# Patient Record
Sex: FEMALE | Race: WHITE | NOT HISPANIC OR LATINO | Employment: FULL TIME | URBAN - METROPOLITAN AREA
[De-identification: names, ages, dates, MRNs, and addresses within clinical notes are randomized per-mention and may not be internally consistent; named-entity substitution may affect disease eponyms.]

---

## 2017-06-15 DIAGNOSIS — E66.3 OVERWEIGHT(278.02): ICD-10-CM

## 2017-06-15 DIAGNOSIS — Z12.31 ENCOUNTER FOR SCREENING MAMMOGRAM FOR MALIGNANT NEOPLASM OF BREAST: ICD-10-CM

## 2017-06-15 DIAGNOSIS — R14.0 ABDOMINAL DISTENSION (GASEOUS): ICD-10-CM

## 2017-06-15 DIAGNOSIS — R73.9 HYPERGLYCEMIA: ICD-10-CM

## 2017-06-15 DIAGNOSIS — Z12.11 ENCOUNTER FOR SCREENING FOR MALIGNANT NEOPLASM OF COLON: ICD-10-CM

## 2017-06-15 DIAGNOSIS — F41.9 ANXIETY DISORDER: ICD-10-CM

## 2017-06-15 DIAGNOSIS — E78.5 HYPERLIPIDEMIA: ICD-10-CM

## 2017-06-15 DIAGNOSIS — K21.9 GASTRO-ESOPHAGEAL REFLUX DISEASE WITHOUT ESOPHAGITIS: ICD-10-CM

## 2017-06-22 ENCOUNTER — ALLSCRIPTS OFFICE VISIT (OUTPATIENT)
Dept: OTHER | Facility: OTHER | Age: 52
End: 2017-06-22

## 2017-06-26 ENCOUNTER — APPOINTMENT (OUTPATIENT)
Dept: LAB | Facility: HOSPITAL | Age: 52
End: 2017-06-26
Payer: COMMERCIAL

## 2017-06-26 DIAGNOSIS — F41.9 ANXIETY DISORDER: ICD-10-CM

## 2017-06-26 DIAGNOSIS — Z12.11 ENCOUNTER FOR SCREENING FOR MALIGNANT NEOPLASM OF COLON: ICD-10-CM

## 2017-06-26 DIAGNOSIS — E66.3 OVERWEIGHT(278.02): ICD-10-CM

## 2017-06-26 LAB
ALBUMIN SERPL BCP-MCNC: 3.6 G/DL (ref 3.5–5)
ALP SERPL-CCNC: 81 U/L (ref 46–116)
ALT SERPL W P-5'-P-CCNC: 40 U/L (ref 12–78)
ANION GAP SERPL CALCULATED.3IONS-SCNC: 8 MMOL/L (ref 4–13)
AST SERPL W P-5'-P-CCNC: 19 U/L (ref 5–45)
BASOPHILS # BLD AUTO: 0.04 THOUSANDS/ΜL (ref 0–0.1)
BASOPHILS NFR BLD AUTO: 0 % (ref 0–1)
BILIRUB SERPL-MCNC: 0.6 MG/DL (ref 0.2–1)
BUN SERPL-MCNC: 16 MG/DL (ref 5–25)
CALCIUM SERPL-MCNC: 8.8 MG/DL (ref 8.3–10.1)
CHLORIDE SERPL-SCNC: 107 MMOL/L (ref 100–108)
CHOLEST SERPL-MCNC: 233 MG/DL (ref 50–200)
CO2 SERPL-SCNC: 27 MMOL/L (ref 21–32)
CREAT SERPL-MCNC: 0.65 MG/DL (ref 0.6–1.3)
EOSINOPHIL # BLD AUTO: 0.33 THOUSAND/ΜL (ref 0–0.61)
EOSINOPHIL NFR BLD AUTO: 3 % (ref 0–6)
ERYTHROCYTE [DISTWIDTH] IN BLOOD BY AUTOMATED COUNT: 13.2 % (ref 11.6–15.1)
GFR SERPL CREATININE-BSD FRML MDRD: >60 ML/MIN/1.73SQ M
GLUCOSE P FAST SERPL-MCNC: 111 MG/DL (ref 65–99)
HCT VFR BLD AUTO: 45.6 % (ref 34.8–46.1)
HDLC SERPL-MCNC: 38 MG/DL (ref 40–60)
HEMOCCULT STL QL IA: POSITIVE
HGB BLD-MCNC: 15.3 G/DL (ref 11.5–15.4)
LDLC SERPL CALC-MCNC: 163 MG/DL (ref 0–100)
LYMPHOCYTES # BLD AUTO: 2.91 THOUSANDS/ΜL (ref 0.6–4.47)
LYMPHOCYTES NFR BLD AUTO: 29 % (ref 14–44)
MCH RBC QN AUTO: 32.1 PG (ref 26.8–34.3)
MCHC RBC AUTO-ENTMCNC: 33.6 G/DL (ref 31.4–37.4)
MCV RBC AUTO: 96 FL (ref 82–98)
MONOCYTES # BLD AUTO: 0.8 THOUSAND/ΜL (ref 0.17–1.22)
MONOCYTES NFR BLD AUTO: 8 % (ref 4–12)
NEUTROPHILS # BLD AUTO: 5.78 THOUSANDS/ΜL (ref 1.85–7.62)
NEUTS SEG NFR BLD AUTO: 60 % (ref 43–75)
NRBC BLD AUTO-RTO: 0 /100 WBCS
PLATELET # BLD AUTO: 275 THOUSANDS/UL (ref 149–390)
PMV BLD AUTO: 11.5 FL (ref 8.9–12.7)
POTASSIUM SERPL-SCNC: 4.2 MMOL/L (ref 3.5–5.3)
PROT SERPL-MCNC: 6.8 G/DL (ref 6.4–8.2)
RBC # BLD AUTO: 4.77 MILLION/UL (ref 3.81–5.12)
SODIUM SERPL-SCNC: 142 MMOL/L (ref 136–145)
TRIGL SERPL-MCNC: 162 MG/DL
TSH SERPL DL<=0.05 MIU/L-ACNC: 0.64 UIU/ML (ref 0.36–3.74)
WBC # BLD AUTO: 9.89 THOUSAND/UL (ref 4.31–10.16)

## 2017-06-26 PROCEDURE — 85025 COMPLETE CBC W/AUTO DIFF WBC: CPT

## 2017-06-26 PROCEDURE — 80061 LIPID PANEL: CPT

## 2017-06-26 PROCEDURE — 80053 COMPREHEN METABOLIC PANEL: CPT

## 2017-06-26 PROCEDURE — G0328 FECAL BLOOD SCRN IMMUNOASSAY: HCPCS

## 2017-06-26 PROCEDURE — 36415 COLL VENOUS BLD VENIPUNCTURE: CPT

## 2017-06-26 PROCEDURE — 84443 ASSAY THYROID STIM HORMONE: CPT

## 2017-06-29 ENCOUNTER — ALLSCRIPTS OFFICE VISIT (OUTPATIENT)
Dept: OTHER | Facility: OTHER | Age: 52
End: 2017-06-29

## 2017-07-31 ENCOUNTER — HOSPITAL ENCOUNTER (OUTPATIENT)
Dept: RADIOLOGY | Age: 52
Discharge: HOME | End: 2017-07-31
Payer: COMMERCIAL

## 2017-07-31 DIAGNOSIS — Z12.31 ENCOUNTER FOR SCREENING MAMMOGRAM FOR MALIGNANT NEOPLASM OF BREAST: ICD-10-CM

## 2017-07-31 PROCEDURE — G0202 SCR MAMMO BI INCL CAD: HCPCS

## 2017-08-17 ENCOUNTER — ALLSCRIPTS OFFICE VISIT (OUTPATIENT)
Dept: OTHER | Facility: OTHER | Age: 52
End: 2017-08-17

## 2017-08-30 ENCOUNTER — ALLSCRIPTS OFFICE VISIT (OUTPATIENT)
Dept: OTHER | Facility: OTHER | Age: 52
End: 2017-08-30

## 2017-08-30 DIAGNOSIS — E78.5 HYPERLIPIDEMIA: ICD-10-CM

## 2017-08-30 DIAGNOSIS — R73.9 HYPERGLYCEMIA: ICD-10-CM

## 2017-09-20 RX ORDER — OMEPRAZOLE 20 MG/1
20 CAPSULE, DELAYED RELEASE ORAL DAILY
COMMUNITY
End: 2018-07-23 | Stop reason: SDUPTHER

## 2017-09-20 RX ORDER — ATORVASTATIN CALCIUM 10 MG/1
10 TABLET, FILM COATED ORAL DAILY
COMMUNITY
End: 2018-03-15 | Stop reason: SDUPTHER

## 2017-09-20 RX ORDER — ALPRAZOLAM 0.25 MG/1
0.25 TABLET ORAL 2 TIMES DAILY PRN
COMMUNITY
End: 2018-03-15 | Stop reason: SDUPTHER

## 2017-09-20 RX ORDER — SERTRALINE HYDROCHLORIDE 25 MG/1
50 TABLET, FILM COATED ORAL DAILY
COMMUNITY
End: 2018-04-03 | Stop reason: SDUPTHER

## 2017-09-26 ENCOUNTER — ANESTHESIA EVENT (OUTPATIENT)
Dept: GASTROENTEROLOGY | Facility: MEDICAL CENTER | Age: 52
End: 2017-09-26
Payer: COMMERCIAL

## 2017-09-26 ENCOUNTER — GENERIC CONVERSION - ENCOUNTER (OUTPATIENT)
Dept: GASTROENTEROLOGY | Facility: MEDICAL CENTER | Age: 52
End: 2017-09-26

## 2017-09-26 ENCOUNTER — HOSPITAL ENCOUNTER (OUTPATIENT)
Facility: MEDICAL CENTER | Age: 52
Setting detail: OUTPATIENT SURGERY
Discharge: HOME | End: 2017-09-26
Attending: INTERNAL MEDICINE | Admitting: INTERNAL MEDICINE
Payer: COMMERCIAL

## 2017-09-26 ENCOUNTER — ANESTHESIA (OUTPATIENT)
Dept: GASTROENTEROLOGY | Facility: MEDICAL CENTER | Age: 52
End: 2017-09-26
Payer: COMMERCIAL

## 2017-09-26 VITALS
SYSTOLIC BLOOD PRESSURE: 121 MMHG | TEMPERATURE: 96.7 F | HEART RATE: 66 BPM | OXYGEN SATURATION: 98 % | HEIGHT: 63 IN | RESPIRATION RATE: 18 BRPM | DIASTOLIC BLOOD PRESSURE: 59 MMHG | WEIGHT: 170 LBS | BODY MASS INDEX: 30.12 KG/M2

## 2017-09-26 DIAGNOSIS — K21.9 GASTRO-ESOPHAGEAL REFLUX DISEASE WITHOUT ESOPHAGITIS: ICD-10-CM

## 2017-09-26 DIAGNOSIS — R19.5 FECAL OCCULT BLOOD TEST POSITIVE: ICD-10-CM

## 2017-09-26 PROCEDURE — 88342 IMHCHEM/IMCYTCHM 1ST ANTB: CPT | Performed by: INTERNAL MEDICINE

## 2017-09-26 PROCEDURE — 88305 TISSUE EXAM BY PATHOLOGIST: CPT | Performed by: INTERNAL MEDICINE

## 2017-09-26 RX ORDER — PROPOFOL 10 MG/ML
INJECTION, EMULSION INTRAVENOUS AS NEEDED
Status: DISCONTINUED | OUTPATIENT
Start: 2017-09-26 | End: 2017-09-26 | Stop reason: SURG

## 2017-09-26 RX ORDER — SODIUM CHLORIDE 9 MG/ML
125 INJECTION, SOLUTION INTRAVENOUS CONTINUOUS
Status: DISCONTINUED | OUTPATIENT
Start: 2017-09-26 | End: 2017-09-26 | Stop reason: HOSPADM

## 2017-09-26 RX ADMIN — PROPOFOL 30 MG: 10 INJECTION, EMULSION INTRAVENOUS at 10:48

## 2017-09-26 RX ADMIN — PROPOFOL 50 MG: 10 INJECTION, EMULSION INTRAVENOUS at 10:39

## 2017-09-26 RX ADMIN — SODIUM CHLORIDE 125 ML/HR: 0.9 INJECTION, SOLUTION INTRAVENOUS at 09:43

## 2017-09-26 RX ADMIN — PROPOFOL 130 MG: 10 INJECTION, EMULSION INTRAVENOUS at 10:19

## 2017-09-26 RX ADMIN — PROPOFOL 40 MG: 10 INJECTION, EMULSION INTRAVENOUS at 10:57

## 2017-09-26 RX ADMIN — PROPOFOL 30 MG: 10 INJECTION, EMULSION INTRAVENOUS at 10:46

## 2017-09-26 RX ADMIN — PROPOFOL 40 MG: 10 INJECTION, EMULSION INTRAVENOUS at 10:35

## 2017-09-26 RX ADMIN — SODIUM CHLORIDE: 0.9 INJECTION, SOLUTION INTRAVENOUS at 10:11

## 2017-09-26 RX ADMIN — PROPOFOL 50 MG: 10 INJECTION, EMULSION INTRAVENOUS at 10:33

## 2017-09-26 RX ADMIN — PROPOFOL 20 MG: 10 INJECTION, EMULSION INTRAVENOUS at 10:21

## 2017-09-26 RX ADMIN — PROPOFOL 40 MG: 10 INJECTION, EMULSION INTRAVENOUS at 10:54

## 2017-09-26 RX ADMIN — PROPOFOL 40 MG: 10 INJECTION, EMULSION INTRAVENOUS at 10:45

## 2017-09-26 RX ADMIN — PROPOFOL 50 MG: 10 INJECTION, EMULSION INTRAVENOUS at 10:42

## 2017-09-26 RX ADMIN — PROPOFOL 30 MG: 10 INJECTION, EMULSION INTRAVENOUS at 10:49

## 2017-09-26 RX ADMIN — PROPOFOL 40 MG: 10 INJECTION, EMULSION INTRAVENOUS at 11:02

## 2017-09-26 RX ADMIN — PROPOFOL 30 MG: 10 INJECTION, EMULSION INTRAVENOUS at 10:36

## 2017-09-26 RX ADMIN — LIDOCAINE HYDROCHLORIDE 50 MG: 20 INJECTION, SOLUTION INTRAVENOUS at 10:11

## 2017-09-26 RX ADMIN — PROPOFOL 50 MG: 10 INJECTION, EMULSION INTRAVENOUS at 10:29

## 2017-09-26 RX ADMIN — PROPOFOL 50 MG: 10 INJECTION, EMULSION INTRAVENOUS at 10:25

## 2017-09-26 NOTE — DISCHARGE INSTRUCTIONS
Upper Endoscopy   WHAT YOU NEED TO KNOW:   An upper endoscopy is also called an upper gastrointestinal (GI) endoscopy, or an esophagogastroduodenoscopy (EGD)  You may feel bloated, gassy, or have some abdominal discomfort after your procedure  Your throat may be sore for 24 to 36 hours  You may burp or pass gas from air that is still inside your body  DISCHARGE INSTRUCTIONS:   Call 911 for any of the following:   · You have sudden chest pain or trouble breathing  Seek care immediately if:   · You feel dizzy or faint  · You have trouble swallowing  · Your bowel movements are very dark or black  · Your abdomen is hard and firm and you have severe pain  · You vomit blood  Contact your healthcare provider if:   · You feel full or bloated and cannot burp or pass gas  · You have not had a bowel movement for 3 days after your procedure  · You have neck pain  · You have a fever or chills  · You have nausea or are vomiting  · You have a rash or hives  · You have questions or concerns about your endoscopy  Relieve a sore throat:  Suck on throat lozenges or crushed ice  Gargle with a small amount of warm salt water  Mix 1 teaspoon of salt and 1 cup of warm water to make salt water  Relieve gas and discomfort from bloating:  Lie on your right side with a heating pad on your abdomen  Take short walks to help pass gas  Eat small meals until bloating is relieved  Rest after your procedure: You have been given medicine to relax you  Do not  drive or make important decisions until the day after your procedure  Return to your normal activity as directed  You can usually return to work the day after your procedure  Follow up with your healthcare provider as directed:  Write down your questions so you remember to ask them during your visits     © 2017 [address] is for End User's use only and may not be sold, redistributed or otherwise used for commercial purposes  All illustrations and images included in CareNotes® are the copyrighted property of [employer]  or [address]   The above information is an  only  It is not intended as medical advice for individual conditions or treatments  Talk to your doctor, nurse or pharmacist before following any medical regimen to see if it is safe and effective for you  Gastritis   WHAT YOU NEED TO KNOW:   Gastritis is inflammation or irritation of the lining of your stomach  DISCHARGE INSTRUCTIONS:   Call 911 for any of the following:   · You develop chest pain or shortness of breath  Seek care immediately if:   · You vomit blood  · You have black or bloody bowel movements  · You have severe stomach or back pain  Contact your healthcare provider if:   · You have a fever  · You have new or worsening symptoms, even after treatment  · You have questions or concerns about your condition or care  Medicines:   · Medicines  may be given to help treat a bacterial infection or decrease stomach acid  · Take your medicine as directed  Contact your healthcare provider if you think your medicine is not helping or if you have side effects  Tell him or her if you are allergic to any medicine  Keep a list of the medicines, vitamins, and herbs you take  Include the amounts, and when and why you take them  Bring the list or the pill bottles to follow-up visits  Carry your medicine list with you in case of an emergency  Manage or prevent gastritis:   · Do not smoke  Nicotine and other chemicals in cigarettes and cigars can make your symptoms worse and cause lung damage  Ask your healthcare provider for information if you currently smoke and need help to quit  E-cigarettes or smokeless tobacco still contain nicotine  Talk to your healthcare provider before you use these products  · Do not drink alcohol  Alcohol can prevent healing and make your gastritis worse   Talk to your healthcare provider if you need help to stop drinking  · Do not take NSAIDs or aspirin unless directed  These and similar medicines can cause irritation  If your healthcare provider says it is okay to take NSAIDs, take them with food  · Do not eat foods that cause irritation  Foods such as oranges and salsa can cause burning or pain  Eat a variety of healthy foods  Examples include fruits (not citrus), vegetables, low-fat dairy products, beans, whole-grain breads, and lean meats and fish  Try to eat small meals, and drink water with your meals  Do not eat for at least 3 hours before you go to bed  · Find ways to relax and decrease stress  Stress can increase stomach acid and make gastritis worse  Activities such as yoga, meditation, or listening to music can help you relax  Spend time with friends, or do things you enjoy  Follow up with your healthcare provider as directed: You may need ongoing tests or treatment, or referral to a gastroenterologist  Write down your questions so you remember to ask them during your visits  © 2017 [address] [facility] Information is for End User's use only and may not be sold, redistributed or otherwise used for commercial purposes  All illustrations and images included in CareNotes® are the copyrighted property of A D A M , Inc  or [employer]  The above information is an  only  It is not intended as medical advice for individual conditions or treatments  Talk to your doctor, nurse or pharmacist before following any medical regimen to see if it is safe and effective for you  Colonoscopy   WHAT YOU NEED TO KNOW:   A colonoscopy is a procedure to examine the inside of your colon (intestine) with a scope  Polyps or tissue growths may have been removed during your colonoscopy  It is normal to feel bloated and to have some abdominal discomfort  You should be passing gas   If you have hemorrhoids or you had polyps removed, you may have a small amount of bleeding  DISCHARGE INSTRUCTIONS:   Seek care immediately if:   · You have a large amount of bright red blood in your bowel movements  · Your abdomen is hard and firm and you have severe pain  · You have sudden trouble breathing  Contact your healthcare provider if:   · You develop a rash or hives  · You have a fever within 24 hours of your procedure       · You have not had a bowel movement for 3 days after your procedure  · You have questions or concerns about your condition or care  Activity:   · Do not lift, strain, or run  for 3 days after your procedure  · Rest after your procedure  You have been given medicine to relax you  Do not  drive or make important decisions until the day after your procedure  Return to your normal activity as directed  · Relieve gas and discomfort from bloating  by lying on your right side with a heating pad on your abdomen  You may need to take short walks to help the gas move out  Eat small meals until bloating is relieved  If you had polyps removed: For 7 days after your procedure:  · Do not  take aspirin  · Do not  go on long car rides  Follow up with your healthcare provider as directed:  Write down your questions so you remember to ask them during your visits  © 2017 [address] is for End User's use only and may not be sold, redistributed or otherwise used for commercial purposes  All illustrations and images included in CareNotes® are the copyrighted property of [employer]  or [address]  The above information is an  only  It is not intended as medical advice for individual conditions or treatments  Talk to your doctor, nurse or pharmacist before following any medical regimen to see if it is safe and effective for you  Colorectal Polyps   WHAT YOU NEED TO KNOW:   Colorectal polyps are small growths of tissue in the lining of the colon and rectum   Most polyps are hyperplastic polyps and are usually benign (noncancerous)  Certain types of polyps, called adenomatous polyps, may turn into cancer  DISCHARGE INSTRUCTIONS:   Follow up with your healthcare provider or gastroenterologist as directed: You may need to return for more tests, such as another colonoscopy  Write down your questions so you remember to ask them during your visits  Reduce your risk for colorectal polyps:   · Eat a variety of healthy foods:  Healthy foods include fruit, vegetables, whole-grain breads, low-fat dairy products, beans, lean meat, and fish  Ask if you need to be on a special diet  · Maintain a healthy weight:  Ask your healthcare provider if you need to lose weight and how much you need to lose  Ask for help with a weight loss program     · Exercise:  Begin to exercise slowly and do more as you get stronger  Talk with your healthcare provider before you start an exercise program      · Limit alcohol:  Your risk for polyps increases the more you drink  · Do not smoke: If you smoke, it is never too late to quit  Ask for information about how to stop  For support and more information:   · [facility]) [address], [place] [address]  Phone: 2- [phone]  Web Address: www digestive  niddk nih gov  Contact your healthcare provider or gastroenterologist if:   · You have a fever  · You have chills, a cough, or feel weak and achy  · You have abdominal pain that does not go away or gets worse after you take medicine  · Your abdomen is swollen  · You are losing weight without trying  · You have questions or concerns about your condition or care  Seek care immediately or call 911 if:   · You have sudden shortness of breath  · You have a fast heart rate, fast breathing, or are too dizzy to stand up  · You have severe abdominal pain  · You see blood in your bowel movement    © 2017 [facility] [address] is for End User's use only and may not be sold, redistributed or otherwise used for commercial purposes  All illustrations and images included in CareNotes® are the copyrighted property of A D A M , Inc  or [employer]  The above information is an  only  It is not intended as medical advice for individual conditions or treatments  Talk to your doctor, nurse or pharmacist before following any medical regimen to see if it is safe and effective for you

## 2017-09-26 NOTE — OP NOTE
**** GI/ENDOSCOPY REPORT ****     PATIENT NAME: Jacinto Joyner - VISIT ID:  Patient ID: [id number]   YOB: 1965     INTRODUCTION: Esophagogastroduodenoscopy - A 46 female patient presents   for an outpatient Esophagogastroduodenoscopy at [place] [address]  INDICATIONS: GERD  CONSENT: The benefits, risks, and alternatives to the procedure were   discussed and informed consent was obtained from the patient  PREPARATION:  EKG, pulse, pulse oximetry and blood pressure were monitored   throughout the procedure  MEDICATIONS: MAC anesthesia  PROCEDURE:  The endoscope was passed without difficulty through the mouth   under direct visualization and advanced to the 2nd portion of the   duodenum  The scope was withdrawn and the mucosa was carefully examined  FINDINGS:   Esophagus: The Z line was visualized at 39 cm from the entry   site  The esophagus appeared to be normal   Stomach: Gastritis was found   in the pylorus  A cold forceps biopsy was taken  Random biopsies taken   from the gastric mucosa  Duodenum: The duodenum appeared to be normal      COMPLICATIONS: There were no complications  IMPRESSIONS: Z line visualized  Normal esophagus  Gastritis found in the   pylorus  Biopsy taken  Normal duodenum  RECOMMENDATIONS: Follow-up on the results of the biopsy specimens  ESTIMATED BLOOD LOSS: Insignificant  PATHOLOGY SPECIMENS: Cold forceps biopsy taken  Associated finding:   Gastritis  PROCEDURE CODES:     ICD-9 Codes: 530 81 Esophageal reflux 535 50 Unspecified gastritides and   gastroduodenitis, without mention of hemorrhage     ICD-10 Codes: K21 Gastro-esophageal reflux disease K29 Gastritis and   duodenitis     PERFORMED BY: OUSMANE Gama  on 09/26/2017  Version 1, electronically signed by OUSMANE Person  on 09/26/2017 at   11:07

## 2017-09-26 NOTE — OP NOTE
**** GI/ENDOSCOPY REPORT ****     PATIENT NAME: Noble Garcia ------ VISIT ID:  Patient ID:   [id number] YOB: 1965     INTRODUCTION: Colonoscopy - A 46 female patient presents for an outpatient   Colonoscopy at [address] [place] [address]  PREVIOUS COLONOSCOPY:     INDICATIONS: Fecal occult blood positive  CONSENT:  The benefits, risks, and alternatives to the procedure were   discussed and informed consent was obtained from the patient  PREPARATION: EKG, pulse, pulse oximetry and blood pressure were monitored   throughout the procedure  The patient was identified by myself both   verbally and by visual inspection of ID band  Airway Assessment   Classification: Airway class 2 - Visualization of the soft palate, fauces   and uvula  ASA Classification: See anesthesia record  MEDICATIONS: Anesthesia-check records MAC anesthesia  PROCEDURE:  The endoscope was passed without difficulty through the anus   under direct visualization and advanced to the cecum, confirmed by   appendiceal orifice and ileocecal valve  The scope was withdrawn and the   mucosa was carefully examined  The quality of the preparation was good  Cecal Intubation Time: 4 Minute(s) Scope Withdrawal Time:25 Minute(s)     RECTAL EXAM: Normal rectal exam      FINDINGS:   - A single diminutive sessile polyp was found in the cecum  The polyp was completely removed by cold biopsy polypectomy  - Three   diminutive sessile polyps were found in the ascending colon  All polyps   were removed by cold biopsy polypectomy  - A single polyp, measuring   between 5 and 10 mm in size, was found in the hepatic flexure  The polyp   was completely removed by cold snare polypectomy  - Two diminutive   sessile polyps were found in the descending colon  The polyps were removed   by cold snare polypectomy  - A single pedunculated polyp, measuring   greater than 20 mm in size, was found in the descending colon  The polyp   was completely removed by snare cautery polypectomy  To control bleeding,   2 clips was applied  - there are other polyps in sigmoid colon, will plan   for removal during next session  COMPLICATIONS: There were no complications  IMPRESSIONS: A single diminutive sessile polyp found in the cecum; removed   by cold biopsy polypectomy  Three diminutive, bleeding, sessile polyps   found in the ascending colon; all polyps removed by cold biopsy   polypectomy  A single polyp found in the hepatic flexure; removed by cold   snare polypectomy  Two diminutive sessile polyps found in the descending   colon; removed by cold snare polypectomy  A single pedunculated polyp   found in the descending colon; removed by snare cautery polypectomy  Clips   were applied to control bleeding  RECOMMENDATIONS: Follow-up on the results of the biopsy specimens  Colonoscopy recommended in 1 month  ESTIMATED BLOOD LOSS: Insignificant  PATHOLOGY SPECIMENS: Completely removed by cold biopsy polypectomy  All   polyps removed by cold biopsy polypectomy  Completely removed by cold   snare polypectomy  Removed by cold snare polypectomy  Completely removed   by snare cautery polypectomy  PROCEDURE CODES:     ICD-9 Codes: 792 1 Nonspecific abnormal findings in stool contents 211 3   Benign neoplasm of colon 211 3 Benign neoplasm of colon 211 3 Benign   neoplasm of colon 211 3 Benign neoplasm of colon 211 3 Benign neoplasm of   colon     ICD-10 Codes: R19 5 Other fecal abnormalities K63 5 Polyp of colon K63 5   Polyp of colon K63 5 Polyp of colon K63 5 Polyp of colon K63 5 Polyp of   colon     PERFORMED BY: OUSMANE Le  on 09/26/2017  Version 1, electronically signed by OUSMANE Louise  on 09/26/2017 at   11:18

## 2017-10-04 ENCOUNTER — GENERIC CONVERSION - ENCOUNTER (OUTPATIENT)
Dept: OTHER | Facility: OTHER | Age: 52
End: 2017-10-04

## 2017-11-17 ENCOUNTER — APPOINTMENT (OUTPATIENT)
Dept: LAB | Facility: HOSPITAL | Age: 52
End: 2017-11-17
Attending: INTERNAL MEDICINE
Payer: COMMERCIAL

## 2017-11-17 DIAGNOSIS — R73.9 HYPERGLYCEMIA: ICD-10-CM

## 2017-11-17 DIAGNOSIS — E78.5 HYPERLIPIDEMIA: ICD-10-CM

## 2017-11-17 LAB
ANION GAP SERPL CALCULATED.3IONS-SCNC: 6 MMOL/L (ref 4–13)
BUN SERPL-MCNC: 18 MG/DL (ref 5–25)
CALCIUM SERPL-MCNC: 8.5 MG/DL (ref 8.3–10.1)
CHLORIDE SERPL-SCNC: 105 MMOL/L (ref 100–108)
CHOLEST SERPL-MCNC: 181 MG/DL (ref 50–200)
CO2 SERPL-SCNC: 29 MMOL/L (ref 21–32)
CREAT SERPL-MCNC: 0.66 MG/DL (ref 0.6–1.3)
EST. AVERAGE GLUCOSE BLD GHB EST-MCNC: 131 MG/DL
GFR SERPL CREATININE-BSD FRML MDRD: 102 ML/MIN/1.73SQ M
GLUCOSE P FAST SERPL-MCNC: 96 MG/DL (ref 65–99)
HBA1C MFR BLD: 6.2 % (ref 4.2–6.3)
HDLC SERPL-MCNC: 39 MG/DL (ref 40–60)
LDLC SERPL CALC-MCNC: 115 MG/DL (ref 0–100)
POTASSIUM SERPL-SCNC: 4.3 MMOL/L (ref 3.5–5.3)
SODIUM SERPL-SCNC: 140 MMOL/L (ref 136–145)
TRIGL SERPL-MCNC: 136 MG/DL

## 2017-11-17 PROCEDURE — 80061 LIPID PANEL: CPT

## 2017-11-17 PROCEDURE — 80048 BASIC METABOLIC PNL TOTAL CA: CPT

## 2017-11-17 PROCEDURE — 36415 COLL VENOUS BLD VENIPUNCTURE: CPT

## 2017-11-17 PROCEDURE — 83036 HEMOGLOBIN GLYCOSYLATED A1C: CPT

## 2017-12-04 ENCOUNTER — GENERIC CONVERSION - ENCOUNTER (OUTPATIENT)
Dept: OTHER | Facility: OTHER | Age: 52
End: 2017-12-04

## 2017-12-21 LAB
LEFT EYE DIABETIC RETINOPATHY: NORMAL
RIGHT EYE DIABETIC RETINOPATHY: NORMAL

## 2018-01-12 ENCOUNTER — APPOINTMENT (OUTPATIENT)
Dept: LAB | Facility: HOSPITAL | Age: 53
End: 2018-01-12
Payer: COMMERCIAL

## 2018-01-12 ENCOUNTER — ALLSCRIPTS OFFICE VISIT (OUTPATIENT)
Dept: OTHER | Facility: OTHER | Age: 53
End: 2018-01-12

## 2018-01-12 VITALS
OXYGEN SATURATION: 98 % | WEIGHT: 165.4 LBS | BODY MASS INDEX: 29.3 KG/M2 | SYSTOLIC BLOOD PRESSURE: 126 MMHG | DIASTOLIC BLOOD PRESSURE: 80 MMHG | TEMPERATURE: 97.8 F | HEART RATE: 73 BPM | HEIGHT: 63 IN

## 2018-01-12 DIAGNOSIS — E78.00 PURE HYPERCHOLESTEROLEMIA: ICD-10-CM

## 2018-01-12 DIAGNOSIS — E55.9 VITAMIN D DEFICIENCY: ICD-10-CM

## 2018-01-12 DIAGNOSIS — R35.0 FREQUENCY OF MICTURITION: ICD-10-CM

## 2018-01-12 LAB
BILIRUB UR QL STRIP: NEGATIVE
BILIRUB UR QL STRIP: NEGATIVE
CLARITY UR: NORMAL
CLARITY UR: NORMAL
COLOR UR: YELLOW
COLOR UR: YELLOW
GLUCOSE (HISTORICAL): NEGATIVE
GLUCOSE UR STRIP-MCNC: NEGATIVE MG/DL
HGB UR QL STRIP.AUTO: NEGATIVE
HGB UR QL STRIP.AUTO: NEGATIVE
KETONES UR STRIP-MCNC: NEGATIVE MG/DL
KETONES UR STRIP-MCNC: NEGATIVE MG/DL
LEUKOCYTE ESTERASE UR QL STRIP: NEGATIVE
LEUKOCYTE ESTERASE UR QL STRIP: NEGATIVE
NITRITE UR QL STRIP: NEGATIVE
NITRITE UR QL STRIP: NEGATIVE
PH UR STRIP.AUTO: 7.5 [PH]
PH UR STRIP.AUTO: 7.5 [PH] (ref 4.5–8)
PROT UR STRIP-MCNC: NEGATIVE MG/DL
PROT UR STRIP-MCNC: NEGATIVE MG/DL
SP GR UR STRIP.AUTO: 1.01 (ref 1–1.03)
SP GR UR STRIP.AUTO: 1.02
UROBILINOGEN UR QL STRIP.AUTO: 0.2
UROBILINOGEN UR QL STRIP.AUTO: 1 E.U./DL

## 2018-01-12 PROCEDURE — 81003 URINALYSIS AUTO W/O SCOPE: CPT

## 2018-01-13 VITALS
BODY MASS INDEX: 29.95 KG/M2 | HEIGHT: 63 IN | SYSTOLIC BLOOD PRESSURE: 124 MMHG | DIASTOLIC BLOOD PRESSURE: 82 MMHG | TEMPERATURE: 97.3 F | HEART RATE: 76 BPM | WEIGHT: 169 LBS | OXYGEN SATURATION: 97 %

## 2018-01-13 VITALS
RESPIRATION RATE: 18 BRPM | SYSTOLIC BLOOD PRESSURE: 118 MMHG | HEART RATE: 78 BPM | WEIGHT: 169 LBS | DIASTOLIC BLOOD PRESSURE: 71 MMHG | BODY MASS INDEX: 29.95 KG/M2 | TEMPERATURE: 97 F | HEIGHT: 63 IN

## 2018-01-13 NOTE — PROGRESS NOTES
Assessment   1  Urinary frequency (091 49) (R35 0)    Plan   Urinary frequency    · Phenazopyridine HCl - 200 MG Oral Tablet; TAKE 1 TABLET 3 TIMES DAILY AS    NEEDED FOR PAIN   · (1) URINALYSIS w URINE C/S REFLEX (will reflex a microscopy if leukocytes, occult    blood, or nitrites are not within normal limits); Status:Active; Requested NJP:[id number]; Discussion/Summary      As discussed, start the pyridium for your discomfort on urination  This medication can make your urine bright orange, which is normal   have sent the urine to the lab for culture  will call you with the results of the urine test if it is positive and we need to prescribe you an antibiotic   if not improving  The patient was counseled regarding diagnostic results,-- instructions for management,-- risk factor reductions,-- prognosis,-- patient and family education,-- impressions,-- risks and benefits of treatment options,-- importance of compliance with treatment  Possible side effects of new medications were reviewed with the patient/guardian today  The treatment plan was reviewed with the patient/guardian  The patient/guardian understands and agrees with the treatment plan      Chief Complaint   pt here c/o low back pain, burning with urination and frequency, pt used over the counter medication last night which relieved it a little      History of Present Illness   HPI: Patient presents for an acute visit  Patient presents with symptoms that started last night  She reports pressure on urinating, increased frequency and urgency, and intermittent burning on urination  She used Monistat 7 last night for her symptoms  She denies a foul odor or cloudiness to her urine  She denies fevers/chills, but does report lower abdominal pressure and pain at her bladder  Review of Systems        Constitutional: no fever-- and-- no chills  Cardiovascular: no chest pain  Respiratory: no shortness of breath        Gastrointestinal: abdominal pain, but-- as noted in HPI  Genitourinary: dysuria, but-- as noted in HPI  Integumentary: no rashes  Neurological: no headache  Active Problems   1  Abdominal bloating (787 3) (R14 0)   2  Abnormal EKG (794 31) (R94 31)   3  Abnormal weight gain (783 1) (R63 5)   4  Allergic rhinitis (477 9) (J30 9)   5  Anxiety (300 00) (F41 9)   6  Asthma (493 90) (J45 909)   7  Atherosclerosis of arteries (440 8) (I70 8)   8  Benign colon polyp (211 3) (K63 5)   9  Benign paroxysmal positional vertigo (386 11) (H81 10)   10  Colon polyps (211 3) (K63 5)   11  Dermatophytosis of nail (110 1) (B35 1)   12  Diverticulitis of colon (562 11) (K57 32)   13  Fecal occult blood test positive (792 1) (R19 5)   14  GERD (gastroesophageal reflux disease) (530 81) (K21 9)   15  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)   16  Headache, unspecified headache type (784 0) (R51)   17  Hypercholesteremia (272 0) (E78 00)   18  Hyperglycemia (790 29) (R73 9)   19  Hyperlipidemia (272 4) (E78 5)   20  Neuralgia and neuritis, unspecified (729 2) (M79 2)   21  Ovarian cyst (620 2) (N83 209)   22  Overweight (BMI 25 0-29 9) (278 02) (E66 3)   23  Renal calculus, bilateral (592 0) (N20 0)   24  Situational anxiety (300 09) (F41 8)   25  Solitary pulmonary nodule (793 11) (R91 1)    Past Medical History   Active Problems And Past Medical History Reviewed: The active problems and past medical history were reviewed and updated today  Surgical History   Surgical History Reviewed: The surgical history was reviewed and updated today  Social History    · Caffeine use (V49 89) (F15 90)   · Current every day smoker (305 1) (B71 166)   · No illicit drug use   · Rarely consumes alcohol (V49 89) (Z78 9)  The social history was reviewed and updated today  The social history was reviewed and is unchanged  Family History   Family History Reviewed: The family history was reviewed and updated today  Current Meds    1  ALPRAZolam 0 25 MG Oral Tablet; TAKE 1 TABLET Twice daily PRN; Therapy: 68JTX5138 to (Evaluate:50Trx3334); Last Rx:[id number] Ordered   2  Atorvastatin Calcium 10 MG Oral Tablet; TAKE 1 TABLET DAILY AS DIRECTED; Therapy: 97KWS0410 to (Renew:93Jbd4038)  Requested for: 24Rws7736; Last     Rx:[id number] Ordered   3  Omeprazole 20 MG Oral Capsule Delayed Release; Take 1 tablet daily; Therapy: 63CZQ2817 to (26 [id number])  Requested for: 19Nto7695; Last     Rx:[id number] Ordered   4  Sertraline HCl - 25 MG Oral Tablet; TAKE 1 TABLET DAILY; Therapy: 51GVX7668 to (Monika Santana)  Requested for: 30Aug2017; Last     Rx:30Aug2017 Ordered   5  Suprep Bowel Prep Kit 17 5-3 13-1 6 GM/180ML Oral Solution; USE AS DIRECTED; Therapy: 33OYW7921 to (Last Rx:06Oct2017)  Requested for: 73MIC5932 Ordered     The medication list was reviewed and updated today  Allergies   1  Bupropion   2  clonazepam   3  morphine   4  venlafaxine    Vitals    Recorded: 83WNK2905 01:13PM   Temperature 97 8 F, Oral   Heart Rate 84   Systolic 068, LUE, Sitting   Diastolic 82, LUE, Sitting   Height 5 ft 3 in   Weight 174 lb 2 oz   BMI Calculated 30 85   BSA Calculated 1 82   O2 Saturation 96, RA     Physical Exam        Constitutional      General appearance: No acute distress, well appearing and well nourished  Ears, Nose, Mouth, and Throat      External inspection of ears and nose: Normal        Oropharynx: Normal with no erythema, edema, exudate or lesions  Pulmonary      Respiratory effort: No increased work of breathing or signs of respiratory distress  Auscultation of lungs: Clear to auscultation  Cardiovascular      Auscultation of heart: Normal rate and rhythm, normal S1 and S2, without murmurs  Examination of extremities for edema and/or varicosities: Normal        Abdomen Tender over bladder on palpation , no CVA tenderness        Musculoskeletal      Gait and station: Normal  Psychiatric      Orientation to person, place, and time: Normal        Mood and affect: Normal           Future Appointments      Date/Time Provider Specialty Site   01/17/2018 11:30 AM Yeison Penn MD Internal Medicine [facility]     Signatures    Electronically signed by : Dangelo Tavarez; Jan 12 2018  1:49PM EST                       (Author)     Electronically signed by :  Lalo Andrade MD; Jan 12 2018  2:07PM EST                       (Author)

## 2018-01-14 VITALS
SYSTOLIC BLOOD PRESSURE: 130 MMHG | OXYGEN SATURATION: 97 % | WEIGHT: 166.38 LBS | HEIGHT: 63 IN | HEART RATE: 76 BPM | BODY MASS INDEX: 29.48 KG/M2 | TEMPERATURE: 98.4 F | DIASTOLIC BLOOD PRESSURE: 82 MMHG

## 2018-01-15 NOTE — RESULT NOTES
Discussion/Summary   EGD biopsy showed gasritis  colonsoocpy biopsy showed 6 polyps were precancerous  she will need a repeat colonsoocpy in 1-3 months  please schedule  thanks  Verified Results  (1) TISSUE EXAM [id number] 10:25AM Pravin Rojas     Test Name Result Flag Reference   LAB AP CASE REPORT (Report)     Surgical Pathology Report             Case: [id number]                   Authorizing Provider: Gold Vernon MD       Collected:      09/26/2017 1025        Ordering Location:   [facility]    Received:      09/27/2017 [facility]                            Pathologist:      Mary Rich MD                                 Specimens:  A) - Stomach, pylorus bx's                                       B) - Stomach, gastric bx's                                       C) - Large Intestine, Cecum, cold bx                                  D) - Large Intestine, Right/Ascending Colon, cold bx's                         E) - Large Intestine, Hepatic Flexure, cold snare polyp                        F) - Large Intestine, Sigmoid Colon, hot snare polyp                          G) - Large Intestine, Left/Descending Colon, cold snare polyp   LAB AP FINAL DIAGNOSIS (Report)     A  Gastric pylorus (biopsy):    - Minimal chronic inactive gastritis with features of reactive   gastropathy involving antral mucosa  - Immunostain for H  pylori (with appropriate positive control) is   negative  - No intestinal metaplasia, dysplasia or neoplasia identified  B  Stomach (biopsy):    - Minimal chronic inactive gastritis involving oxyntic and foveolar   mucosa  - Immunostain for H  pylori (with appropriate positive control) is   negative  - No definitive intestinal metaplasia, dysplasia or neoplasia   identified      C  Cecal polyp (cold biopsy):    - Portions of polypoid colonic mucosa with focal suggestion of   adenomatous epithelium     - No high-grade dysplasia or malignancy identified  D  Ascending colon polyp ??3 (cold biopsy):    - Portions of tubular adenomas  - No high-grade dysplasia or malignancy identified  E  Hepatic flexure polyp, colon (cold snare):    - Portions of tubular adenoma  - No high-grade dysplasia or malignancy identified  F  Sigmoid colon polyp (hot snare):    - Tubulovillous adenoma  - No high-grade dysplasia or malignancy identified  G  Descending colon polyp (cold snare):    - Polypoid colonic mucosa with focal suggestion of adenomatous   epithelium     - No high-grade dysplasia or malignancy identified  Electronically signed by Savi Ritchie MD on 10/2/2017 at 12:16 PM  Preliminary result electronically signed by Savi Ritchie MD on 9/29/2017 at 12:02 PM   LAB AP NOTE      Interpretation performed at [facility], [address] [place] [address], [address] [place] [address]  LAB AP SURGICAL ADDITIONAL INFORMATION (Report)     All controls performed with the immunohistochemical stains reported above   reacted appropriately  These tests were developed and their performance   characteristics determined by [facility] or   [employer]  They may not be cleared or approved by the [place]  Food and Drug Administration  The FDA has determined that such clearance   or approval is not necessary  These tests are used for clinical purposes  They should not be regarded as investigational or for research  This   laboratory has been approved by [place], designated as a high-complexity   laboratory and is qualified to perform these tests  LAB AP GROSS DESCRIPTION (Report)     A  The specimen is received in formalin, labeled with the patient's name   and hospital number, and is designated Stomach, pylorus biopsy  The   specimen consists of one tan soft tissue fragment measuring 0 3 cm in   greatest dimension  Entire submitted  One cassette  B   The specimen is received in formalin, labeled with the patient's name   and hospital number, and is designated Gastric biopsies  The specimen   consists of one tan soft tissue fragment measuring 0 5 cm in greatest   dimension  Entire submitted  One cassette  C  The specimen is received in formalin, labeled with the patient's name   and hospital number, and is designated Cecum polyp  The specimen   consists of 2 tan soft tissue fragments measuring 0 3 and 0 4 cm in   greatest dimension  Entire submitted  One cassette  D  The specimen is received in formalin, labeled with the patient's name   and hospital number, and is designated Ascending colon polyp ? ?3  The   specimen consists of multiple tan soft tissue fragments measuring in   aggregate 0 8 x 0 7 x 0 2 cm  Entire submitted  One cassette  E  The specimen is received in formalin, labeled with the patient's name   and hospital number, and is designated  Hepatic flexure polyp  The   specimen consists of 3 tan soft tissue fragments measuring 0 2, 0 2, and   0 3 cm in greatest dimension  Entire submitted  One cassette  F  The specimen is received in formalin, labeled with the patient's name   and hospital number, and is designated Sigmoid polyp  The specimen   consists of one tan-red polypoid tissue fragment measuring 2 0 cm in   greatest dimension  The apparent margin of resection is painted with blue   ink  The specimen is serially sectioned and entirely submitted in 4   cassettes  G  The specimen is received in formalin, labeled with the patient's name   and hospital number, and is designated Descending colon polyp  The   specimen consists of one tan soft tissue fragment measuring 0 3 cm in   greatest dimension  Entire submitted  One cassette  Note: The estimated total formalin fixation time based upon information   provided by the submitting clinician and the standard processing schedule   is less than 72 hours  AEK   LAB AP CLINICAL INFORMATION      Fecal occult blood test positive    Gastro-esophageal reflux disease without esophagitis     gastritis

## 2018-01-17 ENCOUNTER — GENERIC CONVERSION - ENCOUNTER (OUTPATIENT)
Dept: INTERNAL MEDICINE CLINIC | Facility: CLINIC | Age: 53
End: 2018-01-17

## 2018-01-17 ENCOUNTER — TRANSCRIBE ORDERS (OUTPATIENT)
Dept: ADMINISTRATIVE | Facility: HOSPITAL | Age: 53
End: 2018-01-17

## 2018-01-17 ENCOUNTER — ALLSCRIPTS OFFICE VISIT (OUTPATIENT)
Dept: OTHER | Facility: OTHER | Age: 53
End: 2018-01-17

## 2018-01-17 ENCOUNTER — TRANSCRIBE ORDERS (OUTPATIENT)
Dept: LAB | Age: 53
End: 2018-01-17

## 2018-01-17 ENCOUNTER — APPOINTMENT (OUTPATIENT)
Dept: LAB | Age: 53
End: 2018-01-17
Payer: COMMERCIAL

## 2018-01-17 DIAGNOSIS — E55.9 VITAMIN D DEFICIENCY DISEASE: ICD-10-CM

## 2018-01-17 DIAGNOSIS — G56.02 CARPAL TUNNEL SYNDROME OF LEFT WRIST: Primary | ICD-10-CM

## 2018-01-17 DIAGNOSIS — E55.9 VITAMIN D DEFICIENCY: ICD-10-CM

## 2018-01-17 LAB — 25(OH)D3 SERPL-MCNC: 19.1 NG/ML (ref 30–100)

## 2018-01-17 PROCEDURE — 82306 VITAMIN D 25 HYDROXY: CPT

## 2018-01-17 PROCEDURE — 36415 COLL VENOUS BLD VENIPUNCTURE: CPT

## 2018-01-23 VITALS
WEIGHT: 173 LBS | TEMPERATURE: 97.7 F | HEART RATE: 90 BPM | DIASTOLIC BLOOD PRESSURE: 90 MMHG | BODY MASS INDEX: 30.65 KG/M2 | SYSTOLIC BLOOD PRESSURE: 142 MMHG | OXYGEN SATURATION: 96 % | HEIGHT: 63 IN

## 2018-01-23 VITALS
HEART RATE: 84 BPM | HEIGHT: 63 IN | BODY MASS INDEX: 30.85 KG/M2 | SYSTOLIC BLOOD PRESSURE: 132 MMHG | DIASTOLIC BLOOD PRESSURE: 82 MMHG | TEMPERATURE: 97.8 F | WEIGHT: 174.13 LBS | OXYGEN SATURATION: 96 %

## 2018-01-23 NOTE — MISCELLANEOUS
Message  GI Reminder Recall [facility]:   Date: 12/05/2017   Dear Daren Cooney:     Review of our records shows you are due for the following: Colonoscopy  Our records indicate that you are due at this time to have a follow-up examination for a colonoscopy  As you now, these tests are done to prevent colon cancer, a very common disease in the [address] [place] [address] and responsible for the thousands of patient deaths each year  We at [address] [place] [address] [facility] are concerned for your health, and would very much appreciate you getting in touch with us at your earliest convenience, Again, this examination is vital to your proper health maintenance and for the prevention of cancer  Please call the following office to schedule your appointment:     We look forward to hearing from you!      Sincerely,     [facility]      Signatures   Electronically signed by : Armando Cr, ; Dec  5 2017  1:57PM EST                       (Author)

## 2018-02-27 ENCOUNTER — HOSPITAL ENCOUNTER (OUTPATIENT)
Dept: NEUROLOGY | Facility: AMBULATORY SURGERY CENTER | Age: 53
Discharge: HOME | End: 2018-02-27
Payer: COMMERCIAL

## 2018-02-27 DIAGNOSIS — G56.02 CARPAL TUNNEL SYNDROME OF LEFT WRIST: ICD-10-CM

## 2018-02-27 DIAGNOSIS — E55.9 VITAMIN D DEFICIENCY DISEASE: ICD-10-CM

## 2018-02-27 DIAGNOSIS — G56.03 CARPAL TUNNEL SYNDROME, BILATERAL: ICD-10-CM

## 2018-02-27 PROCEDURE — 95911 NRV CNDJ TEST 9-10 STUDIES: CPT | Performed by: PSYCHIATRY & NEUROLOGY

## 2018-02-27 PROCEDURE — 95886 MUSC TEST DONE W/N TEST COMP: CPT | Performed by: PSYCHIATRY & NEUROLOGY

## 2018-03-15 ENCOUNTER — OFFICE VISIT (OUTPATIENT)
Dept: INTERNAL MEDICINE CLINIC | Facility: CLINIC | Age: 53
End: 2018-03-15
Payer: COMMERCIAL

## 2018-03-15 VITALS
TEMPERATURE: 97.9 F | HEART RATE: 72 BPM | BODY MASS INDEX: 29.06 KG/M2 | HEIGHT: 65 IN | DIASTOLIC BLOOD PRESSURE: 80 MMHG | SYSTOLIC BLOOD PRESSURE: 152 MMHG | OXYGEN SATURATION: 98 % | WEIGHT: 174.4 LBS | RESPIRATION RATE: 20 BRPM

## 2018-03-15 DIAGNOSIS — J01.10 ACUTE NON-RECURRENT FRONTAL SINUSITIS: Primary | ICD-10-CM

## 2018-03-15 PROBLEM — R94.31 ABNORMAL EKG: Status: ACTIVE | Noted: 2017-06-14

## 2018-03-15 PROBLEM — R63.5 ABNORMAL WEIGHT GAIN: Status: ACTIVE | Noted: 2017-06-14

## 2018-03-15 PROBLEM — J30.9 ALLERGIC RHINITIS: Status: ACTIVE | Noted: 2017-06-14

## 2018-03-15 PROBLEM — F41.9 ANXIETY: Status: ACTIVE | Noted: 2017-06-22

## 2018-03-15 PROBLEM — R03.0 ELEVATED BP WITHOUT DIAGNOSIS OF HYPERTENSION: Status: ACTIVE | Noted: 2018-01-17

## 2018-03-15 PROBLEM — K57.32 DIVERTICULITIS OF COLON: Status: ACTIVE | Noted: 2017-06-29

## 2018-03-15 PROBLEM — K63.5 BENIGN COLON POLYP: Status: ACTIVE | Noted: 2017-09-26

## 2018-03-15 PROBLEM — G56.00 CTS (CARPAL TUNNEL SYNDROME): Status: ACTIVE | Noted: 2018-01-17

## 2018-03-15 PROBLEM — I70.90 ATHEROSCLEROSIS OF ARTERIES: Status: ACTIVE | Noted: 2017-06-14

## 2018-03-15 PROBLEM — H81.10 BENIGN PAROXYSMAL POSITIONAL VERTIGO: Status: ACTIVE | Noted: 2017-06-14

## 2018-03-15 PROBLEM — K21.9 GERD (GASTROESOPHAGEAL REFLUX DISEASE): Status: ACTIVE | Noted: 2017-06-22

## 2018-03-15 PROBLEM — J45.909 ASTHMA: Status: ACTIVE | Noted: 2017-06-14

## 2018-03-15 PROCEDURE — 99213 OFFICE O/P EST LOW 20 MIN: CPT | Performed by: NURSE PRACTITIONER

## 2018-03-15 RX ORDER — OMEPRAZOLE 20 MG/1
1 CAPSULE, DELAYED RELEASE ORAL DAILY
COMMUNITY
Start: 2017-06-22 | End: 2018-03-15 | Stop reason: SDUPTHER

## 2018-03-15 RX ORDER — NICOTINE 21 MG/24HR
1 PATCH, TRANSDERMAL 24 HOURS TRANSDERMAL DAILY
COMMUNITY
Start: 2018-01-17 | End: 2018-04-03

## 2018-03-15 RX ORDER — ALPRAZOLAM 0.25 MG/1
1 TABLET ORAL 2 TIMES DAILY PRN
COMMUNITY
Start: 2017-06-22 | End: 2021-07-26 | Stop reason: HOSPADM

## 2018-03-15 RX ORDER — SERTRALINE HYDROCHLORIDE 25 MG/1
TABLET, FILM COATED ORAL
COMMUNITY
Start: 2016-04-13 | End: 2018-03-15 | Stop reason: SDUPTHER

## 2018-03-15 RX ORDER — ATORVASTATIN CALCIUM 10 MG/1
1 TABLET, FILM COATED ORAL DAILY
COMMUNITY
Start: 2017-06-29 | End: 2018-04-03 | Stop reason: SDUPTHER

## 2018-03-15 RX ORDER — AZITHROMYCIN 250 MG/1
250 TABLET, FILM COATED ORAL EVERY 24 HOURS
Qty: 6 TABLET | Refills: 0 | Status: SHIPPED | OUTPATIENT
Start: 2018-03-15 | End: 2018-03-20

## 2018-03-15 NOTE — PROGRESS NOTES
Assessment/Plan:    Acute frontal sinusitis  Z pack take 2 tablets today and 1 tablet for 4 days after  Use Flonase 1 spray each nostril in the morning and at night  Use Zyrtec at night before going to bed  And use Mucinex 12 hour during the day for congestion  Rest and Hydrate  Take BP daily, and write them down, will discuss at your next appointment  It is felt that her BP is elevated due to her recent illness  Come back to the office if you are not feeling better in about a week, or if you develop fevers above 101  Diagnoses and all orders for this visit:    Acute non-recurrent frontal sinusitis  -     azithromycin (ZITHROMAX) 250 mg tablet; Take 1 tablet (250 mg total) by mouth every 24 hours for 5 days Take 2 tablets today, and one tablet daily for 4 days  Other orders  -     nicotine (NICODERM CQ) 14 mg/24hr TD 24 hr patch; Place 1 patch on the skin daily  -     Discontinue: omeprazole (PriLOSEC) 20 mg delayed release capsule; Take 1 tablet by mouth daily  -     Na Sulfate-K Sulfate-Mg Sulf (SUPREP BOWEL PREP KIT) 17 5-3 13-1 6 GM/180ML SOLN; Take by mouth  -     Cholecalciferol (VITAMIN D3) 57767 units TABS; Take by mouth  -     ALPRAZolam (XANAX) 0 25 mg tablet; Take 1 tablet by mouth 2 (two) times a day as needed  -     atorvastatin (LIPITOR) 10 mg tablet; Take 1 tablet by mouth daily  -     Discontinue: sertraline (ZOLOFT) 25 mg tablet; 1 TABLET DAILY          Subjective:      Patient ID: Madina Nixon is a 46 y o  female  Pt  Presents today for cold like symptoms  She was in urgent care last week (3/6/18) for cold like symptoms and was swabbed for flu which was negative, swabbed for strep and that was negative as well  She continues to have sore throat, hoarseness, ear "tingle" and a moist productive cough with greenish yellow secretions  She also has complaints of nasal congestion  Puffiness noted under both eyes   C/O right eye being "crusty" in AM  Reports some relief from OTC Vicks 44  Pt stated that BP was elevated at urgent care  BP was 155/80  Pt  Did have the flu vaccination this year  URI    This is a recurrent problem  The current episode started 1 to 4 weeks ago  The problem has been gradually worsening  There has been no fever  Associated symptoms include congestion, coughing (productive), headaches (sinus headaches), a plugged ear sensation, rhinorrhea, sinus pain, sneezing, a sore throat and swollen glands  Pertinent negatives include no abdominal pain, chest pain, diarrhea, dysuria, ear pain, joint pain, joint swelling, nausea, neck pain, rash, vomiting or wheezing  Treatments tried: OTC vicks 44  The treatment provided no relief  The following portions of the patient's history were reviewed and updated as appropriate: allergies, current medications, past family history, past medical history, past social history, past surgical history and problem list     Review of Systems   Constitutional: Positive for chills, diaphoresis and fatigue  Negative for activity change, appetite change and fever  HENT: Positive for congestion, postnasal drip, rhinorrhea, sinus pain, sinus pressure, sneezing and sore throat  Negative for dental problem and ear pain  Eyes: Positive for discharge (crusty to the right eye)  Negative for pain and itching  Respiratory: Positive for cough (productive)  Negative for chest tightness, shortness of breath and wheezing  Cardiovascular: Negative for chest pain  Gastrointestinal: Negative for abdominal pain, constipation, diarrhea, nausea and vomiting  Genitourinary: Negative for decreased urine volume, difficulty urinating and dysuria  Musculoskeletal: Negative for arthralgias, joint pain and neck pain  Skin: Negative for rash  Neurological: Positive for headaches (sinus headaches)  Negative for dizziness and numbness           Past Medical History:   Diagnosis Date    Anxiety     Asthma     Diverticulitis     GERD (gastroesophageal reflux disease)     Glycosuria     Headache     Hyperlipidemia     Ovarian cyst     Overweight     Pulmonary nodule     Renal calculus     Vertigo          Current Outpatient Prescriptions:     ALPRAZolam (XANAX) 0 25 mg tablet, Take 1 tablet by mouth 2 (two) times a day as needed, Disp: , Rfl:     atorvastatin (LIPITOR) 10 mg tablet, Take 1 tablet by mouth daily, Disp: , Rfl:     Cholecalciferol (VITAMIN D3) 20272 units TABS, Take by mouth, Disp: , Rfl:     Na Sulfate-K Sulfate-Mg Sulf (SUPREP BOWEL PREP KIT) 17 5-3 13-1 6 GM/180ML SOLN, Take by mouth, Disp: , Rfl:     nicotine (NICODERM CQ) 14 mg/24hr TD 24 hr patch, Place 1 patch on the skin daily, Disp: , Rfl:     omeprazole (PriLOSEC) 20 mg delayed release capsule, Take 20 mg by mouth daily, Disp: , Rfl:     sertraline (ZOLOFT) 25 mg tablet, Take 25 mg by mouth daily, Disp: , Rfl:     azithromycin (ZITHROMAX) 250 mg tablet, Take 1 tablet (250 mg total) by mouth every 24 hours for 5 days Take 2 tablets today, and one tablet daily for 4 days  , Disp: 6 tablet, Rfl: 0    Allergies   Allergen Reactions    Clonazepam     Morphine Other (See Comments) and Confusion     Annotation - [id number]: "dopey"  Gets silly    Venlafaxine        Social History   Past Surgical History:   Procedure Laterality Date    APPENDECTOMY      HYSTERECTOMY      [place] COLONOSCOPY FLX DX W/COLLJ SPEC WHEN PFRMD N/A 9/26/2017    Procedure: EGD AND COLONOSCOPY;  Surgeon: Solitario Mojica MD;  Location: [facility]; Service: Gastroenterology     Family History   Problem Relation Age of Onset    Cancer Father        Objective:  /80 (BP Location: Left arm, Patient Position: Sitting, Cuff Size: Adult)   Pulse 72   Temp 97 9 °F (36 6 °C) (Oral)   Resp 20   Ht 5' 5" (1 651 m)   Wt 79 1 kg (174 lb 6 4 oz)   SpO2 98%   BMI 29 02 kg/m²     No results found for this or any previous visit (from the past 1344 hour(s))           Physical Exam   Constitutional: She is oriented to person, place, and time  She appears well-developed and well-nourished  She appears ill  No distress  HENT:   Head: Normocephalic and atraumatic  Right Ear: External ear normal  Tympanic membrane is bulging  Left Ear: External ear normal  Tympanic membrane is bulging  Nose: Mucosal edema and rhinorrhea present  Right sinus exhibits maxillary sinus tenderness and frontal sinus tenderness  Left sinus exhibits maxillary sinus tenderness and frontal sinus tenderness  Mouth/Throat: Mucous membranes are pale and dry  Posterior oropharyngeal erythema present  No oropharyngeal exudate or posterior oropharyngeal edema  Eyes: Conjunctivae and EOM are normal  Pupils are equal, round, and reactive to light  Right eye exhibits no discharge  Left eye exhibits no discharge  Puffiness noted to B/L eyes   Neck: Normal range of motion  Neck supple  No thyromegaly present  Cardiovascular: Normal rate, regular rhythm, normal heart sounds and intact distal pulses  Exam reveals no gallop and no friction rub  No murmur heard  Pulmonary/Chest: Effort normal  No stridor  No respiratory distress  She has decreased breath sounds  She has no wheezes  She has no rhonchi  She has no rales  Abdominal: Soft  Bowel sounds are normal  She exhibits no distension  There is no tenderness  Musculoskeletal: She exhibits no edema  Lymphadenopathy:     She has no cervical adenopathy  Neurological: She is alert and oriented to person, place, and time  Skin: Skin is warm and dry  No rash noted  She is not diaphoretic  No erythema  Psychiatric: She has a normal mood and affect   Her behavior is normal  Judgment and thought content normal

## 2018-03-15 NOTE — ASSESSMENT & PLAN NOTE
Z pack take 2 tablets today and 1 tablet for 4 days after  Use Flonase 1 spray each nostril in the morning and at night  Use Zyrtec at night before going to bed  And use Mucinex 12 hour during the day for congestion  Rest and Hydrate  Take BP daily, and write them down, will discuss at your next appointment  It is felt that her BP is elevated due to her recent illness  Come back to the office if you are not feeling better in about a week, or if you develop fevers above 101

## 2018-04-03 ENCOUNTER — TELEPHONE (OUTPATIENT)
Dept: INTERNAL MEDICINE CLINIC | Facility: CLINIC | Age: 53
End: 2018-04-03

## 2018-04-03 ENCOUNTER — OFFICE VISIT (OUTPATIENT)
Dept: INTERNAL MEDICINE CLINIC | Facility: CLINIC | Age: 53
End: 2018-04-03
Payer: COMMERCIAL

## 2018-04-03 VITALS
HEART RATE: 86 BPM | DIASTOLIC BLOOD PRESSURE: 80 MMHG | TEMPERATURE: 98 F | HEIGHT: 65 IN | OXYGEN SATURATION: 97 % | SYSTOLIC BLOOD PRESSURE: 124 MMHG | BODY MASS INDEX: 28.56 KG/M2 | WEIGHT: 171.4 LBS

## 2018-04-03 DIAGNOSIS — K21.9 GASTROESOPHAGEAL REFLUX DISEASE WITHOUT ESOPHAGITIS: ICD-10-CM

## 2018-04-03 DIAGNOSIS — F41.9 ANXIETY: ICD-10-CM

## 2018-04-03 DIAGNOSIS — Z87.891 HISTORY OF PRIOR CIGARETTE SMOKING: Primary | ICD-10-CM

## 2018-04-03 DIAGNOSIS — G56.03 BILATERAL CARPAL TUNNEL SYNDROME: ICD-10-CM

## 2018-04-03 DIAGNOSIS — E78.00 HYPERCHOLESTEREMIA: ICD-10-CM

## 2018-04-03 PROCEDURE — 99214 OFFICE O/P EST MOD 30 MIN: CPT | Performed by: INTERNAL MEDICINE

## 2018-04-03 RX ORDER — ATORVASTATIN CALCIUM 10 MG/1
10 TABLET, FILM COATED ORAL DAILY
Qty: 30 TABLET | Refills: 0 | Status: SHIPPED | OUTPATIENT
Start: 2018-04-03 | End: 2018-07-23 | Stop reason: SDUPTHER

## 2018-04-05 PROBLEM — Z71.6 ENCOUNTER FOR SMOKING CESSATION COUNSELING: Status: ACTIVE | Noted: 2018-04-05

## 2018-04-05 PROBLEM — R03.0 ELEVATED BP WITHOUT DIAGNOSIS OF HYPERTENSION: Status: RESOLVED | Noted: 2018-01-17 | Resolved: 2018-04-05

## 2018-04-05 PROBLEM — J01.10 ACUTE FRONTAL SINUSITIS: Status: RESOLVED | Noted: 2018-03-15 | Resolved: 2018-04-05

## 2018-04-05 PROBLEM — H81.10 BENIGN PAROXYSMAL POSITIONAL VERTIGO: Status: RESOLVED | Noted: 2017-06-14 | Resolved: 2018-04-05

## 2018-04-05 PROBLEM — R63.5 ABNORMAL WEIGHT GAIN: Status: RESOLVED | Noted: 2017-06-14 | Resolved: 2018-04-05

## 2018-04-05 PROBLEM — K63.5 BENIGN COLON POLYP: Status: RESOLVED | Noted: 2017-09-26 | Resolved: 2018-04-05

## 2018-04-05 PROBLEM — R94.31 ABNORMAL EKG: Status: RESOLVED | Noted: 2017-06-14 | Resolved: 2018-04-05

## 2018-04-05 NOTE — PATIENT INSTRUCTIONS

## 2018-04-05 NOTE — PROGRESS NOTES
Assessment/Plan:    CTS (carpal tunnel syndrome)  Reviewed EMG/NC, will need f/u for tunnel release    Esophageal reflux  Much improved since she has stopped smoking       Diagnoses and all orders for this visit:    History of prior cigarette smoking  -     nicotine (NICODERM CQ) 7 mg/24hr TD 24 hr patch; Place 1 patch on the skin every 24 hours for 28 days    Anxiety  -     sertraline (ZOLOFT) 50 mg tablet; Take 1 tablet (50 mg total) by mouth daily for 30 days Take 1/2 to 1 tab daily as directed    Hypercholesteremia  -     atorvastatin (LIPITOR) 10 mg tablet; Take 1 tablet (10 mg total) by mouth daily    Gastroesophageal reflux disease without esophagitis    Bilateral carpal tunnel syndrome  -     Ambulatory referral to Orthopedic Surgery; Future          Subjective:      Patient ID: Tianna Childs is a 46 y o  female  Nicotine Dependence   Presents for follow-up visit  Her urge triggers include company of smokers  The symptoms have been resolved  Number of cigarettes per day: none  Compliance with prior treatments has been good  Heartburn   This is a chronic problem  The current episode started more than 1 year ago  The problem occurs occasionally  The problem has been gradually improving  Abdominal bloating  Risk factors include obesity and smoking/tobacco exposure  Past procedures include an EGD  The following portions of the patient's history were reviewed and updated as appropriate: past family history, past medical history, past social history, past surgical history and problem list     Review of Systems   Constitutional: Positive for activity change  HENT: Positive for postnasal drip  Gastrointestinal: Positive for abdominal distention  Psychiatric/Behavioral:        Anxiety due to  health   All other systems reviewed and are negative          Objective:      /80 (BP Location: Left arm, Patient Position: Sitting, Cuff Size: Adult)   Pulse 86   Temp 98 °F (36 7 °C) (Oral) Ht 5' 4 5" (1 638 m)   Wt 77 7 kg (171 lb 6 4 oz)   SpO2 97%   BMI 28 97 kg/m²          Physical Exam      /80 (BP Location: Left arm, Patient Position: Sitting, Cuff Size: Adult)   Pulse 86   Temp 98 °F (36 7 °C) (Oral)   Ht 5' 4 5" (1 638 m)   Wt 77 7 kg (171 lb 6 4 oz)   SpO2 97%   BMI 28 97 kg/m²     General Appearance:    Alert, cooperative, no distress, appears stated age   Head:    Normocephalic, without obvious abnormality, atraumatic   Eyes:    PERRL, conjunctiva/corneas clear, EOM's intact, fundi     benign, both eyes   Ears:    Normal TM's and external ear canals, both ears   Nose:   Nares normal, septum midline, mucosa normal, no drainage     or sinus tenderness   Throat:   Lips, mucosa, and tongue normal; teeth and gums normal   Neck:   Supple, symmetrical, trachea midline, no adenopathy;     thyroid:  no enlargement/tenderness/nodules; no carotid    bruit or JVD   Back:     Symmetric, no curvature, ROM normal, no CVA tenderness   Lungs:     Clear to auscultation bilaterally, respirations unlabored   Chest Wall:    No tenderness or deformity    Heart:    Regular rate and rhythm, S1 and S2 normal, no murmur, rub    or gallop       Abdomen:     Soft, non-tender, bowel sounds active all four quadrants,     no masses, no organomegaly           Extremities:   Extremities normal, atraumatic, no cyanosis or edema   Pulses:   2+ and symmetric all extremities   Skin:   Skin color, texture, turgor normal, no rashes or lesions   Lymph nodes:   Cervical, supraclavicular, and axillary nodes normal

## 2018-04-06 ENCOUNTER — CLINICAL SUPPORT (OUTPATIENT)
Dept: INTERNAL MEDICINE CLINIC | Facility: CLINIC | Age: 53
End: 2018-04-06
Payer: COMMERCIAL

## 2018-04-06 DIAGNOSIS — E78.00 HYPERCHOLESTEREMIA: Primary | ICD-10-CM

## 2018-04-06 LAB
CHOLEST SERPL-MCNC: 160 MG/DL (ref 50–200)
HDLC SERPL-MCNC: 42 MG/DL (ref 40–60)
LDLC SERPL CALC-MCNC: 90 MG/DL (ref 0–100)
NONHDLC SERPL-MCNC: 118 MG/DL
TRIGL SERPL-MCNC: 141 MG/DL

## 2018-04-06 PROCEDURE — 36415 COLL VENOUS BLD VENIPUNCTURE: CPT

## 2018-04-06 PROCEDURE — 80061 LIPID PANEL: CPT | Performed by: INTERNAL MEDICINE

## 2018-04-20 ENCOUNTER — OFFICE VISIT (OUTPATIENT)
Dept: OBGYN CLINIC | Facility: HOSPITAL | Age: 53
End: 2018-04-20
Attending: INTERNAL MEDICINE
Payer: COMMERCIAL

## 2018-04-20 VITALS
DIASTOLIC BLOOD PRESSURE: 76 MMHG | BODY MASS INDEX: 29.98 KG/M2 | HEART RATE: 62 BPM | HEIGHT: 63 IN | WEIGHT: 169.2 LBS | SYSTOLIC BLOOD PRESSURE: 116 MMHG

## 2018-04-20 DIAGNOSIS — G56.03 BILATERAL CARPAL TUNNEL SYNDROME: ICD-10-CM

## 2018-04-20 PROCEDURE — 99204 OFFICE O/P NEW MOD 45 MIN: CPT | Performed by: ORTHOPAEDIC SURGERY

## 2018-04-20 RX ORDER — NICOTINE 14MG/24HR
PATCH, TRANSDERMAL 24 HOURS TRANSDERMAL
Refills: 0 | COMMUNITY
Start: 2018-04-14 | End: 2018-06-07 | Stop reason: SDUPTHER

## 2018-04-20 NOTE — H&P
Assessment:  1  Bilateral carpal tunnel syndrome  Ambulatory referral to Orthopedic Surgery          Patient Active Problem List   Diagnosis    Allergic rhinitis    Anxiety    Asthma    Atherosclerosis of arteries    CTS (carpal tunnel syndrome)    Diverticulitis of colon    Esophageal reflux    Follicular cyst of ovary    GERD (gastroesophageal reflux disease)    Encounter for smoking cessation counseling               Plan       carpal tunnel release right hand               Subjective:      Patient ID:     Chief Complaint:Sakshi West 46 y o  female        HPI     Patient comes in today with regards to Bilateral wrist hand pain and numbness  The patient reports that the pain is in the  Volar aspect all 5 fingers go numb and has been going on for  Approximately a year  The pain is rated at0 at its best    Pain is not as much of the issue as it is numbness and dropping things and positive sleep disturbances  It is worsened with  uncertain, and is made better with  uncertain    The patient has taken  nothing for treatment         The following portions of the patient's history were reviewed and updated as appropriate: allergies, current medications, past family history, past social history, past surgical history and problem list            Social History   Social History            Social History    Marital status: /Civil Union       Spouse name: N/A    Number of children: N/A    Years of education: N/A          Occupational History    Not on file              Social History Main Topics    Smoking status: Former Smoker       Packs/day: 0 25       Years: 20 00       Quit date: 3/16/2018    Smokeless tobacco: Never Used         Comment: approx less than  half a pack    Alcohol use Yes         Comment: socail    Drug use: No    Sexual activity: Not on file           Other Topics Concern    Not on file          Social History Narrative     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                Medical History Past Medical History:   Diagnosis Date    Anxiety      Asthma      Depression      Diverticulitis      Diverticulitis of colon      Follicular cyst of ovary 12/23/2014    GERD (gastroesophageal reflux disease)      Glycosuria      Headache      Hyperlipidemia      Obesity      Ovarian cyst      Overweight      Pulmonary nodule      Renal calculus      Vertigo           Surgical History         Past Surgical History:   Procedure Laterality Date    APPENDECTOMY        HYSTERECTOMY        [place] COLONOSCOPY FLX DX W/COLLJ SPEC WHEN PFRMD N/A 9/26/2017     Procedure: EGD AND COLONOSCOPY;  Surgeon: Valeri Merida MD;  Location: [facility]; Service: Gastroenterology               Allergies   Allergen Reactions    Clonazepam      Morphine Other (See Comments) and Confusion       Annotation - [id number]: "dopey"  Gets silly    Venlafaxine               Current Outpatient Prescriptions on File Prior to Visit   Medication Sig Dispense Refill    ALPRAZolam (XANAX) 0 25 mg tablet Take 1 tablet by mouth 2 (two) times a day as needed        atorvastatin (LIPITOR) 10 mg tablet Take 1 tablet (10 mg total) by mouth daily 30 tablet 0    Cholecalciferol (VITAMIN D3) 09468 units TABS Take 1 tablet by mouth once a week          Na Sulfate-K Sulfate-Mg Sulf (SUPREP BOWEL PREP KIT) 17 5-3 13-1 6 GM/180ML SOLN Take by mouth        nicotine (NICODERM CQ) 7 mg/24hr TD 24 hr patch Place 1 patch on the skin every 24 hours for 28 days 28 patch 3    omeprazole (PriLOSEC) 20 mg delayed release capsule Take 20 mg by mouth daily        sertraline (ZOLOFT) 50 mg tablet Take 1 tablet (50 mg total) by mouth daily for 30 days Take 1/2 to 1 tab daily as directed 30 tablet 5      No current facility-administered medications on file prior to visit                  Objective:     Review of Systems   Constitutional: Negative  HENT: Negative  Eyes: Negative  Respiratory: Negative  Cardiovascular: Negative  Gastrointestinal: Negative  Endocrine: Negative  Genitourinary: Negative  Musculoskeletal:        See HPI   Skin: Negative  Allergic/Immunologic: Negative  Neurological: Negative  Hematological: Negative  Psychiatric/Behavioral: Negative           Right Hand Exam      Tenderness   The patient is experiencing tenderness in the zendejas area      Range of Motion   The patient has normal right wrist ROM     Tests   Phalens Sign: positive  Tinels Sign (Medial Nerve): negative     Other   Erythema: absent  Sensation: normal  Pulse: present     Comments:   Decreased sensation both hands all digits compared to forearm  There is minimal atrophy of the thenar eminence         Left Hand Exam      Tenderness   The patient is experiencing tenderness in the zendejas area       Range of Motion   The patient has normal left wrist ROM     Tests   Phalens Sign: positive  Tinels Sign (Medial Nerve): negative     Other   Erythema: absent  Sensation: normal  Pulse: present                Physical Exam   Constitutional: She is oriented to person, place, and time  She appears well-developed  HENT:   Head: Normocephalic  Eyes: Pupils are equal, round, and reactive to light  Neck: Normal range of motion  Cardiovascular: Normal rate  Pulmonary/Chest: Effort normal    Abdominal: She exhibits no distension  Neurological: She is alert and oriented to person, place, and time  Skin: Skin is warm  Psychiatric: She has a normal mood and affect  Nursing note and vitals reviewed         Procedures  No Procedures performed today     I have personally reviewed pertinent films in PACS and my interpretation is Severe carpal tunnel bilaterally motor showing significant deficits with the right being worse than the left and sensory being non recordable           Portions of the record may have been created with voice recognition software   Occasional wrong word or "sound a like" substitutions may have occurred due to the inherent limitations of voice recognition software   Read the chart carefully and recognize, using context, where substitutions have occurred

## 2018-04-20 NOTE — PROGRESS NOTES
Assessment:  1  Bilateral carpal tunnel syndrome  Ambulatory referral to Orthopedic Surgery     Patient Active Problem List   Diagnosis    Allergic rhinitis    Anxiety    Asthma    Atherosclerosis of arteries    CTS (carpal tunnel syndrome)    Diverticulitis of colon    Esophageal reflux    Follicular cyst of ovary    GERD (gastroesophageal reflux disease)    Encounter for smoking cessation counseling           Plan       carpal tunnel release right hand            Subjective:     Patient ID:    Chief Complaint:Sakshi Aguirre 46 y o  female      HPI    Patient comes in today with regards to Bilateral wrist hand pain and numbness  The patient reports that the pain is in the  Volar aspect all 5 fingers go numb and has been going on for  Approximately a year  The pain is rated at0 at its best    Pain is not as much of the issue as it is numbness and dropping things and positive sleep disturbances  It is worsened with  uncertain, and is made better with  uncertain  The patient has taken  nothing for treatment  The following portions of the patient's history were reviewed and updated as appropriate: allergies, current medications, past family history, past social history, past surgical history and problem list         Social History     Social History    Marital status: /Civil Union     Spouse name: N/A    Number of children: N/A    Years of education: N/A     Occupational History    Not on file       Social History Main Topics    Smoking status: Former Smoker     Packs/day: 0 25     Years: 20 00     Quit date: 3/16/2018    Smokeless tobacco: Never Used      Comment: approx less than  half a pack    Alcohol use Yes      Comment: socail    Drug use: No    Sexual activity: Not on file     Other Topics Concern    Not on file     Social History Narrative    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          Past Medical History:   Diagnosis Date    Anxiety     Asthma     Depression     Diverticulitis     Diverticulitis of colon     Follicular cyst of ovary 12/23/2014    GERD (gastroesophageal reflux disease)     Glycosuria     Headache     Hyperlipidemia     Obesity     Ovarian cyst     Overweight     Pulmonary nodule     Renal calculus     Vertigo      Past Surgical History:   Procedure Laterality Date    APPENDECTOMY      HYSTERECTOMY      [place] COLONOSCOPY FLX DX W/COLLJ SPEC WHEN PFRMD N/A 9/26/2017    Procedure: EGD AND COLONOSCOPY;  Surgeon: Cade Allan MD;  Location: [facility]; Service: Gastroenterology     Allergies   Allergen Reactions    Clonazepam     Morphine Other (See Comments) and Confusion     Annotation - [id number]: "dopey"  Gets silly    Venlafaxine      Current Outpatient Prescriptions on File Prior to Visit   Medication Sig Dispense Refill    ALPRAZolam (XANAX) 0 25 mg tablet Take 1 tablet by mouth 2 (two) times a day as needed      atorvastatin (LIPITOR) 10 mg tablet Take 1 tablet (10 mg total) by mouth daily 30 tablet 0    Cholecalciferol (VITAMIN D3) 66366 units TABS Take 1 tablet by mouth once a week        Na Sulfate-K Sulfate-Mg Sulf (SUPREP BOWEL PREP KIT) 17 5-3 13-1 6 GM/180ML SOLN Take by mouth      nicotine (NICODERM CQ) 7 mg/24hr TD 24 hr patch Place 1 patch on the skin every 24 hours for 28 days 28 patch 3    omeprazole (PriLOSEC) 20 mg delayed release capsule Take 20 mg by mouth daily      sertraline (ZOLOFT) 50 mg tablet Take 1 tablet (50 mg total) by mouth daily for 30 days Take 1/2 to 1 tab daily as directed 30 tablet 5     No current facility-administered medications on file prior to visit  Objective:    Review of Systems   Constitutional: Negative  HENT: Negative  Eyes: Negative  Respiratory: Negative  Cardiovascular: Negative  Gastrointestinal: Negative  Endocrine: Negative  Genitourinary: Negative  Musculoskeletal:        See HPI   Skin: Negative  Allergic/Immunologic: Negative  Neurological: Negative  Hematological: Negative  Psychiatric/Behavioral: Negative  Right Hand Exam     Tenderness   The patient is experiencing tenderness in the zendejas area  Range of Motion   The patient has normal right wrist ROM  Tests   Phalens Sign: positive  Tinels Sign (Medial Nerve): negative    Other   Erythema: absent  Sensation: normal  Pulse: present    Comments:   Decreased sensation both hands all digits compared to forearm  There is minimal atrophy of the thenar eminence  Left Hand Exam     Tenderness   The patient is experiencing tenderness in the zendejas area  Range of Motion   The patient has normal left wrist ROM  Tests   Phalens Sign: positive  Tinels Sign (Medial Nerve): negative    Other   Erythema: absent  Sensation: normal  Pulse: present            Physical Exam   Constitutional: She is oriented to person, place, and time  She appears well-developed  HENT:   Head: Normocephalic  Eyes: Pupils are equal, round, and reactive to light  Neck: Normal range of motion  Cardiovascular: Normal rate  Pulmonary/Chest: Effort normal    Abdominal: She exhibits no distension  Neurological: She is alert and oriented to person, place, and time  Skin: Skin is warm  Psychiatric: She has a normal mood and affect  Nursing note and vitals reviewed  Procedures  No Procedures performed today    I have personally reviewed pertinent films in PACS and my interpretation is Severe carpal tunnel bilaterally motor showing significant deficits with the right being worse than the left and sensory being non recordable         Portions of the record may have been created with voice recognition software   Occasional wrong word or "sound a like" substitutions may have occurred due to the inherent limitations of voice recognition software   Read the chart carefully and recognize, using context, where substitutions have occurred

## 2018-04-30 ENCOUNTER — CLINICAL SUPPORT (OUTPATIENT)
Dept: INTERNAL MEDICINE CLINIC | Facility: CLINIC | Age: 53
End: 2018-04-30
Payer: COMMERCIAL

## 2018-04-30 DIAGNOSIS — G56.03 BILATERAL CARPAL TUNNEL SYNDROME: Primary | ICD-10-CM

## 2018-04-30 LAB
ANION GAP SERPL CALCULATED.3IONS-SCNC: 7 MMOL/L (ref 4–13)
BASOPHILS # BLD AUTO: 0.04 THOUSANDS/ΜL (ref 0–0.1)
BASOPHILS NFR BLD AUTO: 0 % (ref 0–1)
BUN SERPL-MCNC: 17 MG/DL (ref 5–25)
CALCIUM SERPL-MCNC: 9.4 MG/DL (ref 8.3–10.1)
CHLORIDE SERPL-SCNC: 105 MMOL/L (ref 100–108)
CO2 SERPL-SCNC: 29 MMOL/L (ref 21–32)
CREAT SERPL-MCNC: 0.74 MG/DL (ref 0.6–1.3)
EOSINOPHIL # BLD AUTO: 0.4 THOUSAND/ΜL (ref 0–0.61)
EOSINOPHIL NFR BLD AUTO: 4 % (ref 0–6)
ERYTHROCYTE [DISTWIDTH] IN BLOOD BY AUTOMATED COUNT: 12.7 % (ref 11.6–15.1)
GFR SERPL CREATININE-BSD FRML MDRD: 93 ML/MIN/1.73SQ M
GLUCOSE SERPL-MCNC: 104 MG/DL (ref 65–140)
HCT VFR BLD AUTO: 44 % (ref 34.8–46.1)
HGB BLD-MCNC: 14.9 G/DL (ref 11.5–15.4)
LYMPHOCYTES # BLD AUTO: 3.43 THOUSANDS/ΜL (ref 0.6–4.47)
LYMPHOCYTES NFR BLD AUTO: 36 % (ref 14–44)
MCH RBC QN AUTO: 31.9 PG (ref 26.8–34.3)
MCHC RBC AUTO-ENTMCNC: 33.9 G/DL (ref 31.4–37.4)
MCV RBC AUTO: 94 FL (ref 82–98)
MONOCYTES # BLD AUTO: 0.73 THOUSAND/ΜL (ref 0.17–1.22)
MONOCYTES NFR BLD AUTO: 8 % (ref 4–12)
NEUTROPHILS # BLD AUTO: 5.04 THOUSANDS/ΜL (ref 1.85–7.62)
NEUTS SEG NFR BLD AUTO: 52 % (ref 43–75)
NRBC BLD AUTO-RTO: 0 /100 WBCS
PLATELET # BLD AUTO: 283 THOUSANDS/UL (ref 149–390)
PMV BLD AUTO: 11.5 FL (ref 8.9–12.7)
POTASSIUM SERPL-SCNC: 4.1 MMOL/L (ref 3.5–5.3)
RBC # BLD AUTO: 4.67 MILLION/UL (ref 3.81–5.12)
SODIUM SERPL-SCNC: 141 MMOL/L (ref 136–145)
WBC # BLD AUTO: 9.66 THOUSAND/UL (ref 4.31–10.16)

## 2018-04-30 PROCEDURE — 36415 COLL VENOUS BLD VENIPUNCTURE: CPT

## 2018-04-30 PROCEDURE — 80048 BASIC METABOLIC PNL TOTAL CA: CPT | Performed by: ORTHOPAEDIC SURGERY

## 2018-04-30 PROCEDURE — 85025 COMPLETE CBC W/AUTO DIFF WBC: CPT | Performed by: ORTHOPAEDIC SURGERY

## 2018-05-01 PROBLEM — G56.03 BILATERAL CARPAL TUNNEL SYNDROME: Status: ACTIVE | Noted: 2018-05-01

## 2018-05-10 ENCOUNTER — CONSULT (OUTPATIENT)
Dept: INTERNAL MEDICINE CLINIC | Facility: CLINIC | Age: 53
End: 2018-05-10
Payer: COMMERCIAL

## 2018-05-10 VITALS
WEIGHT: 168.8 LBS | SYSTOLIC BLOOD PRESSURE: 120 MMHG | BODY MASS INDEX: 28.12 KG/M2 | DIASTOLIC BLOOD PRESSURE: 76 MMHG | TEMPERATURE: 98.6 F | OXYGEN SATURATION: 98 % | HEIGHT: 65 IN | RESPIRATION RATE: 20 BRPM | HEART RATE: 97 BPM

## 2018-05-10 DIAGNOSIS — Z01.818 PRE-OP EXAMINATION: ICD-10-CM

## 2018-05-10 DIAGNOSIS — G56.03 BILATERAL CARPAL TUNNEL SYNDROME: Primary | ICD-10-CM

## 2018-05-10 PROBLEM — K57.32 DIVERTICULITIS OF COLON: Status: RESOLVED | Noted: 2017-06-29 | Resolved: 2018-05-10

## 2018-05-10 PROCEDURE — 99242 OFF/OP CONSLTJ NEW/EST SF 20: CPT | Performed by: NURSE PRACTITIONER

## 2018-05-10 NOTE — PATIENT INSTRUCTIONS
Cleared for surgery on 5/29 of right hand  Please contact the office of ANY new symptoms or signs of illness prior to the surgery     Note will be faxed to surgeon

## 2018-05-10 NOTE — PROGRESS NOTES
Subjective:    Connor Salgado is a 46y o  year old male or female who presents to the office today for a preoperative consultation at the request of surgeon Dr Malou Vance who plans on performing carpal tunnel release of the right hand on May 29, 2018  Planned anesthesia: IV sedation and with anesthesia   The patient has the following known anesthesia issues: none  Patients bleeding risk: none  Patient is able to walk 4 blocks without symptoms  Patient is able to walk up 2 flights of stairs without symptoms  Significant past medical history includes wears dentures - lower partial     Tobacco use: Quit 3 weeks ago  < 1 ppd, not every day per week, 20-30 years   Alcohol use: rare  Illicit drug use: none    Symptoms:   Easy bleeding: no  Easy bruising: no  Frequent nose bleeds: no  Chest pain: no  Cough: no  Dyspnea on exertion:no  Edema: no  Palpitations: no  Wheezing: no    Living situation: Patient lives with her huband in a private home  Her mother will be caring for her after surgery  shedoes not have post-op concerns  The following portions of the patient's history were reviewed and updated as appropriate: allergies, current medications, past family history, past medical history, past social history, past surgical history and problem list     Review of Systems  Review of Systems   Constitutional: Negative for activity change, appetite change, chills, diaphoresis, fatigue and fever  HENT: Positive for postnasal drip  Negative for congestion, ear pain, rhinorrhea, sinus pain, sinus pressure and sore throat  Respiratory: Negative for cough, chest tightness, shortness of breath and wheezing  Cardiovascular: Negative for chest pain, palpitations and leg swelling  Gastrointestinal: Negative for diarrhea, nausea and vomiting  Genitourinary: Negative for dysuria  Neurological: Negative for dizziness, seizures, syncope, light-headedness and headaches  Hematological: Negative for adenopathy  Does not bruise/bleed easily  Past Medical History:   Diagnosis Date    Anxiety     Asthma     Depression     Diverticulitis     Diverticulitis of colon     Follicular cyst of ovary 12/23/2014    GERD (gastroesophageal reflux disease)     Glycosuria     Headache     Hyperlipidemia     Obesity     Ovarian cyst     Overweight     Pulmonary nodule     Renal calculus     Vertigo      Past Surgical History:   Procedure Laterality Date    APPENDECTOMY      HYSTERECTOMY      [place] COLONOSCOPY FLX DX W/COLLJ SPEC WHEN PFRMD N/A 9/26/2017    Procedure: EGD AND COLONOSCOPY;  Surgeon: Margo Dunlap MD;  Location: [facility];   Service: Gastroenterology     Social History   Substance Use Topics    Smoking status: Former Smoker     Packs/day: 0 25     Years: 20 00     Quit date: 3/16/2018    Smokeless tobacco: Never Used      Comment: approx less than  half a pack    Alcohol use Yes      Comment: socail     Family History   Problem Relation Age of Onset    No Known Problems Mother     Cancer Father     Stroke Father     Pancreatic cancer Father      Clonazepam; Morphine; and Venlafaxine  Current Outpatient Prescriptions   Medication Sig Dispense Refill    ALPRAZolam (XANAX) 0 25 mg tablet Take 1 tablet by mouth 2 (two) times a day as needed      atorvastatin (LIPITOR) 10 mg tablet Take 1 tablet (10 mg total) by mouth daily 30 tablet 0    Cholecalciferol (VITAMIN D3) 06090 units TABS Take 1 tablet by mouth once a week        Na Sulfate-K Sulfate-Mg Sulf (SUPREP BOWEL PREP KIT) 17 5-3 13-1 6 GM/180ML SOLN Take by mouth      NICODERM CQ 14 MG/24HR TD 24 hr patch   0    omeprazole (PriLOSEC) 20 mg delayed release capsule Take 20 mg by mouth daily      sertraline (ZOLOFT) 50 mg tablet sertraline 50 mg tablet      nicotine (NICODERM CQ) 7 mg/24hr TD 24 hr patch Place 1 patch on the skin every 24 hours for 28 days 28 patch 3    sertraline (ZOLOFT) 50 mg tablet Take 1 tablet (50 mg total) by mouth daily for 30 days Take 1/2 to 1 tab daily as directed 30 tablet 5     No current facility-administered medications for this visit  Objective:       /76 (BP Location: Left arm, Patient Position: Sitting, Cuff Size: Large)   Pulse 97   Temp 98 6 °F (37 °C) (Oral)   Resp 20   Ht 5' 5" (1 651 m)   Wt 76 6 kg (168 lb 12 8 oz)   SpO2 98%   BMI 28 09 kg/m²   Physical Exam   Constitutional: She is oriented to person, place, and time  She appears well-developed and well-nourished  No distress  HENT:   Head: Normocephalic and atraumatic  Right Ear: Tympanic membrane and external ear normal    Left Ear: Tympanic membrane and external ear normal    Nose: Nose normal    Mouth/Throat: Oropharynx is clear and moist  No oropharyngeal exudate, posterior oropharyngeal edema or posterior oropharyngeal erythema  Eyes: Conjunctivae and EOM are normal  Pupils are equal, round, and reactive to light  Right eye exhibits no discharge  Left eye exhibits no discharge  Neck: Normal range of motion  Neck supple  Carotid bruit is not present  No thyromegaly present  Cardiovascular: Normal rate, regular rhythm and normal heart sounds  No murmur heard  Pulmonary/Chest: Effort normal and breath sounds normal  No respiratory distress  She has no decreased breath sounds  She has no wheezes  She has no rhonchi  Musculoskeletal: She exhibits no edema  Lymphadenopathy:     She has no cervical adenopathy  Neurological: She is alert and oriented to person, place, and time  Skin: Skin is warm and dry  No rash noted  She is not diaphoretic  Psychiatric: She has a normal mood and affect  Her behavior is normal    Vitals reviewed  Cardiographics  ECG: not indicated    Lab Review   CBC and BMP reviewed from 4/30/2018 within normal limits    Assessment:      46 y o  male or female with planned surgery as above      Known risk factors for perioperative complications: None      Cardiac Risk Estimation: Low risk    Juani Castelan is cleared from a medical standpoint to proceed with surgery  she is at a low risk from a cariovascular standpoint at this time without any additional cardiac testing  Reevaluation needed if he should present with symptoms prior to surgery  Plan:  Change in medication regimen before surgery: hold all medication day of surgery  Okay to resume after surgery

## 2018-05-24 NOTE — PRE-PROCEDURE INSTRUCTIONS
Pre-Surgery Instructions:   Medication Instructions    ALPRAZolam (XANAX) 0 25 mg tablet Instructed patient per Anesthesia Guidelines   atorvastatin (LIPITOR) 10 mg tablet Instructed patient per Anesthesia Guidelines   Cholecalciferol (VITAMIN D3) 39290 units TABS Instructed patient per Anesthesia Guidelines   NICODERM CQ 14 MG/24HR TD 24 hr patch Instructed patient per Anesthesia Guidelines   omeprazole (PriLOSEC) 20 mg delayed release capsule Instructed patient per Anesthesia Guidelines   sertraline (ZOLOFT) 50 mg tablet Instructed patient per Anesthesia Guidelines  Pre op and bathing instructions reviewed   Pt will get hibiclens

## 2018-05-29 ENCOUNTER — HOSPITAL ENCOUNTER (OUTPATIENT)
Facility: HOSPITAL | Age: 53
Setting detail: OUTPATIENT SURGERY
Discharge: HOME | End: 2018-05-29
Attending: ORTHOPAEDIC SURGERY | Admitting: ORTHOPAEDIC SURGERY
Payer: COMMERCIAL

## 2018-05-29 ENCOUNTER — ANESTHESIA (OUTPATIENT)
Dept: PERIOP | Facility: HOSPITAL | Age: 53
End: 2018-05-29
Payer: COMMERCIAL

## 2018-05-29 ENCOUNTER — ANESTHESIA EVENT (OUTPATIENT)
Dept: PERIOP | Facility: HOSPITAL | Age: 53
End: 2018-05-29
Payer: COMMERCIAL

## 2018-05-29 VITALS
WEIGHT: 168 LBS | HEART RATE: 63 BPM | SYSTOLIC BLOOD PRESSURE: 125 MMHG | RESPIRATION RATE: 16 BRPM | HEIGHT: 63 IN | DIASTOLIC BLOOD PRESSURE: 59 MMHG | BODY MASS INDEX: 29.77 KG/M2 | TEMPERATURE: 96.8 F | OXYGEN SATURATION: 98 %

## 2018-05-29 DIAGNOSIS — G56.03 BILATERAL CARPAL TUNNEL SYNDROME: Primary | ICD-10-CM

## 2018-05-29 PROCEDURE — 64721 CARPAL TUNNEL SURGERY: CPT | Performed by: ORTHOPAEDIC SURGERY

## 2018-05-29 RX ORDER — SODIUM CHLORIDE 9 MG/ML
INJECTION, SOLUTION INTRAVENOUS CONTINUOUS PRN
Status: DISCONTINUED | OUTPATIENT
Start: 2018-05-29 | End: 2018-05-29 | Stop reason: SURG

## 2018-05-29 RX ORDER — MAGNESIUM HYDROXIDE 1200 MG/15ML
LIQUID ORAL AS NEEDED
Status: DISCONTINUED | OUTPATIENT
Start: 2018-05-29 | End: 2018-05-29 | Stop reason: HOSPADM

## 2018-05-29 RX ORDER — FENTANYL CITRATE/PF 50 MCG/ML
25 SYRINGE (ML) INJECTION
Status: DISCONTINUED | OUTPATIENT
Start: 2018-05-29 | End: 2018-05-29 | Stop reason: HOSPADM

## 2018-05-29 RX ORDER — ONDANSETRON 2 MG/ML
4 INJECTION INTRAMUSCULAR; INTRAVENOUS EVERY 6 HOURS PRN
Status: DISCONTINUED | OUTPATIENT
Start: 2018-05-29 | End: 2018-05-29 | Stop reason: SDUPTHER

## 2018-05-29 RX ORDER — ACETAMINOPHEN 325 MG/1
650 TABLET ORAL EVERY 6 HOURS PRN
Status: DISCONTINUED | OUTPATIENT
Start: 2018-05-29 | End: 2018-05-29 | Stop reason: HOSPADM

## 2018-05-29 RX ORDER — PROPOFOL 10 MG/ML
INJECTION, EMULSION INTRAVENOUS AS NEEDED
Status: DISCONTINUED | OUTPATIENT
Start: 2018-05-29 | End: 2018-05-29 | Stop reason: SURG

## 2018-05-29 RX ORDER — BUPIVACAINE HYDROCHLORIDE 2.5 MG/ML
INJECTION, SOLUTION INFILTRATION; PERINEURAL AS NEEDED
Status: DISCONTINUED | OUTPATIENT
Start: 2018-05-29 | End: 2018-05-29 | Stop reason: HOSPADM

## 2018-05-29 RX ORDER — ONDANSETRON 2 MG/ML
INJECTION INTRAMUSCULAR; INTRAVENOUS AS NEEDED
Status: DISCONTINUED | OUTPATIENT
Start: 2018-05-29 | End: 2018-05-29 | Stop reason: SURG

## 2018-05-29 RX ORDER — OXYCODONE HYDROCHLORIDE 5 MG/1
TABLET ORAL
Qty: 6 TABLET | Refills: 0 | Status: SHIPPED | OUTPATIENT
Start: 2018-05-29 | End: 2018-10-16 | Stop reason: ALTCHOICE

## 2018-05-29 RX ORDER — PROPOFOL 10 MG/ML
INJECTION, EMULSION INTRAVENOUS CONTINUOUS PRN
Status: DISCONTINUED | OUTPATIENT
Start: 2018-05-29 | End: 2018-05-29 | Stop reason: SURG

## 2018-05-29 RX ORDER — OXYCODONE HYDROCHLORIDE 5 MG/1
5 TABLET ORAL
Status: DISCONTINUED | OUTPATIENT
Start: 2018-05-29 | End: 2018-05-29 | Stop reason: HOSPADM

## 2018-05-29 RX ORDER — FENTANYL CITRATE 50 UG/ML
INJECTION, SOLUTION INTRAMUSCULAR; INTRAVENOUS AS NEEDED
Status: DISCONTINUED | OUTPATIENT
Start: 2018-05-29 | End: 2018-05-29 | Stop reason: SURG

## 2018-05-29 RX ORDER — ONDANSETRON 2 MG/ML
4 INJECTION INTRAMUSCULAR; INTRAVENOUS ONCE AS NEEDED
Status: DISCONTINUED | OUTPATIENT
Start: 2018-05-29 | End: 2018-05-29 | Stop reason: HOSPADM

## 2018-05-29 RX ORDER — ONDANSETRON 2 MG/ML
4 INJECTION INTRAMUSCULAR; INTRAVENOUS EVERY 6 HOURS PRN
Status: DISCONTINUED | OUTPATIENT
Start: 2018-05-29 | End: 2018-05-29 | Stop reason: HOSPADM

## 2018-05-29 RX ORDER — MIDAZOLAM HYDROCHLORIDE 1 MG/ML
INJECTION INTRAMUSCULAR; INTRAVENOUS AS NEEDED
Status: DISCONTINUED | OUTPATIENT
Start: 2018-05-29 | End: 2018-05-29 | Stop reason: SURG

## 2018-05-29 RX ORDER — SODIUM CHLORIDE 9 MG/ML
100 INJECTION, SOLUTION INTRAVENOUS CONTINUOUS
Status: DISCONTINUED | OUTPATIENT
Start: 2018-05-29 | End: 2018-05-29 | Stop reason: HOSPADM

## 2018-05-29 RX ORDER — ACETAMINOPHEN 325 MG/1
650 TABLET ORAL EVERY 6 HOURS PRN
Status: DISCONTINUED | OUTPATIENT
Start: 2018-05-29 | End: 2018-05-29 | Stop reason: SDUPTHER

## 2018-05-29 RX ADMIN — SODIUM CHLORIDE: 0.9 INJECTION, SOLUTION INTRAVENOUS at 07:34

## 2018-05-29 RX ADMIN — FENTANYL CITRATE 25 MCG: 50 INJECTION INTRAMUSCULAR; INTRAVENOUS at 08:25

## 2018-05-29 RX ADMIN — PROPOFOL 80 MCG/KG/MIN: 10 INJECTION, EMULSION INTRAVENOUS at 08:25

## 2018-05-29 RX ADMIN — Medication 1000 MG: at 08:25

## 2018-05-29 RX ADMIN — ONDANSETRON 4 MG: 2 INJECTION INTRAMUSCULAR; INTRAVENOUS at 08:47

## 2018-05-29 RX ADMIN — FENTANYL CITRATE 25 MCG: 50 INJECTION INTRAMUSCULAR; INTRAVENOUS at 08:27

## 2018-05-29 RX ADMIN — MIDAZOLAM HYDROCHLORIDE 2 MG: 1 INJECTION, SOLUTION INTRAMUSCULAR; INTRAVENOUS at 08:16

## 2018-05-29 RX ADMIN — FENTANYL CITRATE 25 MCG: 50 INJECTION INTRAMUSCULAR; INTRAVENOUS at 08:44

## 2018-05-29 RX ADMIN — FENTANYL CITRATE 25 MCG: 50 INJECTION INTRAMUSCULAR; INTRAVENOUS at 08:36

## 2018-05-29 RX ADMIN — PROPOFOL 20 MG: 10 INJECTION, EMULSION INTRAVENOUS at 08:25

## 2018-05-29 NOTE — DISCHARGE INSTRUCTIONS
Discharge Instructions - Orthopedics  Donald Oviedo 46 y o  female MRN: [id number]  Unit/Bed#: APU 5    Weight Bearing Status:                                           WBAT  Encourage finger ROM    DVT prophylaxis:  None    Pain:  Aleve OTC one tablet BID with food  Oxycodone 5 mg one tab every four hours prn    Dressing Instructions:   Keep dressing clean, dry and intact until follow up appointment  Do not shower until post-op day three and ONLY IF WOUND IS DRY  No baths or pools    PT/OT:  none    Appt Instructions: If you do not have your appointment, please call the clinic at [phone] to f/u with Dr Lisa Sim in 10-14 days or as previously scheduled      Contact the office sooner if you experience any increased numbness/tingling in the extremities        Miscellaneous:  Ok to remove dressing on post-op day #3

## 2018-05-29 NOTE — OP NOTE
OPERATIVE REPORT  PATIENT NAME: Georgiana Buck    :  1965  MRN: [id number]  Pt Location: AN OR ROOM 01    SURGERY DATE: 2018    Surgeon(s) and Role:     * Dinah Lanes, DO - Primary  Haritha Montoya PAC utilized during the case for assistance with prepping draping positioning retraction closure and dressing application  Preop Diagnosis:  Bilateral carpal tunnel syndrome [G56 03]    Post-Op Diagnosis Codes:     * Bilateral carpal tunnel syndrome [G56 03]    Procedure(s) (LRB):  CARPAL TUNNEL RELEASE (Right)    Specimen(s):  * No specimens in log *    Estimated Blood Loss:   Minimal    Drains:       Anesthesia Type:   IV Sedation with Anesthesia    Operative Indications:  Bilateral carpal tunnel syndrome [G56 03]  Right carpal tunnel    Operative Findings: Thickened transverse carpal ligament    Complications:   None    Procedure and Technique:  Patient was seen and examined in the preoperative area  The right upper extremity was marked, the consent an H&P had been reviewed  The patient was brought back to the operative suite  The patient was intubated sedated  Tourniquet was applied to right upper extremity  The right upper extremity was prepped and draped in a sterile fashion  After proper timeout commenced and identified the right upper extremity as the operative site  Esmarch was utilized to exsanguinate the extremity, the tourniquet was turned up to 250 mmHg  Tissue was made in the Salomon's cardinal lines  Bovie was used to electrocauterize any bleeding  We used Metzenbaum scissors to dissect down to the carpal tunnel ligament  We used a 10 blade to incise into the carpal tunnel ligament and used a Woodland elevator to protect the median nerve while dissecting proximally and distally the carpal tunnel ligament, while releasing the carpal tunnel ligament   The transverse carpal ligament was significantly thick which required change to a free blade due to the protection blade getting caught on skin and retractors  We confirmed proper release  We irrigated wound the tourniquet was taken down after 14 minutes  Bleeding was cauterized incision was closed with 3 O nylon  Xeroform was applied to the skin a cut 4 x 4 and a Tegaderm were applied to the hand the patient was taken back to PACU after anesthesia was reversed     I was present for the entire procedure     I was present for the entire procedure    Patient Disposition:  PACU     SIGNATURE: Ryan Langston DO  DATE: May 29, 2018  TIME: 8:17 AM

## 2018-05-29 NOTE — ANESTHESIA PREPROCEDURE EVALUATION
Review of Systems/Medical History      No history of anesthetic complications     Cardiovascular  Hyperlipidemia,    Pulmonary  Asthma , well controlled/ stable ,   Comment: Seasonal allergies     GI/Hepatic    GERD well controlled,        Kidney stones,        Endo/Other  Negative endo/other ROS      GYN    Hysterectomy,        Hematology  Negative hematology ROS      Musculoskeletal    Comment: ct      Neurology    Headaches,    Psychology   Anxiety, Depression ,              Physical Exam    Airway    Mallampati score: II  TM Distance: >3 FB  Neck ROM: full     Dental   lower dentures,     Cardiovascular      Pulmonary      Other Findings        Anesthesia Plan  ASA Score- 2     Anesthesia Type- IV sedation with anesthesia with ASA Monitors  Additional Monitors:   Airway Plan:     Comment: IV sedation, GA back up; standard ASA monitors  Risks and benefits discussed with patient; patient consented and agrees to proceed  I saw and evaluated the patient  If seen with CRNA, we have discussed the anesthetic plan and I am in agreement that the plan is appropriate for the patient        Plan Factors-    Induction- intravenous  Postoperative Plan- Plan for postoperative opioid use  Informed Consent- Anesthetic plan and risks discussed with patient  I personally reviewed this patient with the CRNA  Discussed and agreed on the Anesthesia Plan with the CRNA  Sonny Farrell

## 2018-05-29 NOTE — ANESTHESIA POSTPROCEDURE EVALUATION
Post-Op Assessment Note      CV Status:  Stable    Mental Status:  Alert and awake    Hydration Status:  Euvolemic    PONV Controlled:  Controlled    Airway Patency:  Patent    Post Op Vitals Reviewed: Yes          Staff: CRNA           /56 (05/29/18 0856)    Temp     Pulse 77 (05/29/18 0856)   Resp 15 (05/29/18 0856)    SpO2 99 % (05/29/18 0856)

## 2018-05-29 NOTE — H&P (VIEW-ONLY)
Subjective:    Jacki Ortez is a 46y o  year old male or female who presents to the office today for a preoperative consultation at the request of surgeon Dr Vikas Yao who plans on performing carpal tunnel release of the right hand on May 29, 2018  Planned anesthesia: IV sedation and with anesthesia   The patient has the following known anesthesia issues: none  Patients bleeding risk: none  Patient is able to walk 4 blocks without symptoms  Patient is able to walk up 2 flights of stairs without symptoms  Significant past medical history includes wears dentures - lower partial     Tobacco use: Quit 3 weeks ago  < 1 ppd, not every day per week, 20-30 years   Alcohol use: rare  Illicit drug use: none    Symptoms:   Easy bleeding: no  Easy bruising: no  Frequent nose bleeds: no  Chest pain: no  Cough: no  Dyspnea on exertion:no  Edema: no  Palpitations: no  Wheezing: no    Living situation: Patient lives with her huband in a private home  Her mother will be caring for her after surgery  shedoes not have post-op concerns  The following portions of the patient's history were reviewed and updated as appropriate: allergies, current medications, past family history, past medical history, past social history, past surgical history and problem list     Review of Systems  Review of Systems   Constitutional: Negative for activity change, appetite change, chills, diaphoresis, fatigue and fever  HENT: Positive for postnasal drip  Negative for congestion, ear pain, rhinorrhea, sinus pain, sinus pressure and sore throat  Respiratory: Negative for cough, chest tightness, shortness of breath and wheezing  Cardiovascular: Negative for chest pain, palpitations and leg swelling  Gastrointestinal: Negative for diarrhea, nausea and vomiting  Genitourinary: Negative for dysuria  Neurological: Negative for dizziness, seizures, syncope, light-headedness and headaches  Hematological: Negative for adenopathy  Does not bruise/bleed easily  Past Medical History:   Diagnosis Date    Anxiety     Asthma     Depression     Diverticulitis     Diverticulitis of colon     Follicular cyst of ovary 12/23/2014    GERD (gastroesophageal reflux disease)     Glycosuria     Headache     Hyperlipidemia     Obesity     Ovarian cyst     Overweight     Pulmonary nodule     Renal calculus     Vertigo      Past Surgical History:   Procedure Laterality Date    APPENDECTOMY      HYSTERECTOMY      UT COLONOSCOPY FLX DX W/COLLJ SPEC WHEN PFRMD N/A 9/26/2017    Procedure: EGD AND COLONOSCOPY;  Surgeon: Morenita Lowe MD;  Location: [facility];   Service: Gastroenterology     Social History   Substance Use Topics    Smoking status: Former Smoker     Packs/day: 0 25     Years: 20 00     Quit date: 3/16/2018    Smokeless tobacco: Never Used      Comment: approx less than  half a pack    Alcohol use Yes      Comment: socail     Family History   Problem Relation Age of Onset    No Known Problems Mother     Cancer Father     Stroke Father     Pancreatic cancer Father      Clonazepam; Morphine; and Venlafaxine  Current Outpatient Prescriptions   Medication Sig Dispense Refill    ALPRAZolam (XANAX) 0 25 mg tablet Take 1 tablet by mouth 2 (two) times a day as needed      atorvastatin (LIPITOR) 10 mg tablet Take 1 tablet (10 mg total) by mouth daily 30 tablet 0    Cholecalciferol (VITAMIN D3) 24215 units TABS Take 1 tablet by mouth once a week        Na Sulfate-K Sulfate-Mg Sulf (SUPREP BOWEL PREP KIT) 17 5-3 13-1 6 GM/180ML SOLN Take by mouth      NICODERM CQ 14 MG/24HR TD 24 hr patch   0    omeprazole (PriLOSEC) 20 mg delayed release capsule Take 20 mg by mouth daily      sertraline (ZOLOFT) 50 mg tablet sertraline 50 mg tablet      nicotine (NICODERM CQ) 7 mg/24hr TD 24 hr patch Place 1 patch on the skin every 24 hours for 28 days 28 patch 3    sertraline (ZOLOFT) 50 mg tablet Take 1 tablet (50 mg total) by mouth daily for 30 days Take 1/2 to 1 tab daily as directed 30 tablet 5     No current facility-administered medications for this visit  Objective:       /76 (BP Location: Left arm, Patient Position: Sitting, Cuff Size: Large)   Pulse 97   Temp 98 6 °F (37 °C) (Oral)   Resp 20   Ht 5' 5" (1 651 m)   Wt 76 6 kg (168 lb 12 8 oz)   SpO2 98%   BMI 28 09 kg/m²   Physical Exam   Constitutional: She is oriented to person, place, and time  She appears well-developed and well-nourished  No distress  HENT:   Head: Normocephalic and atraumatic  Right Ear: Tympanic membrane and external ear normal    Left Ear: Tympanic membrane and external ear normal    Nose: Nose normal    Mouth/Throat: Oropharynx is clear and moist  No oropharyngeal exudate, posterior oropharyngeal edema or posterior oropharyngeal erythema  Eyes: Conjunctivae and EOM are normal  Pupils are equal, round, and reactive to light  Right eye exhibits no discharge  Left eye exhibits no discharge  Neck: Normal range of motion  Neck supple  Carotid bruit is not present  No thyromegaly present  Cardiovascular: Normal rate, regular rhythm and normal heart sounds  No murmur heard  Pulmonary/Chest: Effort normal and breath sounds normal  No respiratory distress  She has no decreased breath sounds  She has no wheezes  She has no rhonchi  Musculoskeletal: She exhibits no edema  Lymphadenopathy:     She has no cervical adenopathy  Neurological: She is alert and oriented to person, place, and time  Skin: Skin is warm and dry  No rash noted  She is not diaphoretic  Psychiatric: She has a normal mood and affect  Her behavior is normal    Vitals reviewed  Cardiographics  ECG: not indicated    Lab Review   CBC and BMP reviewed from 4/30/2018 within normal limits    Assessment:      46 y o  male or female with planned surgery as above      Known risk factors for perioperative complications: None      Cardiac Risk Estimation: Low risk    Gisele Dance is cleared from a medical standpoint to proceed with surgery  she is at a low risk from a cariovascular standpoint at this time without any additional cardiac testing  Reevaluation needed if he should present with symptoms prior to surgery  Plan:  Change in medication regimen before surgery: hold all medication day of surgery  Okay to resume after surgery

## 2018-06-07 DIAGNOSIS — Z72.0 CURRENTLY ATTEMPTING TO QUIT SMOKING: Primary | ICD-10-CM

## 2018-06-07 RX ORDER — NICOTINE 14MG/24HR
1 PATCH, TRANSDERMAL 24 HOURS TRANSDERMAL EVERY 24 HOURS
Qty: 28 PATCH | Refills: 1 | Status: SHIPPED | OUTPATIENT
Start: 2018-06-07 | End: 2019-05-21 | Stop reason: ALTCHOICE

## 2018-06-07 RX ORDER — NICOTINE 14MG/24HR
PATCH, TRANSDERMAL 24 HOURS TRANSDERMAL
Qty: 14 PATCH | Refills: 2 | Status: CANCELLED | OUTPATIENT
Start: 2018-06-07

## 2018-06-08 ENCOUNTER — OFFICE VISIT (OUTPATIENT)
Dept: OBGYN CLINIC | Facility: HOSPITAL | Age: 53
End: 2018-06-08

## 2018-06-08 VITALS
WEIGHT: 167 LBS | BODY MASS INDEX: 29.59 KG/M2 | HEART RATE: 75 BPM | DIASTOLIC BLOOD PRESSURE: 78 MMHG | HEIGHT: 63 IN | SYSTOLIC BLOOD PRESSURE: 116 MMHG

## 2018-06-08 DIAGNOSIS — Z48.89 AFTERCARE FOLLOWING SURGERY: Primary | ICD-10-CM

## 2018-06-08 PROCEDURE — 99024 POSTOP FOLLOW-UP VISIT: CPT | Performed by: PHYSICIAN ASSISTANT

## 2018-06-08 RX ORDER — METHOCARBAMOL 750 MG/1
TABLET ORAL
Refills: 0 | COMMUNITY
Start: 2018-06-07 | End: 2018-10-16 | Stop reason: SDUPTHER

## 2018-06-22 ENCOUNTER — OFFICE VISIT (OUTPATIENT)
Dept: OBGYN CLINIC | Facility: HOSPITAL | Age: 53
End: 2018-06-22

## 2018-06-22 VITALS
HEART RATE: 75 BPM | HEIGHT: 62 IN | BODY MASS INDEX: 30.73 KG/M2 | DIASTOLIC BLOOD PRESSURE: 72 MMHG | WEIGHT: 167 LBS | SYSTOLIC BLOOD PRESSURE: 118 MMHG

## 2018-06-22 DIAGNOSIS — G56.03 BILATERAL CARPAL TUNNEL SYNDROME: Primary | ICD-10-CM

## 2018-06-22 PROCEDURE — 99024 POSTOP FOLLOW-UP VISIT: CPT | Performed by: ORTHOPAEDIC SURGERY

## 2018-06-22 NOTE — PROGRESS NOTES
_____________________________________________________  CHIEF COMPLAINT:  Chief Complaint   Patient presents with    Right Hand - Follow-up, Post-op         SUBJECTIVE:  Hanna Ornelas is a 46y o  year old female who presents 4 weeks status post right carpal tunnel release  Patient states that she is doing very well  She denies any numbness or tingling  She is doing some exercises on her own  She only notes a decrease in strength at this point in time  She notes some pain over the incision  She denies any constitutional signs or symptoms     PAST MEDICAL HISTORY:  Past Medical History:   Diagnosis Date    Anxiety     Asthma     Depression     Diverticulitis     Diverticulitis of colon     Follicular cyst of ovary 12/23/2014    GERD (gastroesophageal reflux disease)     Glycosuria     Headache     Hyperlipidemia     Obesity     Ovarian cyst     Overweight     Pulmonary nodule     Renal calculus     Vertigo        PAST SURGICAL HISTORY:  Past Surgical History:   Procedure Laterality Date    APPENDECTOMY      COLONOSCOPY      HYSTERECTOMY      ND COLONOSCOPY FLX DX W/COLLJ SPEC WHEN PFRMD N/A 9/26/2017    Procedure: EGD AND COLONOSCOPY;  Surgeon: Lidya Claros MD;  Location: [facility];   Service: Gastroenterology    ND REVISE MEDIAN N/CARPAL TUNNEL SURG Right 5/29/2018    Procedure: CARPAL TUNNEL RELEASE;  Surgeon: Zion Garcia DO;  [facility];  Service: Orthopedics    TONSILLECTOMY      WISDOM TOOTH EXTRACTION         FAMILY HISTORY:  Family History   Problem Relation Age of Onset    No Known Problems Mother     Cancer Father     Stroke Father     Pancreatic cancer Father        SOCIAL HISTORY:  Social History   Substance Use Topics    Smoking status: Former Smoker     Packs/day: 0 25     Years: 20 00     Quit date: 3/16/2018    Smokeless tobacco: Never Used      Comment: approx less than  half a pack    Alcohol use Yes      Comment: socail       MEDICATIONS:    Current Outpatient Prescriptions:     ALPRAZolam (XANAX) 0 25 mg tablet, Take 1 tablet by mouth 2 (two) times a day as needed, Disp: , Rfl:     atorvastatin (LIPITOR) 10 mg tablet, Take 1 tablet (10 mg total) by mouth daily, Disp: 30 tablet, Rfl: 0    Cholecalciferol (VITAMIN D3) 54842 units TABS, Take 1 tablet by mouth once a week  , Disp: , Rfl:     D3-50 27865 units capsule, , Disp: , Rfl: 0    Na Sulfate-K Sulfate-Mg Sulf (SUPREP BOWEL PREP KIT) 17 5-3 13-1 6 GM/180ML SOLN, Take by mouth, Disp: , Rfl:     NICODERM CQ 14 MG/24HR TD 24 hr patch, Place 1 patch on the skin every 24 hours, Disp: 28 patch, Rfl: 1    omeprazole (PriLOSEC) 20 mg delayed release capsule, Take 20 mg by mouth daily, Disp: , Rfl:     oxyCODONE (ROXICODONE) 5 mg immediate release tablet, Take 1-2 tab Q4H PRN pain, Disp: 6 tablet, Rfl: 0    sertraline (ZOLOFT) 50 mg tablet, sertraline 50 mg tablet, Disp: , Rfl:     ALLERGIES:  Allergies   Allergen Reactions    Clonazepam     Morphine Other (See Comments) and Confusion     [id number]: "dopey"  Gets silly    Venlafaxine        REVIEW OF SYSTEMS:  General: no fever, no chills  HEENT:  No loss of hearing or eyesight problems  Eyes:  No red eyes  Respiratory:  No coughing, shortness of breath or wheezing  Cardiovascular:  No chest pain, no palpitations  GI:  Abdomen soft nontender, denies nausea  Endocrine:  No muscle weakness, no frequent urination, no excessive thirst  Urinary:  No dysuria, no incontinence  Musculoskeletal: see HPI and PE  SKIN:  No skin rash, no dry skin  Neurological:  No headaches, no confusion  Psychiatric:  No suicide thoughts, no anxiety, no depression  Review of all other systems is negative    LABS:  HgA1c:   Lab Results   Component Value Date    HGBA1C 6 2 11/17/2017     BMP:   Lab Results   Component Value Date    GLUCOSE 104 04/30/2018    CALCIUM 9 4 04/30/2018     04/30/2018    K 4 1 04/30/2018    CO2 29 04/30/2018     04/30/2018    BUN 17 04/30/2018    CREATININE 0 74 04/30/2018       _____________________________________________________  PHYSICAL EXAMINATION:  General: well developed and well nourished, alert, oriented times 3 and appears comfortable  Psychiatric: Normal  HEENT: Trachea Midline, No torticollis  Cardiovascular: No discernable arrhythmia  Pulmonary: No wheezing or stridor  Skin: No masses, erthema, lacerations, fluctation, ulcerations  Neurovascular: Sensation Intact to the Median, Ulnar, Radial Nerve, Motor Intact to the Median, Ulnar, Radial Nerve and Pulses Intact    MUSCULOSKELETAL EXAMINATION:  Right hand:  No erythema or ecchymosis noted  Mild edema noted over the carpal tunnel  Flaking of the skin from the incision  She is tender to palpation with pillar pain  Range of motion is within normal limits  Patient is neurovascularly intact     _____________________________________________________  STUDIES REVIEWED:  None      ASSESSMENT/PLAN:    Diagnoses and all orders for this visit:    Bilateral carpal tunnel syndrome        Assessment:   Four weeks status post carpal tunnel release    Plan:   Patient was instructed to continue her home exercise program   She can use cocoa butter and doing cross friction massage over the incision to help break up the scar tissue  We will have her follow up in 2 weeks to check her progress and release her back to work  Follow Up:   Two weeks    PROCEDURES PERFORMED:  None

## 2018-06-27 PROBLEM — G56.00 CTS (CARPAL TUNNEL SYNDROME): Status: RESOLVED | Noted: 2018-01-17 | Resolved: 2018-06-27

## 2018-06-27 NOTE — PROGRESS NOTES
46 y o female presenting to the office for 2 week follow up s/p right open carpal tunnel release (5/29/18)  Patient doing well overall  She denies any significant pain in the wrist  She denies any numbness/tingling at this time  She denies any s/s of an infection, or any other acute/associated complaints  Review of Systems  Review of systems negative unless otherwise specified in HPI    Past Medical History  Past Medical History:   Diagnosis Date    Anxiety     Asthma     Depression     Diverticulitis     Diverticulitis of colon     Follicular cyst of ovary 12/23/2014    GERD (gastroesophageal reflux disease)     Glycosuria     Headache     Hyperlipidemia     Obesity     Ovarian cyst     Overweight     Pulmonary nodule     Renal calculus     Vertigo        Past Surgical History  Past Surgical History:   Procedure Laterality Date    APPENDECTOMY      COLONOSCOPY      HYSTERECTOMY      CO COLONOSCOPY FLX DX W/COLLJ SPEC WHEN PFRMD N/A 9/26/2017    Procedure: EGD AND COLONOSCOPY;  Surgeon: Carri Degroot MD;  Location: [facility];   Service: Gastroenterology    CO REVISE MEDIAN N/CARPAL TUNNEL SURG Right 5/29/2018    Procedure: CARPAL TUNNEL RELEASE;  Surgeon: Christina Turner DO;  [facility];  Service: Orthopedics    TONSILLECTOMY      WISDOM TOOTH EXTRACTION         Current Medications  Current Outpatient Prescriptions on File Prior to Visit   Medication Sig Dispense Refill    ALPRAZolam (XANAX) 0 25 mg tablet Take 1 tablet by mouth 2 (two) times a day as needed      atorvastatin (LIPITOR) 10 mg tablet Take 1 tablet (10 mg total) by mouth daily 30 tablet 0    Cholecalciferol (VITAMIN D3) 74463 units TABS Take 1 tablet by mouth once a week        Na Sulfate-K Sulfate-Mg Sulf (SUPREP BOWEL PREP KIT) 17 5-3 13-1 6 GM/180ML SOLN Take by mouth      NICODERM CQ 14 MG/24HR TD 24 hr patch Place 1 patch on the skin every 24 hours 28 patch 1    omeprazole (PriLOSEC) 20 mg delayed release capsule Take 20 mg by mouth daily      oxyCODONE (ROXICODONE) 5 mg immediate release tablet Take 1-2 tab Q4H PRN pain 6 tablet 0    sertraline (ZOLOFT) 50 mg tablet sertraline 50 mg tablet       No current facility-administered medications on file prior to visit  Recent Labs [facility])    0  Lab Value Date/Time   HCT 44 0 04/30/2018 1614   HGB 14 9 04/30/2018 1614   WBC 9 66 04/30/2018 1614   GLUCOSE 104 04/30/2018 1614   HGBA1C 6 2 11/17/2017 1620         Physical exam  · General: Awake, Alert, Oriented  · Eyes: Pupils equal, round and reactive to light  · Heart: regular rate and rhythm  · Lungs: No audible wheezing  · Abdomen: soft  right wrist and hand  · Skin/incision/alignment/Palpation: minimal tenderness to palpation over the surgical site  · Range of motion: good ROM at the wrist with some stiffness  · Sensation: intact  · Motor: intact  · Tinels: Negative  · Good capillary refill    Imaging  None needed    Procedure  Removal of sutures    Assessment/Plan:   46 y  o female 2 weeks s/p right open carpal tunnel release   - can increase some weight bearing activities, but keep less than 10 lbs  - work on ROM, scar care, and strengthening  - follow up in 2 weeks for re-evaluation

## 2018-07-05 ENCOUNTER — OFFICE VISIT (OUTPATIENT)
Dept: OBGYN CLINIC | Facility: HOSPITAL | Age: 53
End: 2018-07-05

## 2018-07-05 ENCOUNTER — TELEPHONE (OUTPATIENT)
Dept: INTERNAL MEDICINE CLINIC | Facility: CLINIC | Age: 53
End: 2018-07-05

## 2018-07-05 VITALS
WEIGHT: 168.8 LBS | HEIGHT: 63 IN | DIASTOLIC BLOOD PRESSURE: 80 MMHG | BODY MASS INDEX: 29.91 KG/M2 | HEART RATE: 80 BPM | SYSTOLIC BLOOD PRESSURE: 131 MMHG

## 2018-07-05 DIAGNOSIS — Z48.89 AFTERCARE FOLLOWING SURGERY: Primary | ICD-10-CM

## 2018-07-05 PROCEDURE — 99024 POSTOP FOLLOW-UP VISIT: CPT | Performed by: PHYSICIAN ASSISTANT

## 2018-07-05 NOTE — PROGRESS NOTES
46 y o female presenting to the office for about 6 week follow-up status post right open carpal tunnel release  Patient denies any pain in the wrist  She denies any numbness/tingling in fingers  Patient is having some hypersensitivity over the incision area and some pain to touch  She denies any drainage, wound dehiscence, fever/chills, or any other acute/associated complaints  Review of Systems  Review of systems negative unless otherwise specified in HPI    Past Medical History  Past Medical History:   Diagnosis Date    Anxiety     Asthma     Depression     Diverticulitis     Diverticulitis of colon     Follicular cyst of ovary 12/23/2014    GERD (gastroesophageal reflux disease)     Glycosuria     Headache     Hyperlipidemia     Obesity     Ovarian cyst     Overweight     Pulmonary nodule     Renal calculus     Vertigo        Past Surgical History  Past Surgical History:   Procedure Laterality Date    APPENDECTOMY      COLONOSCOPY      HYSTERECTOMY      CT COLONOSCOPY FLX DX W/COLLJ SPEC WHEN PFRMD N/A 9/26/2017    Procedure: EGD AND COLONOSCOPY;  Surgeon: Lorri Jackson MD;  Location: [facility];   Service: Gastroenterology    CT REVISE MEDIAN N/CARPAL TUNNEL SURG Right 5/29/2018    Procedure: CARPAL TUNNEL RELEASE;  Surgeon: Omega Locke DO;  [facility];  Service: Orthopedics    TONSILLECTOMY      WISDOM TOOTH EXTRACTION         Current Medications  Current Outpatient Prescriptions on File Prior to Visit   Medication Sig Dispense Refill    ALPRAZolam (XANAX) 0 25 mg tablet Take 1 tablet by mouth 2 (two) times a day as needed      atorvastatin (LIPITOR) 10 mg tablet Take 1 tablet (10 mg total) by mouth daily 30 tablet 0    Cholecalciferol (VITAMIN D3) 54700 units TABS Take 1 tablet by mouth once a week        D3-50 84766 units capsule   0    Na Sulfate-K Sulfate-Mg Sulf (SUPREP BOWEL PREP KIT) 17 5-3 13-1 6 GM/180ML SOLN Take by mouth      NICODERM CQ 14 MG/24HR TD 24 hr patch Place 1 patch on the skin every 24 hours 28 patch 1    omeprazole (PriLOSEC) 20 mg delayed release capsule Take 20 mg by mouth daily      sertraline (ZOLOFT) 50 mg tablet sertraline 50 mg tablet      oxyCODONE (ROXICODONE) 5 mg immediate release tablet Take 1-2 tab Q4H PRN pain 6 tablet 0     No current facility-administered medications on file prior to visit  Recent Labs [facility])    0  Lab Value Date/Time   HCT 44 0 04/30/2018 1614   HGB 14 9 04/30/2018 1614   WBC 9 66 04/30/2018 1614   GLUCOSE 104 04/30/2018 1614   HGBA1C 6 2 11/17/2017 1620         Physical exam  · General: Awake, Alert, Oriented  · Eyes: Pupils equal, round and reactive to light  · Heart: regular rate and rhythm  · Lungs: No audible wheezing  · Abdomen: soft  right wrist   · Skin/incision/alignment/Palpation:  Patient has hypersensitivity to light informed touch over the incision site  Well-healed surgical incision without wound dehiscence  · Range of motion:  Patient has full range of motion in the level of the hand, however, she does have a little bit of stiffness with making a fist due to some minimal swelling  · Sensation:  Intact along median, ulnar, and radial nerve distributions  · Motor:  Intact   · Tinels: negative  · Good capillary refill    Imaging  none    Procedure  none    Assessment/Plan:   46 y  o female almost 6 weeks status post right open carpal tunnel release  -progress to full activities as tolerated  Patient can return to work on 7/16/18  Paperwork filled out for that   -discussed with patient scar tissue management as well as desensitization therapy  ---referred patient hand therapy to assist with this    -patient follow-up in 6 weeks for repeat evaluation

## 2018-07-05 NOTE — TELEPHONE ENCOUNTER
LMOM for pt to call me at [facility] office  Pt needs to obtain last two FMLA papers completed by [facility] neurology

## 2018-07-11 ENCOUNTER — EVALUATION (OUTPATIENT)
Dept: PHYSICAL THERAPY | Age: 53
End: 2018-07-11
Payer: COMMERCIAL

## 2018-07-11 DIAGNOSIS — M25.531 RIGHT WRIST PAIN: Primary | ICD-10-CM

## 2018-07-11 DIAGNOSIS — Z48.89 AFTERCARE FOLLOWING SURGERY: ICD-10-CM

## 2018-07-11 PROCEDURE — 97110 THERAPEUTIC EXERCISES: CPT | Performed by: PHYSICAL THERAPIST

## 2018-07-11 PROCEDURE — G8990 OTHER PT/OT CURRENT STATUS: HCPCS | Performed by: PHYSICAL THERAPIST

## 2018-07-11 PROCEDURE — G8991 OTHER PT/OT GOAL STATUS: HCPCS | Performed by: PHYSICAL THERAPIST

## 2018-07-11 PROCEDURE — 97161 PT EVAL LOW COMPLEX 20 MIN: CPT | Performed by: PHYSICAL THERAPIST

## 2018-07-11 NOTE — PROGRESS NOTES
PT Evaluation     Today's date: 2018  Patient name: Dave Waggoner  : 1965  MRN: [id number]  Referring provider: ART Cortez*  Dx:   Encounter Diagnosis     ICD-10-CM    1  Right wrist pain M25 531    2  Aftercare following surgery [id number] Ambulatory referral to PT/OT hand therapy                  Assessment  Impairments: abnormal muscle tone and pain with function    Assessment details: Patient s/p R CTR with scar hypersensitivity  Understanding of Dx/Px/POC: excellent  Goals  Short Term goals - 4 weeks  1  Patient will be independent HEP  2   Patient will report a 50% decrease in pain complaints  3   Increase strength 1/2 grade  4   Increase ROM 5-10 degrees  Long Term goals - 8 weeks  1  Patient will report elimination of pain complaints  2   Patient will return to all work related activities without restriction  3   Patient will return to all recreational activities without restriction  4   ROM WFL  5   Strength 5/5  Plan  Planned therapy interventions: stretching and strengthening  Frequency: 1x week  Duration in weeks: 2        Subjective Evaluation    History of Present Illness  Mechanism of injury: Patient s/p R CTR release on 18  Primary complaints are with hypersensitivity  Pain  Current pain rating: 3  At best pain ratin  At worst pain ratin  Quality: knife-like  Progression: improved    Patient Goals  Patient goals for therapy: decreased pain          Objective     Tenderness     Additional Tenderness Details  Over CTR incision      Passive Range of Motion     Right Wrist   Wrist flexion: WFL  Wrist extension: WFL  Radial deviation: WFL  Ulnar deviation: WFL     Strength/Myotome Testing     Left Wrist/Hand      (2nd hand position)     Trial 1: 24    Thumb Strength  Key/Lateral Pinch     [place] 1: 20  Palmar/Three-Point Pinch     Trial 1: 16    Right Wrist/Hand      (2nd hand position)     Trial 1: 22    Thumb Strength   Key/Lateral Pinch Trial 1: 19  Palmar/Three-Point Pinch     Trial 1: 16      Flowsheet Rows      Most Recent Value   PT/OT G-Codes   Assessment Type  Evaluation   G code set  Other PT/OT Primary   Other PT Primary Current Status ()  CJ   Other PT Primary Goal Status ()  CH          Precautions: None    Daily Treatment Diary     Manual                                                                                   Exercise Diary              Review HEP                                                                                                                                                                                                                                                                        Modalities

## 2018-07-23 ENCOUNTER — OFFICE VISIT (OUTPATIENT)
Dept: INTERNAL MEDICINE CLINIC | Facility: CLINIC | Age: 53
End: 2018-07-23
Payer: COMMERCIAL

## 2018-07-23 VITALS
DIASTOLIC BLOOD PRESSURE: 84 MMHG | HEIGHT: 64 IN | TEMPERATURE: 98.5 F | SYSTOLIC BLOOD PRESSURE: 132 MMHG | OXYGEN SATURATION: 97 % | HEART RATE: 86 BPM | RESPIRATION RATE: 20 BRPM | BODY MASS INDEX: 28.58 KG/M2 | WEIGHT: 167.4 LBS

## 2018-07-23 DIAGNOSIS — Z11.59 ENCOUNTER FOR HEPATITIS C SCREENING TEST FOR LOW RISK PATIENT: ICD-10-CM

## 2018-07-23 DIAGNOSIS — E55.9 VITAMIN D DEFICIENCY: ICD-10-CM

## 2018-07-23 DIAGNOSIS — F41.9 ANXIETY: ICD-10-CM

## 2018-07-23 DIAGNOSIS — K21.9 GASTROESOPHAGEAL REFLUX DISEASE, ESOPHAGITIS PRESENCE NOT SPECIFIED: Primary | ICD-10-CM

## 2018-07-23 DIAGNOSIS — E78.00 HYPERCHOLESTEREMIA: ICD-10-CM

## 2018-07-23 DIAGNOSIS — G56.03 BILATERAL CARPAL TUNNEL SYNDROME: ICD-10-CM

## 2018-07-23 PROCEDURE — 99214 OFFICE O/P EST MOD 30 MIN: CPT | Performed by: NURSE PRACTITIONER

## 2018-07-23 RX ORDER — ATORVASTATIN CALCIUM 10 MG/1
10 TABLET, FILM COATED ORAL DAILY
Qty: 30 TABLET | Refills: 5 | Status: SHIPPED | OUTPATIENT
Start: 2018-07-23 | End: 2018-09-17 | Stop reason: SDUPTHER

## 2018-07-23 RX ORDER — OMEPRAZOLE 20 MG/1
20 CAPSULE, DELAYED RELEASE ORAL DAILY
Qty: 30 CAPSULE | Refills: 5 | Status: SHIPPED | OUTPATIENT
Start: 2018-07-23 | End: 2018-10-16 | Stop reason: SDUPTHER

## 2018-07-23 NOTE — ASSESSMENT & PLAN NOTE
Will get vitamin-D level  Patient is to continues take 14245 units of vitamin-D weekly, will make changes to this medication if necessary after blood work is obtained

## 2018-07-23 NOTE — PROGRESS NOTES
Assessment/Plan:    GERD (gastroesophageal reflux disease)   Patient's GERD currently stable on Prilosec 20 milligrams daily  Will continue with current medication  Anxiety    Patient currently stable on Zoloft 50 milligrams daily, and Xanax 0 25 milligram tablets as needed  Will make no changes to her medications at this time  Encounter for hepatitis C screening test for low risk patient    Will screen patient for hepatitis- C  Will also get fasting blood work prior to her follow-up appointment in 3 months  Vitamin D deficiency    Will get vitamin-D level  Patient is to continues take 65106 units of vitamin-D weekly, will make changes to this medication if necessary after blood work is obtained  Diagnoses and all orders for this visit:    Gastroesophageal reflux disease, esophagitis presence not specified  -     omeprazole (PriLOSEC) 20 mg delayed release capsule; Take 1 capsule (20 mg total) by mouth daily    Bilateral carpal tunnel syndrome  -     Ambulatory referral to Family Practice    Hypercholesteremia  -     atorvastatin (LIPITOR) 10 mg tablet; Take 1 tablet (10 mg total) by mouth daily  -     CBC and differential  -     Comprehensive metabolic panel; Future  -     Lipid panel    Anxiety  -     sertraline (ZOLOFT) 50 mg tablet; Take 1 tablet (50 mg total) by mouth daily  -     TSH, 3rd generation with Free T4 reflex    Vitamin D deficiency  -     Vitamin D 25 hydroxy    Encounter for hepatitis C screening test for low risk patient  -     Hepatitis C antibody          Subjective:      Patient ID: Herlinda Vickers is a 46 y o  female  Patient presents today for her 3 month follow-up on GERD and anxiety  She reports reports that she is feeling pretty good with no new complaints  Patient recently had carpal tunnel surgery done to her right hand, she states that her pain in her right hand has significantly improved    She also states she quit smoking in on March 17, 2018, she  Continues to use the nicotine patches  Screenings:  Colonoscopy- 2016, she states that she had a lot polyps, she states she is due again for one  Gyn- has history of partial hytrectomy, she has not gone to her GYN in a while, she denies abnormal bleeding   Mammogram- she is going August 6th  Hep C-  Has never been screened  Eye Doctor-sees eye doctor yearly  Dentist- she is due        [facility] 65 for follow-up visit  Patient reports no chest pain, compulsions, confusion, decreased concentration, dizziness, excessive worry, insomnia, irritability, nausea, nervous/anxious behavior, palpitations, panic or shortness of breath  Symptoms occur rarely  The quality of sleep is good  Nighttime awakenings: occasional      Compliance with medications is 0-25%  Heartburn   She reports no abdominal pain, no chest pain, no coughing, no early satiety, no heartburn, no nausea, no sore throat, no stridor or no wheezing  This is a chronic problem  The current episode started more than 1 year ago  The problem occurs rarely  The problem has been waxing and waning  The symptoms are aggravated by certain foods  She has tried a PPI for the symptoms  The treatment provided moderate relief  The following portions of the patient's history were reviewed and updated as appropriate: allergies, current medications, past family history, past medical history, past social history, past surgical history and problem list     Review of Systems   Constitutional: Negative for activity change, appetite change, chills, diaphoresis, fever and irritability  HENT: Negative for congestion, ear discharge, ear pain, postnasal drip, rhinorrhea, sinus pain, sinus pressure and sore throat  Eyes: Negative for pain, discharge, itching and visual disturbance  Respiratory: Negative for cough, chest tightness, shortness of breath and wheezing  Cardiovascular: Negative for chest pain, palpitations and leg swelling     Gastrointestinal: Negative for abdominal pain, constipation, diarrhea, heartburn, nausea and vomiting  Endocrine: Negative for polydipsia, polyphagia and polyuria  Genitourinary: Negative for difficulty urinating, dysuria, urgency and vaginal bleeding  Musculoskeletal: Negative for arthralgias, back pain and neck pain  Skin: Negative for rash and wound  Neurological: Negative for dizziness, weakness and headaches  Psychiatric/Behavioral: Negative for confusion and decreased concentration  The patient is not nervous/anxious and does not have insomnia            Past Medical History:   Diagnosis Date    Anxiety     Asthma     Depression     Diverticulitis     Diverticulitis of colon     Follicular cyst of ovary 12/23/2014    GERD (gastroesophageal reflux disease)     Glycosuria     Headache     Hyperlipidemia     Obesity     Ovarian cyst     Overweight     Pulmonary nodule     Renal calculus     Vertigo          Current Outpatient Prescriptions:     ALPRAZolam (XANAX) 0 25 mg tablet, Take 1 tablet by mouth 2 (two) times a day as needed, Disp: , Rfl:     atorvastatin (LIPITOR) 10 mg tablet, Take 1 tablet (10 mg total) by mouth daily, Disp: 30 tablet, Rfl: 5    Cholecalciferol (VITAMIN D3) 02350 units TABS, Take 1 tablet by mouth once a week  , Disp: , Rfl:     Na Sulfate-K Sulfate-Mg Sulf (SUPREP BOWEL PREP KIT) 17 5-3 13-1 6 GM/180ML SOLN, Take by mouth, Disp: , Rfl:     NICODERM CQ 14 MG/24HR TD 24 hr patch, Place 1 patch on the skin every 24 hours, Disp: 28 patch, Rfl: 1    omeprazole (PriLOSEC) 20 mg delayed release capsule, Take 1 capsule (20 mg total) by mouth daily, Disp: 30 capsule, Rfl: 5    sertraline (ZOLOFT) 50 mg tablet, Take 1 tablet (50 mg total) by mouth daily, Disp: 30 tablet, Rfl: 5    D3-50 63484 units capsule, , Disp: , Rfl: 0    oxyCODONE (ROXICODONE) 5 mg immediate release tablet, Take 1-2 tab Q4H PRN pain, Disp: 6 tablet, Rfl: 0    Allergies   Allergen Reactions    Clonazepam     Morphine Other (See Comments) and Confusion     Annotation - [id number]: "doplaci"  Gets silly    Venlafaxine        Social History   Past Surgical History:   Procedure Laterality Date    APPENDECTOMY      COLONOSCOPY      HYSTERECTOMY      RI COLONOSCOPY FLX DX W/COLLJ SPEC WHEN PFRMD N/A 9/26/2017    Procedure: EGD AND COLONOSCOPY;  Surgeon: Morenita Lowe MD;  Location: [facility]; Service: Gastroenterology    RI REVISE [facility] N/CARPAL TUNNEL SURG Right 5/29/2018    Procedure: CARPAL TUNNEL RELEASE;  Surgeon: Vikas Yao DO;  [facility];  Service: Orthopedics    TONSILLECTOMY      WISDOM TOOTH EXTRACTION       Family History   Problem Relation Age of Onset    No Known Problems Mother     Cancer Father     Stroke Father     Pancreatic cancer Father        Objective:  /84 (BP Location: Left arm, Patient Position: Sitting, Cuff Size: Large)   Pulse 86   Temp 98 5 °F (36 9 °C) (Oral)   Resp 20   Ht 5' 4" (1 626 m)   Wt 75 9 kg (167 lb 6 4 oz)   SpO2 97%   BMI 28 73 kg/m²     No results found for this or any previous visit (from the past 1344 hour(s))  Physical Exam   Constitutional: She is oriented to person, place, and time  She appears well-developed and well-nourished  No distress  HENT:   Head: Normocephalic and atraumatic  Right Ear: External ear normal    Left Ear: External ear normal    Nose: Nose normal    Mouth/Throat: Oropharynx is clear and moist  No oropharyngeal exudate  Eyes: Conjunctivae and EOM are normal  Pupils are equal, round, and reactive to light  Right eye exhibits no discharge  Left eye exhibits no discharge  Neck: Normal range of motion  Neck supple  No thyromegaly present  Cardiovascular: Normal rate, regular rhythm, normal heart sounds and intact distal pulses  Exam reveals no gallop and no friction rub  No murmur heard  Pulmonary/Chest: Effort normal and breath sounds normal  No stridor  No respiratory distress  She has no wheezes   She has no rales    Abdominal: Soft  Bowel sounds are normal  She exhibits no distension  There is no tenderness  Lymphadenopathy:     She has no cervical adenopathy  Neurological: She is alert and oriented to person, place, and time  Skin: Skin is warm and dry  No rash noted  She is not diaphoretic  No erythema  Psychiatric: She has a normal mood and affect   Her behavior is normal  Judgment and thought content normal

## 2018-07-23 NOTE — ASSESSMENT & PLAN NOTE
Patient's GERD currently stable on Prilosec 20 milligrams daily  Will continue with current medication

## 2018-07-23 NOTE — ASSESSMENT & PLAN NOTE
Patient currently stable on Zoloft 50 milligrams daily, and Xanax 0 25 milligram tablets as needed  Will make no changes to her medications at this time

## 2018-07-23 NOTE — ASSESSMENT & PLAN NOTE
Will screen patient for hepatitis- C  Will also get fasting blood work prior to her follow-up appointment in 3 months

## 2018-07-27 ENCOUNTER — APPOINTMENT (OUTPATIENT)
Dept: PHYSICAL THERAPY | Age: 53
End: 2018-07-27
Payer: COMMERCIAL

## 2018-09-05 ENCOUNTER — APPOINTMENT (OUTPATIENT)
Dept: URGENT CARE | Age: 53
End: 2018-09-05
Payer: OTHER MISCELLANEOUS

## 2018-09-05 PROCEDURE — 99283 EMERGENCY DEPT VISIT LOW MDM: CPT | Performed by: PREVENTIVE MEDICINE

## 2018-09-05 PROCEDURE — 29125 APPL SHORT ARM SPLINT STATIC: CPT | Performed by: PREVENTIVE MEDICINE

## 2018-09-05 PROCEDURE — G0382 LEV 3 HOSP TYPE B ED VISIT: HCPCS | Performed by: PREVENTIVE MEDICINE

## 2018-09-10 ENCOUNTER — APPOINTMENT (OUTPATIENT)
Dept: URGENT CARE | Age: 53
End: 2018-09-10
Payer: OTHER MISCELLANEOUS

## 2018-09-10 PROCEDURE — 99213 OFFICE O/P EST LOW 20 MIN: CPT | Performed by: PREVENTIVE MEDICINE

## 2018-09-14 ENCOUNTER — TRANSCRIBE ORDERS (OUTPATIENT)
Dept: LAB | Facility: CLINIC | Age: 53
End: 2018-09-14

## 2018-09-17 ENCOUNTER — HOSPITAL ENCOUNTER (OUTPATIENT)
Dept: RADIOLOGY | Age: 53
Discharge: HOME | End: 2018-09-17
Payer: COMMERCIAL

## 2018-09-17 DIAGNOSIS — E78.00 HYPERCHOLESTEREMIA: ICD-10-CM

## 2018-09-17 PROCEDURE — 77067 SCR MAMMO BI INCL CAD: CPT

## 2018-09-17 RX ORDER — ATORVASTATIN CALCIUM 10 MG/1
TABLET, FILM COATED ORAL
Qty: 30 TABLET | Refills: 5 | Status: SHIPPED | OUTPATIENT
Start: 2018-09-17 | End: 2018-10-16 | Stop reason: SDUPTHER

## 2018-10-11 ENCOUNTER — CLINICAL SUPPORT (OUTPATIENT)
Dept: INTERNAL MEDICINE CLINIC | Facility: CLINIC | Age: 53
End: 2018-10-11
Payer: COMMERCIAL

## 2018-10-11 DIAGNOSIS — Z11.59 ENCOUNTER FOR HEPATITIS C SCREENING TEST FOR LOW RISK PATIENT: ICD-10-CM

## 2018-10-11 DIAGNOSIS — F41.9 ANXIETY: ICD-10-CM

## 2018-10-11 DIAGNOSIS — E78.00 HYPERCHOLESTEREMIA: ICD-10-CM

## 2018-10-11 DIAGNOSIS — E78.00 HYPERCHOLESTEROLEMIA: Primary | ICD-10-CM

## 2018-10-11 DIAGNOSIS — E55.9 VITAMIN D DEFICIENCY: ICD-10-CM

## 2018-10-11 LAB
25(OH)D3 SERPL-MCNC: 64.2 NG/ML (ref 30–100)
ALBUMIN SERPL BCP-MCNC: 3.9 G/DL (ref 3.5–5)
ALP SERPL-CCNC: 105 U/L (ref 46–116)
ALT SERPL W P-5'-P-CCNC: 46 U/L (ref 12–78)
ANION GAP SERPL CALCULATED.3IONS-SCNC: 7 MMOL/L (ref 4–13)
AST SERPL W P-5'-P-CCNC: 30 U/L (ref 5–45)
BASOPHILS # BLD AUTO: 0.06 THOUSANDS/ΜL (ref 0–0.1)
BASOPHILS NFR BLD AUTO: 1 % (ref 0–1)
BILIRUB SERPL-MCNC: 0.59 MG/DL (ref 0.2–1)
BUN SERPL-MCNC: 21 MG/DL (ref 5–25)
CALCIUM SERPL-MCNC: 9.2 MG/DL (ref 8.3–10.1)
CHLORIDE SERPL-SCNC: 103 MMOL/L (ref 100–108)
CHOLEST SERPL-MCNC: 194 MG/DL (ref 50–200)
CO2 SERPL-SCNC: 30 MMOL/L (ref 21–32)
CREAT SERPL-MCNC: 0.81 MG/DL (ref 0.6–1.3)
EOSINOPHIL # BLD AUTO: 0.35 THOUSAND/ΜL (ref 0–0.61)
EOSINOPHIL NFR BLD AUTO: 4 % (ref 0–6)
ERYTHROCYTE [DISTWIDTH] IN BLOOD BY AUTOMATED COUNT: 13.4 % (ref 11.6–15.1)
GFR SERPL CREATININE-BSD FRML MDRD: 84 ML/MIN/1.73SQ M
GLUCOSE P FAST SERPL-MCNC: 115 MG/DL (ref 65–99)
HCT VFR BLD AUTO: 47.5 % (ref 34.8–46.1)
HDLC SERPL-MCNC: 40 MG/DL (ref 40–60)
HGB BLD-MCNC: 15.5 G/DL (ref 11.5–15.4)
IMM GRANULOCYTES # BLD AUTO: 0.04 THOUSAND/UL (ref 0–0.2)
IMM GRANULOCYTES NFR BLD AUTO: 1 % (ref 0–2)
LDLC SERPL CALC-MCNC: 125 MG/DL (ref 0–100)
LYMPHOCYTES # BLD AUTO: 2.76 THOUSANDS/ΜL (ref 0.6–4.47)
LYMPHOCYTES NFR BLD AUTO: 31 % (ref 14–44)
MCH RBC QN AUTO: 31.3 PG (ref 26.8–34.3)
MCHC RBC AUTO-ENTMCNC: 32.6 G/DL (ref 31.4–37.4)
MCV RBC AUTO: 96 FL (ref 82–98)
MONOCYTES # BLD AUTO: 0.72 THOUSAND/ΜL (ref 0.17–1.22)
MONOCYTES NFR BLD AUTO: 8 % (ref 4–12)
NEUTROPHILS # BLD AUTO: 4.91 THOUSANDS/ΜL (ref 1.85–7.62)
NEUTS SEG NFR BLD AUTO: 55 % (ref 43–75)
NONHDLC SERPL-MCNC: 154 MG/DL
NRBC BLD AUTO-RTO: 0 /100 WBCS
PLATELET # BLD AUTO: 260 THOUSANDS/UL (ref 149–390)
PMV BLD AUTO: 11.9 FL (ref 8.9–12.7)
POTASSIUM SERPL-SCNC: 4.4 MMOL/L (ref 3.5–5.3)
PROT SERPL-MCNC: 7.1 G/DL (ref 6.4–8.2)
RBC # BLD AUTO: 4.95 MILLION/UL (ref 3.81–5.12)
SODIUM SERPL-SCNC: 140 MMOL/L (ref 136–145)
TRIGL SERPL-MCNC: 147 MG/DL
TSH SERPL DL<=0.05 MIU/L-ACNC: 0.79 UIU/ML (ref 0.36–3.74)
WBC # BLD AUTO: 8.84 THOUSAND/UL (ref 4.31–10.16)

## 2018-10-11 PROCEDURE — 80061 LIPID PANEL: CPT | Performed by: NURSE PRACTITIONER

## 2018-10-11 PROCEDURE — 84443 ASSAY THYROID STIM HORMONE: CPT | Performed by: NURSE PRACTITIONER

## 2018-10-11 PROCEDURE — 83036 HEMOGLOBIN GLYCOSYLATED A1C: CPT | Performed by: NURSE PRACTITIONER

## 2018-10-11 PROCEDURE — 36415 COLL VENOUS BLD VENIPUNCTURE: CPT

## 2018-10-11 PROCEDURE — 36415 COLL VENOUS BLD VENIPUNCTURE: CPT | Performed by: NURSE PRACTITIONER

## 2018-10-11 PROCEDURE — 85025 COMPLETE CBC W/AUTO DIFF WBC: CPT | Performed by: NURSE PRACTITIONER

## 2018-10-11 PROCEDURE — 82306 VITAMIN D 25 HYDROXY: CPT | Performed by: NURSE PRACTITIONER

## 2018-10-11 PROCEDURE — 80053 COMPREHEN METABOLIC PANEL: CPT | Performed by: NURSE PRACTITIONER

## 2018-10-11 PROCEDURE — 86803 HEPATITIS C AB TEST: CPT | Performed by: NURSE PRACTITIONER

## 2018-10-12 DIAGNOSIS — R73.09 ELEVATED GLUCOSE: Primary | ICD-10-CM

## 2018-10-12 LAB
EST. AVERAGE GLUCOSE BLD GHB EST-MCNC: 146 MG/DL
HBA1C MFR BLD: 6.7 % (ref 4.2–6.3)
HCV AB SER QL: NORMAL

## 2018-10-16 ENCOUNTER — OFFICE VISIT (OUTPATIENT)
Dept: INTERNAL MEDICINE CLINIC | Facility: CLINIC | Age: 53
End: 2018-10-16
Payer: COMMERCIAL

## 2018-10-16 VITALS
OXYGEN SATURATION: 96 % | SYSTOLIC BLOOD PRESSURE: 210 MMHG | HEART RATE: 80 BPM | HEIGHT: 63 IN | BODY MASS INDEX: 31.22 KG/M2 | WEIGHT: 176.2 LBS | TEMPERATURE: 97.6 F | DIASTOLIC BLOOD PRESSURE: 110 MMHG

## 2018-10-16 DIAGNOSIS — E55.9 VITAMIN D DEFICIENCY: ICD-10-CM

## 2018-10-16 DIAGNOSIS — R06.83 SNORING: ICD-10-CM

## 2018-10-16 DIAGNOSIS — F41.9 ANXIETY: ICD-10-CM

## 2018-10-16 DIAGNOSIS — E78.00 HYPERCHOLESTEREMIA: ICD-10-CM

## 2018-10-16 DIAGNOSIS — E11.8 TYPE 2 DIABETES MELLITUS WITH COMPLICATION, UNSPECIFIED WHETHER LONG TERM INSULIN USE: ICD-10-CM

## 2018-10-16 DIAGNOSIS — Z11.59 ENCOUNTER FOR HEPATITIS C SCREENING TEST FOR LOW RISK PATIENT: ICD-10-CM

## 2018-10-16 DIAGNOSIS — I15.9 SECONDARY HYPERTENSION: ICD-10-CM

## 2018-10-16 DIAGNOSIS — K21.9 GASTROESOPHAGEAL REFLUX DISEASE, ESOPHAGITIS PRESENCE NOT SPECIFIED: ICD-10-CM

## 2018-10-16 DIAGNOSIS — K21.9 GASTROESOPHAGEAL REFLUX DISEASE WITHOUT ESOPHAGITIS: Primary | ICD-10-CM

## 2018-10-16 PROBLEM — R73.09 ELEVATED GLUCOSE: Status: RESOLVED | Noted: 2018-10-12 | Resolved: 2018-10-16

## 2018-10-16 PROBLEM — I10 HYPERTENSION: Status: ACTIVE | Noted: 2018-10-16

## 2018-10-16 PROBLEM — E11.9 DIABETES (HCC): Status: ACTIVE | Noted: 2018-10-16

## 2018-10-16 PROBLEM — Z71.6 ENCOUNTER FOR SMOKING CESSATION COUNSELING: Status: RESOLVED | Noted: 2018-04-05 | Resolved: 2018-10-16

## 2018-10-16 PROCEDURE — 4010F ACE/ARB THERAPY RXD/TAKEN: CPT | Performed by: NURSE PRACTITIONER

## 2018-10-16 PROCEDURE — 99215 OFFICE O/P EST HI 40 MIN: CPT | Performed by: NURSE PRACTITIONER

## 2018-10-16 PROCEDURE — 93000 ELECTROCARDIOGRAM COMPLETE: CPT | Performed by: NURSE PRACTITIONER

## 2018-10-16 RX ORDER — ATORVASTATIN CALCIUM 10 MG/1
10 TABLET, FILM COATED ORAL DAILY
Qty: 90 TABLET | Refills: 1 | Status: SHIPPED | OUTPATIENT
Start: 2018-10-16 | End: 2019-02-14 | Stop reason: SDUPTHER

## 2018-10-16 RX ORDER — LISINOPRIL 10 MG/1
10 TABLET ORAL DAILY
Qty: 30 TABLET | Refills: 0 | Status: SHIPPED | OUTPATIENT
Start: 2018-10-16 | End: 2018-11-02 | Stop reason: SDUPTHER

## 2018-10-16 RX ORDER — OMEPRAZOLE 20 MG/1
20 CAPSULE, DELAYED RELEASE ORAL DAILY
Qty: 90 CAPSULE | Refills: 1 | Status: SHIPPED | OUTPATIENT
Start: 2018-10-16 | End: 2019-02-14 | Stop reason: SDUPTHER

## 2018-10-16 RX ORDER — OMEPRAZOLE 20 MG/1
20 CAPSULE, DELAYED RELEASE ORAL DAILY
Qty: 90 CAPSULE | Refills: 1 | Status: SHIPPED | OUTPATIENT
Start: 2018-10-16 | End: 2018-10-16 | Stop reason: SDUPTHER

## 2018-10-16 NOTE — PROGRESS NOTES
Assessment/Plan:    GERD (gastroesophageal reflux disease)   Patient's GERD currently stable on Prilosec 20 milligrams daily  Will continue with current medication  Patient did have an episode of gastric reflux prior to coming into the office today, gastric reflux resolved with Tums  Did get EKG while in office today NSS  Will continue to monitor  Will advise patient to get colonoscopy and possibly EGD  Diabetes [facility])  Lab Results   Component Value Date    HGBA1C 6 7 (H) 10/11/2018       Patient newly diagnosed with diabetes today, patient was given a handout on diabetes while in the office today with education  Patient will start taking metformin 500 milligrams twice a day with a plan to check a hemoglobin A1c in 3 months  Patient is to continue to work on diet and exercise  Limit sugars and carbohydrate intake  Avoid soda, juice, sweets, cookies, desserts, pasta, bread    Eat more whole grains, exercised 30 min of cardio at least 3 times a week  Also recommended daily foot exams to check for sores, and recommended yearly eye exams  Patient was offered a diabetic consultation and weight management however patient refused at this time  Hypertension    Patient newly diagnosed with hypertension today, patient's BP elevated at the beginning of office visit due to gastric reflux EKG done normal sinus rhythm  Patient's recheck /70  Will start patient on lisinopril 10 milligram tablets take once daily  Advised patient to change positions slowly  Continue to monitor blood pressure at home  Goal BP is < 130/80  Contact our office for consistent elevations  Recommend low sodium diet  Exercise 30 minutes three times a week as tolerated  Recommend yearly eye exam     Will have patient follow up in 2 weeks to review blood pressure as well as diabetes  Anxiety    Patient currently stable on Zoloft 50 milligrams daily, and Xanax 0 25 milligram tablets as needed    Will make no changes to her medications at this time  Encounter for hepatitis C screening test for low risk patient    Patient's hepatitis-C antibody negative  Encounter for smoking cessation counseling    Will resolve as patient has stop smoking  Hypercholesteremia    Patient currently well controlled on Lipitor  Recommend healthy lifestyle choices for your cholesterol  Low fat/low cholesterol diet  Limit/avoid red meat  Eat more lean meat - chicken breast, ground turkey, fish  Exercise 30 mins at least 3 times a week as tolerated  Snoring    Will get sleep study as patient has complaints of snoring as well as elevated blood pressure  Vitamin D deficiency    Patient's vitamin-D level now within normal limits patient is encouraged to take 2000 International Units of vitamin-D daily  Diagnoses and all orders for this visit:    Gastroesophageal reflux disease without esophagitis    Anxiety  -     Discontinue: sertraline (ZOLOFT) 50 mg tablet; Take 1 tablet (50 mg total) by mouth daily  -     sertraline (ZOLOFT) 50 mg tablet; Take 1 tablet (50 mg total) by mouth daily    Encounter for hepatitis C screening test for low risk patient    Vitamin D deficiency    Gastroesophageal reflux disease, esophagitis presence not specified  -     Discontinue: omeprazole (PriLOSEC) 20 mg delayed release capsule; Take 1 capsule (20 mg total) by mouth daily  -     omeprazole (PriLOSEC) 20 mg delayed release capsule; Take 1 capsule (20 mg total) by mouth daily    Hypercholesteremia  -     atorvastatin (LIPITOR) 10 mg tablet; Take 1 tablet (10 mg total) by mouth daily    Type 2 diabetes mellitus with complication, unspecified whether long term insulin use (HCC)  -     metFORMIN (GLUCOPHAGE) 500 mg tablet; Take 1 tablet (500 mg total) by mouth 2 (two) times a day with meals    Snoring  -     Diagnostic Sleep Study; Future    Secondary hypertension  -     lisinopril (ZESTRIL) 10 mg tablet;  Take 1 tablet (10 mg total) by mouth daily    Other orders  -     Cancel: Cholecalciferol (VITAMIN D3) 20371 units TABS; Take 1 tablet (50,000 Units total) by mouth once a week          Subjective:      Patient ID: Dacia Gonsalves is a 46 y o  female  Patient presents today for her 3 month follow-up on GERD,anxiety, and hyperlipidemia  She reports reports that she is feeling pretty good with no new complaints  She also states she quit smoking in on March 17, 2018, she  Continues to use the nicotine patches  The patient states that this morning she had an episode of GERD, she does report that she did the chili last night, patient reports that her GERD is much better now that she has taken Tums  Patient also complains that she has been snoring a lot and has gained some weight  Screenings:  Colonoscopy- 2016, she states that she had a lot polyps, she states she is due again for one  Gyn- has history of partial hytrectomy, she has not gone to her GYN in a while, she denies abnormal bleeding   Mammogram-   Had done  Hep C-   Had done 2018  Eye Doctor-sees eye doctor yearly  Dentist- she is due          Anxiety   Presents for follow-up visit  Patient reports no chest pain, compulsions, confusion, decreased concentration, dizziness, excessive worry, insomnia, irritability, nausea, nervous/anxious behavior, palpitations, panic or shortness of breath  Symptoms occur rarely  The quality of sleep is good  Nighttime awakenings: occasional      Compliance with medications is 0-25%  Heartburn   She reports no abdominal pain, no chest pain, no coughing, no early satiety, no heartburn, no nausea, no sore throat, no stridor or no wheezing  This is a chronic problem  The current episode started more than 1 year ago (worse today, she ate chili for dinner)  The problem occurs rarely  The problem has been waxing and waning  The symptoms are aggravated by certain foods  Risk factors include obesity and lack of exercise   She has tried a PPI (resolved) for the symptoms  The treatment provided moderate relief  Hyperlipidemia   This is a chronic problem  The current episode started more than 1 year ago  The problem is controlled  Recent lipid tests were reviewed and are normal  Exacerbating diseases include diabetes and obesity  Pertinent negatives include no chest pain, focal sensory loss, focal weakness, leg pain, myalgias or shortness of breath  Treatments tried:  patient currently taking Lipitor  The current treatment provides significant improvement of lipids  Compliance problems include adherence to diet and adherence to exercise  The following portions of the patient's history were reviewed and updated as appropriate: allergies, current medications, past family history, past medical history, past social history, past surgical history and problem list     Review of Systems   Constitutional: Negative for activity change, appetite change, chills, diaphoresis, fever and irritability  HENT: Negative for congestion, ear discharge, ear pain, postnasal drip, rhinorrhea, sinus pain, sinus pressure and sore throat  Eyes: Negative for pain, discharge, itching and visual disturbance  Respiratory: Negative for cough, chest tightness, shortness of breath and wheezing  Cardiovascular: Negative for chest pain, palpitations and leg swelling  Gastrointestinal: Negative for abdominal pain, blood in stool, constipation, diarrhea, heartburn, nausea and vomiting  Gastric reflux   Endocrine: Negative for polydipsia, polyphagia and polyuria  Genitourinary: Negative for difficulty urinating, dysuria, hematuria and urgency  Musculoskeletal: Negative for arthralgias, back pain, myalgias and neck pain  Skin: Negative for rash and wound  Neurological: Negative for dizziness, focal weakness, weakness, numbness and headaches  Psychiatric/Behavioral: Negative for confusion and decreased concentration   The patient is not nervous/anxious and does not have insomnia  Past Medical History:   Diagnosis Date    Anxiety     Asthma     Depression     Diverticulitis     Diverticulitis of colon     Follicular cyst of ovary 12/23/2014    GERD (gastroesophageal reflux disease)     Glycosuria     Headache     Hyperlipidemia     Obesity     Ovarian cyst     Overweight     Pulmonary nodule     Renal calculus     Vertigo          Current Outpatient Prescriptions:     ALPRAZolam (XANAX) 0 25 mg tablet, Take 1 tablet by mouth 2 (two) times a day as needed, Disp: , Rfl:     atorvastatin (LIPITOR) 10 mg tablet, Take 1 tablet (10 mg total) by mouth daily, Disp: 90 tablet, Rfl: 1    Cholecalciferol (VITAMIN D3) 37690 units TABS, Take 1 tablet by mouth once a week  , Disp: , Rfl:     Na Sulfate-K Sulfate-Mg Sulf (SUPREP BOWEL PREP KIT) 17 5-3 13-1 6 GM/180ML SOLN, Take by mouth, Disp: , Rfl:     NICODERM CQ 14 MG/24HR TD 24 hr patch, Place 1 patch on the skin every 24 hours, Disp: 28 patch, Rfl: 1    omeprazole (PriLOSEC) 20 mg delayed release capsule, Take 1 capsule (20 mg total) by mouth daily, Disp: 90 capsule, Rfl: 1    sertraline (ZOLOFT) 50 mg tablet, Take 1 tablet (50 mg total) by mouth daily, Disp: 90 tablet, Rfl: 1    lisinopril (ZESTRIL) 10 mg tablet, Take 1 tablet (10 mg total) by mouth daily, Disp: 30 tablet, Rfl: 0    metFORMIN (GLUCOPHAGE) 500 mg tablet, Take 1 tablet (500 mg total) by mouth 2 (two) times a day with meals, Disp: 60 tablet, Rfl: 0    Allergies   Allergen Reactions    Clonazepam     Morphine Other (See Comments) and Confusion     Annotation - [id number]: "dopey"  Gets silly    Venlafaxine        Social History   Past Surgical History:   Procedure Laterality Date    APPENDECTOMY      COLONOSCOPY      HYSTERECTOMY      DE COLONOSCOPY FLX DX W/COLLJ SPEC WHEN PFRMD N/A 9/26/2017    Procedure: EGD AND COLONOSCOPY;  Surgeon: Meg Price MD;  Location: [facility];   Service: Gastroenterology    DE REVISE [facility] N/CARPAL TUNNEL SURG Right 5/29/2018    Procedure: CARPAL TUNNEL RELEASE;  Surgeon: Joel Aguilar DO;  [facility];  Service: Orthopedics    TONSILLECTOMY      WISDOM TOOTH EXTRACTION       Family History   Problem Relation Age of Onset    No Known Problems Mother     Cancer Father     Stroke Father     Pancreatic cancer Father        Objective:  BP (!) 210/110 (BP Location: Left arm, Patient Position: Sitting, Cuff Size: Adult)   Pulse 80   Temp 97 6 °F (36 4 °C) (Oral)   Ht 5' 3" (1 6 m)   Wt 79 9 kg (176 lb 3 2 oz)   SpO2 96% Comment: room air  BMI 31 21 kg/m²     Recent Results (from the past 1344 hour(s))   CBC and differential    Collection Time: 10/11/18  8:59 AM   Result Value Ref Range    WBC 8 84 4 31 - 10 16 Thousand/uL    RBC 4 95 3 81 - 5 12 Million/uL    Hemoglobin 15 5 (H) 11 5 - 15 4 g/dL    Hematocrit 47 5 (H) 34 8 - 46 1 %    MCV 96 82 - 98 fL    MCH 31 3 26 8 - 34 3 pg    MCHC 32 6 31 4 - 37 4 g/dL    RDW 13 4 11 6 - 15 1 %    MPV 11 9 8 9 - 12 7 fL    Platelets 610 101 - 350 Thousands/uL    nRBC 0 /100 WBCs    Neutrophils Relative 55 43 - 75 %    Immat GRANS % 1 0 - 2 %    Lymphocytes Relative 31 14 - 44 %    Monocytes Relative 8 4 - 12 %    Eosinophils Relative 4 0 - 6 %    Basophils Relative 1 0 - 1 %    Neutrophils Absolute 4 91 1 85 - 7 62 Thousands/µL    Immature Grans Absolute 0 04 0 00 - 0 20 Thousand/uL    Lymphocytes Absolute 2 76 0 60 - 4 47 Thousands/µL    Monocytes Absolute 0 72 0 17 - 1 22 Thousand/µL    Eosinophils Absolute 0 35 0 00 - 0 61 Thousand/µL    Basophils Absolute 0 06 0 00 - 0 10 Thousands/µL   Hepatitis C antibody    Collection Time: 10/11/18  8:59 AM   Result Value Ref Range    Hepatitis C Ab Non-reactive Non-reactive   Lipid panel    Collection Time: 10/11/18  8:59 AM   Result Value Ref Range    Cholesterol 194 50 - 200 mg/dL    Triglycerides 147 <=150 mg/dL    HDL, Direct 40 40 - 60 mg/dL    LDL Calculated 125 (H) 0 - 100 mg/dL    Non-HDL-Chol (CHOL-HDL) 154 mg/dl   TSH, 3rd generation with Free T4 reflex    Collection Time: 10/11/18  8:59 AM   Result Value Ref Range    TSH 3RD GENERATON 0 789 0 358 - 3 740 uIU/mL   Vitamin D 25 hydroxy    Collection Time: 10/11/18  8:59 AM   Result Value Ref Range    Vit D, 25-Hydroxy 64 2 30 0 - 100 0 ng/mL   Comprehensive metabolic panel    Collection Time: 10/11/18  8:59 AM   Result Value Ref Range    Sodium 140 136 - 145 mmol/L    Potassium 4 4 3 5 - 5 3 mmol/L    Chloride 103 100 - 108 mmol/L    CO2 30 21 - 32 mmol/L    ANION GAP 7 4 - 13 mmol/L    BUN 21 5 - 25 mg/dL    Creatinine 0 81 0 60 - 1 30 mg/dL    Glucose, Fasting 115 (H) 65 - 99 mg/dL    Calcium 9 2 8 3 - 10 1 mg/dL    AST 30 5 - 45 U/L    ALT 46 12 - 78 U/L    Alkaline Phosphatase 105 46 - 116 U/L    Total Protein 7 1 6 4 - 8 2 g/dL    Albumin 3 9 3 5 - 5 0 g/dL    Total Bilirubin 0 59 0 20 - 1 00 mg/dL    eGFR 84 ml/min/1 73sq m   Hemoglobin A1C    Collection Time: 10/11/18 10:38 AM   Result Value Ref Range    Hemoglobin A1C 6 7 (H) 4 2 - 6 3 %     mg/dl            Physical Exam   Constitutional: She is oriented to person, place, and time  She appears well-developed and well-nourished  No distress  HENT:   Head: Normocephalic and atraumatic  Right Ear: External ear normal    Left Ear: External ear normal    Nose: Nose normal    Mouth/Throat: Oropharynx is clear and moist  No oropharyngeal exudate  Eyes: Pupils are equal, round, and reactive to light  Conjunctivae and EOM are normal  Right eye exhibits no discharge  Left eye exhibits no discharge  Neck: Normal range of motion  Neck supple  No thyromegaly present  Cardiovascular: Normal rate, regular rhythm, normal heart sounds and intact distal pulses  Exam reveals no gallop and no friction rub  No murmur heard  Pulmonary/Chest: Effort normal and breath sounds normal  No stridor  No respiratory distress  She has no wheezes  She has no rales  Abdominal: Soft   Bowel sounds are normal  She exhibits no distension  There is no tenderness  Lymphadenopathy:     She has no cervical adenopathy  Neurological: She is alert and oriented to person, place, and time  Skin: Skin is warm and dry  No rash noted  She is not diaphoretic  No erythema  Psychiatric: She has a normal mood and affect   Her behavior is normal  Judgment and thought content normal

## 2018-10-16 NOTE — ASSESSMENT & PLAN NOTE
Patient newly diagnosed with hypertension today, patient's BP elevated at the beginning of office visit due to gastric reflux EKG done normal sinus rhythm  Patient's recheck /70  Will start patient on lisinopril 10 milligram tablets take once daily  Advised patient to change positions slowly  Continue to monitor blood pressure at home  Goal BP is < 130/80  Contact our office for consistent elevations  Recommend low sodium diet  Exercise 30 minutes three times a week as tolerated  Recommend yearly eye exam     Will have patient follow up in 2 weeks to review blood pressure as well as diabetes

## 2018-10-16 NOTE — ASSESSMENT & PLAN NOTE
Patient currently well controlled on Lipitor  Recommend healthy lifestyle choices for your cholesterol  Low fat/low cholesterol diet  Limit/avoid red meat  Eat more lean meat - chicken breast, ground turkey, fish  Exercise 30 mins at least 3 times a week as tolerated

## 2018-10-16 NOTE — ASSESSMENT & PLAN NOTE
Patient's vitamin-D level now within normal limits patient is encouraged to take 2000 International Units of vitamin-D daily

## 2018-10-16 NOTE — ASSESSMENT & PLAN NOTE
Lab Results   Component Value Date    HGBA1C 6 7 (H) 10/11/2018       Patient newly diagnosed with diabetes today, patient was given a handout on diabetes while in the office today with education  Patient will start taking metformin 500 milligrams twice a day with a plan to check a hemoglobin A1c in 3 months  Patient is to continue to work on diet and exercise  Limit sugars and carbohydrate intake  Avoid soda, juice, sweets, cookies, desserts, pasta, bread    Eat more whole grains, exercised 30 min of cardio at least 3 times a week  Also recommended daily foot exams to check for sores, and recommended yearly eye exams  Patient was offered a diabetic consultation and weight management however patient refused at this time

## 2018-10-16 NOTE — ASSESSMENT & PLAN NOTE
Patient's GERD currently stable on Prilosec 20 milligrams daily  Will continue with current medication  Patient did have an episode of gastric reflux prior to coming into the office today, gastric reflux resolved with Tums  Did get EKG while in office today NSS  Will continue to monitor  Will advise patient to get colonoscopy and possibly EGD

## 2018-10-17 DIAGNOSIS — I15.8 OTHER SECONDARY HYPERTENSION: Primary | ICD-10-CM

## 2018-11-02 ENCOUNTER — OFFICE VISIT (OUTPATIENT)
Dept: INTERNAL MEDICINE CLINIC | Facility: CLINIC | Age: 53
End: 2018-11-02
Payer: COMMERCIAL

## 2018-11-02 VITALS
HEIGHT: 63 IN | WEIGHT: 172.2 LBS | TEMPERATURE: 98 F | BODY MASS INDEX: 30.51 KG/M2 | SYSTOLIC BLOOD PRESSURE: 142 MMHG | HEART RATE: 88 BPM | OXYGEN SATURATION: 97 % | DIASTOLIC BLOOD PRESSURE: 78 MMHG

## 2018-11-02 DIAGNOSIS — E11.8 TYPE 2 DIABETES MELLITUS WITH COMPLICATION, UNSPECIFIED WHETHER LONG TERM INSULIN USE: Primary | ICD-10-CM

## 2018-11-02 DIAGNOSIS — I15.9 SECONDARY HYPERTENSION: ICD-10-CM

## 2018-11-02 PROCEDURE — 3008F BODY MASS INDEX DOCD: CPT | Performed by: NURSE PRACTITIONER

## 2018-11-02 PROCEDURE — 1036F TOBACCO NON-USER: CPT | Performed by: NURSE PRACTITIONER

## 2018-11-02 PROCEDURE — 99214 OFFICE O/P EST MOD 30 MIN: CPT | Performed by: NURSE PRACTITIONER

## 2018-11-02 PROCEDURE — 4010F ACE/ARB THERAPY RXD/TAKEN: CPT | Performed by: NURSE PRACTITIONER

## 2018-11-02 RX ORDER — LISINOPRIL 10 MG/1
10 TABLET ORAL DAILY
Qty: 90 TABLET | Refills: 1 | Status: SHIPPED | OUTPATIENT
Start: 2018-11-02 | End: 2019-02-14 | Stop reason: SDUPTHER

## 2018-11-02 NOTE — ASSESSMENT & PLAN NOTE
Patient doing well on lisinopril 10 mg tablet daily, side effects  We will continue to monitor blood pressures   Continue current regimen -   Continue to monitor blood pressure at home  Goal BP is < 130/80  Contact our office for consistent elevations  Recommend low sodium diet  Exercise 30 minutes three times a week as tolerated    Recommend yearly eye exam

## 2018-11-02 NOTE — PROGRESS NOTES
Diabetic Foot Exam    Patient's shoes and socks removed  Right Foot/Ankle   Right Foot Inspection  Skin Exam: skin normal and skin intact no dry skin, no warmth, no callus, no erythema, no maceration, no abnormal color, no pre-ulcer, no ulcer and no callus                          Toe Exam: ROM and strength within normal limits  Sensory       Monofilament testing: intact  Vascular  Capillary refills: < 3 seconds  The right DP pulse is 2+  The right PT pulse is 2+  Right Toe  - Comprehensive Exam  Ecchymosis: none  Arch: normal  Hammertoes: absent  Claw Toes: absent  Swelling: none   Tenderness: none         Left Foot/Ankle  Left Foot Inspection  Skin Exam: skin normal and skin intactno dry skin, no warmth, no erythema, no maceration, normal color, no pre-ulcer, no ulcer and no callus                         Toe Exam: ROM and strength within normal limits                   Sensory       Monofilament: intact  Vascular  Capillary refills: < 3 seconds  The left DP pulse is 2+  The left PT pulse is 2+     Left Toe  - Comprehensive Exam  Ecchymosis: none  Arch: normal  Hammertoes: absent  Claw toes: absent  Swelling: none   Tenderness: none       Assign Risk Category:  No deformity present; ;

## 2018-11-02 NOTE — ASSESSMENT & PLAN NOTE
Lab Results   Component Value Date    HGBA1C 6 7 (H) 10/11/2018       No results for input(s): POCGLU in the last 72 hours  Patient will continue with metformin 500 mg b i d , patient denies any side effects  Will get follow-up blood work in 4 months  Patient is to continue to work on diet and exercise  Limit sugars and carbohydrate intake  Avoid soda, juice, sweets, cookies, desserts, pasta, bread    Eat more whole grains, exercised 30 min of cardio at least 3 times a week  Also recommended daily foot exams to check for sores, and recommended yearly eye exams

## 2018-11-02 NOTE — PROGRESS NOTES
Assessment/Plan:    Diabetes ([facility] [address] )  Lab Results   Component Value Date    HGBA1C 6 7 (H) 10/11/2018       No results for input(s): POCGLU in the last 72 hours  Patient will continue with metformin 500 mg b i d , patient denies any side effects  Will get follow-up blood work in 4 months  Patient is to continue to work on diet and exercise  Limit sugars and carbohydrate intake  Avoid soda, juice, sweets, cookies, desserts, pasta, bread    Eat more whole grains, exercised 30 min of cardio at least 3 times a week  Also recommended daily foot exams to check for sores, and recommended yearly eye exams  Hypertension    Patient doing well on lisinopril 10 mg tablet daily, side effects  We will continue to monitor blood pressures   Continue current regimen -   Continue to monitor blood pressure at home  Goal BP is < 130/80  Contact our office for consistent elevations  Recommend low sodium diet  Exercise 30 minutes three times a week as tolerated  Recommend yearly eye exam          Diagnoses and all orders for this visit:    Type 2 diabetes mellitus with complication, unspecified whether long term insulin use (HCC)  -     Hemoglobin A1C  -     metFORMIN (GLUCOPHAGE) 500 mg tablet; Take 1 tablet (500 mg total) by mouth 2 (two) times a day with meals    Secondary hypertension  -     Lipid panel  -     Comprehensive metabolic panel; Future  -     CBC and differential  -     lisinopril (ZESTRIL) 10 mg tablet; Take 1 tablet (10 mg total) by mouth daily          Subjective:      Patient ID: Pema Butler is a 48 y o  female  Patient presents today for 2 week follow-up on hypertension and diabetes  Diabetic foot exam done while in office today  Patient states she is feeling much better on these medications, she has been experiencing no side effects  She states she has been sleeping better    Patient works as a medical assistant, and has been checking her blood pressures at work systolic BP is in the 130s to 493R, and diastolic BP is 34T to 38M  Hypertension   This is a new problem  The current episode started 1 to 4 weeks ago  The problem has been gradually improving since onset  The problem is controlled  Pertinent negatives include no anxiety, blurred vision, chest pain, headaches, neck pain, palpitations, peripheral edema or shortness of breath  Risk factors for coronary artery disease include diabetes mellitus, dyslipidemia, sedentary lifestyle and stress  Treatments tried:   Patient currently taking lisinopril  The current treatment provides moderate improvement  Compliance problems include diet  Diabetes   She presents for her follow-up diabetic visit  She has type 2 diabetes mellitus  The initial diagnosis of diabetes was made 2 weeks ago  Her disease course has been stable  There are no hypoglycemic associated symptoms  Pertinent negatives for hypoglycemia include no dizziness or headaches  There are no diabetic associated symptoms  Pertinent negatives for diabetes include no blurred vision, no chest pain, no polydipsia, no polyphagia, no polyuria and no weakness  There are no hypoglycemic complications  There are no diabetic complications  Risk factors for coronary artery disease include sedentary lifestyle, stress, obesity and dyslipidemia  Current diabetic treatment includes oral agent (monotherapy)  She is compliant with treatment all of the time  She has not had a previous visit with a dietitian  She rarely participates in exercise  An ACE inhibitor/angiotensin II receptor blocker is being taken  She does not see a podiatrist Eye exam is not current         The following portions of the patient's history were reviewed and updated as appropriate: allergies, current medications, past family history, past medical history, past social history, past surgical history and problem list     Review of Systems   Constitutional: Negative for activity change, appetite change, chills, diaphoresis and fever    HENT: Negative for congestion, ear discharge, ear pain, postnasal drip, rhinorrhea, sinus pain, sinus pressure and sore throat  Eyes: Negative for blurred vision, pain, discharge, itching and visual disturbance  Respiratory: Negative for cough, chest tightness, shortness of breath and wheezing  Cardiovascular: Negative for chest pain, palpitations and leg swelling  Gastrointestinal: Negative for abdominal pain, blood in stool, constipation, diarrhea, nausea and vomiting  Endocrine: Negative for polydipsia, polyphagia and polyuria  Genitourinary: Negative for difficulty urinating, dysuria, hematuria and urgency  Musculoskeletal: Negative for arthralgias, back pain and neck pain  Skin: Negative for rash and wound  Neurological: Negative for dizziness, weakness, numbness and headaches           Past Medical History:   Diagnosis Date    Anxiety     Asthma     Depression     Diverticulitis     Diverticulitis of colon     Follicular cyst of ovary 12/23/2014    GERD (gastroesophageal reflux disease)     Glycosuria     Headache     Hyperlipidemia     Obesity     Ovarian cyst     Overweight     Pulmonary nodule     Renal calculus     Vertigo          Current Outpatient Prescriptions:     ALPRAZolam (XANAX) 0 25 mg tablet, Take 1 tablet by mouth 2 (two) times a day as needed, Disp: , Rfl:     atorvastatin (LIPITOR) 10 mg tablet, Take 1 tablet (10 mg total) by mouth daily, Disp: 90 tablet, Rfl: 1    Cholecalciferol (VITAMIN D3) 92513 units TABS, Take 1 tablet by mouth once a week  , Disp: , Rfl:     lisinopril (ZESTRIL) 10 mg tablet, Take 1 tablet (10 mg total) by mouth daily, Disp: 90 tablet, Rfl: 1    metFORMIN (GLUCOPHAGE) 500 mg tablet, Take 1 tablet (500 mg total) by mouth 2 (two) times a day with meals, Disp: 180 tablet, Rfl: 1    Na Sulfate-K Sulfate-Mg Sulf (SUPREP BOWEL PREP KIT) 17 5-3 13-1 6 GM/180ML SOLN, Take by mouth, Disp: , Rfl:     NICODERM CQ 14 MG/24HR TD 24 hr patch, Place 1 patch on the skin every 24 hours, Disp: 28 patch, Rfl: 1    omeprazole (PriLOSEC) 20 mg delayed release capsule, Take 1 capsule (20 mg total) by mouth daily, Disp: 90 capsule, Rfl: 1    sertraline (ZOLOFT) 50 mg tablet, Take 1 tablet (50 mg total) by mouth daily, Disp: 90 tablet, Rfl: 1    Allergies   Allergen Reactions    Clonazepam     Morphine Other (See Comments) and Confusion     [facility] [id number]: "dopey"  Gets silly    Venlafaxine        Social History   Past Surgical History:   Procedure Laterality Date    APPENDECTOMY      COLONOSCOPY      HYSTERECTOMY      AR COLONOSCOPY FLX DX W/COLLJ SPEC WHEN PFRMD N/A 9/26/2017    Procedure: EGD AND COLONOSCOPY;  Surgeon: Yvonne Gibson MD;  Location: [facility];   Service: Gastroenterology    AR REVISE MEDIAN N/CARPAL TUNNEL SURG Right 5/29/2018    Procedure: CARPAL TUNNEL RELEASE;  Surgeon: Marva Tristan DO;  [facility];  Service: Orthopedics    TONSILLECTOMY      WISDOM TOOTH EXTRACTION       Family History   Problem Relation Age of Onset    No Known Problems Mother     Cancer Father     Stroke Father     Pancreatic cancer Father        Objective:  /78 (BP Location: Right arm, Patient Position: Sitting, Cuff Size: Adult)   Pulse 88   Temp 98 °F (36 7 °C) (Oral)   Ht 5' 3" (1 6 m)   Wt 78 1 kg (172 lb 3 2 oz)   SpO2 97% Comment: room air  BMI 30 50 kg/m²     Recent Results (from the past 1344 hour(s))   CBC and differential    Collection Time: 10/11/18  8:59 AM   Result Value Ref Range    WBC 8 84 4 31 - 10 16 Thousand/uL    RBC 4 95 3 81 - 5 12 Million/uL    Hemoglobin 15 5 (H) 11 5 - 15 4 g/dL    Hematocrit 47 5 (H) 34 8 - 46 1 %    MCV 96 82 - 98 fL    MCH 31 3 26 8 - 34 3 pg    MCHC 32 6 31 4 - 37 4 g/dL    RDW 13 4 11 6 - 15 1 %    MPV 11 9 8 9 - 12 7 fL    Platelets 135 127 - 250 Thousands/uL    nRBC 0 /100 WBCs    Neutrophils Relative 55 43 - 75 %    Immat GRANS % 1 0 - 2 %    Lymphocytes Relative 31 14 - 44 %    Monocytes Relative 8 4 - 12 %    Eosinophils Relative 4 0 - 6 %    Basophils Relative 1 0 - 1 %    Neutrophils Absolute 4 91 1 85 - 7 62 Thousands/µL    Immature Grans Absolute 0 04 0 00 - 0 20 Thousand/uL    Lymphocytes Absolute 2 76 0 60 - 4 47 Thousands/µL    Monocytes Absolute 0 72 0 17 - 1 22 Thousand/µL    Eosinophils Absolute 0 35 0 00 - 0 61 Thousand/µL    Basophils Absolute 0 06 0 00 - 0 10 Thousands/µL   Hepatitis C antibody    Collection Time: 10/11/18  8:59 AM   Result Value Ref Range    Hepatitis C Ab Non-reactive Non-reactive   Lipid panel    Collection Time: 10/11/18  8:59 AM   Result Value Ref Range    Cholesterol 194 50 - 200 mg/dL    Triglycerides 147 <=150 mg/dL    HDL, Direct 40 40 - 60 mg/dL    LDL Calculated 125 (H) 0 - 100 mg/dL    Non-HDL-Chol (CHOL-HDL) 154 mg/dl   TSH, 3rd generation with Free T4 reflex    Collection Time: 10/11/18  8:59 AM   Result Value Ref Range    TSH 3RD GENERATON 0 789 0 358 - 3 740 uIU/mL   Vitamin D 25 hydroxy    Collection Time: 10/11/18  8:59 AM   Result Value Ref Range    Vit D, 25-Hydroxy 64 2 30 0 - 100 0 ng/mL   Comprehensive metabolic panel    Collection Time: 10/11/18  8:59 AM   Result Value Ref Range    Sodium 140 136 - 145 mmol/L    Potassium 4 4 3 5 - 5 3 mmol/L    Chloride 103 100 - 108 mmol/L    CO2 30 21 - 32 mmol/L    ANION GAP 7 4 - 13 mmol/L    BUN 21 5 - 25 mg/dL    Creatinine 0 81 0 60 - 1 30 mg/dL    Glucose, Fasting 115 (H) 65 - 99 mg/dL    Calcium 9 2 8 3 - 10 1 mg/dL    AST 30 5 - 45 U/L    ALT 46 12 - 78 U/L    Alkaline Phosphatase 105 46 - 116 U/L    Total Protein 7 1 6 4 - 8 2 g/dL    Albumin 3 9 3 5 - 5 0 g/dL    Total Bilirubin 0 59 0 20 - 1 00 mg/dL    eGFR 84 ml/min/1 73sq m   Hemoglobin A1C    Collection Time: 10/11/18 10:38 AM   Result Value Ref Range    Hemoglobin A1C 6 7 (H) 4 2 - 6 3 %     mg/dl            Physical Exam   Constitutional: She is oriented to person, place, and time   She appears well-developed and well-nourished  No distress  HENT:   Head: Normocephalic and atraumatic  Right Ear: External ear normal    Left Ear: External ear normal    Nose: Nose normal    Mouth/Throat: Oropharynx is clear and moist  No oropharyngeal exudate  Eyes: Pupils are equal, round, and reactive to light  Conjunctivae and EOM are normal  Right eye exhibits no discharge  Left eye exhibits no discharge  Neck: Normal range of motion  Neck supple  No thyromegaly present  Cardiovascular: Normal rate, regular rhythm, normal heart sounds and intact distal pulses  Exam reveals no gallop and no friction rub  No murmur heard  Pulmonary/Chest: Effort normal and breath sounds normal  No stridor  No respiratory distress  She has no wheezes  She has no rales  Abdominal: Soft  Bowel sounds are normal  She exhibits no distension  There is no tenderness  Lymphadenopathy:     She has no cervical adenopathy  Neurological: She is alert and oriented to person, place, and time  Skin: Skin is warm and dry  No rash noted  She is not diaphoretic  No erythema  Psychiatric: She has a normal mood and affect   Her behavior is normal  Judgment and thought content normal

## 2019-01-10 NOTE — PROGRESS NOTES
Assessment/Plan:    Other microscopic hematuria  Will get CT of abdomen and pelvis due to continued abdominal pressure  UA positive for moderate amount of blood and moderate leukocytes  Will send urine for culture, will hold off on antibiotics at this point  Advised patient to drink plenty of water, patient may take tylenol for discomfort  Kidney calculi  Addendum 01/11/2019 at 12:12 p m: CT results  Moderate right hydroureteronephrosis secondary to a 6 mm distal ureteric calculus at or just above the level of the UVJ  There is a second 3 mm distal ureteric calculus just above the larger more caudal calculus      Numerous bilateral nonobstructing renal calculi      Small amount of fluid in the right pelvis versus ovarian cyst  This could be further evaluated with nonemergent ultrasound      Enlarged fatty liver      Descending and sigmoid colon diverticulosis without evidence of diverticulitis  Discussed with Dr Dayanara Ibrahim,  Will schedule referral to Urology Dr Jasbir Hernández for Monday,  Advised patient to stay well hydrated this weekend and to get a strainer to strain kidney stones, will give patient Flexeril 5 mg tablet twice a day, and Flomax 0 4 mg once a day for the next 10 days  Discussed red flag warning signs with patient when she should report to the emergency room  Diagnoses and all orders for this visit:    Urinary frequency  -     POCT urine dip auto non-scope  -     Ambulatory referral to Gynecology; Future    Dysuria  -     Ambulatory referral to Gynecology; Future  -     CT renal stone study abdomen pelvis wo contrast; Future  -     Urine culture; Future    Right flank pain  -     CT renal stone study abdomen pelvis wo contrast; Future  -     Urine culture; Future    Other microscopic hematuria  -     CT renal stone study abdomen pelvis wo contrast; Future  -     Urine culture; Future    Kidney calculi  -     Ambulatory referral to Urology;  Future  -     cyclobenzaprine (FLEXERIL) 5 mg tablet; Take 1 tablet (5 mg total) by mouth 2 (two) times a day for 10 days  -     tamsulosin (FLOMAX) 0 4 mg; Take 1 capsule (0 4 mg total) by mouth daily with dinner    Other orders  -     cefuroxime (CEFTIN) 250 mg tablet;           Subjective:      Patient ID: Carolyn Field is a 48 y o  female  Patient presents today to follow up on UTI  Patient went to urgent care on 12/30/2018 with complaints of suprapubic tenderness and blood in her urine  Patient had urinalysis done while in Urgent Care positive for moderate amount of blood, and white blood cells, negative for ketone and nitrites  Urine culture was done and patient was started on Ceftin, culture was negative and patient was advised to continue with Ceftin  The patient was advised to continue to follow up with PCP in 14 days, and get a repeat urinalysis  Patient has history of kidney stones in the past      Patient continues to have pressure suprapubic area, and right flank  A couple of years ago she had mesh placed  Denies burning with urination  She feels pressure after she pees  The following portions of the patient's history were reviewed and updated as appropriate: allergies, current medications, past family history, past medical history, past social history, past surgical history and problem list     Review of Systems   Constitutional: Negative for activity change, appetite change, chills, diaphoresis and fever  HENT: Negative for congestion, ear discharge, ear pain, postnasal drip, rhinorrhea, sinus pain, sinus pressure and sore throat  Eyes: Negative for pain, discharge, itching and visual disturbance  Respiratory: Negative for cough, chest tightness, shortness of breath and wheezing  Cardiovascular: Negative for chest pain, palpitations and leg swelling  Gastrointestinal: Negative for abdominal pain, constipation, diarrhea, nausea and vomiting  Endocrine: Negative for polydipsia, polyphagia and polyuria  Genitourinary: Positive for flank pain (right flank) and frequency  Negative for difficulty urinating, dysuria, hematuria and urgency  Motrin relieves the discomfort   Musculoskeletal: Negative for arthralgias, back pain and neck pain  Skin: Negative for rash and wound  Neurological: Negative for dizziness, weakness, numbness and headaches           Past Medical History:   Diagnosis Date    Anxiety     Asthma     Depression     Diverticulitis     Diverticulitis of colon     Follicular cyst of ovary 12/23/2014    GERD (gastroesophageal reflux disease)     Glycosuria     Headache     Hyperlipidemia     Obesity     Ovarian cyst     Overweight     Pulmonary nodule     Renal calculus     Vertigo          Current Outpatient Prescriptions:     ALPRAZolam (XANAX) 0 25 mg tablet, Take 1 tablet by mouth 2 (two) times a day as needed, Disp: , Rfl:     atorvastatin (LIPITOR) 10 mg tablet, Take 1 tablet (10 mg total) by mouth daily, Disp: 90 tablet, Rfl: 1    Cholecalciferol (VITAMIN D3) 00736 units TABS, Take 1 tablet by mouth once a week  , Disp: , Rfl:     lisinopril (ZESTRIL) 10 mg tablet, Take 1 tablet (10 mg total) by mouth daily, Disp: 90 tablet, Rfl: 1    metFORMIN (GLUCOPHAGE) 500 mg tablet, Take 1 tablet (500 mg total) by mouth 2 (two) times a day with meals, Disp: 180 tablet, Rfl: 1    Na Sulfate-K Sulfate-Mg Sulf (SUPREP BOWEL PREP KIT) 17 5-3 13-1 6 GM/180ML SOLN, Take by mouth, Disp: , Rfl:     omeprazole (PriLOSEC) 20 mg delayed release capsule, Take 1 capsule (20 mg total) by mouth daily, Disp: 90 capsule, Rfl: 1    sertraline (ZOLOFT) 50 mg tablet, Take 1 tablet (50 mg total) by mouth daily, Disp: 90 tablet, Rfl: 1    cefuroxime (CEFTIN) 250 mg tablet, , Disp: , Rfl:     cyclobenzaprine (FLEXERIL) 5 mg tablet, Take 1 tablet (5 mg total) by mouth 2 (two) times a day for 10 days, Disp: 20 tablet, Rfl: 0    NICODERM CQ 14 MG/24HR TD 24 hr patch, Place 1 patch on the skin every 24 hours (Patient not taking: Reported on 1/11/2019 ), Disp: 28 patch, Rfl: 1    tamsulosin (FLOMAX) 0 4 mg, Take 1 capsule (0 4 mg total) by mouth daily with dinner, Disp: 10 capsule, Rfl: 0    Allergies   Allergen Reactions    Clonazepam     Morphine Other (See Comments) and Confusion     Annotation - [id number]: "dopey"  Gets silly    Venlafaxine        Social History   Past Surgical History:   Procedure Laterality Date    APPENDECTOMY      COLONOSCOPY      HYSTERECTOMY      [place] COLONOSCOPY FLX DX W/COLLJ SPEC WHEN PFRMD N/A 9/26/2017    Procedure: EGD AND COLONOSCOPY;  Surgeon: Tyree Walter MD;  Location: [facility]; Service: Gastroenterology    [place] REVISE MEDIAN N/CARPAL TUNNEL SURG Right 5/29/2018    Procedure: CARPAL TUNNEL RELEASE;  Surgeon: Lb Green DO;  [facility];  Service: Orthopedics    TONSILLECTOMY      WISDOM TOOTH EXTRACTION       Family History   Problem Relation Age of Onset    No Known Problems Mother     Stroke Father     Pancreatic cancer Father        Objective:  /80 (BP Location: Left arm, Patient Position: Sitting, Cuff Size: Adult)   Pulse 86   Temp (!) 97 1 °F (36 2 °C) (Tympanic)   Ht 5' 3 19" (1 605 m)   Wt 79 9 kg (176 lb 3 2 oz)   SpO2 97% Comment: RA  BMI 31 03 kg/m²     Recent Results (from the past 1344 hour(s))   POCT urine dip auto non-scope    Collection Time: 01/11/19 10:32 AM   Result Value Ref Range     COLOR,UA yellow     CLARITY,UA clear     SPECIFIC GRAVITY,UA 1 005      PH,UA 8 0     LEUKOCYTE ESTERASE,UA moderate     NITRITE,UA negative     GLUCOSE, UA negative     KETONES,UA negative     BILIRUBIN,UA negative     BLOOD,UA moderate     POCT URINE PROTEIN trace     SL AMB POCT UROBILINOGEN 0 2             Physical Exam   Constitutional: She is oriented to person, place, and time  She appears well-developed and well-nourished  No distress  HENT:   Head: Normocephalic and atraumatic     Right Ear: External ear normal    Left Ear: External ear normal    Nose: Nose normal    Mouth/Throat: Oropharynx is clear and moist  No oropharyngeal exudate  Eyes: Pupils are equal, round, and reactive to light  Conjunctivae and EOM are normal  Right eye exhibits no discharge  Left eye exhibits no discharge  Neck: Normal range of motion  Neck supple  No thyromegaly present  Cardiovascular: Normal rate, regular rhythm, normal heart sounds and intact distal pulses  Exam reveals no gallop and no friction rub  No murmur heard  Pulmonary/Chest: Effort normal and breath sounds normal  No stridor  No respiratory distress  She has no wheezes  She has no rales  Abdominal: Soft  Bowel sounds are normal  She exhibits no distension  There is tenderness in the suprapubic area  There is CVA tenderness  Lymphadenopathy:     She has no cervical adenopathy  Neurological: She is alert and oriented to person, place, and time  Skin: Skin is warm and dry  No rash noted  She is not diaphoretic  No erythema  Psychiatric: She has a normal mood and affect   Her behavior is normal  Judgment and thought content normal

## 2019-01-11 ENCOUNTER — HOSPITAL ENCOUNTER (OUTPATIENT)
Dept: RADIOLOGY | Facility: IMAGING CENTER | Age: 54
Discharge: HOME | End: 2019-01-11
Payer: COMMERCIAL

## 2019-01-11 ENCOUNTER — OFFICE VISIT (OUTPATIENT)
Dept: INTERNAL MEDICINE CLINIC | Age: 54
End: 2019-01-11
Payer: COMMERCIAL

## 2019-01-11 ENCOUNTER — TELEPHONE (OUTPATIENT)
Dept: UROLOGY | Facility: MEDICAL CENTER | Age: 54
End: 2019-01-11

## 2019-01-11 VITALS
WEIGHT: 176.2 LBS | TEMPERATURE: 97.1 F | OXYGEN SATURATION: 97 % | BODY MASS INDEX: 31.22 KG/M2 | HEART RATE: 86 BPM | HEIGHT: 63 IN | SYSTOLIC BLOOD PRESSURE: 130 MMHG | DIASTOLIC BLOOD PRESSURE: 80 MMHG

## 2019-01-11 DIAGNOSIS — R30.0 DYSURIA: ICD-10-CM

## 2019-01-11 DIAGNOSIS — R10.9 RIGHT FLANK PAIN: ICD-10-CM

## 2019-01-11 DIAGNOSIS — N20.0 KIDNEY CALCULI: ICD-10-CM

## 2019-01-11 DIAGNOSIS — R31.29 OTHER MICROSCOPIC HEMATURIA: ICD-10-CM

## 2019-01-11 DIAGNOSIS — R35.0 URINARY FREQUENCY: Primary | ICD-10-CM

## 2019-01-11 LAB
SL AMB  POCT GLUCOSE, UA: NEGATIVE
SL AMB LEUKOCYTE ESTERASE,UA: ABNORMAL
SL AMB POCT BILIRUBIN,UA: NEGATIVE
SL AMB POCT BLOOD,UA: ABNORMAL
SL AMB POCT CLARITY,UA: CLEAR
SL AMB POCT COLOR,UA: YELLOW
SL AMB POCT KETONES,UA: NEGATIVE
SL AMB POCT NITRITE,UA: NEGATIVE
SL AMB POCT PH,UA: 8
SL AMB POCT SPECIFIC GRAVITY,UA: 1
SL AMB POCT URINE PROTEIN: ABNORMAL
SL AMB POCT UROBILINOGEN: 0.2

## 2019-01-11 PROCEDURE — 87086 URINE CULTURE/COLONY COUNT: CPT | Performed by: NURSE PRACTITIONER

## 2019-01-11 PROCEDURE — 99215 OFFICE O/P EST HI 40 MIN: CPT | Performed by: NURSE PRACTITIONER

## 2019-01-11 PROCEDURE — 74176 CT ABD & PELVIS W/O CONTRAST: CPT

## 2019-01-11 PROCEDURE — 81003 URINALYSIS AUTO W/O SCOPE: CPT | Performed by: NURSE PRACTITIONER

## 2019-01-11 PROCEDURE — 87077 CULTURE AEROBIC IDENTIFY: CPT | Performed by: NURSE PRACTITIONER

## 2019-01-11 PROCEDURE — 87186 SC STD MICRODIL/AGAR DIL: CPT | Performed by: NURSE PRACTITIONER

## 2019-01-11 RX ORDER — CEFUROXIME AXETIL 250 MG/1
TABLET ORAL
COMMUNITY
Start: 2018-12-30 | End: 2019-11-22 | Stop reason: ALTCHOICE

## 2019-01-11 RX ORDER — TAMSULOSIN HYDROCHLORIDE 0.4 MG/1
0.4 CAPSULE ORAL
Qty: 10 CAPSULE | Refills: 0 | Status: SHIPPED | OUTPATIENT
Start: 2019-01-11 | End: 2019-01-14 | Stop reason: SDUPTHER

## 2019-01-11 RX ORDER — CYCLOBENZAPRINE HCL 5 MG
5 TABLET ORAL 2 TIMES DAILY
Qty: 20 TABLET | Refills: 0 | Status: SHIPPED | OUTPATIENT
Start: 2019-01-11 | End: 2019-05-21 | Stop reason: ALTCHOICE

## 2019-01-11 NOTE — ASSESSMENT & PLAN NOTE
Will get CT of abdomen and pelvis due to continued abdominal pressure  UA positive for moderate amount of blood and moderate leukocytes  Will send urine for culture, will hold off on antibiotics at this point  Advised patient to drink plenty of water, patient may take tylenol for discomfort

## 2019-01-11 NOTE — LETTER
January 11, 2019     Patient: Joy Campos   YOB: 1965   Date of Visit: 1/11/2019       To Whom it May Concern:    Joy Campos is under my professional care  She was seen in my office on 1/11/2019  She may return to work on 1/14/19  If you have any questions or concerns, please don't hesitate to call           Sincerely,          LEONARD Edwards        CC: No Recipients

## 2019-01-11 NOTE — LETTER
January 11, 2019     Patient: Maggi Heller   YOB: 1965   Date of Visit: 1/11/2019       To Whom it May Concern:    Maggi Heller is under my professional care  She was seen in my office on 1/11/2019  She may return to work on 1/15/19  If you have any questions or concerns, please don't hesitate to call           Sincerely,          LEONARD Kwok        CC: No Recipients

## 2019-01-11 NOTE — TELEPHONE ENCOUNTER
Cris Sofia from [address] [place], [place] [address] called to make appointment for patient at [facility]  Patient was scheduled for 01/14 at 1:45 pm  Advised for patient to come in 15 minutes early to fill out new patient paperwork

## 2019-01-11 NOTE — TELEPHONE ENCOUNTER
New Patient Intake form:    Complaint/Diagnosis:  Kidney Stones    Insurance:[employer]    History of Cancer: No    Previous urologist: No    Outside Testing/where:     If yes, what kind:[email or web address]    Records Requested/Where:[facility]    Preferred location:[place]

## 2019-01-11 NOTE — ASSESSMENT & PLAN NOTE
Addendum 01/11/2019 at 12:12 p m: CT results  Moderate right hydroureteronephrosis secondary to a 6 mm distal ureteric calculus at or just above the level of the UVJ  There is a second 3 mm distal ureteric calculus just above the larger more caudal calculus      Numerous bilateral nonobstructing renal calculi      Small amount of fluid in the right pelvis versus ovarian cyst  This could be further evaluated with nonemergent ultrasound      Enlarged fatty liver      Descending and sigmoid colon diverticulosis without evidence of diverticulitis  Discussed with Dr Shankar Dick,  Will schedule referral to Urology Dr Naila Cantor for Monday,  Advised patient to stay well hydrated this weekend and to get a strainer to strain kidney stones, will give patient Flexeril 5 mg tablet twice a day, and Flomax 0 4 mg once a day for the next 10 days  Discussed red flag warning signs with patient when she should report to the emergency room

## 2019-01-13 LAB
BACTERIA UR CULT: ABNORMAL
BACTERIA UR CULT: ABNORMAL

## 2019-01-14 ENCOUNTER — OFFICE VISIT (OUTPATIENT)
Dept: UROLOGY | Facility: CLINIC | Age: 54
End: 2019-01-14
Payer: COMMERCIAL

## 2019-01-14 VITALS
SYSTOLIC BLOOD PRESSURE: 112 MMHG | HEIGHT: 63 IN | WEIGHT: 176 LBS | DIASTOLIC BLOOD PRESSURE: 64 MMHG | BODY MASS INDEX: 31.18 KG/M2

## 2019-01-14 DIAGNOSIS — R31.29 OTHER MICROSCOPIC HEMATURIA: ICD-10-CM

## 2019-01-14 DIAGNOSIS — N20.0 KIDNEY CALCULI: Primary | ICD-10-CM

## 2019-01-14 LAB
SL AMB  POCT GLUCOSE, UA: ABNORMAL
SL AMB LEUKOCYTE ESTERASE,UA: ABNORMAL
SL AMB POCT BILIRUBIN,UA: ABNORMAL
SL AMB POCT BLOOD,UA: ABNORMAL
SL AMB POCT CLARITY,UA: CLEAR
SL AMB POCT COLOR,UA: YELLOW
SL AMB POCT KETONES,UA: ABNORMAL
SL AMB POCT NITRITE,UA: ABNORMAL
SL AMB POCT PH,UA: ABNORMAL
SL AMB POCT SPECIFIC GRAVITY,UA: 1.01
SL AMB POCT URINE PROTEIN: ABNORMAL
SL AMB POCT UROBILINOGEN: ABNORMAL

## 2019-01-14 PROCEDURE — 99243 OFF/OP CNSLTJ NEW/EST LOW 30: CPT | Performed by: PHYSICIAN ASSISTANT

## 2019-01-14 PROCEDURE — 81002 URINALYSIS NONAUTO W/O SCOPE: CPT | Performed by: PHYSICIAN ASSISTANT

## 2019-01-14 RX ORDER — TAMSULOSIN HYDROCHLORIDE 0.4 MG/1
0.4 CAPSULE ORAL
Qty: 10 CAPSULE | Refills: 1 | Status: SHIPPED | OUTPATIENT
Start: 2019-01-14 | End: 2019-05-21 | Stop reason: ALTCHOICE

## 2019-01-15 NOTE — PROGRESS NOTES
UROLOGY  NEW PATIENT  NOTE     Patient Identifiers: Cyndie Cannon (MRN: [id number])    Service Providing Consultation:  Urology, Jorge Haley PA-C  Consults  Date of Service: 1/15/2019      History of Present Illness:     Cyndie Cannon is a 48 y o  Female with remote history of kidney stones  She developed urinary frequency and hematuria and thought she had a urinary tract infection went to urgent care  She was treated for UTI  Her symptoms persisted so she went to her family doctor who ordered a CT scan  This showed a 6 mm and 3 mm stone at the UVJ in the right distal ureter with hydronephrosis  She had multiple small stones bilaterally in the kidneys  She still has the urinary pressure but has minimal pain at this time  Her urine does show 3+ microscopic blood  Review of her CT scan images showed a 6 mm stone followed by a 3 mm stone in the distal right ureter very close to the bladder  There was hydronephrosis and there were stones in both kidneys measuring up to 3-4 mm  Of note she had significant atherosclerotic disease in the aorta and femoral arteries  She does admit to leg pain after walking distances and was a former smoker  Past Medical, Past Surgical History:     Past Medical History:   Diagnosis Date    Anxiety     Asthma     Depression     Diverticulitis     Diverticulitis of colon     Follicular cyst of ovary 12/23/2014    GERD (gastroesophageal reflux disease)     Glycosuria     Headache     Hyperlipidemia     Obesity     Ovarian cyst     Overweight     Pulmonary nodule     Renal calculus     Vertigo    :    Past Surgical History:   Procedure Laterality Date    APPENDECTOMY      COLONOSCOPY      HYSTERECTOMY      NM COLONOSCOPY FLX DX W/COLLJ SPEC WHEN PFRMD N/A 9/26/2017    Procedure: EGD AND COLONOSCOPY;  Surgeon: Raymundo Mustafa MD;  Location: [facility] GI [facility];   Service: Gastroenterology    NM REVISE MEDIAN N/CARPAL TUNNEL SURG Right 5/29/2018    Procedure: CARPAL TUNNEL RELEASE;  Surgeon: Carisa Laguerre DO;  Location: [facility];  Service: Orthopedics    TONSILLECTOMY      WISDOM TOOTH EXTRACTION     :    Medications, Allergies:     Current Outpatient Prescriptions:     ALPRAZolam (XANAX) 0 25 mg tablet, Take 1 tablet by mouth 2 (two) times a day as needed, Disp: , Rfl:     atorvastatin (LIPITOR) 10 mg tablet, Take 1 tablet (10 mg total) by mouth daily, Disp: 90 tablet, Rfl: 1    cefuroxime (CEFTIN) 250 mg tablet, , Disp: , Rfl:     Cholecalciferol (VITAMIN D3) 30813 units TABS, Take 1 tablet by mouth once a week  , Disp: , Rfl:     cyclobenzaprine (FLEXERIL) 5 mg tablet, Take 1 tablet (5 mg total) by mouth 2 (two) times a day for 10 days, Disp: 20 tablet, Rfl: 0    lisinopril (ZESTRIL) 10 mg tablet, Take 1 tablet (10 mg total) by mouth daily, Disp: 90 tablet, Rfl: 1    metFORMIN (GLUCOPHAGE) 500 mg tablet, Take 1 tablet (500 mg total) by mouth 2 (two) times a day with meals, Disp: 180 tablet, Rfl: 1    Na Sulfate-K Sulfate-Mg Sulf (SUPREP BOWEL PREP KIT) 17 5-3 13-1 6 GM/180ML SOLN, Take by mouth, Disp: , Rfl:     NICODERM CQ 14 MG/24HR TD 24 hr patch, Place 1 patch on the skin every 24 hours, Disp: 28 patch, Rfl: 1    omeprazole (PriLOSEC) 20 mg delayed release capsule, Take 1 capsule (20 mg total) by mouth daily, Disp: 90 capsule, Rfl: 1    sertraline (ZOLOFT) 50 mg tablet, Take 1 tablet (50 mg total) by mouth daily, Disp: 90 tablet, Rfl: 1    tamsulosin (FLOMAX) 0 4 mg, Take 1 capsule (0 4 mg total) by mouth daily with dinner, Disp: 10 capsule, Rfl: 1    Allergies:   Allergies   Allergen Reactions    Clonazepam     Morphine Other (See Comments) and Confusion     [facility] - [id number]: "dopey"  Gets silly    Venlafaxine    :    Social and Family History:   Social History:   Social History   Substance Use Topics    Smoking status: Former Smoker     Packs/day: 0 25     Years: 20 00     Quit date: 3/16/2018    Smokeless tobacco: Never Used Comment: approx less than  half a pack    Alcohol use Yes      Comment: socially     History   Smoking Status    Former Smoker    Packs/day: 0 25    Years: 20 00    Quit date: 3/16/2018   Smokeless Tobacco    Never Used     Comment: approx less than  half a pack       Family History:  Family History   Problem Relation Age of Onset    No Known Problems Mother     Stroke Father     Pancreatic cancer Father    :     Review of Systems:     General: Fever, chills, or night sweats: negative  Cardiac: Negative for chest pain  Pulmonary: Negative for shortness of breath  Gastrointestinal: Abdominal pain negative  Nausea, vomiting, or diarrhea negative,  Genitourinary: See HPI above  Patient does have hematuria  All other systems queried were negative  Physical Exam:   General: Patient is pleasant and in NAD  Awake and alert  /64 (BP Location: Left arm, Patient Position: Sitting, Cuff Size: Adult)   Ht 5' 3" (1 6 m)   Wt 79 8 kg (176 lb)   BMI 31 18 kg/m²   Cardiac: Peripheral edema: negative  Pulmonary: Non-labored breathing  Abdomen: Soft, non-tender, non-distended  No surgical scars  No masses, tenderness, hernias noted  Genitourinary: Negative CVA tenderness, negative suprapubic tenderness  Labs:     Lab Results   Component Value Date    HGB 15 5 (H) 10/11/2018    HCT 47 5 (H) 10/11/2018    WBC 8 84 10/11/2018     10/11/2018   ]    Lab Results   Component Value Date    K 4 4 10/11/2018     10/11/2018    CO2 30 10/11/2018    BUN 21 10/11/2018    CREATININE 0 81 10/11/2018    CALCIUM 9 2 10/11/2018   ]    Imaging:   I personally reviewed the images and report of the following studies, and reviewed them with the patient:  CT ABDOMEN AND PELVIS WITHOUT IV CONTRAST - LOW DOSE RENAL STONE   IMPRESSION:     Moderate right hydroureteronephrosis secondary to a 6 mm distal ureteric calculus at or just above the level of the UVJ   There is a second 3 mm distal ureteric calculus just above the larger more caudal calculus  Numerous bilateral nonobstructing renal calculi  Small amount of fluid in the right pelvis versus ovarian cyst  This could be further evaluated with nonemergent ultrasound  Enlarged fatty liver  Descending and sigmoid colon diverticulosis without evidence of diverticulitis  ASSESSMENT:      1  Right ureteral calculus with hydronephrosis   2  Bilateral nephrolithiasis   3  Atherosclerotic vascular disease    PLAN:    she is on Flomax and her pain is well controlled she would like to attempt passing the stones if possible   she will follow up in 10 days with an ultrasound and KUB prior to visit   she knows to call if she has any questions or concerns or increased pain   if she is unable to pass the stone she is then  agreeable to scheduling  a ureteroscopy   I recommended she follow-up with her family doctor and possibly vascular surgery for her vascular CT scan findings      Thank you for allowing me to participate in this patients care  Please do not hesitate to call with any additional questions    Danica Dubon PA-C

## 2019-01-17 ENCOUNTER — HOSPITAL ENCOUNTER (OUTPATIENT)
Dept: RADIOLOGY | Facility: IMAGING CENTER | Age: 54
Discharge: HOME | End: 2019-01-17
Payer: COMMERCIAL

## 2019-01-17 DIAGNOSIS — N20.0 KIDNEY CALCULI: ICD-10-CM

## 2019-01-17 PROCEDURE — 76770 US EXAM ABDO BACK WALL COMP: CPT

## 2019-01-17 PROCEDURE — 74018 RADEX ABDOMEN 1 VIEW: CPT

## 2019-01-25 ENCOUNTER — OFFICE VISIT (OUTPATIENT)
Dept: UROLOGY | Facility: CLINIC | Age: 54
End: 2019-01-25
Payer: COMMERCIAL

## 2019-01-25 VITALS
HEART RATE: 92 BPM | WEIGHT: 179 LBS | DIASTOLIC BLOOD PRESSURE: 74 MMHG | HEIGHT: 63 IN | SYSTOLIC BLOOD PRESSURE: 126 MMHG | BODY MASS INDEX: 31.71 KG/M2

## 2019-01-25 DIAGNOSIS — N20.0 KIDNEY CALCULI: Primary | ICD-10-CM

## 2019-01-25 DIAGNOSIS — R31.29 OTHER MICROSCOPIC HEMATURIA: ICD-10-CM

## 2019-01-25 LAB
SL AMB  POCT GLUCOSE, UA: NORMAL
SL AMB LEUKOCYTE ESTERASE,UA: NORMAL
SL AMB POCT BILIRUBIN,UA: NORMAL
SL AMB POCT BLOOD,UA: NORMAL
SL AMB POCT CLARITY,UA: CLEAR
SL AMB POCT COLOR,UA: YELLOW
SL AMB POCT KETONES,UA: NORMAL
SL AMB POCT NITRITE,UA: NORMAL
SL AMB POCT PH,UA: 7
SL AMB POCT SPECIFIC GRAVITY,UA: 1.01
SL AMB POCT URINE PROTEIN: NORMAL
SL AMB POCT UROBILINOGEN: NORMAL

## 2019-01-25 PROCEDURE — 99213 OFFICE O/P EST LOW 20 MIN: CPT | Performed by: PHYSICIAN ASSISTANT

## 2019-01-25 PROCEDURE — 81002 URINALYSIS NONAUTO W/O SCOPE: CPT | Performed by: PHYSICIAN ASSISTANT

## 2019-01-25 NOTE — PROGRESS NOTES
UROLOGY PROGRESS NOTE   Patient Identifiers: Karel Arzate (MRN [id number])  Date of Service: 1/25/2019    Subjective:     51-year-old female history kidney stones and a family history kidney stones was seen in the office on January 14th for urinary frequency and hematuria  CT scan at that time showed a 6 mm and a 3 mm stone at the right UVJ with hydronephrosis  She had multiple  small stones in each kidney  She did not have a significant amount of pain and wished to remain conservative  She had an ultrasound and a KUB last week which showed the stone and hydronephrosis however she has since passed the stone she believes  She is pain free her urine is clear  Patient has  no complaints        Objective:     VITALS:    Vitals:    01/25/19 1023   BP: 126/74   Pulse: 92           LABS:  Lab Results   Component Value Date    HGB 15 5 (H) 10/11/2018    HCT 47 5 (H) 10/11/2018    WBC 8 84 10/11/2018     10/11/2018   ]    Lab Results   Component Value Date    K 4 4 10/11/2018     10/11/2018    CO2 30 10/11/2018    BUN 21 10/11/2018    CREATININE 0 81 10/11/2018    CALCIUM 9 2 10/11/2018   ]        INPATIENT MEDS:    Current Outpatient Prescriptions:     ALPRAZolam (XANAX) 0 25 mg tablet, Take 1 tablet by mouth 2 (two) times a day as needed, Disp: , Rfl:     atorvastatin (LIPITOR) 10 mg tablet, Take 1 tablet (10 mg total) by mouth daily, Disp: 90 tablet, Rfl: 1    Cholecalciferol (VITAMIN D3) 03838 units TABS, Take 1 tablet by mouth once a week  , Disp: , Rfl:     lisinopril (ZESTRIL) 10 mg tablet, Take 1 tablet (10 mg total) by mouth daily, Disp: 90 tablet, Rfl: 1    metFORMIN (GLUCOPHAGE) 500 mg tablet, Take 1 tablet (500 mg total) by mouth 2 (two) times a day with meals, Disp: 180 tablet, Rfl: 1    omeprazole (PriLOSEC) 20 mg delayed release capsule, Take 1 capsule (20 mg total) by mouth daily, Disp: 90 capsule, Rfl: 1    sertraline (ZOLOFT) 50 mg tablet, Take 1 tablet (50 mg total) by mouth daily, Disp: 90 tablet, Rfl: 1    tamsulosin (FLOMAX) 0 4 mg, Take 1 capsule (0 4 mg total) by mouth daily with dinner, Disp: 10 capsule, Rfl: 1    cefuroxime (CEFTIN) 250 mg tablet, , Disp: , Rfl:     cyclobenzaprine (FLEXERIL) 5 mg tablet, Take 1 tablet (5 mg total) by mouth 2 (two) times a day for 10 days (Patient not taking: Reported on 1/25/2019 ), Disp: 20 tablet, Rfl: 0    Na Sulfate-K Sulfate-Mg Sulf (SUPREP BOWEL PREP KIT) 17 5-3 13-1 6 GM/180ML SOLN, Take by mouth, Disp: , Rfl:     NICODERM CQ 14 MG/24HR TD 24 hr patch, Place 1 patch on the skin every 24 hours (Patient not taking: Reported on 1/25/2019 ), Disp: 28 patch, Rfl: 1      Physical Exam:   /74 (BP Location: Right arm, Patient Position: Sitting, Cuff Size: Adult)   Pulse 92   Ht 5' 3" (1 6 m)   Wt 81 2 kg (179 lb)   BMI 31 71 kg/m²   GEN: no acute distress    RESP: breathing comfortably with no accessory muscle use    ABD: soft, non-tender, non-distended   INCISION:    EXT: no significant peripheral edema       RADIOLOGY:   RENAL ULTRASOUND  IMPRESSION:  1  Shadowing calculus at the right ureterovesical junction with mild right-sided hydroureteronephrosis  A right ureteral jet is visualized within the bladder suggesting incomplete obstruction  2   Bilateral renal calculi  3   Bilateral renal cysts  ABDOMEN  IMPRESSION:     Stable bilateral nephrolithiasis and distal right ureteral calculi  Assessment:     1    Nephrolithiasis     Plan:   - she does believe she passed the right UVJ stone  - since she has no pain I will recheck an ultrasound and KUB in 6 weeks  - consider shockwave lithotripsy to the remaining stones  -

## 2019-02-05 ENCOUNTER — CLINICAL SUPPORT (OUTPATIENT)
Dept: INTERNAL MEDICINE CLINIC | Facility: CLINIC | Age: 54
End: 2019-02-05
Payer: COMMERCIAL

## 2019-02-05 DIAGNOSIS — I15.9 SECONDARY HYPERTENSION: ICD-10-CM

## 2019-02-05 DIAGNOSIS — E11.8 TYPE 2 DIABETES MELLITUS WITH COMPLICATION, UNSPECIFIED WHETHER LONG TERM INSULIN USE: Primary | ICD-10-CM

## 2019-02-05 LAB
ALBUMIN SERPL BCP-MCNC: 4 G/DL (ref 3.5–5)
ALP SERPL-CCNC: 95 U/L (ref 46–116)
ALT SERPL W P-5'-P-CCNC: 58 U/L (ref 12–78)
ANION GAP SERPL CALCULATED.3IONS-SCNC: 9 MMOL/L (ref 4–13)
AST SERPL W P-5'-P-CCNC: 23 U/L (ref 5–45)
BASOPHILS # BLD AUTO: 0.08 THOUSANDS/ΜL (ref 0–0.1)
BASOPHILS NFR BLD AUTO: 1 % (ref 0–1)
BILIRUB SERPL-MCNC: 0.35 MG/DL (ref 0.2–1)
BUN SERPL-MCNC: 20 MG/DL (ref 5–25)
CALCIUM SERPL-MCNC: 9.1 MG/DL (ref 8.3–10.1)
CHLORIDE SERPL-SCNC: 106 MMOL/L (ref 100–108)
CHOLEST SERPL-MCNC: 204 MG/DL (ref 50–200)
CO2 SERPL-SCNC: 25 MMOL/L (ref 21–32)
CREAT SERPL-MCNC: 0.77 MG/DL (ref 0.6–1.3)
EOSINOPHIL # BLD AUTO: 0.41 THOUSAND/ΜL (ref 0–0.61)
EOSINOPHIL NFR BLD AUTO: 5 % (ref 0–6)
ERYTHROCYTE [DISTWIDTH] IN BLOOD BY AUTOMATED COUNT: 13.2 % (ref 11.6–15.1)
EST. AVERAGE GLUCOSE BLD GHB EST-MCNC: 146 MG/DL
GFR SERPL CREATININE-BSD FRML MDRD: 88 ML/MIN/1.73SQ M
GLUCOSE P FAST SERPL-MCNC: 122 MG/DL (ref 65–99)
HBA1C MFR BLD: 6.7 % (ref 4.2–6.3)
HCT VFR BLD AUTO: 44.6 % (ref 34.8–46.1)
HDLC SERPL-MCNC: 40 MG/DL (ref 40–60)
HGB BLD-MCNC: 14.6 G/DL (ref 11.5–15.4)
IMM GRANULOCYTES # BLD AUTO: 0.05 THOUSAND/UL (ref 0–0.2)
IMM GRANULOCYTES NFR BLD AUTO: 1 % (ref 0–2)
LDLC SERPL CALC-MCNC: 139 MG/DL (ref 0–100)
LYMPHOCYTES # BLD AUTO: 2.83 THOUSANDS/ΜL (ref 0.6–4.47)
LYMPHOCYTES NFR BLD AUTO: 31 % (ref 14–44)
MCH RBC QN AUTO: 31.6 PG (ref 26.8–34.3)
MCHC RBC AUTO-ENTMCNC: 32.7 G/DL (ref 31.4–37.4)
MCV RBC AUTO: 97 FL (ref 82–98)
MONOCYTES # BLD AUTO: 0.78 THOUSAND/ΜL (ref 0.17–1.22)
MONOCYTES NFR BLD AUTO: 9 % (ref 4–12)
NEUTROPHILS # BLD AUTO: 4.97 THOUSANDS/ΜL (ref 1.85–7.62)
NEUTS SEG NFR BLD AUTO: 53 % (ref 43–75)
NONHDLC SERPL-MCNC: 164 MG/DL
NRBC BLD AUTO-RTO: 0 /100 WBCS
PLATELET # BLD AUTO: 288 THOUSANDS/UL (ref 149–390)
PMV BLD AUTO: 11.8 FL (ref 8.9–12.7)
POTASSIUM SERPL-SCNC: 4.4 MMOL/L (ref 3.5–5.3)
PROT SERPL-MCNC: 6.8 G/DL (ref 6.4–8.2)
RBC # BLD AUTO: 4.62 MILLION/UL (ref 3.81–5.12)
SODIUM SERPL-SCNC: 140 MMOL/L (ref 136–145)
TRIGL SERPL-MCNC: 123 MG/DL
WBC # BLD AUTO: 9.12 THOUSAND/UL (ref 4.31–10.16)

## 2019-02-05 PROCEDURE — 83036 HEMOGLOBIN GLYCOSYLATED A1C: CPT | Performed by: NURSE PRACTITIONER

## 2019-02-05 PROCEDURE — 85025 COMPLETE CBC W/AUTO DIFF WBC: CPT | Performed by: NURSE PRACTITIONER

## 2019-02-05 PROCEDURE — 36415 COLL VENOUS BLD VENIPUNCTURE: CPT

## 2019-02-05 PROCEDURE — 80061 LIPID PANEL: CPT | Performed by: NURSE PRACTITIONER

## 2019-02-05 PROCEDURE — 80053 COMPREHEN METABOLIC PANEL: CPT | Performed by: NURSE PRACTITIONER

## 2019-02-08 ENCOUNTER — HOSPITAL ENCOUNTER (OUTPATIENT)
Dept: RADIOLOGY | Facility: IMAGING CENTER | Age: 54
Discharge: HOME | End: 2019-02-08
Payer: COMMERCIAL

## 2019-02-08 ENCOUNTER — TRANSCRIBE ORDERS (OUTPATIENT)
Dept: ADMINISTRATIVE | Facility: HOSPITAL | Age: 54
End: 2019-02-08

## 2019-02-08 DIAGNOSIS — N20.0 KIDNEY CALCULI: ICD-10-CM

## 2019-02-08 PROCEDURE — 76770 US EXAM ABDO BACK WALL COMP: CPT

## 2019-02-08 PROCEDURE — 74018 RADEX ABDOMEN 1 VIEW: CPT

## 2019-02-14 ENCOUNTER — OFFICE VISIT (OUTPATIENT)
Dept: INTERNAL MEDICINE CLINIC | Facility: CLINIC | Age: 54
End: 2019-02-14
Payer: COMMERCIAL

## 2019-02-14 VITALS
HEIGHT: 64 IN | TEMPERATURE: 97.9 F | DIASTOLIC BLOOD PRESSURE: 70 MMHG | HEART RATE: 80 BPM | BODY MASS INDEX: 30.22 KG/M2 | WEIGHT: 177 LBS | OXYGEN SATURATION: 98 % | SYSTOLIC BLOOD PRESSURE: 110 MMHG

## 2019-02-14 DIAGNOSIS — E11.8 TYPE 2 DIABETES MELLITUS WITH COMPLICATION, UNSPECIFIED WHETHER LONG TERM INSULIN USE: ICD-10-CM

## 2019-02-14 DIAGNOSIS — N20.0 KIDNEY CALCULI: ICD-10-CM

## 2019-02-14 DIAGNOSIS — F41.9 ANXIETY: ICD-10-CM

## 2019-02-14 DIAGNOSIS — E78.00 HYPERCHOLESTEREMIA: ICD-10-CM

## 2019-02-14 DIAGNOSIS — E55.9 VITAMIN D DEFICIENCY: ICD-10-CM

## 2019-02-14 DIAGNOSIS — I70.90 ATHEROSCLEROSIS OF ARTERIES: ICD-10-CM

## 2019-02-14 DIAGNOSIS — K21.9 GASTROESOPHAGEAL REFLUX DISEASE, ESOPHAGITIS PRESENCE NOT SPECIFIED: ICD-10-CM

## 2019-02-14 DIAGNOSIS — I15.9 SECONDARY HYPERTENSION: ICD-10-CM

## 2019-02-14 DIAGNOSIS — K76.0 FATTY LIVER: ICD-10-CM

## 2019-02-14 PROCEDURE — 99214 OFFICE O/P EST MOD 30 MIN: CPT | Performed by: NURSE PRACTITIONER

## 2019-02-14 PROCEDURE — 3008F BODY MASS INDEX DOCD: CPT | Performed by: NURSE PRACTITIONER

## 2019-02-14 PROCEDURE — 4010F ACE/ARB THERAPY RXD/TAKEN: CPT | Performed by: NURSE PRACTITIONER

## 2019-02-14 RX ORDER — ATORVASTATIN CALCIUM 10 MG/1
10 TABLET, FILM COATED ORAL DAILY
Qty: 90 TABLET | Refills: 1 | Status: SHIPPED | OUTPATIENT
Start: 2019-02-14 | End: 2019-02-14

## 2019-02-14 RX ORDER — OMEPRAZOLE 20 MG/1
20 CAPSULE, DELAYED RELEASE ORAL DAILY
Qty: 90 CAPSULE | Refills: 1 | Status: SHIPPED | OUTPATIENT
Start: 2019-02-14 | End: 2019-10-15 | Stop reason: SDUPTHER

## 2019-02-14 RX ORDER — ATORVASTATIN CALCIUM 20 MG/1
20 TABLET, FILM COATED ORAL DAILY
Qty: 90 TABLET | Refills: 1 | Status: SHIPPED | OUTPATIENT
Start: 2019-02-14 | End: 2019-07-20 | Stop reason: SDUPTHER

## 2019-02-14 RX ORDER — LISINOPRIL 10 MG/1
10 TABLET ORAL DAILY
Qty: 90 TABLET | Refills: 1 | Status: SHIPPED | OUTPATIENT
Start: 2019-02-14 | End: 2019-11-04 | Stop reason: SDUPTHER

## 2019-02-14 NOTE — PROGRESS NOTES
Patient's shoes and socks removed  Right Foot/Ankle   Right Foot Inspection  Skin Exam: skin not intact, no dry skin, no warmth, no callus, no erythema, no maceration, no abnormal color, no pre-ulcer, no ulcer and no callus                          Toe Exam: no swelling, no tenderness, erythema and  no right toe deformity  Sensory       Monofilament testing: intact  Vascular  Capillary refills: < 3 seconds  The right DP pulse is 2+  The right PT pulse is 2+  Left Foot/Ankle  Left Foot Inspection  Skin Exam: skin normalskin not intact, no dry skin, no warmth, no erythema, no maceration, normal color, no pre-ulcer, no ulcer and no callus                         Toe Exam: no swelling, no tenderness, no erythema and no left toe deformity                   Sensory       Monofilament: intact  Vascular  Capillary refills: < 3 seconds  The left DP pulse is 2+  The left PT pulse is 2+  Assign Risk Category:  No deformity present; No loss of protective sensation;  No weak pulses       Risk: 0

## 2019-02-14 NOTE — PROGRESS NOTES
Assessment/Plan:    GERD (gastroesophageal reflux disease)   Patient's GERD currently stable on Prilosec 20 milligrams daily  Will continue with current medication  Patient did have an episode of gastric reflux prior to coming into the office today, gastric reflux resolved with Tums  Did get EKG while in office today NSS  Will continue to monitor  Will advise patient to get colonoscopy and possibly EGD  Diabetes ([facility] [address] )  Lab Results   Component Value Date    HGBA1C 6 7 (H) 02/05/2019     No changes in hemoglobin A1c since starting metformin  Will increase patient's metformin to a 1000 milligrams in the morning and 500 in the evening  Will recheck hemoglobin A1c in 4 months  Patient is to continue to work on diet and exercise  Limit sugars and carbohydrate intake  Avoid soda, juice, sweets, cookies, desserts, pasta, bread    Eat more whole grains, exercised 30 min of cardio at least 3 times a week  Also recommended daily foot exams to check for sores, and recommended yearly eye exams  Hypertension    Patient doing well on lisinopril 10 mg tablet daily, side effects  We will continue to monitor blood pressures   Continue current regimen -   Continue to monitor blood pressure at home  Goal BP is < 130/80  Contact our office for consistent elevations  Recommend low sodium diet  Exercise 30 minutes three times a week as tolerated  Recommend yearly eye exam       Kidney calculi    Patient currently following urology Dr Sae Natarajan  Anxiety    Patient currently stable on Zoloft 50 milligrams daily, and Xanax 0 25 milligram tablets as needed  Will make no changes to her medications at this time  BMI 30 0-30 9,adult    Patient is to increase exercise and make some dietary adjustments  Patient would like to be referred to weight management  Referral placed  Hypercholesteremia    Will plan on increasing patient's Lipitor to 20 milligram tablet daily   Recommend healthy lifestyle choices for your cholesterol  Low fat/low cholesterol diet  Limit/avoid red meat  Eat more lean meat - chicken breast, ground turkey, fish  Exercise 30 mins at least 3 times a week as tolerated  Vitamin D deficiency    Patient's vitamin-D level now within normal limits patient is encouraged to take 2000 International Units of vitamin-D daily  BMI Counseling: Body mass index is 30 38 kg/m²  Discussed the patient's BMI with her  The BMI is above average  BMI counseling and education was provided to the patient  Nutrition recommendations include reducing portion sizes, decreasing overall calorie intake, 3-5 servings of fruits/vegetables daily, reducing fast food intake, consuming healthier snacks, decreasing soda and/or juice intake, moderation in carbohydrate intake, increasing intake of lean protein, reducing intake of saturated fat and trans fat and reducing intake of cholesterol  Exercise recommendations include moderate aerobic physical activity for 150 minutes/week and exercising 3-5 times per week  Diagnoses and all orders for this visit:    BMI 30 0-30 9,adult  -     Ambulatory referral to Weight Management; Future    Anxiety  -     sertraline (ZOLOFT) 50 mg tablet; Take 1 tablet (50 mg total) by mouth daily    Gastroesophageal reflux disease, esophagitis presence not specified  -     omeprazole (PriLOSEC) 20 mg delayed release capsule; Take 1 capsule (20 mg total) by mouth daily    Hypercholesteremia  -     Discontinue: atorvastatin (LIPITOR) 10 mg tablet; Take 1 tablet (10 mg total) by mouth daily  -     atorvastatin (LIPITOR) 20 mg tablet; Take 1 tablet (20 mg total) by mouth daily  -     Lipid panel    Secondary hypertension  -     lisinopril (ZESTRIL) 10 mg tablet;  Take 1 tablet (10 mg total) by mouth daily    Type 2 diabetes mellitus with complication, unspecified whether long term insulin use (HCC)  -     Microalbumin / creatinine urine ratio  -     Discontinue: metFORMIN (GLUCOPHAGE) 500 mg tablet; Take 1 tablet (500 mg total) by mouth 2 (two) times a day with meals  -     metFORMIN (GLUCOPHAGE) 500 mg tablet; Take 1000mg in the morning (2 tablets) and take 500mg in the evening (1 tablet)  -     Hemoglobin A1C    Kidney calculi    Fatty liver    Atherosclerosis of arteries    Vitamin D deficiency          Subjective:      Patient ID: Trixie Lira is a 48 y o  female  Patient presents today for 3 month follow-up on hypertension, hyperlipidemia and diabetes  Diabetic foot exam done while in office today  Patient states she recently passed a kidney stone, she denies blood in her urine and flank pain  Patient currently follows Dr Barbara Llamas  Screenings:  Colonoscopy- 2016, she states that she had a lot polyps, she states she is due again for one  Gyn- has history of partial hytrectomy, she has not gone to her GYN in a while, she denies abnormal bleeding   Mammogram-   Had done  Hep C-   Had done 2018  Eye Doctor-sees eye doctor yearly  Dentist- she is due    Hypertension   This is a new problem  The current episode started 1 to 4 weeks ago  The problem is unchanged  The problem is controlled  Pertinent negatives include no anxiety, blurred vision, chest pain, headaches, neck pain, palpitations, peripheral edema or shortness of breath  Risk factors for coronary artery disease include diabetes mellitus, dyslipidemia, sedentary lifestyle and stress  Treatments tried:   Patient currently taking lisinopril  The current treatment provides moderate improvement  Compliance problems include diet  Diabetes   She presents for her follow-up diabetic visit  She has type 2 diabetes mellitus  The initial diagnosis of diabetes was made 2 weeks ago  Her disease course has been stable  There are no hypoglycemic associated symptoms  Pertinent negatives for hypoglycemia include no dizziness or headaches  There are no diabetic associated symptoms   Pertinent negatives for diabetes include no blurred vision, no chest pain, no polydipsia, no polyphagia, no polyuria and no weakness  There are no hypoglycemic complications  There are no diabetic complications  Risk factors for coronary artery disease include sedentary lifestyle, stress, obesity and dyslipidemia  Current diabetic treatment includes oral agent (monotherapy)  She is compliant with treatment all of the time  She has not had a previous visit with a dietitian  She rarely participates in exercise  An ACE inhibitor/angiotensin II receptor blocker is being taken  She does not see a podiatrist Eye exam is not current  The following portions of the patient's history were reviewed and updated as appropriate: allergies, current medications, past family history, past medical history, past social history, past surgical history and problem list     Review of Systems   Constitutional: Negative for activity change, appetite change, chills, diaphoresis and fever  HENT: Negative for congestion, ear discharge, ear pain, postnasal drip, rhinorrhea, sinus pressure, sinus pain and sore throat  Eyes: Negative for blurred vision, pain, discharge, itching and visual disturbance  Respiratory: Negative for cough, chest tightness, shortness of breath and wheezing  Cardiovascular: Negative for chest pain, palpitations and leg swelling  Gastrointestinal: Negative for abdominal pain, blood in stool, constipation, diarrhea, nausea and vomiting  Endocrine: Negative for polydipsia, polyphagia and polyuria  Genitourinary: Negative for difficulty urinating, dysuria, hematuria, urgency and vaginal bleeding  Musculoskeletal: Negative for arthralgias, back pain and neck pain  Skin: Negative for rash and wound  Neurological: Negative for dizziness, weakness, numbness and headaches           Past Medical History:   Diagnosis Date    Anxiety     Asthma     Depression     Diverticulitis     Diverticulitis of colon     Follicular cyst of ovary 12/23/2014    GERD (gastroesophageal reflux disease)     Glycosuria     Headache     Hyperlipidemia     Obesity     Ovarian cyst     Overweight     Pulmonary nodule     Renal calculus     Vertigo          Current Outpatient Medications:     ALPRAZolam (XANAX) 0 25 mg tablet, Take 1 tablet by mouth 2 (two) times a day as needed, Disp: , Rfl:     atorvastatin (LIPITOR) 20 mg tablet, Take 1 tablet (20 mg total) by mouth daily, Disp: 90 tablet, Rfl: 1    Cholecalciferol (VITAMIN D3) 90292 units TABS, Take 1 tablet by mouth once a week  , Disp: , Rfl:     lisinopril (ZESTRIL) 10 mg tablet, Take 1 tablet (10 mg total) by mouth daily, Disp: 90 tablet, Rfl: 1    metFORMIN (GLUCOPHAGE) 500 mg tablet, Take 1000mg in the morning (2 tablets) and take 500mg in the evening (1 tablet), Disp: 270 tablet, Rfl: 1    omeprazole (PriLOSEC) 20 mg delayed release capsule, Take 1 capsule (20 mg total) by mouth daily, Disp: 90 capsule, Rfl: 1    sertraline (ZOLOFT) 50 mg tablet, Take 1 tablet (50 mg total) by mouth daily, Disp: 90 tablet, Rfl: 1    cefuroxime (CEFTIN) 250 mg tablet, , Disp: , Rfl:     cyclobenzaprine (FLEXERIL) 5 mg tablet, Take 1 tablet (5 mg total) by mouth 2 (two) times a day for 10 days (Patient not taking: Reported on 1/25/2019 ), Disp: 20 tablet, Rfl: 0    Na Sulfate-K Sulfate-Mg Sulf (SUPREP BOWEL PREP KIT) 17 5-3 13-1 6 GM/180ML SOLN, Take by mouth, Disp: , Rfl:     NICODERM CQ 14 MG/24HR TD 24 hr patch, Place 1 patch on the skin every 24 hours (Patient not taking: Reported on 1/25/2019 ), Disp: 28 patch, Rfl: 1    tamsulosin (FLOMAX) 0 4 mg, Take 1 capsule (0 4 mg total) by mouth daily with dinner (Patient not taking: Reported on 2/14/2019), Disp: 10 capsule, Rfl: 1    Allergies   Allergen Reactions    Clonazepam     Morphine Other (See Comments) and Confusion     Annotation - [id number]: "dopey"  Gets silly    Venlafaxine        Social History   Past Surgical History:   Procedure Laterality Date    APPENDECTOMY      COLONOSCOPY      HYSTERECTOMY      KY COLONOSCOPY FLX DX W/COLLJ SPEC WHEN PFRMD N/A 9/26/2017    Procedure: EGD AND COLONOSCOPY;  Surgeon: Constance Land MD;  Location: [facility];   Service: Gastroenterology    KY REVISE MEDIAN N/CARPAL TUNNEL SURG Right 5/29/2018    Procedure: CARPAL TUNNEL RELEASE;  Surgeon: Anaya Wilson DO;  Location[facility];  Service: Orthopedics    TONSILLECTOMY      WISDOM TOOTH EXTRACTION       Family History   Problem Relation Age of Onset    No Known Problems Mother     Stroke Father     Pancreatic cancer Father        Objective:  /70 (BP Location: Left arm, Patient Position: Sitting, Cuff Size: Large)   Pulse 80   Temp 97 9 °F (36 6 °C) (Oral)   Ht 5' 4" (1 626 m) Comment: shoes on  Wt 80 3 kg (177 lb) Comment: shoes on  SpO2 98%   BMI 30 38 kg/m²     Recent Results (from the past 1344 hour(s))   POCT urine dip auto non-scope    Collection Time: 01/11/19 10:32 AM   Result Value Ref Range     COLOR,UA yellow     CLARITY,UA clear     SPECIFIC GRAVITY,UA 1 005      PH,UA 8 0     LEUKOCYTE ESTERASE,UA moderate     NITRITE,UA negative     GLUCOSE, UA negative     KETONES,UA negative     BILIRUBIN,UA negative     BLOOD,UA moderate     POCT URINE PROTEIN trace     SL AMB POCT UROBILINOGEN 0 2    Urine culture    Collection Time: 01/11/19  1:05 PM   Result Value Ref Range    Urine Culture 30,000-39,000 cfu/ml Escherichia coli ESBL (A)     Urine Culture [id number] cfu/ml         Susceptibility    Escherichia coli ESBL - RICHARD     ZID Performed Yes       Amoxicillin + Clavulanate 8/4 Susceptible ug/ml     Ampicillin ($$) >16 00 Resistant ug/ml     Ampicillin + Sulbactam ($) >16/8 Resistant ug/ml     Aztreonam ($$$)  >16 Resistant ug/ml     Cefazolin ($) >16 00 Resistant ug/ml     Cefepime ($) >16 00 Resistant ug/ml     Cefotaxime ($) >32 00 Resistant ug/ml     Ceftazidime ($$) >16 Resistant ug/ml     Ceftriaxone ($$) >32 00 Resistant ug/ml     Cefuroxime ($$) >16 Resistant ug/ml     Ciprofloxacin ($)  >2 00 Resistant ug/ml     Ertapenem ($$$) <0 5 Susceptible ug/ml     Gentamicin ($$) <1 Susceptible ug/ml     Levofloxacin ($) >4 00 Resistant ug/ml     Nitrofurantoin <32 Susceptible ug/ml     Piperacillin + Tazobactam ($$$) <4 Susceptible ug/ml     Tetracycline <4 Susceptible ug/ml     Tobramycin ($) 2 Susceptible ug/ml     Trimethoprim + Sulfamethoxazole ($$$) <2/38 Susceptible ug/ml   POCT urine dip    Collection Time: 01/14/19  1:59 PM   Result Value Ref Range    LEUKOCYTE ESTERASE,UA NEG     NITRITE,UA NEG     SL AMB POCT UROBILINOGEN NEG     POCT URINE PROTEIN NEG      PH,UA NEG     BLOOD,UA +     SPECIFIC GRAVITY,UA 1 015     KETONES,UA NEG     BILIRUBIN,UA NEG     GLUCOSE, UA NEG      COLOR,UA Yellow     CLARITY,UA Clear    POCT urine dip    Collection Time: 01/25/19 10:25 AM   Result Value Ref Range    LEUKOCYTE ESTERASE,UA neg     NITRITE,UA neg     SL AMB POCT UROBILINOGEN neg     POCT URINE PROTEIN neg      PH,UA 7     BLOOD,UA neg     SPECIFIC GRAVITY,UA 1 010     KETONES,UA neg     BILIRUBIN,UA neg     GLUCOSE, UA neg      COLOR,UA yellow     CLARITY,UA clear    Hemoglobin A1C    Collection Time: 02/05/19  7:47 AM   Result Value Ref Range    Hemoglobin A1C 6 7 (H) 4 2 - 6 3 %     mg/dl   Lipid panel    Collection Time: 02/05/19  7:47 AM   Result Value Ref Range    Cholesterol 204 (H) 50 - 200 mg/dL    Triglycerides 123 <=150 mg/dL    HDL, Direct 40 40 - 60 mg/dL    LDL Calculated 139 (H) 0 - 100 mg/dL    Non-HDL-Chol (CHOL-HDL) 164 mg/dl   CBC and differential    Collection Time: 02/05/19  7:47 AM   Result Value Ref Range    WBC 9 12 4 31 - 10 16 Thousand/uL    RBC 4 62 3 81 - 5 12 Million/uL    Hemoglobin 14 6 11 5 - 15 4 g/dL    Hematocrit 44 6 34 8 - 46 1 %    MCV 97 82 - 98 fL    MCH 31 6 26 8 - 34 3 pg    MCHC 32 7 31 4 - 37 4 g/dL    RDW 13 2 11 6 - 15 1 %    MPV 11 8 8 9 - 12 7 fL    Platelets 979 998 - 057 Thousands/uL    nRBC 0 /100 WBCs Neutrophils Relative 53 43 - 75 %    Immat GRANS % 1 0 - 2 %    Lymphocytes Relative 31 14 - 44 %    Monocytes Relative 9 4 - 12 %    Eosinophils Relative 5 0 - 6 %    Basophils Relative 1 0 - 1 %    Neutrophils Absolute 4 97 1 85 - 7 62 Thousands/µL    Immature Grans Absolute 0 05 0 00 - 0 20 Thousand/uL    Lymphocytes Absolute 2 83 0 60 - 4 47 Thousands/µL    Monocytes Absolute 0 78 0 17 - 1 22 Thousand/µL    Eosinophils Absolute 0 41 0 00 - 0 61 Thousand/µL    Basophils Absolute 0 08 0 00 - 0 10 Thousands/µL   Comprehensive metabolic panel    Collection Time: 02/05/19  7:47 AM   Result Value Ref Range    Sodium 140 136 - 145 mmol/L    Potassium 4 4 3 5 - 5 3 mmol/L    Chloride 106 100 - 108 mmol/L    CO2 25 21 - 32 mmol/L    ANION GAP 9 4 - 13 mmol/L    BUN 20 5 - 25 mg/dL    Creatinine 0 77 0 60 - 1 30 mg/dL    Glucose, Fasting 122 (H) 65 - 99 mg/dL    Calcium 9 1 8 3 - 10 1 mg/dL    AST 23 5 - 45 U/L    ALT 58 12 - 78 U/L    Alkaline Phosphatase 95 46 - 116 U/L    Total Protein 6 8 6 4 - 8 2 g/dL    Albumin 4 0 3 5 - 5 0 g/dL    Total Bilirubin 0 35 0 20 - 1 00 mg/dL    eGFR 88 ml/min/1 73sq m            Physical Exam   Constitutional: She is oriented to person, place, and time  She appears well-developed and well-nourished  No distress  HENT:   Head: Normocephalic and atraumatic  Right Ear: External ear normal    Left Ear: External ear normal    Nose: Nose normal    Mouth/Throat: Oropharynx is clear and moist  No oropharyngeal exudate  Eyes: Pupils are equal, round, and reactive to light  Conjunctivae and EOM are normal  Right eye exhibits no discharge  Left eye exhibits no discharge  Neck: Normal range of motion  Neck supple  No thyromegaly present  Cardiovascular: Normal rate, regular rhythm, normal heart sounds and intact distal pulses  Exam reveals no gallop and no friction rub  No murmur heard  Pulmonary/Chest: Effort normal and breath sounds normal  No stridor  No respiratory distress   She has no wheezes  She has no rales  Abdominal: Soft  Bowel sounds are normal  She exhibits no distension  There is no tenderness  Lymphadenopathy:     She has no cervical adenopathy  Neurological: She is alert and oriented to person, place, and time  Skin: Skin is warm and dry  No rash noted  She is not diaphoretic  No erythema  Psychiatric: She has a normal mood and affect   Her behavior is normal  Judgment and thought content normal

## 2019-02-15 PROBLEM — R30.0 DYSURIA: Status: RESOLVED | Noted: 2019-01-11 | Resolved: 2019-02-15

## 2019-02-15 PROBLEM — R10.9 RIGHT FLANK PAIN: Status: RESOLVED | Noted: 2019-01-11 | Resolved: 2019-02-15

## 2019-02-15 PROBLEM — K76.0 FATTY LIVER: Status: ACTIVE | Noted: 2019-02-15

## 2019-02-15 PROBLEM — Z01.818 PRE-OP EXAMINATION: Status: RESOLVED | Noted: 2018-05-10 | Resolved: 2019-02-15

## 2019-02-15 NOTE — ASSESSMENT & PLAN NOTE
Lab Results   Component Value Date    HGBA1C 6 7 (H) 02/05/2019     No changes in hemoglobin A1c since starting metformin  Will increase patient's metformin to a 1000 milligrams in the morning and 500 in the evening  Will recheck hemoglobin A1c in 4 months  Patient is to continue to work on diet and exercise  Limit sugars and carbohydrate intake  Avoid soda, juice, sweets, cookies, desserts, pasta, bread    Eat more whole grains, exercised 30 min of cardio at least 3 times a week  Also recommended daily foot exams to check for sores, and recommended yearly eye exams

## 2019-02-15 NOTE — ASSESSMENT & PLAN NOTE
Patient is to increase exercise and make some dietary adjustments  Patient would like to be referred to weight management  Referral placed

## 2019-02-15 NOTE — ASSESSMENT & PLAN NOTE
Will plan on increasing patient's Lipitor to 20 milligram tablet daily  Recommend healthy lifestyle choices for your cholesterol  Low fat/low cholesterol diet  Limit/avoid red meat  Eat more lean meat - chicken breast, ground turkey, fish  Exercise 30 mins at least 3 times a week as tolerated

## 2019-03-08 ENCOUNTER — OFFICE VISIT (OUTPATIENT)
Dept: UROLOGY | Facility: CLINIC | Age: 54
End: 2019-03-08
Payer: COMMERCIAL

## 2019-03-08 VITALS
DIASTOLIC BLOOD PRESSURE: 70 MMHG | HEIGHT: 64 IN | BODY MASS INDEX: 29.88 KG/M2 | SYSTOLIC BLOOD PRESSURE: 110 MMHG | HEART RATE: 90 BPM | WEIGHT: 175 LBS

## 2019-03-08 DIAGNOSIS — N20.0 KIDNEY CALCULI: Primary | ICD-10-CM

## 2019-03-08 PROCEDURE — 99213 OFFICE O/P EST LOW 20 MIN: CPT | Performed by: PHYSICIAN ASSISTANT

## 2019-03-08 NOTE — PROGRESS NOTES
UROLOGY PROGRESS NOTE   Patient Identifiers: Radha Toscano (MRN [id number])  Date of Service: 3/8/2019    Subjective:    51-year-old female with a history kidney stones as well as the family history kidney stones  She had a 3 mm UVJ stone on the right side  Her follow-up studies showed resolution of hydronephrosis and no stone was seen  She does have bilateral stones nonobstructing  She wishes to remain conservative  Otherwise she is asymptomatic with no complaints  Patient has  no complaints        Objective:     VITALS:    Vitals:    03/08/19 0934   BP: 110/70   Pulse: 90           LABS:  Lab Results   Component Value Date    HGB 14 6 02/05/2019    HCT 44 6 02/05/2019    WBC 9 12 02/05/2019     02/05/2019   ]    Lab Results   Component Value Date    K 4 4 02/05/2019     02/05/2019    CO2 25 02/05/2019    BUN 20 02/05/2019    CREATININE 0 77 02/05/2019    CALCIUM 9 1 02/05/2019   ]        INPATIENT MEDS:    Current Outpatient Medications:     ALPRAZolam (XANAX) 0 25 mg tablet, Take 1 tablet by mouth 2 (two) times a day as needed, Disp: , Rfl:     atorvastatin (LIPITOR) 20 mg tablet, Take 1 tablet (20 mg total) by mouth daily, Disp: 90 tablet, Rfl: 1    Cholecalciferol (VITAMIN D3) 78243 units TABS, Take 1 tablet by mouth once a week  , Disp: , Rfl:     lisinopril (ZESTRIL) 10 mg tablet, Take 1 tablet (10 mg total) by mouth daily, Disp: 90 tablet, Rfl: 1    metFORMIN (GLUCOPHAGE) 500 mg tablet, Take 1000mg in the morning (2 tablets) and take 500mg in the evening (1 tablet), Disp: 270 tablet, Rfl: 1    omeprazole (PriLOSEC) 20 mg delayed release capsule, Take 1 capsule (20 mg total) by mouth daily, Disp: 90 capsule, Rfl: 1    sertraline (ZOLOFT) 50 mg tablet, Take 1 tablet (50 mg total) by mouth daily, Disp: 90 tablet, Rfl: 1    cefuroxime (CEFTIN) 250 mg tablet, , Disp: , Rfl:     cyclobenzaprine (FLEXERIL) 5 mg tablet, Take 1 tablet (5 mg total) by mouth 2 (two) times a day for 10 days (Patient not taking: Reported on 1/25/2019 ), Disp: 20 tablet, Rfl: 0    Na Sulfate-K Sulfate-Mg Sulf (SUPREP BOWEL PREP KIT) 17 5-3 13-1 6 GM/180ML SOLN, Take by mouth, Disp: , Rfl:     NICODERM CQ 14 MG/24HR TD 24 hr patch, Place 1 patch on the skin every 24 hours (Patient not taking: Reported on 1/25/2019 ), Disp: 28 patch, Rfl: 1    tamsulosin (FLOMAX) 0 4 mg, Take 1 capsule (0 4 mg total) by mouth daily with dinner (Patient not taking: Reported on 2/14/2019), Disp: 10 capsule, Rfl: 1      Physical Exam:   /70 (BP Location: Left arm, Patient Position: Sitting, Cuff Size: Standard)   Pulse 90   Ht 5' 4" (1 626 m)   Wt 79 4 kg (175 lb)   BMI 30 04 kg/m²   GEN: no acute distress    RESP: breathing comfortably with no accessory muscle use    ABD: soft, non-tender, non-distended     RADIOLOGY:   RENAL ULTRASOUND      IMPRESSION:     No hydronephrosis  Bilateral nonobstructing calculi and cysts  Previously seen right UVJ calculus is no longer present      Assessment:     1   Nephrolithiasis    Plan:   - I offered her lithotripsy  - she would like to remain conservative this time  - follow-up in 6 months with a KUB and 24 hour urine prior to visit  - she will call if any questions or concerns arise

## 2019-05-17 ENCOUNTER — CLINICAL SUPPORT (OUTPATIENT)
Dept: INTERNAL MEDICINE CLINIC | Facility: CLINIC | Age: 54
End: 2019-05-17
Payer: COMMERCIAL

## 2019-05-17 DIAGNOSIS — I15.9 SECONDARY HYPERTENSION: Primary | ICD-10-CM

## 2019-05-17 DIAGNOSIS — E11.8 TYPE 2 DIABETES MELLITUS WITH COMPLICATION, UNSPECIFIED WHETHER LONG TERM INSULIN USE: ICD-10-CM

## 2019-05-17 DIAGNOSIS — E78.00 HYPERCHOLESTEREMIA: ICD-10-CM

## 2019-05-17 LAB
BASOPHILS # BLD AUTO: 0.05 THOUSANDS/ΜL (ref 0–0.1)
BASOPHILS NFR BLD AUTO: 1 % (ref 0–1)
CHOLEST SERPL-MCNC: 189 MG/DL (ref 50–200)
CREAT UR-MCNC: 100 MG/DL
EOSINOPHIL # BLD AUTO: 0.35 THOUSAND/ΜL (ref 0–0.61)
EOSINOPHIL NFR BLD AUTO: 4 % (ref 0–6)
ERYTHROCYTE [DISTWIDTH] IN BLOOD BY AUTOMATED COUNT: 13 % (ref 11.6–15.1)
EST. AVERAGE GLUCOSE BLD GHB EST-MCNC: 140 MG/DL
HBA1C MFR BLD: 6.5 % (ref 4.2–6.3)
HCT VFR BLD AUTO: 48.5 % (ref 34.8–46.1)
HDLC SERPL-MCNC: 37 MG/DL (ref 40–60)
HGB BLD-MCNC: 15.9 G/DL (ref 11.5–15.4)
IMM GRANULOCYTES # BLD AUTO: 0.04 THOUSAND/UL (ref 0–0.2)
IMM GRANULOCYTES NFR BLD AUTO: 0 % (ref 0–2)
LDLC SERPL CALC-MCNC: 109 MG/DL (ref 0–100)
LYMPHOCYTES # BLD AUTO: 2.48 THOUSANDS/ΜL (ref 0.6–4.47)
LYMPHOCYTES NFR BLD AUTO: 27 % (ref 14–44)
MCH RBC QN AUTO: 31.9 PG (ref 26.8–34.3)
MCHC RBC AUTO-ENTMCNC: 32.8 G/DL (ref 31.4–37.4)
MCV RBC AUTO: 97 FL (ref 82–98)
MICROALBUMIN UR-MCNC: 19.4 MG/L (ref 0–20)
MICROALBUMIN/CREAT 24H UR: 19 MG/G CREATININE (ref 0–30)
MONOCYTES # BLD AUTO: 0.69 THOUSAND/ΜL (ref 0.17–1.22)
MONOCYTES NFR BLD AUTO: 8 % (ref 4–12)
NEUTROPHILS # BLD AUTO: 5.44 THOUSANDS/ΜL (ref 1.85–7.62)
NEUTS SEG NFR BLD AUTO: 60 % (ref 43–75)
NONHDLC SERPL-MCNC: 152 MG/DL
NRBC BLD AUTO-RTO: 0 /100 WBCS
PLATELET # BLD AUTO: 265 THOUSANDS/UL (ref 149–390)
PMV BLD AUTO: 11.8 FL (ref 8.9–12.7)
RBC # BLD AUTO: 4.99 MILLION/UL (ref 3.81–5.12)
TRIGL SERPL-MCNC: 214 MG/DL
WBC # BLD AUTO: 9.05 THOUSAND/UL (ref 4.31–10.16)

## 2019-05-17 PROCEDURE — 85025 COMPLETE CBC W/AUTO DIFF WBC: CPT | Performed by: NURSE PRACTITIONER

## 2019-05-17 PROCEDURE — 80061 LIPID PANEL: CPT | Performed by: NURSE PRACTITIONER

## 2019-05-17 PROCEDURE — 83036 HEMOGLOBIN GLYCOSYLATED A1C: CPT | Performed by: NURSE PRACTITIONER

## 2019-05-17 PROCEDURE — 36415 COLL VENOUS BLD VENIPUNCTURE: CPT

## 2019-05-17 PROCEDURE — 82043 UR ALBUMIN QUANTITATIVE: CPT | Performed by: NURSE PRACTITIONER

## 2019-05-17 PROCEDURE — 3061F NEG MICROALBUMINURIA REV: CPT | Performed by: NURSE PRACTITIONER

## 2019-05-17 PROCEDURE — 82570 ASSAY OF URINE CREATININE: CPT | Performed by: NURSE PRACTITIONER

## 2019-05-21 ENCOUNTER — OFFICE VISIT (OUTPATIENT)
Dept: INTERNAL MEDICINE CLINIC | Age: 54
End: 2019-05-21
Payer: COMMERCIAL

## 2019-05-21 VITALS
OXYGEN SATURATION: 97 % | SYSTOLIC BLOOD PRESSURE: 124 MMHG | DIASTOLIC BLOOD PRESSURE: 72 MMHG | WEIGHT: 172.4 LBS | HEART RATE: 82 BPM | BODY MASS INDEX: 29.59 KG/M2 | TEMPERATURE: 96.4 F

## 2019-05-21 DIAGNOSIS — E11.8 TYPE 2 DIABETES MELLITUS WITH COMPLICATION, UNSPECIFIED WHETHER LONG TERM INSULIN USE: Primary | ICD-10-CM

## 2019-05-21 DIAGNOSIS — K21.9 GASTROESOPHAGEAL REFLUX DISEASE WITHOUT ESOPHAGITIS: ICD-10-CM

## 2019-05-21 DIAGNOSIS — K76.0 FATTY LIVER: ICD-10-CM

## 2019-05-21 DIAGNOSIS — J30.2 SEASONAL ALLERGIES: ICD-10-CM

## 2019-05-21 DIAGNOSIS — F41.9 ANXIETY: ICD-10-CM

## 2019-05-21 DIAGNOSIS — E78.00 HYPERCHOLESTEREMIA: ICD-10-CM

## 2019-05-21 DIAGNOSIS — N20.0 KIDNEY CALCULI: ICD-10-CM

## 2019-05-21 DIAGNOSIS — E55.9 VITAMIN D DEFICIENCY: ICD-10-CM

## 2019-05-21 DIAGNOSIS — I15.9 SECONDARY HYPERTENSION: ICD-10-CM

## 2019-05-21 DIAGNOSIS — I70.90 ATHEROSCLEROSIS OF ARTERIES: ICD-10-CM

## 2019-05-21 PROCEDURE — 1036F TOBACCO NON-USER: CPT | Performed by: NURSE PRACTITIONER

## 2019-05-21 PROCEDURE — 99214 OFFICE O/P EST MOD 30 MIN: CPT | Performed by: NURSE PRACTITIONER

## 2019-05-21 RX ORDER — FLUTICASONE PROPIONATE 50 MCG
1 SPRAY, SUSPENSION (ML) NASAL DAILY
Qty: 1 BOTTLE | Refills: 5 | Status: SHIPPED | OUTPATIENT
Start: 2019-05-21 | End: 2020-06-08 | Stop reason: ALTCHOICE

## 2019-05-21 RX ORDER — OMEPRAZOLE 20 MG/1
CAPSULE, DELAYED RELEASE ORAL
COMMUNITY
End: 2019-05-21 | Stop reason: SDUPTHER

## 2019-07-20 DIAGNOSIS — E78.00 HYPERCHOLESTEREMIA: ICD-10-CM

## 2019-07-22 RX ORDER — ATORVASTATIN CALCIUM 20 MG/1
20 TABLET, FILM COATED ORAL DAILY
Qty: 90 TABLET | Refills: 0 | Status: SHIPPED | OUTPATIENT
Start: 2019-07-22 | End: 2019-10-15 | Stop reason: SDUPTHER

## 2019-10-07 LAB
LEFT EYE DIABETIC RETINOPATHY: NORMAL
RIGHT EYE DIABETIC RETINOPATHY: NORMAL

## 2019-10-15 DIAGNOSIS — E78.00 HYPERCHOLESTEREMIA: ICD-10-CM

## 2019-10-15 DIAGNOSIS — F41.9 ANXIETY: ICD-10-CM

## 2019-10-15 DIAGNOSIS — K21.9 GASTROESOPHAGEAL REFLUX DISEASE, ESOPHAGITIS PRESENCE NOT SPECIFIED: ICD-10-CM

## 2019-10-15 RX ORDER — ATORVASTATIN CALCIUM 20 MG/1
20 TABLET, FILM COATED ORAL DAILY
Qty: 90 TABLET | Refills: 1 | Status: SHIPPED | OUTPATIENT
Start: 2019-10-15 | End: 2019-11-22

## 2019-10-15 RX ORDER — OMEPRAZOLE 20 MG/1
20 CAPSULE, DELAYED RELEASE ORAL DAILY
Qty: 90 CAPSULE | Refills: 1 | Status: SHIPPED | OUTPATIENT
Start: 2019-10-15 | End: 2020-03-18 | Stop reason: SDUPTHER

## 2019-10-15 NOTE — TELEPHONE ENCOUNTER
Patient needs refills of omeprazole, atorvastatin, sertraline sent to [facility] [place]

## 2019-10-28 ENCOUNTER — TRANSCRIBE ORDERS (OUTPATIENT)
Dept: ADMINISTRATIVE | Facility: HOSPITAL | Age: 54
End: 2019-10-28

## 2019-10-28 DIAGNOSIS — Z12.31 SCREENING MAMMOGRAM FOR HIGH-RISK PATIENT: Primary | ICD-10-CM

## 2019-11-04 DIAGNOSIS — I15.9 SECONDARY HYPERTENSION: ICD-10-CM

## 2019-11-04 RX ORDER — LISINOPRIL 10 MG/1
10 TABLET ORAL DAILY
Qty: 90 TABLET | Refills: 1 | Status: SHIPPED | OUTPATIENT
Start: 2019-11-04 | End: 2020-03-18 | Stop reason: SDUPTHER

## 2019-11-07 ENCOUNTER — HOSPITAL ENCOUNTER (OUTPATIENT)
Dept: RADIOLOGY | Facility: IMAGING CENTER | Age: 54
Discharge: HOME | End: 2019-11-07
Payer: COMMERCIAL

## 2019-11-07 VITALS — BODY MASS INDEX: 30.12 KG/M2 | WEIGHT: 170 LBS | HEIGHT: 63 IN

## 2019-11-07 DIAGNOSIS — Z12.31 SCREENING MAMMOGRAM FOR HIGH-RISK PATIENT: ICD-10-CM

## 2019-11-07 PROCEDURE — 77067 SCR MAMMO BI INCL CAD: CPT

## 2019-11-15 ENCOUNTER — CLINICAL SUPPORT (OUTPATIENT)
Dept: INTERNAL MEDICINE CLINIC | Facility: CLINIC | Age: 54
End: 2019-11-15
Payer: COMMERCIAL

## 2019-11-15 DIAGNOSIS — E78.00 HYPERCHOLESTEREMIA: ICD-10-CM

## 2019-11-15 DIAGNOSIS — I15.9 SECONDARY HYPERTENSION: Primary | ICD-10-CM

## 2019-11-15 DIAGNOSIS — E11.9 TYPE 2 DIABETES MELLITUS WITHOUT COMPLICATION, UNSPECIFIED WHETHER LONG TERM INSULIN USE (HCC): ICD-10-CM

## 2019-11-15 LAB
ALBUMIN SERPL BCP-MCNC: 4.3 G/DL (ref 3.5–5)
ALP SERPL-CCNC: 106 U/L (ref 46–116)
ALT SERPL W P-5'-P-CCNC: 47 U/L (ref 12–78)
ANION GAP SERPL CALCULATED.3IONS-SCNC: 7 MMOL/L (ref 4–13)
AST SERPL W P-5'-P-CCNC: 22 U/L (ref 5–45)
BASOPHILS # BLD AUTO: 0.08 THOUSANDS/ΜL (ref 0–0.1)
BASOPHILS NFR BLD AUTO: 1 % (ref 0–1)
BILIRUB SERPL-MCNC: 0.48 MG/DL (ref 0.2–1)
BUN SERPL-MCNC: 19 MG/DL (ref 5–25)
CALCIUM SERPL-MCNC: 9.6 MG/DL (ref 8.3–10.1)
CHLORIDE SERPL-SCNC: 106 MMOL/L (ref 100–108)
CHOLEST SERPL-MCNC: 223 MG/DL (ref 50–200)
CO2 SERPL-SCNC: 29 MMOL/L (ref 21–32)
CREAT SERPL-MCNC: 0.79 MG/DL (ref 0.6–1.3)
EOSINOPHIL # BLD AUTO: 0.49 THOUSAND/ΜL (ref 0–0.61)
EOSINOPHIL NFR BLD AUTO: 5 % (ref 0–6)
ERYTHROCYTE [DISTWIDTH] IN BLOOD BY AUTOMATED COUNT: 12.9 % (ref 11.6–15.1)
EST. AVERAGE GLUCOSE BLD GHB EST-MCNC: 146 MG/DL
GFR SERPL CREATININE-BSD FRML MDRD: 85 ML/MIN/1.73SQ M
GLUCOSE P FAST SERPL-MCNC: 109 MG/DL (ref 65–99)
HBA1C MFR BLD: 6.7 % (ref 4.2–6.3)
HCT VFR BLD AUTO: 49.6 % (ref 34.8–46.1)
HDLC SERPL-MCNC: 41 MG/DL
HGB BLD-MCNC: 15.8 G/DL (ref 11.5–15.4)
IMM GRANULOCYTES # BLD AUTO: 0.04 THOUSAND/UL (ref 0–0.2)
IMM GRANULOCYTES NFR BLD AUTO: 0 % (ref 0–2)
LDLC SERPL CALC-MCNC: 124 MG/DL (ref 0–100)
LYMPHOCYTES # BLD AUTO: 3.7 THOUSANDS/ΜL (ref 0.6–4.47)
LYMPHOCYTES NFR BLD AUTO: 37 % (ref 14–44)
MCH RBC QN AUTO: 31 PG (ref 26.8–34.3)
MCHC RBC AUTO-ENTMCNC: 31.9 G/DL (ref 31.4–37.4)
MCV RBC AUTO: 97 FL (ref 82–98)
MONOCYTES # BLD AUTO: 0.67 THOUSAND/ΜL (ref 0.17–1.22)
MONOCYTES NFR BLD AUTO: 7 % (ref 4–12)
NEUTROPHILS # BLD AUTO: 4.98 THOUSANDS/ΜL (ref 1.85–7.62)
NEUTS SEG NFR BLD AUTO: 50 % (ref 43–75)
NONHDLC SERPL-MCNC: 182 MG/DL
NRBC BLD AUTO-RTO: 0 /100 WBCS
PLATELET # BLD AUTO: 256 THOUSANDS/UL (ref 149–390)
PMV BLD AUTO: 11.9 FL (ref 8.9–12.7)
POTASSIUM SERPL-SCNC: 5 MMOL/L (ref 3.5–5.3)
PROT SERPL-MCNC: 7.3 G/DL (ref 6.4–8.2)
RBC # BLD AUTO: 5.09 MILLION/UL (ref 3.81–5.12)
SODIUM SERPL-SCNC: 142 MMOL/L (ref 136–145)
TRIGL SERPL-MCNC: 288 MG/DL
WBC # BLD AUTO: 9.96 THOUSAND/UL (ref 4.31–10.16)

## 2019-11-15 PROCEDURE — 80061 LIPID PANEL: CPT | Performed by: NURSE PRACTITIONER

## 2019-11-15 PROCEDURE — 80053 COMPREHEN METABOLIC PANEL: CPT | Performed by: NURSE PRACTITIONER

## 2019-11-15 PROCEDURE — 36415 COLL VENOUS BLD VENIPUNCTURE: CPT

## 2019-11-15 PROCEDURE — 83036 HEMOGLOBIN GLYCOSYLATED A1C: CPT | Performed by: NURSE PRACTITIONER

## 2019-11-15 PROCEDURE — 85025 COMPLETE CBC W/AUTO DIFF WBC: CPT | Performed by: NURSE PRACTITIONER

## 2019-11-20 NOTE — PROGRESS NOTES
Assessment/Plan:    No problem-specific Assessment & Plan notes found for this encounter  Diagnoses and all orders for this visit:    Arthritis  -     Ambulatory referral to Orthopedic Surgery; Future    Type 2 diabetes mellitus without complication, without long-term current use of insulin (HCC)  -     metFORMIN (GLUCOPHAGE) 1000 MG tablet; Take 1 tablet (1,000 mg total) by mouth 2 (two) times a day with meals  -     CBC and differential  -     Comprehensive metabolic panel; Future  -     Hemoglobin A1C  -     Microalbumin / creatinine urine ratio    Hypercholesteremia  -     atorvastatin (LIPITOR) 20 mg tablet; Take 2 tablets (40 mg total) by mouth daily  -     Lipid panel    Gastroesophageal reflux disease without esophagitis    Fatty liver    Secondary hypertension    Vitamin D deficiency    Anxiety          Subjective:      Patient ID: Jasbir Hernandez is a 47 y o  female  Patient presents today for 3 month follow-up on hypertension, hyperlipidemia and diabetes  Patient has no new complaints  Screenings:  Colonoscopy- 2016, she states that she had a lot polyps, she states she is due again for one  Gyn- has history of partial hytrectomy, she has not gone to her GYN in a while, she denies abnormal bleeding   Mammogram-   Had done  Hep C-   Had done 2018  Eye Doctor-sees eye doctor yearly  Dentist- she is due    The 10-year ASCVD risk score (Keith Dang et al , 2013) is: 6 6%    Values used to calculate the score:      Age: 47 years      Sex: Female      Is Non- : No      Diabetic: Yes      Tobacco smoker: No      Systolic Blood Pressure: 616 mmHg      Is BP treated: Yes      HDL Cholesterol: 41 mg/dL      Total Cholesterol: 223 mg/dL      Hypertension   This is a new problem  The current episode started more than 1 month ago  The problem is unchanged  The problem is controlled   Pertinent negatives include no anxiety, blurred vision, chest pain, headaches, neck pain, palpitations, peripheral edema or shortness of breath  Risk factors for coronary artery disease include diabetes mellitus, dyslipidemia, sedentary lifestyle and stress  Treatments tried:   Patient currently taking lisinopril  The current treatment provides moderate improvement  Compliance problems include diet  Diabetes   She presents for her follow-up diabetic visit  She has type 2 diabetes mellitus  Her disease course has been stable  There are no hypoglycemic associated symptoms  Pertinent negatives for hypoglycemia include no dizziness or headaches  There are no diabetic associated symptoms  Pertinent negatives for diabetes include no blurred vision, no chest pain, no polydipsia, no polyphagia, no polyuria and no weakness  There are no hypoglycemic complications  There are no diabetic complications  Risk factors for coronary artery disease include sedentary lifestyle, stress, obesity and dyslipidemia  Current diabetic treatment includes oral agent (monotherapy)  She is compliant with treatment all of the time  She has not had a previous visit with a dietitian  She rarely participates in exercise  An ACE inhibitor/angiotensin II receptor blocker is being taken  She does not see a podiatrist Eye exam is not current  Hyperlipidemia   This is a chronic problem  The current episode started more than 1 year ago  The problem is uncontrolled  Exacerbating diseases include diabetes and obesity  She has no history of hypothyroidism  Pertinent negatives include no chest pain or shortness of breath  Current antihyperlipidemic treatment includes statins, diet change and exercise  Compliance problems include adherence to diet and adherence to exercise          The following portions of the patient's history were reviewed and updated as appropriate: allergies, current medications, past family history, past medical history, past social history, past surgical history and problem list     Review of Systems   Constitutional: Negative for activity change, appetite change, chills, diaphoresis and fever  HENT: Negative for congestion, ear discharge, ear pain, postnasal drip, rhinorrhea, sinus pressure, sinus pain and sore throat  Eyes: Negative for blurred vision, pain, discharge, itching and visual disturbance  Respiratory: Negative for cough, chest tightness, shortness of breath and wheezing  Cardiovascular: Negative for chest pain, palpitations and leg swelling  Gastrointestinal: Negative for abdominal pain, constipation, diarrhea, nausea and vomiting  Endocrine: Negative for polydipsia, polyphagia and polyuria  Genitourinary: Negative for difficulty urinating, dysuria and urgency  Musculoskeletal: Positive for arthralgias (bilateral knee pain, states she had injections in the past)  Negative for back pain and neck pain  Skin: Negative for rash and wound  Neurological: Negative for dizziness, weakness, numbness and headaches           Past Medical History:   Diagnosis Date    Anxiety     Asthma     Depression     Diverticulitis     Diverticulitis of colon     Follicular cyst of ovary 12/23/2014    GERD (gastroesophageal reflux disease)     Glycosuria     Headache     Hyperlipidemia     Obesity     Ovarian cyst     Overweight     Pulmonary nodule     Renal calculus     Vertigo          Current Outpatient Medications:     ALPRAZolam (XANAX) 0 25 mg tablet, Take 1 tablet by mouth 2 (two) times a day as needed, Disp: , Rfl:     atorvastatin (LIPITOR) 20 mg tablet, Take 1 tablet (20 mg total) by mouth daily, Disp: 90 tablet, Rfl: 1    cefuroxime (CEFTIN) 250 mg tablet, , Disp: , Rfl:     Cholecalciferol (VITAMIN D3) 95204 units TABS, Take 1 tablet by mouth once a week  , Disp: , Rfl:     fluticasone (FLONASE) 50 mcg/act nasal spray, 1 spray into each nostril daily, Disp: 1 Bottle, Rfl: 5    lisinopril (ZESTRIL) 10 mg tablet, Take 1 tablet (10 mg total) by mouth daily, Disp: 90 tablet, Rfl: 1   metFORMIN (GLUCOPHAGE) 1000 MG tablet, Take 1 tablet (1,000 mg total) by mouth 2 (two) times a day with meals, Disp: 180 tablet, Rfl: 1    omeprazole (PriLOSEC) 20 mg delayed release capsule, Take 1 capsule (20 mg total) by mouth daily, Disp: 90 capsule, Rfl: 1    sertraline (ZOLOFT) 50 mg tablet, Take 1 tablet (50 mg total) by mouth daily, Disp: 90 tablet, Rfl: 1    Allergies   Allergen Reactions    Clonazepam     Morphine Other (See Comments) and Confusion     Annotation - [id number]: "dopey"  Gets silly    Venlafaxine        Social History   Past Surgical History:   Procedure Laterality Date    APPENDECTOMY      COLONOSCOPY      HYSTERECTOMY      partial age 44    MS COLONOSCOPY FLX DX W/COLLJ SPEC WHEN PFRMD N/A 9/26/2017    Procedure: EGD AND COLONOSCOPY;  Surgeon: Black Trinidad MD;  Location: [facility]; Service: Gastroenterology    MS REVISE MEDIAN N/CARPAL TUNNEL SURG Right 5/29/2018    Procedure: CARPAL TUNNEL RELEASE;  Surgeon: Apolinar Hollis DO;  [facility];  Service: Orthopedics    TONSILLECTOMY      WISDOM TOOTH EXTRACTION       Family History   Problem Relation Age of Onset    No Known Problems Mother     Stroke Father     Pancreatic cancer Father 68    No Known Problems Maternal Grandmother     No Known Problems Maternal Grandfather     No Known Problems Paternal Grandmother     No Known Problems Paternal Grandfather     No Known Problems Sister     No Known Problems Sister     No Known Problems Sister     No Known Problems Sister     No Known Problems Sister     No Known Problems Maternal Aunt     No Known Problems Paternal Aunt        Objective: There were no vitals taken for this visit      Recent Results (from the past 1344 hour(s))   CBC and differential    Collection Time: 11/15/19  9:27 AM   Result Value Ref Range    WBC 9 96 4 31 - 10 16 Thousand/uL    RBC 5 09 3 81 - 5 12 Million/uL    Hemoglobin 15 8 (H) 11 5 - 15 4 g/dL    Hematocrit 49 6 (H) 34 8 - 46 1 % MCV 97 82 - 98 fL    MCH 31 0 26 8 - 34 3 pg    MCHC 31 9 31 4 - 37 4 g/dL    RDW 12 9 11 6 - 15 1 %    MPV 11 9 8 9 - 12 7 fL    Platelets 348 695 - 512 Thousands/uL    nRBC 0 /100 WBCs    Neutrophils Relative 50 43 - 75 %    Immat GRANS % 0 0 - 2 %    Lymphocytes Relative 37 14 - 44 %    Monocytes Relative 7 4 - 12 %    Eosinophils Relative 5 0 - 6 %    Basophils Relative 1 0 - 1 %    Neutrophils Absolute 4 98 1 85 - 7 62 Thousands/µL    Immature Grans Absolute 0 04 0 00 - 0 20 Thousand/uL    Lymphocytes Absolute 3 70 0 60 - 4 47 Thousands/µL    Monocytes Absolute 0 67 0 17 - 1 22 Thousand/µL    Eosinophils Absolute 0 49 0 00 - 0 61 Thousand/µL    Basophils Absolute 0 08 0 00 - 0 10 Thousands/µL   Lipid panel    Collection Time: 11/15/19  9:27 AM   Result Value Ref Range    Cholesterol 223 (H) 50 - 200 mg/dL    Triglycerides 288 (H) <=150 mg/dL    HDL, Direct 41 >=40 mg/dL    LDL Calculated 124 (H) 0 - 100 mg/dL    Non-HDL-Chol (CHOL-HDL) 182 mg/dl   Hemoglobin A1C    Collection Time: 11/15/19  9:27 AM   Result Value Ref Range    Hemoglobin A1C 6 7 (H) 4 2 - 6 3 %     mg/dl   Comprehensive metabolic panel    Collection Time: 11/15/19  9:27 AM   Result Value Ref Range    Sodium 142 136 - 145 mmol/L    Potassium 5 0 3 5 - 5 3 mmol/L    Chloride 106 100 - 108 mmol/L    CO2 29 21 - 32 mmol/L    ANION GAP 7 4 - 13 mmol/L    BUN 19 5 - 25 mg/dL    Creatinine 0 79 0 60 - 1 30 mg/dL    Glucose, Fasting 109 (H) 65 - 99 mg/dL    Calcium 9 6 8 3 - 10 1 mg/dL    AST 22 5 - 45 U/L    ALT 47 12 - 78 U/L    Alkaline Phosphatase 106 46 - 116 U/L    Total Protein 7 3 6 4 - 8 2 g/dL    Albumin 4 3 3 5 - 5 0 g/dL    Total Bilirubin 0 48 0 20 - 1 00 mg/dL    eGFR 85 ml/min/1 73sq m            Physical Exam   Constitutional: She is oriented to person, place, and time  She appears well-developed and well-nourished  No distress  HENT:   Head: Normocephalic and atraumatic     Right Ear: External ear normal    Left Ear: External ear normal    Nose: Nose normal    Mouth/Throat: Oropharynx is clear and moist  No oropharyngeal exudate  Eyes: Pupils are equal, round, and reactive to light  Conjunctivae and EOM are normal  Right eye exhibits no discharge  Left eye exhibits no discharge  Neck: Normal range of motion  Neck supple  No thyromegaly present  Cardiovascular: Normal rate, regular rhythm, normal heart sounds and intact distal pulses  Exam reveals no gallop and no friction rub  No murmur heard  Pulmonary/Chest: Effort normal and breath sounds normal  No stridor  No respiratory distress  She has no wheezes  She has no rales  Abdominal: Soft  Bowel sounds are normal  She exhibits no distension  There is no tenderness  Lymphadenopathy:     She has no cervical adenopathy  Neurological: She is alert and oriented to person, place, and time  Skin: Skin is warm and dry  No rash noted  She is not diaphoretic  No erythema  Psychiatric: She has a normal mood and affect   Her behavior is normal  Judgment and thought content normal

## 2019-11-22 ENCOUNTER — OFFICE VISIT (OUTPATIENT)
Dept: INTERNAL MEDICINE CLINIC | Age: 54
End: 2019-11-22
Payer: COMMERCIAL

## 2019-11-22 VITALS
SYSTOLIC BLOOD PRESSURE: 126 MMHG | BODY MASS INDEX: 30.44 KG/M2 | HEART RATE: 92 BPM | OXYGEN SATURATION: 95 % | HEIGHT: 63 IN | TEMPERATURE: 97.5 F | WEIGHT: 171.8 LBS | DIASTOLIC BLOOD PRESSURE: 80 MMHG

## 2019-11-22 DIAGNOSIS — M19.90 ARTHRITIS: Primary | ICD-10-CM

## 2019-11-22 DIAGNOSIS — E78.00 HYPERCHOLESTEREMIA: ICD-10-CM

## 2019-11-22 DIAGNOSIS — K21.9 GASTROESOPHAGEAL REFLUX DISEASE WITHOUT ESOPHAGITIS: ICD-10-CM

## 2019-11-22 DIAGNOSIS — E55.9 VITAMIN D DEFICIENCY: ICD-10-CM

## 2019-11-22 DIAGNOSIS — E11.9 TYPE 2 DIABETES MELLITUS WITHOUT COMPLICATION, WITHOUT LONG-TERM CURRENT USE OF INSULIN (HCC): ICD-10-CM

## 2019-11-22 DIAGNOSIS — F41.9 ANXIETY: ICD-10-CM

## 2019-11-22 DIAGNOSIS — K76.0 FATTY LIVER: ICD-10-CM

## 2019-11-22 DIAGNOSIS — I15.9 SECONDARY HYPERTENSION: ICD-10-CM

## 2019-11-22 PROBLEM — J30.9 ALLERGIC RHINITIS: Status: RESOLVED | Noted: 2017-06-14 | Resolved: 2019-11-22

## 2019-11-22 PROBLEM — N20.0 KIDNEY CALCULI: Status: RESOLVED | Noted: 2019-01-11 | Resolved: 2019-11-22

## 2019-11-22 PROBLEM — R31.29 OTHER MICROSCOPIC HEMATURIA: Status: RESOLVED | Noted: 2019-01-11 | Resolved: 2019-11-22

## 2019-11-22 PROCEDURE — 99214 OFFICE O/P EST MOD 30 MIN: CPT | Performed by: NURSE PRACTITIONER

## 2019-11-22 RX ORDER — ATORVASTATIN CALCIUM 20 MG/1
40 TABLET, FILM COATED ORAL DAILY
Qty: 90 TABLET | Refills: 1 | Status: SHIPPED | OUTPATIENT
Start: 2019-11-22 | End: 2020-03-18 | Stop reason: SDUPTHER

## 2019-11-22 NOTE — ASSESSMENT & PLAN NOTE
Patient's GERD currently stable on Prilosec 20 milligrams daily  Will continue with current medication  You may use OTC tums as needed  Recommend small frequent meals throughout the day  Avoid aggravating foods - spicy foods, acidic foods - such as tomato and citrus  Avoid alcohol  Do not lay down for at least 30 mins after eating  Will advise patient to get colonoscopy and possibly EGD

## 2019-11-22 NOTE — ASSESSMENT & PLAN NOTE
Lab Results   Component Value Date    HGBA1C 6 7 (H) 11/15/2019        Will increase patient's metformin to a 1000 milligrams in the morning and 1000 in the evening  Will recheck hemoglobin A1c in 4 months  Patient is to continue to work on diet and exercise  Limit sugars and carbohydrate intake  Avoid soda, juice, sweets, cookies, desserts, pasta, bread    Eat more whole grains, exercised 30 min of cardio at least 3 times a week  Also recommended daily foot exams to check for sores, and recommended yearly eye exams

## 2019-12-09 ENCOUNTER — OFFICE VISIT (OUTPATIENT)
Dept: OBGYN CLINIC | Facility: OTHER | Age: 54
End: 2019-12-09
Payer: COMMERCIAL

## 2019-12-09 ENCOUNTER — APPOINTMENT (OUTPATIENT)
Dept: RADIOLOGY | Facility: OTHER | Age: 54
End: 2019-12-09
Payer: COMMERCIAL

## 2019-12-09 VITALS — WEIGHT: 171 LBS | BODY MASS INDEX: 30.29 KG/M2

## 2019-12-09 DIAGNOSIS — M19.90 ARTHRITIS: ICD-10-CM

## 2019-12-09 DIAGNOSIS — M25.562 ACUTE PAIN OF BOTH KNEES: ICD-10-CM

## 2019-12-09 DIAGNOSIS — M17.0 PRIMARY OSTEOARTHRITIS OF BOTH KNEES: Primary | ICD-10-CM

## 2019-12-09 DIAGNOSIS — M25.561 ACUTE PAIN OF BOTH KNEES: ICD-10-CM

## 2019-12-09 PROCEDURE — 99243 OFF/OP CNSLTJ NEW/EST LOW 30: CPT | Performed by: ORTHOPAEDIC SURGERY

## 2019-12-09 PROCEDURE — 73564 X-RAY EXAM KNEE 4 OR MORE: CPT

## 2019-12-09 PROCEDURE — 20610 DRAIN/INJ JOINT/BURSA W/O US: CPT | Performed by: ORTHOPAEDIC SURGERY

## 2019-12-09 RX ORDER — LIDOCAINE HYDROCHLORIDE 10 MG/ML
4 INJECTION, SOLUTION INFILTRATION; PERINEURAL
Status: COMPLETED | OUTPATIENT
Start: 2019-12-09 | End: 2019-12-09

## 2019-12-09 RX ORDER — TRIAMCINOLONE ACETONIDE 40 MG/ML
40 INJECTION, SUSPENSION INTRA-ARTICULAR; INTRAMUSCULAR
Status: COMPLETED | OUTPATIENT
Start: 2019-12-09 | End: 2019-12-09

## 2019-12-09 RX ADMIN — LIDOCAINE HYDROCHLORIDE 4 ML: 10 INJECTION, SOLUTION INFILTRATION; PERINEURAL at 10:44

## 2019-12-09 RX ADMIN — TRIAMCINOLONE ACETONIDE 40 MG: 40 INJECTION, SUSPENSION INTRA-ARTICULAR; INTRAMUSCULAR at 10:44

## 2019-12-09 NOTE — PATIENT INSTRUCTIONS
1  I explained the natural history of the  problem to the  patient - there is no surgical intervention necessary and should improve with nonoperative treatment  2  Activity modification - avoid hyperflexion like bending, kneeling, squatting,  stairs as  much as humanly possible  Change position frequently to straighten your leg if you are sitting for a long period of time  3  Pain should be the warning guide to your activities - both during and after exercises  Stop doing your sport or activity if you are getting significant pain  4  Ice in large trash bag or bag of frozen peas for 15 min 4x a day, if needed, for pain or swelling  Heat is not indicated  5  Take Advil, Aleve, or Tylenol on an as-needed basis for pain  6  Do the isometric quad and hamstring exercises that you were shown diligently at  home  7  Gliding activities (skiing machine, elliptical, swimming) are allowed, but do not do pounding activities  ( treadmill, aerobics, distance walking )     8  I injected her  left knee today with lidocaine and Kenalog to decrease pain and inflammation

## 2019-12-09 NOTE — PROGRESS NOTES
Chief Complaint   Patient presents with    Left Knee - Pain    Right Knee - Pain         Assessment:  Patellofemoral arthritis bilaterally                           Lateral compartment arthritis right knee    Plan:  1  I explained the natural history of the  problem to the  patient - there is no surgical intervention necessary and should improve with nonoperative treatment  2  Activity modification - avoid hyperflexion like bending, kneeling, squatting,  stairs as  much as humanly possible  Change position frequently to straighten your leg if you are sitting for a long period of time  3  Pain should be the warning guide to your activities - both during and after exercises  Stop doing your sport or activity if you are getting significant pain  4  Ice in large trash bag or bag of frozen peas for 15 min 4x a day, if needed, for pain or swelling  Heat is not indicated  5  Take Advil, Aleve, or Tylenol on an as-needed basis for pain  6  Do the isometric quad and hamstring exercises that you were shown diligently at  home  7  Gliding activities (skiing machine, elliptical, swimming) are allowed, but do not do pounding activities  ( treadmill, aerobics, distance walking )  8  I injected her  left knee today with lidocaine and Kenalog to decrease pain and inflammation    HPI:  This is a 47year old white female presenting for consultation from her PCP regarding her bilateral knee pain  She states that she has been having pain on and off for more than 2 years  Currently the left is worse and the right is minimally symptomatic however it swells  She was previously treated at [facility] 66  with an aspiration and cortisone injection to the right knee  She states this improved her symptoms  She describes a positive movie sign, stiffness after prolonged sitting, as well as pain with steps, squatting, kneeling and deep knee bends  She denies any giving way, true locking or other mechanical symptoms   Her left knee pain is localized anteriorly  She works at [email or web address] and cares for her handicaped   Past medical history, family history,  social history, medication history, and allergies are reviewed  These include GERD, diabetes, hypertension, anxiety, vitamin D deficiency, and hypercholesteremia  Please see HPI for pertinent review of systems  All other systems reviewed are negative  Exam: Wt 77 6 kg (171 lb)   BMI 30 29 kg/m²   On today's exam of the bilateral knees, she was not limping or using any external walking aids  There was no deformity  There was no swelling, ecchymosis, redness or signs of infection  The skin was warm, dry and well perfused  She was diffusely tender about both anterior knees  The patellae track centrally with crepitus, positive patellar compression of the left  The knees were stable to varus/valgus and AP stress  Sensation was intact to light touch with good capillary refill in all toes  Distal pulses were intact  There was no calf tenderness, popliteal adenopathy or cellulitis noted  Studies Reviewed:  I personally reviewed weight-bearing x-rays of both knees  She has lateral joint space narrowing of the right knee with preservation of the entire left knee tibial femoral and medial compartment of the right knee  There is patellofemoral narrowing and some spurring, actually worse on the right than the left  There is no collapse, loose bodies, soft tissue calcification seen       Large joint arthrocentesis: L knee  Date/Time: 12/9/2019 10:44 AM  Consent given by: patient  Site marked: site marked  Supporting Documentation  Indications: pain   Procedure Details  Location: knee - L knee  Preparation: Patient was prepped and draped in the usual sterile fashion  Needle size: 22 G  Ultrasound guidance: no  Approach: Inferior lateral   Medications administered: 4 mL lidocaine 1 %; 40 mg triamcinolone acetonide 40 mg/mL    Patient tolerance: patient tolerated the procedure well with no immediate complications  Dressing:  Sterile dressing applied            Scribe Attestation    I,:   Gilda Kunz MA am acting as a scribe while in the presence of the attending physician :        I,:   Arthur Terry MD personally performed the services described in this documentation    as scribed in my presence :

## 2019-12-09 NOTE — LETTER
December 9, 2019     [employer] [facility]  [address] [place] [address]    Patient: Rishabh Handy   YOB: 1965   Date of Visit: 12/9/2019       Dear Ms Bauman:    Thank you for referring Rishabh Handy to me for evaluation  Below are my notes for this consultation  If you have questions, please do not hesitate to call me  I look forward to following your patient along with you  Sincerely,        Jorge Swan MD        CC: No Recipients  Jorge Swan MD  12/9/2019 10:45 AM  Sign at close encounter  Chief Complaint   Patient presents with    Left Knee - Pain    Right Knee - Pain         Assessment:  Patellofemoral arthritis bilaterally                           Lateral compartment arthritis right knee    Plan:  1  I explained the natural history of the  problem to the  patient - there is no surgical intervention necessary and should improve with nonoperative treatment  2  Activity modification - avoid hyperflexion like bending, kneeling, squatting,  stairs as  much as humanly possible  Change position frequently to straighten your leg if you are sitting for a long period of time  3  Pain should be the warning guide to your activities - both during and after exercises  Stop doing your sport or activity if you are getting significant pain  4  Ice in large trash bag or bag of frozen peas for 15 min 4x a day, if needed, for pain or swelling  Heat is not indicated  5  Take Advil, Aleve, or Tylenol on an as-needed basis for pain  6  Do the isometric quad and hamstring exercises that you were shown diligently at  home  7  Gliding activities (skiing machine, elliptical, swimming) are allowed, but do not do pounding activities  ( treadmill, aerobics, distance walking )     8  I injected her  left knee today with lidocaine and Kenalog to decrease pain and inflammation    HPI:  This is a 47year old white female presenting for consultation from her PCP regarding her bilateral knee pain  She states that she has been having pain on and off for more than 2 years  Currently the left is worse and the right is minimally symptomatic however it swells  She was previously treated at [facility] 66  with an aspiration and cortisone injection to the right knee  She states this improved her symptoms  She describes a positive movie sign, stiffness after prolonged sitting, as well as pain with steps, squatting, kneeling and deep knee bends  She denies any giving way, true locking or other mechanical symptoms  Her left knee pain is localized anteriorly  She works at [employer] and cares for her handicaped   Past medical history, family history,  social history, medication history, and allergies are reviewed  These include GERD, diabetes, hypertension, anxiety, vitamin D deficiency, and hypercholesteremia  Please see HPI for pertinent review of systems  All other systems reviewed are negative  Exam: Wt 77 6 kg (171 lb)   BMI 30 29 kg/m²    On today's exam of the bilateral knees, she was not limping or using any external walking aids  There was no deformity  There was no swelling, ecchymosis, redness or signs of infection  The skin was warm, dry and well perfused  She was diffusely tender about both anterior knees  The patellae track centrally with crepitus, positive patellar compression of the left  The knees were stable to varus/valgus and AP stress  Sensation was intact to light touch with good capillary refill in all toes  Distal pulses were intact  There was no calf tenderness, popliteal adenopathy or cellulitis noted  Studies Reviewed:  I personally reviewed weight-bearing x-rays of both knees  She has lateral joint space narrowing of the right knee with preservation of the entire left knee tibial femoral and medial compartment of the right knee  There is patellofemoral narrowing and some spurring, actually worse on the right than the left    There is no collapse, loose bodies, soft tissue calcification seen       Large joint arthrocentesis: L knee  Date/Time: 12/9/2019 10:44 AM  Consent given by: patient  Site marked: site marked  Supporting Documentation  Indications: pain   Procedure Details  Location: knee - L knee  Preparation: Patient was prepped and draped in the usual sterile fashion  Needle size: 22 G  Ultrasound guidance: no  Approach: Inferior lateral   Medications administered: 4 mL lidocaine 1 %; 40 mg triamcinolone acetonide 40 mg/mL    Patient tolerance: patient tolerated the procedure well with no immediate complications  Dressing:  Sterile dressing applied            Scribe Attestation    I,:   Charly Madison MA am acting as a scribe while in the presence of the attending physician :        I,:   Maryanne Zapata MD personally performed the services described in this documentation    as scribed in my presence :

## 2019-12-29 ENCOUNTER — OFFICE VISIT (OUTPATIENT)
Dept: URGENT CARE | Age: 54
End: 2019-12-29
Payer: COMMERCIAL

## 2019-12-29 VITALS
OXYGEN SATURATION: 97 % | RESPIRATION RATE: 18 BRPM | DIASTOLIC BLOOD PRESSURE: 80 MMHG | HEART RATE: 92 BPM | HEIGHT: 63 IN | SYSTOLIC BLOOD PRESSURE: 122 MMHG | TEMPERATURE: 97.5 F | WEIGHT: 170 LBS | BODY MASS INDEX: 30.12 KG/M2

## 2019-12-29 DIAGNOSIS — J02.9 SORE THROAT: ICD-10-CM

## 2019-12-29 DIAGNOSIS — J01.00 ACUTE MAXILLARY SINUSITIS, RECURRENCE NOT SPECIFIED: Primary | ICD-10-CM

## 2019-12-29 LAB — S PYO AG THROAT QL: NEGATIVE

## 2019-12-29 PROCEDURE — 99213 OFFICE O/P EST LOW 20 MIN: CPT | Performed by: PHYSICIAN ASSISTANT

## 2019-12-29 PROCEDURE — 87880 STREP A ASSAY W/OPTIC: CPT | Performed by: PHYSICIAN ASSISTANT

## 2019-12-29 RX ORDER — CEFDINIR 300 MG/1
300 CAPSULE ORAL EVERY 12 HOURS SCHEDULED
Qty: 14 CAPSULE | Refills: 0 | Status: SHIPPED | OUTPATIENT
Start: 2019-12-29 | End: 2020-01-05

## 2019-12-29 NOTE — LETTER
December 29, 2019     Patient: Marina Padron   YOB: 1965   Date of Visit: 12/29/2019       To Whom It May Concern: It is my medical opinion that Marina Padron may return to work on 12/31/19  If you have any questions or concerns, please don't hesitate to call           Sincerely,        Williams Ruiz PA-C    CC: No Recipients

## 2019-12-29 NOTE — PROGRESS NOTES
[facility]        NAME: Wilmar Lopez is a 47 y o  female  : 1965    MRN: [id number]  DATE: 2019  TIME: 9:10 AM    Assessment and Plan   Acute maxillary sinusitis, recurrence not specified [J01 00]  1  Acute maxillary sinusitis, recurrence not specified  cefdinir (OMNICEF) 300 mg capsule   2  Sore throat  POCT rapid strepA     Rapid strep negative, will treat acute sinusitis at this time  Patient is requesting a work note    Patient Instructions     Take antibiotics as prescribed  Use nasal saline spray for symptomatic treatment  Warm water gargles  Change toothbrush in 2-3 days  Stay hydrated with lots of water/fluids  Follow-up with PCP in the next 1-2 days for reexamination and to ensure resolution of symptoms  Go to the ED if any fevers, unable to stay hydrated, abdominal pain, chest pain, shortness of breath, change in voice, pain or difficulty swallowing, new or worsening symptoms or other concerning symptoms  Chief Complaint     Chief Complaint   Patient presents with    Earache     pt reports bilateral ear pain, bodyaches and cough   states throat hurts with coughing         History of Present Illness       40-year-old female presents with nasal congestion, sinus pressure, postnasal drip times 5 days  She states the past 3 days she has started with sore throat, bilateral ear pain, intermittent cough that is sometimes dry sometimes productive of clearish phlegm and mild intermittent generalized body aches  States it feels like prior sinus infections  She has not yet tried anything for it  Does note sick contacts with similar symptoms  States she did get her flu vaccine this year  She denies any fevers, chest pain, chest tightness, shortness of breath, GI/ symptoms or other complaints  States she is eating and drinking normally, staying hydrated with a lot of fluids        Review of Systems   Review of Systems   Constitutional: Negative for activity change, appetite change, chills, fatigue and fever  HENT: Positive for congestion, ear pain, postnasal drip, rhinorrhea, sinus pressure and sore throat  Negative for facial swelling, trouble swallowing and voice change  Eyes: Negative for discharge, itching and visual disturbance  Respiratory: Positive for cough  Negative for chest tightness, shortness of breath and wheezing  Cardiovascular: Negative for chest pain  Gastrointestinal: Negative for abdominal pain, diarrhea, nausea and vomiting  Musculoskeletal: Negative for back pain and neck pain  Skin: Negative for rash  Neurological: Negative for dizziness, syncope, weakness, numbness and headaches  All other systems reviewed and are negative          Current Medications       Current Outpatient Medications:     ALPRAZolam (XANAX) 0 25 mg tablet, Take 1 tablet by mouth 2 (two) times a day as needed, Disp: , Rfl:     atorvastatin (LIPITOR) 20 mg tablet, Take 2 tablets (40 mg total) by mouth daily, Disp: 90 tablet, Rfl: 1    cefdinir (OMNICEF) 300 mg capsule, Take 1 capsule (300 mg total) by mouth every 12 (twelve) hours for 7 days, Disp: 14 capsule, Rfl: 0    Cholecalciferol (VITAMIN D3) 41384 units TABS, Take 1 tablet by mouth once a week  , Disp: , Rfl:     fluticasone (FLONASE) 50 mcg/act nasal spray, 1 spray into each nostril daily (Patient not taking: Reported on 11/22/2019), Disp: 1 Bottle, Rfl: 5    lisinopril (ZESTRIL) 10 mg tablet, Take 1 tablet (10 mg total) by mouth daily, Disp: 90 tablet, Rfl: 1    metFORMIN (GLUCOPHAGE) 1000 MG tablet, Take 1 tablet (1,000 mg total) by mouth 2 (two) times a day with meals, Disp: 180 tablet, Rfl: 1    omeprazole (PriLOSEC) 20 mg delayed release capsule, Take 1 capsule (20 mg total) by mouth daily, Disp: 90 capsule, Rfl: 1    sertraline (ZOLOFT) 50 mg tablet, Take 1 tablet (50 mg total) by mouth daily, Disp: 90 tablet, Rfl: 1    Current Allergies     Allergies as of 12/29/2019 - Reviewed 12/29/2019 Allergen Reaction Noted    Clonazepam  09/25/2017    Morphine Other (See Comments) and Confusion 06/14/2017    Venlafaxine  09/25/2017            The following portions of the patient's history were reviewed and updated as appropriate: allergies, current medications, past family history, past medical history, past social history, past surgical history and problem list      Past Medical History:   Diagnosis Date    Anxiety     Asthma     Depression     Diverticulitis     Diverticulitis of colon     Follicular cyst of ovary 12/23/2014    GERD (gastroesophageal reflux disease)     Glycosuria     Headache     Hyperlipidemia     Kidney calculi 1/11/2019    Obesity     Ovarian cyst     Overweight     Pulmonary nodule     Renal calculus     Vertigo        Past Surgical History:   Procedure Laterality Date    APPENDECTOMY      COLONOSCOPY      HYSTERECTOMY      partial age 44    AZ COLONOSCOPY FLX DX W/COLLJ SPEC WHEN PFRMD N/A 9/26/2017    Procedure: EGD AND COLONOSCOPY;  Surgeon: Mehdi Hanson MD;  Location: [facility]; Service: Gastroenterology    AZ REVISE MEDIAN N/CARPAL TUNNEL SURG Right 5/29/2018    Procedure: CARPAL TUNNEL RELEASE;  Surgeon: Samantha Delgado DO;  [facility];  Service: Orthopedics    TONSILLECTOMY      WISDOM TOOTH EXTRACTION         Family History   Problem Relation Age of Onset    No Known Problems Mother     Stroke Father     Pancreatic cancer Father 68    No Known Problems Maternal Grandmother     No Known Problems Maternal Grandfather     No Known Problems Paternal Grandmother     No Known Problems Paternal Grandfather     No Known Problems Sister     No Known Problems Sister     No Known Problems Sister     No Known Problems Sister     No Known Problems Sister     No Known Problems Maternal Aunt     No Known Problems Paternal Aunt          Medications have been verified          Objective   /80 (BP Location: Right arm, Patient Position: Sitting, Cuff Size: Standard)   Pulse 92   Temp 97 5 °F (36 4 °C) (Temporal)   Resp 18   Ht 5' 3" (1 6 m)   Wt 77 1 kg (170 lb)   SpO2 97%   BMI 30 11 kg/m²        Physical Exam     Physical Exam   Constitutional: She is oriented to person, place, and time  She appears well-developed and well-nourished  No distress  HENT:   Head: Normocephalic and atraumatic  Mouth/Throat: Uvula is midline and mucous membranes are normal  No uvula swelling  Mild, bilateral boggy nasal turbinates  TMs intact, pearly grey, mild clear fluid visualized behind TM b/l  Mild PND present with mildly erythematous posterior pharynx  Mild bilateral maxillary sinus pressure/discomfort to palpation  Airway patent, handling secretions  Eyes: Pupils are equal, round, and reactive to light  Neck: Normal range of motion  Neck supple  Cardiovascular: Normal rate, regular rhythm and normal heart sounds  Pulmonary/Chest: Effort normal and breath sounds normal  No respiratory distress  She has no wheezes  Neurological: She is alert and oriented to person, place, and time  Skin: Capillary refill takes less than 2 seconds  Psychiatric: She has a normal mood and affect  Her behavior is normal    Nursing note and vitals reviewed

## 2019-12-29 NOTE — PATIENT INSTRUCTIONS
Take antibiotics as prescribed  Use nasal saline spray for symptomatic treatment  Warm water gargles  Change toothbrush in 2-3 days  Stay hydrated with lots of water/fluids  Follow-up with PCP in the next 1-2 days for reexamination and to ensure resolution of symptoms  Go to the ED if any fevers, unable to stay hydrated, abdominal pain, chest pain, shortness of breath, change in voice, pain or difficulty swallowing, new or worsening symptoms or other concerning symptoms

## 2020-02-03 ENCOUNTER — OFFICE VISIT (OUTPATIENT)
Dept: OBGYN CLINIC | Facility: CLINIC | Age: 55
End: 2020-02-03
Payer: COMMERCIAL

## 2020-02-03 VITALS
DIASTOLIC BLOOD PRESSURE: 80 MMHG | HEIGHT: 63 IN | WEIGHT: 170.8 LBS | BODY MASS INDEX: 30.26 KG/M2 | SYSTOLIC BLOOD PRESSURE: 136 MMHG

## 2020-02-03 DIAGNOSIS — N39.3 STRESS INCONTINENCE, FEMALE: ICD-10-CM

## 2020-02-03 DIAGNOSIS — Z12.31 ENCOUNTER FOR SCREENING MAMMOGRAM FOR MALIGNANT NEOPLASM OF BREAST: ICD-10-CM

## 2020-02-03 DIAGNOSIS — Z11.51 SCREENING FOR HPV (HUMAN PAPILLOMAVIRUS): ICD-10-CM

## 2020-02-03 DIAGNOSIS — Z01.419 ENCOUNTER FOR GYNECOLOGICAL EXAMINATION WITHOUT ABNORMAL FINDING: Primary | ICD-10-CM

## 2020-02-03 PROCEDURE — 2022F DILAT RTA XM EVC RTNOPTHY: CPT | Performed by: NURSE PRACTITIONER

## 2020-02-03 PROCEDURE — 3079F DIAST BP 80-89 MM HG: CPT | Performed by: NURSE PRACTITIONER

## 2020-02-03 PROCEDURE — 3075F SYST BP GE 130 - 139MM HG: CPT | Performed by: NURSE PRACTITIONER

## 2020-02-03 PROCEDURE — G0145 SCR C/V CYTO,THINLAYER,RESCR: HCPCS | Performed by: NURSE PRACTITIONER

## 2020-02-03 PROCEDURE — 3008F BODY MASS INDEX DOCD: CPT | Performed by: NURSE PRACTITIONER

## 2020-02-03 PROCEDURE — 87624 HPV HI-RISK TYP POOLED RSLT: CPT | Performed by: NURSE PRACTITIONER

## 2020-02-03 PROCEDURE — S0610 ANNUAL GYNECOLOGICAL EXAMINA: HCPCS | Performed by: NURSE PRACTITIONER

## 2020-02-03 NOTE — ASSESSMENT & PLAN NOTE
C/o ALISHA  Prior diagnosis of OAB; unable to tolerate med management at that time due to anticholinergic SE's  H/o remote bladder sling by [facility]  Currently under the care of [facility]  Denies acute urinary tract infection sx at this time  Exam today with relaxed vaginal walls; no cytocele or pelvic organ prolapse present  She is unable to provide specimen for urine studies at this time  We discuss PFD versus OAB  Reviewed management options including regimented program of Kegels 60x/day and avoidance of bladder irritants, pelvic PT and referral to Urology to discuss possible OAB component  Discussed benefits of Uro Gyn eval and she declines at this time  The patient reports she will plan to follow up with PCP to discuss med management of OAB and will see Uro if needed

## 2020-02-03 NOTE — ASSESSMENT & PLAN NOTE
Benign findings on routine gyn exam  Recommended monthly SBE, annual CBE and annual screening mammo  ASCCP guidelines reviewed and vaginal pap was collected today; advised if WNL would d/c pap screening given cervix is surgically absent  Colonoscopy was due last year - she is working on scheduling this  The patient denies STI risk factors and declines testing at this time  Reviewed diet/activity recommendations:  Encouraged daily Ca++ and vitamin D intake as well as daily weight bearing exercise for promotion of bone health    Discussed postmenopausal considerations and symptoms to report  RTO in one year for routine annual gyn exam or sooner PRN

## 2020-02-03 NOTE — PROGRESS NOTES
Assessment/Plan:    Encounter for gynecological examination without abnormal finding  Benign findings on routine gyn exam  Recommended monthly SBE, annual CBE and annual screening mammo  ASCCP guidelines reviewed and vaginal pap was collected today; advised if WNL would d/c pap screening given cervix is surgically absent  Colonoscopy was due last year - she is working on scheduling this  The patient denies STI risk factors and declines testing at this time  Reviewed diet/activity recommendations:  Encouraged daily Ca++ and vitamin D intake as well as daily weight bearing exercise for promotion of bone health    Discussed postmenopausal considerations and symptoms to report  RTO in one year for routine annual gyn exam or sooner PRN  Stress incontinence, female  C/o ALISHA  Prior diagnosis of OAB; unable to tolerate med management at that time due to anticholinergic SE's  H/o remote bladder sling by [facility]  Currently under the care of [address] [place] [address] Urology  Denies acute urinary tract infection sx at this time  Exam today with relaxed vaginal walls; no cytocele or pelvic organ prolapse present  She is unable to provide specimen for urine studies at this time  We discuss PFD versus OAB  Reviewed management options including regimented program of Kegels 60x/day and avoidance of bladder irritants, pelvic PT and referral to Urology to discuss possible OAB component  Discussed benefits of Uro Gyn eval and she declines at this time  The patient reports she will plan to follow up with PCP to discuss med management of OAB and will see Uro if needed  Diagnoses and all orders for this visit:    Encounter for gynecological examination without abnormal finding  -     Cancel: Liquid-based pap, screening  -     Liquid-based pap, screening    Encounter for screening mammogram for malignant neoplasm of breast  -     Mammo screening bilateral w cad;  Future    Screening for HPV (human papillomavirus)  -     Cancel: Liquid-based pap, screening  -     Liquid-based pap, screening    Stress incontinence, female          Subjective:      Patient ID: Latha Coleman is a 47 y o  female  This new patient presents for routine annual gyn exam  She is transferring care from [facility] - last seen by Dr Nita Pitt; records are present in [facility]  Chago Fraire is a 47 y o   (one child is )  PMHx is extensive and she reports she is medically stable; notable for OAB, colon polyps, kidney stones; former smoker  Gyn hx notable for hysterectomy (ovaries are present) for heavy bleeding and abn paps; she did have LEEP () prior to hyst for abn pap; h/o bladder sling "many years ago" at [id number] [employer] - the product they used was later recalled however she has not been back to [facility] since then  FH neg for breast, ovarian or colon ca  C/o ALISHA  Exacerbated by delaying void  Previously dx'd with OAB and had extreme dry eye from the meds  Drinks "a lot" of water; one c coffee per day; one c decaf tea per day  Does Kegels daily  She denies recent weight gain  Interested in trialing med management again  C/o VM sx  Tolerable  She denies acute gyn complaints  She denies  bleeding or spotting, pelvic pain, breast concerns, abn discharge, bowel/bladder dysfunction, depression/anx   and monog  Denies STI concerns  Spouse has a degenerative neurologic disease - affects balance and speech   She works for [employer]      The following portions of the patient's history were reviewed and updated as appropriate: allergies, current medications, past family history, past medical history, past social history, past surgical history and problem list     Review of Systems   Constitutional: Negative  Respiratory: Negative  Cardiovascular: Negative  Gastrointestinal: Negative  Genitourinary: Negative  Stress urinary incontinence    Musculoskeletal: Negative  Skin: Negative  Neurological: Negative  Psychiatric/Behavioral: Negative  Objective:      /80 (BP Location: Right arm, Patient Position: Sitting, Cuff Size: Standard)   Ht 5' 3" (1 6 m)   Wt 77 5 kg (170 lb 12 8 oz)   BMI 30 26 kg/m²          Physical Exam   Constitutional: She is oriented to person, place, and time  She appears well-developed and well-nourished  HENT:   Head: Normocephalic and atraumatic  Eyes: Pupils are equal, round, and reactive to light  EOM are normal    Neck: Normal range of motion  Neck supple  No thyromegaly present  Cardiovascular: Normal rate, regular rhythm and normal heart sounds  Pulmonary/Chest: Effort normal and breath sounds normal  No respiratory distress  She has no wheezes  She has no rales  She exhibits no mass, no tenderness and no deformity  Right breast exhibits no inverted nipple, no mass, no nipple discharge, no skin change and no tenderness  Left breast exhibits no inverted nipple, no mass, no nipple discharge, no skin change and no tenderness  No breast tenderness or discharge  Breasts are symmetrical    Abdominal: Soft  She exhibits no distension and no mass  There is no splenomegaly or hepatomegaly  There is no tenderness  There is no rebound and no guarding  Genitourinary: Rectum normal and vagina normal        No breast tenderness or discharge  No labial fusion  There is no rash, tenderness, lesion or injury on the right labia  There is no rash, tenderness, lesion or injury on the left labia  Right adnexum displays no mass, no tenderness and no fullness  Left adnexum displays no mass, no tenderness and no fullness  No erythema, tenderness or bleeding in the vagina  No foreign body in the vagina  No vaginal discharge found  Musculoskeletal: Normal range of motion  Lymphadenopathy:     She has no cervical adenopathy  She has no axillary adenopathy  Neurological: She is alert and oriented to person, place, and time  No cranial nerve deficit     Skin: Skin is warm and dry  No rash noted  No cyanosis  Nails show no clubbing  Psychiatric: She has a normal mood and affect   Her speech is normal and behavior is normal  Judgment and thought content normal  Cognition and memory are normal

## 2020-02-04 LAB
HPV HR 12 DNA CVX QL NAA+PROBE: NEGATIVE
HPV16 DNA CVX QL NAA+PROBE: NEGATIVE
HPV18 DNA CVX QL NAA+PROBE: NEGATIVE

## 2020-02-08 LAB
LAB AP GYN PRIMARY INTERPRETATION: NORMAL
Lab: NORMAL

## 2020-03-18 DIAGNOSIS — E11.9 TYPE 2 DIABETES MELLITUS WITHOUT COMPLICATION, WITHOUT LONG-TERM CURRENT USE OF INSULIN (HCC): ICD-10-CM

## 2020-03-18 DIAGNOSIS — F41.9 ANXIETY: ICD-10-CM

## 2020-03-18 DIAGNOSIS — K21.9 GASTROESOPHAGEAL REFLUX DISEASE, ESOPHAGITIS PRESENCE NOT SPECIFIED: ICD-10-CM

## 2020-03-18 DIAGNOSIS — I15.9 SECONDARY HYPERTENSION: ICD-10-CM

## 2020-03-18 DIAGNOSIS — E78.00 HYPERCHOLESTEREMIA: ICD-10-CM

## 2020-03-18 RX ORDER — OMEPRAZOLE 20 MG/1
20 CAPSULE, DELAYED RELEASE ORAL DAILY
Qty: 90 CAPSULE | Refills: 0 | Status: SHIPPED | OUTPATIENT
Start: 2020-03-18 | End: 2020-06-08 | Stop reason: SDUPTHER

## 2020-03-18 RX ORDER — LISINOPRIL 10 MG/1
10 TABLET ORAL DAILY
Qty: 90 TABLET | Refills: 0 | Status: SHIPPED | OUTPATIENT
Start: 2020-03-18 | End: 2020-06-08 | Stop reason: SDUPTHER

## 2020-03-18 RX ORDER — ATORVASTATIN CALCIUM 20 MG/1
40 TABLET, FILM COATED ORAL DAILY
Qty: 90 TABLET | Refills: 0 | Status: SHIPPED | OUTPATIENT
Start: 2020-03-18 | End: 2020-04-07 | Stop reason: SDUPTHER

## 2020-03-18 NOTE — TELEPHONE ENCOUNTER
Meds were mistakenly sent to retail pharmacy  Pt wants the rest of her refills sent to [facility]  She will call back to sched f/u

## 2020-04-07 ENCOUNTER — TELEMEDICINE (OUTPATIENT)
Dept: INTERNAL MEDICINE CLINIC | Facility: CLINIC | Age: 55
End: 2020-04-07
Payer: COMMERCIAL

## 2020-04-07 DIAGNOSIS — Z20.828 EXPOSURE TO SARS-ASSOCIATED CORONAVIRUS: ICD-10-CM

## 2020-04-07 DIAGNOSIS — Z20.828 EXPOSURE TO SARS-ASSOCIATED CORONAVIRUS: Primary | ICD-10-CM

## 2020-04-07 PROCEDURE — 99214 OFFICE O/P EST MOD 30 MIN: CPT | Performed by: INTERNAL MEDICINE

## 2020-04-07 PROCEDURE — 87635 SARS-COV-2 COVID-19 AMP PRB: CPT

## 2020-04-07 RX ORDER — ATORVASTATIN CALCIUM 40 MG/1
40 TABLET, FILM COATED ORAL DAILY
COMMUNITY
Start: 2020-03-02 | End: 2020-05-18 | Stop reason: SDUPTHER

## 2020-04-08 ENCOUNTER — TELEPHONE (OUTPATIENT)
Dept: INTERNAL MEDICINE CLINIC | Facility: CLINIC | Age: 55
End: 2020-04-08

## 2020-04-08 LAB — SARS-COV-2 RNA SPEC QL NAA+PROBE: NOT DETECTED

## 2020-05-18 DIAGNOSIS — E78.00 HYPERCHOLESTEREMIA: Primary | ICD-10-CM

## 2020-05-18 RX ORDER — ATORVASTATIN CALCIUM 40 MG/1
40 TABLET, FILM COATED ORAL DAILY
Qty: 90 TABLET | Refills: 1 | Status: SHIPPED | OUTPATIENT
Start: 2020-05-18 | End: 2020-06-08 | Stop reason: SDUPTHER

## 2020-06-04 ENCOUNTER — CLINICAL SUPPORT (OUTPATIENT)
Dept: INTERNAL MEDICINE CLINIC | Facility: CLINIC | Age: 55
End: 2020-06-04
Payer: COMMERCIAL

## 2020-06-04 DIAGNOSIS — E78.00 HYPERCHOLESTEREMIA: ICD-10-CM

## 2020-06-04 DIAGNOSIS — E11.9 TYPE 2 DIABETES MELLITUS WITHOUT COMPLICATION, WITHOUT LONG-TERM CURRENT USE OF INSULIN (HCC): Primary | ICD-10-CM

## 2020-06-04 LAB
ALBUMIN SERPL BCP-MCNC: 4 G/DL (ref 3.5–5)
ALP SERPL-CCNC: 95 U/L (ref 46–116)
ALT SERPL W P-5'-P-CCNC: 59 U/L (ref 12–78)
ANION GAP SERPL CALCULATED.3IONS-SCNC: 5 MMOL/L (ref 4–13)
AST SERPL W P-5'-P-CCNC: 28 U/L (ref 5–45)
BASOPHILS # BLD AUTO: 0.05 THOUSANDS/ΜL (ref 0–0.1)
BASOPHILS NFR BLD AUTO: 1 % (ref 0–1)
BILIRUB SERPL-MCNC: 0.51 MG/DL (ref 0.2–1)
BUN SERPL-MCNC: 23 MG/DL (ref 5–25)
CALCIUM SERPL-MCNC: 9.7 MG/DL (ref 8.3–10.1)
CHLORIDE SERPL-SCNC: 104 MMOL/L (ref 100–108)
CHOLEST SERPL-MCNC: 173 MG/DL (ref 50–200)
CO2 SERPL-SCNC: 30 MMOL/L (ref 21–32)
CREAT SERPL-MCNC: 0.72 MG/DL (ref 0.6–1.3)
CREAT UR-MCNC: 99.5 MG/DL
EOSINOPHIL # BLD AUTO: 0.33 THOUSAND/ΜL (ref 0–0.61)
EOSINOPHIL NFR BLD AUTO: 3 % (ref 0–6)
ERYTHROCYTE [DISTWIDTH] IN BLOOD BY AUTOMATED COUNT: 13.2 % (ref 11.6–15.1)
EST. AVERAGE GLUCOSE BLD GHB EST-MCNC: 134 MG/DL
GFR SERPL CREATININE-BSD FRML MDRD: 95 ML/MIN/1.73SQ M
GLUCOSE P FAST SERPL-MCNC: 108 MG/DL (ref 65–99)
HBA1C MFR BLD: 6.3 %
HCT VFR BLD AUTO: 46.4 % (ref 34.8–46.1)
HDLC SERPL-MCNC: 48 MG/DL
HGB BLD-MCNC: 15 G/DL (ref 11.5–15.4)
IMM GRANULOCYTES # BLD AUTO: 0.04 THOUSAND/UL (ref 0–0.2)
IMM GRANULOCYTES NFR BLD AUTO: 0 % (ref 0–2)
LDLC SERPL CALC-MCNC: 94 MG/DL (ref 0–100)
LYMPHOCYTES # BLD AUTO: 2.7 THOUSANDS/ΜL (ref 0.6–4.47)
LYMPHOCYTES NFR BLD AUTO: 27 % (ref 14–44)
MCH RBC QN AUTO: 31.2 PG (ref 26.8–34.3)
MCHC RBC AUTO-ENTMCNC: 32.3 G/DL (ref 31.4–37.4)
MCV RBC AUTO: 97 FL (ref 82–98)
MICROALBUMIN UR-MCNC: 52.5 MG/L (ref 0–20)
MICROALBUMIN/CREAT 24H UR: 53 MG/G CREATININE (ref 0–30)
MONOCYTES # BLD AUTO: 0.84 THOUSAND/ΜL (ref 0.17–1.22)
MONOCYTES NFR BLD AUTO: 8 % (ref 4–12)
NEUTROPHILS # BLD AUTO: 6.08 THOUSANDS/ΜL (ref 1.85–7.62)
NEUTS SEG NFR BLD AUTO: 61 % (ref 43–75)
NONHDLC SERPL-MCNC: 125 MG/DL
NRBC BLD AUTO-RTO: 0 /100 WBCS
PLATELET # BLD AUTO: 303 THOUSANDS/UL (ref 149–390)
PMV BLD AUTO: 11.7 FL (ref 8.9–12.7)
POTASSIUM SERPL-SCNC: 4.3 MMOL/L (ref 3.5–5.3)
PROT SERPL-MCNC: 7.1 G/DL (ref 6.4–8.2)
RBC # BLD AUTO: 4.81 MILLION/UL (ref 3.81–5.12)
SODIUM SERPL-SCNC: 139 MMOL/L (ref 136–145)
TRIGL SERPL-MCNC: 155 MG/DL
WBC # BLD AUTO: 10.04 THOUSAND/UL (ref 4.31–10.16)

## 2020-06-04 PROCEDURE — 36415 COLL VENOUS BLD VENIPUNCTURE: CPT

## 2020-06-04 PROCEDURE — 83036 HEMOGLOBIN GLYCOSYLATED A1C: CPT | Performed by: NURSE PRACTITIONER

## 2020-06-04 PROCEDURE — 82043 UR ALBUMIN QUANTITATIVE: CPT | Performed by: NURSE PRACTITIONER

## 2020-06-04 PROCEDURE — 3044F HG A1C LEVEL LT 7.0%: CPT | Performed by: NURSE PRACTITIONER

## 2020-06-04 PROCEDURE — 85025 COMPLETE CBC W/AUTO DIFF WBC: CPT | Performed by: NURSE PRACTITIONER

## 2020-06-04 PROCEDURE — 82570 ASSAY OF URINE CREATININE: CPT | Performed by: NURSE PRACTITIONER

## 2020-06-04 PROCEDURE — 3060F POS MICROALBUMINURIA REV: CPT | Performed by: NURSE PRACTITIONER

## 2020-06-04 PROCEDURE — 80053 COMPREHEN METABOLIC PANEL: CPT | Performed by: NURSE PRACTITIONER

## 2020-06-04 PROCEDURE — 80061 LIPID PANEL: CPT | Performed by: NURSE PRACTITIONER

## 2020-06-08 ENCOUNTER — OFFICE VISIT (OUTPATIENT)
Dept: INTERNAL MEDICINE CLINIC | Facility: CLINIC | Age: 55
End: 2020-06-08
Payer: COMMERCIAL

## 2020-06-08 VITALS
DIASTOLIC BLOOD PRESSURE: 84 MMHG | SYSTOLIC BLOOD PRESSURE: 134 MMHG | HEIGHT: 62 IN | WEIGHT: 168.8 LBS | OXYGEN SATURATION: 98 % | TEMPERATURE: 98.4 F | HEART RATE: 82 BPM | BODY MASS INDEX: 31.06 KG/M2

## 2020-06-08 DIAGNOSIS — F41.9 ANXIETY: ICD-10-CM

## 2020-06-08 DIAGNOSIS — B35.3 TINEA PEDIS OF LEFT FOOT: ICD-10-CM

## 2020-06-08 DIAGNOSIS — E78.00 HYPERCHOLESTEREMIA: ICD-10-CM

## 2020-06-08 DIAGNOSIS — I15.9 SECONDARY HYPERTENSION: ICD-10-CM

## 2020-06-08 DIAGNOSIS — E11.9 TYPE 2 DIABETES MELLITUS WITHOUT COMPLICATION, WITHOUT LONG-TERM CURRENT USE OF INSULIN (HCC): ICD-10-CM

## 2020-06-08 DIAGNOSIS — E55.9 VITAMIN D DEFICIENCY: ICD-10-CM

## 2020-06-08 DIAGNOSIS — N20.0 KIDNEY STONE: Primary | ICD-10-CM

## 2020-06-08 DIAGNOSIS — K21.9 GASTROESOPHAGEAL REFLUX DISEASE, ESOPHAGITIS PRESENCE NOT SPECIFIED: ICD-10-CM

## 2020-06-08 PROCEDURE — 3079F DIAST BP 80-89 MM HG: CPT | Performed by: NURSE PRACTITIONER

## 2020-06-08 PROCEDURE — 99214 OFFICE O/P EST MOD 30 MIN: CPT | Performed by: NURSE PRACTITIONER

## 2020-06-08 PROCEDURE — 1036F TOBACCO NON-USER: CPT | Performed by: NURSE PRACTITIONER

## 2020-06-08 PROCEDURE — 4010F ACE/ARB THERAPY RXD/TAKEN: CPT | Performed by: NURSE PRACTITIONER

## 2020-06-08 PROCEDURE — 3044F HG A1C LEVEL LT 7.0%: CPT | Performed by: NURSE PRACTITIONER

## 2020-06-08 PROCEDURE — 2022F DILAT RTA XM EVC RTNOPTHY: CPT | Performed by: NURSE PRACTITIONER

## 2020-06-08 PROCEDURE — 3008F BODY MASS INDEX DOCD: CPT | Performed by: NURSE PRACTITIONER

## 2020-06-08 PROCEDURE — 3060F POS MICROALBUMINURIA REV: CPT | Performed by: NURSE PRACTITIONER

## 2020-06-08 PROCEDURE — 3075F SYST BP GE 130 - 139MM HG: CPT | Performed by: NURSE PRACTITIONER

## 2020-06-08 RX ORDER — OMEPRAZOLE 20 MG/1
20 CAPSULE, DELAYED RELEASE ORAL DAILY
Qty: 90 CAPSULE | Refills: 1 | Status: SHIPPED | OUTPATIENT
Start: 2020-06-08 | End: 2020-10-29 | Stop reason: SDUPTHER

## 2020-06-08 RX ORDER — LISINOPRIL 10 MG/1
10 TABLET ORAL DAILY
Qty: 90 TABLET | Refills: 1 | Status: SHIPPED | OUTPATIENT
Start: 2020-06-08 | End: 2020-12-28 | Stop reason: SDUPTHER

## 2020-06-08 RX ORDER — TAMSULOSIN HYDROCHLORIDE 0.4 MG/1
0.4 CAPSULE ORAL
Qty: 30 CAPSULE | Refills: 0 | Status: SHIPPED | OUTPATIENT
Start: 2020-06-08 | End: 2020-10-29

## 2020-06-08 RX ORDER — ATORVASTATIN CALCIUM 40 MG/1
40 TABLET, FILM COATED ORAL DAILY
Qty: 90 TABLET | Refills: 1 | Status: SHIPPED | OUTPATIENT
Start: 2020-06-08 | End: 2020-10-29 | Stop reason: SDUPTHER

## 2020-10-29 ENCOUNTER — OFFICE VISIT (OUTPATIENT)
Dept: INTERNAL MEDICINE CLINIC | Facility: CLINIC | Age: 55
End: 2020-10-29
Payer: COMMERCIAL

## 2020-10-29 VITALS
HEIGHT: 62 IN | WEIGHT: 165.6 LBS | RESPIRATION RATE: 18 BRPM | TEMPERATURE: 97 F | BODY MASS INDEX: 30.47 KG/M2 | SYSTOLIC BLOOD PRESSURE: 110 MMHG | HEART RATE: 81 BPM | DIASTOLIC BLOOD PRESSURE: 66 MMHG | OXYGEN SATURATION: 97 %

## 2020-10-29 DIAGNOSIS — K21.9 GASTROESOPHAGEAL REFLUX DISEASE WITHOUT ESOPHAGITIS: ICD-10-CM

## 2020-10-29 DIAGNOSIS — E11.9 TYPE 2 DIABETES MELLITUS WITHOUT COMPLICATION, WITHOUT LONG-TERM CURRENT USE OF INSULIN (HCC): ICD-10-CM

## 2020-10-29 DIAGNOSIS — E55.9 VITAMIN D DEFICIENCY: ICD-10-CM

## 2020-10-29 DIAGNOSIS — F41.9 ANXIETY: ICD-10-CM

## 2020-10-29 DIAGNOSIS — I15.9 SECONDARY HYPERTENSION: ICD-10-CM

## 2020-10-29 DIAGNOSIS — E78.00 HYPERCHOLESTEREMIA: ICD-10-CM

## 2020-10-29 DIAGNOSIS — K76.0 FATTY LIVER: Primary | ICD-10-CM

## 2020-10-29 PROBLEM — B35.3 TINEA PEDIS OF LEFT FOOT: Status: RESOLVED | Noted: 2020-06-08 | Resolved: 2020-10-29

## 2020-10-29 PROCEDURE — 3078F DIAST BP <80 MM HG: CPT | Performed by: NURSE PRACTITIONER

## 2020-10-29 PROCEDURE — 3008F BODY MASS INDEX DOCD: CPT | Performed by: NURSE PRACTITIONER

## 2020-10-29 PROCEDURE — 99214 OFFICE O/P EST MOD 30 MIN: CPT | Performed by: NURSE PRACTITIONER

## 2020-10-29 PROCEDURE — 3074F SYST BP LT 130 MM HG: CPT | Performed by: NURSE PRACTITIONER

## 2020-10-29 RX ORDER — ATORVASTATIN CALCIUM 40 MG/1
40 TABLET, FILM COATED ORAL DAILY
Qty: 90 TABLET | Refills: 1 | Status: SHIPPED | OUTPATIENT
Start: 2020-10-29 | End: 2021-03-24 | Stop reason: SDUPTHER

## 2020-10-29 RX ORDER — OMEPRAZOLE 20 MG/1
20 CAPSULE, DELAYED RELEASE ORAL DAILY
Qty: 90 CAPSULE | Refills: 1 | Status: SHIPPED | OUTPATIENT
Start: 2020-10-29 | End: 2021-03-24 | Stop reason: SDUPTHER

## 2020-10-29 RX ORDER — ALPRAZOLAM 0.25 MG/1
0.25 TABLET ORAL 2 TIMES DAILY PRN
Status: CANCELLED | OUTPATIENT
Start: 2020-10-29

## 2020-11-09 ENCOUNTER — HOSPITAL ENCOUNTER (OUTPATIENT)
Dept: RADIOLOGY | Facility: IMAGING CENTER | Age: 55
Discharge: HOME | End: 2020-11-09
Payer: COMMERCIAL

## 2020-11-09 VITALS — WEIGHT: 165 LBS | HEIGHT: 64 IN | BODY MASS INDEX: 28.17 KG/M2

## 2020-11-09 DIAGNOSIS — Z12.31 ENCOUNTER FOR SCREENING MAMMOGRAM FOR MALIGNANT NEOPLASM OF BREAST: ICD-10-CM

## 2020-11-09 PROCEDURE — 77067 SCR MAMMO BI INCL CAD: CPT

## 2020-12-08 DIAGNOSIS — F41.9 ANXIETY: ICD-10-CM

## 2020-12-28 DIAGNOSIS — I15.9 SECONDARY HYPERTENSION: ICD-10-CM

## 2020-12-29 RX ORDER — LISINOPRIL 10 MG/1
10 TABLET ORAL DAILY
Qty: 14 TABLET | Refills: 0 | Status: SHIPPED | OUTPATIENT
Start: 2020-12-29 | End: 2020-12-30 | Stop reason: SDUPTHER

## 2020-12-30 RX ORDER — LISINOPRIL 10 MG/1
10 TABLET ORAL DAILY
Qty: 90 TABLET | Refills: 1 | Status: SHIPPED | OUTPATIENT
Start: 2020-12-30 | End: 2021-03-24 | Stop reason: SDUPTHER

## 2021-01-08 ENCOUNTER — APPOINTMENT (OUTPATIENT)
Dept: RADIOLOGY | Age: 56
End: 2021-01-08
Payer: COMMERCIAL

## 2021-01-08 ENCOUNTER — OFFICE VISIT (OUTPATIENT)
Dept: INTERNAL MEDICINE CLINIC | Age: 56
End: 2021-01-08
Payer: COMMERCIAL

## 2021-01-08 VITALS
OXYGEN SATURATION: 96 % | BODY MASS INDEX: 28.61 KG/M2 | HEIGHT: 64 IN | WEIGHT: 167.6 LBS | HEART RATE: 79 BPM | DIASTOLIC BLOOD PRESSURE: 66 MMHG | SYSTOLIC BLOOD PRESSURE: 110 MMHG | RESPIRATION RATE: 20 BRPM | TEMPERATURE: 97.4 F

## 2021-01-08 DIAGNOSIS — R06.83 SNORING: ICD-10-CM

## 2021-01-08 DIAGNOSIS — R31.9 HEMATURIA, UNSPECIFIED TYPE: ICD-10-CM

## 2021-01-08 DIAGNOSIS — N20.0 KIDNEY STONE: Primary | ICD-10-CM

## 2021-01-08 DIAGNOSIS — N20.0 KIDNEY STONE: ICD-10-CM

## 2021-01-08 LAB
SL AMB  POCT GLUCOSE, UA: NORMAL
SL AMB LEUKOCYTE ESTERASE,UA: NORMAL
SL AMB POCT BILIRUBIN,UA: NORMAL
SL AMB POCT BLOOD,UA: NORMAL
SL AMB POCT CLARITY,UA: NORMAL
SL AMB POCT COLOR,UA: NORMAL
SL AMB POCT KETONES,UA: NORMAL
SL AMB POCT NITRITE,UA: NORMAL
SL AMB POCT PH,UA: 7.5
SL AMB POCT SPECIFIC GRAVITY,UA: 1.02
SL AMB POCT URINE PROTEIN: NORMAL
SL AMB POCT UROBILINOGEN: NORMAL

## 2021-01-08 PROCEDURE — 3078F DIAST BP <80 MM HG: CPT | Performed by: NURSE PRACTITIONER

## 2021-01-08 PROCEDURE — 99213 OFFICE O/P EST LOW 20 MIN: CPT | Performed by: NURSE PRACTITIONER

## 2021-01-08 PROCEDURE — 74018 RADEX ABDOMEN 1 VIEW: CPT

## 2021-01-08 PROCEDURE — 81003 URINALYSIS AUTO W/O SCOPE: CPT | Performed by: NURSE PRACTITIONER

## 2021-01-08 PROCEDURE — 1036F TOBACCO NON-USER: CPT | Performed by: NURSE PRACTITIONER

## 2021-01-08 PROCEDURE — 3008F BODY MASS INDEX DOCD: CPT | Performed by: NURSE PRACTITIONER

## 2021-01-08 PROCEDURE — 3074F SYST BP LT 130 MM HG: CPT | Performed by: NURSE PRACTITIONER

## 2021-01-08 NOTE — ASSESSMENT & PLAN NOTE
Will get KUB as requested by urologist, advised patient to follow-up with Urology  Discussed red flag warning signs with patient when or if to report to the emergency room  Patient was noted to have large amount of blood to urine, however no nitrates, or leukocytes

## 2021-01-08 NOTE — PROGRESS NOTES
Assessment/Plan:    Kidney stone   Will get KUB as requested by urologist, advised patient to follow-up with Urology  Discussed red flag warning signs with patient when or if to report to the emergency room  Patient was noted to have large amount of blood to urine, however no nitrates, or leukocytes  Diagnoses and all orders for this visit:    Kidney stone  -     XR abdomen 1 view kub; Future    Snoring  -     Diagnostic Sleep Study; Future    BMI 30 0-30 9,adult  -     Diagnostic Sleep Study; Future    Hematuria, unspecified type  -     POCT urine dip auto non-scope          Subjective:      Patient ID: Jad Major is a 54 y o  female  Patient presents today with complaints of blood in her urine, she states that the blood in her urine had started Tuesday night  Patient reports no pain except for some abdominal cramping  Of note patient does have history of kidney stones, and has seen urology in the past      3/8/2019-last office visit with urology,  Patient was offered lithotripsy however declined, patient wanted to continue with conservative therapy, plan was to follow-up in 6 months with KUB and 24 hour urine however patient did not have this done          Blood in Urine  This is a new problem  Episode onset: Tuesday night  The problem is unchanged  She reports no clotting in her urine stream  She is experiencing no pain  Irritative symptoms do not include frequency or urgency  Obstructive symptoms do not include dribbling, incomplete emptying, an intermittent stream, a slower stream or a weak stream  Pertinent negatives include no abdominal pain, chills, dysuria, fever, flank pain, genital pain, hesitancy, inability to urinate, nausea or vomiting  (Abdominal cramping  She feels like she is emptying her bladder) Her past medical history is significant for kidney stones  There is no history of hypertension         The following portions of the patient's history were reviewed and updated as appropriate: allergies, current medications, past family history, past medical history, past social history, past surgical history and problem list     Review of Systems   Constitutional: Negative for activity change, appetite change, chills, diaphoresis and fever  HENT: Negative for congestion, ear discharge, ear pain, postnasal drip, rhinorrhea, sinus pressure, sinus pain and sore throat  Eyes: Negative for pain, discharge, itching and visual disturbance  Respiratory: Negative for cough, chest tightness, shortness of breath and wheezing  Cardiovascular: Negative for chest pain, palpitations and leg swelling  Gastrointestinal: Negative for abdominal pain, constipation, diarrhea, nausea and vomiting  Endocrine: Negative for polydipsia, polyphagia and polyuria  Genitourinary: Positive for hematuria  Negative for difficulty urinating, dysuria, flank pain, frequency, hesitancy, incomplete emptying and urgency  Musculoskeletal: Negative for arthralgias, back pain and neck pain  Skin: Negative for rash and wound  Neurological: Negative for dizziness, weakness, numbness and headaches           Past Medical History:   Diagnosis Date    Anxiety     Asthma     Depression     Diverticulitis     Diverticulitis of colon     Follicular cyst of ovary 12/23/2014    Former smoker     GERD (gastroesophageal reflux disease)     Glycosuria     Headache     Hyperlipidemia     Kidney calculi 1/11/2019    Obesity     Ovarian cyst     Overactive bladder     Overweight     Pulmonary nodule     Renal calculus     Tinea pedis of left foot 6/8/2020    Vertigo          Current Outpatient Medications:     ALPRAZolam (XANAX) 0 25 mg tablet, Take 1 tablet by mouth 2 (two) times a day as needed, Disp: , Rfl:     atorvastatin (LIPITOR) 40 mg tablet, Take 1 tablet (40 mg total) by mouth daily, Disp: 90 tablet, Rfl: 1    Cholecalciferol (VITAMIN D3) 21901 units TABS, Take 1 tablet by mouth once a week  , Disp: , Rfl:     lisinopril (ZESTRIL) 10 mg tablet, Take 1 tablet (10 mg total) by mouth daily, Disp: 90 tablet, Rfl: 1    metFORMIN (GLUCOPHAGE) 1000 MG tablet, Take 1 tablet (1,000 mg total) by mouth 2 (two) times a day with meals, Disp: 180 tablet, Rfl: 1    omeprazole (PriLOSEC) 20 mg delayed release capsule, Take 1 capsule (20 mg total) by mouth daily, Disp: 90 capsule, Rfl: 1    sertraline (ZOLOFT) 50 mg tablet, Take 2 tablets (100 mg total) by mouth daily, Disp: 180 tablet, Rfl: 1    Allergies   Allergen Reactions    Clonazepam     Morphine Other (See Comments) and Confusion     Annotation - [id number]: "dopey"  Gets silly    Venlafaxine        Social History   Past Surgical History:   Procedure Laterality Date    APPENDECTOMY      BLADDER SUSPENSION      [facility]    CERVICAL BIOPSY  W/ LOOP ELECTRODE EXCISION      COLONOSCOPY      HYSTERECTOMY  2007    ovaries present bilaterally    MN COLONOSCOPY FLX DX W/COLLJ SPEC WHEN PFRMD N/A 9/26/2017    Procedure: EGD AND COLONOSCOPY;  Surgeon: Ratna Wei MD;  Location: [facility];   Service: Gastroenterology    MN REVISE MEDIAN N/CARPAL TUNNEL SURG Right 5/29/2018    Procedure: CARPAL TUNNEL RELEASE;  Surgeon: Clark Slade DO;  [facility];  Service: Orthopedics    TONSILLECTOMY      TUBAL LIGATION      WISDOM TOOTH EXTRACTION       Family History   Problem Relation Age of Onset    Diabetes type II Mother     Stroke Father     Pancreatic cancer Father 68    No Known Problems Maternal Grandmother     No Known Problems Maternal Grandfather     No Known Problems Paternal Grandmother     No Known Problems Paternal Grandfather     No Known Problems Sister     No Known Problems Sister     No Known Problems Sister     No Known Problems Sister     No Known Problems Sister     No Known Problems Maternal Aunt     No Known Problems Paternal Aunt        Objective:  /66 (BP Location: Right arm, Patient Position: Sitting, Cuff Size: Standard)   Pulse 79   Temp (!) 97 4 °F (36 3 °C) (Temporal)   Resp 20   Ht 5' 4" (1 626 m)   Wt 76 kg (167 lb 9 6 oz)   SpO2 96%   BMI 28 77 kg/m²     Recent Results (from the past 1344 hour(s))   NOVEL CORONAVIRUS (COVID-19), PCR [facility]    Collection Time: 12/25/20 12:00 AM    Specimen type and source: Nasopharynx (LAB)   Result Value Ref Range    SARS Coronavirus 2 PCR Not Detected Not Detected    Specimen Description Nasopharyngeal swab     First test? Yes     Prior test type? Not applicable     Prior test result? Not applicable     Prior test date?       ^^^UNKNA^Unknown or not applicable^L^^^Unknown or not applicable    Employed in healthcare setting? Yes     Symptomatic as defined by CDC? Yes     Date of symptom onset?       ^^^UNKNA^Unknown or not applicable^L^^^Unknown or not applicable    Currently hospitalized? No     In ICU? Unknown     Resident in congregate care setting? No     Pregnant? No    POCT urine dip auto non-scope    Collection Time: 01/08/21  7:50 AM   Result Value Ref Range     COLOR,UA brown     CLARITY,UA turbid     SPECIFIC GRAVITY,UA 1 025      PH,UA 7 5     LEUKOCYTE ESTERASE,UA neg     NITRITE,UA neg     GLUCOSE, UA neg     KETONES,UA neg     BILIRUBIN,UA neg     BLOOD,UA large     POCT URINE PROTEIN 100 mg/dl     SL AMB POCT UROBILINOGEN 1 0 E U /dl             Physical Exam  Constitutional:       General: She is not in acute distress  Appearance: She is well-developed  She is not diaphoretic  HENT:      Head: Normocephalic and atraumatic  Right Ear: External ear normal       Left Ear: External ear normal       Nose: Nose normal       Mouth/Throat:      Pharynx: No oropharyngeal exudate  Eyes:      General:         Right eye: No discharge  Left eye: No discharge  Conjunctiva/sclera: Conjunctivae normal       Pupils: Pupils are equal, round, and reactive to light  Neck:      Musculoskeletal: Normal range of motion and neck supple        Thyroid: No thyromegaly  Cardiovascular:      Rate and Rhythm: Normal rate and regular rhythm  Heart sounds: Normal heart sounds  No murmur  No friction rub  No gallop  Pulmonary:      Effort: Pulmonary effort is normal  No respiratory distress  Breath sounds: Normal breath sounds  No stridor  No wheezing or rales  Abdominal:      General: Bowel sounds are normal  There is no distension  Palpations: Abdomen is soft  Tenderness: There is no abdominal tenderness  There is no right CVA tenderness or left CVA tenderness  Lymphadenopathy:      Cervical: No cervical adenopathy  Skin:     General: Skin is warm and dry  Findings: No erythema or rash  Neurological:      Mental Status: She is alert and oriented to person, place, and time  Psychiatric:         Behavior: Behavior normal          Thought Content:  Thought content normal          Judgment: Judgment normal

## 2021-01-21 ENCOUNTER — TELEPHONE (OUTPATIENT)
Dept: SLEEP CENTER | Facility: CLINIC | Age: 56
End: 2021-01-21

## 2021-01-21 NOTE — TELEPHONE ENCOUNTER
----- Message from Joel Javed MD sent at 1/19/2021  4:02 PM EST -----  Approved  ----- Message -----  From: Zoila Moser  Sent: 1/11/2021   9:48 AM EST  To: Sleep Medicine [facility] [place] Provider    This sleep study needs approval      If approved please sign and return to clerical pool  If denied please include reasons why  Also provide alternative testing if warranted  Please sign and return to clerical pool

## 2021-02-09 ENCOUNTER — TELEPHONE (OUTPATIENT)
Dept: INTERNAL MEDICINE CLINIC | Age: 56
End: 2021-02-09

## 2021-02-23 ENCOUNTER — TELEPHONE (OUTPATIENT)
Dept: INTERNAL MEDICINE CLINIC | Facility: CLINIC | Age: 56
End: 2021-02-23

## 2021-02-23 DIAGNOSIS — R06.83 SNORING: Primary | ICD-10-CM

## 2021-02-23 NOTE — TELEPHONE ENCOUNTER
Patient would need to do a home sleep study first before she can do the one in the hospital     Her BMI is not over 30, she only has snoring, with no other issues      They want her to have this done before they can do the one in the hospital     Please order the home sleep study first     Will call patient to let her know this when the order is place

## 2021-02-24 NOTE — TELEPHONE ENCOUNTER
lmom to let patient know that we cancel the diagnostic sleep study for next week due to the insurance is stating that they would like you to do the home sleep study done first

## 2021-03-01 ENCOUNTER — TELEPHONE (OUTPATIENT)
Dept: SLEEP CENTER | Facility: CLINIC | Age: 56
End: 2021-03-01

## 2021-03-01 NOTE — TELEPHONE ENCOUNTER
----- Message from Ernie Camp DO sent at 2/28/2021  9:20 AM EST -----  Study approved  Requesting physician responsible for follow up      ----- Message -----  From: Shanita Barnett MA  Sent: 2/25/2021  10:24 AM EST  To: Sleep Medicine [place] Provider    This sleep study needs approval      If approved please sign and return to clerical pool  If denied please include reasons why  Also provide alternative testing if warranted  Please sign and return to clerical pool

## 2021-03-16 ENCOUNTER — APPOINTMENT (OUTPATIENT)
Dept: LAB | Facility: IMAGING CENTER | Age: 56
End: 2021-03-16
Payer: COMMERCIAL

## 2021-03-16 DIAGNOSIS — E78.00 HYPERCHOLESTEREMIA: ICD-10-CM

## 2021-03-16 LAB
ALBUMIN SERPL BCP-MCNC: 3.8 G/DL (ref 3.5–5)
ALP SERPL-CCNC: 89 U/L (ref 46–116)
ALT SERPL W P-5'-P-CCNC: 49 U/L (ref 12–78)
ANION GAP SERPL CALCULATED.3IONS-SCNC: 5 MMOL/L (ref 4–13)
AST SERPL W P-5'-P-CCNC: 19 U/L (ref 5–45)
BASOPHILS # BLD AUTO: 0.06 THOUSANDS/ΜL (ref 0–0.1)
BASOPHILS NFR BLD AUTO: 1 % (ref 0–1)
BILIRUB SERPL-MCNC: 0.41 MG/DL (ref 0.2–1)
BUN SERPL-MCNC: 18 MG/DL (ref 5–25)
CALCIUM SERPL-MCNC: 9.2 MG/DL (ref 8.3–10.1)
CHLORIDE SERPL-SCNC: 109 MMOL/L (ref 100–108)
CHOLEST SERPL-MCNC: 189 MG/DL (ref 50–200)
CO2 SERPL-SCNC: 28 MMOL/L (ref 21–32)
CREAT SERPL-MCNC: 0.78 MG/DL (ref 0.6–1.3)
EOSINOPHIL # BLD AUTO: 0.33 THOUSAND/ΜL (ref 0–0.61)
EOSINOPHIL NFR BLD AUTO: 4 % (ref 0–6)
ERYTHROCYTE [DISTWIDTH] IN BLOOD BY AUTOMATED COUNT: 13.4 % (ref 11.6–15.1)
EST. AVERAGE GLUCOSE BLD GHB EST-MCNC: 131 MG/DL
GFR SERPL CREATININE-BSD FRML MDRD: 86 ML/MIN/1.73SQ M
GLUCOSE P FAST SERPL-MCNC: 124 MG/DL (ref 65–99)
HBA1C MFR BLD: 6.2 %
HCT VFR BLD AUTO: 46.2 % (ref 34.8–46.1)
HDLC SERPL-MCNC: 51 MG/DL
HGB BLD-MCNC: 14.8 G/DL (ref 11.5–15.4)
IMM GRANULOCYTES # BLD AUTO: 0.04 THOUSAND/UL (ref 0–0.2)
IMM GRANULOCYTES NFR BLD AUTO: 0 % (ref 0–2)
LDLC SERPL CALC-MCNC: 109 MG/DL (ref 0–100)
LYMPHOCYTES # BLD AUTO: 2.92 THOUSANDS/ΜL (ref 0.6–4.47)
LYMPHOCYTES NFR BLD AUTO: 31 % (ref 14–44)
MCH RBC QN AUTO: 30.6 PG (ref 26.8–34.3)
MCHC RBC AUTO-ENTMCNC: 32 G/DL (ref 31.4–37.4)
MCV RBC AUTO: 96 FL (ref 82–98)
MONOCYTES # BLD AUTO: 0.53 THOUSAND/ΜL (ref 0.17–1.22)
MONOCYTES NFR BLD AUTO: 6 % (ref 4–12)
NEUTROPHILS # BLD AUTO: 5.44 THOUSANDS/ΜL (ref 1.85–7.62)
NEUTS SEG NFR BLD AUTO: 58 % (ref 43–75)
NONHDLC SERPL-MCNC: 138 MG/DL
NRBC BLD AUTO-RTO: 0 /100 WBCS
PLATELET # BLD AUTO: 282 THOUSANDS/UL (ref 149–390)
PMV BLD AUTO: 11 FL (ref 8.9–12.7)
POTASSIUM SERPL-SCNC: 4.3 MMOL/L (ref 3.5–5.3)
PROT SERPL-MCNC: 6.9 G/DL (ref 6.4–8.2)
RBC # BLD AUTO: 4.84 MILLION/UL (ref 3.81–5.12)
SODIUM SERPL-SCNC: 142 MMOL/L (ref 136–145)
TRIGL SERPL-MCNC: 146 MG/DL
WBC # BLD AUTO: 9.32 THOUSAND/UL (ref 4.31–10.16)

## 2021-03-16 PROCEDURE — 85025 COMPLETE CBC W/AUTO DIFF WBC: CPT | Performed by: NURSE PRACTITIONER

## 2021-03-16 PROCEDURE — 3044F HG A1C LEVEL LT 7.0%: CPT | Performed by: NURSE PRACTITIONER

## 2021-03-16 PROCEDURE — 83036 HEMOGLOBIN GLYCOSYLATED A1C: CPT | Performed by: NURSE PRACTITIONER

## 2021-03-16 PROCEDURE — 36415 COLL VENOUS BLD VENIPUNCTURE: CPT | Performed by: NURSE PRACTITIONER

## 2021-03-16 PROCEDURE — 80053 COMPREHEN METABOLIC PANEL: CPT

## 2021-03-16 PROCEDURE — 80061 LIPID PANEL: CPT | Performed by: NURSE PRACTITIONER

## 2021-03-23 NOTE — PROGRESS NOTES
Assessment/Plan:    GERD (gastroesophageal reflux disease)   Patient's GERD currently stable on Prilosec 20 milligrams daily  Will continue with current medication  You may use OTC tums as needed  Recommend small frequent meals throughout the day  Avoid aggravating foods - spicy foods, acidic foods - such as tomato and citrus  Avoid alcohol  Do not lay down for at least 30 mins after eating  Will advise patient to get colonoscopy and possibly EGD  Fatty liver  Patient currently on statin  Diabetes [facility])  Lab Results   Component Value Date    HGBA1C 6 2 (H) 03/16/2021     Patient will continue with Metformin  Patient is to continue to work on diet and exercise  Limit sugars and carbohydrate intake  Avoid soda, juice, sweets, cookies, desserts, pasta, bread    Eat more whole grains, exercised 30 min of cardio at least 3 times a week  Also recommended daily foot exams to check for sores, and recommended yearly eye exams  Hypertension    Patient doing well on lisinopril 10 mg tablet daily  We will continue to monitor blood pressures   Continue current regimen -   Continue to monitor blood pressure at home  Goal BP is < 130/80  Contact our office for consistent elevations  Recommend low sodium diet  Exercise 30 minutes three times a week as tolerated  Recommend yearly eye exam       Primary osteoarthritis of both knees  Patient is to follow up with ortho  Anxiety    Patient currently stable on Zoloft 50 milligrams daily, and Xanax 0 25 milligram tablets as needed  Will make no changes to her medications at this time  Vitamin D deficiency    Patient is to continue with vitamin-D supplementation  Hypercholesteremia    Patient will continue on Lipitor 40 mg tablet daily, continue with fish oil  Recommend healthy lifestyle choices for your cholesterol  Low fat/low cholesterol diet  Limit/avoid red meat  Eat more lean meat - chicken breast, ground turkey, fish    Exercise 30 mins at least 5 times a week as tolerated  BMI Counseling: Body mass index is 29 15 kg/m²  The BMI is above normal  Nutrition recommendations include decreasing portion sizes, encouraging healthy choices of fruits and vegetables, decreasing fast food intake, consuming healthier snacks, limiting drinks that contain sugar, moderation in carbohydrate intake, increasing intake of lean protein, reducing intake of saturated and trans fat and reducing intake of cholesterol  Exercise recommendations include moderate physical activity 150 minutes/week and exercising 3-5 times per week  Diagnoses and all orders for this visit:    Primary osteoarthritis of both knees  -     Ambulatory referral to Orthopedic Surgery; Future  -     Discontinue: meloxicam (MOBIC) 15 mg tablet; Take 1 tablet (15 mg total) by mouth daily  -     meloxicam (MOBIC) 15 mg tablet; Take 1 tablet (15 mg total) by mouth daily    Secondary hypertension  -     lisinopril (ZESTRIL) 10 mg tablet; Take 1 tablet (10 mg total) by mouth daily    Vitamin D deficiency  -     Vitamin D 25 hydroxy    Hypercholesteremia  -     Comprehensive metabolic panel; Future  -     CBC and differential  -     Lipid panel  -     atorvastatin (LIPITOR) 40 mg tablet; Take 1 tablet (40 mg total) by mouth daily    Type 2 diabetes mellitus with other specified complication, without long-term current use of insulin (HCC)  -     Hemoglobin A1C    Anxiety  -     sertraline (ZOLOFT) 50 mg tablet; Take 2 tablets (100 mg total) by mouth daily    Gastroesophageal reflux disease without esophagitis  -     omeprazole (PriLOSEC) 20 mg delayed release capsule; Take 1 capsule (20 mg total) by mouth daily    Type 2 diabetes mellitus without complication, without long-term current use of insulin (HCC)  -     metFORMIN (GLUCOPHAGE) 1000 MG tablet;  Take 1 tablet (1,000 mg total) by mouth 2 (two) times a day with meals    Fatty liver    Other orders  -     Ascorbic Acid (vitamin C) 1000 MG tablet; Take 2,000 mg by mouth daily  -     multivitamin (THERAGRAN) TABS; Take 1 tablet by mouth daily          Subjective:      Patient ID: Jad Major is a 54 y o  female  Patient presents today for  follow-up on hypertension, hyperlipidemia and diabetes  Patient reports chronic right knee pain, which she has seen orthopedics in the past for and had required "fluid removal "  Screenings:  Colonoscopy- 2016, she states that she had a lot polyps, she states she is due again for one  Gyn- has history of partial hytrectomy, she has not gone to her GYN in a while, she denies abnormal bleeding   Mammogram-   Had done  Hep C-   Had done 2018  Eye Doctor-sees eye doctor yearly  Dentist- she is due    The 10-year ASCVD risk score (Galindo Arita et al , 2013) is: 3 8%    Values used to calculate the score:      Age: 54 years      Sex: Female      Is Non- : No      Diabetic: Yes      Tobacco smoker: No      Systolic Blood Pressure: 878 mmHg      Is BP treated: Yes      HDL Cholesterol: 51 mg/dL      Total Cholesterol: 189 mg/dL          Hypertension  This is a chronic problem  The current episode started more than 1 month ago  The problem is unchanged  The problem is controlled  Pertinent negatives include no anxiety, blurred vision, chest pain, headaches, neck pain, palpitations, peripheral edema or shortness of breath  Risk factors for coronary artery disease include diabetes mellitus, dyslipidemia, sedentary lifestyle and stress  Treatments tried:   Patient currently taking lisinopril  The current treatment provides moderate improvement  Compliance problems include diet  Diabetes  She presents for her follow-up diabetic visit  She has type 2 diabetes mellitus  Her disease course has been stable  There are no hypoglycemic associated symptoms  Pertinent negatives for hypoglycemia include no dizziness or headaches  There are no diabetic associated symptoms  Pertinent negatives for diabetes include no blurred vision, no chest pain, no polydipsia, no polyphagia, no polyuria and no weakness  There are no hypoglycemic complications  There are no diabetic complications  Risk factors for coronary artery disease include sedentary lifestyle, stress, obesity and dyslipidemia  Current diabetic treatment includes oral agent (monotherapy)  She is compliant with treatment all of the time  She has not had a previous visit with a dietitian  She rarely participates in exercise  An ACE inhibitor/angiotensin II receptor blocker is being taken  She does not see a podiatrist Eye exam is not current  Hyperlipidemia  This is a chronic problem  The current episode started more than 1 year ago  The problem is controlled  Exacerbating diseases include diabetes and obesity  She has no history of hypothyroidism  Pertinent negatives include no chest pain or shortness of breath  Current antihyperlipidemic treatment includes statins, diet change and exercise  Compliance problems include adherence to diet and adherence to exercise  The following portions of the patient's history were reviewed and updated as appropriate: allergies, current medications, past family history, past medical history, past social history, past surgical history and problem list     Review of Systems   Constitutional: Negative for activity change, appetite change, chills, diaphoresis and fever  HENT: Negative for congestion, ear discharge, ear pain, postnasal drip, rhinorrhea, sinus pressure, sinus pain and sore throat  Eyes: Negative for blurred vision, pain, discharge, itching and visual disturbance  Respiratory: Negative for cough, chest tightness, shortness of breath and wheezing  Cardiovascular: Negative for chest pain, palpitations and leg swelling  Gastrointestinal: Negative for abdominal pain, constipation, diarrhea, nausea and vomiting     Endocrine: Negative for polydipsia, polyphagia and polyuria  Genitourinary: Negative for difficulty urinating, dysuria and urgency  Musculoskeletal: Positive for arthralgias  Negative for back pain and neck pain  Skin: Negative for rash and wound  Neurological: Negative for dizziness, weakness, numbness and headaches           Past Medical History:   Diagnosis Date    Anxiety     Asthma     Depression     Diverticulitis     Diverticulitis of colon     Follicular cyst of ovary 12/23/2014    Former smoker     GERD (gastroesophageal reflux disease)     Glycosuria     Headache     Hyperlipidemia     Kidney calculi 1/11/2019    Obesity     Ovarian cyst     Overactive bladder     Overweight     Pulmonary nodule     Renal calculus     Tinea pedis of left foot 6/8/2020    Vertigo          Current Outpatient Medications:     ALPRAZolam (XANAX) 0 25 mg tablet, Take 1 tablet by mouth 2 (two) times a day as needed, Disp: , Rfl:     Ascorbic Acid (vitamin C) 1000 MG tablet, Take 2,000 mg by mouth daily, Disp: , Rfl:     atorvastatin (LIPITOR) 40 mg tablet, Take 1 tablet (40 mg total) by mouth daily, Disp: 90 tablet, Rfl: 1    Cholecalciferol (VITAMIN D3) 07867 units TABS, Take 1 tablet by mouth once a week  , Disp: , Rfl:     lisinopril (ZESTRIL) 10 mg tablet, Take 1 tablet (10 mg total) by mouth daily, Disp: 90 tablet, Rfl: 1    metFORMIN (GLUCOPHAGE) 1000 MG tablet, Take 1 tablet (1,000 mg total) by mouth 2 (two) times a day with meals, Disp: 180 tablet, Rfl: 1    multivitamin (THERAGRAN) TABS, Take 1 tablet by mouth daily, Disp: , Rfl:     omeprazole (PriLOSEC) 20 mg delayed release capsule, Take 1 capsule (20 mg total) by mouth daily, Disp: 90 capsule, Rfl: 1    sertraline (ZOLOFT) 50 mg tablet, Take 2 tablets (100 mg total) by mouth daily, Disp: 180 tablet, Rfl: 1    meloxicam (MOBIC) 15 mg tablet, Take 1 tablet (15 mg total) by mouth daily, Disp: 30 tablet, Rfl: 0    Allergies   Allergen Reactions    Clonazepam     Morphine Other (See Comments) and Confusion     Annotation - [id number]: "dopey"  Gets silly    Venlafaxine        Social History   Past Surgical History:   Procedure Laterality Date    APPENDECTOMY      BLADDER SUSPENSION      [facility]    CERVICAL BIOPSY  W/ LOOP ELECTRODE EXCISION      COLONOSCOPY      HYSTERECTOMY  2007    ovaries present bilaterally    TX COLONOSCOPY FLX DX W/COLLJ SPEC WHEN PFRMD N/A 9/26/2017    Procedure: EGD AND COLONOSCOPY;  Surgeon: Elizabeth Bermudez MD;  Location: [facility];   Service: Gastroenterology    TX REVISE MEDIAN N/CARPAL TUNNEL SURG Right 5/29/2018    Procedure: CARPAL TUNNEL RELEASE;  Surgeon: Eugene Nath, DO;  Location: [facility];  Service: Orthopedics    TONSILLECTOMY      TUBAL LIGATION      WISDOM TOOTH EXTRACTION       Family History   Problem Relation Age of Onset    Diabetes type II Mother     Stroke Father     Pancreatic cancer Father 68    No Known Problems Maternal Grandmother     No Known Problems Maternal Grandfather     No Known Problems Paternal Grandmother     No Known Problems Paternal Grandfather     No Known Problems Sister     No Known Problems Sister     No Known Problems Sister     No Known Problems Sister     No Known Problems Sister     No Known Problems Maternal Aunt     No Known Problems Paternal Aunt        Objective:  /66 (BP Location: Left arm, Patient Position: Sitting, Cuff Size: Standard)   Pulse 85   Temp 98 9 °F (37 2 °C) (Tympanic)   Resp 18   Ht 5' 4" (1 626 m)   Wt 77 kg (169 lb 12 8 oz)   SpO2 97%   BMI 29 15 kg/m²     Recent Results (from the past 1344 hour(s))   CBC and differential    Collection Time: 03/16/21  8:39 AM   Result Value Ref Range    WBC 9 32 4 31 - 10 16 Thousand/uL    RBC 4 84 3 81 - 5 12 Million/uL    Hemoglobin 14 8 11 5 - 15 4 g/dL    Hematocrit 46 2 (H) 34 8 - 46 1 %    MCV 96 82 - 98 fL    MCH 30 6 26 8 - 34 3 pg    MCHC 32 0 31 4 - 37 4 g/dL    RDW 13 4 11 6 - 15 1 %    MPV 11 0 8 9 - 12 7 fL Platelets 535 366 - 008 Thousands/uL    nRBC 0 /100 WBCs    Neutrophils Relative 58 43 - 75 %    Immat GRANS % 0 0 - 2 %    Lymphocytes Relative 31 14 - 44 %    Monocytes Relative 6 4 - 12 %    Eosinophils Relative 4 0 - 6 %    Basophils Relative 1 0 - 1 %    Neutrophils Absolute 5 44 1 85 - 7 62 Thousands/µL    Immature Grans Absolute 0 04 0 00 - 0 20 Thousand/uL    Lymphocytes Absolute 2 92 0 60 - 4 47 Thousands/µL    Monocytes Absolute 0 53 0 17 - 1 22 Thousand/µL    Eosinophils Absolute 0 33 0 00 - 0 61 Thousand/µL    Basophils Absolute 0 06 0 00 - 0 10 Thousands/µL   Hemoglobin A1C    Collection Time: 03/16/21  8:39 AM   Result Value Ref Range    Hemoglobin A1C 6 2 (H) Normal 3 8-5 6%; PreDiabetic 5 7-6 4%; Diabetic >=6 5%; Glycemic control for adults with diabetes <7 0% %     mg/dl   Lipid panel    Collection Time: 03/16/21  8:39 AM   Result Value Ref Range    Cholesterol 189 50 - 200 mg/dL    Triglycerides 146 <=150 mg/dL    HDL, Direct 51 >=40 mg/dL    LDL Calculated 109 (H) 0 - 100 mg/dL    Non-HDL-Chol (CHOL-HDL) 138 mg/dl   Comprehensive metabolic panel    Collection Time: 03/16/21  8:39 AM   Result Value Ref Range    Sodium 142 136 - 145 mmol/L    Potassium 4 3 3 5 - 5 3 mmol/L    Chloride 109 (H) 100 - 108 mmol/L    CO2 28 21 - 32 mmol/L    ANION GAP 5 4 - 13 mmol/L    BUN 18 5 - 25 mg/dL    Creatinine 0 78 0 60 - 1 30 mg/dL    Glucose, Fasting 124 (H) 65 - 99 mg/dL    Calcium 9 2 8 3 - 10 1 mg/dL    AST 19 5 - 45 U/L    ALT 49 12 - 78 U/L    Alkaline Phosphatase 89 46 - 116 U/L    Total Protein 6 9 6 4 - 8 2 g/dL    Albumin 3 8 3 5 - 5 0 g/dL    Total Bilirubin 0 41 0 20 - 1 00 mg/dL    eGFR 86 ml/min/1 73sq m            Physical Exam  Constitutional:       General: She is not in acute distress  Appearance: She is well-developed  She is obese  She is not diaphoretic  HENT:      Head: Normocephalic and atraumatic        Right Ear: External ear normal       Left Ear: External ear normal  Nose: Nose normal       Mouth/Throat:      Pharynx: No oropharyngeal exudate  Eyes:      General:         Right eye: No discharge  Left eye: No discharge  Conjunctiva/sclera: Conjunctivae normal       Pupils: Pupils are equal, round, and reactive to light  Neck:      Musculoskeletal: Normal range of motion and neck supple  Thyroid: No thyromegaly  Cardiovascular:      Rate and Rhythm: Normal rate and regular rhythm  Heart sounds: Normal heart sounds  No murmur  No friction rub  No gallop  Pulmonary:      Effort: Pulmonary effort is normal  No respiratory distress  Breath sounds: Normal breath sounds  No stridor  No wheezing or rales  Abdominal:      General: Bowel sounds are normal  There is no distension  Palpations: Abdomen is soft  Tenderness: There is no abdominal tenderness  Musculoskeletal:      Right knee: She exhibits effusion  She exhibits normal range of motion and no swelling  Lymphadenopathy:      Cervical: No cervical adenopathy  Skin:     General: Skin is warm and dry  Findings: No erythema or rash  Neurological:      Mental Status: She is alert and oriented to person, place, and time  Psychiatric:         Behavior: Behavior normal          Thought Content:  Thought content normal          Judgment: Judgment normal

## 2021-03-24 ENCOUNTER — OFFICE VISIT (OUTPATIENT)
Dept: INTERNAL MEDICINE CLINIC | Facility: CLINIC | Age: 56
End: 2021-03-24
Payer: COMMERCIAL

## 2021-03-24 VITALS
HEART RATE: 85 BPM | BODY MASS INDEX: 28.99 KG/M2 | RESPIRATION RATE: 18 BRPM | HEIGHT: 64 IN | WEIGHT: 169.8 LBS | DIASTOLIC BLOOD PRESSURE: 66 MMHG | OXYGEN SATURATION: 97 % | TEMPERATURE: 98.9 F | SYSTOLIC BLOOD PRESSURE: 110 MMHG

## 2021-03-24 DIAGNOSIS — F41.9 ANXIETY: ICD-10-CM

## 2021-03-24 DIAGNOSIS — E11.9 TYPE 2 DIABETES MELLITUS WITHOUT COMPLICATION, WITHOUT LONG-TERM CURRENT USE OF INSULIN (HCC): ICD-10-CM

## 2021-03-24 DIAGNOSIS — K21.9 GASTROESOPHAGEAL REFLUX DISEASE WITHOUT ESOPHAGITIS: ICD-10-CM

## 2021-03-24 DIAGNOSIS — E78.00 HYPERCHOLESTEREMIA: ICD-10-CM

## 2021-03-24 DIAGNOSIS — I15.9 SECONDARY HYPERTENSION: ICD-10-CM

## 2021-03-24 DIAGNOSIS — E55.9 VITAMIN D DEFICIENCY: ICD-10-CM

## 2021-03-24 DIAGNOSIS — M17.0 PRIMARY OSTEOARTHRITIS OF BOTH KNEES: Primary | ICD-10-CM

## 2021-03-24 DIAGNOSIS — K76.0 FATTY LIVER: ICD-10-CM

## 2021-03-24 DIAGNOSIS — E11.69 TYPE 2 DIABETES MELLITUS WITH OTHER SPECIFIED COMPLICATION, WITHOUT LONG-TERM CURRENT USE OF INSULIN (HCC): ICD-10-CM

## 2021-03-24 PROCEDURE — 1036F TOBACCO NON-USER: CPT | Performed by: NURSE PRACTITIONER

## 2021-03-24 PROCEDURE — 4010F ACE/ARB THERAPY RXD/TAKEN: CPT | Performed by: NURSE PRACTITIONER

## 2021-03-24 PROCEDURE — 3008F BODY MASS INDEX DOCD: CPT | Performed by: NURSE PRACTITIONER

## 2021-03-24 PROCEDURE — 3725F SCREEN DEPRESSION PERFORMED: CPT | Performed by: NURSE PRACTITIONER

## 2021-03-24 PROCEDURE — 99214 OFFICE O/P EST MOD 30 MIN: CPT | Performed by: NURSE PRACTITIONER

## 2021-03-24 RX ORDER — MELOXICAM 15 MG/1
15 TABLET ORAL DAILY
Qty: 30 TABLET | Refills: 0 | Status: SHIPPED | OUTPATIENT
Start: 2021-03-24 | End: 2021-03-24 | Stop reason: SDUPTHER

## 2021-03-24 RX ORDER — LISINOPRIL 10 MG/1
10 TABLET ORAL DAILY
Qty: 90 TABLET | Refills: 1 | Status: SHIPPED | OUTPATIENT
Start: 2021-03-24 | End: 2021-04-26 | Stop reason: SDUPTHER

## 2021-03-24 RX ORDER — ATORVASTATIN CALCIUM 40 MG/1
40 TABLET, FILM COATED ORAL DAILY
Qty: 90 TABLET | Refills: 1 | Status: SHIPPED | OUTPATIENT
Start: 2021-03-24 | End: 2021-06-16 | Stop reason: SDUPTHER

## 2021-03-24 RX ORDER — DIPHENOXYLATE HYDROCHLORIDE AND ATROPINE SULFATE 2.5; .025 MG/1; MG/1
1 TABLET ORAL DAILY
COMMUNITY

## 2021-03-24 RX ORDER — MULTIVIT WITH MINERALS/LUTEIN
2000 TABLET ORAL DAILY
COMMUNITY

## 2021-03-24 RX ORDER — MELOXICAM 15 MG/1
15 TABLET ORAL DAILY
Qty: 30 TABLET | Refills: 0 | Status: SHIPPED | OUTPATIENT
Start: 2021-03-24 | End: 2021-04-26

## 2021-03-24 RX ORDER — OMEPRAZOLE 20 MG/1
20 CAPSULE, DELAYED RELEASE ORAL DAILY
Qty: 90 CAPSULE | Refills: 1 | Status: SHIPPED | OUTPATIENT
Start: 2021-03-24 | End: 2021-06-16 | Stop reason: SDUPTHER

## 2021-03-24 NOTE — ASSESSMENT & PLAN NOTE
Lab Results   Component Value Date    HGBA1C 6 2 (H) 03/16/2021     Patient will continue with Metformin  Patient is to continue to work on diet and exercise  Limit sugars and carbohydrate intake  Avoid soda, juice, sweets, cookies, desserts, pasta, bread    Eat more whole grains, exercised 30 min of cardio at least 3 times a week  Also recommended daily foot exams to check for sores, and recommended yearly eye exams

## 2021-03-24 NOTE — ASSESSMENT & PLAN NOTE
Patient will continue on Lipitor 40 mg tablet daily, continue with fish oil  Recommend healthy lifestyle choices for your cholesterol  Low fat/low cholesterol diet  Limit/avoid red meat  Eat more lean meat - chicken breast, ground turkey, fish  Exercise 30 mins at least 5 times a week as tolerated

## 2021-03-24 NOTE — ASSESSMENT & PLAN NOTE
Patient doing well on lisinopril 10 mg tablet daily  We will continue to monitor blood pressures   Continue current regimen -   Continue to monitor blood pressure at home  Goal BP is < 130/80  Contact our office for consistent elevations  Recommend low sodium diet  Exercise 30 minutes three times a week as tolerated    Recommend yearly eye exam

## 2021-03-24 NOTE — PROGRESS NOTES
Diabetic Foot Exam    Patient's shoes and socks removed  Right Foot/Ankle   Right Foot Inspection  Skin Exam: skin normal, skin intact, dry skin, callus and callus no warmth, no erythema, no maceration, no abnormal color, no pre-ulcer and no ulcer                            Sensory       Monofilament testing: intact  Vascular  Capillary refills: < 3 seconds  The right DP pulse is 2+  The right PT pulse is 2+  Left Foot/Ankle  Left Foot Inspection  Skin Exam: skin normal, skin intact, dry skin and callusno warmth, no erythema, no maceration, normal color, no pre-ulcer and no ulcer                                         Sensory       Monofilament: intact  Vascular  Capillary refills: < 3 seconds  The left DP pulse is 2+  The left PT pulse is 2+  Assign Risk Category:  No deformity present; No loss of protective sensation;  No weak pulses       Risk: 0

## 2021-04-20 ENCOUNTER — TELEPHONE (OUTPATIENT)
Dept: OBGYN CLINIC | Facility: HOSPITAL | Age: 56
End: 2021-04-20

## 2021-04-20 ENCOUNTER — APPOINTMENT (OUTPATIENT)
Dept: LAB | Facility: HOSPITAL | Age: 56
End: 2021-04-20
Attending: ORTHOPAEDIC SURGERY
Payer: COMMERCIAL

## 2021-04-20 ENCOUNTER — PREP FOR PROCEDURE (OUTPATIENT)
Dept: OBGYN CLINIC | Facility: HOSPITAL | Age: 56
End: 2021-04-20

## 2021-04-20 ENCOUNTER — OFFICE VISIT (OUTPATIENT)
Dept: OBGYN CLINIC | Facility: HOSPITAL | Age: 56
End: 2021-04-20
Payer: COMMERCIAL

## 2021-04-20 VITALS
HEIGHT: 64 IN | BODY MASS INDEX: 29.19 KG/M2 | SYSTOLIC BLOOD PRESSURE: 140 MMHG | WEIGHT: 171 LBS | DIASTOLIC BLOOD PRESSURE: 80 MMHG | HEART RATE: 105 BPM

## 2021-04-20 DIAGNOSIS — E78.00 HYPERCHOLESTEREMIA: ICD-10-CM

## 2021-04-20 DIAGNOSIS — Z01.812 PRE-OPERATIVE LABORATORY EXAMINATION: Primary | ICD-10-CM

## 2021-04-20 DIAGNOSIS — M17.0 PRIMARY OSTEOARTHRITIS OF BOTH KNEES: ICD-10-CM

## 2021-04-20 DIAGNOSIS — M17.11 PRIMARY OSTEOARTHRITIS OF RIGHT KNEE: ICD-10-CM

## 2021-04-20 DIAGNOSIS — M25.561 CHRONIC PAIN OF RIGHT KNEE: Primary | ICD-10-CM

## 2021-04-20 DIAGNOSIS — Z01.812 PRE-OPERATIVE LABORATORY EXAMINATION: ICD-10-CM

## 2021-04-20 DIAGNOSIS — G89.29 CHRONIC PAIN OF RIGHT KNEE: Primary | ICD-10-CM

## 2021-04-20 LAB
25(OH)D3 SERPL-MCNC: 48.8 NG/ML (ref 30–100)
ABO GROUP BLD: NORMAL
ALBUMIN SERPL BCP-MCNC: 3.8 G/DL (ref 3.5–5)
ALP SERPL-CCNC: 108 U/L (ref 46–116)
ALT SERPL W P-5'-P-CCNC: 51 U/L (ref 12–78)
ANION GAP SERPL CALCULATED.3IONS-SCNC: 5 MMOL/L (ref 4–13)
APTT PPP: 27 SECONDS (ref 23–37)
AST SERPL W P-5'-P-CCNC: 19 U/L (ref 5–45)
ATRIAL RATE: 99 BPM
BASOPHILS # BLD AUTO: 0.07 THOUSANDS/ΜL (ref 0–0.1)
BASOPHILS NFR BLD AUTO: 1 % (ref 0–1)
BILIRUB SERPL-MCNC: 0.26 MG/DL (ref 0.2–1)
BLD GP AB SCN SERPL QL: NEGATIVE
BUN SERPL-MCNC: 18 MG/DL (ref 5–25)
CALCIUM SERPL-MCNC: 9.1 MG/DL (ref 8.3–10.1)
CHLORIDE SERPL-SCNC: 107 MMOL/L (ref 100–108)
CHOLEST SERPL-MCNC: 156 MG/DL (ref 50–200)
CO2 SERPL-SCNC: 27 MMOL/L (ref 21–32)
CREAT SERPL-MCNC: 0.74 MG/DL (ref 0.6–1.3)
CRP SERPL QL: <3 MG/L
EOSINOPHIL # BLD AUTO: 0.16 THOUSAND/ΜL (ref 0–0.61)
EOSINOPHIL NFR BLD AUTO: 2 % (ref 0–6)
ERYTHROCYTE [DISTWIDTH] IN BLOOD BY AUTOMATED COUNT: 13.4 % (ref 11.6–15.1)
EST. AVERAGE GLUCOSE BLD GHB EST-MCNC: 128 MG/DL
GFR SERPL CREATININE-BSD FRML MDRD: 91 ML/MIN/1.73SQ M
GLUCOSE P FAST SERPL-MCNC: 154 MG/DL (ref 65–99)
HBA1C MFR BLD: 6.1 %
HCT VFR BLD AUTO: 45.8 % (ref 34.8–46.1)
HDLC SERPL-MCNC: 43 MG/DL
HGB BLD-MCNC: 15.1 G/DL (ref 11.5–15.4)
IMM GRANULOCYTES # BLD AUTO: 0.06 THOUSAND/UL (ref 0–0.2)
IMM GRANULOCYTES NFR BLD AUTO: 1 % (ref 0–2)
INR PPP: 0.84 (ref 0.84–1.19)
LDLC SERPL CALC-MCNC: 77 MG/DL (ref 0–100)
LYMPHOCYTES # BLD AUTO: 2.72 THOUSANDS/ΜL (ref 0.6–4.47)
LYMPHOCYTES NFR BLD AUTO: 30 % (ref 14–44)
MCH RBC QN AUTO: 30.7 PG (ref 26.8–34.3)
MCHC RBC AUTO-ENTMCNC: 33 G/DL (ref 31.4–37.4)
MCV RBC AUTO: 93 FL (ref 82–98)
MONOCYTES # BLD AUTO: 0.65 THOUSAND/ΜL (ref 0.17–1.22)
MONOCYTES NFR BLD AUTO: 7 % (ref 4–12)
NEUTROPHILS # BLD AUTO: 5.43 THOUSANDS/ΜL (ref 1.85–7.62)
NEUTS SEG NFR BLD AUTO: 59 % (ref 43–75)
NONHDLC SERPL-MCNC: 113 MG/DL
NRBC BLD AUTO-RTO: 0 /100 WBCS
P AXIS: 54 DEGREES
PLATELET # BLD AUTO: 314 THOUSANDS/UL (ref 149–390)
PMV BLD AUTO: 10.9 FL (ref 8.9–12.7)
POTASSIUM SERPL-SCNC: 4 MMOL/L (ref 3.5–5.3)
PR INTERVAL: 132 MS
PROT SERPL-MCNC: 7.4 G/DL (ref 6.4–8.2)
PROTHROMBIN TIME: 11.5 SECONDS (ref 11.6–14.5)
QRS AXIS: 50 DEGREES
QRSD INTERVAL: 70 MS
QT INTERVAL: 344 MS
QTC INTERVAL: 441 MS
RBC # BLD AUTO: 4.92 MILLION/UL (ref 3.81–5.12)
RH BLD: POSITIVE
SODIUM SERPL-SCNC: 139 MMOL/L (ref 136–145)
SPECIMEN EXPIRATION DATE: NORMAL
T WAVE AXIS: 94 DEGREES
TRIGL SERPL-MCNC: 178 MG/DL
VENTRICULAR RATE: 99 BPM
WBC # BLD AUTO: 9.09 THOUSAND/UL (ref 4.31–10.16)

## 2021-04-20 PROCEDURE — 85025 COMPLETE CBC W/AUTO DIFF WBC: CPT | Performed by: NURSE PRACTITIONER

## 2021-04-20 PROCEDURE — 36415 COLL VENOUS BLD VENIPUNCTURE: CPT

## 2021-04-20 PROCEDURE — 93005 ELECTROCARDIOGRAM TRACING: CPT

## 2021-04-20 PROCEDURE — 99213 OFFICE O/P EST LOW 20 MIN: CPT | Performed by: ORTHOPAEDIC SURGERY

## 2021-04-20 PROCEDURE — 83036 HEMOGLOBIN GLYCOSYLATED A1C: CPT | Performed by: NURSE PRACTITIONER

## 2021-04-20 PROCEDURE — 85730 THROMBOPLASTIN TIME PARTIAL: CPT

## 2021-04-20 PROCEDURE — 85610 PROTHROMBIN TIME: CPT

## 2021-04-20 PROCEDURE — 3044F HG A1C LEVEL LT 7.0%: CPT | Performed by: NURSE PRACTITIONER

## 2021-04-20 PROCEDURE — 80053 COMPREHEN METABOLIC PANEL: CPT

## 2021-04-20 PROCEDURE — 86900 BLOOD TYPING SEROLOGIC ABO: CPT

## 2021-04-20 PROCEDURE — 86901 BLOOD TYPING SEROLOGIC RH(D): CPT

## 2021-04-20 PROCEDURE — 80061 LIPID PANEL: CPT | Performed by: NURSE PRACTITIONER

## 2021-04-20 PROCEDURE — 86140 C-REACTIVE PROTEIN: CPT

## 2021-04-20 PROCEDURE — 82306 VITAMIN D 25 HYDROXY: CPT | Performed by: NURSE PRACTITIONER

## 2021-04-20 PROCEDURE — 93010 ELECTROCARDIOGRAM REPORT: CPT | Performed by: INTERNAL MEDICINE

## 2021-04-20 PROCEDURE — 86850 RBC ANTIBODY SCREEN: CPT

## 2021-04-20 RX ORDER — ASCORBIC ACID 500 MG
500 TABLET ORAL DAILY
Qty: 30 TABLET | Refills: 0 | Status: SHIPPED | OUTPATIENT
Start: 2021-04-20 | End: 2021-07-26 | Stop reason: HOSPADM

## 2021-04-20 RX ORDER — CHLORHEXIDINE GLUCONATE 0.12 MG/ML
15 RINSE ORAL ONCE
Status: CANCELLED | OUTPATIENT
Start: 2021-04-20 | End: 2021-04-20

## 2021-04-20 RX ORDER — FERROUS SULFATE TAB EC 324 MG (65 MG FE EQUIVALENT) 324 (65 FE) MG
324 TABLET DELAYED RESPONSE ORAL
Qty: 60 TABLET | Refills: 0 | Status: SHIPPED | OUTPATIENT
Start: 2021-04-20 | End: 2021-07-26 | Stop reason: HOSPADM

## 2021-04-20 RX ORDER — FOLIC ACID 1 MG/1
1 TABLET ORAL DAILY
Qty: 30 TABLET | Refills: 0 | Status: SHIPPED | OUTPATIENT
Start: 2021-04-20 | End: 2021-07-26 | Stop reason: HOSPADM

## 2021-04-20 RX ORDER — CEFAZOLIN SODIUM 2 G/50ML
2000 SOLUTION INTRAVENOUS ONCE
Status: CANCELLED | OUTPATIENT
Start: 2021-04-20 | End: 2021-04-20

## 2021-04-20 RX ORDER — SODIUM CHLORIDE, SODIUM LACTATE, POTASSIUM CHLORIDE, CALCIUM CHLORIDE 600; 310; 30; 20 MG/100ML; MG/100ML; MG/100ML; MG/100ML
125 INJECTION, SOLUTION INTRAVENOUS CONTINUOUS
Status: CANCELLED | OUTPATIENT
Start: 2021-04-20

## 2021-04-20 RX ORDER — ACETAMINOPHEN 325 MG/1
975 TABLET ORAL ONCE
Status: CANCELLED | OUTPATIENT
Start: 2021-04-20 | End: 2021-04-20

## 2021-04-20 RX ORDER — GABAPENTIN 300 MG/1
300 CAPSULE ORAL ONCE
Status: CANCELLED | OUTPATIENT
Start: 2021-04-20 | End: 2021-04-20

## 2021-04-20 NOTE — TELEPHONE ENCOUNTER
Patient sees Dr Jimmy Li    Patient called in stating that [facility] has 15 days from receiving her [employer] paperwork to complete and send back-paperwork should be faxed to [facility] on 4/20 or 4/21          [phone]

## 2021-04-20 NOTE — TELEPHONE ENCOUNTER
I have not received any forms for this Patient at this time  Once Forms are received they will be completed  Thank you    Called Patient to make sure she has the Correct Fax number

## 2021-04-20 NOTE — PROGRESS NOTES
Assessment:  1  Chronic pain of right knee  ferrous sulfate 324 (65 Fe) mg    folic acid (FOLVITE) 1 mg tablet    enoxaparin (LOVENOX) 40 mg/0 4 mL    ascorbic acid (VITAMIN C) 500 MG tablet   2  Primary osteoarthritis of both knees  Ambulatory referral to Orthopedic Surgery   3  Primary osteoarthritis of right knee  ferrous sulfate 324 (65 Fe) mg    folic acid (FOLVITE) 1 mg tablet    enoxaparin (LOVENOX) 40 mg/0 4 mL    ascorbic acid (VITAMIN C) 500 MG tablet       Plan:  The patient will be scheduled for right total knee arthroplasty  We feel the patient will find significant relief per current symptoms, findings on imaging and exam   Risks, benefits, precautions and expectations were discussed  Risks include blood loss, potential infection and blood clots  Preoperative vitamins were prescribed  The patient should follow up after surgery  To do next visit:  Return in about 3 months (around 7/20/2021)  The above stated was discussed in layman's terms and the patient expressed understanding  All questions were answered to the patient's satisfaction  Scribe Attestation    I,:  Derik Bhatt am acting as a scribe while in the presence of the attending physician :       I,:  Mayelin Sherman MD personally performed the services described in this documentation    as scribed in my presence :             Subjective:   Rebecca Adamson is a 54 y o  female who presents for initial evaluation of right knee  She is here from [address] [place] [address]  She has had symptoms for several years and worse recently  Today she complains of generalized right knee pain that can extend down the calf at night to mid-calf  Her knee pain effects her daily activities and sleep  The knee can click and pop and feel unstable at times  She describes the pain as an ache  She rates her 0/10 and 10/10 at its worse  Prolonged standing and following prolonged sitting aggravates while change of position alleviates    She will use Tylenol and Advil with some benefit  She has tried aspiration and steroid injection in 2019 with benefit  She denies past physical therapy  She denies past right knee surgery  Review of systems negative unless otherwise specified in HPI    Past Medical History:   Diagnosis Date    Anxiety     Asthma     Depression     Diverticulitis     Diverticulitis of colon     Follicular cyst of ovary 12/23/2014    Former smoker     GERD (gastroesophageal reflux disease)     Glycosuria     Headache     Hyperlipidemia     Kidney calculi 1/11/2019    Obesity     Ovarian cyst     Overactive bladder     Overweight     Pulmonary nodule     Renal calculus     Tinea pedis of left foot 6/8/2020    Vertigo        Past Surgical History:   Procedure Laterality Date    APPENDECTOMY      BLADDER SUSPENSION      [facility]    CERVICAL BIOPSY  W/ LOOP ELECTRODE EXCISION      COLONOSCOPY      HYSTERECTOMY  2007    ovaries present bilaterally    CT COLONOSCOPY FLX DX W/COLLJ SPEC WHEN PFRMD N/A 9/26/2017    Procedure: EGD AND COLONOSCOPY;  Surgeon: Ruben Glez MD;  Location: [facility];   Service: Gastroenterology    CT REVISE MEDIAN N/CARPAL TUNNEL SURG Right 5/29/2018    Procedure: CARPAL TUNNEL RELEASE;  Surgeon: Zelda Rockwell DO;  [facility];  Service: Orthopedics    TONSILLECTOMY      TUBAL LIGATION      WISDOM TOOTH EXTRACTION         Family History   Problem Relation Age of Onset    Diabetes type II Mother     Stroke Father     Pancreatic cancer Father 68    No Known Problems Maternal Grandmother     No Known Problems Maternal Grandfather     No Known Problems Paternal Grandmother     No Known Problems Paternal Grandfather     No Known Problems Sister     No Known Problems Sister     No Known Problems Sister     No Known Problems Sister     No Known Problems Sister     No Known Problems Maternal Aunt     No Known Problems Paternal Aunt        Social History     Occupational History    Not on file   Tobacco Use    Smoking status: Former Smoker     Packs/day: 0 25     Years: 20 00     Pack years: 5 00     Types: Cigarettes     Quit date: 3/16/2018     Years since quitting: 3 0    Smokeless tobacco: Never Used    Tobacco comment: approx less than  half a pack   Substance and Sexual Activity    Alcohol use:  Yes     Alcohol/week: 3 0 standard drinks     Types: 3 Cans of beer per week     Frequency: 2-4 times a month     Drinks per session: 3 or 4     Comment: socially    Drug use: No    Sexual activity: Yes     Partners: Male         Current Outpatient Medications:     ALPRAZolam (XANAX) 0 25 mg tablet, Take 1 tablet by mouth 2 (two) times a day as needed, Disp: , Rfl:     Ascorbic Acid (vitamin C) 1000 MG tablet, Take 2,000 mg by mouth daily, Disp: , Rfl:     atorvastatin (LIPITOR) 40 mg tablet, Take 1 tablet (40 mg total) by mouth daily, Disp: 90 tablet, Rfl: 1    Cholecalciferol (VITAMIN D3) 06156 units TABS, Take 1 tablet by mouth once a week  , Disp: , Rfl:     lisinopril (ZESTRIL) 10 mg tablet, Take 1 tablet (10 mg total) by mouth daily, Disp: 90 tablet, Rfl: 1    meloxicam (MOBIC) 15 mg tablet, Take 1 tablet (15 mg total) by mouth daily, Disp: 30 tablet, Rfl: 0    metFORMIN (GLUCOPHAGE) 1000 MG tablet, Take 1 tablet (1,000 mg total) by mouth 2 (two) times a day with meals, Disp: 180 tablet, Rfl: 1    multivitamin (THERAGRAN) TABS, Take 1 tablet by mouth daily, Disp: , Rfl:     omeprazole (PriLOSEC) 20 mg delayed release capsule, Take 1 capsule (20 mg total) by mouth daily, Disp: 90 capsule, Rfl: 1    sertraline (ZOLOFT) 50 mg tablet, Take 2 tablets (100 mg total) by mouth daily, Disp: 180 tablet, Rfl: 1    Allergies   Allergen Reactions    Clonazepam     Morphine Other (See Comments) and Confusion     Annotation - [id number]: "dopey"  Gets silly    Venlafaxine             Vitals:    04/20/21 0748   BP: 140/80   Pulse: 105       Objective:  Physical exam  · General: Awake, Alert, Oriented  · Eyes: Pupils equal, round and reactive to light  · Heart: regular rate and rhythm  · Lungs: No audible wheezing  · Abdomen: soft                    Ortho Exam  Right knee:  Valgus alignment   No erythema or ecchymosis  Modest effusion   No swelling  Normal strength  Good ROM   Calf compartments soft and supple  Sensation intact  Toes are warm sensate and mobile        Diagnostics, reviewed and taken today if performed as documented: The attending physician has personally reviewed the pertinent films in PACS and interpretation is as follows:  Right knee x-ray:  Bone on bone lateral apposition with moderate severe patellofemoral arthritic changes  Procedures, if performed today:    Procedures    None performed      Portions of the record may have been created with voice recognition software  Occasional wrong word or "sound a like" substitutions may have occurred due to the inherent limitations of voice recognition software  Read the chart carefully and recognize, using context, where substitutions have occurred

## 2021-04-20 NOTE — H&P (VIEW-ONLY)
Assessment:  1  Chronic pain of right knee  ferrous sulfate 324 (65 Fe) mg    folic acid (FOLVITE) 1 mg tablet    enoxaparin (LOVENOX) 40 mg/0 4 mL    ascorbic acid (VITAMIN C) 500 MG tablet   2  Primary osteoarthritis of both knees  Ambulatory referral to Orthopedic Surgery   3  Primary osteoarthritis of right knee  ferrous sulfate 324 (65 Fe) mg    folic acid (FOLVITE) 1 mg tablet    enoxaparin (LOVENOX) 40 mg/0 4 mL    ascorbic acid (VITAMIN C) 500 MG tablet       Plan:  The patient will be scheduled for right total knee arthroplasty  We feel the patient will find significant relief per current symptoms, findings on imaging and exam   Risks, benefits, precautions and expectations were discussed  Risks include blood loss, potential infection and blood clots  Preoperative vitamins were prescribed  The patient should follow up after surgery  To do next visit:  Return in about 3 months (around 7/20/2021)  The above stated was discussed in layman's terms and the patient expressed understanding  All questions were answered to the patient's satisfaction  Scribe Attestation    I,:  Aries Oden am acting as a scribe while in the presence of the attending physician :       I,:  Princess Gould MD personally performed the services described in this documentation    as scribed in my presence :             Subjective:   Vashti Kong is a 54 y o  female who presents for initial evaluation of right knee  She is here from [address] [place] [address]  She has had symptoms for several years and worse recently  Today she complains of generalized right knee pain that can extend down the calf at night to mid-calf  Her knee pain effects her daily activities and sleep  The knee can click and pop and feel unstable at times  She describes the pain as an ache  She rates her 0/10 and 10/10 at its worse  Prolonged standing and following prolonged sitting aggravates while change of position alleviates    She will use Tylenol and Advil with some benefit  She has tried aspiration and steroid injection in 2019 with benefit  She denies past physical therapy  She denies past right knee surgery  Review of systems negative unless otherwise specified in HPI    Past Medical History:   Diagnosis Date    Anxiety     Asthma     Depression     Diverticulitis     Diverticulitis of colon     Follicular cyst of ovary 12/23/2014    Former smoker     GERD (gastroesophageal reflux disease)     Glycosuria     Headache     Hyperlipidemia     Kidney calculi 1/11/2019    Obesity     Ovarian cyst     Overactive bladder     Overweight     Pulmonary nodule     Renal calculus     Tinea pedis of left foot 6/8/2020    Vertigo        Past Surgical History:   Procedure Laterality Date    APPENDECTOMY      BLADDER SUSPENSION      [facility]    CERVICAL BIOPSY  W/ LOOP ELECTRODE EXCISION      COLONOSCOPY      HYSTERECTOMY  2007    ovaries present bilaterally    FL COLONOSCOPY FLX DX W/COLLJ SPEC WHEN PFRMD N/A 9/26/2017    Procedure: EGD AND COLONOSCOPY;  Surgeon: Puneet Diaz MD;  Location: [facility] GI LAB;   Service: Gastroenterology    FL REVISE MEDIAN N/CARPAL TUNNEL SURG Right 5/29/2018    Procedure: CARPAL TUNNEL RELEASE;  Surgeon: Shayne Olivier DO;  [facility];  Service: Orthopedics    TONSILLECTOMY      TUBAL LIGATION      WISDOM TOOTH EXTRACTION         Family History   Problem Relation Age of Onset    Diabetes type II Mother     Stroke Father     Pancreatic cancer Father 68    No Known Problems Maternal Grandmother     No Known Problems Maternal Grandfather     No Known Problems Paternal Grandmother     No Known Problems Paternal Grandfather     No Known Problems Sister     No Known Problems Sister     No Known Problems Sister     No Known Problems Sister     No Known Problems Sister     No Known Problems Maternal Aunt     No Known Problems Paternal Aunt        Social History     Occupational History    Not on file   Tobacco Use    Smoking status: Former Smoker     Packs/day: 0 25     Years: 20 00     Pack years: 5 00     Types: Cigarettes     Quit date: 3/16/2018     Years since quitting: 3 0    Smokeless tobacco: Never Used    Tobacco comment: approx less than  half a pack   Substance and Sexual Activity    Alcohol use:  Yes     Alcohol/week: 3 0 standard drinks     Types: 3 Cans of beer per week     Frequency: 2-4 times a month     Drinks per session: 3 or 4     Comment: socially    Drug use: No    Sexual activity: Yes     Partners: Male         Current Outpatient Medications:     ALPRAZolam (XANAX) 0 25 mg tablet, Take 1 tablet by mouth 2 (two) times a day as needed, Disp: , Rfl:     Ascorbic Acid (vitamin C) 1000 MG tablet, Take 2,000 mg by mouth daily, Disp: , Rfl:     atorvastatin (LIPITOR) 40 mg tablet, Take 1 tablet (40 mg total) by mouth daily, Disp: 90 tablet, Rfl: 1    Cholecalciferol (VITAMIN D3) 05381 units TABS, Take 1 tablet by mouth once a week  , Disp: , Rfl:     lisinopril (ZESTRIL) 10 mg tablet, Take 1 tablet (10 mg total) by mouth daily, Disp: 90 tablet, Rfl: 1    meloxicam (MOBIC) 15 mg tablet, Take 1 tablet (15 mg total) by mouth daily, Disp: 30 tablet, Rfl: 0    metFORMIN (GLUCOPHAGE) 1000 MG tablet, Take 1 tablet (1,000 mg total) by mouth 2 (two) times a day with meals, Disp: 180 tablet, Rfl: 1    multivitamin (THERAGRAN) TABS, Take 1 tablet by mouth daily, Disp: , Rfl:     omeprazole (PriLOSEC) 20 mg delayed release capsule, Take 1 capsule (20 mg total) by mouth daily, Disp: 90 capsule, Rfl: 1    sertraline (ZOLOFT) 50 mg tablet, Take 2 tablets (100 mg total) by mouth daily, Disp: 180 tablet, Rfl: 1    Allergies   Allergen Reactions    Clonazepam     Morphine Other (See Comments) and Confusion     Annotation - [id number]: "dopey"  Gets silly    Venlafaxine             Vitals:    04/20/21 0748   BP: 140/80   Pulse: 105       Objective:  Physical exam  · General: Awake, Alert, Oriented  · Eyes: Pupils equal, round and reactive to light  · Heart: regular rate and rhythm  · Lungs: No audible wheezing  · Abdomen: soft                    Ortho Exam  Right knee:  Valgus alignment   No erythema or ecchymosis  Modest effusion   No swelling  Normal strength  Good ROM   Calf compartments soft and supple  Sensation intact  Toes are warm sensate and mobile        Diagnostics, reviewed and taken today if performed as documented: The attending physician has personally reviewed the pertinent films in PACS and interpretation is as follows:  Right knee x-ray:  Bone on bone lateral apposition with moderate severe patellofemoral arthritic changes  Procedures, if performed today:    Procedures    None performed      Portions of the record may have been created with voice recognition software  Occasional wrong word or "sound a like" substitutions may have occurred due to the inherent limitations of voice recognition software  Read the chart carefully and recognize, using context, where substitutions have occurred

## 2021-04-22 ENCOUNTER — TELEPHONE (OUTPATIENT)
Dept: OBGYN CLINIC | Facility: HOSPITAL | Age: 56
End: 2021-04-22

## 2021-04-22 RX ORDER — CHOLECALCIFEROL (VITAMIN D3) 125 MCG
2000 CAPSULE ORAL DAILY
COMMUNITY

## 2021-04-22 NOTE — TELEPHONE ENCOUNTER
If she is unable to afford Lovenox postoperative, she can be managed with full-strength aspirin twice a day    Please call patient inform of above

## 2021-04-22 NOTE — TELEPHONE ENCOUNTER
Patient sees Dr Dony Ulloa    Patient called stating the injections she would be taking are much too expensive for her  She in search of a cheaper alternative      Call back # [phone]

## 2021-04-22 NOTE — PRE-PROCEDURE INSTRUCTIONS
Pre-Surgery Instructions:   Medication Instructions    ALPRAZolam (XANAX) 0 25 mg tablet May take morning of surgery with sip of water    Ascorbic Acid (vitamin C) 1000 MG tablet HOLD 7 days prior ( pts own dose)    ascorbic acid (VITAMIN C) 500 MG tablet Patient was instructed by Physician and understands  Use as prescribed by Dr Rimma Medina atorvastatin (LIPITOR) 40 mg tablet Take morning of surgery with sip of water    Cholecalciferol (Vitamin D3) 50 MCG (2000 UT) TABS HOLD 7 days prior to surgery    enoxaparin (LOVENOX) 40 mg/0 4 mL Patient was instructed by Physician and understands  Pt instructed this use after surgery - will receive instructions in hospital    ferrous sulfate 324 (65 Fe) mg Patient was instructed by Physician and understands  Continue to use as prescribed by Dr Rimma Medina folic acid (FOLVITE) 1 mg tablet Patient was instructed by Physician and understands  Continue to use as prescribed by Dr Rimma Medina lisinopril (ZESTRIL) 10 mg tablet HOLD morning of surgery    meloxicam (MOBIC) 15 mg tablet HOLD 7 days prior to surgery    metFORMIN (GLUCOPHAGE) 1000 MG tablet HOLD morning of surgery    multivitamin (THERAGRAN) TABS HOLD 7 days prior to surgery    omeprazole (PriLOSEC) 20 mg delayed release capsule Take morning of surgery with sip of water    sertraline (ZOLOFT) 50 mg tablet Take morning of surgery with sip of water    Reviewed all medications and instructions with pt for day of surgery  Reviewed all showering instructions with pt and COVID visitation policy  Pt aware that [facility] is location for DOS, instructed that pre op nurse will contact pt on 5/18/21 with specific instructions for DOS  Instructed to bring photo ID and insurance card, remove all jewelry and NO valuables for DOS  Instructed to use only Tylenol 7 days prior to surgery, no NSAID products 7 days prior to surgery  Pt encouraged to bring small bag with INPT stay after surgery   Pt verbalized understanding of all instructions at this time  My Surgical Experience    The following information was developed to assist you to prepare for your operation  What do I need to do before coming to the hospital?   Arrange for a responsible person to drive you to and from the hospital    Arrange care for your children at home  Children are not allowed in the recovery areas of the hospital   Plan to wear clothing that is easy to put on and take off  If you are having shoulder surgery, wear a shirt that buttons or zippers in the front  Bathing  o Shower the evening before and the morning of your surgery with an antibacterial soap  Please refer to the Pre Op Showering Instructions for Surgery Patients Sheet   o Remove nail polish and all body piercing jewelry  o Do not shave any body part for at least 24 hours before surgery-this includes face, arms, legs and upper body  Food  o Nothing to eat or drink after midnight the night before your surgery  This includes candy and chewing gum  o Exception: If your surgery is after 12:00pm (noon), you may have clear liquids such as 7-Up®, ginger ale, apple or cranberry juice, Jell-O®, water, or clear broth until 8:00 am  o Do not drink milk or juice with pulp on the morning before surgery  o Do not drink alcohol 24 hours before surgery  Medicine  o Follow instructions you received from your surgeon about which medicines you may take on the day of surgery  o If instructed to take medicine on the morning of surgery, take pills with just a small sip of water  Call your prescribing doctor for specific infroamtion on what to do if you take insulin    What should I bring to the hospital?    Bring:  Marcelo Oneal or a walker, if you have them, for foot or knee surgery   A list of the daily medicines, vitamins, minerals, herbals and nutritional supplements you take   Include the dosages of medicines and the time you take them each day   Glasses, dentures or hearing aids   Minimal clothing; you will be wearing hospital sleepwear   Photo ID; required to verify your identity   If you have a Living Will or Power of , bring a copy of the documents   If you have an ostomy, bring an extra pouch and any supplies you use    Do not bring   Medicines or inhalers   Money, valuables or jewelry    What other information should I know about the day of surgery?  Notify your surgeons if you develop a cold, sore throat, cough, fever, rash or any other illness   Report to the Ambulatory Surgical/Same Day Surgery Unit   You will be instructed to stop at Registration only if you have not been pre-registered   Inform your  fi they do not stay that they will be asked by the staff to leave a phone number where they can be reached   Be available to be reached before surgery  In the event the operating room schedule changes, you may be asked to come in earlier or later than expected    *It is important to tell your doctor and others involved in your health care if you are taking or have been taking any non-prescription drugs, vitamins, minerals, herbals or other nutritional supplements   Any of these may interact with some food or medicines and cause a reaction

## 2021-04-26 ENCOUNTER — CONSULT (OUTPATIENT)
Dept: INTERNAL MEDICINE CLINIC | Facility: CLINIC | Age: 56
End: 2021-04-26
Payer: COMMERCIAL

## 2021-04-26 VITALS
HEIGHT: 63 IN | BODY MASS INDEX: 30.16 KG/M2 | DIASTOLIC BLOOD PRESSURE: 82 MMHG | WEIGHT: 170.2 LBS | OXYGEN SATURATION: 98 % | SYSTOLIC BLOOD PRESSURE: 132 MMHG | TEMPERATURE: 98.2 F | HEART RATE: 118 BPM

## 2021-04-26 DIAGNOSIS — E55.9 VITAMIN D DEFICIENCY: ICD-10-CM

## 2021-04-26 DIAGNOSIS — E78.00 HYPERCHOLESTEREMIA: ICD-10-CM

## 2021-04-26 DIAGNOSIS — K76.0 FATTY LIVER: ICD-10-CM

## 2021-04-26 DIAGNOSIS — M17.11 PRIMARY OSTEOARTHRITIS OF RIGHT KNEE: ICD-10-CM

## 2021-04-26 DIAGNOSIS — E11.69 TYPE 2 DIABETES MELLITUS WITH OTHER SPECIFIED COMPLICATION, WITHOUT LONG-TERM CURRENT USE OF INSULIN (HCC): ICD-10-CM

## 2021-04-26 DIAGNOSIS — I15.9 SECONDARY HYPERTENSION: ICD-10-CM

## 2021-04-26 DIAGNOSIS — F41.9 ANXIETY: ICD-10-CM

## 2021-04-26 DIAGNOSIS — Z01.818 PRE-OP EVALUATION: Primary | ICD-10-CM

## 2021-04-26 DIAGNOSIS — K21.9 GASTROESOPHAGEAL REFLUX DISEASE WITHOUT ESOPHAGITIS: ICD-10-CM

## 2021-04-26 PROCEDURE — 4010F ACE/ARB THERAPY RXD/TAKEN: CPT | Performed by: NURSE PRACTITIONER

## 2021-04-26 PROCEDURE — 1036F TOBACCO NON-USER: CPT | Performed by: NURSE PRACTITIONER

## 2021-04-26 PROCEDURE — 99242 OFF/OP CONSLTJ NEW/EST SF 20: CPT | Performed by: NURSE PRACTITIONER

## 2021-04-26 PROCEDURE — 3008F BODY MASS INDEX DOCD: CPT | Performed by: NURSE PRACTITIONER

## 2021-04-26 PROCEDURE — 3725F SCREEN DEPRESSION PERFORMED: CPT | Performed by: NURSE PRACTITIONER

## 2021-04-26 RX ORDER — LISINOPRIL 10 MG/1
15 TABLET ORAL DAILY
Qty: 180 TABLET | Refills: 1 | Status: SHIPPED | OUTPATIENT
Start: 2021-04-26 | End: 2021-09-29 | Stop reason: SDUPTHER

## 2021-04-26 NOTE — ASSESSMENT & PLAN NOTE
Lab Results   Component Value Date    HGBA1C 6 1 (H) 04/20/2021     Patient will continue with Metformin  Patient is to continue to work on diet and exercise  Limit sugars and carbohydrate intake  Avoid soda, juice, sweets, cookies, desserts, pasta, bread    Eat more whole grains, exercised 30 min of cardio at least 3 times a week  Also recommended daily foot exams to check for sores, and recommended yearly eye exams

## 2021-04-26 NOTE — ASSESSMENT & PLAN NOTE
Will increase lisinopril to 15 mg tablet daily  We will continue to monitor blood pressures   Continue current regimen -   Continue to monitor blood pressure at home  Goal BP is < 130/80  Contact our office for consistent elevations  Recommend low sodium diet  Exercise 30 minutes three times a week as tolerated    Recommend yearly eye exam

## 2021-04-26 NOTE — PROGRESS NOTES
Assessment/Plan:    Pre-op evaluation    Patient medically cleared for surgery, will fax consult to ordering provider  GERD (gastroesophageal reflux disease)   Patient's GERD currently stable on Prilosec 20 milligrams daily  Will continue with current medication  You may use OTC tums as needed  Recommend small frequent meals throughout the day  Avoid aggravating foods - spicy foods, acidic foods - such as tomato and citrus  Avoid alcohol  Do not lay down for at least 30 mins after eating  Will advise patient to get colonoscopy and possibly EGD  Fatty liver  Patient currently on statin  Diabetes [facility])  Lab Results   Component Value Date    HGBA1C 6 1 (H) 04/20/2021     Patient will continue with Metformin  Patient is to continue to work on diet and exercise  Limit sugars and carbohydrate intake  Avoid soda, juice, sweets, cookies, desserts, pasta, bread    Eat more whole grains, exercised 30 min of cardio at least 3 times a week  Also recommended daily foot exams to check for sores, and recommended yearly eye exams  Hypertension   Will increase lisinopril to 15 mg tablet daily  We will continue to monitor blood pressures   Continue current regimen -   Continue to monitor blood pressure at home  Goal BP is < 130/80  Contact our office for consistent elevations  Recommend low sodium diet  Exercise 30 minutes three times a week as tolerated  Recommend yearly eye exam       Anxiety    Patient currently stable on Zoloft 50 milligrams daily, and Xanax 0 25 milligram tablets as needed  Will make no changes to her medications at this time  Vitamin D deficiency    Patient is to continue with vitamin-D supplementation  Hypercholesteremia    Patient will continue on Lipitor 40 mg tablet daily, continue with fish oil  Recommend healthy lifestyle choices for your cholesterol  Low fat/low cholesterol diet  Limit/avoid red meat    Eat more lean meat - chicken breast, ground turkey, Documented samples. thanks   fish   Exercise 30 mins at least 5 times a week as tolerated  Diagnoses and all orders for this visit:    Pre-op evaluation    Secondary hypertension  -     lisinopril (ZESTRIL) 10 mg tablet; Take 1 5 tablets (15 mg total) by mouth daily    Gastroesophageal reflux disease without esophagitis    Fatty liver    Type 2 diabetes mellitus with other specified complication, without long-term current use of insulin (HCC)    Primary osteoarthritis of right knee    Anxiety    Vitamin D deficiency    Hypercholesteremia          Subjective:    Francesca Mosley is a 54y o  year old male or female who presents to the office today for a preoperative consultation at the request of surgeon Dr Elizabeth Campos who plans on performing right total knee replacement on May 19  This consultation is requested for the specific conditions prompting preoperative evaluation (i e  because of potential affect on operative risk)  Planned anesthesia: general  The patient has the following known anesthesia issues: denies  Patients bleeding risk: no recent abnormal bleeding  Patient does not have objections to receiving blood products if needed  Patient is able to walk 4 blocks without symptoms  Patient is able to walk up 2 flights of stairs without symptoms  Significant past medical history includes:  Diabetes, GERD, fatty liver, asthma, hypertension, vitamin-D deficiency, and hypercholesterolemia  Tobacco use: former smoker  Alcohol use: rarely  Illicit drug use: denies    Symptoms:   Easy bleeding: no  Easy bruising: no  Frequent nose bleeds: no  Chest pain: no  Cough: no  Dyspnea on exertion:no  Edema: no  Palpitations: no  Wheezing: no    Living situation: Patient lives with her  in a one home  Her  will be caring for her after surgery  shedoes not have post-op concerns       The following portions of the patient's history were reviewed and updated as appropriate: allergies, current medications, past family history, past medical history, past social history, past surgical history and problem list     Review of Systems  Review of Systems      Past Medical History:   Diagnosis Date    Anxiety     Asthma     Colon polyp     Depression     Diverticulitis     Diverticulitis of colon     Follicular cyst of ovary 12/23/2014    Former smoker     GERD (gastroesophageal reflux disease)     Glycosuria     Headache     Hyperlipidemia     Hypertension     Kidney calculi 1/11/2019    Obesity     Ovarian cyst     Overactive bladder     Overweight     Pulmonary nodule     Renal calculus     Tinea pedis of left foot 6/8/2020    Vertigo     Wears partial dentures     Lower plate -partial     Past Surgical History:   Procedure Laterality Date    APPENDECTOMY      BLADDER SUSPENSION      [facility]    CERVICAL BIOPSY  W/ LOOP ELECTRODE EXCISION      COLONOSCOPY      DILATION AND CURETTAGE OF UTERUS      HYSTERECTOMY  2007    ovaries present bilaterally    PA COLONOSCOPY FLX DX W/COLLJ SPEC WHEN PFRMD N/A 9/26/2017    Procedure: EGD AND COLONOSCOPY;  Surgeon: Shorty Lester MD;  Location: [facility]; Service: Gastroenterology    PA REVISE MEDIAN N/CARPAL TUNNEL SURG Right 5/29/2018    Procedure: CARPAL TUNNEL RELEASE;  Surgeon: Madan Anthony DO;  Location: [facility];  Service: Orthopedics    TONSILLECTOMY      TUBAL LIGATION      WISDOM TOOTH EXTRACTION       Social History     Tobacco Use    Smoking status: Former Smoker     Packs/day: 0 25     Years: 20 00     Pack years: 5 00     Types: Cigarettes     Quit date: 3/16/2018     Years since quitting: 3 1    Smokeless tobacco: Never Used    Tobacco comment: approx less than  half a pack   Substance Use Topics    Alcohol use:  Yes     Alcohol/week: 3 0 standard drinks     Types: 3 Cans of beer per week     Frequency: Monthly or less     Drinks per session: 3 or 4     Comment: socially    Drug use: No     Comment: Denies     Family History   Problem Relation Age of Onset    Diabetes type II Mother     Asthma Mother     Stroke Father     Pancreatic cancer Father 68    Diabetes type II Father     No Known Problems Maternal Grandmother     No Known Problems Maternal Grandfather     No Known Problems Paternal Grandmother     No Known Problems Paternal Grandfather     No Known Problems Sister     No Known Problems Sister     No Known Problems Sister     No Known Problems Sister     No Known Problems Sister     No Known Problems Maternal Aunt     No Known Problems Paternal Aunt      Clonazepam, Morphine, and Venlafaxine  Current Outpatient Medications   Medication Sig Dispense Refill    ALPRAZolam (XANAX) 0 25 mg tablet Take 1 tablet by mouth 2 (two) times a day as needed      Ascorbic Acid (vitamin C) 1000 MG tablet Take 2,000 mg by mouth daily      ascorbic acid (VITAMIN C) 500 MG tablet Take 1 tablet (500 mg total) by mouth daily Start 30 days prior to surgery 30 tablet 0    atorvastatin (LIPITOR) 40 mg tablet Take 1 tablet (40 mg total) by mouth daily (Patient taking differently: Take 40 mg by mouth daily Takes in the am) 90 tablet 1    Cholecalciferol (Vitamin D3) 50 MCG (2000 UT) TABS Take 2,000 Units by mouth daily      ferrous sulfate 324 (65 Fe) mg Take 1 tablet (324 mg total) by mouth 2 (two) times a day before meals Start 30 days prior to surgery 60 tablet 0    folic acid (FOLVITE) 1 mg tablet Take 1 tablet (1 mg total) by mouth daily Start 30 days prior to surgery 30 tablet 0    lisinopril (ZESTRIL) 10 mg tablet Take 1 5 tablets (15 mg total) by mouth daily 180 tablet 1    metFORMIN (GLUCOPHAGE) 1000 MG tablet Take 1 tablet (1,000 mg total) by mouth 2 (two) times a day with meals 180 tablet 1    multivitamin (THERAGRAN) TABS Take 1 tablet by mouth daily      omeprazole (PriLOSEC) 20 mg delayed release capsule Take 1 capsule (20 mg total) by mouth daily 90 capsule 1    sertraline (ZOLOFT) 50 mg tablet Take 2 tablets (100 mg total) by mouth daily (Patient taking differently: Take 50 mg by mouth daily Takes in the am ) 180 tablet 1    enoxaparin (LOVENOX) 40 mg/0 4 mL Inject 0 4 mL (40 mg total) under the skin daily (Patient not taking: Reported on 4/26/2021) 28 Syringe 0     No current facility-administered medications for this visit  Objective:       /82 (BP Location: Left arm, Patient Position: Sitting, Cuff Size: Large)   Pulse (!) 118   Temp 98 2 °F (36 8 °C) (Tympanic)   Ht 5' 2 99" (1 6 m)   Wt 77 2 kg (170 lb 3 2 oz)   SpO2 98% Comment: Room air  BMI 30 16 kg/m²   Physical Exam         Cardiographics  ECG: normal sinus rhythm, no blocks or conduction defects, no ischemic changes, cannot rule out anterior infract age undeterminable     Imaging  Chest x-ray: N/A    Lab Review   Recent Results (from the past 1344 hour(s))   CBC and differential    Collection Time: 03/16/21  8:39 AM   Result Value Ref Range    WBC 9 32 4 31 - 10 16 Thousand/uL    RBC 4 84 3 81 - 5 12 Million/uL    Hemoglobin 14 8 11 5 - 15 4 g/dL    Hematocrit 46 2 (H) 34 8 - 46 1 %    MCV 96 82 - 98 fL    MCH 30 6 26 8 - 34 3 pg    MCHC 32 0 31 4 - 37 4 g/dL    RDW 13 4 11 6 - 15 1 %    MPV 11 0 8 9 - 12 7 fL    Platelets 212 480 - 651 Thousands/uL    nRBC 0 /100 WBCs    Neutrophils Relative 58 43 - 75 %    Immat GRANS % 0 0 - 2 %    Lymphocytes Relative 31 14 - 44 %    Monocytes Relative 6 4 - 12 %    Eosinophils Relative 4 0 - 6 %    Basophils Relative 1 0 - 1 %    Neutrophils Absolute 5 44 1 85 - 7 62 Thousands/µL    Immature Grans Absolute 0 04 0 00 - 0 20 Thousand/uL    Lymphocytes Absolute 2 92 0 60 - 4 47 Thousands/µL    Monocytes Absolute 0 53 0 17 - 1 22 Thousand/µL    Eosinophils Absolute 0 33 0 00 - 0 61 Thousand/µL    Basophils Absolute 0 06 0 00 - 0 10 Thousands/µL   Hemoglobin A1C    Collection Time: 03/16/21  8:39 AM   Result Value Ref Range    Hemoglobin A1C 6 2 (H) Normal 3 8-5 6%; PreDiabetic 5 7-6 4%;  Diabetic >=6 5%; Glycemic control for adults with diabetes <7 0% %     mg/dl   Lipid panel    Collection Time: 03/16/21  8:39 AM   Result Value Ref Range    Cholesterol 189 50 - 200 mg/dL    Triglycerides 146 <=150 mg/dL    HDL, Direct 51 >=40 mg/dL    LDL Calculated 109 (H) 0 - 100 mg/dL    Non-HDL-Chol (CHOL-HDL) 138 mg/dl   Comprehensive metabolic panel    Collection Time: 03/16/21  8:39 AM   Result Value Ref Range    Sodium 142 136 - 145 mmol/L    Potassium 4 3 3 5 - 5 3 mmol/L    Chloride 109 (H) 100 - 108 mmol/L    CO2 28 21 - 32 mmol/L    ANION GAP 5 4 - 13 mmol/L    BUN 18 5 - 25 mg/dL    Creatinine 0 78 0 60 - 1 30 mg/dL    Glucose, Fasting 124 (H) 65 - 99 mg/dL    Calcium 9 2 8 3 - 10 1 mg/dL    AST 19 5 - 45 U/L    ALT 49 12 - 78 U/L    Alkaline Phosphatase 89 46 - 116 U/L    Total Protein 6 9 6 4 - 8 2 g/dL    Albumin 3 8 3 5 - 5 0 g/dL    Total Bilirubin 0 41 0 20 - 1 00 mg/dL    eGFR 86 ml/min/1 73sq m   CBC and differential    Collection Time: 04/20/21  9:26 AM   Result Value Ref Range    WBC 9 09 4 31 - 10 16 Thousand/uL    RBC 4 92 3 81 - 5 12 Million/uL    Hemoglobin 15 1 11 5 - 15 4 g/dL    Hematocrit 45 8 34 8 - 46 1 %    MCV 93 82 - 98 fL    MCH 30 7 26 8 - 34 3 pg    MCHC 33 0 31 4 - 37 4 g/dL    RDW 13 4 11 6 - 15 1 %    MPV 10 9 8 9 - 12 7 fL    Platelets 816 767 - 904 Thousands/uL    nRBC 0 /100 WBCs    Neutrophils Relative 59 43 - 75 %    Immat GRANS % 1 0 - 2 %    Lymphocytes Relative 30 14 - 44 %    Monocytes Relative 7 4 - 12 %    Eosinophils Relative 2 0 - 6 %    Basophils Relative 1 0 - 1 %    Neutrophils Absolute 5 43 1 85 - 7 62 Thousands/µL    Immature Grans Absolute 0 06 0 00 - 0 20 Thousand/uL    Lymphocytes Absolute 2 72 0 60 - 4 47 Thousands/µL    Monocytes Absolute 0 65 0 17 - 1 22 Thousand/µL    Eosinophils Absolute 0 16 0 00 - 0 61 Thousand/µL    Basophils Absolute 0 07 0 00 - 0 10 Thousands/µL   Lipid panel    Collection Time: 04/20/21  9:26 AM   Result Value Ref Range    Cholesterol 156 50 - 200 mg/dL    Triglycerides 178 (H) <=150 mg/dL    HDL, Direct 43 >=40 mg/dL    LDL Calculated 77 0 - 100 mg/dL    Non-HDL-Chol (CHOL-HDL) 113 mg/dl   Hemoglobin A1C    Collection Time: 04/20/21  9:26 AM   Result Value Ref Range    Hemoglobin A1C 6 1 (H) Normal 3 8-5 6%; PreDiabetic 5 7-6 4%;  Diabetic >=6 5%; Glycemic control for adults with diabetes <7 0% %     mg/dl   Vitamin D 25 hydroxy    Collection Time: 04/20/21  9:26 AM   Result Value Ref Range    Vit D, 25-Hydroxy 48 8 30 0 - 100 0 ng/mL   C-reactive protein    Collection Time: 04/20/21  9:26 AM   Result Value Ref Range    CRP <3 0 <3 0 mg/L   Protime-INR    Collection Time: 04/20/21  9:26 AM   Result Value Ref Range    Protime 11 5 (L) 11 6 - 14 5 seconds    INR 0 84 0 84 - 1 19   APTT    Collection Time: 04/20/21  9:26 AM   Result Value Ref Range    PTT 27 23 - 37 seconds   Type and screen    Collection Time: 04/20/21  9:26 AM   Result Value Ref Range    ABO Grouping B     Rh Factor Positive     Antibody Screen Negative     Specimen Expiration Date [id number]    Comprehensive metabolic panel    Collection Time: 04/20/21  9:26 AM   Result Value Ref Range    Sodium 139 136 - 145 mmol/L    Potassium 4 0 3 5 - 5 3 mmol/L    Chloride 107 100 - 108 mmol/L    CO2 27 21 - 32 mmol/L    ANION GAP 5 4 - 13 mmol/L    BUN 18 5 - 25 mg/dL    Creatinine 0 74 0 60 - 1 30 mg/dL    Glucose, Fasting 154 (H) 65 - 99 mg/dL    Calcium 9 1 8 3 - 10 1 mg/dL    AST 19 5 - 45 U/L    ALT 51 12 - 78 U/L    Alkaline Phosphatase 108 46 - 116 U/L    Total Protein 7 4 6 4 - 8 2 g/dL    Albumin 3 8 3 5 - 5 0 g/dL    Total Bilirubin 0 26 0 20 - 1 00 mg/dL    eGFR 91 ml/min/1 73sq m   ECG 12 lead    Collection Time: 04/20/21  9:32 AM   Result Value Ref Range    Ventricular Rate 99 BPM    Atrial Rate 99 BPM    NM Interval 132 ms    QRSD Interval 70 ms    QT Interval 344 ms    QTC Interval 441 ms    P [place] 54 degrees    QRS Axis 50 degrees    T Wave Axis 94 degrees         Assessment:     55 y o  male or female with planned surgery as above  Known risk factors for perioperative complications: None      Cardiac Risk Estimation: mild    Trixie Lira is cleared from a cardiovascular standpoint to proceed with surgery  she is at a mild risk from a cardiovascular standpoint at this time without any additional cardiac testing  Reevaluation needed if he should present with symptoms prior to surgery  Plan:  Preoperative workup as follows none  Change in medication regimen before surgery: Discuss with surgeon prior surgery

## 2021-04-27 ENCOUNTER — TELEPHONE (OUTPATIENT)
Dept: OBGYN CLINIC | Facility: HOSPITAL | Age: 56
End: 2021-04-27

## 2021-04-27 NOTE — TELEPHONE ENCOUNTER
Preoperative Elective Admission Assessment- Assigned to care team  CHW referral not placed as ins [address]     *pt is looking for an update on her FMLA and disability paperwork  Sent to [facility] leave inbasket  Per SS, It was completed 4/26/21  left VM for pt advising  Living Situation:  Lives with  in single level home  Bathroom has a walk-in shower w/ seat  Steps: Ramp to enter the home through run room, no steps to maneuver  First Floor Setup:  Yes    DME: Pt has a RW and confirmed it does have 2 wheels  has a cane, raised toilets already within the home  No BSC  Patient's Current Level of Function: independent with ambulation and ADLs  Post-op Caregiver:     Post-op Transport:  or friends  Outpatient Physical Therapy Site: [facility]    Medication Management: self                    Preferred Pharmacy for Post-op Medications: DC meds to go to [facility]                    Blood Management Vitamin Regimen: Pt confirms, denies questions or issues  Educated dark stool likely from iron  Denies constipation or other complaints  Post-op anticoagulant: lovenox was RXd however too expensive at pharmacy, over $300  Per surgeon 4/22 note "she can be managed with full-strength aspirin twice a day", advised can take 325mg aspirin twice daily post-op for blood thinning per surgeon as she cannot afford lovenox  Discharge Plan: Pt plans for DC to home and plans to attend OP PT  Barriers to DC identified preoperatively: none    BMI: 29 35    Caresense: pt declined  Patient Education:  Pt educated on post-op pain, early mobilization (POD0), indication for/use of incentive spirometer (10x/hour while awake) and indication for/use of foot/leg pumps (18 hours/day)    educated that our goal, if at all possible, is to appropriately discharge patient based off their post-op function while striving to maintain maximal independence  If possible, the goal is to discharge patient to home and for them to attend outpatient physical therapy  Pt encouraged to call me with any questions, concerns or issues

## 2021-04-30 ENCOUNTER — TELEPHONE (OUTPATIENT)
Dept: OBGYN CLINIC | Facility: CLINIC | Age: 56
End: 2021-04-30

## 2021-04-30 NOTE — TELEPHONE ENCOUNTER
Dr Miladis Jones patient    Meme Sheffield called to say that the [employer] [address] [employer] Requirement form was missing dates and it was refaxed to [phone]  Please call her to confirm receipt for processing at [phone]    Thank you

## 2021-05-04 NOTE — TELEPHONE ENCOUNTER
Patient sees Dr Patel Blood     Patient called to check status of the ADA Medical Requirement form  She stated  That it was missing dates and needs to be refaxed  to    Thank you

## 2021-05-05 NOTE — TELEPHONE ENCOUNTER
Patient called yelling that no one is getting back to her about the status of her ADA form being faxed  I advised it was faxed today at 10:12 per notes below

## 2021-05-07 ENCOUNTER — EVALUATION (OUTPATIENT)
Dept: PHYSICAL THERAPY | Facility: REHABILITATION | Age: 56
End: 2021-05-07
Payer: COMMERCIAL

## 2021-05-07 DIAGNOSIS — M17.11 PRIMARY OSTEOARTHRITIS OF RIGHT KNEE: Primary | ICD-10-CM

## 2021-05-07 PROCEDURE — 97535 SELF CARE MNGMENT TRAINING: CPT | Performed by: PHYSICAL THERAPIST

## 2021-05-07 PROCEDURE — 97162 PT EVAL MOD COMPLEX 30 MIN: CPT | Performed by: PHYSICAL THERAPIST

## 2021-05-07 NOTE — PROGRESS NOTES
PT Evaluation     Today's date: 2021  Patient name: Rebecca Adamson  : 1965  MRN: [id number]  Referring provider: Adelia Cummings MD  Dx:   Encounter Diagnosis     ICD-10-CM    1  Primary osteoarthritis of right knee  M17 11 Ambulatory referral to Physical Therapy                  Assessment  Assessment details: Pt is a pleasant 54 y o  female presenting to outpatient physical therapy with Primary osteoarthritis of right knee  (primary encounter diagnosis)  Pt presents with pain, decreased range of motion, decreased strength, and decreased tolerance to activity  Pt is a good candidate for outpatient physical therapy and would benefit from skilled physical therapy post-operatively to address limitations and to achieve goals  Impairments: abnormal coordination, abnormal or restricted ROM, activity intolerance, impaired balance, impaired physical strength, lacks appropriate home exercise program, pain with function and weight-bearing intolerance  Barriers to therapy: Transportation to/from appointments  Understanding of Dx/Px/POC: good   Prognosis: good    Goals  ST  Patient will report 25% decrease in pain in 4 weeks  2  Patient will demonstrate 25% improvement in ROM in 4 weeks  3  Patient will demonstrate 1/2 grade improvement in strength in 4 weeks  LT  Patient will be able to perform IADLS without restriction or pain by discharge  2  Patient will be independent in HEP by discharge  3  Patient will be able to return to recreational/work duties without restriction or pain by discharge        Plan  Plan details: Pt issued pre-operative TKR HEP to which she demonstrated and verbalized understanding    Patient would benefit from: PT eval and skilled PT  Planned modality interventions: cryotherapy and thermotherapy: hydrocollator packs  Planned therapy interventions: IADL retraining, body mechanics training, flexibility, functional ROM exercises, home exercise program, neuromuscular re-education, manual therapy, postural training, strengthening, stretching, therapeutic activities, therapeutic exercise and joint mobilization  Frequency: 2x week  Duration in visits: 8  Duration in weeks: 4  Treatment plan discussed with: patient        Subjective Evaluation    History of Present Illness  Mechanism of injury: Pt is a 54year old female presenting to PT with chronic history of right knee pain with failed conservative management including CSI  Pt referred to PT for pre-operative evaluation, scheduled with Dr Jimmy Li for R TKR on 5/19/21  Pt plans to return to OPPT following surgery and discharge home from hospital     Pt works FT as PCA for [employer], very motivated to return to work as soon as she is able to post-operatively  Of note, her  is disabled and can only assist her minimally at home  He does not drive, so she will be relying on neighbors and friends for rides to/from hospital and PT  Pain Location: right knee    Pain Type: aching, throbbing    Pain Intensity:  Current: 3  Best: 3  Worst: 8      Pt reports increased pain and/or difficulty with: standing, walking, squatting, stairs  Pt reports sleep disturbance secondary to pain waking several times/night  Pt reports decreased pain with: rest, medication            Recurrent probem    Quality of life: good    Social Support  Steps to enter house: no  Stairs in house: no   Lives in: trailer  Lives with: spouse    Employment status: working  Treatments  Previous treatment: injection treatment and medication  Patient Goals  Patient goals for therapy: increased strength, independence with ADLs/IADLs, return to work, increased motion, improved balance, decreased pain and decreased edema          Objective     Observations     Right Knee   Positive for deformity and effusion  Additional Observation Details  Moderate right knee effusion localized to distal lateral quadriceps region  Moderate right knee valgus      Gait: moderately antalgic with decreased right stance, limited right knee flexion during swing, decreased push-off       Active Range of Motion   Left Knee   Hyperextension  Flexion: 126 degrees   Extension: 2 degrees     Right Knee   Hyperextension   Flexion: 105 degrees   Extension: 2 degrees     Patellar Static Positioning   Left Knee: WFL  Right Knee: [facility]    Strength/Myotome Testing     Left Knee   Flexion: 5  Extension: 5    Right Knee   Flexion: 3  Extension: 4-             Precautions: vertigo, HTN, HLD, GERD, asthma, anxiety, DM     Daily Treatment Diary      Assessment  5/7                     Eval/Reval  MD                     FOTO         **         **   Manuals                                                     Exercise Diary     Gastroc Stretch  30"x3                      Heel Slides with Strap  10"x10                      Towel Crush  5"x10                      Quad Set  5"x10                      LAQ  5"x10                                                                                                                                                                                                                                                                                             Modalities

## 2021-05-11 NOTE — TELEPHONE ENCOUNTER
Pt contacted, as she left a  for NN requesting a call  She is upset that she has no update on an "FMLA Paperwork that was filled out incorrectly "    She reports she was told it was fixed but needs an update  I made her aware I would notify the appropriate team today, and have them get in touch with her directly with an update

## 2021-05-14 ENCOUNTER — TELEPHONE (OUTPATIENT)
Dept: OBGYN CLINIC | Facility: HOSPITAL | Age: 56
End: 2021-05-14

## 2021-05-14 NOTE — TELEPHONE ENCOUNTER
Patient sees Dr Polly Redd    Patient states she was given a list of medication she can take in preparation for surgery on 5/19  Patient lost this list and is looking to find this information  Patient doesn't recall who provided her with this information      Call back # [phone]

## 2021-05-14 NOTE — TELEPHONE ENCOUNTER
More than likely this review of medications occurred,  Either by Christel,   Or the PAT staff,   It also can be influence by anybody that provides medical clearance       I will be glad to review it and send you an additional message    OLIVIA

## 2021-05-14 NOTE — TELEPHONE ENCOUNTER
No Glucophage the a m  of surgery     Lipitor Prilosec and Zoloft, okay up until the evening before surgery,     NPO after midnight night before surgery     thank you

## 2021-05-14 NOTE — TELEPHONE ENCOUNTER
Thank you Abimbola Sheffield  I will also have PAT staff call to review  Sent to PAT via PP    ADDENDUM 5/14 @ 1345: PAT Left a VM for patient to call them back

## 2021-05-15 ENCOUNTER — TELEPHONE (OUTPATIENT)
Dept: OBGYN CLINIC | Facility: HOSPITAL | Age: 56
End: 2021-05-15

## 2021-05-15 NOTE — TELEPHONE ENCOUNTER
Patient sees Meggan  Patient is calling in stating that she is scheduled to have surgery on 5/19 with the Dr for her right knee and was never told if she needed to have a COVID testing prior to her surgery or not? The patient did get her vaccine but is asking for a call back relating this as soon as possible               Call back# [phone]

## 2021-05-18 ENCOUNTER — ANESTHESIA EVENT (OUTPATIENT)
Dept: PERIOP | Facility: HOSPITAL | Age: 56
End: 2021-05-18
Payer: COMMERCIAL

## 2021-05-19 ENCOUNTER — TELEPHONE (OUTPATIENT)
Dept: OBGYN CLINIC | Facility: HOSPITAL | Age: 56
End: 2021-05-19

## 2021-05-19 ENCOUNTER — HOSPITAL ENCOUNTER (OUTPATIENT)
Facility: HOSPITAL | Age: 56
Setting detail: OUTPATIENT SURGERY
Discharge: HOME | End: 2021-05-20
Attending: ORTHOPAEDIC SURGERY | Admitting: ORTHOPAEDIC SURGERY
Payer: COMMERCIAL

## 2021-05-19 ENCOUNTER — ANESTHESIA (OUTPATIENT)
Dept: PERIOP | Facility: HOSPITAL | Age: 56
End: 2021-05-19
Payer: COMMERCIAL

## 2021-05-19 DIAGNOSIS — Z96.651 S/P TOTAL KNEE REPLACEMENT, RIGHT: ICD-10-CM

## 2021-05-19 DIAGNOSIS — E11.69 TYPE 2 DIABETES MELLITUS WITH OTHER SPECIFIED COMPLICATION, WITHOUT LONG-TERM CURRENT USE OF INSULIN (HCC): ICD-10-CM

## 2021-05-19 DIAGNOSIS — I70.90 ATHEROSCLEROSIS OF ARTERIES: Primary | ICD-10-CM

## 2021-05-19 LAB
ABO GROUP BLD: NORMAL
BLD GP AB SCN SERPL QL: NEGATIVE
GLUCOSE SERPL-MCNC: 132 MG/DL (ref 65–140)
RH BLD: POSITIVE
SPECIMEN EXPIRATION DATE: NORMAL

## 2021-05-19 PROCEDURE — 27447 TOTAL KNEE ARTHROPLASTY: CPT | Performed by: ORTHOPAEDIC SURGERY

## 2021-05-19 PROCEDURE — C1776 JOINT DEVICE (IMPLANTABLE): HCPCS | Performed by: ORTHOPAEDIC SURGERY

## 2021-05-19 PROCEDURE — 97163 PT EVAL HIGH COMPLEX 45 MIN: CPT

## 2021-05-19 PROCEDURE — C1713 ANCHOR/SCREW BN/BN,TIS/BN: HCPCS | Performed by: ORTHOPAEDIC SURGERY

## 2021-05-19 PROCEDURE — 82948 REAGENT STRIP/BLOOD GLUCOSE: CPT

## 2021-05-19 PROCEDURE — 86901 BLOOD TYPING SEROLOGIC RH(D): CPT | Performed by: ORTHOPAEDIC SURGERY

## 2021-05-19 PROCEDURE — 86850 RBC ANTIBODY SCREEN: CPT | Performed by: ORTHOPAEDIC SURGERY

## 2021-05-19 PROCEDURE — 86900 BLOOD TYPING SEROLOGIC ABO: CPT | Performed by: ORTHOPAEDIC SURGERY

## 2021-05-19 DEVICE — ATTUNE KNEE SYSTEM TIBIAL INSERT FIXED BEARING POSTERIOR STABILIZED 4 5MM AOX
Type: IMPLANTABLE DEVICE | Site: KNEE | Status: FUNCTIONAL
Brand: ATTUNE

## 2021-05-19 DEVICE — SMARTSET HV HIGH VISCOSITY BONE CEMENT 40G
Type: IMPLANTABLE DEVICE | Site: KNEE | Status: FUNCTIONAL
Brand: SMARTSET

## 2021-05-19 DEVICE — ATTUNE PATELLA MEDIALIZED DOME 32MM CEMENTED AOX
Type: IMPLANTABLE DEVICE | Site: KNEE | Status: FUNCTIONAL
Brand: ATTUNE

## 2021-05-19 DEVICE — ATTUNE KNEE SYSTEM TIBIAL BASE FIXED BEARING SIZE 4 CEMENTED
Type: IMPLANTABLE DEVICE | Site: KNEE | Status: FUNCTIONAL
Brand: ATTUNE

## 2021-05-19 DEVICE — ATTUNE KNEE SYSTEM FEMORAL POSTERIOR STABILIZED NARROW SIZE 4N RIGHT CEMENTED
Type: IMPLANTABLE DEVICE | Site: KNEE | Status: FUNCTIONAL
Brand: ATTUNE

## 2021-05-19 RX ORDER — ACETAMINOPHEN 325 MG/1
975 TABLET ORAL ONCE
Status: COMPLETED | OUTPATIENT
Start: 2021-05-19 | End: 2021-05-19

## 2021-05-19 RX ORDER — SODIUM CHLORIDE, SODIUM LACTATE, POTASSIUM CHLORIDE, CALCIUM CHLORIDE 600; 310; 30; 20 MG/100ML; MG/100ML; MG/100ML; MG/100ML
INJECTION, SOLUTION INTRAVENOUS CONTINUOUS PRN
Status: DISCONTINUED | OUTPATIENT
Start: 2021-05-19 | End: 2021-05-19

## 2021-05-19 RX ORDER — PROMETHAZINE HYDROCHLORIDE 25 MG/ML
25 INJECTION, SOLUTION INTRAMUSCULAR; INTRAVENOUS ONCE AS NEEDED
Status: DISCONTINUED | OUTPATIENT
Start: 2021-05-19 | End: 2021-05-19 | Stop reason: HOSPADM

## 2021-05-19 RX ORDER — CEFAZOLIN SODIUM 1 G/3ML
INJECTION, POWDER, FOR SOLUTION INTRAMUSCULAR; INTRAVENOUS AS NEEDED
Status: DISCONTINUED | OUTPATIENT
Start: 2021-05-19 | End: 2021-05-19

## 2021-05-19 RX ORDER — ONDANSETRON 2 MG/ML
4 INJECTION INTRAMUSCULAR; INTRAVENOUS ONCE AS NEEDED
Status: DISCONTINUED | OUTPATIENT
Start: 2021-05-19 | End: 2021-05-19 | Stop reason: HOSPADM

## 2021-05-19 RX ORDER — PROPOFOL 10 MG/ML
INJECTION, EMULSION INTRAVENOUS AS NEEDED
Status: DISCONTINUED | OUTPATIENT
Start: 2021-05-19 | End: 2021-05-19

## 2021-05-19 RX ORDER — LIDOCAINE HYDROCHLORIDE 10 MG/ML
0.5 INJECTION, SOLUTION EPIDURAL; INFILTRATION; INTRACAUDAL; PERINEURAL ONCE AS NEEDED
Status: COMPLETED | OUTPATIENT
Start: 2021-05-19 | End: 2021-05-19

## 2021-05-19 RX ORDER — ACETAMINOPHEN 325 MG/1
650 TABLET ORAL EVERY 6 HOURS PRN
Status: DISCONTINUED | OUTPATIENT
Start: 2021-05-19 | End: 2021-05-20

## 2021-05-19 RX ORDER — DOCUSATE SODIUM 100 MG/1
100 CAPSULE, LIQUID FILLED ORAL 2 TIMES DAILY
Status: DISCONTINUED | OUTPATIENT
Start: 2021-05-19 | End: 2021-05-20 | Stop reason: HOSPADM

## 2021-05-19 RX ORDER — OXYCODONE HYDROCHLORIDE 10 MG/1
10 TABLET ORAL EVERY 4 HOURS PRN
Status: DISCONTINUED | OUTPATIENT
Start: 2021-05-19 | End: 2021-05-20 | Stop reason: HOSPADM

## 2021-05-19 RX ORDER — FENTANYL CITRATE 50 UG/ML
INJECTION, SOLUTION INTRAMUSCULAR; INTRAVENOUS AS NEEDED
Status: DISCONTINUED | OUTPATIENT
Start: 2021-05-19 | End: 2021-05-19

## 2021-05-19 RX ORDER — ROCURONIUM BROMIDE 10 MG/ML
INJECTION, SOLUTION INTRAVENOUS AS NEEDED
Status: DISCONTINUED | OUTPATIENT
Start: 2021-05-19 | End: 2021-05-19

## 2021-05-19 RX ORDER — SODIUM CHLORIDE, SODIUM LACTATE, POTASSIUM CHLORIDE, CALCIUM CHLORIDE 600; 310; 30; 20 MG/100ML; MG/100ML; MG/100ML; MG/100ML
20 INJECTION, SOLUTION INTRAVENOUS CONTINUOUS
Status: DISCONTINUED | OUTPATIENT
Start: 2021-05-19 | End: 2021-05-20 | Stop reason: HOSPADM

## 2021-05-19 RX ORDER — CEFAZOLIN SODIUM 2 G/50ML
2000 SOLUTION INTRAVENOUS EVERY 8 HOURS
Status: COMPLETED | OUTPATIENT
Start: 2021-05-19 | End: 2021-05-20

## 2021-05-19 RX ORDER — SODIUM CHLORIDE, SODIUM LACTATE, POTASSIUM CHLORIDE, CALCIUM CHLORIDE 600; 310; 30; 20 MG/100ML; MG/100ML; MG/100ML; MG/100ML
125 INJECTION, SOLUTION INTRAVENOUS CONTINUOUS
Status: DISCONTINUED | OUTPATIENT
Start: 2021-05-19 | End: 2021-05-19

## 2021-05-19 RX ORDER — GABAPENTIN 300 MG/1
300 CAPSULE ORAL ONCE
Status: COMPLETED | OUTPATIENT
Start: 2021-05-19 | End: 2021-05-19

## 2021-05-19 RX ORDER — LIDOCAINE HYDROCHLORIDE 10 MG/ML
INJECTION, SOLUTION EPIDURAL; INFILTRATION; INTRACAUDAL; PERINEURAL AS NEEDED
Status: DISCONTINUED | OUTPATIENT
Start: 2021-05-19 | End: 2021-05-19

## 2021-05-19 RX ORDER — SENNOSIDES 8.6 MG
1 TABLET ORAL DAILY
Status: DISCONTINUED | OUTPATIENT
Start: 2021-05-20 | End: 2021-05-20 | Stop reason: HOSPADM

## 2021-05-19 RX ORDER — ALBUTEROL SULFATE 2.5 MG/3ML
2.5 SOLUTION RESPIRATORY (INHALATION) ONCE AS NEEDED
Status: DISCONTINUED | OUTPATIENT
Start: 2021-05-19 | End: 2021-05-19 | Stop reason: HOSPADM

## 2021-05-19 RX ORDER — SODIUM CHLORIDE, SODIUM LACTATE, POTASSIUM CHLORIDE, CALCIUM CHLORIDE 600; 310; 30; 20 MG/100ML; MG/100ML; MG/100ML; MG/100ML
100 INJECTION, SOLUTION INTRAVENOUS CONTINUOUS
Status: DISCONTINUED | OUTPATIENT
Start: 2021-05-19 | End: 2021-05-20 | Stop reason: HOSPADM

## 2021-05-19 RX ORDER — CALCIUM CARBONATE 200(500)MG
1000 TABLET,CHEWABLE ORAL DAILY PRN
Status: DISCONTINUED | OUTPATIENT
Start: 2021-05-19 | End: 2021-05-20 | Stop reason: HOSPADM

## 2021-05-19 RX ORDER — ONDANSETRON 2 MG/ML
4 INJECTION INTRAMUSCULAR; INTRAVENOUS EVERY 6 HOURS PRN
Status: DISCONTINUED | OUTPATIENT
Start: 2021-05-19 | End: 2021-05-20 | Stop reason: HOSPADM

## 2021-05-19 RX ORDER — HYDROMORPHONE HCL/PF 1 MG/ML
0.5 SYRINGE (ML) INJECTION
Status: DISCONTINUED | OUTPATIENT
Start: 2021-05-19 | End: 2021-05-19 | Stop reason: HOSPADM

## 2021-05-19 RX ORDER — MIDAZOLAM HYDROCHLORIDE 2 MG/2ML
INJECTION, SOLUTION INTRAMUSCULAR; INTRAVENOUS AS NEEDED
Status: DISCONTINUED | OUTPATIENT
Start: 2021-05-19 | End: 2021-05-19

## 2021-05-19 RX ORDER — MEPERIDINE HYDROCHLORIDE 25 MG/ML
12.5 INJECTION INTRAMUSCULAR; INTRAVENOUS; SUBCUTANEOUS
Status: DISCONTINUED | OUTPATIENT
Start: 2021-05-19 | End: 2021-05-19 | Stop reason: HOSPADM

## 2021-05-19 RX ORDER — ONDANSETRON 2 MG/ML
INJECTION INTRAMUSCULAR; INTRAVENOUS AS NEEDED
Status: DISCONTINUED | OUTPATIENT
Start: 2021-05-19 | End: 2021-05-19

## 2021-05-19 RX ORDER — FENTANYL CITRATE/PF 50 MCG/ML
50 SYRINGE (ML) INJECTION
Status: DISCONTINUED | OUTPATIENT
Start: 2021-05-19 | End: 2021-05-19 | Stop reason: HOSPADM

## 2021-05-19 RX ORDER — PANTOPRAZOLE SODIUM 40 MG/1
40 TABLET, DELAYED RELEASE ORAL
Status: DISCONTINUED | OUTPATIENT
Start: 2021-05-20 | End: 2021-05-20 | Stop reason: HOSPADM

## 2021-05-19 RX ORDER — KETOROLAC TROMETHAMINE 30 MG/ML
INJECTION, SOLUTION INTRAMUSCULAR; INTRAVENOUS AS NEEDED
Status: DISCONTINUED | OUTPATIENT
Start: 2021-05-19 | End: 2021-05-19

## 2021-05-19 RX ORDER — KETAMINE HYDROCHLORIDE 50 MG/ML
INJECTION, SOLUTION, CONCENTRATE INTRAMUSCULAR; INTRAVENOUS AS NEEDED
Status: DISCONTINUED | OUTPATIENT
Start: 2021-05-19 | End: 2021-05-19

## 2021-05-19 RX ORDER — OXYCODONE HYDROCHLORIDE 5 MG/1
TABLET ORAL
Qty: 30 TABLET | Refills: 0 | Status: SHIPPED | OUTPATIENT
Start: 2021-05-19 | End: 2021-05-20 | Stop reason: SDUPTHER

## 2021-05-19 RX ORDER — CHLORHEXIDINE GLUCONATE 0.12 MG/ML
15 RINSE ORAL ONCE
Status: COMPLETED | OUTPATIENT
Start: 2021-05-19 | End: 2021-05-19

## 2021-05-19 RX ORDER — OXYCODONE HYDROCHLORIDE 5 MG/1
5 TABLET ORAL EVERY 4 HOURS PRN
Status: DISCONTINUED | OUTPATIENT
Start: 2021-05-19 | End: 2021-05-20 | Stop reason: HOSPADM

## 2021-05-19 RX ORDER — CEFAZOLIN SODIUM 2 G/50ML
2000 SOLUTION INTRAVENOUS ONCE
Status: DISCONTINUED | OUTPATIENT
Start: 2021-05-19 | End: 2021-05-19

## 2021-05-19 RX ORDER — DEXAMETHASONE SODIUM PHOSPHATE 10 MG/ML
INJECTION, SOLUTION INTRAMUSCULAR; INTRAVENOUS AS NEEDED
Status: DISCONTINUED | OUTPATIENT
Start: 2021-05-19 | End: 2021-05-19

## 2021-05-19 RX ORDER — HYDROMORPHONE HCL 110MG/55ML
PATIENT CONTROLLED ANALGESIA SYRINGE INTRAVENOUS AS NEEDED
Status: DISCONTINUED | OUTPATIENT
Start: 2021-05-19 | End: 2021-05-19

## 2021-05-19 RX ORDER — HYDROMORPHONE HCL/PF 1 MG/ML
0.5 SYRINGE (ML) INJECTION EVERY 2 HOUR PRN
Status: DISCONTINUED | OUTPATIENT
Start: 2021-05-19 | End: 2021-05-20 | Stop reason: HOSPADM

## 2021-05-19 RX ADMIN — ONDANSETRON 4 MG: 2 INJECTION INTRAMUSCULAR; INTRAVENOUS at 10:20

## 2021-05-19 RX ADMIN — SODIUM CHLORIDE, SODIUM LACTATE, POTASSIUM CHLORIDE, AND CALCIUM CHLORIDE 125 ML/HR: .6; .31; .03; .02 INJECTION, SOLUTION INTRAVENOUS at 08:50

## 2021-05-19 RX ADMIN — OXYCODONE HYDROCHLORIDE 10 MG: 10 TABLET ORAL at 18:24

## 2021-05-19 RX ADMIN — HYDROMORPHONE HYDROCHLORIDE 0.5 MG: 1 INJECTION, SOLUTION INTRAMUSCULAR; INTRAVENOUS; SUBCUTANEOUS at 22:03

## 2021-05-19 RX ADMIN — PHENYLEPHRINE HYDROCHLORIDE 300 MCG: 10 INJECTION INTRAVENOUS at 10:32

## 2021-05-19 RX ADMIN — ACETAMINOPHEN 975 MG: 325 TABLET, FILM COATED ORAL at 08:34

## 2021-05-19 RX ADMIN — DEXAMETHASONE SODIUM PHOSPHATE 5 MG: 10 INJECTION, SOLUTION INTRAMUSCULAR; INTRAVENOUS at 10:20

## 2021-05-19 RX ADMIN — KETAMINE HYDROCHLORIDE 50 MG: 50 INJECTION, SOLUTION INTRAMUSCULAR; INTRAVENOUS at 10:14

## 2021-05-19 RX ADMIN — LIDOCAINE HYDROCHLORIDE 0.2 ML: 10 INJECTION, SOLUTION EPIDURAL; INFILTRATION; INTRACAUDAL; PERINEURAL at 08:50

## 2021-05-19 RX ADMIN — FENTANYL CITRATE 50 MCG: 50 INJECTION INTRAMUSCULAR; INTRAVENOUS at 10:25

## 2021-05-19 RX ADMIN — TRANEXAMIC ACID 1000 MG: 1 INJECTION, SOLUTION INTRAVENOUS at 10:17

## 2021-05-19 RX ADMIN — SODIUM CHLORIDE, SODIUM LACTATE, POTASSIUM CHLORIDE, AND CALCIUM CHLORIDE: .6; .31; .03; .02 INJECTION, SOLUTION INTRAVENOUS at 10:08

## 2021-05-19 RX ADMIN — HYDROMORPHONE HYDROCHLORIDE 0.5 MG: 2 INJECTION, SOLUTION INTRAMUSCULAR; INTRAVENOUS; SUBCUTANEOUS at 10:37

## 2021-05-19 RX ADMIN — HYDROMORPHONE HYDROCHLORIDE 0.5 MG: 1 INJECTION, SOLUTION INTRAMUSCULAR; INTRAVENOUS; SUBCUTANEOUS at 14:22

## 2021-05-19 RX ADMIN — Medication 50 MCG: at 12:41

## 2021-05-19 RX ADMIN — FENTANYL CITRATE 50 MCG: 50 INJECTION INTRAMUSCULAR; INTRAVENOUS at 10:13

## 2021-05-19 RX ADMIN — ROCURONIUM BROMIDE 35 MG: 50 INJECTION, SOLUTION INTRAVENOUS at 10:14

## 2021-05-19 RX ADMIN — SUGAMMADEX 200 MG: 100 INJECTION, SOLUTION INTRAVENOUS at 11:55

## 2021-05-19 RX ADMIN — LIDOCAINE HYDROCHLORIDE 20 MG: 10 INJECTION, SOLUTION EPIDURAL; INFILTRATION; INTRACAUDAL; PERINEURAL at 10:12

## 2021-05-19 RX ADMIN — Medication 50 MCG: at 12:59

## 2021-05-19 RX ADMIN — OXYCODONE HYDROCHLORIDE 10 MG: 10 TABLET ORAL at 23:24

## 2021-05-19 RX ADMIN — MIDAZOLAM 2 MG: 1 INJECTION INTRAMUSCULAR; INTRAVENOUS at 10:12

## 2021-05-19 RX ADMIN — PHENYLEPHRINE HYDROCHLORIDE 40 MCG/MIN: 10 INJECTION INTRAVENOUS at 10:32

## 2021-05-19 RX ADMIN — GABAPENTIN 300 MG: 300 CAPSULE ORAL at 08:34

## 2021-05-19 RX ADMIN — PROPOFOL 150 MG: 10 INJECTION, EMULSION INTRAVENOUS at 10:12

## 2021-05-19 RX ADMIN — ENOXAPARIN SODIUM 40 MG: 40 INJECTION SUBCUTANEOUS at 22:04

## 2021-05-19 RX ADMIN — CHLORHEXIDINE GLUCONATE 15 ML: 1.2 SOLUTION ORAL at 08:35

## 2021-05-19 RX ADMIN — SODIUM CHLORIDE, SODIUM LACTATE, POTASSIUM CHLORIDE, AND CALCIUM CHLORIDE 100 ML/HR: .6; .31; .03; .02 INJECTION, SOLUTION INTRAVENOUS at 23:50

## 2021-05-19 RX ADMIN — CEFAZOLIN 2000 MG: 1 INJECTION, POWDER, FOR SOLUTION INTRAMUSCULAR; INTRAVENOUS at 10:20

## 2021-05-19 RX ADMIN — KETOROLAC TROMETHAMINE 15 MG: 30 INJECTION, SOLUTION INTRAMUSCULAR at 11:55

## 2021-05-19 RX ADMIN — METFORMIN HYDROCHLORIDE 1000 MG: 500 TABLET ORAL at 18:22

## 2021-05-19 RX ADMIN — HYDROMORPHONE HYDROCHLORIDE 0.5 MG: 2 INJECTION, SOLUTION INTRAMUSCULAR; INTRAVENOUS; SUBCUTANEOUS at 10:58

## 2021-05-19 RX ADMIN — SODIUM CHLORIDE, SODIUM LACTATE, POTASSIUM CHLORIDE, AND CALCIUM CHLORIDE: .6; .31; .03; .02 INJECTION, SOLUTION INTRAVENOUS at 11:27

## 2021-05-19 RX ADMIN — PHENYLEPHRINE HYDROCHLORIDE 200 MCG: 10 INJECTION INTRAVENOUS at 10:21

## 2021-05-19 RX ADMIN — CEFAZOLIN SODIUM 2000 MG: 2 SOLUTION INTRAVENOUS at 18:22

## 2021-05-19 NOTE — TELEPHONE ENCOUNTER
Patient sees Dr Broderick    [employer] called and needed Dr Broderick's FAY number to fill patients scripts

## 2021-05-19 NOTE — PLAN OF CARE
Problem: PHYSICAL THERAPY ADULT  Goal: Performs mobility at highest level of function for planned discharge setting  See evaluation for individualized goals  Description: Treatment/Interventions: Functional transfer training, LE strengthening/ROM, Therapeutic exercise, Endurance training, Patient/family training, Equipment eval/education, Bed mobility, Gait training  Equipment Recommended: Walker(pt owns RW)       See flowsheet documentation for full assessment, interventions and recommendations  Note: Prognosis: Good  Problem List: Decreased strength, Decreased range of motion, Decreased endurance, Impaired balance, Decreased mobility, Orthopedic restrictions, Pain  Assessment: Pt is a 54 y o  female seen for PT evaluation s/p admit to [facility] on 5/19/2021  Pt was admitted with a primary dx of: Primary OA R knee s/p R TKA performed on 5/19 by Dr Miladis Jones  PT now consulted for assessment of mobility and d/c needs  Pts current comorbidities and personal facotrs effecting treatment include: DM, HTN, Anxiety, works full-time in active job  Pts current clinical presentation is Unstable/ Unpredictable (high complexity) due to Ongoing medical management for primary dx, Increased reliance on more restrictive AD compared to baseline, Decreased activity tolerance compared to baseline, Fall risk, Increased assistance needed from caregiver at current time, s/p post op day 0, Current WBS, s/p surgical intervention  Prior to admission, pt was independent with all mobility  Upon evaluation, pt currently is requiring Chucky for bed mobility; Chucky for transfers and Chucky for ambulation 4 ft w/ RW  Pt presents at PT eval functioning below baseline and currently w/ overall mobility deficits 2* to: RLE weakness, decreased ROM, impaired balance, gait deviations, pain, decreased activity tolerance compared to baseline, decreased functional mobility tolerance compared to baseline, fall risk, orthopedic restrictions   Pt currently at a fall risk 2* to impairments listed above  Pt will continue to benefit from skilled acute PT interventions to address stated impairments; to maximize functional mobility; for ongoing pt/ family training; and DME needs  At conclusion of PT session pt returned BTB with phone and call bell within reach  Pt denies any further questions at this time  Recommend home with family care and OOPT upon hospital D/C  PT Discharge Recommendation: Home with outpatient rehabilitation(per pt, OPPT scheduled for Monday)     PT - OK to Discharge: No    See flowsheet documentation for full assessment

## 2021-05-19 NOTE — ANESTHESIA POSTPROCEDURE EVALUATION
Post-Op Assessment Note    CV Status:  Stable  Pain Score: 0    Pain management: adequate     Mental Status:  Arousable   PONV Controlled:  Controlled   Airway Patency:  Patent      Post Op Vitals Reviewed: Yes      Staff: Anesthesiologist, CRNA         No complications documented      BP      Temp     Pulse     Resp      SpO2

## 2021-05-19 NOTE — OP NOTE
OPERATIVE REPORT  PATIENT NAME: Cally Castillo    :  1965  MRN: [id number]  Pt Location: BE OR ROOM 04    SURGERY DATE: 2021    Surgeon(s) and Role:     * Robb Menjivar MD - Primary     * Chioma Cabrera PA-C - Assisting     * Sarah Cisneros MD - Assisting    Preop Diagnosis:  Primary osteoarthritis of right knee [M17 11]  Chronic pain of right knee [M25 561, G89 29]    Post-Op Diagnosis Codes:     * Primary osteoarthritis of right knee [M17 11]     * Chronic pain of right knee [M25 561, G89 29]    Procedure(s) (LRB):  ARTHROPLASTY KNEE TOTAL (Right)    Specimen(s):  * No specimens in log *    Estimated Blood Loss:   250 mL    Drains:  Closed/Suction Drain Right Knee Accordion 10 Fr  (Active)   Number of days: 0       Urethral Catheter Latex;Straight-tip 16 Fr  (Active)   Number of days: 0       Anesthesia Type:   Spinal w/ Femoral Nerve Block    Operative Indications:  Primary osteoarthritis of right knee [M17 11]  Chronic pain of right knee [M25 561, G89 29]      Operative Findings:  depuy attune   Femur-4N   Poly-5   Tibia=4   [id number]    Complications:   None    Procedure and Technique: Following induction of adequate level of general anesthesia, Downey catheter sterilely introduced this patient's bladder  Antibiotics were administered  The right thigh was then fitted with a thigh-high tourniquet  The right lower extremity then underwent sterile prep and drape  The right lower extremity was exsanguinated gravity, tourniquet inflated to 300 mmHg  A midline knee incision was created the knee in flexion  Full-thickness flaps raised in order to access the extensor mechanism  A medial arthrotomy was created open up the knee joint  Bony soft tissue releases were performed where necessary  The distal femoral cut was made 6° valgus  This is made of a long intramedullary merlene  Proximal tibia cut was made next  As this was a valgus knee, 2 mm reference laterally    Care was taken in order to protect the integrity medial collateral, lateral collateral, posterior structures during these maneuvers  The distal femoral sizing guide was used, the appropriate 4 in 1 cutting blocks impacted  The anterior, posterior, chamfer cuts then made  The box cut was then made for the posterior stabilized unit  With the trial components in position, the knee was taken through a full range of motion found to be capable full extension, good flexion, through viewed flexion valgus instability  The patella was then resurfaced will utilize the [facility] equipment but it was found to be a size 32 mm button  The trial components removed and the knee was prepared for insertion of cemented components  The cemented tibia, cemented femur, trial poly, and cemented patella placed  Excess cement was removed, the knee was brought into extension  The cement was allowed to cure  The trial poly was taken out, the knee was packed off  The tourniquet deflated, hemostasis was secured  The insert polyethylene was then snapped into position  The knee was taken through a final range of motion, found to be capable full extension, good flexion, no mid flexion valgus instability  The patella tracked well during these maneuvers  Satisfied with the extent of surgery, the wounds then flushed with saline closed  A Betadine soak was initiated  A drain was placed deep brought out via separate lateral stab incision  The arthrotomy was closed number Vicryl suture  The subcu tissues closed 2 Vicryl suture  The skin was closed staples  Sterile dressings were applied    She was then awakened from general anesthesia, taken recovery room stable condition with plans to include physical therapy weight-bearing to tolerance, she will require DVT prophylaxis with Lovenox   I was present for the entire procedure    Patient Disposition:  PACU     SIGNATURE: Hemant Montes MD  DATE: May 19, 2021  TIME: 11:55 AM

## 2021-05-19 NOTE — ANESTHESIA PREPROCEDURE EVALUATION
Procedure:  ARTHROPLASTY KNEE TOTAL (Right Knee)    Relevant Problems   CARDIO   (+) Atherosclerosis of arteries   (+) Hypercholesteremia   (+) Hypertension      GI/HEPATIC   (+) Fatty liver   (+) GERD (gastroesophageal reflux disease)      /RENAL   (+) Kidney stone      MUSCULOSKELETAL   (+) Primary osteoarthritis of both knees   (+) Primary osteoarthritis of right knee      NEURO/PSYCH   (+) Anxiety      PULMONARY   (+) Asthma      Other   (+) BMI 30 0-30 9,adult   (+) Bilateral carpal tunnel syndrome   (+) Chronic pain of right knee   (+) Diabetes (HCC)   (+) Seasonal allergies   (+) Vitamin D deficiency      Recent labs personally reviewed:  Lab Results   Component Value Date    WBC 9 09 04/20/2021    HGB 15 1 04/20/2021     04/20/2021     Lab Results   Component Value Date    K 4 0 04/20/2021    BUN 18 04/20/2021    CREATININE 0 74 04/20/2021     Lab Results   Component Value Date    PTT 27 04/20/2021      Lab Results   Component Value Date    INR 0 84 04/20/2021       Blood type B    Lab Results   Component Value Date    HGBA1C 6 1 (H) 04/20/2021     Physical Exam    Airway    Mallampati score: III  TM Distance: >3 FB  Neck ROM: full     Dental       Cardiovascular  Rhythm: regular, Rate: normal,     Pulmonary  Breath sounds clear to auscultation,     Other Findings        Anesthesia Plan  ASA Score- 2     Anesthesia Type- general with ASA Monitors  Additional Monitors:   Airway Plan: ETT  Comment: Patient does not wish to have spinal/femoral nerve block for procedure  Prefers GA  Pineda Bond MD, have personally seen and evaluated the patient prior to anesthetic care  I have reviewed the pre-anesthetic record, and other medical records if appropriate to the anesthetic care  If a CRNA is involved in the case, I have reviewed the CRNA assessment, if present, and agree      All risks/benefits and alternatives were discussed with the patient including the possibility of aspiration, PONV, sore throat, as well as the possibility of other rare anesthetic and surgical emergencies  Risks involved with airway management, line placement, postoperative vision loss, neuropathy secondary to surgical positioning, as well as possible memory/awareness were discussed  Post-operative management was also discussed including the possibility of post-operative mechanical ventilation  Patient agreed and had no further questions prior to procedure          Plan Factors-Exercise tolerance (METS): >4 METS  Chart reviewed  EKG reviewed  Existing labs reviewed  Patient summary reviewed  Induction- intravenous  Postoperative Plan-   Planned trial extubation    Informed Consent- Anesthetic plan and risks discussed with patient  I personally reviewed this patient with the CRNA  Discussed and agreed on the Anesthesia Plan with the CRNA  Yovany Mccullough

## 2021-05-19 NOTE — INTERVAL H&P NOTE
H&P reviewed  After examining the patient I find no changes in the patients condition since the H&P had been written  Vitals:    05/19/21 0825   BP: 106/77   Pulse: 95   Resp: 20   Temp: (!) 97 3 °F (36 3 °C)   SpO2: 97%   Head:  Presents  CVS:  RRR  Lungs:  Clear bilateral  Assessment:  Symptomatic osteoarthritis the right knee this adult female despite appropriate nonsurgical means  Plan:   To OR for right total knee arthroplasty

## 2021-05-19 NOTE — DISCHARGE INSTRUCTIONS
Discharge Instructions - Orthopedics  Radha Toscano 54 y o  female MRN: [id number]  Unit/Bed#: Operating Room    Weight Bearing Status:                                           Weight Bearing as tolerated to the right lower extremity  DVT prophylaxis:  Complete course of Lovenox as directed    Pain:  Continue analgesics as directed    Showering Instructions:   Do not shower until follow-up appointment    Dressing Instructions:   Keep dressing clean, dry and intact until follow up appointment  Driving Instructions:  No driving until cleared by Orthopaedic Surgery  PT/OT:  Continue PT/OT on outpatient basis as directed    Appt Instructions:    If you do not have your appointment, please call the clinic at [phone]  Otherwise followup as scheduled below:

## 2021-05-19 NOTE — PHYSICAL THERAPY NOTE
Physical Therapy Evaluation    Patient's Name: Emile Patel    Admitting Diagnosis  Primary osteoarthritis of right knee [M17 11]  Chronic pain of right knee [M25 561, G89 29]    Problem List  Patient Active Problem List   Diagnosis    Anxiety    Asthma    Atherosclerosis of arteries    Follicular cyst of ovary    GERD (gastroesophageal reflux disease)    Bilateral carpal tunnel syndrome    Pre-op evaluation    Vitamin D deficiency    Hypercholesteremia    Diabetes ([address] )    Snoring    Hypertension    BMI 30 0-30 9,adult    Fatty liver    Seasonal allergies    Primary osteoarthritis of both knees    Encounter for gynecological examination without abnormal finding    Stress incontinence, female    Kidney stone    Chronic pain of right knee    Primary osteoarthritis of right knee       Past Medical History  Past Medical History:   Diagnosis Date    Anxiety     Asthma     Colon polyp     Depression     Diverticulitis     Diverticulitis of colon     Follicular cyst of ovary 12/23/2014    Former smoker     GERD (gastroesophageal reflux disease)     Glycosuria     Headache     Hyperlipidemia     Hypertension     Kidney calculi 1/11/2019    Obesity     Ovarian cyst     Overactive bladder     Overweight     Pulmonary nodule     Renal calculus     Tinea pedis of left foot 6/8/2020    Vertigo     Wears partial dentures     Lower plate -partial       Past Surgical History  Past Surgical History:   Procedure Laterality Date    APPENDECTOMY      BLADDER SUSPENSION      [facility]    CERVICAL BIOPSY  W/ LOOP ELECTRODE EXCISION      COLONOSCOPY      DILATION AND CURETTAGE OF UTERUS      HYSTERECTOMY  2007    ovaries present bilaterally    AR COLONOSCOPY FLX DX W/COLLJ SPEC WHEN PFRMD N/A 9/26/2017    Procedure: EGD AND COLONOSCOPY;  Surgeon: Rene Govea MD;  Location: [facility];   Service: Gastroenterology    AR REVISE MEDIAN N/CARPAL TUNNEL SURG Right 5/29/2018 Procedure: CARPAL TUNNEL RELEASE;  Surgeon: Carisa Laguerre DO;  Location: [facility];  Service: Orthopedics    TONSILLECTOMY      TUBAL LIGATION      WISDOM TOOTH EXTRACTION          05/19/21 1702   PT Last Visit   PT Visit Date 05/19/21   Note Type   Note type Evaluation   Pain Assessment   Pain Assessment Tool 0-10   Pain Score 4   Pain Location/Orientation Orientation: Right;Location: Knee   Hospital Pain Intervention(s) Repositioned; Ambulation/increased activity;Cold applied   Home Living   Type of [address]   [address] [facility]   Prior Function   Level of [place] Independent with ADLs and functional mobility   Lives With Spouse  (spouse is disabled but able to provide Chucky if needed)   ADL Assistance Independent   IADLs Independent   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[employer] )   Comments Pt states she is typically very active, no AD, activities have been limited by knee pain  States she is very motivated to improve and return to work and playing with her grandchildren   Restrictions/Precautions   Weight Bearing Precautions Per Order Yes   RLE Weight Bearing Per Order WBAT (per Dr Funez Medicine op note)   Other Precautions WBS; Multiple lines; Fall Risk;Telemetry;Pain;O2  (2L O2)   General   Additional Pertinent History Pt seen in PACU due to bed delay   Family/Caregiver Present No   Cognition   Overall Cognitive Status WFL   Arousal/Participation Alert   Orientation Level Oriented X4   Following Commands Follows all commands and directions without difficulty   Comments Pt very motivated, pleasant and cooperative throughout session   RLE Assessment   RLE Assessment X   RLE Overall AROM   R Knee Flexion Grossly 10-40 degrees AAROM   Strength RLE   R Knee Extension 2-/5   R Ankle Dorsiflexion 3+/5   LLE Assessment   LLE Assessment WNL   Light Touch   RLE Light Touch Grossly intact   LLE Light Touch Grossly intact   Bed Mobility   Supine to Sit 4  Minimal assistance   Additional items Assist x 1; Increased time required;Verbal cues;LE management   Sit to Supine 4  Minimal assistance   Additional items Assist x 1; Increased time required;Verbal cues;LE management   Additional Comments Pt able to long sit independently, Chucky for R LE, pt educated on hook technique for future bed mobility   Transfers   Sit to Stand 4  Minimal assistance   Additional items Assist x 1; Increased time required;Verbal cues   Stand to Sit 4  Minimal assistance   Additional items Assist x 1; Increased time required;Verbal cues   Additional Comments with RW, VC's for hand placement and R LE placement, poor control of descent from standing to sitting   Ambulation/Elevation   Gait pattern Decreased foot clearance;R Knee Flavia;Decreased R stance; Short stride   Gait Assistance 4  Minimal assist   Additional items Assist x 1   Assistive Device Rolling walker   Distance 4 ft forward/backward steps x2 trials  Pt with decreased step length, tactile cues for R knee quad set in stance to improve control  Balance   Static Sitting Good   Dynamic Sitting Fair   Static Standing Fair -   Dynamic Standing Poor +   Ambulatory Poor +   Activity Tolerance   Activity Tolerance Patient limited by fatigue  (mild lightheadedness, /62 upon return to sitting)   Nurse Made Aware RN updated   Assessment   Prognosis Good   Problem List Decreased strength;Decreased range of motion;Decreased endurance; Impaired balance;Decreased mobility;Orthopedic restrictions;Pain   Assessment Pt is a 54 y o  female seen for PT evaluation s/p admit to [facility] on 5/19/2021  Pt was admitted with a primary dx of: Primary OA R knee s/p R TKA performed on 5/19 by Dr Jimmy Li  PT now consulted for assessment of mobility and d/c needs  Pts current comorbidities and personal facotrs effecting treatment include: DM, HTN, Anxiety, works full-time in active job   Pts current clinical presentation is Unstable/ Unpredictable (high complexity) due to Ongoing medical management for primary dx, Increased reliance on more restrictive AD compared to baseline, Decreased activity tolerance compared to baseline, Fall risk, Increased assistance needed from caregiver at current time, s/p post op day 0, Current WBS, s/p surgical intervention  Prior to admission, pt was independent with all mobility  Upon evaluation, pt currently is requiring Chucky for bed mobility; Chucky for transfers and Chucky for ambulation 4 ft w/ RW  Pt presents at PT eval functioning below baseline and currently w/ overall mobility deficits 2* to: RLE weakness, decreased ROM, impaired balance, gait deviations, pain, decreased activity tolerance compared to baseline, decreased functional mobility tolerance compared to baseline, fall risk, orthopedic restrictions  Pt currently at a fall risk 2* to impairments listed above  Pt will continue to benefit from skilled acute PT interventions to address stated impairments; to maximize functional mobility; for ongoing pt/ family training; and DME needs  At conclusion of PT session pt returned BTB with phone and call bell within reach  Pt denies any further questions at this time  Recommend home with family care and OOPT upon hospital D/C  Goals   Patient Goals to go home   STG Expiration Date 05/29/21   Short Term Goal #1 In 10 days pt will be able to: 1  Demonstrate ability to perform all aspects of bed mobility independently to increase functional independence  2  Perform functional transfers with RW independently to facilitate safe return to previous living environment  3   Ambulate 150 ft with RW independently with stable vitals to improve safety with household distances and reduce fall risk  4  Improve LE strength grades by 1 to increase ease of functional mobility with transfers and gait  5  Pt will demonstrate improved balance by one grade in order to decrease risk of falls     PT Treatment Day 0   Plan   Treatment/Interventions Functional transfer training;LE strengthening/ROM; Therapeutic exercise; Endurance training;Patient/family training;Equipment eval/education; Bed mobility;Gait training   PT Frequency Twice a day;7x/wk   Recommendation   PT Discharge Recommendation Home with outpatient rehabilitation  (per pt, OPPT scheduled for Monday)   Equipment Recommended Walker  (pt owns RW)   PT - OK to Discharge No   AM-PAC Basic Mobility Inpatient   Turning in Bed Without Bedrails 4   Lying on Back to Sitting on Edge of Flat Bed 3   Moving Bed to Chair 3   Standing Up From Chair 3   Walk in Room 3   Climb 3-5 Stairs 2   Basic Mobility Inpatient Raw Score 18   Basic Mobility Standardized Score 41 05       Rogerio Conway, PT, DPT

## 2021-05-19 NOTE — CONSULTS
Internal Medicine  Consultation Note    Patient: Liam Marie  Age/sex: 54 y o  female  Medical Record #: [id number]    Assessment/Plan    Status Post right Total KNEE ARTHROPLASTY   Continue post op pain control measures as prescribed   Follow bowel regimen to help decrease narcotic induced constipation    Follow post operative hemoglobin with serial CBC and treat accordingly   Monitor WBC and fever curve post op while encouraging use of incentive spirometer   DVT prophylaxis in place and reviewed  DM II   Hgb A1c 6 1   Home meds: metformin 1g bid   Switch to DM diet   Add sliding scale and coverage   Resume oral medications as above   Monitor trend    HTN   Hold lisinopril in the post operative setting to avoid hypotension/TOMMY   OK to resume on dc   Add hydralazine as needed for SBP >160   Monitor trend    Hyperlipidemia   Low cholesterol diet   Continue statin therapy    GERD   Cont PPI   Monitor for nausea/vomiting    Depression/anxiety  · Cont sertraline 50mg daily and xanax as needed        PRE-OP HGB LEVEL: 15 1    Subjective/ HPI: Liam Marie was seen and examined  Hx of KNEE pain failed out patient conservative measures  Elected to undergo total KNEE arthroplasty We are asked to see patient for post op management of underlying medical co-morbidities as outlined above  Pt did well intra and post operatively with good hemodynamics  Pt currently comfortable and without any reported post op nausea  ROS:   A 10 point ROS was performed; negative except as noted above       Social History:    Substance Use History:   Social History     Substance and Sexual Activity   Alcohol Use Yes    Alcohol/week: 3 0 standard drinks    Types: 3 Cans of beer per week    Frequency: Monthly or less    Drinks per session: 3 or 4    Comment: socially     Social History     Tobacco Use   Smoking Status Former Smoker    Packs/day: 0 25    Years: 20 00    Pack years: 5 00    Types: Cigarettes    Quit date: 3/16/2018    Years since quitting: 3 1   Smokeless Tobacco Never Used   Tobacco Comment    approx less than  half a pack     Social History     Substance and Sexual Activity   Drug Use No    Comment: Denies       Family History:    non-contributory      Review of Scheduled Meds:  Current Facility-Administered Medications   Medication Dose Route Frequency Provider Last Rate    cefazolin  2,000 mg Intravenous Once Kirill Ozuna MD      lactated ringers  125 mL/hr Intravenous Continuous Aleksandra Bansal  mL/hr (05/19/21 0850)    lactated ringers  125 mL/hr Intravenous Continuous Kirill Ozuna MD      betadine 20 mL in sodium chloride 0 9% 500 ml   Irrigation Once Kirill Ozuna MD       Facility-Administered Medications Ordered in Other Encounters   Medication Dose Route Frequency Provider Last Rate    ceFAZolin   Intravenous PRN Elmarie Lali, CRNA      dexamethasone (PF)    PRN Elmarie Lali, CRNA      fentanyl citrate (PF)   Intravenous PRN Elmarie Lali, CRNA      HYDROmorphone    PRN Elmarie Lali, CRNA      ketamine   Intramuscular PRN Elmarie Lali, CRNA      lactated ringers    Continuous PRN Elmarie Lali, CRNA      lidocaine (PF)    PRN Elmarie Lali, CRNA      midazolam   Intravenous PRN Elmarie Lali, CRNA      ondansetron    PRN Elmarie Lali, CRNA      phenylephrine (CLINTON-SYNEPHRINE) 50 mg (STANDARD CONCENTRATION) in sodium chloride 0 9% 250 mL    Continuous PRN Elmarie Lali, CRNA 20 mcg/min (05/19/21 1040)    phenylephrine    PRN Elmarie Lali, CRNA      propofol   Intravenous PRN Elmarie Lali, CRNA      ROCuronium    PRN Elmarie Lali, CRNA         Labs:                Invalid input(s): LABGLOM, CMP             Results from last 7 days   Lab Units 05/19/21  0833   POC GLUCOSE mg/dl 132       Lab Results   Component Value Date    URINECX 30,000-39,000 cfu/ml Escherichia coli ESBL (A) 01/11/2019    URINECX 8161-1234 cfu/ml 2019       Input and Output Summary (last 24 hours):     No intake or output data in the 24 hours ending 21 1042    Imaging:     No orders to display       *Labs /Radiology studiesLabs reviewed  *Medications reviewed and reconciled as needed  *Please refer to order section for additional ordered labs studies  *Case discussed with primary attending during morning huddle case rounds    Vitals:   Temp (24hrs), Av 3 °F (36 3 °C), Min:97 3 °F (36 3 °C), Max:97 3 °F (36 3 °C)    Temp:  [97 3 °F (36 3 °C)] 97 3 °F (36 3 °C)  HR:  [95] 95  Resp:  [20] 20  BP: (106)/(77) 106/77  SpO2:  [97 %] 97 %  Body mass index is 29 18 kg/m²  Physical Exam:   General Appearance: no distress, conversive  HEENT: PERRLA, conjuctiva normal; oropharynx clear; mucous membranes moist;   Neck:  Supple, no lymphadenopathy or thyromegaly  Lungs: CTA, normal respiratory effort, no retractions, expiratory effort normal  CV: regular rate and rhythm , PMI normal   ABD: soft non tender, no masses , no hepatic or splenomegaly  EXT: DP pulses intact, no lymphadenopathy, no edema ;  right KNEE dressing/drain in place  : mckeon draining clear yellow urine  Skin: normal turgor, normal texture, no rash  Psych: affect normal, mood normal  Neuro: AAOx3          Invasive Devices     Peripheral Intravenous Line            Peripheral IV -- days    Peripheral IV 21 Left Hand less than 1 day          Drain            Urethral Catheter Latex;Straight-tip 16 Fr  less than 1 day          Airway            ETT  Cuffed;Oral;Straight; Inflated 7 mm less than 1 day                   Code Status: No Order  Current Length of Stay: 0 day(s)    Total floor / unit time spent today 45 minutes with more than 50% spent counseling/coordinating care  Counseling includes discussion with patient re: progress  and discussion with patient of his/her current medical state/information   Coordination of patient's care was performed in conjunction with primary service  Time invested included review of patient's labs, vitals, and management of their comorbidities with continued monitoring  In addition, this patient was discussed with medical team including physician and advanced extenders  The care of the patient was extensively discussed and appropriate treatment plan was formulated unique for this patient  ** Please Note: Fluency Direct voice to text software may have been used in the creation of this document   Audio transcription errors may occur**

## 2021-05-19 NOTE — ANESTHESIA POSTPROCEDURE EVALUATION
Post-Op Assessment Note    No complications documented      BP   117/58   Temp  97 6   Pulse  92   Resp   22   SpO2   96 face mask

## 2021-05-20 VITALS
HEIGHT: 64 IN | HEART RATE: 95 BPM | RESPIRATION RATE: 18 BRPM | OXYGEN SATURATION: 91 % | SYSTOLIC BLOOD PRESSURE: 101 MMHG | BODY MASS INDEX: 29.76 KG/M2 | DIASTOLIC BLOOD PRESSURE: 48 MMHG | WEIGHT: 174.3 LBS | TEMPERATURE: 98.3 F

## 2021-05-20 DIAGNOSIS — Z96.651 AFTERCARE FOLLOWING RIGHT KNEE JOINT REPLACEMENT SURGERY: Primary | ICD-10-CM

## 2021-05-20 DIAGNOSIS — Z47.1 AFTERCARE FOLLOWING RIGHT KNEE JOINT REPLACEMENT SURGERY: Primary | ICD-10-CM

## 2021-05-20 LAB
ANION GAP SERPL CALCULATED.3IONS-SCNC: 9 MMOL/L (ref 4–13)
BUN SERPL-MCNC: 16 MG/DL (ref 5–25)
CALCIUM SERPL-MCNC: 8.8 MG/DL (ref 8.3–10.1)
CHLORIDE SERPL-SCNC: 101 MMOL/L (ref 100–108)
CO2 SERPL-SCNC: 29 MMOL/L (ref 21–32)
CREAT SERPL-MCNC: 0.76 MG/DL (ref 0.6–1.3)
ERYTHROCYTE [DISTWIDTH] IN BLOOD BY AUTOMATED COUNT: 13.6 % (ref 11.6–15.1)
GFR SERPL CREATININE-BSD FRML MDRD: 89 ML/MIN/1.73SQ M
GLUCOSE SERPL-MCNC: 128 MG/DL (ref 65–140)
HCT VFR BLD AUTO: 40.1 % (ref 34.8–46.1)
HGB BLD-MCNC: 12.8 G/DL (ref 11.5–15.4)
MCH RBC QN AUTO: 30.8 PG (ref 26.8–34.3)
MCHC RBC AUTO-ENTMCNC: 31.9 G/DL (ref 31.4–37.4)
MCV RBC AUTO: 97 FL (ref 82–98)
PLATELET # BLD AUTO: 285 THOUSANDS/UL (ref 149–390)
PMV BLD AUTO: 11.4 FL (ref 8.9–12.7)
POTASSIUM SERPL-SCNC: 3.7 MMOL/L (ref 3.5–5.3)
RBC # BLD AUTO: 4.15 MILLION/UL (ref 3.81–5.12)
SODIUM SERPL-SCNC: 139 MMOL/L (ref 136–145)
WBC # BLD AUTO: 13.63 THOUSAND/UL (ref 4.31–10.16)

## 2021-05-20 PROCEDURE — 80048 BASIC METABOLIC PNL TOTAL CA: CPT | Performed by: ORTHOPAEDIC SURGERY

## 2021-05-20 PROCEDURE — 97530 THERAPEUTIC ACTIVITIES: CPT

## 2021-05-20 PROCEDURE — 99024 POSTOP FOLLOW-UP VISIT: CPT | Performed by: PHYSICIAN ASSISTANT

## 2021-05-20 PROCEDURE — 97110 THERAPEUTIC EXERCISES: CPT

## 2021-05-20 PROCEDURE — NC001 PR NO CHARGE: Performed by: PHYSICIAN ASSISTANT

## 2021-05-20 PROCEDURE — NC001 PR NO CHARGE: Performed by: ORTHOPAEDIC SURGERY

## 2021-05-20 PROCEDURE — 97116 GAIT TRAINING THERAPY: CPT

## 2021-05-20 PROCEDURE — 85027 COMPLETE CBC AUTOMATED: CPT | Performed by: ORTHOPAEDIC SURGERY

## 2021-05-20 PROCEDURE — 97166 OT EVAL MOD COMPLEX 45 MIN: CPT

## 2021-05-20 RX ORDER — ACETAMINOPHEN 325 MG/1
975 TABLET ORAL EVERY 8 HOURS SCHEDULED
Status: DISCONTINUED | OUTPATIENT
Start: 2021-05-20 | End: 2021-05-20 | Stop reason: HOSPADM

## 2021-05-20 RX ORDER — METHOCARBAMOL 500 MG/1
500 TABLET, FILM COATED ORAL EVERY 6 HOURS PRN
Status: DISCONTINUED | OUTPATIENT
Start: 2021-05-20 | End: 2021-05-20 | Stop reason: HOSPADM

## 2021-05-20 RX ORDER — OXYCODONE HYDROCHLORIDE 5 MG/1
TABLET ORAL
Qty: 30 TABLET | Refills: 0 | Status: SHIPPED | OUTPATIENT
Start: 2021-05-20 | End: 2021-08-31

## 2021-05-20 RX ADMIN — PANTOPRAZOLE SODIUM 40 MG: 40 TABLET, DELAYED RELEASE ORAL at 05:12

## 2021-05-20 RX ADMIN — CEFAZOLIN SODIUM 2000 MG: 2 SOLUTION INTRAVENOUS at 01:32

## 2021-05-20 RX ADMIN — SERTRALINE 50 MG: 50 TABLET, FILM COATED ORAL at 08:23

## 2021-05-20 RX ADMIN — OXYCODONE HYDROCHLORIDE 10 MG: 10 TABLET ORAL at 08:47

## 2021-05-20 RX ADMIN — HYDROMORPHONE HYDROCHLORIDE 0.5 MG: 1 INJECTION, SOLUTION INTRAMUSCULAR; INTRAVENOUS; SUBCUTANEOUS at 06:21

## 2021-05-20 RX ADMIN — METHOCARBAMOL 500 MG: 500 TABLET ORAL at 08:23

## 2021-05-20 RX ADMIN — ACETAMINOPHEN 975 MG: 325 TABLET, FILM COATED ORAL at 08:23

## 2021-05-20 RX ADMIN — METFORMIN HYDROCHLORIDE 1000 MG: 500 TABLET ORAL at 08:23

## 2021-05-20 RX ADMIN — HYDROMORPHONE HYDROCHLORIDE 0.5 MG: 1 INJECTION, SOLUTION INTRAMUSCULAR; INTRAVENOUS; SUBCUTANEOUS at 01:37

## 2021-05-20 RX ADMIN — OXYCODONE HYDROCHLORIDE 10 MG: 10 TABLET ORAL at 04:37

## 2021-05-20 NOTE — CASE MANAGEMENT
Not a bundle  Not a readmission  Met with pt & explained CM role  Pt lives with  in mobile home with ramp entry in back  Reports was Xander Manjit, works & drives  DME rw  No h/o vna or rhb  Denies any MH,D&A tx  Uses [facility]  Wants to use [employer]  Emergency contact,  Waylon Owen [phone]  Will have ride home  Has OPPT already set up & RW      Pt discussed during care rounds  Has lovenox filled prior to surgery  CM reviewed d/c planning process including the following: identifying help at home, patient preference for d/c planning needs, Discharge Lounge, [facility] Meds to Bed program, availability of treatment team to discuss questions or concerns patient and/or family may have regarding understanding medications and recognizing signs and symptoms once discharged  CM also encouraged patient to follow up with all recommended appointments after discharge  Patient advised of importance for patient and family to participate in managing patients medical well being  Updated by nursing pt needs atc clarified & they are reaching out to ortho & will update pt

## 2021-05-20 NOTE — PROGRESS NOTES
Progress Note - Orthopedics   Pema Butler 54 y o  female MRN: [id number]  Unit/Bed#: -[facility] Encounter: [id number]    Assessment:  Postop day 1 right total knee replacement arthroplasty  DM 2  Hypertension  Hyperlipidemia  GERD  Depression anxiety  Observe for acute blood loss anemia    Plan: Total knee replacement protocol  Physical therapy  DVT prophylaxis  Case management discharge planning  Hemovac discontinued  Observe for acute blood loss anemia    Weight bearing:  Weight-bearing as tolerated right lower extremity    VTE Pharmacologic Prophylaxis: Enoxaparin (Lovenox)  VTE Mechanical Prophylaxis: foot pump applied    Subjective:  Adult female who is postop day 1 from right total knee replacement, she reports significant pain, she elected not to receive a block by Anesthesia    Vitals: Blood pressure 131/91, pulse 80, temperature 98 7 °F (37 1 °C), resp  rate 17, height 5' 4" (1 626 m), weight 79 1 kg (174 lb 4 8 oz), SpO2 96 %  ,Body mass index is 29 92 kg/m²        Intake/Output Summary (Last 24 hours) at 5/20/2021 8757  Last data filed at 5/20/2021 0550  Gross per 24 hour   Intake 1750 ml   Output 1900 ml   Net -150 ml       Invasive Devices     Peripheral Intravenous Line            Peripheral IV -- days    Peripheral IV 05/19/21 Left Hand less than 1 day          Drain            Closed/Suction Drain Right Knee Accordion 10 Fr  less than 1 day                Physical Exam:  Awake alert adult female, out of bed in a reclining chair with legs elevated  Ortho Exam: Knee:  She has Ace wraps and dry dressing from the midthigh to her foot right lower extremity  Hemovac output had discontinued significantly through the night and was removed at this time  She has no calf pain she has negative Homans test    Lab, Imaging and other studies:  Pending, the reader is directed to the patient's chart

## 2021-05-20 NOTE — OCCUPATIONAL THERAPY NOTE
Occupational Therapy Evaluation     Patient Name: Brandy Daniel  FCYFE'F Date: 5/20/2021  Problem List  Principal Problem:    Status post total knee replacement, right    Past Medical History  Past Medical History:   Diagnosis Date    Anxiety     Asthma     Colon polyp     Depression     Diverticulitis     Diverticulitis of colon     Follicular cyst of ovary 12/23/2014    Former smoker     GERD (gastroesophageal reflux disease)     Glycosuria     Headache     Hyperlipidemia     Hypertension     Kidney calculi 1/11/2019    Obesity     Ovarian cyst     Overactive bladder     Overweight     Pulmonary nodule     Renal calculus     Tinea pedis of left foot 6/8/2020    Vertigo     Wears partial dentures     Lower plate -partial     Past Surgical History  Past Surgical History:   Procedure Laterality Date    APPENDECTOMY      BLADDER SUSPENSION      [facility]    CERVICAL BIOPSY  W/ LOOP ELECTRODE EXCISION      COLONOSCOPY      DILATION AND CURETTAGE OF UTERUS      HYSTERECTOMY  2007    ovaries present bilaterally    OH COLONOSCOPY FLX DX W/COLLJ SPEC WHEN PFRMD N/A 9/26/2017    Procedure: EGD AND COLONOSCOPY;  Surgeon: Claudia Dorman MD;  Location: [facility]; Service: Gastroenterology    OH REVISE MEDIAN N/CARPAL TUNNEL SURG Right 5/29/2018    Procedure: CARPAL TUNNEL RELEASE;  Surgeon: David Miramontes DO;  [facility];  Service: Orthopedics    TONSILLECTOMY      TUBAL LIGATION      WISDOM TOOTH EXTRACTION             05/20/21 0835   OT Last Visit   OT Visit Date 05/20/21   Note Type   Note type Evaluation   Restrictions/Precautions   Weight Bearing Precautions Per Order Yes   RLE Weight Bearing Per Order WBAT   Pain Assessment   Pain Assessment Tool 0-10   Pain Score Worst Possible Pain   Pain Location/Orientation Orientation: Right;Location: Knee   Hospital Pain Intervention(s) Ambulation/increased activity; Emotional support   Home Living   Type of [address] Layout One level   Bathroom Shower/Tub Walk-in shower   Bathroom Toilet Standard   Bathroom Equipment Grab bars in shower;Commode;Built-in shower seat   Bathroom Accessibility Accessible   Home Equipment Walker;Reacher   Additional Comments Pt lives in a [facility] with her  who is on disability and can assist as needed  DME includes: 2 commodes, built in shower seat, reacher, walker, and grab bars  Prior Function   Level of [place] Independent with ADLs and functional mobility   Lives With Spouse   Receives Help From Family; Neighbor   ADL Assistance Independent   IADLs Independent   Falls in the last 6 months 0   Vocational Full time employment   Comments Pt was completing ADLs, IADLs, functional mobility, and 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  Pt receives assistane from her  and neighbors  Pt was working three 12 hr shifts as a CNA and babysitting grandchildren  Lifestyle   Autonomy I with ADLs, IADLs, functional mobility, and work I  Reciprocal Relationships Lives with ; has children/grandchildren; friendly neighbors    Service to Others CNA    Intrinsic Gratification Spending time with grandchildren   Psychosocial   Psychosocial (WDL) WDL   ADL   Where Assessed Chair   Eating Assistance 7  Independent   Grooming Assistance 6  [address] 5  Supervision/Setup   [address] 4  [address] 6  Modified independent   [address] [facility] 4  Minimal Assistance   Additional Comments Increased time and use of RW for steadying while pulling pants over hips in stance  Bed Mobility   Additional Comments Pt was seated in recliner chair upon arrival; pt was left in recliner chair with all needs within reach and nurse in room      Transfers   Sit to Stand 5  Supervision   Additional items Increased time required   Stand to Sit 5 Supervision   Additional items Increased time required   Additional Comments RW use   Functional Mobility   Additional Comments Unable to assess    Balance   Static Sitting Good   Dynamic Sitting Fair +   Static Standing Fair -   Dynamic Standing Fair -   Activity Tolerance   Activity Tolerance Patient limited by pain   Medical Staff Made Aware OT Wendy Thomas    Nurse Made Aware Yes    RUE Assessment   RUE Assessment WFL   LUE Assessment   LUE Assessment WFL   Hand Function   Gross Motor Coordination Functional   Fine Motor Coordination Functional   Sensation   Additional Comments No apparent deficits    Vision-Basic Assessment   Current Vision No visual deficits   Vision - Complex Assessment   Ocular Range of Motion WFL   Perception   Inattention/Neglect Appears intact   Cognition   Overall Cognitive Status WFL   Arousal/Participation Alert; Cooperative;Responsive   Attention Within functional limits   Orientation Level Oriented X4   Memory Within functional limits   Following Commands Follows all commands and directions without difficulty   Comments Pt was limitied by significant pain this session however demonstrated intact insight to deficits and limitations for returning home,    Assessment   Assessment Pt is a 54 y o female presenting to B with prior dx of osteoarthritis of R knee  Pt underwent R TKA on 05/19  Comorbidities include DM II, HTN, Hyperlipidemia, GERD, and depression/anxiety  Pt was I PTA  Currently, pt is completing UB ADLs with Mod I, LB ADLs with Min A, and functional transfers with supervision and use of RW  Functional limitations consist of increased pain, and decreased activity tolerance  From an OT standpoint, anticipate d/c home with family support and available DME pending medical stability   Pt is not recommended for OT for remainder of IP stay   Goals   Patient Goals Return home    Recommendation   OT Discharge Recommendation No rehabilitation needs   OT - OK to Discharge Yes   AM-PAC Daily Activity Inpatient   Lower Body Dressing 3   Bathing 3   Toileting 3   Upper Body Dressing 4   Grooming 4   Eating 4   Daily Activity Raw Score 21   Daily Activity Standardized Score (Calc for Raw Score >=11) 44 27   AM-PAC Applied Cognition Inpatient   Following a Speech/Presentation 4   Understanding Ordinary Conversation 4   Taking Medications 4   Remembering Where Things Are Placed or Put Away 4   Remembering List of 4-5 Errands 4   Taking Care of Complicated Tasks 4   Applied Cognition Raw Score 24   Applied Cognition Standardized Score 62 21

## 2021-05-20 NOTE — PLAN OF CARE
Problem: PHYSICAL THERAPY ADULT  Goal: Performs mobility at highest level of function for planned discharge setting  See evaluation for individualized goals  Description: Treatment/Interventions: Functional transfer training, LE strengthening/ROM, Therapeutic exercise, Endurance training, Patient/family training, Equipment eval/education, Bed mobility, Gait training  Equipment Recommended: Walker(pt owns RW)       See flowsheet documentation for full assessment, interventions and recommendations  Outcome: Progressing  Note: Prognosis: Good  Problem List: Decreased strength, Decreased range of motion, Decreased endurance, Impaired balance, Decreased mobility, Pain  Assessment: Patient out of bed in bathroom at time of PT treatment session  Patient reports having increased right knee pain, but is willing to participate with therapy  Patient was able to perform all transfers with supervision which is improved compared to previous session, although, increased time still required to complete due to patient's pain level  Slight cuing was needed for hand placement during sit to stand transfers  Patient tolerated increased ambulation distances with supervision and the use of a rolling walker, but distances remain limited by pain  One seated rest break was required during gait training 2* to pain  No gross loss of balance was noted with gait training, but patient did present with decreased stance time on right, forward flexed posture, and decreased step length bilaterally  With tactile cuing patient was able to correct forward flexed posture during ambulation  Additionally, patient was able to tolerate and perform all lower extremity TherEx seated out of bed in chair without any increase in complaints  Slight cuing was needed for proper form and pacing with TherEx  Increased time was required to complete all mobility and TherEx this session due to patient's increased pain level    PT spoke with patient who denies any mobility or safety concerns about returning home at time of discharge  Patient states her spouse and supportive neighbors are able to assist her as needed and patient is very motivated to go home today  Patient states "if I pain is under control i'll be fine at home"  At conclusion of PT treatment session patient was assisted back into chair with all needs within reach  PT will continue to follow, D/C recommendation when medically cleared is home with increased support, OPPT  Barriers to Discharge: None  Barriers to Discharge Comments: Patient denies any mobility or safety concerns about returning home at time of discharge     PT Discharge Recommendation: Home with outpatient rehabilitation(home with increased support, OPPT )     PT - OK to Discharge: Yes(when medically cleared )    See flowsheet documentation for full assessment

## 2021-05-20 NOTE — PHYSICAL THERAPY NOTE
PHYSICAL THERAPY NOTE          Patient Name: Cyndie DUARTE'S Date: 5/20/2021 05/20/21 2198   Note Type   Note Type Treatment   Pain Assessment   Pain Assessment Tool 0-10   Pain Score 9   Pain Location/Orientation Orientation: Right;Location: Knee   Pain Onset/Description Onset: Ongoing;Frequency: Constant/Continuous; Descriptor: Aching   Effect of Pain on Daily Activities Increased pain with activity   Patient's Stated Pain Goal No pain   Hospital Pain Intervention(s) Ambulation/increased activity;Repositioned   Restrictions/Precautions   Weight Bearing Precautions Per Order Yes   RLE Weight Bearing Per Order WBAT   Other Precautions Fall Risk;Pain   General   Chart Reviewed Yes   Response to Previous Treatment Patient with no complaints from previous session  Family/Caregiver Present No   Cognition   Overall Cognitive Status WFL   Arousal/Participation Alert   Attention Within functional limits   Orientation Level Oriented X4   Memory Within functional limits   Following Commands Follows all commands and directions without difficulty   Subjective   Subjective Patient willing to participate in PT treatment session   Bed Mobility   Additional Comments NA, patient out of bed in bathroom at time of PT eval   Transfers   Sit to Stand 5  Supervision   Additional items Increased time required   Stand to Sit 5  Supervision   Additional items Increased time required   Additional Comments Increased time required during transfers due to pain   Ambulation/Elevation   Gait pattern Short stride; Foward flexed; Excessively slow   Gait Assistance 5  Supervision   Assistive Device Rolling walker   Distance 40ft, 55ft   (seated rest break required )   Balance   Static Sitting Fair +   Static Standing Fair -   Ambulatory Fair -   Activity Tolerance   Activity Tolerance Patient limited by pain; Patient limited by fatigue   Nurse Made Aware Patient appropriate to be seen and mobilized per nursing   Exercises   Quad Sets Sitting;10 reps;AROM; Right   Heelslides Sitting;15 reps;AROM; Right  (x 2 sets )   Hip Abduction Sitting;15 reps;AAROM; Right  (x 2 sets )   Knee AROM Long Arc Quad Sitting;15 reps;AROM; Right  (x 2 sets )   Assessment   Prognosis Good   Problem List Decreased strength;Decreased range of motion;Decreased endurance; Impaired balance;Decreased mobility;Pain   Assessment Patient out of bed in bathroom at time of PT treatment session  Patient reports having increased right knee pain, but is willing to participate with therapy  Patient was able to perform all transfers with supervision which is improved compared to previous session, although, increased time still required to complete due to patient's pain level  Slight cuing was needed for hand placement during sit to stand transfers  Patient tolerated increased ambulation distances with supervision and the use of a rolling walker, but distances remain limited by pain  One seated rest break was required during gait training 2* to pain  No gross loss of balance was noted with gait training, but patient did present with decreased stance time on right, forward flexed posture, and decreased step length bilaterally  With tactile cuing patient was able to correct forward flexed posture during ambulation  Additionally, patient was able to tolerate and perform all lower extremity TherEx seated out of bed in chair without any increase in complaints  Slight cuing was needed for proper form and pacing with TherEx  Increased time was required to complete all mobility and TherEx this session due to patient's increased pain level  PT spoke with patient who denies any mobility or safety concerns about returning home at time of discharge  Patient states her spouse and supportive neighbors are able to assist her as needed and patient is very motivated to go home today    Patient states "if I pain is under control i'll be fine at home"  At conclusion of PT treatment session patient was assisted back into chair with all needs within reach  PT will continue to follow, D/C recommendation when medically cleared is home with increased support, OPPT  Barriers to Discharge None   Barriers to Discharge Comments Patient denies any mobility or safety concerns about returning home at time of discharge   Goals   Patient Goals " to go home today"   [facility] Expiration Date 05/29/21   PT Treatment Day 1   Plan   Treatment/Interventions Functional transfer training;LE strengthening/ROM; Therapeutic exercise; Endurance training;Patient/family training;Equipment eval/education; Bed mobility;Gait training;Spoke to nursing;OT   Progress Progressing toward goals   PT Frequency 7x/wk; Twice a day   Recommendation   PT Discharge Recommendation Home with outpatient rehabilitation  (home with increased support, OPPT )   Equipment Recommended [employer]  (pt already owns )   Lizz 74 walker   PT - OK to Discharge Yes  (when medically cleared )   AM-PAC Basic Mobility Inpatient   Turning in Bed Without Bedrails 4   Lying on Back to Sitting on Edge of Flat Bed 4   Moving Bed to Chair 4   Standing Up From Chair 4   Walk in Room 3   Climb 3-5 Stairs 3   Basic Mobility Inpatient Raw Score 22   Basic Mobility Standardized Score 47 4   Portions of the documentation may have been created using voice recognition software  Occasional wrong word or sound alike substitutions may have occurred due to the inherent limitations of the voice recognition software  Read the chart carefully and recognize, using context, where substitutions have occurred      Oliverio , PT, DPT

## 2021-05-20 NOTE — DISCHARGE SUMMARY
ORTHOPEDICS DISCHARGE SUMMARY  Richard Coombs 54 y o  female MRN: [id number]  Unit/Bed#: -01    Attending Physician: Herlinda Cartwright    Admitting diagnosis: Primary osteoarthritis of right knee [M17 11]  Chronic pain of right knee [M25 561, G89 29]    Discharge diagnosis:  Status post right total knee arthroplasty    Date of admission: 5/19/2021    Date of discharge: 05/20/21         Procedure:  Right total knee arthroplasty    HPI:  This is a 54y o  year old female that presented to the office with signs and symptoms of right knee osteoarthritis  They tried and failed conservative treatment measures and wished to proceed with surgical intervention  The risks, benefits, and complications of the procedure were discussed with the patient and informed consent was obtained  Hospital Course: The patient was admitted to the hospital on 5/19/2021 and underwent an uncomplicated right total knee arthroplasty  They were transferred to the floor after a brief stay in the post-anesthesia care unit  Their pain was well managed with IV and oral pain medications  They began therapy on post operative day #1  Lovenox was also started for DVT prophylaxis 12 hours post operatively  Hemovac drain was removed on POD1  On discharge date pt was cleared by PT and the medicine team and determined to be safe for discharge  Daily discussion was had with the patient, nursing staff, orthopaedic team, and family members if present  All questions were answered to the patients satisifaction  0   Lab Value Date/Time    HGB 12 8 05/20/2021 0507    HGB 15 1 04/20/2021 0926    HGB 14 8 03/16/2021 0839    HGB 15 0 06/04/2020 0841    HGB 15 8 (H) 11/15/2019 0927    HGB 15 9 (H) 05/17/2019 0847    HGB 14 6 02/05/2019 0747    HGB 15 5 (H) 10/11/2018 0859    HGB 14 9 04/30/2018 1614    HGB 15 3 06/26/2017 1253     Vital signs remained stable and pt was resuscitated with IVF as needed   Body mass index is 29 92 kg/m²       Discharge Instructions: The patient was discharged weight bearing as tolerated to the right lower extremity  Lovenox will be continued for 28 days  Continue PT/OT  Take pain medications as instructed  Discharge Medications: For the complete list of discharge medications, please refer to the patient's medication reconciliation

## 2021-05-20 NOTE — CONSULTS
Acute pain service consulted for perioperative right lower extremity nerve blocks for right TKA  Patient seen by anesthesia preop, refused block at that time  Patient also seen postoperatively and again declined placement of peripheral nerve block      Alee Allen PA-C

## 2021-05-20 NOTE — PROGRESS NOTES
Internal Medicine Progress Note  Patient: Lucy Simms  Age/sex: 54 y o  female  Medical Record #: [id number]      ASSESSMENT/PLAN: (Interval History)  Lucy Simms is seen and examined and management for following issues:    Status Post right Total KNEE ARTHROPLASTY   Pain not well controlled   Continue encourage incentive spirometry; monitor fever curve   DVT prophylaxis in place and reviewed  Results from last 7 days   Lab Units 05/20/21  0507   WBC Thousand/uL 13 63*   HEMOGLOBIN g/dL 12 8   HEMATOCRIT % 40 1   PLATELETS Thousands/uL 285    Add standing tylenol and as needed methocarbamol      Operative acute blood loss   · Decrease in hgb levels from preop labs results; suspect due to post operation extravasation losses along with potential dilutional effects of fluids  · No indication for surgery optimization venofer protocol/transfusion  · Transfuse PRBC if hgb less then 8 0 (per ortho protocol)  or symptomatic  · Monitor follow up CBC post intervention to trend effect    DM II  · Hgb A1c 6 1  · Home meds: metformin 1g bid  · Cont DM diet  · Cont sliding scale and coverage  · Resume oral medications as above  · Monitor trend     HTN  · Hold lisinopril in the post operative setting to avoid hypotension/TOMMY  · OK to resume on dc  · Add hydralazine as needed for SBP >160  · Monitor trend     Hyperlipidemia  · Low cholesterol diet  · Continue statin therapy     GERD  · Cont PPI  · Monitor for nausea/vomiting     Depression/anxiety  · Cont sertraline 50mg daily and xanax as needed      OK for dc from medicine standpoint     PRE-OP HGB LEVEL: 15 1  The above assessment and plan was reviewed and updated as determined by my evaluation of the patient on 5/20/2021      Labs:   Results from last 7 days   Lab Units 05/20/21  0507   WBC Thousand/uL 13 63*   HEMOGLOBIN g/dL 12 8   HEMATOCRIT % 40 1   PLATELETS Thousands/uL 285           Invalid input(s): LABGLOM, CMP          Results from last 7 days   Lab Units 05/19/21  0833   POC GLUCOSE mg/dl 132       Review of Scheduled Meds:  Current Facility-Administered Medications   Medication Dose Route Frequency Provider Last Rate    acetaminophen  650 mg Oral Q6H PRN Karlie Gomez MD      calcium carbonate  1,000 mg Oral Daily PRN Karlie Gomez MD      docusate sodium  100 mg Oral BID Karlie Gomez MD      enoxaparin  40 mg Subcutaneous Daily Karlie Gomez MD      HYDROmorphone  0 5 mg Intravenous Q2H PRN Karlie Gomez MD      lactated ringers  1,000 mL Intravenous Once PRN Karlie Gomez MD      And    lactated ringers  1,000 mL Intravenous Once PRN Karlie Gomez MD      lactated ringers  20 mL/hr Intravenous Continuous Horace Reed MD      lactated ringers  100 mL/hr Intravenous Continuous Karlie Gomez MD Stopped (05/20/21 0505)    metFORMIN  1,000 mg Oral BID With Meals Karlie Gomez MD      ondansetron  4 mg Intravenous Q6H PRN Karlie Gomez MD      oxyCODONE  10 mg Oral Q4H PRN Karlie Gomez MD      oxyCODONE  5 mg Oral Q4H PRN Karlie Gomez MD      pantoprazole  40 mg Oral Early Morning Karlie Gomez MD      senna  1 tablet Oral Daily Karlie Gomez MD      sertraline  50 mg Oral Daily Karlie Gomez MD      sodium chloride  1,000 mL Intravenous Once PRN Karlie Gomez MD      And    sodium chloride  1,000 mL Intravenous Once PRN Karlie Gomez MD         Subjective/ HPI: Patient seen and examined  Patients overnight issues or events were reviewed with nursing or staff during rounds or morning huddle session  New or overnight issues include the following:     Pt without any overnight events or reported nursing issues; does not feel that pain is very well controlled      ROS:   A 10 point ROS was performed; negative except as noted above         Imaging:     No orders to display       *Labs /Radiology studies Reviewed  *Medications  reviewed and reconciled as needed  *Please refer to order section for additional ordered labs studies  *Case discussed with primary attending during morning huddle case rounds    Physical Examination:  Vitals:   Vitals:    05/19/21 2315 05/20/21 0321 05/20/21 0550 05/20/21 0703   BP: 128/65 131/91  (!) 101/48   Pulse: 89 80  95   Resp: 17 17  18   Temp: 98 4 °F (36 9 °C) 98 7 °F (37 1 °C)  98 3 °F (36 8 °C)   TempSrc:       SpO2: 98% 96%  91%   Weight:   79 1 kg (174 lb 4 8 oz)    Height:           General Appearance: no distress, conversive  HEENT: PERRLA, conjuctiva normal; oropharynx clear; mucous membranes moist;   Neck:  Supple, no lymphadenopathy or thyromegaly  Lungs: CTA, normal respiratory effort, no retractions, expiratory effort normal  CV: regular rate and rhythm , PMI normal   ABD: soft non tender, no masses , no hepatic or splenomegaly  EXT: DP pulses intact, no lymphadenopathy, no edema; right knee drain and surgical dressing in place  Skin: normal turgor, normal texture, no rash  Psych: affect normal, mood normal  Neuro: AAOx3    : mckeon removed ; due to void    The above physical exam was reviewed and updated as determined by my evaluation of the patient on 5/20/2021  Invasive Devices     Peripheral Intravenous Line            Peripheral IV -- days    Peripheral IV 05/19/21 Left Hand less than 1 day                   VTE Pharmacologic Prophylaxis: Enoxaparin  Code Status: Level 1 - Full Code  Current Length of Stay: 0 day(s)      Total time spent:  30 minutes with more than 50% spent counseling/coordinating care  Counseling includes discussion with patient re: progress  and discussion with patient of his/her current medical state/information  Coordination of patient's care was performed in conjunction with primary service  Time invested included review of patient's labs, vitals, and management of their comorbidities with continued monitoring  In addition, this patient was discussed with medical team including physician and advanced extenders   The care of the patient was extensively discussed and appropriate treatment plan was formulated unique for this patient  ** Please Note:  voice to text software may have been used in the creation of this document   Although proof errors in transcription or interpretation are a potential of such software**

## 2021-05-21 ENCOUNTER — TELEPHONE (OUTPATIENT)
Dept: OBGYN CLINIC | Facility: HOSPITAL | Age: 56
End: 2021-05-21

## 2021-05-21 NOTE — TELEPHONE ENCOUNTER
Pt contacted to complete a postoperative follow up call assessment  Pt did not answer, so a detailed VM was left requesting a callback to discuss

## 2021-05-21 NOTE — TELEPHONE ENCOUNTER
Spoke to pt for post-op follow up assessment  Pt reports she is doing very well but does have pain  7/10 pain now  AVS, AVS med list and F/Us reviewed with pt  Pt confirms she is taking  Oxycodone 5mg every 4 hours and  Tylenol 1000mg every 6-8 hours  She denies any drainage  She Started colace today, LBM day before surgery  Passing gas, no vomiting or abdominal pain  Pt was advised on a BM plan: Take Colace 100mg BID  Increase oral fluids to 6-8 glasses of water/day  Increase ambulation frequency  Eat small, frequent, meals high in protein and fiber  Eat Prunes or Drink prune juice  Take OTC miralax if above plan does not work, following directions on the back of the bottle and consulting [facility] with a med list to verify miralax is appropriate  Confirms she is  Icing and elevating, denies swelling concerns  Taking 325mg aspirin  twice daily as planned preoperatively (see 4/27 note)  PT 5/24  Surgeon 5/26  No barriers  Amb with RW, no falls  Pt encouraged to call me with any questions, concerns or issues

## 2021-05-24 ENCOUNTER — OFFICE VISIT (OUTPATIENT)
Dept: PHYSICAL THERAPY | Facility: REHABILITATION | Age: 56
End: 2021-05-24
Payer: COMMERCIAL

## 2021-05-24 DIAGNOSIS — Z96.651 STATUS POST TOTAL KNEE REPLACEMENT, RIGHT: Primary | ICD-10-CM

## 2021-05-24 PROCEDURE — 97112 NEUROMUSCULAR REEDUCATION: CPT | Performed by: PHYSICAL THERAPIST

## 2021-05-24 PROCEDURE — 97161 PT EVAL LOW COMPLEX 20 MIN: CPT | Performed by: PHYSICAL THERAPIST

## 2021-05-24 PROCEDURE — 97110 THERAPEUTIC EXERCISES: CPT | Performed by: PHYSICAL THERAPIST

## 2021-05-24 NOTE — PROGRESS NOTES
PT Evaluation     Today's date: 2021  Patient name: Romy Ramirez  : 1965  MRN: [id number]  Referring provider: Deepika Nance MD  Dx:   Encounter Diagnosis     ICD-10-CM    1  Status post total knee replacement, right  [id number] [id number]                   Assessment  Assessment details: Pt is a pleasant 54 y o female reporting to PT s/p R TKA on 2021  She presents with increased swelling, decreased ROM in extension and flexion, and global decreased strength of surrounding musculature of R knee, and abnormal gait  She exhibits decreased contractions of the quadriceps muscles  These impairments limit her in performing her ADLs at her PLOF, as well as restrict her from returning to work  Pt supervised and treated by PAT Sherman, with direct supervision by Tanya Arevalo DPT  Impairments: abnormal gait, abnormal muscle firing, abnormal or restricted ROM, activity intolerance, impaired balance and pain with function  Understanding of Dx/Px/POC: good   Prognosis: good  Prognosis details: Pt prognosis is good due to her motivation to return to work and PLOF  Goals  ST  Pt will be able to ambulate with [facility] community distances up to a mile in 4 weeks  2  Pt will be able to dress, including donning and doffing socks and shoes with less than 25% assistance and decreased time in 4 weeks  LT  Pt will be able to ambulate independently community distances up to 1-2 miles in 7 weeks  2  Pt will be able to ascend and descend stairs ad curbs in 7 weeks  3  Pt will be able to demonstrate adequate strength and ROM to return to PLOF and return to work       Plan  Patient would benefit from: skilled physical therapy  Planned modality interventions: thermotherapy: hydrocollator packs and cryotherapy  Planned therapy interventions: ADL retraining, balance, flexibility, functional ROM exercises, gait training, home exercise program, joint mobilization, manual therapy, neuromuscular re-education, patient education, strengthening, stretching, therapeutic activities and therapeutic exercise  Frequency: 2x week  Duration in visits: 8  Duration in weeks: 4  Treatment plan discussed with: patient        Subjective Evaluation    History of Present Illness  Mechanism of injury: surgery  Mechanism of injury: 2021     Pt is a 55 y/o female presenting to therapy s/p R TKA on 2021  She states the surgery went well and has been self managing her pain with tylenol and ice throughout the day after discontinuing use of OxyContin and has been taking Asprin as a DVT prophylaxis  She has been independent with ADLs such as cooking, dressing, bathing, and ambulating around her home but takes increased time and causes fatigue  States she has been sleeping in recliner due to her bed being too high up  She is ambulating with a rolling walker and states she wishes to use a cane as soon as possible  She denies drainage and redness from the wound but expressed concern for possible infection due to increase edema at the knee and ankle  She has a follow up with the doctor on Wednesday (2021)  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  Pain  Current pain ratin  At worst pain ratin    Patient Goals  Patient goals for therapy: increased motion, increased strength, return to work and decreased edema          Objective     Observations     Right Knee   Positive for edema and incision  Additional Observation Details  No warmth, redness, or drainage from incisional site       Active Range of Motion     Right Knee   Flexion: 45 degrees   Extension: -10 degrees     Passive Range of Motion     Right Knee   Flexion: 78 degrees   Extension: -8 degrees       Flowsheet Rows      Most Recent Value   PT/OT G-Codes   Current Score  63   Projected Score  81             Precautions: n/a    Daily Treatment Diary    Date             FOTO IE            Re-Eval IE Manuals    Knee PROM flex, ext  AYAN                                                   Neuro Re-Ed     Quad sets 5" x10                                                                                          Ther Ex    gastroc stretch with strap 30" x 3            Heel slides 5"x 10            SAQ                          Standing hip abd, ext             Standing knee flex             HR/TR                          Ther Activity    bike                          Gait Training                              Modalities    CP

## 2021-05-26 ENCOUNTER — OFFICE VISIT (OUTPATIENT)
Dept: OBGYN CLINIC | Facility: HOSPITAL | Age: 56
End: 2021-05-26

## 2021-05-26 ENCOUNTER — HOSPITAL ENCOUNTER (OUTPATIENT)
Dept: RADIOLOGY | Facility: HOSPITAL | Age: 56
Discharge: HOME | End: 2021-05-26
Attending: ORTHOPAEDIC SURGERY
Payer: COMMERCIAL

## 2021-05-26 ENCOUNTER — VBI (OUTPATIENT)
Dept: ADMINISTRATIVE | Facility: OTHER | Age: 56
End: 2021-05-26

## 2021-05-26 VITALS
WEIGHT: 174 LBS | HEART RATE: 137 BPM | DIASTOLIC BLOOD PRESSURE: 73 MMHG | SYSTOLIC BLOOD PRESSURE: 148 MMHG | HEIGHT: 64 IN | BODY MASS INDEX: 29.71 KG/M2

## 2021-05-26 DIAGNOSIS — Z96.651 AFTERCARE FOLLOWING RIGHT KNEE JOINT REPLACEMENT SURGERY: ICD-10-CM

## 2021-05-26 DIAGNOSIS — Z47.1 AFTERCARE FOLLOWING RIGHT KNEE JOINT REPLACEMENT SURGERY: ICD-10-CM

## 2021-05-26 DIAGNOSIS — Z47.1 AFTERCARE FOLLOWING RIGHT KNEE JOINT REPLACEMENT SURGERY: Primary | ICD-10-CM

## 2021-05-26 DIAGNOSIS — Z96.651 AFTERCARE FOLLOWING RIGHT KNEE JOINT REPLACEMENT SURGERY: Primary | ICD-10-CM

## 2021-05-26 PROCEDURE — 3008F BODY MASS INDEX DOCD: CPT | Performed by: NURSE PRACTITIONER

## 2021-05-26 PROCEDURE — 73560 X-RAY EXAM OF KNEE 1 OR 2: CPT

## 2021-05-26 PROCEDURE — 99024 POSTOP FOLLOW-UP VISIT: CPT | Performed by: ORTHOPAEDIC SURGERY

## 2021-05-26 NOTE — PROGRESS NOTES
Assessment:  1  Aftercare following right knee joint replacement surgery         Plan:  The patient is doing well and should continue daily Lovenox, pain medications as needed and current physical therapy regimen  The patient should follow up in one week  To do next visit:  Return in about 1 week (around 6/2/2021)  The above stated was discussed in layman's terms and the patient expressed understanding  All questions were answered to the patient's satisfaction  Scribe Attestation    I,:  Dalphine Hashimoto am acting as a scribe while in the presence of the attending physician :       I,:  Kolton Britt MD personally performed the services described in this documentation    as scribed in my presence :             Subjective:   Tha Zaldivar is a 54 y o  female who presents one week s/p right TKA, 5/19/2021  The patient is doing well  Today she complains of generalized right pain  She does participate in physical therapy  She does use Aspirin 325mg BID  She does use oxycodone and Tylenol for pain control  She denies fever, chills or shortness of breath          Review of systems negative unless otherwise specified in HPI    Past Medical History:   Diagnosis Date    Anxiety     Asthma     Colon polyp     Depression     Diverticulitis     Diverticulitis of colon     Follicular cyst of ovary 12/23/2014    Former smoker     GERD (gastroesophageal reflux disease)     Glycosuria     Headache     Hyperlipidemia     Hypertension     Kidney calculi 1/11/2019    Obesity     Ovarian cyst     Overactive bladder     Overweight     Pulmonary nodule     Renal calculus     Tinea pedis of left foot 6/8/2020    Vertigo     Wears partial dentures     Lower plate -partial       Past Surgical History:   Procedure Laterality Date    APPENDECTOMY      BLADDER SUSPENSION      [facility]    CERVICAL BIOPSY  W/ LOOP ELECTRODE EXCISION      COLONOSCOPY      DILATION AND CURETTAGE OF UTERUS  HYSTERECTOMY  2007    ovaries present bilaterally    [place] COLONOSCOPY FLX DX W/COLLJ SPEC WHEN PFRMD N/A 9/26/2017    Procedure: EGD AND COLONOSCOPY;  Surgeon: Adamaris Parada MD;  Location: [facility]; Service: Gastroenterology    [place] REVISE MEDIAN N/CARPAL TUNNEL SURG Right 5/29/2018    Procedure: CARPAL TUNNEL RELEASE;  Surgeon: Leanna Matt DO;  [facility];  Service: Orthopedics    [place] TOTAL KNEE ARTHROPLASTY Right 5/19/2021    Procedure: ARTHROPLASTY KNEE TOTAL;  Surgeon: Anna King MD;  [facility];  Service: Orthopedics    TONSILLECTOMY      TUBAL LIGATION      WISDOM TOOTH EXTRACTION         Family History   Problem Relation Age of Onset    Diabetes type II Mother     Asthma Mother     Stroke Father     Pancreatic cancer Father 68    Diabetes type II Father     No Known Problems Maternal Grandmother     No Known Problems Maternal Grandfather     No Known Problems Paternal Grandmother     No Known Problems Paternal Grandfather     No Known Problems Sister     No Known Problems Sister     No Known Problems Sister     No Known Problems Sister     No Known Problems Sister     No Known Problems Maternal Aunt     No Known Problems Paternal Aunt        Social History     Occupational History    Not on file   Tobacco Use    Smoking status: Former Smoker     Packs/day: 0 25     Years: 20 00     Pack years: 5 00     Types: Cigarettes     Quit date: 3/16/2018     Years since quitting: 3 1    Smokeless tobacco: Never Used    Tobacco comment: approx less than  half a pack   Substance and Sexual Activity    Alcohol use:  Yes     Alcohol/week: 3 0 standard drinks     Types: 3 Cans of beer per week     Frequency: Monthly or less     Drinks per session: 3 or 4     Comment: socially    Drug use: No     Comment: Denies    Sexual activity: Not Currently     Partners: Male         Current Outpatient Medications:     ALPRAZolam (XANAX) 0 25 mg tablet, Take 1 tablet by mouth 2 (two) times a day as needed, Disp: , Rfl:     Ascorbic Acid (vitamin C) 1000 MG tablet, Take 2,000 mg by mouth daily, Disp: , Rfl:     ascorbic acid (VITAMIN C) 500 MG tablet, Take 1 tablet (500 mg total) by mouth daily Start 30 days prior to surgery, Disp: 30 tablet, Rfl: 0    atorvastatin (LIPITOR) 40 mg tablet, Take 1 tablet (40 mg total) by mouth daily (Patient taking differently: Take 40 mg by mouth daily Takes in the am), Disp: 90 tablet, Rfl: 1    Cholecalciferol (Vitamin D3) 50 MCG (2000 UT) TABS, Take 2,000 Units by mouth daily, Disp: , Rfl:     enoxaparin (LOVENOX) 40 mg/0 4 mL, Inject 0 4 mL (40 mg total) under the skin daily (Patient not taking: Reported on 4/26/2021), Disp: 28 Syringe, Rfl: 0    ferrous sulfate 324 (65 Fe) mg, Take 1 tablet (324 mg total) by mouth 2 (two) times a day before meals Start 30 days prior to surgery, Disp: 60 tablet, Rfl: 0    folic acid (FOLVITE) 1 mg tablet, Take 1 tablet (1 mg total) by mouth daily Start 30 days prior to surgery, Disp: 30 tablet, Rfl: 0    lisinopril (ZESTRIL) 10 mg tablet, Take 1 5 tablets (15 mg total) by mouth daily, Disp: 180 tablet, Rfl: 1    metFORMIN (GLUCOPHAGE) 1000 MG tablet, Take 1 tablet (1,000 mg total) by mouth 2 (two) times a day with meals, Disp: 180 tablet, Rfl: 1    multivitamin (THERAGRAN) TABS, Take 1 tablet by mouth daily, Disp: , Rfl:     omeprazole (PriLOSEC) 20 mg delayed release capsule, Take 1 capsule (20 mg total) by mouth daily, Disp: 90 capsule, Rfl: 1    oxyCODONE (ROXICODONE) 5 mg immediate release tablet, 1 pill po Q4 Hrs prn, Disp: 30 tablet, Rfl: 0    sertraline (ZOLOFT) 50 mg tablet, Take 2 tablets (100 mg total) by mouth daily (Patient taking differently: Take 50 mg by mouth daily Takes in the am ), Disp: 180 tablet, Rfl: 1    Allergies   Allergen Reactions    Clonazepam Other (See Comments)     Pt states "didn't feel right on it "    Morphine Other (See Comments) and Confusion     Annotation - [id number]: "dopey"  Gets silly    Venlafaxine Other (See Comments)     Unknown--pt states " MD was trying pt on different medications "            Vitals:    05/26/21 1352   BP: 148/73   Pulse: (!) 137       Objective:  Physical exam  · General: Awake, Alert, Oriented  · Eyes: Pupils equal, round and reactive to light  · Heart: regular rate and rhythm  · Lungs: No audible wheezing  · Abdomen: soft                    Ortho Exam  Right knee:  Incision clean dry and intact  Staples well approximated   No erythema and no ecchymosis  Appropriate swelling of lower limb  Appropriate warmth of knee  Extensor mechanism intact  Extension near full  Flexion near 90  Calf compartments soft and supple  Sensation intact  Toes are warm sensate and mobile      Diagnostics, reviewed and taken today if performed as documented: The attending physician has personally reviewed the pertinent films in PACS and interpretation is as follows:  Right knee x-ray:  Well aligned prosthesis with no acute changes  Procedures, if performed today:    Procedures    None performed      Portions of the record may have been created with voice recognition software  Occasional wrong word or "sound a like" substitutions may have occurred due to the inherent limitations of voice recognition software  Read the chart carefully and recognize, using context, where substitutions have occurred

## 2021-05-28 ENCOUNTER — OFFICE VISIT (OUTPATIENT)
Dept: PHYSICAL THERAPY | Facility: REHABILITATION | Age: 56
End: 2021-05-28
Payer: COMMERCIAL

## 2021-05-28 ENCOUNTER — TELEPHONE (OUTPATIENT)
Dept: OBGYN CLINIC | Facility: HOSPITAL | Age: 56
End: 2021-05-28

## 2021-05-28 DIAGNOSIS — Z96.651 STATUS POST TOTAL KNEE REPLACEMENT, RIGHT: Primary | ICD-10-CM

## 2021-05-28 DIAGNOSIS — M17.11 PRIMARY OSTEOARTHRITIS OF RIGHT KNEE: ICD-10-CM

## 2021-05-28 PROCEDURE — 97140 MANUAL THERAPY 1/> REGIONS: CPT

## 2021-05-28 PROCEDURE — 97112 NEUROMUSCULAR REEDUCATION: CPT

## 2021-05-28 PROCEDURE — 97110 THERAPEUTIC EXERCISES: CPT

## 2021-05-28 NOTE — PROGRESS NOTES
Daily Note     Today's date: 2021  Patient name: Joy Campos  : 1965  MRN: [id number]  Referring provider: Nereida Sands MD  Dx:   Encounter Diagnosis     ICD-10-CM    1  Status post total knee replacement, right  Z96 651    2  Primary osteoarthritis of right knee  M17 11                   Subjective: pt reports returning to surgeon yesterday in which he was pleased with her progress  She noted will return next  for suture removal  She currently c/o pain in R knee and soreness following IE for a short period  Compliance noted with HEP  Objective: See treatment diary below      Assessment: Tolerated treatment well  Added exercises as listed with good response  Intermittent cues to follow through with proper technique  Most challenged with quad sets and SAQ's; poor quad activation  Trial NMES NV to promote increased quad activation  Patient demonstrated fatigue post treatment and would benefit from continued PT  Plan: Continue per plan of care  Progress treatment as tolerated         Precautions: n/a    Daily Treatment Diary    Date            FOTO IE            Re-Eval IE               Manuals    Knee PROM flex, ext  AYAN TE                                                  Neuro Re-Ed     Quad sets 5" x10 5"x10  NMES nv                                                                                         Ther Ex    gastroc stretch with strap 30" x 3 30"x3           Heel slides 5"x 10            SAQ  5" 2x5 /c min A NMES nv                        Standing hip abd, ext  x10 ea b/l           Standing knee flex  x10 ea b/l           HR/TR  2x10                        Ther Activity    bike  5 min                        Gait Training                              Modalities    CP  10 min

## 2021-05-28 NOTE — TELEPHONE ENCOUNTER
Patient sees Dr Miladis Jones  Patient is calling in stating that she had surgery on 5/19 to her right knee  She is calling in stating that she just got off the phone with [employer] disability and was advised that her her disability was only approved until 6/9 but she is still not able to work  The patient is stating that her disability people stated that they need records from us but is not exactly sure what is needed, she is going to reach back out to them to get further information

## 2021-06-01 ENCOUNTER — OFFICE VISIT (OUTPATIENT)
Dept: PHYSICAL THERAPY | Facility: REHABILITATION | Age: 56
End: 2021-06-01
Payer: COMMERCIAL

## 2021-06-01 DIAGNOSIS — Z96.651 STATUS POST TOTAL KNEE REPLACEMENT, RIGHT: Primary | ICD-10-CM

## 2021-06-01 DIAGNOSIS — M17.11 PRIMARY OSTEOARTHRITIS OF RIGHT KNEE: ICD-10-CM

## 2021-06-01 PROCEDURE — 97110 THERAPEUTIC EXERCISES: CPT | Performed by: PHYSICAL THERAPIST

## 2021-06-01 PROCEDURE — 97112 NEUROMUSCULAR REEDUCATION: CPT | Performed by: PHYSICAL THERAPIST

## 2021-06-01 NOTE — PROGRESS NOTES
Daily Note     Today's date: 2021  Patient name: Rivera Felix  : 1965  MRN: [id number]  Referring provider: Kadeem Tovar MD  Dx:   Encounter Diagnosis     ICD-10-CM    1  Status post total knee replacement, right  Z96 651    2  Primary osteoarthritis of right knee  M17 11                   Subjective: Pt reports she doesn't feel very well today, most likely secondary to pain medication  She reports she has right ankle swelling today as well  She will be following up with Dr Pamella Boateng tomorrow for suture removal      Objective: See treatment diary below  Assessment: Pt with good tolerance to progression of program noting appropriate challenge and fatigue  She required moderate verbal and manual cues for correct positioning and performance, difficulty activating quadriceps with all exercises which improved with tactile cues and simultaneous contralateral activation  Will continue to progress program as able  Pt will benefit from continued skilled PT intervention in order to address her remaining limitations and to restore maximal function  Plan: Continue per plan of care  Progress treatment as tolerated         Precautions: vertigo, HTN, HLD, GERD, asthma, anxiety, DM     Daily Treatment Diary      Assessment                       Eval/Reval                       FOTO         **         **   Manuals   Knee PROM                       Patellar ROM                          Exercise Diary     Bike - AROM  5'             Gastroc Stretch - strap  30"x3                      Heel Slides with Strap  10"x10                      Hamstring Stretch - strap  30"x3             Towel Crush  5"x10                      Quad Set  5"x10                      LAQ  5"x10                      SAQ  5"x10 Modalities    CP R Knee   Extension Stretch  Post-Tx  10'

## 2021-06-01 NOTE — TELEPHONE ENCOUNTER
Patient called stating her insurance attempted to send over disability paperwork to be filled out by our office  Patient was advised we received paperwork on 5/26 and that it is bin queue

## 2021-06-02 ENCOUNTER — OFFICE VISIT (OUTPATIENT)
Dept: OBGYN CLINIC | Facility: HOSPITAL | Age: 56
End: 2021-06-02

## 2021-06-02 VITALS
DIASTOLIC BLOOD PRESSURE: 68 MMHG | HEART RATE: 128 BPM | HEIGHT: 64 IN | SYSTOLIC BLOOD PRESSURE: 113 MMHG | BODY MASS INDEX: 29.87 KG/M2

## 2021-06-02 DIAGNOSIS — Z96.651 AFTERCARE FOLLOWING RIGHT KNEE JOINT REPLACEMENT SURGERY: Primary | ICD-10-CM

## 2021-06-02 DIAGNOSIS — Z47.1 AFTERCARE FOLLOWING RIGHT KNEE JOINT REPLACEMENT SURGERY: Primary | ICD-10-CM

## 2021-06-02 PROCEDURE — 99024 POSTOP FOLLOW-UP VISIT: CPT | Performed by: ORTHOPAEDIC SURGERY

## 2021-06-02 NOTE — PROGRESS NOTES
Assessment:   Diagnosis ICD-10-CM Associated Orders   1  Aftercare following right knee joint replacement surgery  Z47 1     Z96 651        Plan:    Second postoperative visit 2 weeks status post right total knee arthroplasty  Patient's incision is healed appropriately and staples removed successfully  Continue Lovenox until its completion  Continue physical therapy to maximize recovery  Walker to cane progression when able to do so  Ice elevate as indicated  Can get incision wet, do not submerge, pat dry  On topical ointments can be applied in approximately 7-10 days  To do next visit:  Return in about 4 weeks (around 6/30/2021) for re-check  The above stated was discussed in layman's terms and the patient expressed understanding  All questions were answered to the patient's satisfaction  Scribe Attestation    I,:  Pako Davalos am acting as a scribe while in the presence of the attending physician :       I,:  Kole Ramos MD personally performed the services described in this documentation    as scribed in my presence :             Subjective:   Marisol Black is a 54 y o  female who presents second postoperative visit 2 weeks status post right total knee arthroplasty  Patient overall is doing well  Her pain is well controlled  She uses a Rollator for ambulatory assistance  She has been administering her Lovenox for DVT prophylaxis  She presents today with a compression stocking on her right lower extremity that therapy gave her  She notes muscle soreness  She denies any calf pain         Review of systems negative unless otherwise specified in HPI  Review of Systems    Past Medical History:   Diagnosis Date    Anxiety     Asthma     Colon polyp     Depression     Diverticulitis     Diverticulitis of colon     Follicular cyst of ovary 12/23/2014    Former smoker     GERD (gastroesophageal reflux disease)     Glycosuria     Headache     Hyperlipidemia     Hypertension     Kidney calculi 1/11/2019    Obesity     Ovarian cyst     Overactive bladder     Overweight     Pulmonary nodule     Renal calculus     Tinea pedis of left foot 6/8/2020    Vertigo     Wears partial dentures     Lower plate -partial       Past Surgical History:   Procedure Laterality Date    APPENDECTOMY      BLADDER SUSPENSION      [facility]    CERVICAL BIOPSY  W/ LOOP ELECTRODE EXCISION      COLONOSCOPY      DILATION AND CURETTAGE OF UTERUS      HYSTERECTOMY  2007    ovaries present bilaterally    [place] COLONOSCOPY FLX DX W/COLLJ SPEC WHEN PFRMD N/A 9/26/2017    Procedure: EGD AND COLONOSCOPY;  Surgeon: Bon Haynes MD;  Location: [facility];   Service: Gastroenterology    [place] REVISE MEDIAN N/CARPAL TUNNEL SURG Right 5/29/2018    Procedure: CARPAL TUNNEL RELEASE;  Surgeon: Barbi Baxter DO;  [facility];  Service: Orthopedics    [place] TOTAL KNEE ARTHROPLASTY Right 5/19/2021    Procedure: ARTHROPLASTY KNEE TOTAL;  Surgeon: Petty Ga MD;  [facility];  Service: Orthopedics    TONSILLECTOMY      TUBAL LIGATION      WISDOM TOOTH EXTRACTION         Family History   Problem Relation Age of Onset    Diabetes type II Mother     Asthma Mother     Stroke Father     Pancreatic cancer Father 68    Diabetes type II Father     No Known Problems Maternal Grandmother     No Known Problems Maternal Grandfather     No Known Problems Paternal Grandmother     No Known Problems Paternal Grandfather     No Known Problems Sister     No Known Problems Sister     No Known Problems Sister     No Known Problems Sister     No Known Problems Sister     No Known Problems Maternal Aunt     No Known Problems Paternal Aunt        Social History     Occupational History    Not on file   Tobacco Use    Smoking status: Former Smoker     Packs/day: 0 25     Years: 20 00     Pack years: 5 00     Types: Cigarettes     Quit date: 3/16/2018     Years since quitting: 3 2    Smokeless tobacco: Never Used    Tobacco comment: approx less than  half a pack   Substance and Sexual Activity    Alcohol use:  Yes     Alcohol/week: 3 0 standard drinks     Types: 3 Cans of beer per week     Frequency: Monthly or less     Drinks per session: 3 or 4     Comment: socially    Drug use: No     Comment: Denies    Sexual activity: Not Currently     Partners: Male         Current Outpatient Medications:     ALPRAZolam (XANAX) 0 25 mg tablet, Take 1 tablet by mouth 2 (two) times a day as needed, Disp: , Rfl:     Ascorbic Acid (vitamin C) 1000 MG tablet, Take 2,000 mg by mouth daily, Disp: , Rfl:     ascorbic acid (VITAMIN C) 500 MG tablet, Take 1 tablet (500 mg total) by mouth daily Start 30 days prior to surgery, Disp: 30 tablet, Rfl: 0    atorvastatin (LIPITOR) 40 mg tablet, Take 1 tablet (40 mg total) by mouth daily (Patient taking differently: Take 40 mg by mouth daily Takes in the am), Disp: 90 tablet, Rfl: 1    Cholecalciferol (Vitamin D3) 50 MCG (2000 UT) TABS, Take 2,000 Units by mouth daily, Disp: , Rfl:     enoxaparin (LOVENOX) 40 mg/0 4 mL, Inject 0 4 mL (40 mg total) under the skin daily (Patient not taking: Reported on 4/26/2021), Disp: 28 Syringe, Rfl: 0    ferrous sulfate 324 (65 Fe) mg, Take 1 tablet (324 mg total) by mouth 2 (two) times a day before meals Start 30 days prior to surgery, Disp: 60 tablet, Rfl: 0    folic acid (FOLVITE) 1 mg tablet, Take 1 tablet (1 mg total) by mouth daily Start 30 days prior to surgery, Disp: 30 tablet, Rfl: 0    lisinopril (ZESTRIL) 10 mg tablet, Take 1 5 tablets (15 mg total) by mouth daily, Disp: 180 tablet, Rfl: 1    metFORMIN (GLUCOPHAGE) 1000 MG tablet, Take 1 tablet (1,000 mg total) by mouth 2 (two) times a day with meals, Disp: 180 tablet, Rfl: 1    multivitamin (THERAGRAN) TABS, Take 1 tablet by mouth daily, Disp: , Rfl:     omeprazole (PriLOSEC) 20 mg delayed release capsule, Take 1 capsule (20 mg total) by mouth daily, Disp: 90 capsule, Rfl: 1   oxyCODONE (ROXICODONE) 5 mg immediate release tablet, 1 pill po Q4 Hrs prn, Disp: 30 tablet, Rfl: 0    sertraline (ZOLOFT) 50 mg tablet, Take 2 tablets (100 mg total) by mouth daily (Patient taking differently: Take 50 mg by mouth daily Takes in the am ), Disp: 180 tablet, Rfl: 1    Allergies   Allergen Reactions    Clonazepam Other (See Comments)     Pt states "didn't feel right on it "    Morphine Other (See Comments) and Confusion     Annotation - [id number]: "dopey"  Gets silly    Venlafaxine Other (See Comments)     Unknown--pt states " MD was trying pt on different medications "            Vitals:    06/02/21 1423   BP: 113/68   Pulse: (!) 128       Objective:                    Right Knee Exam     Range of Motion   Extension: 10 (To within 5° with time)   Flexion: 90     Other   Swelling: moderate    Comments:      Intact extensor mechanism  Calf and thigh are soft nontender no signs DVT  Healed anterior incision no signs of infection  Staples removed successfully  Appropriate amount of warmth  Diagnostics, reviewed and taken today if performed as documented:    None performed          Procedures, if performed today:    Procedures    None performed      Portions of the record may have been created with voice recognition software  Occasional wrong word or "sound a like" substitutions may have occurred due to the inherent limitations of voice recognition software  Read the chart carefully and recognize, using context, where substitutions have occurred

## 2021-06-04 ENCOUNTER — OFFICE VISIT (OUTPATIENT)
Dept: PHYSICAL THERAPY | Facility: REHABILITATION | Age: 56
End: 2021-06-04
Payer: COMMERCIAL

## 2021-06-04 DIAGNOSIS — Z96.651 STATUS POST TOTAL KNEE REPLACEMENT, RIGHT: Primary | ICD-10-CM

## 2021-06-04 PROCEDURE — 97112 NEUROMUSCULAR REEDUCATION: CPT | Performed by: PHYSICAL THERAPIST

## 2021-06-04 PROCEDURE — 97110 THERAPEUTIC EXERCISES: CPT | Performed by: PHYSICAL THERAPIST

## 2021-06-04 NOTE — PROGRESS NOTES
Daily Note     Today's date: 2021  Patient name: James Pope  : 1965  MRN: [id number]  Referring provider: Desirae Jorge MD  Dx:   Encounter Diagnosis     ICD-10-CM    1  Status post total knee replacement, right  Z96 651                   Subjective: Pt reports feeling sore and achy globally in the knee this morning and took pain medication  States the follow up with Doctor went well to remove staples and wants to see her back in 4 weeks  He also instructed her to not wrap her knee with ACE bandage to reduce swelling at the ankle  Objective: See treatment diary below      Assessment: Tolerated treatment well  Patient demonstrated fatigue post treatment and would benefit from continued PT  Pt continues to demonstrate poor quad activation with exercises that improves slightly with tactile and verbal cueing, decreased knee extension as well as noted increased fatigue as progressed through exercises  Pt supervised and treated by PAT Shabazz, with direct supervision by Easton Anand DPT  Plan: Continue per plan of care  Progress treatment as tolerated         Precautions: vertigo, HTN, HLD, GERD, asthma, anxiety, DM     Daily Treatment Diary      Assessment                     Eval/Reval                       FOTO         **         **   Manuals   Knee PROM    JG                   Patellar ROM  JG                        Exercise Diary     Bike - AROM  5' 5'            Gastroc Stretch - strap  30"x3  30"x3                    Heel Slides with Strap  10"x10  10"x10                    Hamstring Stretch - strap  30"x3 30"x3            Towel Crush  5"x10  5" x 10                    Quad Set  5"x10  5"x 10                    LAQ  5"x10  5"x10                    SAQ  5"x10  5"x10 Modalities    CP R Knee   Extension Stretch  Post-Tx  10'  10'

## 2021-06-08 ENCOUNTER — OFFICE VISIT (OUTPATIENT)
Dept: PHYSICAL THERAPY | Facility: REHABILITATION | Age: 56
End: 2021-06-08
Payer: COMMERCIAL

## 2021-06-08 DIAGNOSIS — M17.11 PRIMARY OSTEOARTHRITIS OF RIGHT KNEE: ICD-10-CM

## 2021-06-08 DIAGNOSIS — Z96.651 STATUS POST TOTAL KNEE REPLACEMENT, RIGHT: Primary | ICD-10-CM

## 2021-06-08 PROCEDURE — 97140 MANUAL THERAPY 1/> REGIONS: CPT | Performed by: PHYSICAL THERAPIST

## 2021-06-08 PROCEDURE — 97112 NEUROMUSCULAR REEDUCATION: CPT | Performed by: PHYSICAL THERAPIST

## 2021-06-11 ENCOUNTER — OFFICE VISIT (OUTPATIENT)
Dept: PHYSICAL THERAPY | Facility: REHABILITATION | Age: 56
End: 2021-06-11
Payer: COMMERCIAL

## 2021-06-11 DIAGNOSIS — M17.11 PRIMARY OSTEOARTHRITIS OF RIGHT KNEE: ICD-10-CM

## 2021-06-11 DIAGNOSIS — Z96.651 STATUS POST TOTAL KNEE REPLACEMENT, RIGHT: Primary | ICD-10-CM

## 2021-06-11 PROCEDURE — 97140 MANUAL THERAPY 1/> REGIONS: CPT | Performed by: PHYSICAL THERAPIST

## 2021-06-11 PROCEDURE — 97110 THERAPEUTIC EXERCISES: CPT | Performed by: PHYSICAL THERAPIST

## 2021-06-11 PROCEDURE — 97112 NEUROMUSCULAR REEDUCATION: CPT | Performed by: PHYSICAL THERAPIST

## 2021-06-11 NOTE — PROGRESS NOTES
Daily Note     Today's date: 2021  Patient name: Rivera Felix  : 1965  MRN: [id number]  Referring provider: Kadeem Tovar MD  Dx:   Encounter Diagnosis     ICD-10-CM    1  Status post total knee replacement, right  Z96 651    2  Primary osteoarthritis of right knee  M17 11                   Subjective: Pt reports moderate aching and stiffness prior to session today, may be attributed to rainy weather  She reports she transitioned to cane for shorter distances, still using RW for longer distances  Objective: See treatment diary below  Assessment: Pt with good tolerance to progression of program noting appropriate challenge and fatigue  She required moderate verbal and manual cues for correct positioning and performance, difficulty activating quadriceps with all exercises which improved with tactile cues and simultaneous contralateral activation  Will continue to progress program as able  Pt will benefit from continued skilled PT intervention in order to address her remaining limitations and to restore maximal function  Plan: Continue per plan of care  Progress treatment as tolerated         Precautions: vertigo, HTN, HLD, GERD, asthma, anxiety, DM     Daily Treatment Diary      Assessment                 Eval/Reval                       FOTO         **         **   Manuals   Knee PROM    GOPAL SANDHU MD               Patellar ROM  GOPAL SANDHU MD         HS & GS Stretch             Exercise Diary     Bike - AROM  5' 5' 6' 10'          Gastroc Stretch - strap  30"x3  30"x3 30"x3  30"x3               Heel Slides with Strap  10"x10  10"x10  10"x10 AAROM  10"x10               Hamstring Stretch - strap  30"x3 30"x3 30"x4  30"x3          Towel Crush  5"x10  5" x 10  5"x10  10"x10                Quad Set  5"x10  5"x 10  5"x10  10"x10                LAQ  5"x10  5"x10  5"x10  5"  2x10                SAQ  5"x10  5"x10  5"x15  5"  2x10                Standing Knee Flexion        5"  1x10 Step Ups:   Forward        NV                Step Ups:   Lateral                        Eccentric Step Downs:    Forward                       Mini Squats                       SLS        NV                                                                                                               Modalities    CP R Knee   Extension Stretch  Post-Tx  10'  10'  10'  10'

## 2021-06-15 ENCOUNTER — OFFICE VISIT (OUTPATIENT)
Dept: PHYSICAL THERAPY | Facility: REHABILITATION | Age: 56
End: 2021-06-15
Payer: COMMERCIAL

## 2021-06-15 DIAGNOSIS — Z96.651 STATUS POST TOTAL KNEE REPLACEMENT, RIGHT: Primary | ICD-10-CM

## 2021-06-15 DIAGNOSIS — M17.11 PRIMARY OSTEOARTHRITIS OF RIGHT KNEE: ICD-10-CM

## 2021-06-15 PROCEDURE — 97110 THERAPEUTIC EXERCISES: CPT

## 2021-06-15 PROCEDURE — 97140 MANUAL THERAPY 1/> REGIONS: CPT

## 2021-06-15 PROCEDURE — 97112 NEUROMUSCULAR REEDUCATION: CPT

## 2021-06-15 NOTE — PROGRESS NOTES
Daily Note     Today's date: 6/15/2021  Patient name: Yuvonne Bamberger  : 1965  MRN: [id number]  Referring provider: Traci Thakkar MD  Dx:   Encounter Diagnosis     ICD-10-CM    1  Status post total knee replacement, right  Z96 651    2  Primary osteoarthritis of right knee  M17 11                   Subjective: pt reports shopping for an extended period of time in which she experienced       Objective: See treatment diary below      Assessment: Tolerated treatment well  Responds well with addition of exercises today  Knee PROM progressing nicely  Patient demonstrated fatigue post treatment, exhibited good technique with therapeutic exercises and would benefit from continued PT      Plan: Continue per plan of care  Progress treatment as tolerated  Precautions: vertigo, HTN, HLD, GERD, asthma, anxiety, DM     Daily Treatment Diary      Assessment  6/1  6/4  6/8  6/11  6/15             Eval/Reval                       FOTO         63/69         **   Manuals   Knee PROM    JEKTA LERNER             Patellar ROM  JG EDSON OAKLEY TE        HS & GS Stretch             Exercise Diary     Bike - AROM  5' 5' 6' 10' 10'         Gastroc Stretch - strap  30"x3  30"x3 30"x3  30"x3 30"x3              Heel Slides with Strap  10"x10  10"x10  10"x10 AAROM  10"x10 AAROM  10"x10              Hamstring Stretch - strap  30"x3 30"x3 30"x4  30"x3 10"x3         Towel Crush  5"x10  5" x 10  5"x10  10"x10  10"x10              Quad Set  5"x10  5"x 10  5"x10  10"x10  10"x10              LAQ  5"x10  5"x10  5"x10  5"  2x10  5"  2x10              SAQ  5"x10  5"x10  5"x15  5"  2x10  5"  2x10              Standing Knee Flexion        5"  1x10  5"x10              Step Ups:   Forward        NV  NV              Step Ups:   Lateral                        Eccentric Step Downs:    Forward                       Mini Squats                       SLS        NV  10"x3 Modalities    CP R Knee   Extension Stretch  Post-Tx  10'  10'  10'  10'  10'

## 2021-06-16 DIAGNOSIS — F41.9 ANXIETY: ICD-10-CM

## 2021-06-16 DIAGNOSIS — E78.00 HYPERCHOLESTEREMIA: ICD-10-CM

## 2021-06-16 DIAGNOSIS — K21.9 GASTROESOPHAGEAL REFLUX DISEASE WITHOUT ESOPHAGITIS: ICD-10-CM

## 2021-06-16 RX ORDER — ATORVASTATIN CALCIUM 40 MG/1
40 TABLET, FILM COATED ORAL DAILY
Qty: 90 TABLET | Refills: 1 | Status: SHIPPED | OUTPATIENT
Start: 2021-06-16 | End: 2022-05-04 | Stop reason: SDUPTHER

## 2021-06-16 RX ORDER — OMEPRAZOLE 20 MG/1
20 CAPSULE, DELAYED RELEASE ORAL DAILY
Qty: 90 CAPSULE | Refills: 1 | Status: SHIPPED | OUTPATIENT
Start: 2021-06-16 | End: 2021-07-28 | Stop reason: SDUPTHER

## 2021-06-16 NOTE — TELEPHONE ENCOUNTER
Needs the following medications to be sent to [facility]:    Omeprazole 20 mg  Sertraline 50 mg  Atorvastatin 40 mg

## 2021-06-18 ENCOUNTER — OFFICE VISIT (OUTPATIENT)
Dept: PHYSICAL THERAPY | Facility: REHABILITATION | Age: 56
End: 2021-06-18
Payer: COMMERCIAL

## 2021-06-18 DIAGNOSIS — Z96.651 STATUS POST TOTAL KNEE REPLACEMENT, RIGHT: Primary | ICD-10-CM

## 2021-06-18 DIAGNOSIS — M17.11 PRIMARY OSTEOARTHRITIS OF RIGHT KNEE: ICD-10-CM

## 2021-06-18 PROCEDURE — 97140 MANUAL THERAPY 1/> REGIONS: CPT

## 2021-06-18 PROCEDURE — 97112 NEUROMUSCULAR REEDUCATION: CPT

## 2021-06-18 PROCEDURE — 97110 THERAPEUTIC EXERCISES: CPT

## 2021-06-18 NOTE — PROGRESS NOTES
Daily Note     Today's date: 2021  Patient name: Jorge Thomas  : 1965  MRN: [id number]  Referring provider: Eli Singh MD  Dx:   Encounter Diagnosis     ICD-10-CM    1  Status post total knee replacement, right  Z96 651    2  Primary osteoarthritis of right knee  M17 11                   Subjective: Pt reports with no new complaints of knee pain and states she is noticing things getting easier around the house like getting in and out of bed  Objective: See treatment diary below      Assessment: Tolerated treatment well  Patient demonstrated fatigue post treatment, exhibited good technique with therapeutic exercises and would benefit from continued PT  Pt continues to demonstrate increased ROM gains  Measurement take at 105 degrees knee flexion  Pt tolerated step ups well, requiring cueing to decrease hip hike and increase knee flexion during exercise  Plan: Continue per plan of care        Precautions: vertigo, HTN, HLD, GERD, asthma, anxiety, DM     Daily Treatment Diary      Assessment  6/1  6/4  6/8  6/11  6/15  6/18           Eval/Reval                       FOTO         63/69         **   Manuals   Knee PROM    JG  NT  MD EDUARDA HERRON           Patellar ROM  JG NT  MD LERNER JG       HS & GS Stretch             Exercise Diary     Bike - AROM  5' 5' 6' 10' 10' 8' Full ROM        Gastroc Stretch - strap  30"x3  30"x3 30"x3  30"x3 30"x3  30" x3             Heel Slides with Strap  10"x10  10"x10  10"x10 AAROM  10"x10 AAROM  10"x10  AAROM 10"x10            Hamstring Stretch - strap  30"x3 30"x3 30"x4  30"x3 10"x3 10" x3         Towel Crush  5"x10  5" x 10  5"x10  10"x10  10"x10  10"x10            Quad Set  5"x10  5"x 10  5"x10  10"x10  10"x10  10" x10            LAQ  5"x10  5"x10  5"x10  5"  2x10  5"  2x10  5" 2x10            SAQ  5"x10  5"x10  5"x15  5"  2x10  5"  2x10  5" 2x10            Standing Knee Flexion        5"  1x10  5"x10  5# x10             Step Ups:   Forward        NV  NV  x10 Step Ups:   Lateral                        Eccentric Step Downs:    Forward                       Mini Squats                       SLS        NV  10"x3  10"3 x5                                                                                                           Modalities    CP R Knee   Extension Stretch  Post-Tx  10'  10'  10'  10'  10'

## 2021-06-22 ENCOUNTER — OFFICE VISIT (OUTPATIENT)
Dept: PHYSICAL THERAPY | Facility: REHABILITATION | Age: 56
End: 2021-06-22
Payer: COMMERCIAL

## 2021-06-22 DIAGNOSIS — Z96.651 STATUS POST TOTAL KNEE REPLACEMENT, RIGHT: Primary | ICD-10-CM

## 2021-06-22 DIAGNOSIS — M17.11 PRIMARY OSTEOARTHRITIS OF RIGHT KNEE: ICD-10-CM

## 2021-06-22 PROCEDURE — 97116 GAIT TRAINING THERAPY: CPT

## 2021-06-22 PROCEDURE — 97110 THERAPEUTIC EXERCISES: CPT

## 2021-06-22 PROCEDURE — 97112 NEUROMUSCULAR REEDUCATION: CPT

## 2021-06-22 PROCEDURE — 97140 MANUAL THERAPY 1/> REGIONS: CPT

## 2021-06-22 NOTE — PROGRESS NOTES
Daily Note     Today's date: 2021  Patient name: James Pope  : 1965  MRN: [id number]  Referring provider: Desirae Jorge MD  Dx:   Encounter Diagnosis     ICD-10-CM    1  Status post total knee replacement, right  Z96 651    2  Primary osteoarthritis of right knee  M17 11                   Subjective:  Patient reports that her knee is getting better  Sakshi notes feeling restless at night when trying to sleep but overall she notes her knee is improving  She continues to note stiffness when trying to walk  Patient notes that she is walking at home at times without her cane  Objective: See treatment diary below      Assessment: Tolerated treatment well  Patient performed ex as noted without difficulty  Performed gait training without an AD with verbal cues and demonstration to improve heel/toe gait mechanics  Patient demonstrated good carry over  Patient with R knee stiffness initially which improved throughout the session and post manual therapy  Patient denied pain post treatment  Patient would benefit from continued PT to attain set goals  Plan: Continue per plan of care        Precautions: vertigo, HTN, HLD, GERD, asthma, anxiety, DM     Daily Treatment Diary      Assessment  6/1  6/4  6/8  6/11  6/15  6/18  6/22         Eval/Reval                       FOTO         63/69         **   Manuals   Knee PROM    GOPAL Cordero  CC         Patellar ROM  JEKTA SANDHU MD TE JG CC      HS & GS Stretch             Exercise Diary     Bike - AROM  5' 5' 6' 10' 10' 8' Full ROM 8' L1       Gastroc Stretch - strap  30"x3  30"x3 30"x3  30"x3 30"x3  30" x3   30"x3          Heel Slides with Strap  10"x10  10"x10  10"x10 AAROM  10"x10 AAROM  10"x10  AAROM 10"x10  AAROM 10"x10          Hamstring Stretch - strap  30"x3 30"x3 30"x4  30"x3 10"x3 10" x3  30"x3       Towel Crush  5"x10  5" x 10  5"x10  10"x10  10"x10  10"x10  10"x10          Quad Set  5"x10  5"x 10  5"x10  10"x10  10"x10  10" x10 10"x10          LAQ  5"x10  5"x10  5"x10  5"  2x10  5"  2x10  5" 2x10  5" 2x10          SAQ  5"x10  5"x10  5"x15  5"  2x10  5"  2x10  5" 2x10  5" 2x10          Standing Knee Flexion        5"  1x10  5"x10  5# x10   5"x15          Step Ups:   Forward        NV  NV  x10  4inch x15          Step Ups:   Lateral                        Eccentric Step Downs:    Forward                       Mini Squats                       SLS        NV  10"x3  10"3 x5            gait tr              perf                                                                                 Modalities    CP R Knee   Extension Stretch  Post-Tx  10'  10'  10'  10'  10'    10'

## 2021-06-25 ENCOUNTER — OFFICE VISIT (OUTPATIENT)
Dept: PHYSICAL THERAPY | Facility: REHABILITATION | Age: 56
End: 2021-06-25
Payer: COMMERCIAL

## 2021-06-25 DIAGNOSIS — Z96.651 STATUS POST TOTAL KNEE REPLACEMENT, RIGHT: Primary | ICD-10-CM

## 2021-06-25 PROCEDURE — 97116 GAIT TRAINING THERAPY: CPT | Performed by: PHYSICAL THERAPIST

## 2021-06-25 PROCEDURE — 97110 THERAPEUTIC EXERCISES: CPT | Performed by: PHYSICAL THERAPIST

## 2021-06-25 PROCEDURE — 97140 MANUAL THERAPY 1/> REGIONS: CPT | Performed by: PHYSICAL THERAPIST

## 2021-06-25 PROCEDURE — 97112 NEUROMUSCULAR REEDUCATION: CPT | Performed by: PHYSICAL THERAPIST

## 2021-06-25 NOTE — PROGRESS NOTES
Daily Note     Today's date: 2021  Patient name: Lucy Simms  : 1965  MRN: [id number]  Referring provider: Mya Toribio MD  Dx:   Encounter Diagnosis     ICD-10-CM    1  Status post total knee replacement, right  Z96 651                   Subjective: Pt states she overall feels her knee is feeling better, reporting occasion aches and pains and discomfort at night trying to sleep  States her knee still gives out slightly from time to time when walking  Objective: See treatment diary below      Assessment: Tolerated treatment well  Patient demonstrated fatigue post treatment, exhibited good technique with therapeutic exercises and would benefit from continued PT  Demonstrates improved quad contraction with slight lag remaining  Increased stride length on R compared to L during gait training as well as decreased stance time on R LE and decreased arm swing  Cued pt for heel strike o R LE and knee extension as well as beginning small step through pattern, she adjusted to cueing accordingly  Plan: Continue per plan of care  Progress treatment as tolerated         Precautions: vertigo, HTN, HLD, GERD, asthma, anxiety, DM     Daily Treatment Diary      Assessment  6/1  6/4  6/8  6/11  6/15  6/18  6/22  6/24       Eval/Reval                       FOTO         63/69         **   Manuals   Knee PROM    JG  NT  MD Lucia Sandra  CC JG       Patellar ROM  JG NT  MD TE GOPAL CC JG     HS & GS Stretch        JG     Exercise Diary     Bike - AROM  5' 5' 6' 10' 10' 8' Full ROM 8' L1 8' L1      Gastroc Stretch - strap  30"x3  30"x3 30"x3  30"x3 30"x3  30" x3   30"x3  30"x3        Heel Slides with Strap  10"x10  10"x10  10"x10 AAROM  10"x10 AAROM  10"x10  AAROM 10"x10  AAROM 10"x10  AAROM 10"x10        Hamstring Stretch - strap  30"x3 30"x3 30"x4  30"x3 10"x3 10" x3  30"x3 np      Towel Crush  5"x10  5" x 10  5"x10  10"x10  10"x10  10"x10  10"x10  10"x10        Quad Set  5"x10  5"x 10  5"x10  10"x10  10"x10  10" x10  10"x10  10"x10        LAQ  5"x10  5"x10  5"x10  5"  2x10  5"  2x10  5" 2x10  5" 2x10  5" 2x10        SAQ  5"x10  5"x10  5"x15  5"  2x10  5"  2x10  5" 2x10  5" 2x10  5" 2x10        Standing Knee Flexion        5"  1x10  5"x10  5# x10   5"x15  np        Step Ups:   Forward        NV  NV  x10  4inch x15  0R x15        Step Ups:   Lateral                        Eccentric Step Downs:    Forward                       Mini Squats                       SLS        NV  10"x3  10"3 x5    np        gait tr              perf  perf                                                                               Modalities    CP R Knee   Extension Stretch  Post-Tx  10'  10'  10'  10'  10'    10'

## 2021-06-29 ENCOUNTER — OFFICE VISIT (OUTPATIENT)
Dept: PHYSICAL THERAPY | Facility: REHABILITATION | Age: 56
End: 2021-06-29
Payer: COMMERCIAL

## 2021-06-29 DIAGNOSIS — Z96.651 STATUS POST TOTAL KNEE REPLACEMENT, RIGHT: Primary | ICD-10-CM

## 2021-06-29 DIAGNOSIS — M17.11 PRIMARY OSTEOARTHRITIS OF RIGHT KNEE: ICD-10-CM

## 2021-06-29 PROCEDURE — 97112 NEUROMUSCULAR REEDUCATION: CPT

## 2021-06-29 PROCEDURE — 97140 MANUAL THERAPY 1/> REGIONS: CPT

## 2021-06-29 PROCEDURE — 97110 THERAPEUTIC EXERCISES: CPT

## 2021-06-29 NOTE — PROGRESS NOTES
PT Re-Evaluation     Today's date: 2021  Patient name: Joy Campos  : 1965  MRN: [id number]  Referring provider: Nereida Sands MD  Dx:   Encounter Diagnosis     ICD-10-CM    1  Status post total knee replacement, right  Z96 651    2  Primary osteoarthritis of right knee  M17 11                   Assessment  Assessment details: Joy Campos has been treated in outpatient physical therapy over the past 4 weeks for diagnosis/complaints of Status post total knee replacement, right  (primary encounter diagnosis)  Primary osteoarthritis of right knee  Pt demonstrates increased range of motion, improved strength, decreased pain, and increased activity tolerance  Pt continues to present with pain, decreased range of motion, decreased strength, and decreased tolerance to activity  Pt would continue to benefit from skilled physical therapy to address limitations and to achieve goals  Thank you for this referral   Impairments: abnormal coordination, abnormal gait, abnormal muscle firing, abnormal or restricted ROM, activity intolerance, impaired balance, impaired physical strength and pain with function  Understanding of Dx/Px/POC: good   Prognosis: good  Prognosis details: Pt prognosis is good due to her motivation to return to work and PLOF  Goals  ST  Pt will be able to ambulate with [facility] community distances up to a mile in 4 weeks  - Partially met  2  Pt will be able to dress, including donning and doffing socks and shoes with less than 25% assistance and decreased time in 4 weeks  - MET    LT  Pt will be able to ambulate independently community distances up to 1-2 miles in 7 weeks  2  Pt will be able to ascend and descend stairs ad curbs in 7 weeks  3  Pt will be able to demonstrate adequate strength and ROM to return to PLOF and return to work       Plan  Patient would benefit from: skilled physical therapy  Planned modality interventions: thermotherapy: hydrocollator packs and cryotherapy  Planned therapy interventions: ADL retraining, balance, flexibility, functional ROM exercises, gait training, home exercise program, joint mobilization, manual therapy, neuromuscular re-education, patient education, strengthening, stretching, therapeutic activities and therapeutic exercise  Frequency: 2x week  Duration in visits: 8  Duration in weeks: 4  Treatment plan discussed with: patient        Subjective Evaluation    History of Present Illness  Mechanism of injury: surgery  Mechanism of injury: 2021     Pt is a 53 y/o female presenting to therapy s/p R TKA on 2021  She states the surgery went well and has been self managing her pain with tylenol and ice throughout the day after discontinuing use of OxyContin and has been taking Asprin as a DVT prophylaxis  She has been independent with ADLs such as cooking, dressing, bathing, and ambulating around her home but takes increased time and causes fatigue  States she has been sleeping in recliner due to her bed being too high up  She is ambulating with a rolling walker and states she wishes to use a cane as soon as possible  She denies drainage and redness from the wound but expressed concern for possible infection due to increase edema at the knee and ankle  She has a follow up with the doctor on Wednesday (2021)  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[employer] and is highly motivated to return to work        21  Pt states she has some stiffness and discomfort in the morning upon waking up  States she takes Tylenol PRN and utilizes Oxycodone prior to therapy sessions  Reports she has no stairs at home, except in the front of her house, which she avoids still  Utilizes RW for assistance into shower, otherwise ambulates with SPC in and outside the home    Pain  Current pain ratin  At worst pain ratin    Patient Goals  Patient goals for therapy: increased motion, increased strength, return to work and decreased edema          Objective     Observations     Right Knee   Negative for edema and incision  Additional Observation Details  No warmth, redness, or drainage from incisional site       Active Range of Motion     Right Knee   Flexion: 118 degrees   Extensor la degrees     Passive Range of Motion     Right Knee   Flexion: 122 degrees   Extension: 0 degrees

## 2021-06-29 NOTE — PROGRESS NOTES
Daily Note     Today's date: 2021  Patient name: Dacia Gonsalves  : 1965  MRN: [id number]  Referring provider: Karyle Lemon, MD  Dx:   Encounter Diagnosis     ICD-10-CM    1  Status post total knee replacement, right  Z96 651    2  Primary osteoarthritis of right knee  M17 11                   Subjective: pt reports no c/o pain prior to beginning today, however, mild stiffness and pain upon waking in the a m  She noted has been taking Tylenol intermittently and Oxycodone before each therapy session  Pt reports returning to Dr Munira Lora tomorrow for post op f/u  Objective: See treatment diary below      Assessment: Tolerated treatment well  Moderate difficulty with eccentric control with step exercises and required verbal and manual cuing to avoid compensatory movements  Patient demonstrated fatigue post treatment, exhibited good technique with therapeutic exercises and would benefit from continued PT      Plan: Continue per plan of care  Progress treatment as tolerated         Precautions: vertigo, HTN, HLD, GERD, asthma, anxiety, DM     Daily Treatment Diary      Assessment  6/1  6/4  6/8  6/11  6/15  6/18  6/22  6/24  6/29     Eval/Reval                       FOTO         63/69         **   Manuals   Knee PROM    JG  NT  MD  TE  Justin Beverage  CC JG  TE     Patellar ROM  JG NT  MD TE JG CC JG TE    HS & GS Stretch        JG     Exercise Diary     Bike - AROM  5' 5' 6' 10' 10' 8' Full ROM 8' L1 8' L1 16'L1     Gastroc Stretch - strap  30"x3  30"x3 30"x3  30"x3 30"x3  30" x3   30"x3  30"x3  30"x3      Heel Slides with Strap  10"x10  10"x10  10"x10 AAROM  10"x10 AAROM  10"x10  AAROM 10"x10  AAROM 10"x10  AAROM 10"x10  AAROM 10"x10      Hamstring Stretch - strap  30"x3 30"x3 30"x4  30"x3 10"x3 10" x3  30"x3 np      Towel Crush  5"x10  5" x 10  5"x10  10"x10  10"x10  10"x10  10"x10  10"x10        Quad Set  5"x10  5"x 10  5"x10  10"x10  10"x10  10" x10  10"x10  10"x10        LAQ  5"x10  5"x10  5"x10  5"  2x10 5"  2x10  5" 2x10  5" 2x10  5" 2x10  5"2x10      SAQ  5"x10  5"x10  5"x15  5"  2x10  5"  2x10  5" 2x10  5" 2x10  5" 2x10  DC      Standing Knee Flexion        5"  1x10  5"x10  5# x10   5"x15  np        Step Ups:   Forward        NV  NV  x10  4inch x15  0R x15  0Rx15      Step Ups:   Lateral                  0R x10      Eccentric Step Downs:    Forward                  0Rx5     Mini Squats                 5"2x10      SLS        NV  10"x3  10"3 x5    np       gait tr              perf  perf                                                                               Modalities    CP R Knee   Extension Stretch  Post-Tx  10'  10'  10'  10'  10'    10'    10'

## 2021-06-30 ENCOUNTER — OFFICE VISIT (OUTPATIENT)
Dept: OBGYN CLINIC | Facility: HOSPITAL | Age: 56
End: 2021-06-30

## 2021-06-30 VITALS — BODY MASS INDEX: 29.71 KG/M2 | WEIGHT: 174 LBS | HEIGHT: 64 IN

## 2021-06-30 DIAGNOSIS — Z96.651 AFTERCARE FOLLOWING RIGHT KNEE JOINT REPLACEMENT SURGERY: ICD-10-CM

## 2021-06-30 DIAGNOSIS — Z96.651 STATUS POST TOTAL KNEE REPLACEMENT, RIGHT: Primary | ICD-10-CM

## 2021-06-30 DIAGNOSIS — Z47.1 AFTERCARE FOLLOWING RIGHT KNEE JOINT REPLACEMENT SURGERY: ICD-10-CM

## 2021-06-30 PROBLEM — M25.561 CHRONIC PAIN OF RIGHT KNEE: Status: RESOLVED | Noted: 2021-04-20 | Resolved: 2021-06-30

## 2021-06-30 PROBLEM — M17.11 PRIMARY OSTEOARTHRITIS OF RIGHT KNEE: Status: RESOLVED | Noted: 2021-04-20 | Resolved: 2021-06-30

## 2021-06-30 PROBLEM — G89.29 CHRONIC PAIN OF RIGHT KNEE: Status: RESOLVED | Noted: 2021-04-20 | Resolved: 2021-06-30

## 2021-06-30 PROCEDURE — 99024 POSTOP FOLLOW-UP VISIT: CPT | Performed by: ORTHOPAEDIC SURGERY

## 2021-06-30 RX ORDER — CEPHALEXIN 500 MG/1
2000 CAPSULE ORAL ONCE AS NEEDED
Qty: 40 CAPSULE | Refills: 0 | Status: SHIPPED | OUTPATIENT
Start: 2021-06-30 | End: 2021-07-14

## 2021-06-30 NOTE — PROGRESS NOTES
Assessment  Diagnoses and all orders for this visit:    Status post total knee replacement, right    Aftercare following right knee joint replacement surgery      Discussion and Plan:    Weightbearing as tolerated right lower extremity   Continue physical therapy to focus on strengthening per TKA protocol  Antibiotic prescription provided for dental prophylaxis before dental procedures  Follow-up and 6 weeks for re-evaluation, with new x-rays upon arrival    Subjective:   Patient ID: Keagan Bonner is a 54 y o  female      59-year-old female presents for 6 week evaluation of right total knee replacement  She reports she is doing well  She is participating in physical therapy benefit  She does take oxycodone at times before went physical therapy, but mostly takes Tylenol when her knee feels sore  She presents ambulating with a 4 point cane  She denies any other complaints  The following portions of the patient's history were reviewed and updated as appropriate: allergies, current medications, past family history, past medical history, past social history, past surgical history and problem list     Review of Systems   Constitutional: Negative for chills, fever and unexpected weight change  HENT: Negative for sore throat  Eyes: Negative for visual disturbance  Respiratory: Negative for cough and shortness of breath  Cardiovascular: Negative for chest pain  Gastrointestinal: Negative for abdominal pain, nausea and vomiting  Endocrine: Negative for cold intolerance  Musculoskeletal: Positive for arthralgias and myalgias  Skin: Negative for rash  Allergic/Immunologic: Negative for immunocompromised state  Objective:  Ht 5' 4" (1 626 m)   Wt 78 9 kg (174 lb)   BMI 29 87 kg/m²       Right Knee Exam     Comments:   There is well healing anterior knee scar, 1 small Vicryl stitch a popping out proximally   Knee range of motion from 0-110 degrees   Stable to varus and valgus stress There is no effusion   Ankle strongly plantar flexes and dorsiflexes            Physical Exam  Vitals and nursing note reviewed  Constitutional:       Appearance: Normal appearance  HENT:      Head: Normocephalic and atraumatic  Nose: Nose normal       Mouth/Throat:      Mouth: Mucous membranes are moist    Eyes:      Extraocular Movements: Extraocular movements intact  Conjunctiva/sclera: Conjunctivae normal    Cardiovascular:      Rate and Rhythm: Normal rate  Pulses: Normal pulses  Pulmonary:      Effort: Pulmonary effort is normal       Breath sounds: Normal breath sounds  Abdominal:      Palpations: Abdomen is soft  Musculoskeletal:      Cervical back: Normal range of motion  Comments: See Ortho Exam   Skin:     General: Skin is warm  Capillary Refill: Capillary refill takes less than 2 seconds  Neurological:      General: No focal deficit present  Mental Status: She is alert  Mental status is at baseline  Psychiatric:         Mood and Affect: Mood normal          Behavior: Behavior normal          I have personally reviewed pertinent films in PACS and my interpretation is as follows    No new x-rays obtained today

## 2021-07-02 ENCOUNTER — OFFICE VISIT (OUTPATIENT)
Dept: PHYSICAL THERAPY | Facility: REHABILITATION | Age: 56
End: 2021-07-02
Payer: COMMERCIAL

## 2021-07-02 DIAGNOSIS — M17.11 PRIMARY OSTEOARTHRITIS OF RIGHT KNEE: ICD-10-CM

## 2021-07-02 DIAGNOSIS — Z96.651 STATUS POST TOTAL KNEE REPLACEMENT, RIGHT: Primary | ICD-10-CM

## 2021-07-02 PROCEDURE — 97110 THERAPEUTIC EXERCISES: CPT | Performed by: PHYSICAL THERAPIST

## 2021-07-02 PROCEDURE — 97112 NEUROMUSCULAR REEDUCATION: CPT | Performed by: PHYSICAL THERAPIST

## 2021-07-02 PROCEDURE — 97140 MANUAL THERAPY 1/> REGIONS: CPT | Performed by: PHYSICAL THERAPIST

## 2021-07-02 NOTE — PROGRESS NOTES
Daily Note     Today's date: 2021  Patient name: Mary Logan  : 1965  MRN: [id number]  Referring provider: Natalie Shell MD  Dx:   Encounter Diagnosis     ICD-10-CM    1  Status post total knee replacement, right  Z96 651    2  Primary osteoarthritis of right knee  M17 11                   Subjective: Pt reports she had her follow-up appointment with Dr Janet Castle yesterday, he was very pleased with her progress  She reports she fully transitioned to cane for all distances, no longer using RW  Objective: See treatment diary below  Assessment: Pt with good tolerance to progression of program noting appropriate challenge and fatigue  She required moderate verbal and manual cues for correct positioning and performance, difficulty activating quadriceps with all exercises which improved with tactile cues and simultaneous contralateral activation  Will continue to progress program as able  Pt will benefit from continued skilled PT intervention in order to address her remaining limitations and to restore maximal function  Plan: Continue per plan of care  Progress treatment as tolerated         Precautions: vertigo, HTN, HLD, GERD, asthma, anxiety, DM     Daily Treatment Diary      Assessment  6/1  6/4  6/8  6/11  6/15  6/18  6/22  6/24  6/29  7/2   Eval/Reval                       FOTO         63/69         **   Manuals   Knee PROM    JG  NT  MD  TE  Ale Winn  CC JG  TE     Patellar ROM  JG NT  MD EDUARDA SENIOR JG TE    HS & GS Stretch        JG     Exercise Diary     Bike - AROM  5' 5' 6' 10' 10' 8' Full ROM 8' L1 8' L1 16'L1 L1x13'    Gastroc Stretch - strap  30"x3  30"x3 30"x3  30"x3 30"x3  30" x3   30"x3  30"x3  30"x3  Wall  30"x3    Heel Slides with Strap  10"x10  10"x10  10"x10 AAROM  10"x10 AAROM  10"x10  AAROM 10"x10  AAROM 10"x10  AAROM 10"x10  AAROM 10"x10  HEP    Hamstring Stretch - strap  30"x3 30"x3 30"x4  30"x3 10"x3 10" x3  30"x3 np      Towel Crush  5"x10  5" x 10  5"x10  10"x10  10"x10 10"x10  10"x10  10"x10    HEP    Quad Set  5"x10  5"x 10  5"x10  10"x10  10"x10  10" x10  10"x10  10"x10    HEP    LAQ  5"x10  5"x10  5"x10  5"  2x10  5"  2x10  5" 2x10  5" 2x10  5" 2x10  5"2x10  1#  5"  2x10    SAQ  5"x10  5"x10  5"x15  5"  2x10  5"  2x10  5" 2x10  5" 2x10  5" 2x10  DC  1#  5"  2x10    Standing Knee Flexion        5"  1x10  5"x10  5# x10   5"x15  np        SLR Flexion          1x10    SLR Abduction          1x10    Step Ups:   Forward        NV  NV  x10  4inch x15  0R x15  0Rx15  0Rx20    Step Ups:   Lateral                  0R x10  0Rx20    Eccentric Step Downs:    Forward                  0Rx5  0Rx20  Manual cues and A   Mini Squats                 5"2x10   5"  2x10   SLS        NV  10"x3  10"3 x5    np   20"x3    gait tr              perf  perf        Wall Slides                    NV                                                   Modalities    CP R Knee   Extension Stretch  Post-Tx  10'  10'  10'  10'  10'    10'    10'  10'

## 2021-07-06 ENCOUNTER — TELEPHONE (OUTPATIENT)
Dept: INTERNAL MEDICINE CLINIC | Facility: CLINIC | Age: 56
End: 2021-07-06

## 2021-07-06 ENCOUNTER — OFFICE VISIT (OUTPATIENT)
Dept: PHYSICAL THERAPY | Facility: REHABILITATION | Age: 56
End: 2021-07-06
Payer: COMMERCIAL

## 2021-07-06 DIAGNOSIS — M17.11 PRIMARY OSTEOARTHRITIS OF RIGHT KNEE: ICD-10-CM

## 2021-07-06 DIAGNOSIS — Z96.651 STATUS POST TOTAL KNEE REPLACEMENT, RIGHT: Primary | ICD-10-CM

## 2021-07-06 PROCEDURE — 97112 NEUROMUSCULAR REEDUCATION: CPT | Performed by: PHYSICAL THERAPIST

## 2021-07-06 PROCEDURE — 97110 THERAPEUTIC EXERCISES: CPT | Performed by: PHYSICAL THERAPIST

## 2021-07-06 PROCEDURE — 97140 MANUAL THERAPY 1/> REGIONS: CPT | Performed by: PHYSICAL THERAPIST

## 2021-07-06 NOTE — PROGRESS NOTES
Daily Note     Today's date: 2021  Patient name: Dg Leroy  : 1965  MRN: [id number]  Referring provider: Alma Ferreira MD  Dx: No diagnosis found  Subjective: Pt states no new complaints of pain/discomfort since last session  Objective: See treatment diary below      Assessment: Tolerated treatment well  Patient demonstrated fatigue post treatment, exhibited good technique with therapeutic exercises and would benefit from continued PT  Pt continues to demonstrate decreased eccentric control of quads during stepping exercises  Quad contraction is improving, only required cueing at beginning of exercises to maintain it  Plan: Continue per plan of care  Progress treatment as tolerated         Precautions: vertigo, HTN, HLD, GERD, asthma, anxiety, DM     Daily Treatment Diary      Assessment  7/6  6/4  6/8  6/11  6/15  6/18  6/22  6/24  6/29  7/2   Eval/Reval                       FOTO         63/69         **   Manuals   Knee PROM    JG  NT  MD  TE  [address]  CC JG  TE     Patellar ROM  JG NT  MD TE JG CC JG TE    HS & GS Stretch        JG     Exercise Diary     Bike - AROM  L1 x 5' 6' 10' 10' 8' Full ROM 8' L1 8' L1 16'L1 L1x13'    Gastroc Stretch - strap  Wall 30"x3  30"x3 30"x3  30"x3 30"x3  30" x3   30"x3  30"x3  30"x3  Wall  30"x3    Heel Slides with Strap    10"x10  10"x10 AAROM  10"x10 AAROM  10"x10  AAROM 10"x10  AAROM 10"x10  AAROM 10"x10  AAROM 10"x10  HEP    Hamstring Stretch - strap   30"x3 30"x4  30"x3 10"x3 10" x3  30"x3 np      Towel Crush   5" x 10  5"x10  10"x10  10"x10  10"x10  10"x10  10"x10    HEP    Quad Set    5"x 10  5"x10  10"x10  10"x10  10" x10  10"x10  10"x10    HEP    LAQ  1# 5" 2x10   5"x10  5"x10  5"  2x10  5"  2x10  5" 2x10  5" 2x10  5" 2x10  5"2x10  1#  5"  2x10    SAQ  1# 5' 2x10  5"x10  5"x15  5"  2x10  5"  2x10  5" 2x10  5" 2x10  5" 2x10  DC  1#  5"  2x10    Standing Knee Flexion        5"  1x10  5"x10  5# x10   5"x15  np        SLR Flexion 1x10 1x10    SLR Abduction 1x10         1x10    Step Ups:   Forward  0R x20      NV  NV  x10  4inch x15  0R x15  0Rx15  0Rx20    Step Ups:   Lateral  0R x20                0R x10  0Rx20    Eccentric Step Downs:    Forward  0Rx20 manual cues and A                0Rx5  0Rx20  Manual cues and A   Mini Squats  5" 2x10                5"2x10   5"  2x10   SLS  30" x3      NV  10"x3  10"3 x5    np   20"x3    gait tr              perf  perf        Wall Slides  NV                  NV                                                   Modalities    CP R Knee   Extension Stretch  Post-Tx    10'  10'  10'  10'    10'    10'  10'

## 2021-07-06 NOTE — TELEPHONE ENCOUNTER
Patient called asking for a urologist she tried calling dr walsh but would have to wait to long  She states before amrando costa went on maternity leave she told her she did have kidney stones  She wants to be seen for this  Who is recommended she call?

## 2021-07-07 ENCOUNTER — TELEPHONE (OUTPATIENT)
Dept: OBGYN CLINIC | Facility: HOSPITAL | Age: 56
End: 2021-07-07

## 2021-07-07 NOTE — TELEPHONE ENCOUNTER
Patient sees Dr Broderick    Patient called to check on her STD paperwork, Dr Shen Hoes off on them today and faxed them back

## 2021-07-07 NOTE — TELEPHONE ENCOUNTER
I called the patient with the contact information for [facility]

## 2021-07-09 ENCOUNTER — OFFICE VISIT (OUTPATIENT)
Dept: PHYSICAL THERAPY | Facility: REHABILITATION | Age: 56
End: 2021-07-09
Payer: COMMERCIAL

## 2021-07-09 DIAGNOSIS — M17.11 PRIMARY OSTEOARTHRITIS OF RIGHT KNEE: ICD-10-CM

## 2021-07-09 DIAGNOSIS — Z96.651 STATUS POST TOTAL KNEE REPLACEMENT, RIGHT: Primary | ICD-10-CM

## 2021-07-09 PROCEDURE — 97140 MANUAL THERAPY 1/> REGIONS: CPT | Performed by: PHYSICAL THERAPIST

## 2021-07-09 PROCEDURE — 97110 THERAPEUTIC EXERCISES: CPT | Performed by: PHYSICAL THERAPIST

## 2021-07-09 PROCEDURE — 97112 NEUROMUSCULAR REEDUCATION: CPT | Performed by: PHYSICAL THERAPIST

## 2021-07-09 NOTE — PROGRESS NOTES
Daily Note     Today's date: 2021  Patient name: Hilda Johnston  : 1965  MRN: [id number]  Referring provider: Camilo Villalobos MD  Dx:   Encounter Diagnosis     ICD-10-CM    1  Status post total knee replacement, right  Z96 651    2  Primary osteoarthritis of right knee  M17 11                   Subjective: Pt states no new complaints of pain/discomfort since last session  Objective: See treatment diary below  Assessment: Pt with good tolerance to progression of program noting appropriate challenge and fatigue  She required moderate verbal and manual cues for correct positioning and performance, difficulty activating quadriceps with forward/lateral step ups with compensatory hip extension  Pt with good response to addition of wall slides and single leg press  Will continue to progress program as able  Pt will benefit from continued skilled PT intervention in order to address her remaining limitations and to restore maximal function  Plan: Continue per plan of care  Progress treatment as tolerated  Precautions: vertigo, HTN, HLD, GERD, asthma, anxiety, DM     Daily Treatment Diary      Assessment             Eval/Reval               FOTO               Manuals   Knee PROM   MD           Patellar ROM  MD           HS & GS Stretch             Exercise Diary     Bike - AROM  L1 x L1x10'            LAQ  1# 5" 2x10  2# 5" 2x10             SAQ  1# 5' 2x10 2# 5" 2x10             TKE with 1/2 Foam    10"x10  MC            SLR Flexion 1x10 2#  2x10            SLR Abduction 1x10 2#  2x10            Step Ups:   Forward  0R x20  0R  x20            Step Ups:   Lateral  0R x20 0R  x20            Eccentric Step Downs:    Forward  0Rx20 manual cues and A  HOLD           Mini Squats  5" 2x10   HOLD           SLS  30"x3              gait tr                Wall Slides  NV  5"x10           Single Leg Press    22#  2x10                           Modalities    CP R Knee   Extension Stretch  Post-Tx NP

## 2021-07-13 ENCOUNTER — OFFICE VISIT (OUTPATIENT)
Dept: PHYSICAL THERAPY | Facility: REHABILITATION | Age: 56
End: 2021-07-13
Payer: COMMERCIAL

## 2021-07-13 DIAGNOSIS — Z96.651 STATUS POST TOTAL KNEE REPLACEMENT, RIGHT: Primary | ICD-10-CM

## 2021-07-13 DIAGNOSIS — M17.11 PRIMARY OSTEOARTHRITIS OF RIGHT KNEE: ICD-10-CM

## 2021-07-13 PROCEDURE — 97112 NEUROMUSCULAR REEDUCATION: CPT | Performed by: PHYSICAL THERAPIST

## 2021-07-13 PROCEDURE — 97140 MANUAL THERAPY 1/> REGIONS: CPT | Performed by: PHYSICAL THERAPIST

## 2021-07-13 PROCEDURE — 97110 THERAPEUTIC EXERCISES: CPT | Performed by: PHYSICAL THERAPIST

## 2021-07-13 NOTE — PROGRESS NOTES
Daily Note     Today's date: 2021  Patient name: Tami Meraz  : 1965  MRN: [id number]  Referring provider: Jenel Mortimer, MD  Dx:   Encounter Diagnosis     ICD-10-CM    1  Status post total knee replacement, right  Z96 651    2  Primary osteoarthritis of right knee  M17 11                   Subjective: Pt reports to therapy with no new complaints of pain/discomfort in her knee or adverse effects of last therapy session  Objective: See treatment diary below      Assessment: Tolerated treatment well  Patient demonstrated fatigue post treatment, exhibited good technique with therapeutic exercises and would benefit from continued PT  Pt continues to demonstrate decreased eccentric quad control during step down and required verbal and manual cueing to decrease hip extension as compensation  Plan: Continue per plan of care  Progress treatment as tolerated  Precautions: vertigo, HTN, HLD, GERD, asthma, anxiety, DM     Daily Treatment Diary      Assessment            Eval/Reval               FOTO               Manuals   Knee PROM   MD HERRON          Patellar ROM  MD HERRON          HS & GS Stretch             Exercise Diary     Bike - AROM  L1 x L1x10' L1 x10'            LAQ  1# 5" 2x10  2# 5" 2x10  2# 5" 2x10           SAQ  1# 5' 2x10 2# 5" 2x10  2# 5" 2x10            TKE with 1/2 Foam    10"x10  MC            SLR Flexion 1x10 2#  2x10 2#   2x10           SLR Abduction 1x10 2#  2x10 2# 2x10           Step Ups:   Forward  0R x20  0R  x20 0R x20           Step Ups:   Lateral  0R x20 0R  x20 0R x20           Eccentric Step Downs:    Forward  0Rx20 manual cues and A  HOLD           Mini Squats  5" 2x10   HOLD           SLS  30"x3              gait tr                Wall Slides  NV  5"x10 5" x10           Single Leg Press    22#  2x10 22# 2x10                          Modalities    CP R Knee   Extension Stretch  Post-Tx   NP

## 2021-07-16 ENCOUNTER — OFFICE VISIT (OUTPATIENT)
Dept: PHYSICAL THERAPY | Facility: REHABILITATION | Age: 56
End: 2021-07-16
Payer: COMMERCIAL

## 2021-07-16 ENCOUNTER — OFFICE VISIT (OUTPATIENT)
Dept: UROLOGY | Facility: AMBULATORY SURGERY CENTER | Age: 56
End: 2021-07-16
Payer: COMMERCIAL

## 2021-07-16 ENCOUNTER — APPOINTMENT (OUTPATIENT)
Dept: PHYSICAL THERAPY | Facility: REHABILITATION | Age: 56
End: 2021-07-16
Payer: COMMERCIAL

## 2021-07-16 VITALS
HEART RATE: 84 BPM | SYSTOLIC BLOOD PRESSURE: 138 MMHG | HEIGHT: 64 IN | DIASTOLIC BLOOD PRESSURE: 74 MMHG | WEIGHT: 171 LBS | BODY MASS INDEX: 29.19 KG/M2

## 2021-07-16 DIAGNOSIS — M17.11 PRIMARY OSTEOARTHRITIS OF RIGHT KNEE: ICD-10-CM

## 2021-07-16 DIAGNOSIS — Z96.651 STATUS POST TOTAL KNEE REPLACEMENT, RIGHT: Primary | ICD-10-CM

## 2021-07-16 DIAGNOSIS — N20.0 NEPHROLITHIASIS: Primary | ICD-10-CM

## 2021-07-16 PROCEDURE — 97110 THERAPEUTIC EXERCISES: CPT | Performed by: PHYSICAL THERAPIST

## 2021-07-16 PROCEDURE — 97140 MANUAL THERAPY 1/> REGIONS: CPT | Performed by: PHYSICAL THERAPIST

## 2021-07-16 PROCEDURE — 1036F TOBACCO NON-USER: CPT | Performed by: NURSE PRACTITIONER

## 2021-07-16 PROCEDURE — 99213 OFFICE O/P EST LOW 20 MIN: CPT | Performed by: NURSE PRACTITIONER

## 2021-07-16 PROCEDURE — 97112 NEUROMUSCULAR REEDUCATION: CPT | Performed by: PHYSICAL THERAPIST

## 2021-07-16 NOTE — PROGRESS NOTES
Daily Note     Today's date: 2021  Patient name: Tianna Childs  : 1965  MRN: [id number]  Referring provider: Jonathan Santos MD  Dx:   Encounter Diagnosis     ICD-10-CM    1  Status post total knee replacement, right  Z96 651    2  Primary osteoarthritis of right knee  M17 11        Start Time: 0900  Stop Time: 0945  Total time in clinic (min): 45 minutes    Subjective: Pt comes to therapy denying pain or discomfort  Reports she was having a lot of pain yesterday, attributed to walking a lot while out shopping  Objective: See treatment diary below      Assessment: Tolerated treatment well  Patient demonstrated fatigue post treatment, exhibited good technique with therapeutic exercises and would benefit from continued PT      Plan: Progress treatment as tolerated  Precautions: vertigo, HTN, HLD, GERD, asthma, anxiety, DM     Daily Treatment Diary      Assessment  7/6  7/9 7/13 7/15         Eval/Reval               FOTO               Manuals   Knee PROM   MD HERRON          Patellar ROM  MD HERRON          HS & GS Stretch             Exercise Diary     Bike - AROM  L1 x L1x10' L1 x10'  L1x10'          LAQ  1# 5" 2x10  2# 5" 2x10  2# 5" 2x10           SAQ  1# 5' 2x10 2# 5" 2x10  2# 5" 2x10            TKE with 1/2 Foam    10"x10  MC            SLR Flexion 1x10 2#  2x10 2#   2x10 2#   2x10          SLR Abduction 1x10 2#  2x10 2# 2x10 2#   2x10          Step Ups:   Forward  0R x20  0R  x20 0R x20           Step Ups:   Lateral  0R x20 0R  x20 0R x20           Eccentric Step Downs:    Forward  0Rx20 manual cues and A  HOLD           Mini Squats  5" 2x10   HOLD           SLS  30"x3              gait tr                Wall Slides  NV  5"x10 5" x10           Single Leg Press    22#  2x10 22# 2x10 22# 2x10                         Modalities    CP R Knee   Extension Stretch  Post-Tx   NP

## 2021-07-16 NOTE — PROGRESS NOTES
07/16/21    Hardik Tomas   1965   [id number]     Assessment  1 Nephrolithiasis     Discussion/Plan  1 Nephrolithiasis    (1/8/2021) Abdominal Xray: Numerous bilateral renal stones, largest in the left kidney upper pole measuring 1 4 x 0 8 cm  Referral to Nephrology discussed for metabolic workup  Patient refused at this time  We discussed procedures for kidney stones including ESWL and URS  Educational dietary packet provided and we discussed this at length  Encouraged hydration with lemon water 64 oz daily  KUB xray ordered   We reviewed ER precautions    Patient will return in 4 weeks with KUB xray to review and monitor stone burden  Patient wishes to discuss surgical intervention options and take care of her stones  Subjective  HPI    Hardik Tomas is a 54year-old female with a history of kidney stones  She is asymptomatic at this time, however, she wishes to have intervention for her large stone burden  She denies stone episodes, gross hematuria, fever, chills, nausea, dysuria, flank pain  She hydrates with primarily lemon water and 1 coffee daily  Her father had an extensive history of kidney stones  She recently had her right knee replaced and is going through physical therapy     PMH: GERD, asthma, HTN, anxiety, OA, ovarian cysts     Review of Systems - History obtained from chart review and the patient  General ROS: negative  Psychological ROS: negative  Ophthalmic ROS: negative  ENT ROS: negative  Allergy and Immunology ROS: negative  Hematological and Lymphatic ROS: negative  Endocrine ROS: negative  Breast ROS: negative for breast lumps  Respiratory ROS: no cough, shortness of breath, or wheezing  Cardiovascular ROS: no chest pain or dyspnea on exertion  Gastrointestinal ROS: no abdominal pain, change in bowel habits, or black or bloody stools  Genito-Urinary ROS: positive for - urinary frequency  Musculoskeletal ROS: negative  Neurological ROS: no TIA or stroke symptoms  Dermatological ROS: negative       Objective  Physical Exam  Vitals and nursing note reviewed  Constitutional:       General: She is not in acute distress  Appearance: Normal appearance  She is obese  She is not ill-appearing, toxic-appearing or diaphoretic  HENT:      Head: Normocephalic and atraumatic  Pulmonary:      Effort: Pulmonary effort is normal  No respiratory distress  Musculoskeletal:         General: Normal range of motion  Cervical back: Normal range of motion  Skin:     General: Skin is warm and dry  Neurological:      General: No focal deficit present  Mental Status: She is alert and oriented to person, place, and time  Mental status is at baseline  Psychiatric:         Mood and Affect: Mood normal          Behavior: Behavior normal          Thought Content: Thought content normal          Judgment: Judgment normal           XR abdomen KUB 1 view 1/8/21  INDICATION:   N20 0: Calculus of kidney  Blood in urine for 3 days  Lower abdominal cramping  History of kidney stones      COMPARISON:  Renal ultrasound from 2/8/2019  Abdomen plain films from 2/8/2019  Abdomen and pelvic CT from 1/11/2019      VIEWS:  AP supine        FINDINGS:  Numerous bilateral renal stones, largest in the left kidney upper pole measuring 1 4 x 0 8 cm  Rest of the other stones are subcentimeter in size      No radiopaque ureteral calculi identified      Nonobstructive bowel gas pattern      No acute osseous abnormality is seen      IMPRESSION:     Numerous bilateral renal stones, largest in the left kidney upper pole measuring 1 4 x 0 8 cm  Rest of the other stones are subcentimeter in size      Navi Brandon [facility]

## 2021-07-20 ENCOUNTER — OFFICE VISIT (OUTPATIENT)
Dept: PHYSICAL THERAPY | Facility: REHABILITATION | Age: 56
End: 2021-07-20
Payer: COMMERCIAL

## 2021-07-20 DIAGNOSIS — Z96.651 STATUS POST TOTAL KNEE REPLACEMENT, RIGHT: Primary | ICD-10-CM

## 2021-07-20 DIAGNOSIS — M17.11 PRIMARY OSTEOARTHRITIS OF RIGHT KNEE: ICD-10-CM

## 2021-07-20 PROCEDURE — 97110 THERAPEUTIC EXERCISES: CPT

## 2021-07-20 PROCEDURE — 97112 NEUROMUSCULAR REEDUCATION: CPT

## 2021-07-20 PROCEDURE — 97140 MANUAL THERAPY 1/> REGIONS: CPT

## 2021-07-23 ENCOUNTER — OFFICE VISIT (OUTPATIENT)
Dept: PHYSICAL THERAPY | Facility: REHABILITATION | Age: 56
End: 2021-07-23
Payer: COMMERCIAL

## 2021-07-23 DIAGNOSIS — Z96.651 STATUS POST TOTAL KNEE REPLACEMENT, RIGHT: Primary | ICD-10-CM

## 2021-07-23 DIAGNOSIS — M17.11 PRIMARY OSTEOARTHRITIS OF RIGHT KNEE: ICD-10-CM

## 2021-07-23 PROCEDURE — 97112 NEUROMUSCULAR REEDUCATION: CPT

## 2021-07-23 PROCEDURE — 97110 THERAPEUTIC EXERCISES: CPT

## 2021-07-23 PROCEDURE — 97140 MANUAL THERAPY 1/> REGIONS: CPT

## 2021-07-23 NOTE — PROGRESS NOTES
Daily Note     Today's date: 2021  Patient name: Deidre Venegas  : 1965  MRN: [id number]  Referring provider: Fermín Nair MD  Dx:   Encounter Diagnosis     ICD-10-CM    1  Status post total knee replacement, right  Z96 651    2  Primary osteoarthritis of right knee  M17 11                   Subjective:  Patient reports that her knee is feeling good, just some mild stiffness  Objective: See treatment diary below      Assessment: Patient tolerated treatment well and was able to progress quad strengthening today  Cuing for proper loading and quad control with step ups  Patient exhibited good technique with therapeutic exercises and would benefit from continued PT      Plan: Continue per plan of care  Precautions: vertigo, HTN, HLD, GERD, asthma, anxiety, DM     Daily Treatment Diary      Assessment  7/6  7/9 7/13 7/15 7/20 7/23       Eval/Reval               FOTO               Manuals   Knee PROM   MD HERRON  CC SC       Patellar ROM  MD HERRON  CC SC       HS & GS Stretch             Exercise Diary     Bike - AROM  L1 x L1x10' L1 x10'  L1x10' L1 x10' L1 x10'        LAQ  1# 5" 2x10  2# 5" 2x10  2# 5" 2x10  2# 5" 2x10 2 5# 5"  2x10        SAQ  1# 5' 2x10 2# 5" 2x10  2# 5" 2x10   2# 5" 2x10 2 5# 5"  2x10        TKE with 1/2 Foam    10"x10  MC            SLR Flexion 1x10 2#  2x10 2#   2x10 2#   2x10 2# 2x10 2#  2x10        SLR Abduction 1x10 2#  2x10 2# 2x10 2#   2x10 2# 2x10 2#  2x10        Step Ups:   Forward  0R x20  0R  x20 0R x20  ! R x5  OR x15 1R  20x        Step Ups:   Lateral  0R x20 0R  x20 0R x20  OR x20 1R  x20        Eccentric Step Downs:    Forward  0Rx20 manual cues and A  HOLD           Mini Squats  5" 2x10   HOLD           SLS  30"x3              gait tr                Wall Slides  NV  5"x10 5" x10   5"x12 5"x12       Single Leg Press    22#  2x10 22# 2x10 22# 2x10 22# 2x10 22#  2x10                       Modalities    CP R Knee   Extension Stretch  Post-Tx   NP   NP

## 2021-07-25 ENCOUNTER — APPOINTMENT (EMERGENCY)
Dept: RADIOLOGY | Facility: HOSPITAL | Age: 56
DRG: 661 | End: 2021-07-25
Payer: COMMERCIAL

## 2021-07-25 ENCOUNTER — HOSPITAL ENCOUNTER (INPATIENT)
Facility: HOSPITAL | Age: 56
LOS: 1 days | Discharge: HOME | DRG: 661 | End: 2021-07-26
Attending: EMERGENCY MEDICINE | Admitting: INTERNAL MEDICINE
Payer: COMMERCIAL

## 2021-07-25 DIAGNOSIS — N20.0 KIDNEY STONE: ICD-10-CM

## 2021-07-25 DIAGNOSIS — N20.0 NEPHROLITHIASIS: Primary | ICD-10-CM

## 2021-07-25 DIAGNOSIS — R10.9 LEFT FLANK PAIN: ICD-10-CM

## 2021-07-25 LAB
ALBUMIN SERPL BCP-MCNC: 3.9 G/DL (ref 3.5–5)
ALP SERPL-CCNC: 123 U/L (ref 46–116)
ALT SERPL W P-5'-P-CCNC: 31 U/L (ref 12–78)
ANION GAP SERPL CALCULATED.3IONS-SCNC: 6 MMOL/L (ref 4–13)
AST SERPL W P-5'-P-CCNC: 15 U/L (ref 5–45)
BACTERIA UR QL AUTO: ABNORMAL /HPF
BASOPHILS # BLD AUTO: 0.06 THOUSANDS/ΜL (ref 0–0.1)
BASOPHILS NFR BLD AUTO: 0 % (ref 0–1)
BILIRUB SERPL-MCNC: 0.3 MG/DL (ref 0.2–1)
BILIRUB UR QL STRIP: NEGATIVE
BUN SERPL-MCNC: 19 MG/DL (ref 5–25)
CALCIUM SERPL-MCNC: 9.8 MG/DL (ref 8.3–10.1)
CHLORIDE SERPL-SCNC: 104 MMOL/L (ref 100–108)
CLARITY UR: ABNORMAL
CO2 SERPL-SCNC: 25 MMOL/L (ref 21–32)
COLOR UR: ABNORMAL
CREAT SERPL-MCNC: 1.04 MG/DL (ref 0.6–1.3)
EOSINOPHIL # BLD AUTO: 0.02 THOUSAND/ΜL (ref 0–0.61)
EOSINOPHIL NFR BLD AUTO: 0 % (ref 0–6)
ERYTHROCYTE [DISTWIDTH] IN BLOOD BY AUTOMATED COUNT: 14.4 % (ref 11.6–15.1)
GFR SERPL CREATININE-BSD FRML MDRD: 61 ML/MIN/1.73SQ M
GLUCOSE SERPL-MCNC: 121 MG/DL (ref 65–140)
GLUCOSE UR STRIP-MCNC: NEGATIVE MG/DL
HCT VFR BLD AUTO: 41.1 % (ref 34.8–46.1)
HGB BLD-MCNC: 13.2 G/DL (ref 11.5–15.4)
HGB UR QL STRIP.AUTO: ABNORMAL
IMM GRANULOCYTES # BLD AUTO: 0.13 THOUSAND/UL (ref 0–0.2)
IMM GRANULOCYTES NFR BLD AUTO: 1 % (ref 0–2)
KETONES UR STRIP-MCNC: NEGATIVE MG/DL
LEUKOCYTE ESTERASE UR QL STRIP: ABNORMAL
LYMPHOCYTES # BLD AUTO: 1.71 THOUSANDS/ΜL (ref 0.6–4.47)
LYMPHOCYTES NFR BLD AUTO: 10 % (ref 14–44)
MCH RBC QN AUTO: 29.5 PG (ref 26.8–34.3)
MCHC RBC AUTO-ENTMCNC: 32.1 G/DL (ref 31.4–37.4)
MCV RBC AUTO: 92 FL (ref 82–98)
MONOCYTES # BLD AUTO: 0.92 THOUSAND/ΜL (ref 0.17–1.22)
MONOCYTES NFR BLD AUTO: 6 % (ref 4–12)
NEUTROPHILS # BLD AUTO: 14.01 THOUSANDS/ΜL (ref 1.85–7.62)
NEUTS SEG NFR BLD AUTO: 83 % (ref 43–75)
NITRITE UR QL STRIP: NEGATIVE
NON-SQ EPI CELLS URNS QL MICRO: ABNORMAL /HPF
NRBC BLD AUTO-RTO: 0 /100 WBCS
PH UR STRIP.AUTO: 5.5 [PH] (ref 4.5–8)
PLATELET # BLD AUTO: 351 THOUSANDS/UL (ref 149–390)
PMV BLD AUTO: 10.9 FL (ref 8.9–12.7)
POTASSIUM SERPL-SCNC: 4.1 MMOL/L (ref 3.5–5.3)
PROT SERPL-MCNC: 7.8 G/DL (ref 6.4–8.2)
PROT UR STRIP-MCNC: ABNORMAL MG/DL
RBC # BLD AUTO: 4.47 MILLION/UL (ref 3.81–5.12)
RBC #/AREA URNS AUTO: ABNORMAL /HPF
SODIUM SERPL-SCNC: 135 MMOL/L (ref 136–145)
SP GR UR STRIP.AUTO: 1.02 (ref 1–1.03)
UROBILINOGEN UR QL STRIP.AUTO: 0.2 E.U./DL
WBC # BLD AUTO: 16.85 THOUSAND/UL (ref 4.31–10.16)
WBC #/AREA URNS AUTO: ABNORMAL /HPF

## 2021-07-25 PROCEDURE — 87086 URINE CULTURE/COLONY COUNT: CPT

## 2021-07-25 PROCEDURE — 74176 CT ABD & PELVIS W/O CONTRAST: CPT

## 2021-07-25 PROCEDURE — NC001 PR NO CHARGE: Performed by: INTERNAL MEDICINE

## 2021-07-25 PROCEDURE — 80053 COMPREHEN METABOLIC PANEL: CPT

## 2021-07-25 PROCEDURE — 96366 THER/PROPH/DIAG IV INF ADDON: CPT

## 2021-07-25 PROCEDURE — 85025 COMPLETE CBC W/AUTO DIFF WBC: CPT

## 2021-07-25 PROCEDURE — 36415 COLL VENOUS BLD VENIPUNCTURE: CPT

## 2021-07-25 PROCEDURE — 96375 TX/PRO/DX INJ NEW DRUG ADDON: CPT

## 2021-07-25 PROCEDURE — G1004 CDSM NDSC: HCPCS

## 2021-07-25 PROCEDURE — 81001 URINALYSIS AUTO W/SCOPE: CPT

## 2021-07-25 PROCEDURE — 96365 THER/PROPH/DIAG IV INF INIT: CPT

## 2021-07-25 PROCEDURE — 99285 EMERGENCY DEPT VISIT HI MDM: CPT

## 2021-07-25 PROCEDURE — 99285 EMERGENCY DEPT VISIT HI MDM: CPT | Performed by: EMERGENCY MEDICINE

## 2021-07-25 RX ORDER — KETOROLAC TROMETHAMINE 10 MG/1
10 TABLET, FILM COATED ORAL EVERY 6 HOURS PRN
Status: DISCONTINUED | OUTPATIENT
Start: 2021-07-25 | End: 2021-07-26 | Stop reason: HOSPADM

## 2021-07-25 RX ORDER — ONDANSETRON 4 MG/1
4 TABLET, ORALLY DISINTEGRATING ORAL ONCE
Status: COMPLETED | OUTPATIENT
Start: 2021-07-25 | End: 2021-07-25

## 2021-07-25 RX ORDER — CEFAZOLIN SODIUM 1 G/50ML
1000 SOLUTION INTRAVENOUS
Status: COMPLETED | OUTPATIENT
Start: 2021-07-26 | End: 2021-07-26

## 2021-07-25 RX ORDER — ATORVASTATIN CALCIUM 40 MG/1
40 TABLET, FILM COATED ORAL DAILY
Status: DISCONTINUED | OUTPATIENT
Start: 2021-07-25 | End: 2021-07-26 | Stop reason: HOSPADM

## 2021-07-25 RX ORDER — HYDROMORPHONE HCL/PF 1 MG/ML
1 SYRINGE (ML) INJECTION ONCE
Status: COMPLETED | OUTPATIENT
Start: 2021-07-25 | End: 2021-07-25

## 2021-07-25 RX ORDER — MELATONIN
2000 DAILY
Status: DISCONTINUED | OUTPATIENT
Start: 2021-07-26 | End: 2021-07-26 | Stop reason: HOSPADM

## 2021-07-25 RX ORDER — PANTOPRAZOLE SODIUM 20 MG/1
20 TABLET, DELAYED RELEASE ORAL
Status: DISCONTINUED | OUTPATIENT
Start: 2021-07-26 | End: 2021-07-26 | Stop reason: HOSPADM

## 2021-07-25 RX ORDER — ACETAMINOPHEN 325 MG/1
650 TABLET ORAL EVERY 6 HOURS PRN
Status: DISCONTINUED | OUTPATIENT
Start: 2021-07-25 | End: 2021-07-26 | Stop reason: HOSPADM

## 2021-07-25 RX ORDER — SODIUM CHLORIDE, SODIUM GLUCONATE, SODIUM ACETATE, POTASSIUM CHLORIDE, MAGNESIUM CHLORIDE, SODIUM PHOSPHATE, DIBASIC, AND POTASSIUM PHOSPHATE .53; .5; .37; .037; .03; .012; .00082 G/100ML; G/100ML; G/100ML; G/100ML; G/100ML; G/100ML; G/100ML
1000 INJECTION, SOLUTION INTRAVENOUS ONCE
Status: COMPLETED | OUTPATIENT
Start: 2021-07-25 | End: 2021-07-25

## 2021-07-25 RX ORDER — SODIUM CHLORIDE, SODIUM GLUCONATE, SODIUM ACETATE, POTASSIUM CHLORIDE, MAGNESIUM CHLORIDE, SODIUM PHOSPHATE, DIBASIC, AND POTASSIUM PHOSPHATE .53; .5; .37; .037; .03; .012; .00082 G/100ML; G/100ML; G/100ML; G/100ML; G/100ML; G/100ML; G/100ML
75 INJECTION, SOLUTION INTRAVENOUS CONTINUOUS
Status: DISPENSED | OUTPATIENT
Start: 2021-07-25 | End: 2021-07-26

## 2021-07-25 RX ORDER — KETOROLAC TROMETHAMINE 30 MG/ML
15 INJECTION, SOLUTION INTRAMUSCULAR; INTRAVENOUS ONCE
Status: COMPLETED | OUTPATIENT
Start: 2021-07-25 | End: 2021-07-25

## 2021-07-25 RX ADMIN — ONDANSETRON 4 MG: 4 TABLET, ORALLY DISINTEGRATING ORAL at 23:12

## 2021-07-25 RX ADMIN — SODIUM CHLORIDE, SODIUM GLUCONATE, SODIUM ACETATE, POTASSIUM CHLORIDE, MAGNESIUM CHLORIDE, SODIUM PHOSPHATE, DIBASIC, AND POTASSIUM PHOSPHATE 75 ML/HR: .53; .5; .37; .037; .03; .012; .00082 INJECTION, SOLUTION INTRAVENOUS at 23:29

## 2021-07-25 RX ADMIN — HYDROMORPHONE HYDROCHLORIDE 1 MG: 1 INJECTION, SOLUTION INTRAMUSCULAR; INTRAVENOUS; SUBCUTANEOUS at 23:12

## 2021-07-25 RX ADMIN — SODIUM CHLORIDE, SODIUM GLUCONATE, SODIUM ACETATE, POTASSIUM CHLORIDE, MAGNESIUM CHLORIDE, SODIUM PHOSPHATE, DIBASIC, AND POTASSIUM PHOSPHATE 1000 ML: .53; .5; .37; .037; .03; .012; .00082 INJECTION, SOLUTION INTRAVENOUS at 17:49

## 2021-07-25 RX ADMIN — ATORVASTATIN CALCIUM 40 MG: 40 TABLET, FILM COATED ORAL at 23:25

## 2021-07-25 RX ADMIN — KETOROLAC TROMETHAMINE 15 MG: 30 INJECTION, SOLUTION INTRAMUSCULAR; INTRAVENOUS at 17:49

## 2021-07-25 NOTE — ED PROVIDER NOTES
History  Chief Complaint   Patient presents with    Flank Pain     Pt with kidney sontes, pain on the right kristin, was at the specialist on last week, states no improvement     53 yo F PMHx of kidney stones and chronic knee pain s/p R  Knee replacement in May 2021 presents today with L-sided flank pain  Patient has a history of numerous bilateral renal stones, largest stone noted to be in the L upper pole, measuring 1 4 x0 8cm on on KUB from 01/2021  Patient's current pain began today at noon  She states that she has intermittent pain from her kidney stones which she manages well with Tylenol  However, today her pain is 10/10  She states her pain does not usually get this bad  She saw Urology last week who discussed her options regarding surgical procedures or lithotripsy for removal of the stones  They provided her a script for an updated outpatient KUB but she has not been able to get this done yet  She does endorse polydipsia and urinary frequency today  She notes a small amount of blood in her urine, but has not been sifting her urine for stones and has not noticed passing any stones  She denies any nausea, vomiting, SOB, cough or chest pain  Of note, patient has chronic pain in her R-knee s/p knee replacement  She has a script for oxycontin which she takes twice a week for physical therapy (on Tuesday and Friday)  She has not taken any oxycontin for this current bout of pain  Prior to Admission Medications   Prescriptions Last Dose Informant Patient Reported? Taking?    ALPRAZolam (XANAX) 0 25 mg tablet Not Taking at Unknown time Self Yes No   Sig: Take 1 tablet by mouth 2 (two) times a day as needed   Patient not taking: Reported on 7/25/2021   Ascorbic Acid (vitamin C) 1000 MG tablet 7/24/2021 at Unknown time Self Yes Yes   Sig: Take 2,000 mg by mouth daily   Cholecalciferol (Vitamin D3) 50 MCG (2000 UT) TABS 7/25/2021 at Unknown time Self Yes Yes   Sig: Take 2,000 Units by mouth daily   ascorbic acid (VITAMIN C) 500 MG tablet Not Taking at Unknown time Self No No   Sig: Take 1 tablet (500 mg total) by mouth daily Start 30 days prior to surgery   Patient not taking: Reported on 2021   atorvastatin (LIPITOR) 40 mg tablet 2021 at Unknown time Self No Yes   Sig: Take 1 tablet (40 mg total) by mouth daily   enoxaparin (LOVENOX) 40 mg/0 4 mL Not Taking at Unknown time Self No No   Sig: Inject 0 4 mL (40 mg total) under the skin daily   Patient not taking: Reported on 2021   ferrous sulfate 324 (65 Fe) mg Not Taking at Unknown time Self No No   Sig: Take 1 tablet (324 mg total) by mouth 2 (two) times a day before meals Start 30 days prior to surgery   Patient not taking: Reported on 3/27/8893   folic acid (FOLVITE) 1 mg tablet Not Taking at Unknown time Self No No   Sig: Take 1 tablet (1 mg total) by mouth daily Start 30 days prior to surgery   Patient not taking: Reported on 2021   lisinopril (ZESTRIL) 10 mg tablet 2021 at Unknown time Self No Yes   Sig: Take 1 5 tablets (15 mg total) by mouth daily   metFORMIN (GLUCOPHAGE) 1000 MG tablet 2021 at Unknown time Self No Yes   Sig: Take 1 tablet (1,000 mg total) by mouth 2 (two) times a day with meals   multivitamin (THERAGRAN) TABS 2021 at Unknown time Self Yes Yes   Sig: Take 1 tablet by mouth daily   omeprazole (PriLOSEC) 20 mg delayed release capsule  Self No No   Sig: Take 1 capsule (20 mg total) by mouth daily   oxyCODONE (ROXICODONE) 5 mg immediate release tablet 2021 at Unknown time Self No Yes   Si pill po Q4 Hrs prn   sertraline (ZOLOFT) 50 mg tablet 2021 at Unknown time Self No Yes   Sig: Take 1 tablet (50 mg total) by mouth daily Takes in the am       Facility-Administered Medications: None       Past Medical History:   Diagnosis Date    Anxiety     Asthma     Colon polyp     Depression     Diverticulitis     Diverticulitis of colon     Follicular cyst of ovary 2014    Former smoker     GERD (gastroesophageal reflux disease)     Glycosuria     Headache     Hyperlipidemia     Hypertension     Kidney calculi 1/11/2019    Obesity     Ovarian cyst     Overactive bladder     Overweight     Pulmonary nodule     Renal calculus     Tinea pedis of left foot 6/8/2020    Vertigo     Wears partial dentures     Lower plate -partial       Past Surgical History:   Procedure Laterality Date    APPENDECTOMY      BLADDER SUSPENSION      [facility]    CERVICAL BIOPSY  W/ LOOP ELECTRODE EXCISION      COLONOSCOPY      DILATION AND CURETTAGE OF UTERUS      HYSTERECTOMY  2007    ovaries present bilaterally    CO COLONOSCOPY FLX DX W/COLLJ SPEC WHEN PFRMD N/A 9/26/2017    Procedure: EGD AND COLONOSCOPY;  Surgeon: Cameron Munguia MD;  Location: [facility]; Service: Gastroenterology    CO REVISE MEDIAN N/CARPAL TUNNEL SURG Right 5/29/2018    Procedure: CARPAL TUNNEL RELEASE;  Surgeon: Leonardo Castro DO;  Location[facility];  Service: Orthopedics    CO TOTAL KNEE ARTHROPLASTY Right 5/19/2021    Procedure: ARTHROPLASTY KNEE TOTAL;  Surgeon: Miguelina Simmons MD;  [facility]: [facility];  Service: Orthopedics    TONSILLECTOMY      TUBAL LIGATION      WISDOM TOOTH EXTRACTION         Family History   Problem Relation Age of Onset    Diabetes type II Mother     Asthma Mother     Stroke Father     Pancreatic cancer Father 68    Diabetes type II Father     No Known Problems Maternal Grandmother     No Known Problems Maternal Grandfather     No Known Problems Paternal Grandmother     No Known Problems Paternal Grandfather     No Known Problems Sister     No Known Problems Sister     No Known Problems Sister     No Known Problems Sister     No Known Problems Sister     No Known Problems Maternal Aunt     No Known Problems Paternal Aunt      I have reviewed and agree with the history as documented      E-Cigarette/Vaping    E-Cigarette Use Never User     Comments Denies      E-Cigarette/Vaping Substances     Social History     Tobacco Use    Smoking status: Former Smoker     Packs/day: 0 25     Years: 20 00     Pack years: 5 00     Types: Cigarettes     Quit date: 3/16/2018     Years since quitting: 3 3    Smokeless tobacco: Never Used    Tobacco comment: approx less than  half a pack   Vaping Use    Vaping Use: Never used   Substance Use Topics    Alcohol use: Yes     Alcohol/week: 3 0 standard drinks     Types: 3 Cans of beer per week     Comment: socially    Drug use: No     Comment: Denies        Review of Systems   Constitutional: Negative for activity change, appetite change, chills, diaphoresis and fever  HENT: Negative for congestion, ear pain and sore throat  Eyes: Negative for pain and visual disturbance  Respiratory: Negative for cough and shortness of breath  Cardiovascular: Negative for chest pain and palpitations  Gastrointestinal: Positive for abdominal pain  Negative for abdominal distention, blood in stool, diarrhea, nausea and vomiting  Genitourinary: Positive for dysuria, flank pain (left-sided) and hematuria  Negative for pelvic pain  Musculoskeletal: Negative for arthralgias and back pain  Skin: Negative for color change and rash  Neurological: Negative for seizures and syncope  Psychiatric/Behavioral: Negative for agitation  All other systems reviewed and are negative  Physical Exam  ED Triage Vitals [07/25/21 1617]   Temperature Pulse Respirations Blood Pressure SpO2   97 8 °F (36 6 °C) 98 16 159/83 100 %      Temp Source Heart Rate Source Patient Position - Orthostatic VS BP Location FiO2 (%)   Oral -- -- -- --      Pain Score       Worst Possible Pain             Orthostatic Vital Signs  Vitals:    07/25/21 1617   BP: 159/83   Pulse: 98       Physical Exam  Vitals reviewed  Constitutional:       General: She is in acute distress (pateint writhing on the stretcher in pain, can't find a comfortable postion)     HENT:      Head: Normocephalic and atraumatic  Right Ear: External ear normal       Left Ear: External ear normal       Nose: Nose normal       Mouth/Throat:      Mouth: Mucous membranes are moist       Pharynx: Oropharynx is clear  Eyes:      Extraocular Movements: Extraocular movements intact  Pupils: Pupils are equal, round, and reactive to light  Cardiovascular:      Rate and Rhythm: Normal rate and regular rhythm  Pulses: Normal pulses  Pulmonary:      Effort: Pulmonary effort is normal       Breath sounds: Normal breath sounds  Abdominal:      General: Abdomen is flat  Tenderness: There is left CVA tenderness  Musculoskeletal:         General: No swelling or tenderness  Skin:     General: Skin is warm and dry  Capillary Refill: Capillary refill takes less than 2 seconds  Neurological:      Mental Status: She is alert and oriented to person, place, and time  Psychiatric:         Mood and Affect: Mood normal          Behavior: Behavior normal          ED Medications  Medications   HYDROmorphone (DILAUDID) injection 1 mg (has no administration in time range)   ceFAZolin (ANCEF) IVPB (premix in dextrose) 1,000 mg 50 mL (has no administration in time range)   multi-electrolyte (PLASMALYTE-A/ISOLYTE-S PH 7 4) IV solution (has no administration in time range)   ketorolac (TORADOL) injection 15 mg (15 mg Intravenous Given 7/25/21 1749)   multi-electrolyte (ISOLYTE-S PH 7 4) bolus 1,000 mL (1,000 mL Intravenous New Bag 7/25/21 1749)       Diagnostic Studies  Results Reviewed     Procedure Component Value Units Date/Time    Urine Microscopic [[id number]]  (Abnormal) Collected: 07/25/21 1756    Lab Status: Final result Specimen: Urine, Clean Catch Updated: 07/25/21 1827     RBC, UA Innumerable /hpf      WBC, UA 20-30 /hpf      Epithelial Cells Occasional /hpf      Bacteria, UA None Seen /hpf     Urine culture [[id number]] Collected: 07/25/21 1756    Lab Status:  In process Specimen: Urine, Clean Catch Updated: 07/25/21 1827    Comprehensive metabolic panel [[id number]]  (Abnormal) Collected: 07/25/21 1749    Lab Status: Final result Specimen: Blood from Arm, Right Updated: 07/25/21 1813     Sodium 135 mmol/L      Potassium 4 1 mmol/L      Chloride 104 mmol/L      CO2 25 mmol/L      ANION GAP 6 mmol/L      BUN 19 mg/dL      Creatinine 1 04 mg/dL      Glucose 121 mg/dL      Calcium 9 8 mg/dL      AST 15 U/L      ALT 31 U/L      Alkaline Phosphatase 123 U/L      Total Protein 7 8 g/dL      Albumin 3 9 g/dL      Total Bilirubin 0 30 mg/dL      eGFR 61 ml/min/1 73sq m     Narrative:      National Kidney Disease Foundation guidelines for Chronic Kidney Disease (CKD):     Stage 1 with normal or high GFR (GFR > 90 mL/min/1 73 square meters)    Stage 2 Mild CKD (GFR = 60-89 mL/min/1 73 square meters)    Stage 3A Moderate CKD (GFR = 45-59 mL/min/1 73 square meters)    Stage 3B Moderate CKD (GFR = 30-44 mL/min/1 73 square meters)    Stage 4 Severe CKD (GFR = 15-29 mL/min/1 73 square meters)    Stage 5 End Stage CKD (GFR <15 mL/min/1 73 square meters)  Note: GFR calculation is accurate only with a steady state creatinine    CBC and differential [[id number]]  (Abnormal) Collected: 07/25/21 1749    Lab Status: Final result Specimen: Blood from Arm, Right Updated: 07/25/21 1759     WBC 16 85 Thousand/uL      RBC 4 47 Million/uL      Hemoglobin 13 2 g/dL      Hematocrit 41 1 %      MCV 92 fL      MCH 29 5 pg      MCHC 32 1 g/dL      RDW 14 4 %      MPV 10 9 fL      Platelets 202 Thousands/uL      nRBC 0 /100 WBCs      Neutrophils Relative 83 %      Immat GRANS % 1 %      Lymphocytes Relative 10 %      Monocytes Relative 6 %      Eosinophils Relative 0 %      Basophils Relative 0 %      Neutrophils Absolute 14 01 Thousands/µL      Immature Grans Absolute 0 13 Thousand/uL      Lymphocytes Absolute 1 71 Thousands/µL      Monocytes Absolute 0 92 Thousand/µL      Eosinophils Absolute 0 02 Thousand/µL      Basophils Absolute 0 06 Thousands/µL     Urine Macroscopic, POC [[id number]]  (Abnormal) Collected: 07/25/21 1756    Lab Status: Final result Specimen: Urine Updated: 07/25/21 1757     Color, UA Rosa     Clarity, UA Slightly Cloudy     pH, UA 5 5     Leukocytes, UA Small     Nitrite, UA Negative     Protein, UA 30 (1+) mg/dl      Glucose, UA Negative mg/dl      Ketones, UA Negative mg/dl      Urobilinogen, UA 0 2 E U /dl      Bilirubin, UA Negative     Blood, UA Large     Specific [place], UA 1 020    Narrative:      [facility] RESULT                 CT renal stone study abdomen pelvis wo contrast   Final Result by Ayleen Aponte MD (07/25 1908)      Severe left hydroureteronephrosis resulting from an obstructing distal left ureteral calculus measuring 8 mm just above the UV junction  Multiple additional nonobstructing intrarenal calculi the largest measuring 1 4 cm  Significant perinephric    stranding  Moderate hydroureteronephrosis due to an obstructing 14 mm proximal right ureteral calculus  Multiple additional calculi throughout the right renal collecting system the largest in the lower pole measures 5 mm  Hepatomegaly  Benign left adrenal gland adenoma measuring 1 9 cm  Colonic diverticulosis without diverticulitis  Workstation performed: [id number]               Procedures  Procedures      ED Course  ED Course as of Jul 25 2132   Coolidge Gosselin Jul 25, 2021   6760 Re-evaluated patient after toradol IV, her pain is much relieved  CT renal stone study abdomen pelvis wo contrast   2058 Spoke with resident AYLA Camargo to admit to [facility] under Dr David Ayala                                          MDM  Number of Diagnoses or Management Options  Left flank pain  Nephrolithiasis  Diagnosis management comments: 55 yo F PMHx of kidney stones following with urology, chronic knee pain s/p knee replacement presents with severe L-sided flank pain       Previous imaging reviewed: showed significant stone burden bilaterally  Last CT from 2019  CT ordered with stone protocol to evaluate current status of her stones  Differential includes: nephrolithiasis, UTI  Basic labs ordered and was significant for WBCs-16 85  Urine showed sterile pyuria  CT showed: severe L hydroureteronephrosis resulting from obstructing stoen in the L ureter  8 mm stone just above the UV junction    -Also showed moderate R hydroureteronephrosis with a 14mm obstructing stone  Urology consulted and made aware that patient is in the ED  They recommend the patient be admitted to medicine, made NPO from midnight for a urologic procedure in the morning  Case discussed with [facility] resident for admission  Disposition  Final diagnoses:   Nephrolithiasis   Left flank pain     Time reflects when diagnosis was documented in both MDM as applicable and the Disposition within this note     Time User Action Codes Description Comment    7/25/2021  8:29 PM Laveta [place] [facility] Add [N20 0] Nephrolithiasis     7/25/2021  9:00 PM Stiles Cinnamon Modify [N20 0] Nephrolithiasis     7/25/2021  9:00 PM Stiles Cinnamon Add [R10 9] Left flank pain       ED Disposition     ED Disposition Condition Date/Time Comment    Admit Stable Sun Jul 25, 2021  9:00 PM Case was discussed with Dr Francis Abbasi and the patient's admission status was agreed to be inpatient to the service of Dr Núñez  Follow-up Information    None         Patient's Medications   Discharge Prescriptions    No medications on file     No discharge procedures on file  PDMP Review     None           ED Provider  Attending physically available and evaluated Alexa Forrest I managed the patient along with the ED Attending      Electronically Signed by         Vitaliy Yates MD  07/25/21 4632

## 2021-07-25 NOTE — ED ATTENDING ATTESTATION
Raymon Palm DO, saw and evaluated the patient  I have discussed the patient with the resident/non-physician practitioner and agree with the resident's/non-physician practitioner's findings, Plan of Care, and MDM as documented in the resident's/non-physician practitioner's note, except where noted  All available labs and Radiology studies were reviewed  At this point I agree with the current assessment done in the Emergency Department  I have conducted an independent evaluation of this patient including a focused history and a physical exam     ED Note - Herlinda Vickers 54 y o  female MRN: [id number]  Unit/Bed#: ED 15 Encounter: [id number]    History of Present Illness   HPI  Herlinda Vickers is a 54 y o  female who presents for evaluation of left flank pain which onset this morning  Patient with known left upper pole kidney stone  Patient able to describe the quality of the pain but states that it is severe and feels like it is radiating down to the left lower quadrant  No ripping or tearing type pain  No chest pain, shortness of breath, nausea or vomiting, dizziness or diaphoresis  Tylenol at home with minimal to no relief  No obvious alleviating or exacerbating factors  REVIEW OF SYSTEMS  See HPI for further details  12 systems reviewed and otherwise negative except as noted     Historical Information     PAST MEDICAL HISTORY  Past Medical History:   Diagnosis Date    Anxiety     Asthma     Colon polyp     Depression     Diverticulitis     Diverticulitis of colon     Follicular cyst of ovary 12/23/2014    Former smoker     GERD (gastroesophageal reflux disease)     Glycosuria     Headache     Hyperlipidemia     Hypertension     Kidney calculi 1/11/2019    Obesity     Ovarian cyst     Overactive bladder     Overweight     Pulmonary nodule     Renal calculus     Tinea pedis of left foot 6/8/2020    Vertigo     Wears partial dentures     Lower plate -partial       FAMILY HISTORY  Family History   Problem Relation Age of Onset    Diabetes type II Mother     Asthma Mother     Stroke Father     Pancreatic cancer Father 68    Diabetes type II Father     No Known Problems Maternal Grandmother     No Known Problems Maternal Grandfather     No Known Problems Paternal Grandmother     No Known Problems Paternal Grandfather     No Known Problems Sister     No Known Problems Sister     No Known Problems Sister     No Known Problems Sister     No Known Problems Sister     No Known Problems Maternal Aunt     No Known Problems Paternal Aunt        SOCIAL HISTORY  Social History     Socioeconomic History    Marital status: /Civil Union     Spouse name: None    Number of children: None    Years of education: None    Highest education level: None   Occupational History    None   Tobacco Use    Smoking status: Former Smoker     Packs/day: 0 25     Years: 20 00     Pack years: 5 00     Types: Cigarettes     Quit date: 3/16/2018     Years since quitting: 3 3    Smokeless tobacco: Never Used    Tobacco comment: approx less than  half a pack   Vaping Use    Vaping Use: Never used   Substance and Sexual Activity    Alcohol use: Yes     Alcohol/week: 3 0 standard drinks     Types: 3 Cans of beer per week     Comment: socially    Drug use: No     Comment: Denies    Sexual activity: Not Currently     Partners: Male   Other Topics Concern    None   Social History Narrative    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      Social Determinants of Health     Financial Resource Strain:     Difficulty of Paying Living Expenses:    Food Insecurity:     Worried About Running Out of Food in the Last Year:     Ran Out of Food in the Last Year:    Transportation Needs:     Lack of Transportation (Medical):      Lack of Transportation (Non-Medical):    Physical Activity:     Days of Exercise per Week:     Minutes of Exercise per Session:    Stress:     Feeling of Stress :    Social Connections:     Frequency of Communication with Friends and Family:     Frequency of Social Gatherings with Friends and Family:     Attends Zoroastrianism Services:     Active Member of Clubs or Organizations:     Attends Club or Organization Meetings:     Marital Status:    Intimate Partner Violence:     Fear of Current or Ex-Partner:     Emotionally Abused:     Physically Abused:     Sexually Abused:        SURGICAL HISTORY  Past Surgical History:   Procedure Laterality Date    APPENDECTOMY      BLADDER SUSPENSION      [facility]    CERVICAL BIOPSY  W/ LOOP ELECTRODE EXCISION      COLONOSCOPY      DILATION AND CURETTAGE OF UTERUS      HYSTERECTOMY  2007    ovaries present bilaterally    LA COLONOSCOPY FLX DX W/COLLJ SPEC WHEN PFRMD N/A 9/26/2017    Procedure: EGD AND COLONOSCOPY;  Surgeon: Valeri Merida MD;  Location: [facility]; Service: Gastroenterology    LA REVISE MEDIAN N/CARPAL TUNNEL SURG Right 5/29/2018    Procedure: CARPAL TUNNEL RELEASE;  Surgeon: Lyn Terrell DO;  Location[facility];  Service: Orthopedics    LA TOTAL KNEE ARTHROPLASTY Right 5/19/2021    Procedure: ARTHROPLASTY KNEE TOTAL;  Surgeon: Suraj Malave MD;  Location: [facility];  Service: Orthopedics    TONSILLECTOMY      TUBAL LIGATION      WISDOM TOOTH EXTRACTION       Meds/Allergies     CURRENT MEDICATIONS  No current facility-administered medications for this encounter      Current Outpatient Medications:     ALPRAZolam (XANAX) 0 25 mg tablet, Take 1 tablet by mouth 2 (two) times a day as needed, Disp: , Rfl:     Ascorbic Acid (vitamin C) 1000 MG tablet, Take 2,000 mg by mouth daily, Disp: , Rfl:     ascorbic acid (VITAMIN C) 500 MG tablet, Take 1 tablet (500 mg total) by mouth daily Start 30 days prior to surgery, Disp: 30 tablet, Rfl: 0    atorvastatin (LIPITOR) 40 mg tablet, Take 1 tablet (40 mg total) by mouth daily, Disp: 90 tablet, Rfl: 1    Cholecalciferol (Vitamin D3) 50 MCG (2000 UT) TABS, Take 2,000 Units by mouth daily, Disp: , Rfl:     enoxaparin (LOVENOX) 40 mg/0 4 mL, Inject 0 4 mL (40 mg total) under the skin daily, Disp: 28 Syringe, Rfl: 0    ferrous sulfate 324 (65 Fe) mg, Take 1 tablet (324 mg total) by mouth 2 (two) times a day before meals Start 30 days prior to surgery, Disp: 60 tablet, Rfl: 0    folic acid (FOLVITE) 1 mg tablet, Take 1 tablet (1 mg total) by mouth daily Start 30 days prior to surgery, Disp: 30 tablet, Rfl: 0    lisinopril (ZESTRIL) 10 mg tablet, Take 1 5 tablets (15 mg total) by mouth daily, Disp: 180 tablet, Rfl: 1    metFORMIN (GLUCOPHAGE) 1000 MG tablet, Take 1 tablet (1,000 mg total) by mouth 2 (two) times a day with meals, Disp: 180 tablet, Rfl: 1    multivitamin (THERAGRAN) TABS, Take 1 tablet by mouth daily, Disp: , Rfl:     omeprazole (PriLOSEC) 20 mg delayed release capsule, Take 1 capsule (20 mg total) by mouth daily, Disp: 90 capsule, Rfl: 1    oxyCODONE (ROXICODONE) 5 mg immediate release tablet, 1 pill po Q4 Hrs prn, Disp: 30 tablet, Rfl: 0    sertraline (ZOLOFT) 50 mg tablet, Take 1 tablet (50 mg total) by mouth daily Takes in the am , Disp: 90 tablet, Rfl: 1  (Not in a hospital admission)      ALLERGIES  Allergies   Allergen Reactions    Clonazepam Other (See Comments)     Pt states "didn't feel right on it "    Morphine Other (See Comments) and Confusion     Annotation - [id number]: "dopey"  Gets silly    Venlafaxine Other (See Comments)     Unknown--pt states " MD was trying pt on different medications "     Objective     PHYSICAL EXAM    VITAL SIGNS: Blood pressure 159/83, pulse 98, temperature 97 8 °F (36 6 °C), temperature source Oral, resp  rate 16, weight 77 1 kg (170 lb), SpO2 100 %      Constitutional:  Appears well developed and well nourished, no acute distress, non-toxic appearance   Eyes:  PERRL, EOMI, conjunctivae pink, sclerae non-icteric    HENT:  Normocephalic/Atraumatic, no rhinorrhea, mucous membranes moist   Neck: normal range of motion, no tenderness, supple   Respiratory:  No respiratory distress, normal breath sounds   Cardiovascular:  Normal rate, normal rhythm    GI:  Soft, mild left lower quadrant tenderness, non-distended  :  Mild left CVAT, no flank ecchymosis  Musculoskeletal:  Slight tenderness noted to the left lower lumbar paraspinal musculature  No tenderness noted to the left SI joint or left sciatic notch  No swelling or edema, no tenderness, no deformities  Integument:  Pink, warm, dry, Well hydrated, no rash, no erythema, no bullae   Lymphatic:  No cervical/ tonsillar/ submandibular lymphadenopathy noted   Neurologic:  Awake, Alert & oriented x 3, CN 2-12 intact, no focal neurological deficits, motor function intact  Psychiatric:  Speech and behavior appropriate       ED COURSE and MDM:    Assessment/Plan   Assessment:  Deidre Venegas is a 54 y o  female presents for evaluation of left flank pain  Known kidney stone  Plan:  Labs, IV fluids, symptom management, CT scan, disposition as appropriate  CRITICAL CARE TIME:   0      Portions of the record may have been created with voice recognition software  Occasional wrong word or "sound a like" substitutions may have occurred due to the inherent limitations of voice recognition software       ED Provider  Electronically Signed by

## 2021-07-26 ENCOUNTER — ANESTHESIA EVENT (INPATIENT)
Dept: PERIOP | Facility: HOSPITAL | Age: 56
DRG: 661 | End: 2021-07-26
Payer: COMMERCIAL

## 2021-07-26 ENCOUNTER — ANESTHESIA (INPATIENT)
Dept: PERIOP | Facility: HOSPITAL | Age: 56
DRG: 661 | End: 2021-07-26
Payer: COMMERCIAL

## 2021-07-26 ENCOUNTER — TELEPHONE (OUTPATIENT)
Dept: UROLOGY | Facility: CLINIC | Age: 56
End: 2021-07-26

## 2021-07-26 ENCOUNTER — PREP FOR PROCEDURE (OUTPATIENT)
Dept: UROLOGY | Facility: CLINIC | Age: 56
End: 2021-07-26

## 2021-07-26 ENCOUNTER — APPOINTMENT (INPATIENT)
Dept: RADIOLOGY | Facility: HOSPITAL | Age: 56
DRG: 661 | End: 2021-07-26
Payer: COMMERCIAL

## 2021-07-26 VITALS
HEART RATE: 61 BPM | OXYGEN SATURATION: 96 % | DIASTOLIC BLOOD PRESSURE: 76 MMHG | SYSTOLIC BLOOD PRESSURE: 145 MMHG | HEIGHT: 63 IN | RESPIRATION RATE: 18 BRPM | BODY MASS INDEX: 30.12 KG/M2 | TEMPERATURE: 98.4 F | WEIGHT: 170 LBS

## 2021-07-26 DIAGNOSIS — N20.0 NEPHROLITHIASIS: Primary | ICD-10-CM

## 2021-07-26 PROBLEM — R10.9 ACUTE LEFT FLANK PAIN: Status: RESOLVED | Noted: 2019-01-11 | Resolved: 2021-07-26

## 2021-07-26 LAB
ANION GAP SERPL CALCULATED.3IONS-SCNC: 4 MMOL/L (ref 4–13)
BACTERIA UR CULT: NORMAL
BASOPHILS # BLD AUTO: 0.04 THOUSANDS/ΜL (ref 0–0.1)
BASOPHILS NFR BLD AUTO: 0 % (ref 0–1)
BUN SERPL-MCNC: 20 MG/DL (ref 5–25)
CALCIUM SERPL-MCNC: 9.1 MG/DL (ref 8.3–10.1)
CHLORIDE SERPL-SCNC: 105 MMOL/L (ref 100–108)
CO2 SERPL-SCNC: 27 MMOL/L (ref 21–32)
CREAT SERPL-MCNC: 1.21 MG/DL (ref 0.6–1.3)
EOSINOPHIL # BLD AUTO: 0.33 THOUSAND/ΜL (ref 0–0.61)
EOSINOPHIL NFR BLD AUTO: 3 % (ref 0–6)
ERYTHROCYTE [DISTWIDTH] IN BLOOD BY AUTOMATED COUNT: 14.3 % (ref 11.6–15.1)
GFR SERPL CREATININE-BSD FRML MDRD: 50 ML/MIN/1.73SQ M
GLUCOSE SERPL-MCNC: 109 MG/DL (ref 65–140)
GLUCOSE SERPL-MCNC: 120 MG/DL (ref 65–140)
HCT VFR BLD AUTO: 37.9 % (ref 34.8–46.1)
HGB BLD-MCNC: 11.9 G/DL (ref 11.5–15.4)
IMM GRANULOCYTES # BLD AUTO: 0.04 THOUSAND/UL (ref 0–0.2)
IMM GRANULOCYTES NFR BLD AUTO: 0 % (ref 0–2)
LYMPHOCYTES # BLD AUTO: 2.27 THOUSANDS/ΜL (ref 0.6–4.47)
LYMPHOCYTES NFR BLD AUTO: 22 % (ref 14–44)
MCH RBC QN AUTO: 29.2 PG (ref 26.8–34.3)
MCHC RBC AUTO-ENTMCNC: 31.4 G/DL (ref 31.4–37.4)
MCV RBC AUTO: 93 FL (ref 82–98)
MONOCYTES # BLD AUTO: 0.83 THOUSAND/ΜL (ref 0.17–1.22)
MONOCYTES NFR BLD AUTO: 8 % (ref 4–12)
NEUTROPHILS # BLD AUTO: 6.81 THOUSANDS/ΜL (ref 1.85–7.62)
NEUTS SEG NFR BLD AUTO: 67 % (ref 43–75)
NRBC BLD AUTO-RTO: 0 /100 WBCS
PLATELET # BLD AUTO: 304 THOUSANDS/UL (ref 149–390)
PMV BLD AUTO: 10.9 FL (ref 8.9–12.7)
POTASSIUM SERPL-SCNC: 4.1 MMOL/L (ref 3.5–5.3)
RBC # BLD AUTO: 4.08 MILLION/UL (ref 3.81–5.12)
SODIUM SERPL-SCNC: 136 MMOL/L (ref 136–145)
WBC # BLD AUTO: 10.32 THOUSAND/UL (ref 4.31–10.16)

## 2021-07-26 PROCEDURE — 0TC68ZZ EXTIRPATION OF MATTER FROM RIGHT URETER, VIA NATURAL OR ARTIFICIAL OPENING ENDOSCOPIC: ICD-10-PCS | Performed by: UROLOGY

## 2021-07-26 PROCEDURE — C1758 CATHETER, URETERAL: HCPCS | Performed by: UROLOGY

## 2021-07-26 PROCEDURE — 99255 IP/OBS CONSLTJ NEW/EST HI 80: CPT | Performed by: UROLOGY

## 2021-07-26 PROCEDURE — 0T778DZ DILATION OF LEFT URETER WITH INTRALUMINAL DEVICE, VIA NATURAL OR ARTIFICIAL OPENING ENDOSCOPIC: ICD-10-PCS | Performed by: UROLOGY

## 2021-07-26 PROCEDURE — 52332 CYSTOSCOPY AND TREATMENT: CPT | Performed by: UROLOGY

## 2021-07-26 PROCEDURE — 74420 UROGRAPHY RTRGR +-KUB: CPT

## 2021-07-26 PROCEDURE — BT14ZZZ FLUOROSCOPY OF KIDNEYS, URETERS AND BLADDER: ICD-10-PCS | Performed by: UROLOGY

## 2021-07-26 PROCEDURE — 0T768DZ DILATION OF RIGHT URETER WITH INTRALUMINAL DEVICE, VIA NATURAL OR ARTIFICIAL OPENING ENDOSCOPIC: ICD-10-PCS | Performed by: UROLOGY

## 2021-07-26 PROCEDURE — 85025 COMPLETE CBC W/AUTO DIFF WBC: CPT | Performed by: STUDENT IN AN ORGANIZED HEALTH CARE EDUCATION/TRAINING PROGRAM

## 2021-07-26 PROCEDURE — C2617 STENT, NON-COR, TEM W/O DEL: HCPCS | Performed by: UROLOGY

## 2021-07-26 PROCEDURE — NC001 PR NO CHARGE: Performed by: INTERNAL MEDICINE

## 2021-07-26 PROCEDURE — 99221 1ST HOSP IP/OBS SF/LOW 40: CPT | Performed by: INTERNAL MEDICINE

## 2021-07-26 PROCEDURE — 82360 CALCULUS ASSAY QUANT: CPT | Performed by: UROLOGY

## 2021-07-26 PROCEDURE — 80048 BASIC METABOLIC PNL TOTAL CA: CPT | Performed by: STUDENT IN AN ORGANIZED HEALTH CARE EDUCATION/TRAINING PROGRAM

## 2021-07-26 PROCEDURE — C1769 GUIDE WIRE: HCPCS | Performed by: UROLOGY

## 2021-07-26 PROCEDURE — 52356 CYSTO/URETERO W/LITHOTRIPSY: CPT | Performed by: UROLOGY

## 2021-07-26 PROCEDURE — 82948 REAGENT STRIP/BLOOD GLUCOSE: CPT

## 2021-07-26 DEVICE — INLAY OPTIMA URETERAL STENT W/O GUIDEWIRE
Type: IMPLANTABLE DEVICE | Status: NON-FUNCTIONAL
Brand: BARD® INLAY OPTIMA® URETERAL STENT
Removed: 2021-09-16

## 2021-07-26 RX ORDER — SODIUM CHLORIDE, SODIUM LACTATE, POTASSIUM CHLORIDE, CALCIUM CHLORIDE 600; 310; 30; 20 MG/100ML; MG/100ML; MG/100ML; MG/100ML
75 INJECTION, SOLUTION INTRAVENOUS CONTINUOUS
Status: DISCONTINUED | OUTPATIENT
Start: 2021-07-26 | End: 2021-07-26 | Stop reason: HOSPADM

## 2021-07-26 RX ORDER — PHENAZOPYRIDINE HYDROCHLORIDE 100 MG/1
100 TABLET, FILM COATED ORAL 3 TIMES DAILY PRN
Qty: 10 TABLET | Refills: 0 | Status: SHIPPED | OUTPATIENT
Start: 2021-07-26 | End: 2021-07-26 | Stop reason: SDUPTHER

## 2021-07-26 RX ORDER — TAMSULOSIN HYDROCHLORIDE 0.4 MG/1
0.4 CAPSULE ORAL
Qty: 14 CAPSULE | Refills: 0 | Status: SHIPPED | OUTPATIENT
Start: 2021-07-26 | End: 2021-07-26 | Stop reason: SDUPTHER

## 2021-07-26 RX ORDER — TAMSULOSIN HYDROCHLORIDE 0.4 MG/1
0.4 CAPSULE ORAL
Qty: 14 CAPSULE | Refills: 0 | Status: SHIPPED | OUTPATIENT
Start: 2021-07-26 | End: 2021-08-31 | Stop reason: SDUPTHER

## 2021-07-26 RX ORDER — OXYBUTYNIN CHLORIDE 5 MG/1
5 TABLET ORAL 3 TIMES DAILY
Qty: 42 TABLET | Refills: 0 | Status: SHIPPED | OUTPATIENT
Start: 2021-07-26 | End: 2021-07-26 | Stop reason: SDUPTHER

## 2021-07-26 RX ORDER — OXYBUTYNIN CHLORIDE 5 MG/1
5 TABLET ORAL 3 TIMES DAILY
Qty: 42 TABLET | Refills: 0 | Status: SHIPPED | OUTPATIENT
Start: 2021-07-26 | End: 2021-08-31 | Stop reason: SDUPTHER

## 2021-07-26 RX ORDER — ONDANSETRON 2 MG/ML
INJECTION INTRAMUSCULAR; INTRAVENOUS AS NEEDED
Status: DISCONTINUED | OUTPATIENT
Start: 2021-07-26 | End: 2021-07-26

## 2021-07-26 RX ORDER — PHENAZOPYRIDINE HYDROCHLORIDE 100 MG/1
100 TABLET, FILM COATED ORAL
Status: CANCELLED | OUTPATIENT
Start: 2021-07-26 | End: 2021-07-28

## 2021-07-26 RX ORDER — MAGNESIUM HYDROXIDE 1200 MG/15ML
LIQUID ORAL AS NEEDED
Status: DISCONTINUED | OUTPATIENT
Start: 2021-07-26 | End: 2021-07-26 | Stop reason: HOSPADM

## 2021-07-26 RX ORDER — GLYCOPYRROLATE 0.2 MG/ML
INJECTION INTRAMUSCULAR; INTRAVENOUS AS NEEDED
Status: DISCONTINUED | OUTPATIENT
Start: 2021-07-26 | End: 2021-07-26

## 2021-07-26 RX ORDER — FENTANYL CITRATE/PF 50 MCG/ML
25 SYRINGE (ML) INJECTION
Status: DISCONTINUED | OUTPATIENT
Start: 2021-07-26 | End: 2021-07-26 | Stop reason: HOSPADM

## 2021-07-26 RX ORDER — ONDANSETRON 2 MG/ML
4 INJECTION INTRAMUSCULAR; INTRAVENOUS ONCE AS NEEDED
Status: DISCONTINUED | OUTPATIENT
Start: 2021-07-26 | End: 2021-07-26 | Stop reason: HOSPADM

## 2021-07-26 RX ORDER — PROPOFOL 10 MG/ML
INJECTION, EMULSION INTRAVENOUS AS NEEDED
Status: DISCONTINUED | OUTPATIENT
Start: 2021-07-26 | End: 2021-07-26

## 2021-07-26 RX ORDER — PHENAZOPYRIDINE HYDROCHLORIDE 100 MG/1
100 TABLET, FILM COATED ORAL 3 TIMES DAILY PRN
Qty: 42 TABLET | Refills: 0 | Status: SHIPPED | OUTPATIENT
Start: 2021-07-26 | End: 2021-08-09

## 2021-07-26 RX ORDER — CEFAZOLIN SODIUM 1 G/50ML
SOLUTION INTRAVENOUS AS NEEDED
Status: DISCONTINUED | OUTPATIENT
Start: 2021-07-26 | End: 2021-07-26

## 2021-07-26 RX ORDER — SODIUM CHLORIDE, SODIUM LACTATE, POTASSIUM CHLORIDE, CALCIUM CHLORIDE 600; 310; 30; 20 MG/100ML; MG/100ML; MG/100ML; MG/100ML
INJECTION, SOLUTION INTRAVENOUS CONTINUOUS PRN
Status: DISCONTINUED | OUTPATIENT
Start: 2021-07-26 | End: 2021-07-26

## 2021-07-26 RX ORDER — LIDOCAINE HYDROCHLORIDE 10 MG/ML
INJECTION, SOLUTION EPIDURAL; INFILTRATION; INTRACAUDAL; PERINEURAL AS NEEDED
Status: DISCONTINUED | OUTPATIENT
Start: 2021-07-26 | End: 2021-07-26

## 2021-07-26 RX ORDER — CEFAZOLIN SODIUM 2 G/50ML
2000 SOLUTION INTRAVENOUS ONCE
Status: CANCELLED | OUTPATIENT
Start: 2021-09-16 | End: 2021-07-26

## 2021-07-26 RX ORDER — OXYBUTYNIN CHLORIDE 5 MG/1
5 TABLET, EXTENDED RELEASE ORAL DAILY
Status: CANCELLED | OUTPATIENT
Start: 2021-07-26

## 2021-07-26 RX ORDER — LIDOCAINE HYDROCHLORIDE 20 MG/ML
JELLY TOPICAL AS NEEDED
Status: DISCONTINUED | OUTPATIENT
Start: 2021-07-26 | End: 2021-07-26 | Stop reason: HOSPADM

## 2021-07-26 RX ORDER — TAMSULOSIN HYDROCHLORIDE 0.4 MG/1
0.4 CAPSULE ORAL
Status: CANCELLED | OUTPATIENT
Start: 2021-07-26

## 2021-07-26 RX ORDER — DEXAMETHASONE SODIUM PHOSPHATE 10 MG/ML
INJECTION, SOLUTION INTRAMUSCULAR; INTRAVENOUS AS NEEDED
Status: DISCONTINUED | OUTPATIENT
Start: 2021-07-26 | End: 2021-07-26

## 2021-07-26 RX ADMIN — ATORVASTATIN CALCIUM 40 MG: 40 TABLET, FILM COATED ORAL at 07:44

## 2021-07-26 RX ADMIN — LIDOCAINE HYDROCHLORIDE 50 MG: 10 INJECTION, SOLUTION EPIDURAL; INFILTRATION; INTRACAUDAL; PERINEURAL at 13:13

## 2021-07-26 RX ADMIN — DEXAMETHASONE SODIUM PHOSPHATE 5 MG: 10 INJECTION, SOLUTION INTRAMUSCULAR; INTRAVENOUS at 13:23

## 2021-07-26 RX ADMIN — ONDANSETRON 4 MG: 2 INJECTION INTRAMUSCULAR; INTRAVENOUS at 13:23

## 2021-07-26 RX ADMIN — CEFAZOLIN SODIUM 1000 MG: 1 SOLUTION INTRAVENOUS at 13:15

## 2021-07-26 RX ADMIN — ACETAMINOPHEN 650 MG: 325 TABLET, FILM COATED ORAL at 07:44

## 2021-07-26 RX ADMIN — PANTOPRAZOLE SODIUM 20 MG: 20 TABLET, DELAYED RELEASE ORAL at 06:11

## 2021-07-26 RX ADMIN — SODIUM CHLORIDE, SODIUM LACTATE, POTASSIUM CHLORIDE, AND CALCIUM CHLORIDE: .6; .31; .03; .02 INJECTION, SOLUTION INTRAVENOUS at 13:28

## 2021-07-26 RX ADMIN — PROPOFOL 150 MG: 10 INJECTION, EMULSION INTRAVENOUS at 13:13

## 2021-07-26 RX ADMIN — Medication 2000 UNITS: at 08:39

## 2021-07-26 RX ADMIN — CEFAZOLIN SODIUM 1000 MG: 1 SOLUTION INTRAVENOUS at 06:12

## 2021-07-26 RX ADMIN — CEFTRIAXONE SODIUM 1000 MG: 10 INJECTION, POWDER, FOR SOLUTION INTRAVENOUS at 01:09

## 2021-07-26 RX ADMIN — SERTRALINE HYDROCHLORIDE 50 MG: 50 TABLET ORAL at 08:39

## 2021-07-26 RX ADMIN — GLYCOPYRROLATE 0.2 MG: 0.2 INJECTION, SOLUTION INTRAMUSCULAR; INTRAVENOUS at 13:46

## 2021-07-26 NOTE — PLAN OF CARE
Problem: Nutrition/Hydration-ADULT  Goal: Nutrient/Hydration intake appropriate for improving, restoring or maintaining nutritional needs  Description: Monitor and assess patient's nutrition/hydration status for malnutrition  Collaborate with interdisciplinary team and initiate plan and interventions as ordered  Monitor patient's weight and dietary intake as ordered or per policy  Utilize nutrition screening tool and intervene as necessary  Determine patient's food preferences and provide high-protein, high-caloric foods as appropriate       INTERVENTIONS:  - Monitor oral intake, urinary output, labs, and treatment plans  - Assess nutrition and hydration status and recommend course of action  - Evaluate amount of meals eaten  - Assist patient with eating if necessary   - Allow adequate time for meals  - Recommend/ encourage appropriate diets, oral nutritional supplements, and vitamin/mineral supplements  - Order, calculate, and assess calorie counts as needed  - Assess need for intravenous fluids  - Provide specific nutrition/hydration education as appropriate  - Include patient/family/caregiver in decisions related to nutrition  7/26/2021 0917 by Geoffrey Ruelas  Outcome: Progressing  Problem: SAFETY ADULT  Goal: Patient will remain free of falls  Description: INTERVENTIONS:  - Educate patient/family on patient safety including physical limitations  - Instruct patient to call for assistance with activity   - Consult OT/PT to assist with strengthening/mobility   - Keep Call bell within reach  - Keep bed low and locked with side rails adjusted as appropriate  - Keep care items and personal belongings within reach  - Initiate and maintain comfort rounds  - Make Fall Risk Sign visible to staff  - Apply yellow socks and bracelet for high fall risk patients  - Consider moving patient to room near nurses station  7/26/2021 0917 by Geoffrey Ruelas  Outcome: Progressing       Problem: DISCHARGE PLANNING  Goal: Discharge to home or other facility with appropriate resources  Description: INTERVENTIONS:  - Identify barriers to discharge w/patient and caregiver  - Arrange for needed discharge resources and transportation as appropriate  - Identify discharge learning needs (meds, wound care, etc )  - Arrange for interpretive services to assist at discharge as needed  - Refer to Case Management Department for coordinating discharge planning if the patient needs post-hospital services based on physician/advanced practitioner order or complex needs related to functional status, cognitive ability, or social support system  7/26/2021 0917 by Eva Baxter  Outcome: Progressing  7/26/2021 0916 by Eva Baxter  Outcome: Progressing     Problem: Knowledge Deficit  Goal: Patient/family/caregiver demonstrates understanding of disease process, treatment plan, medications, and discharge instructions  Description: Complete learning assessment and assess knowledge base    Interventions:  - Provide teaching at level of understanding  - Provide teaching via preferred learning methods  7/26/2021 0917 by Eva Baxter  Outcome: Progressing  7/26/2021 0916 by Eva Baxter  Outcome: Progressing        Problem: PAIN - ADULT  Goal: Verbalizes/displays adequate comfort level or baseline comfort level  Description: Interventions:  - Encourage patient to monitor pain and request assistance  - Assess pain using appropriate pain scale  - Administer analgesics based on type and severity of pain and evaluate response  - Implement non-pharmacological measures as appropriate and evaluate response  - Consider cultural and social influences on pain and pain management  - Notify physician/advanced practitioner if interventions unsuccessful or patient reports new pain  7/26/2021 0917 by Eva Baxter  Outcome: Progressing     Problem: INFECTION - ADULT  Goal: Absence or prevention of progression during hospitalization  Description: INTERVENTIONS:  - Assess and monitor for signs and symptoms of infection  - Monitor lab/diagnostic results  - Monitor all insertion sites, i e  indwelling lines, tubes, and drains  - Monitor endotracheal if appropriate and nasal secretions for changes in amount and color  - [place] appropriate cooling/warming therapies per order  - Administer medications as ordered  - Instruct and encourage patient and family to use good hand hygiene technique  - Identify and instruct in appropriate isolation precautions for identified infection/condition  7/26/2021 0917 by Alison Perez  Outcome: Progressing    Goal: Absence of fever/infection during neutropenic period  Description: INTERVENTIONS:  - Monitor WBC    7/26/2021 0917 by Alison Perez  Outcome: Progressing

## 2021-07-26 NOTE — OP NOTE
OPERATIVE REPORT  PATIENT NAME: Jd Briggs    :  1965  MRN: [id number]  Pt Location: [facility]    SURGERY DATE: 2021    Surgeon(s) and Role:     * Huseyin Cosby MD - Primary    Preop Diagnosis:  Nephrolithiasis [N20 0]    Post-Op Diagnosis Codes:     * Nephrolithiasis [N20 0]    Procedure(s) (LRB):  CYSTOSCOPY URETEROSCOPY WITH LITHOTRIPSY HOLMIUM LASER, RETROGRADE PYELOGRAM AND INSERTION STENT URETERAL (Bilateral)  URETEROSCOPY, LASER AND BASKET STONE EXTRACTION    Specimen(s):  ID Type Source Tests Collected by Time Destination   A :  Calculus Kidney, Right STONE ANALYSIS Huseyin Cosby MD 2021 [id number]        Estimated Blood Loss:   Minimal    Drains:  Bilateral 6 x 24 double-J stents, no string    Anesthesia Type:   General    Operative Indications:  Nephrolithiasis [N20 0]      Operative Findings:  1  Clearly obstructed urine on the left side with placement of the wire, 6 x 24 double-J stent placed  2  Right renal pelvis stone was fragmented with laser lithotripsy and dusted into smaller than 2 mm fragments, some retrieved for analysis  3  Right-sided 6 x 24 double-J stent placed without string, can be removed at secondary left-sided surgery    Complications:   None    Procedure and Technique:  Jd Briggs is a 54 y o  female with bilateral large obstructing stones  The patient was counseled regarding their options and ultimately opted to proceed with cystoscopy with bilateral retrograde pyelograms and stent placement and potential right-sided ureteroscopy with laser lithotripsy basket extraction of stones and stent placement  Risks and benefits of the procedure were described and the patient signed an informed consent  On 2021 , the patient proceeded to the operating room  They were laid supine on the operating room table and a pre-procedure time out was performed with all parties present and in agreement of the procedure planned and laterality      Patient received intravenous antibiotics in the form of Ancef and sequential compression devices were placed on bilateral lower extremities  They were then induced with a LMA anesthetic  Patient was placed in dorsal lithotomy with care to pad all pressure points  Perineum and genitalia prepped and draped with chlorhexidine solution  After appropriate time-out was performed, 2% viscous lidocaine was placed per urethra  25 Stateless cystoscope sheath and 30 degree lens were entered into the patient's urethra  Pan cystoscopy was performed that did not demonstrate any bladder tumors  Left-sided ureteral orifice was identified  Bard solo +wire was introduced  We could feel the passage of the distal stone  Dual-lumen catheter was introduced and a retrograde pyelogram was performed  6 x 24 double-J stent without string was then placed  We then turned our attention to the right side  Given our discussion in the preoperative holding area, we discussed attempting to treat the right-sided stones  Again a wire was placed to the level of collecting system proximally  Dual-lumen catheter was advanced  Retrograde pyelogram was performed  We placed a shunt short semi rigid ureteral scope up alongside the wire but did not encounter any ureteral stones up to the ureteropelvic junction  Stone could be seen in the renal pelvis  Second wire was placed and the semi rigid scope was removed  12/14 space 35 cm ureteral access sheath was introduced over the working wire under continuous fluoroscopy  Inner cannula was removed and a 5 3 [employer] [place] flexible ureteral scope was passed into the renal pelvis  Large stone was immediately encountered  Three hundred sixty-five nanometer laser fiber was utilized to fragment the stone into small dustlike fragments  Hiren light basket was used to retrieve any stones large enough to be grasped  All stones were small and easily passable through the sheath    Reinspection of the calices was performed and additional contrast was placed  Pull-down ureteroscopy was performed removing the sheath and flexible ureteral scope  No ureteral stones were noted  Right-sided 6 x 24 double-J stent was then passed in good position  No string was left in place  Patient's bladder was emptied  At the completion of the procedure, the patient was extubated and transferred to PACU in good condition  Plan-the patient will maintain her bilateral stents  Right-sided stent can be removed at the time of her stage needed secondary left-sided procedure  This will be arranged in the near future      Patient Disposition:  PACU     SIGNATURE: Kristin Garland MD  DATE: July 26, 2021  TIME: 1:51 PM

## 2021-07-26 NOTE — ASSESSMENT & PLAN NOTE
7/25/21 CT abdomen: Severe left hydroureteronephrosis resulting from an obstructing distal left ureteral calculus measuring 8 mm just above the UV junction   Multiple additional nonobstructing intrarenal calculi the largest measuring 1 4 cm   Significant perinephric stranding  Moderate hydroureteronephrosis due to an obstructing 14 mm proximal right ureteral calculus   Multiple additional calculi throughout the right renal collecting system the largest in the lower pole measures 5 mm       NPO for OR today with Dr Mal Dumont for possible left-sided ureteroscopy and right-sided ureteral stent placement   Patient will most likely require staged procedure  Pain management  Continue IVF and IV antibiotics  Await urine culture results  Strain all urine  Trend Creatinine   7/26/21 BUN 20, Cr 1 21

## 2021-07-26 NOTE — PLAN OF CARE
Problem: Nutrition/Hydration-ADULT  Goal: Nutrient/Hydration intake appropriate for improving, restoring or maintaining nutritional needs  Description: Monitor and assess patient's nutrition/hydration status for malnutrition  Collaborate with interdisciplinary team and initiate plan and interventions as ordered  Monitor patient's weight and dietary intake as ordered or per policy  Utilize nutrition screening tool and intervene as necessary  Determine patient's food preferences and provide high-protein, high-caloric foods as appropriate       INTERVENTIONS:  - Monitor oral intake, urinary output, labs, and treatment plans  - Assess nutrition and hydration status and recommend course of action  - Evaluate amount of meals eaten  - Assist patient with eating if necessary   - Allow adequate time for meals  - Recommend/ encourage appropriate diets, oral nutritional supplements, and vitamin/mineral supplements  - Order, calculate, and assess calorie counts as needed  - Recommend, monitor, and adjust tube feedings and TPN/PPN based on assessed needs  - Assess need for intravenous fluids  - Provide specific nutrition/hydration education as appropriate  - Include patient/family/caregiver in decisions related to nutrition  Outcome: Progressing     Problem: PAIN - ADULT  Goal: Verbalizes/displays adequate comfort level or baseline comfort level  Description: Interventions:  - Encourage patient to monitor pain and request assistance  - Assess pain using appropriate pain scale  - Administer analgesics based on type and severity of pain and evaluate response  - Implement non-pharmacological measures as appropriate and evaluate response  - Consider cultural and social influences on pain and pain management  - Notify physician/advanced practitioner if interventions unsuccessful or patient reports new pain  Outcome: Progressing     Problem: INFECTION - ADULT  Goal: Absence or prevention of progression during hospitalization  Description: INTERVENTIONS:  - Assess and monitor for signs and symptoms of infection  - Monitor lab/diagnostic results  - Monitor all insertion sites, i e  indwelling lines, tubes, and drains  - Monitor endotracheal if appropriate and nasal secretions for changes in amount and color  - [place] appropriate cooling/warming therapies per order  - Administer medications as ordered  - Instruct and encourage patient and family to use good hand hygiene technique  - Identify and instruct in appropriate isolation precautions for identified infection/condition  Outcome: Progressing  Goal: Absence of fever/infection during neutropenic period  Description: INTERVENTIONS:  - Monitor WBC    Outcome: Progressing     Problem: SAFETY ADULT  Goal: Patient will remain free of falls  Description: INTERVENTIONS:  - Educate patient/family on patient safety including physical limitations  - Instruct patient to call for assistance with activity   - Consult OT/PT to assist with strengthening/mobility   - Keep Call bell within reach  - Keep bed low and locked with side rails adjusted as appropriate  - Keep care items and personal belongings within reach  - Initiate and maintain comfort rounds  - Make Fall Risk Sign visible to staff  - Apply yellow socks and bracelet for high fall risk patients  - Consider moving patient to room near nurses station  Outcome: Progressing  Goal: Maintain or return to baseline ADL function  Description: INTERVENTIONS:  -  Assess patient's ability to carry out ADLs; assess patient's baseline for ADL function and identify physical deficits which impact ability to perform ADLs (bathing, care of mouth/teeth, toileting, grooming, dressing, etc )  - Assess/evaluate cause of self-care deficits   - Assess range of motion  - Assess patient's mobility; develop plan if impaired  - Assess patient's need for assistive devices and provide as appropriate  - Encourage maximum independence but intervene and supervise when necessary  - Involve family in performance of ADLs  - Assess for home care needs following discharge   - Consider OT consult to assist with ADL evaluation and planning for discharge  - Provide patient education as appropriate  Outcome: Progressing  Goal: Maintains/Returns to pre admission functional level  Description: INTERVENTIONS:  - Perform BMAT or MOVE assessment daily    - Set and communicate daily mobility goal to care team and patient/family/caregiver  - Collaborate with rehabilitation services on mobility goals if consulted  - Out of bed for toileting  - Record patient progress and toleration of activity level   Outcome: Progressing     Problem: DISCHARGE PLANNING  Goal: Discharge to home or other facility with appropriate resources  Description: INTERVENTIONS:  - Identify barriers to discharge w/patient and caregiver  - Arrange for needed discharge resources and transportation as appropriate  - Identify discharge learning needs (meds, wound care, etc )  - Arrange for interpretive services to assist at discharge as needed  - Refer to Case Management Department for coordinating discharge planning if the patient needs post-hospital services based on physician/advanced practitioner order or complex needs related to functional status, cognitive ability, or social support system  Outcome: Progressing     Problem: Knowledge Deficit  Goal: Patient/family/caregiver demonstrates understanding of disease process, treatment plan, medications, and discharge instructions  Description: Complete learning assessment and assess knowledge base    Interventions:  - Provide teaching at level of understanding  - Provide teaching via preferred learning methods  Outcome: Progressing     Problem: MOBILITY - ADULT  Goal: Maintain or return to baseline ADL function  Description: INTERVENTIONS:  -  Assess patient's ability to carry out ADLs; assess patient's baseline for ADL function and identify physical deficits which impact ability to perform ADLs (bathing, care of mouth/teeth, toileting, grooming, dressing, etc )  - Assess/evaluate cause of self-care deficits   - Assess range of motion  - Assess patient's mobility; develop plan if impaired  - Assess patient's need for assistive devices and provide as appropriate  - Encourage maximum independence but intervene and supervise when necessary  - Involve family in performance of ADLs  - Assess for home care needs following discharge   - Consider OT consult to assist with ADL evaluation and planning for discharge  - Provide patient education as appropriate  Outcome: Progressing  Goal: Maintains/Returns to pre admission functional level  Description: INTERVENTIONS:  - Perform BMAT or MOVE assessment daily    - Set and communicate daily mobility goal to care team and patient/family/caregiver     - Collaborate with rehabilitation services on mobility goals if consulted  - Out of bed for toileting  - Record patient progress and toleration of activity level   Outcome: Progressing

## 2021-07-26 NOTE — ANESTHESIA POSTPROCEDURE EVALUATION
Post-Op Assessment Note    CV Status:  Stable    Pain management: adequate     Mental Status:  Alert and awake   Hydration Status:  Euvolemic   PONV Controlled:  Controlled   Airway Patency:  Patent      Post Op Vitals Reviewed: Yes            No complications documented      /76 (07/26/21 1354)    Temp (!) 97 °F (36 1 °C) (07/26/21 1354)    Pulse 86 (07/26/21 1354)   Resp 14 (07/26/21 1354)    SpO2 97 % (07/26/21 1354)

## 2021-07-26 NOTE — PROGRESS NOTES
INTERNAL MEDICINE RESIDENCY SENIOR ADMISSION NOTE     Name: Burton Handley   Age & Sex: 54 y o  female   MRN: [id number]  Unit/Bed#: ED 14   Encounter: [id number]  Primary Care Provider: LEONARD Montes    Admit to team: SOD Team C     Patient seen and examined  Reviewed H&P per Dr Dustin Workman   Agree with the assessment and plan with any exception/addition as noted below:    Pt  Is a 54y/o female with a PMHx of kidney stones, CTS, GERD,anxiety, chronic knee pain s/p R knee replacement in 5/2021 presents to the ED with complain of progressively worsening constant, left flank pain of 1 week duration  She described the pain as 10/10 initially but now 5/10 after pain medication  in the ED, pain radiates to the left lower quadrant, pain is worse compared to her previous episode and it is minimally relieved with Tylenol, no relieving factor the, aggravated by walking around, associated with nausea hematuria this morning which has resolved  She also admits to hematuria this morning  Patient reports that she saw Urology last week who discussed her options regarding surgical procedures or lithotripsy for removal of the stone and was given an updated outpatient KUB which is still pending  She admits to having nausea which is resolved, urinary urgency/frequency she denies dysuria, fever, chills and urinary frequency today  Labs were significant for leukocytosis WBC 16 85, hyponatremia with sodium 135, TOMMY with creatinine 1 04 (bl 0 7), UA showed small leukocytosis with large blood, patient is afebrile with temperature 97 8°, heart rate 98, blood pressure elevated to 159/83 likely secondary to pain repeat blood pressure is now stable 129/87, she saturating 97% on room air  CT renal stone study significant for severe left hydroureteronephrosis from obstructing distal left ureteral calculus measuring 8 mm just above the UV junction    Multiple additional nonobstructing intrarenal calculi the largest measuring 1 4 cm   Significant perinephric stranding, Moderate hydroureteronephrosis due to an obstructing 14 mm proximal right ureteral calculus  Multiple additional calculi throughout the right renal collecting system the largest in the lower pole measures 5 mm  Also showed benign left adrenal adenoma 1 9 cm, diverticulosis with no diverticulitis and hepatomegaly  Patient received Toradol and 1 L bolus of IV fluids in the ED  Patient was seen and examined by bedside she was early is lying comfortably on the bed on room air not in any obvious distress  She states her pain is 5/10 and requesting for pain medication  She denies nausea, constipation, diarrhea, headaches, lightheadedness, chest pain, shortness of breath, palpitation  Will admit patient for urology evaluation for possible intervention    Plan  · Urology consulted recommendation appreciated  · Will start IV fluid gentle hydration  · NPO after midnight  · Will treat patient for urinary tract infection with ceftriaxone  · Follow-up urine cultures and a m  Labs  · Pain control with Toradol  and Tylenol        Active Problems:    * No active hospital problems   *      Code Status: Prior  Admission Status: OBSERVATION  Disposition: Patient requires Med/Surg  Expected Length of Stay: 2

## 2021-07-26 NOTE — DISCHARGE SUMMARY
INTERNAL MEDICINE RESIDENCY DISCHARGE SUMMARY     Neema Romero   54 y o  female  MRN: [id number]  Room/Bed: /[place] [address]   Encounter: [id number]    Principal Problem:    Acute left flank pain  Active Problems:    Kidney stone      * Acute left flank pain  Assessment & Plan  Patient with h/o renal calculi with last imaging revealing 8mm calculi in distal left ureter with severe left hydroureteronephrosis and Left CVA tenderness  + OR with urology today, will F/U afterwards   + On IV fluid for intravascular expansion  + NPO after midnight  + Follow up with urine cultures and labs  + Ancef received for surgical prophylaxis  + Monitor creatinine  + Analgesia with Toradol and Tylenol PRN          Clive Singletary is a 60-year-old female patient with past medical history significant for kidney stones in chronic knee pain status post right knee replacement in May 2021, who presents to [facility] for 10/10 left flank pain  On admission blood pressure was 159/83, heart rate 98, and afebrile  Creatinine on presentation was elevated 1 04 likely TOMMY, leukocytosis 16 5, UA showing white blood cells, blood, and CT renal stone study indicating severe left hydroureteronephrosis, moderate right hydroureteronephrosis secondary to multiple renal calculi  Patient was treated with IV ceftriaxone in the ED with IV cefazolin for surgical prophylaxis and taken to OR with Urology on 07/26  Patient had cystoscopy, ureteroscopy with lithotripsy (right side), retrograde pyelogram and insertion stent (bilateral)  Patient's operation was uncomplicated  Patient recovered well and is set to be discharged with follow-up PCP and Urology as outpatient      DISCHARGE INFORMATION     PCP at Discharge: Lamon Frankel, CRNP    Admitting Provider: Mc Collins MD  Admission Date: 7/25/2021    Discharge Provider: Kristan Manning MD  Discharge Date: 7/26/2021    Discharge Disposition: Home/Self Care  Discharge Condition: Improving  Discharge with Lines: no    Discharge Diet: diabetic diet  Activity Restrictions: As tolerated  Test Results Pending at Discharge: None    Discharge Diagnoses:  Principal Problem:    Acute left flank pain  Active Problems:    Kidney stone  Resolved Problems:    * No resolved hospital problems  *      Consulting Providers:  Urology, Dr Ольга Shields MD     Diagnostic & Therapeutic Procedures Performed:  CT renal stone study abdomen pelvis wo contrast    Result Date: 7/25/2021  Impression: Severe left hydroureteronephrosis resulting from an obstructing distal left ureteral calculus measuring 8 mm just above the UV junction  Multiple additional nonobstructing intrarenal calculi the largest measuring 1 4 cm  Significant perinephric stranding  Moderate hydroureteronephrosis due to an obstructing 14 mm proximal right ureteral calculus  Multiple additional calculi throughout the right renal collecting system the largest in the lower pole measures 5 mm  Hepatomegaly  Benign left adrenal gland adenoma measuring 1 9 cm  Colonic diverticulosis without diverticulitis  Workstation performed: [id number]       Code Status: Level 1 - Full Code  Advance Directive & Living Will: <no information>  Power of :    POLST:      Medications:  Current Discharge Medication List        Current Discharge Medication List        Current Discharge Medication List      CONTINUE these medications which have NOT CHANGED    Details   ! !  Ascorbic Acid (vitamin C) 1000 MG tablet Take 2,000 mg by mouth daily      atorvastatin (LIPITOR) 40 mg tablet Take 1 tablet (40 mg total) by mouth daily  Qty: 90 tablet, Refills: 1    Associated Diagnoses: Hypercholesteremia      Cholecalciferol (Vitamin D3) 50 MCG (2000 UT) TABS Take 2,000 Units by mouth daily      lisinopril (ZESTRIL) 10 mg tablet Take 1 5 tablets (15 mg total) by mouth daily  Qty: 180 tablet, Refills: 1    Associated Diagnoses: Secondary hypertension      metFORMIN (GLUCOPHAGE) 1000 MG tablet Take 1 tablet (1,000 mg total) by mouth 2 (two) times a day with meals  Qty: 180 tablet, Refills: 1    Associated Diagnoses: Type 2 diabetes mellitus without complication, without long-term current use of insulin (HCC)      multivitamin (THERAGRAN) TABS Take 1 tablet by mouth daily      oxyCODONE (ROXICODONE) 5 mg immediate release tablet 1 pill po Q4 Hrs prn  Qty: 30 tablet, Refills: 0    Comments: Continuation of treatment  Associated Diagnoses: S/P total knee replacement, right      sertraline (ZOLOFT) 50 mg tablet Take 1 tablet (50 mg total) by mouth daily Takes in the am   Qty: 90 tablet, Refills: 1    Comments: Per note on 04/26/2021, pt stable on 50mg daily, no change to med    Associated Diagnoses: Anxiety      ALPRAZolam (XANAX) 0 25 mg tablet Take 1 tablet by mouth 2 (two) times a day as needed      !! ascorbic acid (VITAMIN C) 500 MG tablet Take 1 tablet (500 mg total) by mouth daily Start 30 days prior to surgery  Qty: 30 tablet, Refills: 0    Associated Diagnoses: Chronic pain of right knee; Primary osteoarthritis of right knee      enoxaparin (LOVENOX) 40 mg/0 4 mL Inject 0 4 mL (40 mg total) under the skin daily  Qty: 28 Syringe, Refills: 0    Associated Diagnoses: Chronic pain of right knee; Primary osteoarthritis of right knee      ferrous sulfate 324 (65 Fe) mg Take 1 tablet (324 mg total) by mouth 2 (two) times a day before meals Start 30 days prior to surgery  Qty: 60 tablet, Refills: 0    Associated Diagnoses: Chronic pain of right knee; Primary osteoarthritis of right knee      folic acid (FOLVITE) 1 mg tablet Take 1 tablet (1 mg total) by mouth daily Start 30 days prior to surgery  Qty: 30 tablet, Refills: 0    Associated Diagnoses: Chronic pain of right knee; Primary osteoarthritis of right knee      omeprazole (PriLOSEC) 20 mg delayed release capsule Take 1 capsule (20 mg total) by mouth daily  Qty: 90 capsule, Refills: 1    Associated Diagnoses: Gastroesophageal reflux disease without esophagitis       ! ! - Potential duplicate medications found  Please discuss with provider  Allergies: Allergies   Allergen Reactions    Clonazepam Other (See Comments)     Pt states "didn't feel right on it "    Morphine Other (See Comments) and Confusion     Annotation - [id number]: "dopey"  Gets silly    Venlafaxine Other (See Comments)     Unknown--pt states " MD was trying pt on different medications "       FOLLOW-UP     PCP Outpatient Follow-up:  Yes, LEONARD Johnson    Consulting Providers Follow-up:  Yes, Urology follow-up     Active Issues Requiring Follow-up:   yes  Nephrolithiasis s/p stent placement    Discharge Statement:   I spent 30 minutes minutes discharging the patient  This time was spent on the day of discharge  I had direct contact with the patient on the day of discharge  Additional documentation is required if more than 30 minutes were spent on discharge  Portions of the record may have been created with voice recognition software  Occasional wrong word or "sound a like" substitutions may have occurred due to the inherent limitations of voice recognition software    Read the chart carefully and recognize, using context, where substitutions have occurred     ==  Starla Sandy MD  [address]  Internal Medicine Resident PGY-1

## 2021-07-26 NOTE — DISCHARGE INSTR - AVS FIRST PAGE
Discharge Instructions:  · Follow-up with your PCP within 7-14 days after hospital discharge  · You have 3 new medications added Oxybutynin, Pyridium, Flomax, take as prescribed  · When we have DC your Lovenox as we discussed that your surgery team advised to increase aspirin dose was sufficient as your insurance did not cover Lovenox  · Call the urology office to schedule appointment with them follow-up after hospital discharge

## 2021-07-26 NOTE — H&P
INTERNAL MEDICINE RESIDENCY ADMISSION H&P     Name: Deidre Venegas   Age & Sex: 54 y o  female   MRN: [id number]  Unit/Bed#: -01   Encounter: [id number]  Primary Care Provider: LEONARD Villagran    Code Status: Level 1 - Full Code  Admission Status: INPATIENT   Disposition: Patient requires Med/Surg    Admit to team: SOD Team C     ASSESSMENT/PLAN     Active Problems:    Acute left flank pain      Acute left flank pain  Assessment & Plan  Patient with h/o renal calculi with last imaging revealing 8mm calculi in distal left ureter with severe left hydroureteronephrosis and Left CVA tenderness  + Urology consult ordered  + On IV fluid for intravascular expansion  + NPO after midnight  + Follow up with urine cultures and labs  + Will treat with ceftriaxone for UTI  + Monitor creatinine  + Analgesia with Toradol and Tylenol PRN        VTE Pharmacologic Prophylaxis: Reason for no pharmacologic prophylaxis Patient low risk for VTE  VTE Mechanical Prophylaxis: sequential compression device    CHIEF COMPLAINT     Chief Complaint   Patient presents with    Flank Pain     Pt with kidney sontes, pain on the right kristin, was at the specialist on last week, states no improvement      HISTORY OF PRESENT ILLNESS     53 yo F with PMH of DM type 2 (last A1c 6 2), HTN, GERD, HLD, s/p 1 month right knee arthroplasty admitted to ED for left flank pain  As per patient, the left flank pain started a few weeks ago and was intermittent in nature originally  Over the past week the left flank pain has become constant, 10/10 overnight, with radiation to groin  It is worsened by standing or walking and relieved by nothing  Since yesterday, she has also experienced urinary urgency and dysuria with hematuria that started this morning  States that she has had nausea but no vomiting  Denies any fevers or chills  At time of admission, her blood pressure was 159/83, heart rate 98, temperature 97 8 F   Her creatinine on presentation was elevated (1 04) likely TOMMY, patient was placed on 0 9% normal saline, lisinopril held  A UA was performed which showed leukocytosis with blood, likely related to left hydroureteronephrosis from obstructing distal left ureteral calculus (8mm) with significant perinephric stranding  Additional renal calculi were also noted on left side with largest one being 1 4 cm  A 14 mm proximal right ureteral calculus was also noted  Patient was given Toradol and 1 L bolus of IV fluids in ED  A urology consult was ordered  At time of exam, patient was seen by bedside, AAOX3, reported of pain reduced to 5/10 due to analgesia  Denies chest pain, chest tightness, palpitations, headache, and blurry vision  REVIEW OF SYSTEMS     Review of Systems   Constitutional: Negative for chills and fever  Respiratory: Negative for chest tightness and shortness of breath  Cardiovascular: Negative for chest pain  Gastrointestinal: Positive for abdominal pain  Negative for vomiting  Genitourinary: Positive for flank pain and hematuria  Neurological: Negative for dizziness and headaches  OBJECTIVE     Vitals:    21 1617 21 2231   BP: 159/83 129/87   Pulse: 98 73   Resp: 16    Temp: 97 8 °F (36 6 °C) (!) 97 3 °F (36 3 °C)   TempSrc: Oral    SpO2: 100% 97%   Weight: 77 1 kg (170 lb)       Temperature:   Temp (24hrs), Av 6 °F (36 4 °C), Min:97 3 °F (36 3 °C), Max:97 8 °F (36 6 °C)    Temperature: (!) 97 3 °F (36 3 °C)  Intake & Output:  I/O     None        Weights:        Body mass index is 29 18 kg/m²  Weight (last 2 days)     Date/Time   Weight    21 1617   77 1 (170)            Physical Exam  Cardiovascular:      Rate and Rhythm: Normal rate and regular rhythm  Heart sounds: Normal heart sounds  Pulmonary:      Breath sounds: Normal breath sounds  Abdominal:      Tenderness: There is left CVA tenderness     Neurological:      Mental Status: She is alert and oriented to person, place, and time  Mental status is at baseline  PAST MEDICAL HISTORY     Past Medical History:   Diagnosis Date    Anxiety     Asthma     Colon polyp     Depression     Diverticulitis     Diverticulitis of colon     Follicular cyst of ovary 12/23/2014    Former smoker     GERD (gastroesophageal reflux disease)     Glycosuria     Headache     Hyperlipidemia     Hypertension     Kidney calculi 1/11/2019    Obesity     Ovarian cyst     Overactive bladder     Overweight     Pulmonary nodule     Renal calculus     Tinea pedis of left foot 6/8/2020    Vertigo     Wears partial dentures     Lower plate -partial     PAST SURGICAL HISTORY     Past Surgical History:   Procedure Laterality Date    APPENDECTOMY      BLADDER SUSPENSION      [facility]    CERVICAL BIOPSY  W/ LOOP ELECTRODE EXCISION      COLONOSCOPY      DILATION AND CURETTAGE OF UTERUS      HYSTERECTOMY  2007    ovaries present bilaterally    WY COLONOSCOPY FLX DX W/COLLJ SPEC WHEN PFRMD N/A 9/26/2017    Procedure: EGD AND COLONOSCOPY;  Surgeon: Isra Kumar MD;  [facility];   Service: Gastroenterology    WY REVISE MEDIAN N/CARPAL TUNNEL SURG Right 5/29/2018    Procedure: CARPAL TUNNEL RELEASE;  Surgeon: Brian Kramer DO;  Location[facility];  Service: Orthopedics    WY TOTAL KNEE ARTHROPLASTY Right 5/19/2021    Procedure: ARTHROPLASTY KNEE TOTAL;  Surgeon: Garcia Abebe MD;  Location: [facility];  Service: Orthopedics    TONSILLECTOMY      TUBAL LIGATION      WISDOM TOOTH EXTRACTION       SOCIAL & FAMILY HISTORY     Social History     Substance and Sexual Activity   Alcohol Use Yes    Alcohol/week: 3 0 standard drinks    Types: 3 Cans of beer per week    Comment: socially       Social History     Substance and Sexual Activity   Drug Use No    Comment: Denies     Social History     Tobacco Use   Smoking Status Former Smoker    Packs/day: 0 25    Years: 20 00    Pack years: 5 00    Types: Cigarettes  Quit date: 3/16/2018    Years since quitting: 3 3   Smokeless Tobacco Never Used   Tobacco Comment    approx less than  half a pack     Family History   Problem Relation Age of Onset    Diabetes type II Mother     Asthma Mother     Stroke Father     Pancreatic cancer Father 68    Diabetes type II Father     No Known Problems Maternal Grandmother     No Known Problems Maternal Grandfather     No Known Problems Paternal Grandmother     No Known Problems Paternal Grandfather     No Known Problems Sister     No Known Problems Sister     No Known Problems Sister     No Known Problems Sister     No Known Problems Sister     No Known Problems Maternal Aunt     No Known Problems Paternal Aunt      LABORATORY DATA     Labs: I have personally reviewed pertinent reports  Results from last 7 days   Lab Units 07/25/21  1749   WBC Thousand/uL 16 85*   HEMOGLOBIN g/dL 13 2   HEMATOCRIT % 41 1   PLATELETS Thousands/uL 351   NEUTROS PCT % 83*   MONOS PCT % 6      Results from last 7 days   Lab Units 07/25/21  1749   POTASSIUM mmol/L 4 1   CHLORIDE mmol/L 104   CO2 mmol/L 25   BUN mg/dL 19   CREATININE mg/dL 1 04   CALCIUM mg/dL 9 8   ALK PHOS U/L 123*   ALT U/L 31   AST U/L 15                          Micro:  Lab Results   Component Value Date    URINECX 30,000-39,000 cfu/ml Escherichia coli ESBL (A) 01/11/2019    URINECX 0655-7931 cfu/ml  01/11/2019     IMAGING & DIAGNOSTIC TESTS     Imaging: I have personally reviewed pertinent reports  CT renal stone study abdomen pelvis wo contrast    Result Date: 7/25/2021  Impression: Severe left hydroureteronephrosis resulting from an obstructing distal left ureteral calculus measuring 8 mm just above the UV junction  Multiple additional nonobstructing intrarenal calculi the largest measuring 1 4 cm  Significant perinephric stranding  Moderate hydroureteronephrosis due to an obstructing 14 mm proximal right ureteral calculus    Multiple additional calculi throughout the right renal collecting system the largest in the lower pole measures 5 mm  Hepatomegaly  Benign left adrenal gland adenoma measuring 1 9 cm  Colonic diverticulosis without diverticulitis  Workstation performed: [id number]     EKG, Pathology, and Other Studies: I have personally reviewed pertinent reports  ALLERGIES     Allergies   Allergen Reactions    Clonazepam Other (See Comments)     Pt states "didn't feel right on it "    Morphine Other (See Comments) and Confusion     Annotation - [id number]: "dopey"  Gets silly    Venlafaxine Other (See Comments)     Unknown--pt states " MD was trying pt on different medications "     MEDICATIONS PRIOR TO ARRIVAL     Prior to Admission medications    Medication Sig Start Date End Date Taking? Authorizing Provider   Ascorbic Acid (vitamin C) 1000 MG tablet Take 2,000 mg by mouth daily   Yes Historical Provider, MD   atorvastatin (LIPITOR) 40 mg tablet Take 1 tablet (40 mg total) by mouth daily 6/16/21  Yes LEONARD Camarena   Cholecalciferol (Vitamin D3) 50 MCG (2000 UT) TABS Take 2,000 Units by mouth daily   Yes Historical Provider, MD   lisinopril (ZESTRIL) 10 mg tablet Take 1 5 tablets (15 mg total) by mouth daily 4/26/21  Yes LEONARD Jerry   metFORMIN (GLUCOPHAGE) 1000 MG tablet Take 1 tablet (1,000 mg total) by mouth 2 (two) times a day with meals 3/24/21  Yes LEONARD Love   multivitamin (THERAGRAN) TABS Take 1 tablet by mouth daily   Yes Historical Provider, MD   oxyCODONE (ROXICODONE) 5 mg immediate release tablet 1 pill po Q4 Hrs prn 5/20/21  Yes Salvador Mccabe PA-C   sertraline (ZOLOFT) 50 mg tablet Take 1 tablet (50 mg total) by mouth daily Takes in the am  6/16/21  Yes LEONARD Camarena   ALPRAZolam Shahida Rhodes) 0 25 mg tablet Take 1 tablet by mouth 2 (two) times a day as needed  Patient not taking: Reported on 7/25/2021 6/22/17   Historical Provider, MD   ascorbic acid (VITAMIN C) 500 MG tablet Take 1 tablet (500 mg total) by mouth daily Start 30 days prior to surgery  Patient not taking: Reported on 7/25/2021 4/20/21   Garcia Abebe MD   enoxaparin (LOVENOX) 40 mg/0 4 mL Inject 0 4 mL (40 mg total) under the skin daily  Patient not taking: Reported on 7/25/2021 4/20/21   Garcia Abebe MD   ferrous sulfate 324 (65 Fe) mg Take 1 tablet (324 mg total) by mouth 2 (two) times a day before meals Start 30 days prior to surgery  Patient not taking: Reported on 7/25/2021 4/20/21   Garcia Abebe MD   folic acid (FOLVITE) 1 mg tablet Take 1 tablet (1 mg total) by mouth daily Start 30 days prior to surgery  Patient not taking: Reported on 7/25/2021 4/20/21   Garcia Abebe MD   omeprazole (PriLOSEC) 20 mg delayed release capsule Take 1 capsule (20 mg total) by mouth daily 6/16/21   LEONARD Obregon     MEDICATIONS ADMINISTERED IN LAST 24 HOURS     Medication Administration - last 24 hours from 07/25/2021 0134 to 07/26/2021 0134       Date/Time Order Dose Route Action Action by     07/25/2021 1749 ketorolac (TORADOL) injection 15 mg 15 mg Intravenous Given Lisa Valverde RN     07/25/2021 1749 multi-electrolyte (ISOLYTE-S PH 7 4) bolus 1,000 mL 1,000 mL Intravenous New Bag Lisa Valverde RN     07/25/2021 2312 HYDROmorphone (DILAUDID) injection 1 mg 1 mg Intravenous Given Princess Rob RN     07/25/2021 2325 atorvastatin (LIPITOR) tablet 40 mg 40 mg Oral Given Princess Rob RN     07/26/2021 0109 multi-electrolyte (PLASMALYTE-A/ISOLYTE-S PH 7 4) IV solution 0 mL/hr Intravenous Stopped Princess Rob RN     07/25/2021 2329 multi-electrolyte (PLASMALYTE-A/ISOLYTE-S PH 7 4) IV solution 75 mL/hr Intravenous [address] Princess Rob RN     07/26/2021 0109 cefTRIAXone (ROCEPHIN) 1,000 mg in dextrose 5 % 50 mL IVPB 1,000 mg Intravenous New Bag Princess Rob RN     07/25/2021 2894 ondansetron (ZOFRAN-ODT) dispersible tablet 4 mg 4 mg Oral Given Princess Rob RN        CURRENT MEDICATIONS     Current Facility-Administered Medications   Medication Dose Route Frequency Provider Last Rate    acetaminophen  650 mg Oral Q6H PRN Mindy Jordan MD      atorvastatin  40 mg Oral Daily Kristina,       cefazolin  1,000 mg Intravenous On Call To OR Kristina,       cefTRIAXone  1,000 mg Intravenous Q24H Mindy Jordan MD 1,000 mg (07/26/21 0109)    cholecalciferol  2,000 Units Oral Daily Kristina, DO      ketorolac  10 mg Oral Q6H PRN Mindy Jordan MD      multi-electrolyte  75 mL/hr Intravenous Continuous Mindy Jordan MD Stopped (07/26/21 0109)    pantoprazole  20 mg Oral Early Morning Kristina,       sertraline  50 mg Oral Daily Burke Kan, DO       multi-electrolyte, 75 mL/hr, Last Rate: Stopped (07/26/21 0109)      acetaminophen, 650 mg, Q6H PRN  ketorolac, 10 mg, Q6H PRN        Admission Time  I spent 30 minutes admitting the patient  This involved direct patient contact where I performed a full history and physical, reviewing previous records, and reviewing laboratory and other diagnostic studies  Portions of the record may have been created with voice recognition software  Occasional wrong word or "sound a like" substitutions may have occurred due to the inherent limitations of voice recognition software    Read the chart carefully and recognize, using context, where substitutions have occurred     ==  Mindy Jordan MD  [address]  Internal Medicine Residency PGY-1

## 2021-07-26 NOTE — ASSESSMENT & PLAN NOTE
Patient with h/o renal calculi with last imaging revealing 8mm calculi in distal left ureter with severe left hydroureteronephrosis and Left CVA tenderness      + OR with urology today, will F/U afterwards   + On IV fluid for intravascular expansion  + NPO after midnight  + Follow up with urine cultures and labs  + Ancef received for surgical prophylaxis  + Monitor creatinine  + Analgesia with Toradol and Tylenol PRN

## 2021-07-26 NOTE — DISCHARGE INSTRUCTIONS
You have a stent in place on both sides  Right-sided stones were well treated but the left-sided stones were not addressed and you will absolutely need to return to the operating room for interval stone surgery  Failure to followup can result in dangerous complication as the ureteral stent cannot remain in place indefinitely  It is common to have some mild symptoms from the stent placement  Pain with urination, feeling of needing to urinate frequency or urgently, flank discomfort or blood in the urine  Utilize the medications provided (flomax, ditropan, pyridium) as well as ibuprofen/motrin for pain control  Hydrate liberally with oral fluids  Ureteroscopy   WHAT YOU NEED TO KNOW:   A ureteroscopy is a procedure to examine in the inside of your urinary tract, which includes your urethra, bladder, ureters, and kidneys  A ureteroscope is a small, thin tube with a light and camera on the end  Ureteroscopy can help your healthcare provider diagnose and treat problems in your urinary tract, such as kidney stones  DISCHARGE INSTRUCTIONS:   Medicine:   · Antibiotics  may be given to treat or prevent an infection  · Take your medicine as directed  Contact your healthcare provider if you think your medicine is not helping or if you have side effects  Tell him or her if you are allergic to any medicine  Keep a list of the medicines, vitamins, and herbs you take  Include the amounts, and when and why you take them  Bring the list or the pill bottles to follow-up visits  Carry your medicine list with you in case of an emergency  Follow up with your healthcare provider as directed:  Write down your questions so you remember to ask them during your visits  Drink liquids as directed  Liquids can help prevent kidney stones and urinary tract infections  Drink water and limit the amount of caffeine you drink  Caffeine may be found in coffee, tea, soda, sports drinks, and foods   Ask your healthcare provider how much liquid to drink each day  Contact your healthcare provider if:   · You have a fever  · You cannot urinate  · You have blood in your urine  · You are vomiting  · You have pain in your abdomen or side  · You have questions or concerns about your condition or care  © Copyright [employer] 2018 Information is for End User's use only and may not be sold, redistributed or otherwise used for commercial purposes  All illustrations and images included in CareNotes® are the copyrighted property of A D A M [employer] Inc  or [facility] Nazario Rodriguez   The above information is an  only  It is not intended as medical advice for individual conditions or treatments  Talk to your doctor, nurse or pharmacist before following any medical regimen to see if it is safe and effective for you

## 2021-07-26 NOTE — CONSULTS
Consult - Urology   Donald Oviedo 1965, 54 y o  female MRN: [id number]    Unit/Bed#: -01 Encounter: [id number]    No new Assessment & Plan notes have been filed under this hospital service since the last note was generated  Service: Urology    Assessment and Plan  7/25/21 CT abdomen: Severe left hydroureteronephrosis resulting from an obstructing distal left ureteral calculus measuring 8 mm just above the UV junction   Multiple additional nonobstructing intrarenal calculi the largest measuring 1 4 cm   Significant perinephric stranding  Moderate hydroureteronephrosis due to an obstructing 14 mm proximal right ureteral calculus   Multiple additional calculi throughout the right renal collecting system the largest in the lower pole measures 5 mm  NPO for OR today with Dr Ange Chaparro for possible left-sided ureteroscopy and right-sided ureteral stent placement   Patient will most likely require staged procedure  Pain management  Continue IVF  IV Ceftriaxone   Await urine culture results  Strain all urine  Trend Creatinine   7/26/21 BUN 20, Cr 1 21    Subjective:   Donald Oviedo is a 54year old female who presented to the emergency department 7/25/21 with reports of acute onset severe left flank pain radiating to her left lower quadrant associated with frequency of urination and blood-tinged urine  She took Tylenol at home without relief of pain  She has a known history of kidney stones  She reports her father has history of kidney stones as well  WBC on admission 16 85  BUN and Creatinine were within normal limits in ER  (BUN 19 Cr 1 04) Urine culture in process  Upon examination this morning, patient appears comfortable  She reports her pain is much more controlled at present time  She has been NPO since midnight  Denies nausea, vomiting, fever, chills, flank pain, gross hematuria, dysuria    PMH: right knee replacement May 2021 on OxyContin for pain management with physical therapy (Tues/Thurs)    Review of Systems   Constitutional: Negative for chills, diaphoresis, fatigue and fever  HENT: Negative for ear pain and sore throat  Eyes: Negative for pain and visual disturbance  Respiratory: Negative for cough and shortness of breath  Cardiovascular: Negative for chest pain and palpitations  Gastrointestinal: Negative for abdominal distention, abdominal pain, constipation, diarrhea, nausea and vomiting  Endocrine: Negative  Genitourinary: Negative for decreased urine volume, difficulty urinating, dysuria, flank pain, frequency, hematuria, pelvic pain and urgency  Musculoskeletal: Negative for arthralgias and back pain  Skin: Negative for color change, pallor and rash  Allergic/Immunologic: Negative  Neurological: Negative for seizures and syncope  Hematological: Negative  Psychiatric/Behavioral: Negative  All other systems reviewed and are negative  Objective:  Vitals: Blood pressure 132/84, pulse 79, temperature (!) 97 4 °F (36 3 °C), resp  rate 16, height 5' 3" (1 6 m), weight 77 1 kg (170 lb), SpO2 95 %  ,Body mass index is 30 11 kg/m²  Physical Exam  Vitals and nursing note reviewed  Constitutional:       General: She is not in acute distress  Appearance: Normal appearance  She is obese  She is not ill-appearing, toxic-appearing or diaphoretic  HENT:      Head: Normocephalic and atraumatic  Cardiovascular:      Rate and Rhythm: Normal rate and regular rhythm  Pulses: Normal pulses  Heart sounds: Normal heart sounds  Pulmonary:      Effort: Pulmonary effort is normal       Breath sounds: Normal breath sounds  Abdominal:      General: Bowel sounds are normal       Palpations: Abdomen is soft  Tenderness: There is no right CVA tenderness or left CVA tenderness  Musculoskeletal:         General: Normal range of motion  Cervical back: Normal range of motion  Skin:     General: Skin is warm and dry        Capillary Refill: Capillary refill takes less than 2 seconds  Neurological:      General: No focal deficit present  Mental Status: She is alert and oriented to person, place, and time  Mental status is at baseline  Psychiatric:         Mood and Affect: Mood normal          Behavior: Behavior normal          Thought Content: Thought content normal          Judgment: Judgment normal          Imaging:  CT Abdomen: ct stone study  Imaging reviewed - both report and images personally reviewed  Labs:  Recent Labs     07/25/21 1749 07/26/21  0737   WBC 16 85* 10 32*     Recent Labs     07/25/21 1749 07/26/21  0737   HGB 13 2 11 9     Recent Labs     07/25/21 1749 07/26/21  0737   CREATININE 1 04 1 21       Microbiology:  Urine in process    History:  Social History     Socioeconomic History    Marital status: /Civil Union     Spouse name: None    Number of children: None    Years of education: None    Highest education level: None   Occupational History    None   Tobacco Use    Smoking status: Former Smoker     Packs/day: 0 25     Years: 20 00     Pack years: 5 00     Types: Cigarettes     Quit date: 3/16/2018     Years since quitting: 3 3    Smokeless tobacco: Never Used    Tobacco comment: approx less than  half a pack   Vaping Use    Vaping Use: Never used   Substance and Sexual Activity    Alcohol use: Yes     Alcohol/week: 3 0 standard drinks     Types: 3 Cans of beer per week     Comment: socially    Drug use: No     Comment: Denies    Sexual activity: Not Currently     Partners: Male   Other Topics Concern    None   Social History Narrative    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      Social Determinants of Health     Financial Resource Strain:     Difficulty of Paying Living Expenses:    Food Insecurity:     Worried About Running Out of Food in the Last Year:     Ran Out of Food in the Last Year:    Transportation Needs:     Lack of Transportation (Medical):      Lack of Transportation (Non-Medical): Physical Activity:     Days of Exercise per Week:     Minutes of Exercise per Session:    Stress:     Feeling of Stress :    Social Connections:     Frequency of Communication with Friends and Family:     Frequency of Social Gatherings with Friends and Family:     Attends Yazdanism Services:     Active Member of Clubs or Organizations:     Attends Club or Organization Meetings:     Marital Status:    Intimate Partner Violence:     Fear of Current or Ex-Partner:     Emotionally Abused:     Physically Abused:     Sexually Abused:      Past Medical History:   Diagnosis Date    Anxiety     Asthma     Colon polyp     Depression     Diverticulitis     Diverticulitis of colon     Follicular cyst of ovary 12/23/2014    Former smoker     GERD (gastroesophageal reflux disease)     Glycosuria     Headache     Hyperlipidemia     Hypertension     Kidney calculi 1/11/2019    Obesity     Ovarian cyst     Overactive bladder     Overweight     Pulmonary nodule     Renal calculus     Tinea pedis of left foot 6/8/2020    Vertigo     Wears partial dentures     Lower plate -partial     Past Surgical History:   Procedure Laterality Date    APPENDECTOMY      BLADDER SUSPENSION      [facility]    CERVICAL BIOPSY  W/ LOOP ELECTRODE EXCISION      COLONOSCOPY      DILATION AND CURETTAGE OF UTERUS      HYSTERECTOMY  2007    ovaries present bilaterally    MA COLONOSCOPY FLX DX W/COLLJ SPEC WHEN PFRMD N/A 9/26/2017    Procedure: EGD AND COLONOSCOPY;  Surgeon: Whitley Dukes MD;  Location: [facility];   Service: Gastroenterology    MA REVISE MEDIAN N/CARPAL TUNNEL SURG Right 5/29/2018    Procedure: CARPAL TUNNEL RELEASE;  Surgeon: Jennifer Ervin DO;  Location: [facility];  Service: Orthopedics    MA TOTAL KNEE ARTHROPLASTY Right 5/19/2021    Procedure: ARTHROPLASTY KNEE TOTAL;  Surgeon: Codi Grey MD;  Location: [facility];  Service: Orthopedics    TONSILLECTOMY      TUBAL LIGATION      MARIE TOOTH EXTRACTION       Family History   Problem Relation Age of Onset    Diabetes type II Mother     Asthma Mother     Stroke Father     Pancreatic cancer Father 68    Diabetes type II Father     No Known Problems Maternal Grandmother     No Known Problems Maternal Grandfather     No Known Problems Paternal Grandmother     No Known Problems Paternal Grandfather     No Known Problems Sister     No Known Problems Sister     No Known Problems Sister     No Known Problems Sister     No Known Problems Sister     No Known Problems Maternal Aunt     No Known Problems Paternal Aunt        CT ABDOMEN AND PELVIS WITHOUT IV CONTRAST - LOW DOSE RENAL STONE 7/25/21    INDICATION:   Flank pain, kidney stone suspected   kidney stone   Hematuria  COMPARISON:  CT scan 1/11/2019  TECHNIQUE:  Low dose thin section CT examination of the abdomen and pelvis was performed without intravenous or oral contrast according to a protocol specifically designed to evaluate for urinary tract calculus   Axial, sagittal, and coronal 2D   reformatted images were created from the source data and submitted for interpretation    Evaluation for pathology in the abdomen and pelvis that is unrelated to urinary tract calculi is limited  Radiation dose length product (DLP) for this visit:  507 27 mGy-cm    This examination, like all CT scans performed in the [facility], was performed utilizing techniques to minimize radiation dose exposure, including the use of iterative    reconstruction and automated exposure control  FINDINGS:     RIGHT KIDNEY AND URETER:   Moderate hydroureteronephrosis due to an obstructing 14 mm proximal right ureteral calculus   Multiple additional calculi throughout the right renal collecting system the largest in the lower pole measures 5 mm       LEFT KIDNEY AND URETER:   Severe left hydroureteronephrosis resulting from an obstructing distal left ureteral calculus measuring 8 mm just above the UV junction   Multiple additional nonobstructing intrarenal calculi the largest measuring 1 4 cm   Significant perinephric   stranding  URINARY BLADDER:   Unremarkable  No significant abnormality in the visualized lung bases  Limited low radiation dose noncontrast CT evaluation demonstrates no clinically significant abnormality of spleen, pancreas, or right adrenal gland   Left adrenal gland adenoma measuring 1 9 cm   Hepatomegaly without focal lesion  No calcified gallstones or gallbladder wall thickening noted  No ascites or bulky lymphadenopathy on this limited noncontrast study  Colonic diverticula are noted, without evidence to suggest acute diverticulitis   Visualized bowel appears otherwise unremarkable  Status post appendectomy  No acute fracture or destructive osseous lesion is identified  Impression:       Severe left hydroureteronephrosis resulting from an obstructing distal left ureteral calculus measuring 8 mm just above the UV junction   Multiple additional nonobstructing intrarenal calculi the largest measuring 1 4 cm   Significant perinephric   stranding  Moderate hydroureteronephrosis due to an obstructing 14 mm proximal right ureteral calculus   Multiple additional calculi throughout the right renal collecting system the largest in the lower pole measures 5 mm  Hepatomegaly  Benign left adrenal gland adenoma measuring 1 9 cm  Colonic diverticulosis without diverticulitis            LEONARD Escobar  Date: 7/26/2021 Time: 11:08 AM

## 2021-07-26 NOTE — PROGRESS NOTES
INTERNAL MEDICINE RESIDENCY PROGRESS NOTE     Name: Jd Briggs   Age & Sex: 54 y o  female   MRN: [id number]  Unit/Bed#: -01   Encounter: [id number]  Team: SOD Team C     PATIENT INFORMATION     Name: Jd Briggs   Age & Sex: 54 y o  female   MRN: [id number]  Hospital Stay Days: 1    ASSESSMENT/PLAN     Principal Problem:    Acute left flank pain      * Acute left flank pain  Assessment & Plan  Patient with h/o renal calculi with last imaging revealing 8mm calculi in distal left ureter with severe left hydroureteronephrosis and Left CVA tenderness  + OR with urology today, will F/U afterwards   + On IV fluid for intravascular expansion  + NPO after midnight  + Follow up with urine cultures and labs  + Ancef received for surgical prophylaxis  + Monitor creatinine  + Analgesia with Toradol and Tylenol PRN          Disposition: Continue current level of care  OR with urology today  SUBJECTIVE     Patient seen and examined  No acute events overnight  No new complaints this morning, continues to have flank pain but it is much improved from yesterday  Patient denies chest pain, nausea/vomiting, shortness of breath, or fevers/chills  OBJECTIVE     Vitals:    21 1617 21 2231 21 0138 21 0709   BP: 159/83 129/87 129/87 132/84   Pulse: 98 73 73 79   Resp: 16  16    Temp: 97 8 °F (36 6 °C) (!) 97 3 °F (36 3 °C) (!) 97 3 °F (36 3 °C) (!) 97 4 °F (36 3 °C)   TempSrc: Oral  Oral    SpO2: 100% 97%  95%   Weight: 77 1 kg (170 lb)  77 1 kg (170 lb)    Height:   5' 3" (1 6 m)       Temperature:   Temp (24hrs), Av 5 °F (36 4 °C), Min:97 3 °F (36 3 °C), Max:97 8 °F (36 6 °C)    Temperature: (!) 97 4 °F (36 3 °C)  Intake & Output:  I/O     None        Weights:   IBW (Ideal Body Weight): 52 4 kg    Body mass index is 30 11 kg/m²    Weight (last 2 days)     Date/Time   Weight    21 0138   77 1 (170)    21 1617   77 1 (170)            Physical Exam  HENT:      Head: Normocephalic  Eyes:      Extraocular Movements: Extraocular movements intact  Cardiovascular:      Rate and Rhythm: Normal rate and regular rhythm  Pulses: Normal pulses  Heart sounds: Normal heart sounds  Pulmonary:      Effort: Pulmonary effort is normal       Breath sounds: Normal breath sounds  Abdominal:      General: Bowel sounds are normal       Palpations: Abdomen is soft  Tenderness: There is abdominal tenderness  There is left CVA tenderness  There is no guarding  Skin:     General: Skin is warm and dry  Capillary Refill: Capillary refill takes less than 2 seconds  Neurological:      General: No focal deficit present  Mental Status: She is alert and oriented to person, place, and time  Psychiatric:         Mood and Affect: Mood normal          Behavior: Behavior normal          Thought Content: Thought content normal        LABORATORY DATA     Labs: I have personally reviewed pertinent reports  Results from last 7 days   Lab Units 07/26/21  0737 07/25/21  1749   WBC Thousand/uL 10 32* 16 85*   HEMOGLOBIN g/dL 11 9 13 2   HEMATOCRIT % 37 9 41 1   PLATELETS Thousands/uL 304 351   NEUTROS PCT % 67 83*   MONOS PCT % 8 6      Results from last 7 days   Lab Units 07/26/21  0737 07/25/21  1749   POTASSIUM mmol/L 4 1 4 1   CHLORIDE mmol/L 105 104   CO2 mmol/L 27 25   BUN mg/dL 20 19   CREATININE mg/dL 1 21 1 04   CALCIUM mg/dL 9 1 9 8   ALK PHOS U/L  --  123*   ALT U/L  --  31   AST U/L  --  15                            IMAGING & DIAGNOSTIC TESTING     Radiology Results: I have personally reviewed pertinent reports  CT renal stone study abdomen pelvis wo contrast    Result Date: 7/25/2021  Impression: Severe left hydroureteronephrosis resulting from an obstructing distal left ureteral calculus measuring 8 mm just above the UV junction  Multiple additional nonobstructing intrarenal calculi the largest measuring 1 4 cm  Significant perinephric stranding   Moderate hydroureteronephrosis due to an obstructing 14 mm proximal right ureteral calculus  Multiple additional calculi throughout the right renal collecting system the largest in the lower pole measures 5 mm  Hepatomegaly  Benign left adrenal gland adenoma measuring 1 9 cm  Colonic diverticulosis without diverticulitis  Workstation performed: [id number]     Other Diagnostic Testing: I have personally reviewed pertinent reports  ACTIVE MEDICATIONS     Current Facility-Administered Medications   Medication Dose Route Frequency    acetaminophen (TYLENOL) tablet 650 mg  650 mg Oral Q6H PRN    atorvastatin (LIPITOR) tablet 40 mg  40 mg Oral Daily    cholecalciferol (VITAMIN D3) tablet 2,000 Units  2,000 Units Oral Daily    ketorolac (TORADOL) tablet 10 mg  10 mg Oral Q6H PRN    multi-electrolyte (PLASMALYTE-A/ISOLYTE-S PH 7 4) IV solution  75 mL/hr Intravenous Continuous    pantoprazole (PROTONIX) EC tablet 20 mg  20 mg Oral Early Morning    sertraline (ZOLOFT) tablet 50 mg  50 mg Oral Daily       VTE Pharmacologic Prophylaxis: Reason for no pharmacologic prophylaxis low risk  VTE Mechanical Prophylaxis: sequential compression device    Portions of the record may have been created with voice recognition software  Occasional wrong word or "sound a like" substitutions may have occurred due to the inherent limitations of voice recognition software    Read the chart carefully and recognize, using context, where substitutions have occurred   ==  Julian Hardin MD  [facility]  Internal Medicine Residency PGY-1

## 2021-07-26 NOTE — ANESTHESIA PREPROCEDURE EVALUATION
Procedure:  CYSTOSCOPY URETEROSCOPY WITH LITHOTRIPSY HOLMIUM LASER, RETROGRADE PYELOGRAM AND INSERTION STENT URETERAL (Bilateral Bladder)    Relevant Problems   CARDIO   (+) Hypercholesteremia   (+) Hypertension      GI/HEPATIC   (+) Fatty liver   (+) GERD (gastroesophageal reflux disease)      /RENAL   (+) Kidney stone      MUSCULOSKELETAL   (+) Primary osteoarthritis of both knees      NEURO/PSYCH   (+) Anxiety      PULMONARY   (+) Asthma        Physical Exam    Airway    Mallampati score: II  TM Distance: >3 FB  Neck ROM: full     Dental       Cardiovascular  Rhythm: regular, Rate: normal,     Pulmonary  Breath sounds clear to auscultation,     Other Findings        Anesthesia Plan  ASA Score- 3     Anesthesia Type- general with ASA Monitors  Additional Monitors:   Airway Plan: LMA  Plan Factors-    Chart reviewed  EKG reviewed  Existing labs reviewed  Patient summary reviewed  Induction- intravenous  Postoperative Plan-   Planned trial extubation    Informed Consent- Anesthetic plan and risks discussed with patient  I personally reviewed this patient with the CRNA  Discussed and agreed on the Anesthesia Plan with the CRNA  Gabo Kingsley

## 2021-07-26 NOTE — UTILIZATION REVIEW
Initial Clinical Review    Admission: Date/Time/Statement:   Admission Orders (From admission, onward)     Ordered        07/25/21 2100  Inpatient Admission  Once                   Orders Placed This Encounter   Procedures    Inpatient Admission     Standing Status:   Standing     Number of Occurrences:   1     Order Specific Question:   Level of Care     Answer:   Med Surg [16]     Order Specific Question:   Estimated length of stay     Answer:   More than 2 Midnights     Order Specific Question:   Certification     Answer:   I certify that inpatient services are medically necessary for this patient for a duration of greater than two midnights  See H&P and MD Progress Notes for additional information about the patient's course of treatment  ED Arrival Information     Expected Arrival Acuity    - 7/25/2021 15:51 Emergent         Means of arrival Escorted by Service Admission type    [facility] Member General Medicine Emergency         Arrival complaint    abdominal pain, kidney stones        Chief Complaint   Patient presents with    Flank Pain     Pt with kidney sontes, pain on the right kristin, was at the specialist on last week, states no improvement     Initial Presentation:   55y Female to ED presents with worsening left flank pain, intermittent for past couple of weeks but now persistent with radiation to groin  Worsened by standing or walking  Also nausea, c/o urinary urgency and dysuria with hematuria today  PMH of s/p Right knee arthroplasty 1 month ago, DM type 2 , HTN, GERD and HLD  In ED noted with BP of 159/63  Creat elevated at 1 04, likely TOMMY and given IVF NS  Hold lisinopril  UA leukocytosis with blood, likely related to left hydroureteronephrosis from obstructing distal left ureteral calculus (8mm) with significant perinephric stranding  Additional renal calculi were also noted on left side with largest one being 1 4 cm  A 14 mm proximal right ureteral calculus was also note   Given IV Toradol and IVF bolus in ED  Admit Inpatient level of care for Acute left flank pain  H/o renal calculi with last imaging revealing 8mm calculi in distal left ureter with severe left hydroureteronephrosis and Left CVA tenderness  Urology consult  IVFs  NPO after MN  F/u urine cultures and lab  IV antibiotics for UTI  Monitor creat  Pain control  Date: 7/26  Day 2:   Progress notes; Plan OR  for Urology today   IVFs  NPO  F/u urine culture  Iv antibiotics for surgical ppx  Monitor creat  Analgesia prn  Urology cons; NPO for OR today for possible left-sided ureteroscopy and right-sided ureteral stent placement  She will most likely require staged procedure  Pain management  Continue IVFs  Iv antibiotics  Await urine culture results  Strain all urine  Trend creat  7/26 OR - S/p CYSTOSCOPY URETEROSCOPY WITH LITHOTRIPSY HOLMIUM LASER, RETROGRADE PYELOGRAM AND INSERTION STENT URETERAL (Bilateral)  URETEROSCOPY, LASER AND BASKET STONE EXTRACTION   Operative Findings:  1  Clearly obstructed urine on the left side with placement of the wire, 6 x 24 double-J stent placed  2  Right renal pelvis stone was fragmented with laser lithotripsy and dusted into smaller than 2 mm fragments, some retrieved for analysis  3   Right-sided 6 x 24 double-J stent placed without string, can be removed at secondary left-sided surgery       ED Triage Vitals   Temperature Pulse Respirations Blood Pressure SpO2   07/25/21 1617 07/25/21 1617 07/25/21 1617 07/25/21 1617 07/25/21 1617   97 8 °F (36 6 °C) 98 16 159/83 100 %      Temp Source Heart Rate Source Patient Position - Orthostatic VS BP Location FiO2 (%)   07/25/21 1617 07/26/21 0138 -- -- --   Oral Monitor         Pain Score       07/25/21 1617       Worst Possible Pain          Wt Readings from Last 1 Encounters:   07/26/21 77 1 kg (170 lb)     Additional Vital Signs:   07/26/21 07:09:17  97 4 °F (36 3 °C)  Abnormal   79  --  132/84  100  95 %   07/26/21 0138  97 3 °F (36 3 °C)  Abnormal   73  16  129/87  --  --   07/25/21 22:31:21  97 3 °F (36 3 °C)  Abnormal   73  --  129/87  101  97 %     Pertinent Labs/Diagnostic Test Results:   7/25  CT renal stone study abd/pelvis - Severe left hydroureteronephrosis resulting from an obstructing distal left ureteral calculus measuring 8 mm just above the UV junction  Multiple additional nonobstructing intrarenal calculi the largest measuring 1 4 cm  Significant perinephric   stranding  Moderate hydroureteronephrosis due to an obstructing 14 mm proximal right ureteral calculus  Multiple additional calculi throughout the right renal collecting system the largest in the lower pole measures 5 mm  Hepatomegaly  Benign left adrenal gland adenoma measuring 1 9 cm  Colonic diverticulosis without diverticulitis        Results from last 7 days   Lab Units 07/26/21  0737 07/25/21  1749   WBC Thousand/uL 10 32* 16 85*   HEMOGLOBIN g/dL 11 9 13 2   HEMATOCRIT % 37 9 41 1   PLATELETS Thousands/uL 304 351   NEUTROS ABS Thousands/µL 6 81 14 01*         Results from last 7 days   Lab Units 07/26/21  0737 07/25/21  1749   SODIUM mmol/L 136 135*   POTASSIUM mmol/L 4 1 4 1   CHLORIDE mmol/L 105 104   CO2 mmol/L 27 25   ANION GAP mmol/L 4 6   BUN mg/dL 20 19   CREATININE mg/dL 1 21 1 04   EGFR ml/min/1 73sq m 50 61   CALCIUM mg/dL 9 1 9 8     Results from last 7 days   Lab Units 07/25/21  1749   AST U/L 15   ALT U/L 31   ALK PHOS U/L 123*   TOTAL PROTEIN g/dL 7 8   ALBUMIN g/dL 3 9   TOTAL BILIRUBIN mg/dL 0 30         Results from last 7 days   Lab Units 07/26/21  0737 07/25/21  1749   GLUCOSE RANDOM mg/dL 109 121       Results from last 7 days   Lab Units 07/25/21  1756   CLARITY UA  Slightly Cloudy   COLOR UA  Rosa   SPEC GRAV UA  1 020   PH UA  5 5   GLUCOSE UA mg/dl Negative   KETONES UA mg/dl Negative   BLOOD UA  Large*   PROTEIN UA mg/dl 30 (1+)*   NITRITE UA  Negative   BILIRUBIN UA  Negative   UROBILINOGEN UA E U /dl 0 2   LEUKOCYTES UA  Small*   WBC UA /hpf 20-30*   RBC UA /hpf Innumerable*   BACTERIA UA /hpf None Seen   EPITHELIAL CELLS WET PREP /hpf Occasional       ED Treatment:   Medication Administration from 07/25/2021 1551 to 07/25/2021 2226       Date/Time Order Dose Route Action     07/25/2021 1749 ketorolac (TORADOL) injection 15 mg 15 mg Intravenous Given     07/25/2021 1749 multi-electrolyte (ISOLYTE-S PH 7 4) bolus 1,000 mL 1,000 mL Intravenous New Bag        Past Medical History:   Diagnosis Date    Anxiety     Asthma     Colon polyp     Depression     Diverticulitis     Diverticulitis of colon     Follicular cyst of ovary 12/23/2014    Former smoker     GERD (gastroesophageal reflux disease)     Glycosuria     Headache     Hyperlipidemia     Hypertension     Kidney calculi 1/11/2019    Obesity     Ovarian cyst     Overactive bladder     Overweight     Pulmonary nodule     Renal calculus     Tinea pedis of left foot 6/8/2020    Vertigo     Wears partial dentures     Lower plate -partial     Present on Admission:  **None**      Admitting Diagnosis: Nephrolithiasis [N20 0]  Abdominal pain [R10 9]  Left flank pain [R10 9]  Age/Sex: 54 y o  female     Admission Orders:  Scheduled Medications:  atorvastatin, 40 mg, Oral, Daily  cholecalciferol, 2,000 Units, Oral, Daily  pantoprazole, 20 mg, Oral, Early Morning  sertraline, 50 mg, Oral, Daily    cefTRIAXone (ROCEPHIN) 1,000 mg in dextrose 5 % 50 mL IVPB   Dose: 1,000 mg  Freq: Every 24 hours Route: IV x1 given  Last Dose: 1,000 mg (07/26/21 0109)  Start: 07/25/21 2300 End: 07/26/21 0139      ceFAZolin (ANCEF) IVPB (premix in dextrose) 1,000 mg 50 mL   Dose: 1,000 mg  Freq:  On call to O R  Route: IV x1 given  Last Dose: 1,000 mg (07/26/21 0612)  Start: 07/26/21 0600 End: 07/26/21 0642    Continuous IV Infusions:  multi-electrolyte, 75 mL/hr, Intravenous, Continuous      PRN Meds:  acetaminophen, 650 mg, Oral, Q6H PRN 7/26 x1  ketorolac, 10 mg, Oral, Q6H PRN      Urine cultue  IP CONSULT TO UROLOGY    Network Utilization Review Department  ATTENTION: Please call with any questions or concerns to [phone] and carefully listen to the prompts so that you are directed to the right person  All voicemails are confidential   Andrew Goldman all requests for admission clinical reviews, approved or denied determinations and any other requests to dedicated fax number below belonging to the campus where the patient is receiving treatment   List of dedicated fax numbers for the Facilities:  [address] DENIALS (Administrative/Medical Necessity) [phone]   [address] (Maternity/NICU/Pediatrics) [phone]   [address] [place] [address] [address] [place] [address] [address] [id number] [place] [address] [address] [place] [address] [phone]

## 2021-07-27 ENCOUNTER — APPOINTMENT (OUTPATIENT)
Dept: PHYSICAL THERAPY | Facility: REHABILITATION | Age: 56
End: 2021-07-27
Payer: COMMERCIAL

## 2021-07-27 ENCOUNTER — TRANSITIONAL CARE MANAGEMENT (OUTPATIENT)
Dept: INTERNAL MEDICINE CLINIC | Age: 56
End: 2021-07-27

## 2021-07-27 NOTE — PROGRESS NOTES
Assessment/Plan:    GERD (gastroesophageal reflux disease)  Increase Prilosec to 40 mg tablet daily, uncontrolled  You may use OTC tums as needed  Recommend small frequent meals throughout the day  Avoid aggravating foods - spicy foods, acidic foods - such as tomato and citrus  Avoid alcohol  Do not lay down for at least 30 mins after eating  Diabetes [facility])  Lab Results   Component Value Date    HGBA1C 6 1 (H) 04/20/2021     Patient will continue with Metformin  Patient is to continue to work on diet and exercise  Limit sugars and carbohydrate intake  Avoid soda, juice, sweets, cookies, desserts, pasta, bread    Eat more whole grains, exercised 30 min of cardio at least 3 times a week  Also recommended daily foot exams to check for sores, and recommended yearly eye exams  Hypertension  Patient will continue with lisinopril 50 mg tablet daily  Continue current regimen -   Continue to monitor blood pressure at home  Goal BP is < 130/80  Contact our office for consistent elevations  Recommend low sodium diet  Exercise 30 minutes 5 times a week as tolerated  Recommend yearly eye exam       Primary osteoarthritis of both knees  S/P knee replacement, follows Dr Damion Velázquez stone  S/p stent placement, keep follow-up with urology, pending stone analysis    Anxiety    Patient currently stable on Zoloft 50 milligrams daily, and Xanax 0 25 milligram tablets as needed  Will make no changes to her medications at this time  Hypercholesteremia    Patient will continue on Lipitor 40 mg tablet daily, continue with fish oil  Recommend healthy lifestyle choices for your cholesterol  Low fat/low cholesterol diet  Limit/avoid red meat  Eat more lean meat - chicken breast, ground turkey, fish  Exercise 30 mins at least 5 times a week as tolerated                    Diagnoses and all orders for this visit:    Type 2 diabetes mellitus with other specified complication, without long-term current use of insulin (HCC)    Gastroesophageal reflux disease without esophagitis  -     omeprazole (PriLOSEC) 20 mg delayed release capsule; Take 2 capsules (40 mg total) by mouth daily    Uncomplicated asthma, unspecified asthma severity, unspecified whether persistent    Secondary hypertension    Primary osteoarthritis of both knees    Kidney stone    Anxiety    Hypercholesteremia    Status post total knee replacement, right          Subjective:      Patient ID: Jaquan Nieto is a 54 y o  female  Patient presents today for TCM, patient was admitted for nephrolithiasis from 7/25/21-7/26/21  Hospital course as per Dr Teresa Genao:   "Jaquan Nieto is a 19-year-old female patient with past medical history significant for kidney stones in chronic knee pain status post right knee replacement in May 2021, who presents to [facility] for 10/10 left flank pain  On admission blood pressure was 159/83, heart rate 98, and afebrile  Creatinine on presentation was elevated 1 04 likely TOMMY, leukocytosis 16 5, UA showing white blood cells, blood, and CT renal stone study indicating severe left hydroureteronephrosis, moderate right hydroureteronephrosis secondary to multiple renal calculi  Patient was treated with IV ceftriaxone in the ED with IV cefazolin for surgical prophylaxis and taken to OR with Urology on 07/26  Patient had cystoscopy, ureteroscopy with lithotripsy (right side), retrograde pyelogram and insertion stent (bilateral)  Patient's operation was uncomplicated  Patient recovered well and is set to be discharged with follow-up PCP and Urology as outpatient "    Patient reports that she continues to feel fatigued  She is status post knee replacement and she is working with PT  Reports that pain is well controlled      TCM Call (since 6/27/2021)     Date and time call was made  7/27/2021 11:59 AM    Hospital care reviewed  Records reviewed    Patient was hospitialized at  [facility] [place]        Date of Admission  07/25/21    Date of discharge  07/26/21    Diagnosis  nephrolithiasis, s/p right lithotripsy with renal stent placement    Disposition  Home    Were the patients medications reviewed and updated  Yes    Current Symptoms  None      TCM Call (since 6/27/2021)     Post hospital issues  Reduced activity    Should patient be enrolled in anticoag monitoring? No    Scheduled for follow up? Yes    Did you obtain your prescribed medications  Yes    Do you need help managing your prescriptions or medications  No    Is transportation to your appointment needed  No    I have advised the patient to call PCP with any new or worsening symptoms  Gabbi Laguerre RN          The following portions of the patient's history were reviewed and updated as appropriate: allergies, current medications, past family history, past medical history, past social history, past surgical history and problem list     Review of Systems   Constitutional: Positive for fatigue  Negative for activity change, appetite change, chills, diaphoresis and fever  HENT: Negative for congestion, ear discharge, ear pain, postnasal drip, rhinorrhea, sinus pressure, sinus pain and sore throat  Eyes: Negative for pain, discharge, itching and visual disturbance  Respiratory: Negative for cough, chest tightness, shortness of breath and wheezing  Cardiovascular: Negative for chest pain, palpitations and leg swelling  Gastrointestinal: Negative for abdominal pain, constipation, diarrhea, nausea and vomiting  Endocrine: Negative for polydipsia, polyphagia and polyuria  Genitourinary: Negative for difficulty urinating, dysuria and urgency  Musculoskeletal: Negative for arthralgias, back pain and neck pain  Skin: Negative for rash and wound  Neurological: Negative for dizziness, weakness, numbness and headaches           Past Medical History:   Diagnosis Date    Anxiety     Asthma     Colon polyp     Depression     Diverticulitis     Diverticulitis of colon     Follicular cyst of ovary 12/23/2014    Former smoker     GERD (gastroesophageal reflux disease)     Glycosuria     Headache     Hyperlipidemia     Hypertension     Kidney calculi 1/11/2019    Obesity     Ovarian cyst     Overactive bladder     Overweight     Pulmonary nodule     Renal calculus     Tinea pedis of left foot 6/8/2020    Vertigo     Wears partial dentures     Lower plate -partial         Current Outpatient Medications:     Ascorbic Acid (vitamin C) 1000 MG tablet, Take 2,000 mg by mouth daily, Disp: , Rfl:     atorvastatin (LIPITOR) 40 mg tablet, Take 1 tablet (40 mg total) by mouth daily, Disp: 90 tablet, Rfl: 1    Cholecalciferol (Vitamin D3) 50 MCG (2000 UT) TABS, Take 2,000 Units by mouth daily, Disp: , Rfl:     lisinopril (ZESTRIL) 10 mg tablet, Take 1 5 tablets (15 mg total) by mouth daily, Disp: 180 tablet, Rfl: 1    metFORMIN (GLUCOPHAGE) 1000 MG tablet, Take 1 tablet (1,000 mg total) by mouth 2 (two) times a day with meals, Disp: 180 tablet, Rfl: 1    multivitamin (THERAGRAN) TABS, Take 1 tablet by mouth daily, Disp: , Rfl:     omeprazole (PriLOSEC) 20 mg delayed release capsule, Take 2 capsules (40 mg total) by mouth daily, Disp: 90 capsule, Rfl: 1    oxybutynin (DITROPAN) 5 mg tablet, Take 1 tablet (5 mg total) by mouth 3 (three) times a day for 14 days, Disp: 42 tablet, Rfl: 0    oxyCODONE (ROXICODONE) 5 mg immediate release tablet, 1 pill po Q4 Hrs prn, Disp: 30 tablet, Rfl: 0    phenazopyridine (PYRIDIUM) 100 mg tablet, Take 1 tablet (100 mg total) by mouth 3 (three) times a day as needed for bladder spasms for up to 14 days, Disp: 42 tablet, Rfl: 0    sertraline (ZOLOFT) 50 mg tablet, Take 1 tablet (50 mg total) by mouth daily Takes in the am , Disp: 90 tablet, Rfl: 1    tamsulosin (FLOMAX) 0 4 mg, Take 1 capsule (0 4 mg total) by mouth daily with dinner for 14 days, Disp: 14 capsule, Rfl: 0    Allergies   Allergen Reactions    Clonazepam Other (See Comments)     Pt states "didn't feel right on it "    Morphine Other (See Comments) and Confusion     Annotation - [id number]: "dopey"  Gets silly    Venlafaxine Other (See Comments)     Unknown--pt states " MD was trying pt on different medications "       Social History   Past Surgical History:   Procedure Laterality Date    APPENDECTOMY      BLADDER SUSPENSION      [facility]    CERVICAL BIOPSY  W/ LOOP ELECTRODE EXCISION      COLONOSCOPY      DILATION AND CURETTAGE OF UTERUS      FL RETROGRADE PYELOGRAM  7/26/2021    HYSTERECTOMY  2007    ovaries present bilaterally    DC COLONOSCOPY FLX DX W/COLLJ SPEC WHEN PFRMD N/A 9/26/2017    Procedure: EGD AND COLONOSCOPY;  Surgeon: Elba Juan MD;  Location: [facility];   Service: Gastroenterology    DC REVISE MEDIAN N/CARPAL TUNNEL SURG Right 5/29/2018    Procedure: CARPAL TUNNEL RELEASE;  Surgeon: Willie Haney DO;  Location[facility];  Service: Orthopedics    DC TOTAL KNEE ARTHROPLASTY Right 5/19/2021    Procedure: ARTHROPLASTY KNEE TOTAL;  Surgeon: Frederick Reese MD;  [facility]: [facility];  Service: Orthopedics    TONSILLECTOMY      TUBAL LIGATION      WISDOM TOOTH EXTRACTION       Family History   Problem Relation Age of Onset    Diabetes type II Mother     Asthma Mother     Stroke Father     Pancreatic cancer Father 68    Diabetes type II Father     No Known Problems Maternal Grandmother     No Known Problems Maternal Grandfather     No Known Problems Paternal Grandmother     No Known Problems Paternal Grandfather     No Known Problems Sister     No Known Problems Sister     No Known Problems Sister     No Known Problems Sister     No Known Problems Sister     No Known Problems Maternal Aunt     No Known Problems Paternal Aunt        Objective:  /70 (BP Location: Right arm, Patient Position: Sitting, Cuff Size: Adult)   Pulse 92   Temp 99 1 °F (37 3 °C)   Ht 5' 3" (1 6 m)   Wt 77 1 kg (170 lb)   SpO2 98%   BMI 30 11 kg/m²     Recent Results (from the past 1344 hour(s))   CBC and differential    Collection Time: 07/25/21  5:49 PM   Result Value Ref Range    WBC 16 85 (H) 4 31 - 10 16 Thousand/uL    RBC 4 47 3 81 - 5 12 Million/uL    Hemoglobin 13 2 11 5 - 15 4 g/dL    Hematocrit 41 1 34 8 - 46 1 %    MCV 92 82 - 98 fL    MCH 29 5 26 8 - 34 3 pg    MCHC 32 1 31 4 - 37 4 g/dL    RDW 14 4 11 6 - 15 1 %    MPV 10 9 8 9 - 12 7 fL    Platelets 090 346 - 281 Thousands/uL    nRBC 0 /100 WBCs    Neutrophils Relative 83 (H) 43 - 75 %    Immat GRANS % 1 0 - 2 %    Lymphocytes Relative 10 (L) 14 - 44 %    Monocytes Relative 6 4 - 12 %    Eosinophils Relative 0 0 - 6 %    Basophils Relative 0 0 - 1 %    Neutrophils Absolute 14 01 (H) 1 85 - 7 62 Thousands/µL    Immature Grans Absolute 0 13 0 00 - 0 20 Thousand/uL    Lymphocytes Absolute 1 71 0 60 - 4 47 Thousands/µL    Monocytes Absolute 0 92 0 17 - 1 22 Thousand/µL    Eosinophils Absolute 0 02 0 00 - 0 61 Thousand/µL    Basophils Absolute 0 06 0 00 - 0 10 Thousands/µL   Comprehensive metabolic panel    Collection Time: 07/25/21  5:49 PM   Result Value Ref Range    Sodium 135 (L) 136 - 145 mmol/L    Potassium 4 1 3 5 - 5 3 mmol/L    Chloride 104 100 - 108 mmol/L    CO2 25 21 - 32 mmol/L    ANION GAP 6 4 - 13 mmol/L    BUN 19 5 - 25 mg/dL    Creatinine 1 04 0 60 - 1 30 mg/dL    Glucose 121 65 - 140 mg/dL    Calcium 9 8 8 3 - 10 1 mg/dL    AST 15 5 - 45 U/L    ALT 31 12 - 78 U/L    Alkaline Phosphatase 123 (H) 46 - 116 U/L    Total Protein 7 8 6 4 - 8 2 g/dL    Albumin 3 9 3 5 - 5 0 g/dL    Total Bilirubin 0 30 0 20 - 1 00 mg/dL    eGFR 61 ml/min/1 73sq m   Urine Macroscopic, POC    Collection Time: 07/25/21  5:56 PM   Result Value Ref Range    Color, UA Rosa     Clarity, UA Slightly Cloudy     pH, UA 5 5 4 5 - 8 0    Leukocytes, UA Small (A) Negative    Nitrite, UA Negative Negative    Protein, UA 30 (1+) (A) Negative mg/dl Glucose, UA Negative Negative mg/dl    Ketones, UA Negative Negative mg/dl    Urobilinogen, UA 0 2 0 2, 1 0 E U /dl E U /dl    Bilirubin, UA Negative Negative    Blood, UA Large (A) Negative    Specific Gravity, UA 1 020 1 003 - 1 030   Urine Microscopic    Collection Time: 07/25/21  5:56 PM   Result Value Ref Range    RBC, UA Innumerable (A) None Seen, 2-4 /hpf    WBC, UA 20-30 (A) None Seen, 2-4, 5-60 /hpf    Epithelial Cells Occasional None Seen, Occasional /hpf    Bacteria, UA None Seen None Seen, Occasional /hpf   Urine culture    Collection Time: 07/25/21  5:56 PM    Specimen: Urine, Clean Catch   Result Value Ref Range    Urine Culture No Growth <1000 cfu/mL    CBC and differential    Collection Time: 07/26/21  7:37 AM   Result Value Ref Range    WBC 10 32 (H) 4 31 - 10 16 Thousand/uL    RBC 4 08 3 81 - 5 12 Million/uL    Hemoglobin 11 9 11 5 - 15 4 g/dL    Hematocrit 37 9 34 8 - 46 1 %    MCV 93 82 - 98 fL    MCH 29 2 26 8 - 34 3 pg    MCHC 31 4 31 4 - 37 4 g/dL    RDW 14 3 11 6 - 15 1 %    MPV 10 9 8 9 - 12 7 fL    Platelets 002 182 - 585 Thousands/uL    nRBC 0 /100 WBCs    Neutrophils Relative 67 43 - 75 %    Immat GRANS % 0 0 - 2 %    Lymphocytes Relative 22 14 - 44 %    Monocytes Relative 8 4 - 12 %    Eosinophils Relative 3 0 - 6 %    Basophils Relative 0 0 - 1 %    Neutrophils Absolute 6 81 1 85 - 7 62 Thousands/µL    Immature Grans Absolute 0 04 0 00 - 0 20 Thousand/uL    Lymphocytes Absolute 2 27 0 60 - 4 47 Thousands/µL    Monocytes Absolute 0 83 0 17 - 1 22 Thousand/µL    Eosinophils Absolute 0 33 0 00 - 0 61 Thousand/µL    Basophils Absolute 0 04 0 00 - 0 10 Thousands/µL   Basic metabolic panel    Collection Time: 07/26/21  7:37 AM   Result Value Ref Range    Sodium 136 136 - 145 mmol/L    Potassium 4 1 3 5 - 5 3 mmol/L    Chloride 105 100 - 108 mmol/L    CO2 27 21 - 32 mmol/L    ANION GAP 4 4 - 13 mmol/L    BUN 20 5 - 25 mg/dL    Creatinine 1 21 0 60 - 1 30 mg/dL    Glucose 109 65 - 140 mg/dL Calcium 9 1 8 3 - 10 1 mg/dL    eGFR 50 ml/min/1 73sq m   Fingerstick Glucose (POCT)    Collection Time: 07/26/21  2:23 PM   Result Value Ref Range    POC Glucose 120 65 - 140 mg/dl            Physical Exam  Constitutional:       General: She is not in acute distress  Appearance: She is well-developed  She is not diaphoretic  HENT:      Head: Normocephalic and atraumatic  Right Ear: External ear normal       Left Ear: External ear normal       Nose: Nose normal       Mouth/Throat:      Pharynx: No oropharyngeal exudate  Eyes:      General:         Right eye: No discharge  Left eye: No discharge  Conjunctiva/sclera: Conjunctivae normal       Pupils: Pupils are equal, round, and reactive to light  Neck:      Thyroid: No thyromegaly  Cardiovascular:      Rate and Rhythm: Normal rate and regular rhythm  Heart sounds: Normal heart sounds  No murmur heard  No friction rub  No gallop  Pulmonary:      Effort: Pulmonary effort is normal  No respiratory distress  Breath sounds: Normal breath sounds  No stridor  No wheezing or rales  Abdominal:      General: Bowel sounds are normal  There is no distension  Palpations: Abdomen is soft  Tenderness: There is no abdominal tenderness  Musculoskeletal:      Cervical back: Normal range of motion and neck supple  Comments: Ambulation with cane   Lymphadenopathy:      Cervical: No cervical adenopathy  Skin:     General: Skin is warm and dry  Findings: No erythema or rash  Neurological:      Mental Status: She is alert and oriented to person, place, and time  Psychiatric:         Behavior: Behavior normal          Thought Content:  Thought content normal          Judgment: Judgment normal

## 2021-07-27 NOTE — UTILIZATION REVIEW
Notification of Discharge   This is a Notification of Discharge from our facility [address]  Please be advised that this patient has been discharge from our facility  Below you will find the admission and discharge date and time including the patients disposition  UTILIZATION REVIEW CONTACT:  Michaelle Councilman  Utilization   Network Utilization Review Department  Phone: [phone] x carefully listen to the prompts  All voicemails are confidential   Email: [email or web address]     PHYSICIAN ADVISORY SERVICES:  FOR [id number] REVIEW - MEDICAL NECESSITY DENIAL  Phone: [phone]  Fax: [phone]  Email: [email or web address]     PRESENTATION DATE: 7/25/2021  5:09 PM  OBERVATION ADMISSION DATE:   INPATIENT ADMISSION DATE: 7/25/21  9:00 PM   DISCHARGE DATE: 7/26/2021  6:48 PM  DISPOSITION: Home/Self Care Home/Self Care      IMPORTANT INFORMATION:  Send all requests for admission clinical reviews, approved or denied determinations and any other requests to dedicated fax number below belonging to the campus where the patient is receiving treatment   List of dedicated fax numbers:  [address] DENIALS (Administrative/Medical Necessity) [phone]   [address] [place] [address] (Maternity/NICU/Pediatrics) [phone]   Kellie Quintana [phone]   130 [facility] [phone]   [facility] [phone]   Wilbur Soni [facility] [address] [place] [address] [phone]   [facility]  [phone]   [address] [facility]  [address] [facility] 1000 [address] [facility] [phone]

## 2021-07-28 ENCOUNTER — OFFICE VISIT (OUTPATIENT)
Dept: INTERNAL MEDICINE CLINIC | Facility: CLINIC | Age: 56
End: 2021-07-28
Payer: COMMERCIAL

## 2021-07-28 VITALS
HEIGHT: 63 IN | DIASTOLIC BLOOD PRESSURE: 70 MMHG | OXYGEN SATURATION: 98 % | WEIGHT: 170 LBS | HEART RATE: 92 BPM | BODY MASS INDEX: 30.12 KG/M2 | TEMPERATURE: 99.1 F | SYSTOLIC BLOOD PRESSURE: 130 MMHG

## 2021-07-28 DIAGNOSIS — M17.0 PRIMARY OSTEOARTHRITIS OF BOTH KNEES: ICD-10-CM

## 2021-07-28 DIAGNOSIS — Z96.651 STATUS POST TOTAL KNEE REPLACEMENT, RIGHT: ICD-10-CM

## 2021-07-28 DIAGNOSIS — N20.0 KIDNEY STONE: ICD-10-CM

## 2021-07-28 DIAGNOSIS — E11.69 TYPE 2 DIABETES MELLITUS WITH OTHER SPECIFIED COMPLICATION, WITHOUT LONG-TERM CURRENT USE OF INSULIN (HCC): Primary | ICD-10-CM

## 2021-07-28 DIAGNOSIS — J45.909 UNCOMPLICATED ASTHMA, UNSPECIFIED ASTHMA SEVERITY, UNSPECIFIED WHETHER PERSISTENT: ICD-10-CM

## 2021-07-28 DIAGNOSIS — I15.9 SECONDARY HYPERTENSION: ICD-10-CM

## 2021-07-28 DIAGNOSIS — F41.9 ANXIETY: ICD-10-CM

## 2021-07-28 DIAGNOSIS — E78.00 HYPERCHOLESTEREMIA: ICD-10-CM

## 2021-07-28 DIAGNOSIS — K21.9 GASTROESOPHAGEAL REFLUX DISEASE WITHOUT ESOPHAGITIS: ICD-10-CM

## 2021-07-28 PROBLEM — Z01.419 ENCOUNTER FOR GYNECOLOGICAL EXAMINATION WITHOUT ABNORMAL FINDING: Status: RESOLVED | Noted: 2020-02-03 | Resolved: 2021-07-28

## 2021-07-28 PROCEDURE — 99496 TRANSJ CARE MGMT HIGH F2F 7D: CPT | Performed by: NURSE PRACTITIONER

## 2021-07-28 PROCEDURE — 3008F BODY MASS INDEX DOCD: CPT | Performed by: NURSE PRACTITIONER

## 2021-07-28 PROCEDURE — 1111F DSCHRG MED/CURRENT MED MERGE: CPT | Performed by: NURSE PRACTITIONER

## 2021-07-28 RX ORDER — OMEPRAZOLE 20 MG/1
40 CAPSULE, DELAYED RELEASE ORAL DAILY
Qty: 90 CAPSULE | Refills: 1 | Status: SHIPPED | OUTPATIENT
Start: 2021-07-28 | End: 2021-09-29 | Stop reason: SDUPTHER

## 2021-07-30 ENCOUNTER — EVALUATION (OUTPATIENT)
Dept: PHYSICAL THERAPY | Facility: REHABILITATION | Age: 56
End: 2021-07-30
Payer: COMMERCIAL

## 2021-07-30 DIAGNOSIS — M17.11 PRIMARY OSTEOARTHRITIS OF RIGHT KNEE: ICD-10-CM

## 2021-07-30 DIAGNOSIS — Z96.651 STATUS POST TOTAL KNEE REPLACEMENT, RIGHT: Primary | ICD-10-CM

## 2021-07-30 PROCEDURE — 97140 MANUAL THERAPY 1/> REGIONS: CPT

## 2021-07-30 PROCEDURE — 97110 THERAPEUTIC EXERCISES: CPT

## 2021-07-30 NOTE — PROGRESS NOTES
PT Re-Evaluation     Today's date: 21  Patient name: Wing Gregorio  : 1965  MRN: [id number]  Referring provider: Papo Barragan MD  Dx:   Encounter Diagnosis     ICD-10-CM    1  Status post total knee replacement, right  Z96 651    2  Primary osteoarthritis of right knee  M17 11        Assessment  Assessment details: Upon RE on this date pt has made improvements with R knee active and passive ROM  Pt continues to ambulate with SPC due to reports of feeling off balance  Pt was able to complete step up exercises but with 1HR on L and significant left lateral knee when ascending stairs due to decreased tolerance to weight bearing on RLE  Pt would continue to benefit from skilled physical therapy to address limitations and to achieve goals  Thank you for this referral     Impairments: abnormal coordination, abnormal gait, abnormal muscle firing, abnormal or restricted ROM, activity intolerance, impaired balance, impaired physical strength and pain with function  Understanding of Dx/Px/POC: good   Prognosis: good  Prognosis details: Pt prognosis is good due to her motivation to return to work and PLOF  Goals  STG: Continue all   1  Pt will be able to ambulate with [facility] community distances up to a mile in 4 weeks  - Partially met  2  Pt will be able to dress, including donning and doffing socks and shoes with less than 25% assistance and decreased time in 4 weeks  - MET    LTG: Continue all   1  Pt will be able to ambulate independently community distances up to 1-2 miles in 7 weeks  2  Pt will be able to ascend and descend stairs ad curbs in 7 weeks  3  Pt will be able to demonstrate adequate strength and ROM to return to PLOF and return to work       Plan  Patient would benefit from: skilled physical therapy  Planned modality interventions: thermotherapy: hydrocollator packs and cryotherapy  Planned therapy interventions: ADL retraining, balance, flexibility, functional ROM exercises, gait training, home exercise program, joint mobilization, manual therapy, neuromuscular re-education, patient education, strengthening, stretching, therapeutic activities and therapeutic exercise  Frequency: 2x week  Duration in visits: 8  Duration in weeks: 4  Treatment plan discussed with: patient        Subjective Evaluation    History of Present Illness  Mechanism of injury: surgery  Mechanism of injury: 5/24/2021     Pt is a 53 y/o female presenting to therapy s/p R TKA on 5/19/2021  She states the surgery went well and has been self managing her pain with tylenol and ice throughout the day after discontinuing use of OxyContin and has been taking Asprin as a DVT prophylaxis  She has been independent with ADLs such as cooking, dressing, bathing, and ambulating around her home but takes increased time and causes fatigue  States she has been sleeping in recliner due to her bed being too high up  She is ambulating with a rolling walker and states she wishes to use a cane as soon as possible  She denies drainage and redness from the wound but expressed concern for possible infection due to increase edema at the knee and ankle  She has a follow up with the doctor on Wednesday (5/26/2021)  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[employer] and is highly motivated to return to work        06/29/21  Pt states she has some stiffness and discomfort in the morning upon waking up  States she takes Tylenol PRN and utilizes Oxycodone prior to therapy sessions  Reports she has no stairs at home, except in the front of her house, which she avoids still  Utilizes RW for assistance into shower, otherwise ambulates with SPC in and outside the home  7/30/21  Pt reports that overall her knee is feeling okay today and has no current complaints  Pt reports that since starting therapy she feels like she has made progress but is still limited in her ability to do stairs   Pt also reports that she continues to be limited with endurance to activities because she still feels fatigue in her leg and notes that if she goes to the store she feels exhausted  Pt reports that she is no longer taking the Oxycodone unless she really needs it but she primarily uses the Tylenol  Pt also reports that she uses the [facility] for ambulation  Pain  Current pain ratin  At worst pain rating: primarily stiffness aches/pains in anterior aspect of R shin     Patient Goals  Patient goals for therapy: increased motion, increased strength, return to work and decreased edema      Objective     Active Range of Motion     Right Knee   Flexion: 126 degrees   Extensor la degrees     Passive Range of Motion     Right Knee   Flexion: 130 degrees   Extension: 0 degrees       Precautions: vertigo, HTN, HLD, GERD, asthma, anxiety, DM     Daily Treatment Diary      Assessment  7/6  7/9 7/13 7/15 7/20 7/23 7/30      Eval/Reval         RE       FOTO               Manuals   Knee PROM   MD HERRON  CC SC ML      Patellar ROM  MD HERRON  CC SC ML      HS & GS Stretch             Exercise Diary     Bike - AROM  L1 x L1x10' L1 x10'  L1x10' L1 x10' L1 x10' L1 x10'       LAQ  1# 5" 2x10  2# 5" 2x10  2# 5" 2x10  2# 5" 2x10 2 5# 5"  2x10        SAQ  1# 5' 2x10 2# 5" 2x10  2# 5" 2x10   2# 5" 2x10 2 5# 5"  2x10        TKE with 1/2 Foam    10"x10  MC            SLR Flexion 1x10 2#  2x10 2#   2x10 2#   2x10 2# 2x10 2#  2x10 2#  2x15       SLR Abduction 1x10 2#  2x10 2# 2x10 2#   2x10 2# 2x10 2#  2x10 2#  2x15       Step Ups:   Forward  0R x20  0R  x20 0R x20  ! R x5  OR x15 1R  20x 1R   20x       Step Ups:   Lateral  0R x20 0R  x20 0R x20  OR x20 1R  x20 1R   x20        Eccentric Step Downs:    Forward  0Rx20 manual cues and A  HOLD           Mini Squats  5" 2x10   HOLD           SLS  30"x3              gait tr                Wall Slides  NV  5"x10 5" x10   5"x12 5"x12       Single Leg Press    22#  2x10 22# 2x10 22# 2x10 22# 2x10 22#  2x10                       Modalities    CP R Knee   Extension Stretch  Post-Tx   NP   NP

## 2021-08-02 ENCOUNTER — TELEPHONE (OUTPATIENT)
Dept: OBGYN CLINIC | Facility: HOSPITAL | Age: 56
End: 2021-08-02

## 2021-08-02 NOTE — TELEPHONE ENCOUNTER
Dr Broderick,       Patient is calling in requesting a call  She is experiencing a lot of pain on the right knee  She states her knee is giving out and it's warm  No oozing, no fevers  But her balance is off  She would like to know what can she do?        Please adviseLance#: [phone]

## 2021-08-02 NOTE — TELEPHONE ENCOUNTER
TKA 5/19  Pt contacted  Patient reports her "knee is hot" and "giving out " She reports the heat started today with swelling, and the "giving out" for the last week, and states she would fall if she did not have her cane  She denies any known injury, Denies any known fevers or changes to incision  8/12 next appt, is patient okay to wait until then, or should appt be moved sooner

## 2021-08-02 NOTE — TELEPHONE ENCOUNTER
Spoke with patient  At this time she will keep her 8/12 appt, if persisting or worsening she will call for a sooner appt

## 2021-08-02 NOTE — TELEPHONE ENCOUNTER
Doc is out all week  If she wishes to be seen this week she may see Paula Rueda as he has an office schedule this week or next week with PJB is also appropriate  Warmth is common 10 weeks s/p total knee  Giving out can be a result of quadriceps weakness  Using a cane 10 weeks s/p is not uncommon, especially in the presence of weakness  We will accommodate the patient as she deems fit      Thanks  Kanika Dawson

## 2021-08-03 ENCOUNTER — TELEPHONE (OUTPATIENT)
Dept: UROLOGY | Facility: MEDICAL CENTER | Age: 56
End: 2021-08-03

## 2021-08-03 ENCOUNTER — OFFICE VISIT (OUTPATIENT)
Dept: PHYSICAL THERAPY | Facility: REHABILITATION | Age: 56
End: 2021-08-03
Payer: COMMERCIAL

## 2021-08-03 DIAGNOSIS — M17.11 PRIMARY OSTEOARTHRITIS OF RIGHT KNEE: ICD-10-CM

## 2021-08-03 DIAGNOSIS — Z96.651 STATUS POST TOTAL KNEE REPLACEMENT, RIGHT: Primary | ICD-10-CM

## 2021-08-03 LAB
CALCIUM OXALATE DIHYDRATE MFR STONE IR: 90 %
COLOR STONE: NORMAL
COMMENT-STONE3: NORMAL
COMPOSITION: NORMAL
HYDROXYAPATITE 24H ENGDIFF UR: 10 %
LABORATORY COMMENT REPORT: NORMAL
PHOTO: NORMAL
SIZE STONE: NORMAL MM
SPEC SOURCE SUBJ: NORMAL
STONE ANALYSIS-IMP: NORMAL
WT STONE: 52 MG

## 2021-08-03 PROCEDURE — 97140 MANUAL THERAPY 1/> REGIONS: CPT

## 2021-08-03 PROCEDURE — 97110 THERAPEUTIC EXERCISES: CPT

## 2021-08-03 NOTE — PROGRESS NOTES
Daily Note     Today's date: 8/3/2021  Patient name: Olga Lidia Zuniga  : 1965  MRN: [id number]  Referring provider: Pepe Bella MD  Dx:   Encounter Diagnosis     ICD-10-CM    1  Status post total knee replacement, right  Z96 651    2  Primary osteoarthritis of right knee  M17 11                   Subjective: Pt reports she has been experiencing intermittent clicking with occasional mild pain at lateral joint line proximal to patella  She also noticed her knee felt warm the last few days  She called the doctor, who told her to keep an eye on it and call if it becomes worse  Pt also states she has had a few incidences of her knee giving out while she is walking since lv  Pt was also admitted to the hospital last week for kidney stones and has not been feeling great as a result  Objective: See treatment diary below      Assessment: Tolerated treatment well  Pt with good flex and ext ROM  Tolerates all activity well with no increased pain, no knee giving out  Continues to compensate with increased R knee flexion during step ups  Patient demonstrated fatigue post treatment, exhibited good technique with therapeutic exercises and would benefit from continued PT      Plan: Continue per plan of care        Precautions: vertigo, HTN, HLD, GERD, asthma, anxiety, DM     Daily Treatment Diary      Assessment  7/6  7/9 7/13 7/15 7/20 7/23 8/3      Eval/Reval               FOTO               Manuals   Knee PROM   MD HERRON  CC SC       Patellar ROM  MD HERRON  CC SC       HS & GS Stretch             Exercise Diary     Bike - AROM  L1 x L1x10' L1 x10'  L1x10' L1 x10' L1 x10' l1 10'       LAQ  1# 5" 2x10  2# 5" 2x10  2# 5" 2x10  2# 5" 2x10 2 5# 5"  2x10 2 5# 5"  2x10       SAQ  1# 5' 2x10 2# 5" 2x10  2# 5" 2x10   2# 5" 2x10 2 5# 5"  2x10 2 5# 5"  2x10       TKE with 1/2 Foam    10"x10  MC            SLR Flexion 1x10 2#  2x10 2#   2x10 2#   2x10 2# 2x10 2#  2x10 2#  2x10       SLR Abduction 1x10 2#  2x10 2# 2x10 2#   2x10 2# 2x10 2#  2x10 2#  2x10       Step Ups:   Forward  0R x20  0R  x20 0R x20  ! R x5  OR x15 1R  20x 1R  20x       Step Ups:   Lateral  0R x20 0R  x20 0R x20  OR x20 1R  x20 1R 20x       Eccentric Step Downs:    Forward  0Rx20 manual cues and A  HOLD           Mini Squats  5" 2x10   HOLD           SLS  30"x3              gait tr                Wall Slides  NV  5"x10 5" x10   5"x12 5"x12 5"x12      Single Leg Press    22#  2x10 22# 2x10 22# 2x10 22# 2x10 22#  2x10 22#  2x10                      Modalities    CP R Knee   Extension Stretch  Post-Tx   NP   NP

## 2021-08-04 NOTE — TELEPHONE ENCOUNTER
I spoke to patient and scheduled her for surgery with Dr Sevilla Led on 9/16/21 at [address] [facility]  Patient will have urine culture prior to surgery  I am mailing her a surgery packet

## 2021-08-05 ENCOUNTER — TELEPHONE (OUTPATIENT)
Dept: INTERNAL MEDICINE CLINIC | Facility: CLINIC | Age: 56
End: 2021-08-05

## 2021-08-05 NOTE — TELEPHONE ENCOUNTER
Pt called asking for you to order the labs , she said at [facility] it was discussed bc she was not feeling well  She decided she wants to have them done   Please let know once done

## 2021-08-06 ENCOUNTER — OFFICE VISIT (OUTPATIENT)
Dept: PHYSICAL THERAPY | Facility: REHABILITATION | Age: 56
End: 2021-08-06
Payer: COMMERCIAL

## 2021-08-06 DIAGNOSIS — Z96.651 STATUS POST TOTAL KNEE REPLACEMENT, RIGHT: Primary | ICD-10-CM

## 2021-08-06 DIAGNOSIS — M17.11 PRIMARY OSTEOARTHRITIS OF RIGHT KNEE: ICD-10-CM

## 2021-08-06 PROCEDURE — 97112 NEUROMUSCULAR REEDUCATION: CPT | Performed by: PHYSICAL THERAPIST

## 2021-08-06 PROCEDURE — 97110 THERAPEUTIC EXERCISES: CPT | Performed by: PHYSICAL THERAPIST

## 2021-08-06 NOTE — PROGRESS NOTES
Daily Note     Today's date: 2021  Patient name: Herlinda Vickers  : 1965  MRN: [id number]  Referring provider: Domonique Espinoza MD  Dx:   Encounter Diagnosis     ICD-10-CM    1  Status post total knee replacement, right  Z96 651    2  Primary osteoarthritis of right knee  M17 11                   Subjective: Pt reports she was admitted to the hospital last week for kidney stones and has not been feeling great as a result  She reports "something doesn't feel right" with her right knee, notes episodes of buckling and giving way over the past 2 week  She denies any injury  She report she is scheduled to see Dr Alma Nair on 21  Objective: See treatment diary below  Moderate right knee effusion with increased warmth present  Moderately antalgic gait with increased reliance on SPC  R Knee ROM: 0-122 pain-free  Pt with TTP over lateral knee joint line  Assessment: Program modified to decrease resistance and hold on standing TE due to patient presenting to session with c/o increased pain and episodic buckling  Pt with good response to reduced program with appropriate challenge and fatigue and no increase in pain  Will continue to re-assess symptoms at next session and resume previous program as able  Plan: Continue per plan of care        Precautions: vertigo, HTN, HLD, GERD, asthma, anxiety, DM     Daily Treatment Diary      Assessment  7/6  7/9 7/13 7/15 7/20 7/23 8/3 8/6     Eval/Reval               FOTO               Manuals   Knee PROM   MD HERRON  CC SC       Patellar ROM  MD HERRON  CC SC       HS & GS Stretch             Exercise Diary     Bike - AROM  L1 x L1x10' L1 x10'  L1x10' L1 x10' L1 x10' l1 10' L1x10'      LAQ  1# 5" 2x10  2# 5" 2x10  2# 5" 2x10  2# 5" 2x10 2 5# 5"  2x10 2 5# 5"  2x10 5"  2x10      SAQ  1# 5' 2x10 2# 5" 2x10  2# 5" 2x10   2# 5" 2x10 2 5# 5"  2x10 2 5# 5"  2x10 5"  2x10      TKE with 1/2 Foam    10"x10  MC            SLR Flexion 1x10 2#  2x10 2#   2x10 2#   2x10 2# 2x10 2#  2x10 2#  2x10   2x10      SLR Abduction 1x10 2#  2x10 2# 2x10 2#   2x10 2# 2x10 2#  2x10 2#  2x10   2x10      Step Ups:   Forward  0R x20  0R  x20 0R x20  ! R x5  OR x15 1R  20x 1R  20x NP      Step Ups:   Lateral  0R x20 0R  x20 0R x20  OR x20 1R  x20 1R 20x NP      Eccentric Step Downs:    Forward  0Rx20 manual cues and A  HOLD           Mini Squats  5" 2x10   HOLD           SLS  30"x3              gait tr                Wall Slides  NV  5"x10 5" x10   5"x12 5"x12 5"x12 NP     Single Leg Press    22#  2x10 22# 2x10 22# 2x10 22# 2x10 22#  2x10 22#  2x10 NP      Standing Knee Flexion          2x10     Modalities    CP R Knee   Extension Stretch  Post-Tx   NP   NP   CP only  10'

## 2021-08-09 ENCOUNTER — APPOINTMENT (OUTPATIENT)
Dept: LAB | Facility: IMAGING CENTER | Age: 56
End: 2021-08-09
Payer: COMMERCIAL

## 2021-08-09 ENCOUNTER — CLINICAL SUPPORT (OUTPATIENT)
Dept: INTERNAL MEDICINE CLINIC | Facility: CLINIC | Age: 56
End: 2021-08-09

## 2021-08-09 DIAGNOSIS — N20.0 KIDNEY STONE: Primary | ICD-10-CM

## 2021-08-09 DIAGNOSIS — Z96.651 STATUS POST TOTAL KNEE REPLACEMENT, RIGHT: ICD-10-CM

## 2021-08-09 DIAGNOSIS — E11.69 TYPE 2 DIABETES MELLITUS WITH OTHER SPECIFIED COMPLICATION, WITHOUT LONG-TERM CURRENT USE OF INSULIN (HCC): ICD-10-CM

## 2021-08-09 LAB
ALBUMIN SERPL BCP-MCNC: 3.5 G/DL (ref 3.5–5)
ALP SERPL-CCNC: 116 U/L (ref 46–116)
ALT SERPL W P-5'-P-CCNC: 37 U/L (ref 12–78)
ANION GAP SERPL CALCULATED.3IONS-SCNC: 6 MMOL/L (ref 4–13)
AST SERPL W P-5'-P-CCNC: 19 U/L (ref 5–45)
BACTERIA UR QL AUTO: ABNORMAL /HPF
BILIRUB SERPL-MCNC: 0.27 MG/DL (ref 0.2–1)
BILIRUB UR QL STRIP: ABNORMAL
BUN SERPL-MCNC: 13 MG/DL (ref 5–25)
CALCIUM SERPL-MCNC: 9.3 MG/DL (ref 8.3–10.1)
CHLORIDE SERPL-SCNC: 106 MMOL/L (ref 100–108)
CLARITY UR: ABNORMAL
CO2 SERPL-SCNC: 24 MMOL/L (ref 21–32)
COLOR UR: ABNORMAL
CREAT SERPL-MCNC: 0.75 MG/DL (ref 0.6–1.3)
GFR SERPL CREATININE-BSD FRML MDRD: 90 ML/MIN/1.73SQ M
GLUCOSE P FAST SERPL-MCNC: 113 MG/DL (ref 65–99)
GLUCOSE UR STRIP-MCNC: NEGATIVE MG/DL
HGB UR QL STRIP.AUTO: ABNORMAL
KETONES UR STRIP-MCNC: ABNORMAL MG/DL
LEUKOCYTE ESTERASE UR QL STRIP: ABNORMAL
NITRITE UR QL STRIP: POSITIVE
NON-SQ EPI CELLS URNS QL MICRO: ABNORMAL /HPF
PH UR STRIP.AUTO: 7 [PH]
POTASSIUM SERPL-SCNC: 4.4 MMOL/L (ref 3.5–5.3)
PROT SERPL-MCNC: 7.1 G/DL (ref 6.4–8.2)
PROT UR STRIP-MCNC: ABNORMAL MG/DL
RBC #/AREA URNS AUTO: ABNORMAL /HPF
SODIUM SERPL-SCNC: 136 MMOL/L (ref 136–145)
SP GR UR STRIP.AUTO: 1.01 (ref 1–1.03)
UROBILINOGEN UR QL STRIP.AUTO: 1 E.U./DL
WBC #/AREA URNS AUTO: ABNORMAL /HPF

## 2021-08-09 PROCEDURE — 87086 URINE CULTURE/COLONY COUNT: CPT | Performed by: NURSE PRACTITIONER

## 2021-08-09 PROCEDURE — 81001 URINALYSIS AUTO W/SCOPE: CPT

## 2021-08-09 PROCEDURE — 80053 COMPREHEN METABOLIC PANEL: CPT

## 2021-08-09 PROCEDURE — 86140 C-REACTIVE PROTEIN: CPT

## 2021-08-10 ENCOUNTER — OFFICE VISIT (OUTPATIENT)
Dept: PHYSICAL THERAPY | Facility: REHABILITATION | Age: 56
End: 2021-08-10
Payer: COMMERCIAL

## 2021-08-10 DIAGNOSIS — M17.11 PRIMARY OSTEOARTHRITIS OF RIGHT KNEE: ICD-10-CM

## 2021-08-10 DIAGNOSIS — Z96.651 STATUS POST TOTAL KNEE REPLACEMENT, RIGHT: Primary | ICD-10-CM

## 2021-08-10 LAB — BACTERIA UR CULT: NORMAL

## 2021-08-10 PROCEDURE — 97112 NEUROMUSCULAR REEDUCATION: CPT

## 2021-08-10 PROCEDURE — 97140 MANUAL THERAPY 1/> REGIONS: CPT

## 2021-08-10 PROCEDURE — 97110 THERAPEUTIC EXERCISES: CPT

## 2021-08-10 NOTE — PROGRESS NOTES
Daily Note     Today's date: 8/10/2021  Patient name: Jd Briggs  : 1965  MRN: [id number]  Referring provider: Raheem Kaplan MD  Dx:   Encounter Diagnosis     ICD-10-CM    1  Status post total knee replacement, right  Z96 651    2  Primary osteoarthritis of right knee  M17 11                   Subjective: pt expresses concern and frustration regarding knee buckling and sharp pain  She noted sharp pain and TTP in lateral aspect of her knee  She noted scheduling a f/u with Dr Ruth Blair on 21(Thurs)  Objective: See treatment diary below      Assessment: Tolerated treatment well and without complaints  Resumed exercises below and progressed resistance as listed below  Improved quad control with exercises, but challenged with maintaining TKE with SLR's; VC/TC's required to do so correctly  Added IASTM to ITB and lateral quads to reduce soft tissue restrictions palpated in this area; performed with light pressure to pt tolerance  Patient demonstrated fatigue post treatment, exhibited good technique with therapeutic exercises and would benefit from continued PT      Plan: Continue per plan of care  Progress treatment as tolerated         Precautions: vertigo, HTN, HLD, GERD, asthma, anxiety, DM     Daily Treatment Diary      Assessment  7/6  7/9 7/13 7/15 7/20 7/23 8/3 8/6 8/10    Eval/Reval               FOTO               Manuals   Knee PROM   MD HERRON  CC SC       Patellar ROM  MD HERRON  CC SC       HS & GS Stretch             IASTM L ITB/lat quads         TE(light to katrina)    Exercise Diary     Bike - AROM  L1 x L1x10' L1 x10'  L1x10' L1 x10' L1 x10' l1 10' L1x10' L1x10'     LAQ  1# 5" 2x10  2# 5" 2x10  2# 5" 2x10  2# 5" 2x10 2 5# 5"  2x10 2 5# 5"  2x10 5"  2x10 3# 5" 2x10     SAQ  1# 5' 2x10 2# 5" 2x10  2# 5" 2x10   2# 5" 2x10 2 5# 5"  2x10 2 5# 5"  2x10 5"  2x10 3# 5"2x10     TKE with 1/2 Foam    10"x10  MC            SLR Flexion 1x10 2#  2x10 2#   2x10 2#   2x10 2# 2x10 2#  2x10 2#  2x10   2x10 x10 SLR Abduction 1x10 2#  2x10 2# 2x10 2#   2x10 2# 2x10 2#  2x10 2#  2x10   2x10 x10     Step Ups:   Forward  0R x20  0R  x20 0R x20  ! R x5  OR x15 1R  20x 1R  20x NP      Step Ups:   Lateral  0R x20 0R  x20 0R x20  OR x20 1R  x20 1R 20x NP      Eccentric Step Downs:    Forward  0Rx20 manual cues and A  HOLD           Mini Squats  5" 2x10   HOLD           SLS  30"x3              gait tr                Wall Slides  NV  5"x10 5" x10   5"x12 5"x12 5"x12 NP 2x10    Single Leg Press    22#  2x10 22# 2x10 22# 2x10 22# 2x10 22#  2x10 22#  2x10 NP 22# 2x10     Standing Knee Flexion          2x10 2x10    Modalities    CP R Knee   Extension Stretch  Post-Tx   NP   NP   CP only  10' pelvic

## 2021-08-10 NOTE — PROGRESS NOTES
Assessment/Plan:    Kidney stone  Advised patient to make a follow-up with Urology sooner than scheduled currently  Patient will continue full course of Bactrim and then repeat CBC  Status post total knee replacement, right  Discussed with Dr Ingram Apt  Will get CRP, CBC and CMP  Patient has follow-up with Orthopedic surgery tomorrow  Patient currently taking aspirin  Tachycardia  Will start lopressor 12 5mg tablet, and continue to monitor  Diagnoses and all orders for this visit:    Status post total knee replacement, right  -     Comprehensive metabolic panel; Future  -     CBC and differential  -     C-reactive protein; Future    Tachycardia  -     metoprolol tartrate (LOPRESSOR) 25 mg tablet; Take 0 5 tablets (12 5 mg total) by mouth every 12 (twelve) hours    Kidney stone          Subjective:      Patient ID: Hanna Ornelas is a 54 y o  female  Patient presents today with concerns for fatigue status post nephrolithiasis and right TKR  Reviewed bloodwork with patient  UA positive treated with Bactrim will await culture  Continues with fatigue  She reports the she feels that her right knee is warmer then her left, and that with thearpy she feels at times she gets a sharp pain  She reports that she feels her knee humza at times  Denies pain to calf  The following portions of the patient's history were reviewed and updated as appropriate: allergies, current medications, past family history, past medical history, past social history, past surgical history and problem list     Review of Systems   Constitutional: Positive for chills and fatigue  Negative for activity change, appetite change, diaphoresis and fever  HENT: Negative for congestion, ear discharge, ear pain, postnasal drip, rhinorrhea, sinus pressure, sinus pain and sore throat  Eyes: Negative for pain, discharge, itching and visual disturbance     Respiratory: Negative for cough, chest tightness, shortness of breath and wheezing  Cardiovascular: Negative for chest pain, palpitations and leg swelling  Gastrointestinal: Negative for abdominal pain, constipation, diarrhea, nausea and vomiting  Endocrine: Negative for polydipsia, polyphagia and polyuria  Genitourinary: Negative for difficulty urinating, dysuria and urgency  Musculoskeletal: Positive for joint swelling  Negative for arthralgias, back pain and neck pain  Skin: Negative for rash and wound  Neurological: Negative for dizziness, weakness, numbness and headaches           Past Medical History:   Diagnosis Date    Anxiety     Asthma     Colon polyp     Depression     Diverticulitis     Diverticulitis of colon     Follicular cyst of ovary 12/23/2014    Former smoker     GERD (gastroesophageal reflux disease)     Glycosuria     Headache     Hyperlipidemia     Hypertension     Kidney calculi 1/11/2019    Obesity     Ovarian cyst     Overactive bladder     Overweight     Pulmonary nodule     Renal calculus     Tinea pedis of left foot 6/8/2020    Vertigo     Wears partial dentures     Lower plate -partial         Current Outpatient Medications:     Ascorbic Acid (vitamin C) 1000 MG tablet, Take 2,000 mg by mouth daily, Disp: , Rfl:     atorvastatin (LIPITOR) 40 mg tablet, Take 1 tablet (40 mg total) by mouth daily, Disp: 90 tablet, Rfl: 1    Cholecalciferol (Vitamin D3) 50 MCG (2000 UT) TABS, Take 2,000 Units by mouth daily, Disp: , Rfl:     lisinopril (ZESTRIL) 10 mg tablet, Take 1 5 tablets (15 mg total) by mouth daily, Disp: 180 tablet, Rfl: 1    metFORMIN (GLUCOPHAGE) 1000 MG tablet, Take 1 tablet (1,000 mg total) by mouth 2 (two) times a day with meals, Disp: 180 tablet, Rfl: 1    multivitamin (THERAGRAN) TABS, Take 1 tablet by mouth daily, Disp: , Rfl:     omeprazole (PriLOSEC) 20 mg delayed release capsule, Take 2 capsules (40 mg total) by mouth daily, Disp: 90 capsule, Rfl: 1    oxyCODONE (ROXICODONE) 5 mg immediate release tablet, 1 pill po Q4 Hrs prn, Disp: 30 tablet, Rfl: 0    sertraline (ZOLOFT) 50 mg tablet, Take 1 tablet (50 mg total) by mouth daily Takes in the am , Disp: 90 tablet, Rfl: 1    sulfamethoxazole-trimethoprim (BACTRIM DS) 800-160 mg per tablet, Take 1 tablet by mouth every 12 (twelve) hours for 14 days, Disp: 28 tablet, Rfl: 0    metoprolol tartrate (LOPRESSOR) 25 mg tablet, Take 0 5 tablets (12 5 mg total) by mouth every 12 (twelve) hours, Disp: 30 tablet, Rfl: 0    oxybutynin (DITROPAN) 5 mg tablet, Take 1 tablet (5 mg total) by mouth 3 (three) times a day for 14 days, Disp: 42 tablet, Rfl: 0    tamsulosin (FLOMAX) 0 4 mg, Take 1 capsule (0 4 mg total) by mouth daily with dinner for 14 days (Patient not taking: Reported on 8/11/2021), Disp: 14 capsule, Rfl: 0    Allergies   Allergen Reactions    Clonazepam Other (See Comments)     Pt states "didn't feel right on it "    Morphine Other (See Comments) and Confusion     Annotation - [id number]: "dopey"  Gets silly    Venlafaxine Other (See Comments)     Unknown--pt states " MD was trying pt on different medications "       Social History   Past Surgical History:   Procedure Laterality Date    APPENDECTOMY      BLADDER SUSPENSION      [facility]    CERVICAL BIOPSY  W/ LOOP ELECTRODE EXCISION      COLONOSCOPY      DILATION AND CURETTAGE OF UTERUS      FL RETROGRADE PYELOGRAM  7/26/2021    HYSTERECTOMY  2007    ovaries present bilaterally    [place] COLONOSCOPY FLX DX W/COLLJ SPEC WHEN PFRMD N/A 9/26/2017    Procedure: EGD AND COLONOSCOPY;  Surgeon: Evelena Phalen, MD;  Location: [facility];   Service: Gastroenterology    [place] CYSTO/URETERO W/LITHOTRIPSY &INDWELL STENT INSRT Bilateral 7/26/2021    Procedure: CYSTOSCOPY URETEROSCOPY WITH LITHOTRIPSY HOLMIUM LASER, RETROGRADE PYELOGRAM AND INSERTION STENT URETERAL;  Surgeon: Alana Urban MD;  Location:  MAIN OR;  Service: Urology    NJ REVISE MEDIAN N/CARPAL TUNNEL SURG Right 5/29/2018    Procedure: CARPAL TUNNEL RELEASE;  Surgeon: Lyn Terrell DO;  [facility];  Service: Orthopedics    [place] TOTAL KNEE ARTHROPLASTY Right 5/19/2021    Procedure: ARTHROPLASTY KNEE TOTAL;  Surgeon: Suraj Malave MD;  [facility];  Service: Orthopedics    TONSILLECTOMY      TUBAL LIGATION      URETEROSCOPY  7/26/2021    Procedure: URETEROSCOPY, LASER AND BASKET STONE EXTRACTION;  Surgeon: Kristin Garland MD;  [facility];  Service: Urology    WISDOM TOOTH EXTRACTION       Family History   Problem Relation Age of Onset    Diabetes type II Mother     Asthma Mother     Stroke Father     Pancreatic cancer Father 68    Diabetes type II Father     No Known Problems Maternal Grandmother     No Known Problems Maternal Grandfather     No Known Problems Paternal Grandmother     No Known Problems Paternal Grandfather     No Known Problems Sister     No Known Problems Sister     No Known Problems Sister     No Known Problems Sister     No Known Problems Sister     No Known Problems Maternal Aunt     No Known Problems Paternal Aunt        Objective:  /80 (BP Location: Left arm, Patient Position: Sitting, Cuff Size: Adult)   Pulse (!) 134   Temp 99 1 °F (37 3 °C) (Tympanic)   Ht 5' 3" (1 6 m)   Wt 75 8 kg (167 lb)   SpO2 99%   BMI 29 58 kg/m²     Recent Results (from the past 1344 hour(s))   CBC and differential    Collection Time: 07/25/21  5:49 PM   Result Value Ref Range    WBC 16 85 (H) 4 31 - 10 16 Thousand/uL    RBC 4 47 3 81 - 5 12 Million/uL    Hemoglobin 13 2 11 5 - 15 4 g/dL    Hematocrit 41 1 34 8 - 46 1 %    MCV 92 82 - 98 fL    MCH 29 5 26 8 - 34 3 pg    MCHC 32 1 31 4 - 37 4 g/dL    RDW 14 4 11 6 - 15 1 %    MPV 10 9 8 9 - 12 7 fL    Platelets 189 762 - 218 Thousands/uL    nRBC 0 /100 WBCs    Neutrophils Relative 83 (H) 43 - 75 %    Immat GRANS % 1 0 - 2 %    Lymphocytes Relative 10 (L) 14 - 44 %    Monocytes Relative 6 4 - 12 %    Eosinophils Relative 0 0 - 6 %    Basophils Relative 0 0 - 1 %    Neutrophils Absolute 14 01 (H) 1 85 - 7 62 Thousands/µL    Immature Grans Absolute 0 13 0 00 - 0 20 Thousand/uL    Lymphocytes Absolute 1 71 0 60 - 4 47 Thousands/µL    Monocytes Absolute 0 92 0 17 - 1 22 Thousand/µL    Eosinophils Absolute 0 02 0 00 - 0 61 Thousand/µL    Basophils Absolute 0 06 0 00 - 0 10 Thousands/µL   Comprehensive metabolic panel    Collection Time: 07/25/21  5:49 PM   Result Value Ref Range    Sodium 135 (L) 136 - 145 mmol/L    Potassium 4 1 3 5 - 5 3 mmol/L    Chloride 104 100 - 108 mmol/L    CO2 25 21 - 32 mmol/L    ANION GAP 6 4 - 13 mmol/L    BUN 19 5 - 25 mg/dL    Creatinine 1 04 0 60 - 1 30 mg/dL    Glucose 121 65 - 140 mg/dL    Calcium 9 8 8 3 - 10 1 mg/dL    AST 15 5 - 45 U/L    ALT 31 12 - 78 U/L    Alkaline Phosphatase 123 (H) 46 - 116 U/L    Total Protein 7 8 6 4 - 8 2 g/dL    Albumin 3 9 3 5 - 5 0 g/dL    Total Bilirubin 0 30 0 20 - 1 00 mg/dL    eGFR 61 ml/min/1 73sq m   Urine Macroscopic, POC    Collection Time: 07/25/21  5:56 PM   Result Value Ref Range    Color, UA Rosa     Clarity, UA Slightly Cloudy     pH, UA 5 5 4 5 - 8 0    Leukocytes, UA Small (A) Negative    Nitrite, UA Negative Negative    Protein, UA 30 (1+) (A) Negative mg/dl    Glucose, UA Negative Negative mg/dl    Ketones, UA Negative Negative mg/dl    Urobilinogen, UA 0 2 0 2, 1 0 E U /dl E U /dl    Bilirubin, UA Negative Negative    Blood, UA Large (A) Negative    Specific Gravity, UA 1 020 1 003 - 1 030   Urine Microscopic    Collection Time: 07/25/21  5:56 PM   Result Value Ref Range    RBC, UA Innumerable (A) None Seen, 2-4 /hpf    WBC, UA 20-30 (A) None Seen, 2-4, 5-60 /hpf    Epithelial Cells Occasional None Seen, Occasional /hpf    Bacteria, UA None Seen None Seen, Occasional /hpf   Urine culture    Collection Time: 07/25/21  5:56 PM    Specimen: Urine, Clean Catch   Result Value Ref Range    Urine Culture No Growth <1000 cfu/mL    CBC and differential Collection Time: 07/26/21  7:37 AM   Result Value Ref Range    WBC 10 32 (H) 4 31 - 10 16 Thousand/uL    RBC 4 08 3 81 - 5 12 Million/uL    Hemoglobin 11 9 11 5 - 15 4 g/dL    Hematocrit 37 9 34 8 - 46 1 %    MCV 93 82 - 98 fL    MCH 29 2 26 8 - 34 3 pg    MCHC 31 4 31 4 - 37 4 g/dL    RDW 14 3 11 6 - 15 1 %    MPV 10 9 8 9 - 12 7 fL    Platelets 985 040 - 378 Thousands/uL    nRBC 0 /100 WBCs    Neutrophils Relative 67 43 - 75 %    Immat GRANS % 0 0 - 2 %    Lymphocytes Relative 22 14 - 44 %    Monocytes Relative 8 4 - 12 %    Eosinophils Relative 3 0 - 6 %    Basophils Relative 0 0 - 1 %    Neutrophils Absolute 6 81 1 85 - 7 62 Thousands/µL    Immature Grans Absolute 0 04 0 00 - 0 20 Thousand/uL    Lymphocytes Absolute 2 27 0 60 - 4 47 Thousands/µL    Monocytes Absolute 0 83 0 17 - 1 22 Thousand/µL    Eosinophils Absolute 0 33 0 00 - 0 61 Thousand/µL    Basophils Absolute 0 04 0 00 - 0 10 Thousands/µL   Basic metabolic panel    Collection Time: 07/26/21  7:37 AM   Result Value Ref Range    Sodium 136 136 - 145 mmol/L    Potassium 4 1 3 5 - 5 3 mmol/L    Chloride 105 100 - 108 mmol/L    CO2 27 21 - 32 mmol/L    ANION GAP 4 4 - 13 mmol/L    BUN 20 5 - 25 mg/dL    Creatinine 1 21 0 60 - 1 30 mg/dL    Glucose 109 65 - 140 mg/dL    Calcium 9 1 8 3 - 10 1 mg/dL    eGFR 50 ml/min/1 73sq m   Stone analysis    Collection Time: 07/26/21  1:42 PM   Result Value Ref Range    COLOR Tan     Size 4x5 mm    Stone Weight 52 mg    Composition Comment     Ca Oxalate,Dihydrate 90 %    STONE COMMENT Comment     Please note Comment     Disclaimer Comment     Photo Comment     Stone Source Comment     Hydroxyapatite 10 %   Fingerstick Glucose (POCT)    Collection Time: 07/26/21  2:23 PM   Result Value Ref Range    POC Glucose 120 65 - 140 mg/dl   CBC and differential    Collection Time: 08/09/21  7:21 AM   Result Value Ref Range    WBC 13 05 (H) 4 31 - 10 16 Thousand/uL    RBC 4 41 3 81 - 5 12 Million/uL    Hemoglobin 12 8 11 5 - 15 4 g/dL    Hematocrit 40 8 34 8 - 46 1 %    MCV 93 82 - 98 fL    MCH 29 0 26 8 - 34 3 pg    MCHC 31 4 31 4 - 37 4 g/dL    RDW 14 6 11 6 - 15 1 %    MPV 11 0 8 9 - 12 7 fL    Platelets 752 (H) 028 - 390 Thousands/uL    nRBC 0 /100 WBCs    Neutrophils Relative 61 43 - 75 %    Immat GRANS % 1 0 - 2 %    Lymphocytes Relative 25 14 - 44 %    Monocytes Relative 8 4 - 12 %    Eosinophils Relative 4 0 - 6 %    Basophils Relative 1 0 - 1 %    Neutrophils Absolute 8 05 (H) 1 85 - 7 62 Thousands/µL    Immature Grans Absolute 0 08 0 00 - 0 20 Thousand/uL    Lymphocytes Absolute 3 25 0 60 - 4 47 Thousands/µL    Monocytes Absolute 1 05 0 17 - 1 22 Thousand/µL    Eosinophils Absolute 0 53 0 00 - 0 61 Thousand/µL    Basophils Absolute 0 09 0 00 - 0 10 Thousands/µL   TSH, 3rd generation with Free T4 reflex    Collection Time: 08/09/21  7:21 AM   Result Value Ref Range    TSH 3RD GENERATON 1 060 0 358 - 3 740 uIU/mL   Vitamin D 25 hydroxy    Collection Time: 08/09/21  7:21 AM   Result Value Ref Range    Vit D, 25-Hydroxy 60 7 30 0 - 100 0 ng/mL   Comprehensive metabolic panel    Collection Time: 08/09/21  7:21 AM   Result Value Ref Range    Sodium 136 136 - 145 mmol/L    Potassium 4 4 3 5 - 5 3 mmol/L    Chloride 106 100 - 108 mmol/L    CO2 24 21 - 32 mmol/L    ANION GAP 6 4 - 13 mmol/L    BUN 13 5 - 25 mg/dL    Creatinine 0 75 0 60 - 1 30 mg/dL    Glucose, Fasting 113 (H) 65 - 99 mg/dL    Calcium 9 3 8 3 - 10 1 mg/dL    AST 19 5 - 45 U/L    ALT 37 12 - 78 U/L    Alkaline Phosphatase 116 46 - 116 U/L    Total Protein 7 1 6 4 - 8 2 g/dL    Albumin 3 5 3 5 - 5 0 g/dL    Total Bilirubin 0 27 0 20 - 1 00 mg/dL    eGFR 90 ml/min/1 73sq m   UA w Reflex to Microscopic w Reflex to Culture -Lab Collect    Collection Time: 08/09/21  3:17 PM    Specimen: Urine, Clean Catch   Result Value Ref Range    Color, UA Red     Clarity, UA Turbid     Specific [place], UA 1 010 1 003 - 1 030    pH, UA 7 0 4 5, 5 0, 5 5, 6 0, 6 5, 7 0, 7 5, 8 0    Leukocytes, UA Moderate (A) Negative    Nitrite, UA Positive (A) Negative    Protein,  (2+) (A) Negative mg/dl    Glucose, UA Negative Negative mg/dl    Ketones, UA Trace (A) Negative mg/dl    Urobilinogen, UA 1 0 0 2, 1 0 E U /dl E U /dl    Bilirubin, UA Interference- unable to analyze (A) Negative    Blood, UA Large (A) Negative   Urine culture    Collection Time: 08/09/21  3:17 PM    Specimen: Urine, Clean Catch   Result Value Ref Range    Urine Culture 10,000-19,000 cfu/ml     Urine Microscopic    Collection Time: 08/09/21  3:17 PM   Result Value Ref Range    RBC, UA Innumerable (A) None Seen, 2-4 /hpf    WBC, UA Field obscured, unable to enumerate (A) None Seen, 2-4, 5-60 /hpf    Epithelial Cells Occasional None Seen, Occasional /hpf    Bacteria, UA None Seen None Seen, Occasional /hpf            Physical Exam  Constitutional:       General: She is not in acute distress  Appearance: She is well-developed  She is not diaphoretic  HENT:      Head: Normocephalic and atraumatic  Right Ear: External ear normal       Left Ear: External ear normal       Nose: Nose normal       Mouth/Throat:      Pharynx: No oropharyngeal exudate  Eyes:      General:         Right eye: No discharge  Left eye: No discharge  Conjunctiva/sclera: Conjunctivae normal       Pupils: Pupils are equal, round, and reactive to light  Neck:      Thyroid: No thyromegaly  Cardiovascular:      Rate and Rhythm: Regular rhythm  Tachycardia present  Heart sounds: Normal heart sounds  No murmur heard  No friction rub  No gallop  Pulmonary:      Effort: Pulmonary effort is normal  No respiratory distress  Breath sounds: Normal breath sounds  No stridor  No wheezing or rales  Abdominal:      General: Bowel sounds are normal  There is no distension  Palpations: Abdomen is soft  Tenderness: There is no abdominal tenderness  Musculoskeletal:      Cervical back: Normal range of motion and neck supple  Legs:    Lymphadenopathy:      Cervical: No cervical adenopathy  Skin:     General: Skin is warm and dry  Findings: No erythema or rash  Neurological:      Mental Status: She is alert and oriented to person, place, and time  Psychiatric:         Behavior: Behavior normal          Thought Content:  Thought content normal          Judgment: Judgment normal

## 2021-08-11 ENCOUNTER — OFFICE VISIT (OUTPATIENT)
Dept: INTERNAL MEDICINE CLINIC | Facility: CLINIC | Age: 56
End: 2021-08-11
Payer: COMMERCIAL

## 2021-08-11 ENCOUNTER — HOSPITAL ENCOUNTER (OUTPATIENT)
Dept: RADIOLOGY | Facility: IMAGING CENTER | Age: 56
Discharge: HOME | End: 2021-08-11
Payer: COMMERCIAL

## 2021-08-11 VITALS
WEIGHT: 167 LBS | DIASTOLIC BLOOD PRESSURE: 80 MMHG | TEMPERATURE: 99.1 F | SYSTOLIC BLOOD PRESSURE: 136 MMHG | BODY MASS INDEX: 29.59 KG/M2 | HEART RATE: 134 BPM | HEIGHT: 63 IN | OXYGEN SATURATION: 99 %

## 2021-08-11 DIAGNOSIS — N20.0 NEPHROLITHIASIS: ICD-10-CM

## 2021-08-11 DIAGNOSIS — Z96.651 STATUS POST TOTAL KNEE REPLACEMENT, RIGHT: Primary | ICD-10-CM

## 2021-08-11 DIAGNOSIS — R00.0 TACHYCARDIA: ICD-10-CM

## 2021-08-11 DIAGNOSIS — N20.0 KIDNEY STONE: ICD-10-CM

## 2021-08-11 LAB — CRP SERPL QL: 3.4 MG/L

## 2021-08-11 PROCEDURE — 74018 RADEX ABDOMEN 1 VIEW: CPT

## 2021-08-11 PROCEDURE — 99213 OFFICE O/P EST LOW 20 MIN: CPT | Performed by: NURSE PRACTITIONER

## 2021-08-11 NOTE — ASSESSMENT & PLAN NOTE
Advised patient to make a follow-up with Urology sooner than scheduled currently  Patient will continue full course of Bactrim and then repeat CBC

## 2021-08-11 NOTE — ASSESSMENT & PLAN NOTE
Discussed with Dr Sammy Boyd  Will get CRP, CBC and CMP  Patient has follow-up with Orthopedic surgery tomorrow  Patient currently taking aspirin

## 2021-08-12 ENCOUNTER — OFFICE VISIT (OUTPATIENT)
Dept: OBGYN CLINIC | Facility: HOSPITAL | Age: 56
End: 2021-08-12

## 2021-08-12 ENCOUNTER — HOSPITAL ENCOUNTER (OUTPATIENT)
Dept: RADIOLOGY | Facility: HOSPITAL | Age: 56
Discharge: HOME | End: 2021-08-12
Attending: ORTHOPAEDIC SURGERY
Payer: COMMERCIAL

## 2021-08-12 VITALS
SYSTOLIC BLOOD PRESSURE: 98 MMHG | BODY MASS INDEX: 29.84 KG/M2 | HEART RATE: 99 BPM | DIASTOLIC BLOOD PRESSURE: 62 MMHG | WEIGHT: 168.4 LBS | HEIGHT: 63 IN

## 2021-08-12 DIAGNOSIS — Z96.651 AFTERCARE FOLLOWING RIGHT KNEE JOINT REPLACEMENT SURGERY: ICD-10-CM

## 2021-08-12 DIAGNOSIS — Z96.651 AFTERCARE FOLLOWING RIGHT KNEE JOINT REPLACEMENT SURGERY: Primary | ICD-10-CM

## 2021-08-12 DIAGNOSIS — Z47.1 AFTERCARE FOLLOWING RIGHT KNEE JOINT REPLACEMENT SURGERY: Primary | ICD-10-CM

## 2021-08-12 DIAGNOSIS — Z47.1 AFTERCARE FOLLOWING RIGHT KNEE JOINT REPLACEMENT SURGERY: ICD-10-CM

## 2021-08-12 PROCEDURE — 73560 X-RAY EXAM OF KNEE 1 OR 2: CPT

## 2021-08-12 PROCEDURE — 99024 POSTOP FOLLOW-UP VISIT: CPT | Performed by: ORTHOPAEDIC SURGERY

## 2021-08-12 NOTE — PROGRESS NOTES
Assessment:  1  Aftercare following right knee joint replacement surgery  XR knee 1 or 2 vw right       Plan:  3 months s/p right TKA, 5/19/2021  The patient is encouraged to continue physical therapy with focus on strength and stamina  She should follow up in 3 months  To do next visit:  Return in about 3 months (around 11/12/2021)  The above stated was discussed in layman's terms and the patient expressed understanding  All questions were answered to the patient's satisfaction  Scribe Attestation    I,:  Mariaa Mckeon am acting as a scribe while in the presence of the attending physician :       I,:  Nabor Horn MD personally performed the services described in this documentation    as scribed in my presence :             Subjective:   Norberto Bermeo is a 54 y o  female who presents 3 months s/p right TKA, 5/19/2021  She is doing well with some difficulties  Today she complains of anterior superior and lateral right knee pain  She does participate in physical therapy  She does use a cane for ambulation and feels unbalanced at times  She continues to work with her PCP in regard to blood work and kidney stones          Review of systems negative unless otherwise specified in HPI    Past Medical History:   Diagnosis Date    Anxiety     Asthma     Colon polyp     Depression     Diverticulitis     Diverticulitis of colon     Follicular cyst of ovary 12/23/2014    Former smoker     GERD (gastroesophageal reflux disease)     Glycosuria     Headache     Hyperlipidemia     Hypertension     Kidney calculi 1/11/2019    Obesity     Ovarian cyst     Overactive bladder     Overweight     Pulmonary nodule     Renal calculus     Tinea pedis of left foot 6/8/2020    Vertigo     Wears partial dentures     Lower plate -partial       Past Surgical History:   Procedure Laterality Date    APPENDECTOMY      BLADDER SUSPENSION      [facility]    CERVICAL BIOPSY  W/ LOOP ELECTRODE EXCISION      COLONOSCOPY      DILATION AND CURETTAGE OF UTERUS      FL RETROGRADE PYELOGRAM  7/26/2021    HYSTERECTOMY  2007    ovaries present bilaterally    [place] COLONOSCOPY FLX DX W/COLLJ SPEC WHEN PFRMD N/A 9/26/2017    Procedure: EGD AND COLONOSCOPY;  Surgeon: Judith Tom MD;  Location: [facility];   Service: Gastroenterology    [place] CYSTO/URETERO W/LITHOTRIPSY &INDWELL STENT INSRT Bilateral 7/26/2021    Procedure: CYSTOSCOPY URETEROSCOPY WITH LITHOTRIPSY HOLMIUM LASER, RETROGRADE PYELOGRAM AND INSERTION STENT URETERAL;  Surgeon: Sonu Padilla MD;  [facility];  Service: Urology    CO REVISE MEDIAN N/CARPAL TUNNEL SURG Right 5/29/2018    Procedure: CARPAL TUNNEL RELEASE;  Surgeon: Sosa Stuart DO;  [facility];  Service: Orthopedics    [place] TOTAL KNEE ARTHROPLASTY Right 5/19/2021    Procedure: ARTHROPLASTY KNEE TOTAL;  Surgeon: Artemio Maldonado MD;  [facility];  Service: Orthopedics    TONSILLECTOMY      TUBAL LIGATION      URETEROSCOPY  7/26/2021    Procedure: URETEROSCOPY, LASER AND BASKET STONE EXTRACTION;  Surgeon: Sonu Padilla MD;  [facility];  Service: Urology    WISDOM TOOTH EXTRACTION         Family History   Problem Relation Age of Onset    Diabetes type II Mother     Asthma Mother     Stroke Father     Pancreatic cancer Father 68    Diabetes type II Father     No Known Problems Maternal Grandmother     No Known Problems Maternal Grandfather     No Known Problems Paternal Grandmother     No Known Problems Paternal Grandfather     No Known Problems Sister     No Known Problems Sister     No Known Problems Sister     No Known Problems Sister     No Known Problems Sister     No Known Problems Maternal Aunt     No Known Problems Paternal Aunt        Social History     Occupational History    Not on file   Tobacco Use    Smoking status: Former Smoker     Packs/day: 0 25     Years: 20 00     Pack years: 5 00     Types: Cigarettes     Quit date: 3/16/2018     Years since quitting: 3 4    Smokeless tobacco: Never Used    Tobacco comment: approx less than  half a pack   Vaping Use    Vaping Use: Never used   Substance and Sexual Activity    Alcohol use:  Yes     Alcohol/week: 3 0 standard drinks     Types: 3 Cans of beer per week     Comment: socially    Drug use: No     Comment: Denies    Sexual activity: Not Currently     Partners: Male         Current Outpatient Medications:     Ascorbic Acid (vitamin C) 1000 MG tablet, Take 2,000 mg by mouth daily, Disp: , Rfl:     atorvastatin (LIPITOR) 40 mg tablet, Take 1 tablet (40 mg total) by mouth daily, Disp: 90 tablet, Rfl: 1    Cholecalciferol (Vitamin D3) 50 MCG (2000 UT) TABS, Take 2,000 Units by mouth daily, Disp: , Rfl:     lisinopril (ZESTRIL) 10 mg tablet, Take 1 5 tablets (15 mg total) by mouth daily, Disp: 180 tablet, Rfl: 1    metFORMIN (GLUCOPHAGE) 1000 MG tablet, Take 1 tablet (1,000 mg total) by mouth 2 (two) times a day with meals, Disp: 180 tablet, Rfl: 1    metoprolol tartrate (LOPRESSOR) 25 mg tablet, Take 0 5 tablets (12 5 mg total) by mouth every 12 (twelve) hours, Disp: 30 tablet, Rfl: 0    multivitamin (THERAGRAN) TABS, Take 1 tablet by mouth daily, Disp: , Rfl:     omeprazole (PriLOSEC) 20 mg delayed release capsule, Take 2 capsules (40 mg total) by mouth daily, Disp: 90 capsule, Rfl: 1    oxybutynin (DITROPAN) 5 mg tablet, Take 1 tablet (5 mg total) by mouth 3 (three) times a day for 14 days, Disp: 42 tablet, Rfl: 0    oxyCODONE (ROXICODONE) 5 mg immediate release tablet, 1 pill po Q4 Hrs prn, Disp: 30 tablet, Rfl: 0    sertraline (ZOLOFT) 50 mg tablet, Take 1 tablet (50 mg total) by mouth daily Takes in the am , Disp: 90 tablet, Rfl: 1    sulfamethoxazole-trimethoprim (BACTRIM DS) 800-160 mg per tablet, Take 1 tablet by mouth every 12 (twelve) hours for 14 days, Disp: 28 tablet, Rfl: 0    tamsulosin (FLOMAX) 0 4 mg, Take 1 capsule (0 4 mg total) by mouth daily with dinner for 14 days (Patient not taking: Reported on 8/11/2021), Disp: 14 capsule, Rfl: 0    Allergies   Allergen Reactions    Clonazepam Other (See Comments)     Pt states "didn't feel right on it "    Morphine Other (See Comments) and Confusion     Annotation - [id number]: "dopey"  Gets silly    Venlafaxine Other (See Comments)     Unknown--pt states " MD was trying pt on different medications "            Vitals:    08/12/21 1434   BP: 98/62   Pulse: 99       Objective:  Physical exam  · General: Awake, Alert, Oriented  · Eyes: Pupils equal, round and reactive to light  · Heart: regular rate and rhythm  · Lungs: No audible wheezing  · Abdomen: soft                    Ortho Exam  Right knee:  Well healed anterior incision  No erythema and no ecchymosis  Stable to varus and valgus stress  Extensor mechanism intact  Extension full  Flexion 120  Calf compartments soft and supple  Sensation intact  Toes are warm sensate and mobile      Diagnostics, reviewed and taken today if performed as documented: The attending physician has personally reviewed the pertinent films in PACS and interpretation is as follows:  Right knee x-ray:  Well aligned prosthesis with no acute changes  Procedures, if performed today:    Procedures    None performed      Portions of the record may have been created with voice recognition software  Occasional wrong word or "sound a like" substitutions may have occurred due to the inherent limitations of voice recognition software  Read the chart carefully and recognize, using context, where substitutions have occurred

## 2021-08-13 ENCOUNTER — APPOINTMENT (OUTPATIENT)
Dept: LAB | Facility: IMAGING CENTER | Age: 56
End: 2021-08-13
Payer: COMMERCIAL

## 2021-08-13 DIAGNOSIS — N20.0 KIDNEY STONE: ICD-10-CM

## 2021-08-13 DIAGNOSIS — Z96.651 STATUS POST TOTAL KNEE REPLACEMENT, RIGHT: ICD-10-CM

## 2021-08-13 LAB
ALBUMIN SERPL BCP-MCNC: 3.7 G/DL (ref 3.5–5)
ALP SERPL-CCNC: 127 U/L (ref 46–116)
ALT SERPL W P-5'-P-CCNC: 40 U/L (ref 12–78)
ANION GAP SERPL CALCULATED.3IONS-SCNC: 7 MMOL/L (ref 4–13)
AST SERPL W P-5'-P-CCNC: 19 U/L (ref 5–45)
BASOPHILS # BLD AUTO: 0.1 THOUSANDS/ΜL (ref 0–0.1)
BASOPHILS NFR BLD AUTO: 1 % (ref 0–1)
BILIRUB SERPL-MCNC: 0.4 MG/DL (ref 0.2–1)
BUN SERPL-MCNC: 16 MG/DL (ref 5–25)
CALCIUM SERPL-MCNC: 9.6 MG/DL (ref 8.3–10.1)
CHLORIDE SERPL-SCNC: 102 MMOL/L (ref 100–108)
CO2 SERPL-SCNC: 25 MMOL/L (ref 21–32)
CREAT SERPL-MCNC: 1.02 MG/DL (ref 0.6–1.3)
EOSINOPHIL # BLD AUTO: 0.36 THOUSAND/ΜL (ref 0–0.61)
EOSINOPHIL NFR BLD AUTO: 3 % (ref 0–6)
ERYTHROCYTE [DISTWIDTH] IN BLOOD BY AUTOMATED COUNT: 14.9 % (ref 11.6–15.1)
GFR SERPL CREATININE-BSD FRML MDRD: 62 ML/MIN/1.73SQ M
GLUCOSE SERPL-MCNC: 156 MG/DL (ref 65–140)
HCT VFR BLD AUTO: 42.8 % (ref 34.8–46.1)
HGB BLD-MCNC: 13.1 G/DL (ref 11.5–15.4)
IMM GRANULOCYTES # BLD AUTO: 0.08 THOUSAND/UL (ref 0–0.2)
IMM GRANULOCYTES NFR BLD AUTO: 1 % (ref 0–2)
LYMPHOCYTES # BLD AUTO: 2.96 THOUSANDS/ΜL (ref 0.6–4.47)
LYMPHOCYTES NFR BLD AUTO: 23 % (ref 14–44)
MCH RBC QN AUTO: 28.3 PG (ref 26.8–34.3)
MCHC RBC AUTO-ENTMCNC: 30.6 G/DL (ref 31.4–37.4)
MCV RBC AUTO: 92 FL (ref 82–98)
MONOCYTES # BLD AUTO: 0.8 THOUSAND/ΜL (ref 0.17–1.22)
MONOCYTES NFR BLD AUTO: 6 % (ref 4–12)
NEUTROPHILS # BLD AUTO: 8.49 THOUSANDS/ΜL (ref 1.85–7.62)
NEUTS SEG NFR BLD AUTO: 66 % (ref 43–75)
NRBC BLD AUTO-RTO: 0 /100 WBCS
PLATELET # BLD AUTO: 419 THOUSANDS/UL (ref 149–390)
PMV BLD AUTO: 10.9 FL (ref 8.9–12.7)
POTASSIUM SERPL-SCNC: 4.4 MMOL/L (ref 3.5–5.3)
PROT SERPL-MCNC: 7.4 G/DL (ref 6.4–8.2)
RBC # BLD AUTO: 4.63 MILLION/UL (ref 3.81–5.12)
SODIUM SERPL-SCNC: 134 MMOL/L (ref 136–145)
WBC # BLD AUTO: 12.79 THOUSAND/UL (ref 4.31–10.16)

## 2021-08-13 PROCEDURE — 85025 COMPLETE CBC W/AUTO DIFF WBC: CPT | Performed by: NURSE PRACTITIONER

## 2021-08-13 PROCEDURE — 80053 COMPREHEN METABOLIC PANEL: CPT

## 2021-08-13 PROCEDURE — 36415 COLL VENOUS BLD VENIPUNCTURE: CPT | Performed by: NURSE PRACTITIONER

## 2021-08-17 ENCOUNTER — EVALUATION (OUTPATIENT)
Dept: PHYSICAL THERAPY | Facility: REHABILITATION | Age: 56
End: 2021-08-17
Payer: COMMERCIAL

## 2021-08-17 DIAGNOSIS — M17.11 PRIMARY OSTEOARTHRITIS OF RIGHT KNEE: ICD-10-CM

## 2021-08-17 DIAGNOSIS — Z96.651 STATUS POST TOTAL KNEE REPLACEMENT, RIGHT: Primary | ICD-10-CM

## 2021-08-17 PROCEDURE — 97110 THERAPEUTIC EXERCISES: CPT

## 2021-08-17 NOTE — PROGRESS NOTES
Daily Note     Today's date: 2021  Patient name: Mary Swartz  : 1965  MRN: [id number]  Referring provider: Ashley Norris MD  Dx:   Encounter Diagnosis     ICD-10-CM    1  Status post total knee replacement, right  Z96 651    2  Primary osteoarthritis of right knee  M17 11                 Assessment  Upon RE on this date pt has presented with improved R knee ROM but continues to be limited in RLE strength especially hip flexion, extension, and abduction  Pt ambulated into clinic today without her SPC but demonstrated overall guarded and decreased knee flexion throughout gait due to reports of not feeling confident with movement in her knee  Pt did well with addition of balance exercises as well as STS without complaints  At this time, pt would benefit from continued skilled physical therapy to continue improving strength, stability, balance, and overall endurance to functional activities and mobility with improved confidence and decreased compensation patterns  Thank you for this referral      Impairments: abnormal coordination, abnormal gait, abnormal muscle firing, abnormal or restricted ROM, activity intolerance, impaired balance, impaired physical strength and pain with function  Understanding of Dx/Px/POC: good   Prognosis: good  Prognosis details: Pt prognosis is good due to her motivation to return to work and PLOF  Goals  STG: CONTINUE ALL   1  Pt will be able to ambulate with [facility] community distances up to a mile in 4 weeks  - Partially met  2  Pt will be able to dress, including donning and doffing socks and shoes with less than 25% assistance and decreased time in 4 weeks  - MET    LTG: CONTINUE ALL   1  Pt will be able to ambulate independently community distances up to 1-2 miles in 7 weeks  2  Pt will be able to ascend and descend stairs ad curbs in 7 weeks  3  Pt will be able to demonstrate adequate strength and ROM to return to PLOF and return to work      Plan  Patient would benefit from: skilled physical therapy  Planned modality interventions: thermotherapy: hydrocollator packs and cryotherapy  Planned therapy interventions: ADL retraining, balance, flexibility, functional ROM exercises, gait training, home exercise program, joint mobilization, manual therapy, neuromuscular re-education, patient education, strengthening, stretching, therapeutic activities and therapeutic exercise  Frequency: 2x week  Duration in visits: 8  Duration in weeks: 4  Treatment plan discussed with: patient         Subjective Evaluation     History of Present Illness  Mechanism of injury: surgery  Mechanism of injury: 5/24/2021     Pt is a 55 y/o female presenting to therapy s/p R TKA on 5/19/2021  She states the surgery went well and has been self managing her pain with tylenol and ice throughout the day after discontinuing use of OxyContin and has been taking Asprin as a DVT prophylaxis  She has been independent with ADLs such as cooking, dressing, bathing, and ambulating around her home but takes increased time and causes fatigue  States she has been sleeping in recliner due to her bed being too high up  She is ambulating with a rolling walker and states she wishes to use a cane as soon as possible  She denies drainage and redness from the wound but expressed concern for possible infection due to increase edema at the knee and ankle  She has a follow up with the doctor on Wednesday (5/26/2021)  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[employer] and is highly motivated to return to work        06/29/21  Pt states she has some stiffness and discomfort in the morning upon waking up  States she takes Tylenol PRN and utilizes Oxycodone prior to therapy sessions  Reports she has no stairs at home, except in the front of her house, which she avoids still   Utilizes RW for assistance into shower, otherwise ambulates with SPC in and outside the home      7/30/21  Pt reports that overall her knee is feeling okay today and has no current complaints  Pt reports that since starting therapy she feels like she has made progress but is still limited in her ability to do stairs  Pt also reports that she continues to be limited with endurance to activities because she still feels fatigue in her leg and notes that if she goes to the store she feels exhausted  Pt reports that she is no longer taking the Oxycodone unless she really needs it but she primarily uses the Tylenol  Pt also reports that she uses the [facility] for ambulation  21  Pt reports that her knee still feels stiff on some days  She reports that she had a follow up with her doctor yesterday and he told her that she needs to do more work in therapy for strengthening and endurance  Pt reports that she still has difficulty going up/down her stairs but she has been working on them  She reports that sometimes she still feels off balance with her knee when moving around and has recently stopped using her cane       Pain  Current pain ratin  At worst pain rating: aching/stiffness       Patient Goals  Patient goals for therapy: increased motion, increased strength, return to work and decreased edema        Objective      Active Range of Motion      Right Knee   Flexion: 127 degrees   Extensor la degrees     RLE Strength   Hip Flexion 4/5  Hip Abd 3+/5  Hip Ext 3+/5  Knee ext 4+/5  Knee flex 4+/5  Ankle DF 5/5           Precautions: vertigo, HTN, HLD, GERD, asthma, anxiety, DM     Daily Treatment Diary      Assessment  7/6  7/9 7/13 7/15 7/20 7/23 8/3 8/6 8/10 8/17   Eval/Reval            RE   FOTO            Comp   Manuals   Knee PROM   MD HERRON  CC SC       Patellar ROM  MD HERRON  CC SC       HS & GS Stretch             IASTM L ITB/lat quads         TE(light to katrina)    Exercise Diary     Bike - AROM  L1 x L1x10' L1 x10'  L1x10' L1 x10' L1 x10' l1 10' L1x10' L1x10' L3 x6'     LAQ  1# 5" 2x10  2# 5" 2x10  2# 5" 2x10  2# 5" 2x10 2 5# 5"  2x10 2 5# 5"  2x10 5"  2x10 3# 5" 2x10     SAQ  1# 5' 2x10 2# 5" 2x10  2# 5" 2x10   2# 5" 2x10 2 5# 5"  2x10 2 5# 5"  2x10 5"  2x10 3# 5"2x10     TKE with 1/2 Foam    10"x10  MC            SLR Flexion 1x10 2#  2x10 2#   2x10 2#   2x10 2# 2x10 2#  2x10 2#  2x10   2x10 x10 2#  2x10    SLR Abduction 1x10 2#  2x10 2# 2x10 2#   2x10 2# 2x10 2#  2x10 2#  2x10   2x10 x10 2#   2x10    Step Ups:   Forward  0R x20  0R  x20 0R x20  ! R x5  OR x15 1R  20x 1R  20x NP      Step Ups:   Lateral  0R x20 0R  x20 0R x20  OR x20 1R  x20 1R 20x NP      Eccentric Step Downs:    Forward  0Rx20 manual cues and A  HOLD        Slow march c pause counter   2 laps    Mini Squats  5" 2x10   HOLD        Tandem walk at counter   2 laps   SLS  30"x3          STS   x10 no hands  x10 c foam under feet     gait tr                Wall Slides  NV  5"x10 5" x10   5"x12 5"x12 5"x12 NP 2x10    Single Leg Press    22#  2x10 22# 2x10 22# 2x10 22# 2x10 22#  2x10 22#  2x10 NP 22# 2x10     Standing Knee Flexion          2x10 2x10    Modalities    CP R Knee   Extension Stretch  Post-Tx   NP   NP   CP only  10'

## 2021-08-19 ENCOUNTER — TELEPHONE (OUTPATIENT)
Dept: OBGYN CLINIC | Facility: MEDICAL CENTER | Age: 56
End: 2021-08-19

## 2021-08-19 ENCOUNTER — APPOINTMENT (OUTPATIENT)
Dept: PHYSICAL THERAPY | Facility: REHABILITATION | Age: 56
End: 2021-08-19
Payer: COMMERCIAL

## 2021-08-19 ENCOUNTER — VBI (OUTPATIENT)
Dept: ADMINISTRATIVE | Facility: OTHER | Age: 56
End: 2021-08-19

## 2021-08-19 NOTE — TELEPHONE ENCOUNTER
Patient sees Dr Fam Morales  Patient calling in about her [employer] disability paperwork  She spoke to [place] this am and they are stating they did not receive any paperwork, she states it is an extension  On the log it states Duplicate? Can she receive a phone call on the status of this      CB # [id number]

## 2021-08-20 ENCOUNTER — OFFICE VISIT (OUTPATIENT)
Dept: PHYSICAL THERAPY | Facility: REHABILITATION | Age: 56
End: 2021-08-20
Payer: COMMERCIAL

## 2021-08-20 DIAGNOSIS — M17.11 PRIMARY OSTEOARTHRITIS OF RIGHT KNEE: ICD-10-CM

## 2021-08-20 DIAGNOSIS — Z96.651 STATUS POST TOTAL KNEE REPLACEMENT, RIGHT: Primary | ICD-10-CM

## 2021-08-20 PROCEDURE — 97140 MANUAL THERAPY 1/> REGIONS: CPT

## 2021-08-20 PROCEDURE — 97110 THERAPEUTIC EXERCISES: CPT

## 2021-08-20 NOTE — TELEPHONE ENCOUNTER
Patient has turned in disability forms  Have you given her any work restrictions?     Please advise    Thank you

## 2021-08-20 NOTE — PROGRESS NOTES
Daily Note     Today's date: 2021  Patient name: Christa Branham  : 1965  MRN: [id number]  Referring provider: Dangelo Cash MD  Dx:   Encounter Diagnosis     ICD-10-CM    1  Status post total knee replacement, right  Z96 651    2  Primary osteoarthritis of right knee  M17 11                   Subjective: pt presents to therapy with slight antalgic gait, but reporting no pain pre-tx  Objective: See treatment diary below      Assessment: Tolerated treatment well  Moderate difficulty with step down exercises as she exhibits difficulty with eccentric control  Patient demonstrated fatigue post treatment, exhibited good technique with therapeutic exercises and would benefit from continued PT      Plan: Continue per plan of care  Progress treatment as tolerated  Precautions: vertigo, HTN, HLD, GERD, asthma, anxiety, DM     Daily Treatment Diary      Assessment  8/20  7/9 7/13 7/15 7/20 7/23 8/3 8/6 8/10 8/17   Eval/Reval            RE   FOTO            Comp   Manuals   Knee PROM  TE MD HERRON  CC SC       Patellar ROM TE MD HERRON  CC SC       HS & GS Stretch             IASTM L ITB/lat quads         TE(light to katrina)    Exercise Diary     Bike - AROM  L3 x6' L1x10' L1 x10'  L1x10' L1 x10' L1 x10' l1 10' L1x10' L1x10' L3 x6'     LAQ 2# 2x10 2# 5" 2x10  2# 5" 2x10  2# 5" 2x10 2 5# 5"  2x10 2 5# 5"  2x10 5"  2x10 3# 5" 2x10     SAQ  2# 5" 2x10  2# 5" 2x10   2# 5" 2x10 2 5# 5"  2x10 2 5# 5"  2x10 5"  2x10 3# 5"2x10     TKE with 1/2 Foam    10"x10  MC            SLR Flexion 2#  2x10 2#  2x10 2#   2x10 2#   2x10 2# 2x10 2#  2x10 2#  2x10   2x10 x10 2#  2x10    SLR Abduction 2#  2x10 2#  2x10 2# 2x10 2#   2x10 2# 2x10 2#  2x10 2#  2x10   2x10 x10 2#   2x10    Step Ups:   Forward   0R  x20 0R x20  ! R x5  OR x15 1R  20x 1R  20x NP      Step Ups:   Lateral  0R  x20 0R x20  OR x20 1R  x20 1R 20x NP      Eccentric Step Downs:    Forward   HOLD        Slow march c pause counter   2 laps    Marches Slow c pause x20 Tandem walk 3 laps            Mini Squats  2x10  HOLD        Tandem walk at counter   2 laps   SLS  STS 2x10          STS   x10 no hands  x10 c foam under feet     gait tr                Wall Slides  nv  5"x10 5" x10   5"x12 5"x12 5"x12 NP 2x10    Single Leg Press  22# 2x10  22#  2x10 22# 2x10 22# 2x10 22# 2x10 22#  2x10 22#  2x10 NP 22# 2x10     Standing Knee Flexion  11# 2x10        2x10 2x10    Modalities    CP R Knee   Extension Stretch  Post-Tx   NP   NP   CP only  10'

## 2021-08-24 ENCOUNTER — OFFICE VISIT (OUTPATIENT)
Dept: PHYSICAL THERAPY | Facility: REHABILITATION | Age: 56
End: 2021-08-24
Payer: COMMERCIAL

## 2021-08-24 ENCOUNTER — TELEPHONE (OUTPATIENT)
Dept: INTERNAL MEDICINE CLINIC | Age: 56
End: 2021-08-24

## 2021-08-24 DIAGNOSIS — Z96.651 STATUS POST TOTAL KNEE REPLACEMENT, RIGHT: Primary | ICD-10-CM

## 2021-08-24 DIAGNOSIS — M17.11 PRIMARY OSTEOARTHRITIS OF RIGHT KNEE: ICD-10-CM

## 2021-08-24 PROCEDURE — 97140 MANUAL THERAPY 1/> REGIONS: CPT

## 2021-08-24 PROCEDURE — 97110 THERAPEUTIC EXERCISES: CPT

## 2021-08-24 PROCEDURE — 97530 THERAPEUTIC ACTIVITIES: CPT

## 2021-08-24 NOTE — TELEPHONE ENCOUNTER
Patient called and said she missed a call from our office and wasn't sure who it was since no message was left  She wanted to let you know she is doing better and would like to discuss her blood work results     # [phone]

## 2021-08-24 NOTE — PROGRESS NOTES
Daily Note     Today's date: 2021  Patient name: Burton Handley  : 1965  MRN: [id number]  Referring provider: Mary Bernal MD  Dx:   Encounter Diagnosis     ICD-10-CM    1  Status post total knee replacement, right  Z96 651    2  Primary osteoarthritis of right knee  M17 11                   Subjective: pt reports no longer experiencing tightness/stiffness and fels more motion in her knee recently  She noted she continues to work on step exercises at home  She further noted take a little while to get herself moving upon standing as well  Objective: See treatment diary below      Assessment: Tolerated treatment well  Great amount of difficulty with eccentric control with step activities; verbal and manual cues required to avoid compensatory movements  Knee PROM appears WNL and progressed nicely  Patient demonstrated fatigue post treatment, exhibited good technique with therapeutic exercises and would benefit from continued PT      Plan: Continue per plan of care  Progress treatment as tolerated         Precautions: vertigo, HTN, HLD, GERD, asthma, anxiety, DM     Daily Treatment Diary      Assessment  8/20  8/24 7/13 7/15 7/20 7/23 8/3 8/6 8/10 8/17   Eval/Reval            RE   FOTO            Comp   Manuals   Knee PROM  TE TE JG  CC SC       Patellar ROM TE  JG  CC SC       HS & GS Stretch             IASTM L ITB/lat quads         TE(light to katrina)    Exercise Diary     Bike - AROM  L3 x6' L3x6' L1 x10'  L1x10' L1 x10' L1 x10' l1 10' L1x10' L1x10' L3 x6'     LAQ 2# 2x10 2# 5" 2x10  2# 5" 2x10  2# 5" 2x10 2 5# 5"  2x10 2 5# 5"  2x10 5"  2x10 3# 5" 2x10     SAQ    2# 5" 2x10   2# 5" 2x10 2 5# 5"  2x10 2 5# 5"  2x10 5"  2x10 3# 5"2x10     TKE with 1/2 Foam     Prone nv           SLR Flexion 2#  2x10 2#  2x10 2#   2x10 2#   2x10 2# 2x10 2#  2x10 2#  2x10   2x10 x10 2#  2x10    SLR Abduction 2#  2x10 2#  2x10 2# 2x10 2#   2x10 2# 2x10 2#  2x10 2#  2x10   2x10 x10 2#   2x10    Step Ups:   Forward 1R  x20 0R x20  ! R x5  OR x15 1R  20x 1R  20x NP      Step Ups:   Lateral  0R  x20 0R x20  OR x20 1R  x20 1R 20x NP      Eccentric Step Downs:    Forward  nv        Slow march c pause counter   2 laps    Marches Slow c pause x20            Tandem walk 3 laps            Mini Squats  2x10  HOLD        Tandem walk at counter   2 laps   SLS  STS 2x10  STS 2x10        STS   x10 no hands  x10 c foam under feet     gait tr                Wall Slides  nv  nv 5" x10   5"x12 5"x12 5"x12 NP 2x10    Single Leg Press  22# 2x10  22#  2x10 22# 2x10 22# 2x10 22# 2x10 22#  2x10 22#  2x10 NP 22# 2x10     Standing Knee Flexion  11# 2x10  22#  2x10      2x10 2x10    Modalities    CP R Knee   Extension Stretch  Post-Tx      NP   CP only  10'

## 2021-08-26 ENCOUNTER — APPOINTMENT (OUTPATIENT)
Dept: PHYSICAL THERAPY | Facility: REHABILITATION | Age: 56
End: 2021-08-26
Payer: COMMERCIAL

## 2021-08-27 ENCOUNTER — OFFICE VISIT (OUTPATIENT)
Dept: PHYSICAL THERAPY | Facility: REHABILITATION | Age: 56
End: 2021-08-27
Payer: COMMERCIAL

## 2021-08-27 DIAGNOSIS — M17.11 PRIMARY OSTEOARTHRITIS OF RIGHT KNEE: ICD-10-CM

## 2021-08-27 DIAGNOSIS — Z96.651 STATUS POST TOTAL KNEE REPLACEMENT, RIGHT: Primary | ICD-10-CM

## 2021-08-27 PROCEDURE — 97110 THERAPEUTIC EXERCISES: CPT | Performed by: PHYSICAL THERAPIST

## 2021-08-27 PROCEDURE — 97530 THERAPEUTIC ACTIVITIES: CPT | Performed by: PHYSICAL THERAPIST

## 2021-08-27 NOTE — PROGRESS NOTES
Daily Note     Today's date: 2021  Patient name: Zay Ga  : 1965  MRN: [id number]  Referring provider: Hilary Larson MD  Dx:   Encounter Diagnosis     ICD-10-CM    1  Status post total knee replacement, right  Z96 651    2  Primary osteoarthritis of right knee  M17 11                   Subjective: Pt wants to get stronger so she can do the steps at her grandchildren's home  Objective: See treatment diary below  5# cuff weight added to leg press as she was unable to do a 10# increase  Assessment: Tolerated treatment well  Steps are still very difficult  Still needs a lot of cuing for eccentric lowering  Had her keep a slight bend in her knee so she didn't hyperextend while lowering  Patient demonstrated fatigue post treatment, exhibited good technique with therapeutic exercises and would benefit from continued PT      Plan: Continue per plan of care  Progress treatment as tolerated  Precautions: vertigo, HTN, HLD, GERD, asthma, anxiety, DM     Daily Treatment Diary      Assessment  8/20  8/24 8/27 7/15 7/20 7/23 8/3 8/6 8/10 8/17   Eval/Reval            RE   FOTO            Comp   Manuals   Knee PROM  TE TE NT-not needed  CC SC       Patellar ROM TE    CC SC       HS & GS Stretch             IASTM L ITB/lat quads         TE(light to katrina)    Exercise Diary     Bike - AROM  L3 x6' L3x6' L3x6' L1x10' L1 x10' L1 x10' l1 10' L1x10' L1x10' L3 x6'     LAQ 2# 2x10 2# 5" 2x10  3# 5" 2x10  2# 5" 2x10 2 5# 5"  2x10 2 5# 5"  2x10 5"  2x10 3# 5" 2x10     SAQ      2# 5" 2x10 2 5# 5"  2x10 2 5# 5"  2x10 5"  2x10 3# 5"2x10     TKE with 1/2 Foam                SLR Flexion 2#  18 2#  2x10 3# x18 2#   2x10 2# 2x10 2#  2x10 2#  2x10   2x10 x10 2#  2x10    SLR Abduction 2#  2x10 2#  2x10 2# 2x10 2#   2x10 2# 2x10 2#  2x10 2#  2x10   2x10 x10 2#   2x10    Step Ups:   Forward   1R  x20 1Rx20  ! R x5  OR x15 1R  20x 1R  20x NP      Step Ups:   Lateral  0R  x20 0R x20  OR x20 1R  x20 1R 20x NP Eccentric Step Downs:    Forward   x10       Slow march c pause counter   2 laps    Marches Slow c pause x20            Tandem walk 3 laps            Mini Squats  2x10  HOLD        Tandem walk at counter   2 laps   STS  STS 2x10  STS 2x10 STS 2x10       STS   x10 no hands  x10 c foam under feet     gait tr                Wall Slides  nv  nv   5"x12 5"x12 5"x12 NP 2x10    Single Leg Press  22# 2x10  22#  2x10 27#x10 22#x10 22# 2x10 22# 2x10 22#  2x10 22#  2x10 NP 22# 2x10     Standing Knee Flexion  11# 2x10  22#  2x10 22# 2x10     2x10 2x10    Modalities    CP R Knee   Extension Stretch  Post-Tx      NP   CP only  10'

## 2021-08-31 ENCOUNTER — OFFICE VISIT (OUTPATIENT)
Dept: PHYSICAL THERAPY | Facility: REHABILITATION | Age: 56
End: 2021-08-31
Payer: COMMERCIAL

## 2021-08-31 ENCOUNTER — OFFICE VISIT (OUTPATIENT)
Dept: UROLOGY | Facility: AMBULATORY SURGERY CENTER | Age: 56
End: 2021-08-31
Payer: COMMERCIAL

## 2021-08-31 VITALS
HEIGHT: 63 IN | HEART RATE: 126 BPM | DIASTOLIC BLOOD PRESSURE: 70 MMHG | SYSTOLIC BLOOD PRESSURE: 128 MMHG | BODY MASS INDEX: 29.95 KG/M2 | WEIGHT: 169 LBS

## 2021-08-31 DIAGNOSIS — N20.0 NEPHROLITHIASIS: ICD-10-CM

## 2021-08-31 DIAGNOSIS — M17.11 PRIMARY OSTEOARTHRITIS OF RIGHT KNEE: ICD-10-CM

## 2021-08-31 DIAGNOSIS — Z96.651 STATUS POST TOTAL KNEE REPLACEMENT, RIGHT: Primary | ICD-10-CM

## 2021-08-31 PROCEDURE — 87086 URINE CULTURE/COLONY COUNT: CPT | Performed by: NURSE PRACTITIONER

## 2021-08-31 PROCEDURE — 97530 THERAPEUTIC ACTIVITIES: CPT

## 2021-08-31 PROCEDURE — 99214 OFFICE O/P EST MOD 30 MIN: CPT | Performed by: NURSE PRACTITIONER

## 2021-08-31 PROCEDURE — 3078F DIAST BP <80 MM HG: CPT | Performed by: NURSE PRACTITIONER

## 2021-08-31 PROCEDURE — 97110 THERAPEUTIC EXERCISES: CPT

## 2021-08-31 PROCEDURE — 1036F TOBACCO NON-USER: CPT | Performed by: NURSE PRACTITIONER

## 2021-08-31 PROCEDURE — 3074F SYST BP LT 130 MM HG: CPT | Performed by: NURSE PRACTITIONER

## 2021-08-31 PROCEDURE — 3008F BODY MASS INDEX DOCD: CPT | Performed by: NURSE PRACTITIONER

## 2021-08-31 RX ORDER — OXYBUTYNIN CHLORIDE 5 MG/1
5 TABLET ORAL 3 TIMES DAILY PRN
Qty: 30 TABLET | Refills: 0 | Status: SHIPPED | OUTPATIENT
Start: 2021-08-31 | End: 2021-09-16 | Stop reason: HOSPADM

## 2021-08-31 RX ORDER — OXYCODONE HYDROCHLORIDE AND ACETAMINOPHEN 5; 325 MG/1; MG/1
1 TABLET ORAL EVERY 4 HOURS PRN
Qty: 15 TABLET | Refills: 0 | Status: SHIPPED | OUTPATIENT
Start: 2021-08-31 | End: 2021-09-16 | Stop reason: HOSPADM

## 2021-08-31 RX ORDER — TAMSULOSIN HYDROCHLORIDE 0.4 MG/1
0.4 CAPSULE ORAL
Qty: 30 CAPSULE | Refills: 0 | Status: SHIPPED | OUTPATIENT
Start: 2021-08-31 | End: 2022-05-04 | Stop reason: ALTCHOICE

## 2021-08-31 RX ORDER — IBUPROFEN 600 MG/1
600 TABLET ORAL EVERY 6 HOURS PRN
Qty: 30 TABLET | Refills: 0 | Status: SHIPPED | OUTPATIENT
Start: 2021-08-31 | End: 2022-05-04 | Stop reason: ALTCHOICE

## 2021-08-31 NOTE — PROGRESS NOTES
Pre-op visit  8/31/2021    Assessment and Plan     54 y o  female managed by Dr Debora Bailey    1  Nephrolithiasis  ·  OR scheduled as below  ·  Will continue with medications to keep patient comfortable-ibuprofen, Percocet, oxybutynin, tamsulosin  ·  ER precautions discussed  ·  repeat CT stone study  ·  after acute phase of nephrolithiasis patient will need to follow-up with Nephrology  Prior stone analysis reveals 90% calcium oxalate  ·  follow up in the office as scheduled postoperatively    History and physical was performed for the patients upcoming  Cystoscopy with left-sided ureteroscopy, retrograde pyelogram and insertion of left ureteral stent, removal of right ureteral stent scheduled for  09/16/2021 with Dr Debora Bailey  All questions and concerns regarding surgery have been addressed and answered  Will proceed with surgery as planned  History of Present Illness  Jamari Bazan is a 54 y o  female here for history and physical prior to their upcoming  Cystoscopy with left-sided ureteroscopy, retrograde pyelogram and insertion of left ureteral stent, removal of right ureteral stent for  nephrolithiasis  The patient has had no overall changes in their health since their last visit and denies any prior complications with anesthesia  She denies recent onset of chest pain, shortness of breath and dizziness  She denies smoking and the use/ abuse of illicit substances and alcohol  Patient initially had cystoscopy, bilateral ureteroscopy with stent insertion performed 07/26/2021 for an 8 mm UVJ stone with severe left-sided hydroureteronephrosis performed 07/25/2021  Patient presents to the office today with a moderate amount of flank and abdominal pain with no relief with medications over the course of the last few weeks  She is no longer taking medications to assist with medical expulsive therapy as she when out  She continues to have moderate pain that is affecting her ability to work during the day    She reports urinary frequency, urgency, dysuria  She denies fever and chills  She does report hematuria  Review of Systems   Constitutional: Negative for chills and fever  Respiratory: Negative for cough and shortness of breath  Cardiovascular: Negative for chest pain  Gastrointestinal: Negative for abdominal distention, abdominal pain, blood in stool, nausea and vomiting  Genitourinary: Positive for flank pain, frequency and urgency  Negative for difficulty urinating, dysuria, enuresis and hematuria  Skin: Negative for rash  Past Medical History  Past Medical History:   Diagnosis Date    Anxiety     Asthma     Colon polyp     Depression     Diverticulitis     Diverticulitis of colon     Follicular cyst of ovary 12/23/2014    Former smoker     GERD (gastroesophageal reflux disease)     Glycosuria     Headache     Hyperlipidemia     Hypertension     Kidney calculi 1/11/2019    Obesity     Ovarian cyst     Overactive bladder     Overweight     Pulmonary nodule     Renal calculus     Tinea pedis of left foot 6/8/2020    Vertigo     Wears partial dentures     Lower plate -partial       Past Social History  Past Surgical History:   Procedure Laterality Date    APPENDECTOMY      BLADDER SUSPENSION      [facility]    CERVICAL BIOPSY  W/ LOOP ELECTRODE EXCISION      COLONOSCOPY      DILATION AND CURETTAGE OF UTERUS      FL RETROGRADE PYELOGRAM  7/26/2021    HYSTERECTOMY  2007    ovaries present bilaterally    AZ COLONOSCOPY FLX DX W/COLLJ SPEC WHEN PFRMD N/A 9/26/2017    Procedure: EGD AND COLONOSCOPY;  Surgeon: Lidya Claros MD;  Location: [facility] GI LAB;   Service: Gastroenterology    AZ CYSTO/URETERO W/LITHOTRIPSY &INDWELL STENT INSRT Bilateral 7/26/2021    Procedure: CYSTOSCOPY URETEROSCOPY WITH LITHOTRIPSY HOLMIUM LASER, RETROGRADE PYELOGRAM AND INSERTION STENT URETERAL;  Surgeon: Ashleigh Santos MD;  Location: [facility] OR;  Service: Urology    AZ REVISE MEDIAN N/CARPAL TUNNEL SURG Right 5/29/2018    Procedure: CARPAL TUNNEL RELEASE;  Surgeon: Derrick Aragon DO;  [facility];  Service: Orthopedics    AL TOTAL KNEE ARTHROPLASTY Right 5/19/2021    Procedure: ARTHROPLASTY KNEE TOTAL;  Surgeon: Della Maradiaga MD;  [facility];  Service: Orthopedics    TONSILLECTOMY      TUBAL LIGATION      URETEROSCOPY  7/26/2021    Procedure: URETEROSCOPY, LASER AND BASKET STONE EXTRACTION;  Surgeon: Krystin Vasquez MD;  [facility];  Service: Urology    WISDOM TOOTH EXTRACTION         Past Family History  Family History   Problem Relation Age of Onset    Diabetes type II Mother     Asthma Mother     Stroke Father     Pancreatic cancer Father 68    Diabetes type II Father     No Known Problems Maternal Grandmother     No Known Problems Maternal Grandfather     No Known Problems Paternal Grandmother     No Known Problems Paternal Grandfather     No Known Problems Sister     No Known Problems Sister     No Known Problems Sister     No Known Problems Sister     No Known Problems Sister     No Known Problems Maternal Aunt     No Known Problems Paternal Aunt        Past Social history  Social History     Socioeconomic History    Marital status: /Civil Union     Spouse name: Not on file    Number of children: Not on file    Years of education: Not on file    Highest education level: Not on file   Occupational History    Not on file   Tobacco Use    Smoking status: Former Smoker     Packs/day: 0 25     Years: 20 00     Pack years: 5 00     Types: Cigarettes     Quit date: 3/16/2018     Years since quitting: 3 4    Smokeless tobacco: Never Used    Tobacco comment: approx less than  half a pack   Vaping Use    Vaping Use: Never used   Substance and Sexual Activity    Alcohol use:  Yes     Alcohol/week: 3 0 standard drinks     Types: 3 Cans of beer per week     Comment: socially    Drug use: No     Comment: Denies    Sexual activity: Not Currently     Partners: Male   Other Topics Concern    Not on file   Social History Narrative    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      Social Determinants of Health     Financial Resource Strain:     Difficulty of Paying Living Expenses:    Food Insecurity:     Worried About Running Out of Food in the Last Year:     [address] in the Last Year:    Transportation Needs:     Lack of Transportation (Medical):      Lack of Transportation (Non-Medical):    Physical Activity:     Days of Exercise per Week:     Minutes of Exercise per Session:    Stress:     Feeling of Stress :    Social Connections:     Frequency of Communication with Friends and Family:     Frequency of Social Gatherings with Friends and Family:     Attends [employer] Services:     Active Member of Clubs or Organizations:     Attends Club or Organization Meetings:     Marital Status:    Intimate Partner Violence:     Fear of Current or Ex-Partner:     Emotionally Abused:     Physically Abused:     Sexually Abused:        Current Medications  Current Outpatient Medications   Medication Sig Dispense Refill    Ascorbic Acid (vitamin C) 1000 MG tablet Take 2,000 mg by mouth daily      atorvastatin (LIPITOR) 40 mg tablet Take 1 tablet (40 mg total) by mouth daily 90 tablet 1    Cholecalciferol (Vitamin D3) 50 MCG (2000 UT) TABS Take 2,000 Units by mouth daily      lisinopril (ZESTRIL) 10 mg tablet Take 1 5 tablets (15 mg total) by mouth daily 180 tablet 1    metFORMIN (GLUCOPHAGE) 1000 MG tablet Take 1 tablet (1,000 mg total) by mouth 2 (two) times a day with meals 180 tablet 1    metoprolol tartrate (LOPRESSOR) 25 mg tablet Take 0 5 tablets (12 5 mg total) by mouth every 12 (twelve) hours 30 tablet 0    multivitamin (THERAGRAN) TABS Take 1 tablet by mouth daily      omeprazole (PriLOSEC) 20 mg delayed release capsule Take 2 capsules (40 mg total) by mouth daily 90 capsule 1    ibuprofen (MOTRIN) 600 mg tablet Take 1 tablet (600 mg total) by mouth every 6 (six) hours as needed for mild pain 30 tablet 0    oxybutynin (DITROPAN) 5 mg tablet Take 1 tablet (5 mg total) by mouth 3 (three) times a day as needed (pain/pressure) 30 tablet 0    oxyCODONE-acetaminophen (PERCOCET) 5-325 mg per tablet Take 1 tablet by mouth every 4 (four) hours as needed for moderate painMax Daily Amount: 6 tablets 15 tablet 0    sertraline (ZOLOFT) 50 mg tablet Take 1 tablet (50 mg total) by mouth daily Takes in the am  (Patient not taking: Reported on 8/31/2021) 90 tablet 1    tamsulosin (FLOMAX) 0 4 mg Take 1 capsule (0 4 mg total) by mouth daily with dinner 30 capsule 0     No current facility-administered medications for this visit  Allergies  Allergies   Allergen Reactions    Clonazepam Other (See Comments)     Pt states "didn't feel right on it "    Morphine Other (See Comments) and Confusion     Annotation - [id number]: "dopey"  Gets silly    Venlafaxine Other (See Comments)     Unknown--pt states " MD was trying pt on different medications "         Past Medical History, Social History, Family History, medications and allergies were reviewed  Vitals  Vitals:    08/31/21 1518   BP: 128/70   Pulse: (!) 126   Weight: 76 7 kg (169 lb)   Height: 5' 3" (1 6 m)     Physical Exam    /70   Pulse (!) 126   Ht 5' 3" (1 6 m)   Wt 76 7 kg (169 lb)   BMI 29 94 kg/m²   General appearance:  moderate distress  Lungs: clear to auscultation bilaterally  Heart: regular rate and rhythm, S1, S2 normal, no murmur, click, rub or gallop  Abdomen:  soft, nontender  Extremities:  full range of motion, no edema noted    Skin:   Warm, dry and intact  Neurologic: Grossly normal     LEONARD Rehman

## 2021-08-31 NOTE — PROGRESS NOTES
Daily Note     Today's date: 2021  Patient name: Norberto Bermeo  : 1965  MRN: [id number]  Referring provider: Lazaro Prasad MD  Dx:   Encounter Diagnosis     ICD-10-CM    1  Status post total knee replacement, right  Z96 651    2  Primary osteoarthritis of right knee  M17 11                   Subjective: pt reports with c/o achiness in R knee pre-tx  She noted difficulty lying down in fetal position due to discomfort after short period of time and then needs to sit with leg extended  Objective: See treatment diary below      Assessment: Tolerated treatment well  Continues to exhibit most difficulty with eccentric control and requires intermittent cues to do so correctly  Patient demonstrated fatigue post treatment, exhibited good technique with therapeutic exercises and would benefit from continued PT      Plan: Continue per plan of care  Progress treatment as tolerated  Precautions: vertigo, HTN, HLD, GERD, asthma, anxiety, DM     Daily Treatment Diary    /  Assessment  8/20  8/24 8/27 8/31 7/20 7/23 8/3 8/6 8/10 8/17   Eval/Reval            RE   FOTO            Comp   Manuals   Knee PROM  TE TE NT-not needed  CC SC       Patellar ROM TE    CC SC       HS & GS Stretch             IASTM L ITB/lat quads         TE(light to katrina)    Exercise Diary     Bike - AROM  L3 x6' L3x6' L3x6' L3x6' L1 x10' L1 x10' l1 10' L1x10' L1x10' L3 x6'     LAQ 2# 2x10 2# 5" 2x10  3# 5" 2x10 3# 5"ecc lower 2x10 2# 5" 2x10 2 5# 5"  2x10 2 5# 5"  2x10 5"  2x10 3# 5" 2x10     SAQ      2# 5" 2x10 2 5# 5"  2x10 2 5# 5"  2x10 5"  2x10 3# 5"2x10     TKE with 1/2 Foam                SLR Flexion 2#  18 2#  2x10 3# x18 2#   2x10 2# 2x10 2#  2x10 2#  2x10   2x10 x10 2#  2x10    SLR Abduction 2#  2x10 2#  2x10 2# 2x10 2#   2x10 2# 2x10 2#  2x10 2#  2x10   2x10 x10 2#   2x10    Step Ups:   Forward   1R  x20 1Rx20 1Rx20 ! R x5  OR x15 1R  20x 1R  20x NP      Step Ups:   Lateral  0R  x20 0R x20 0R x20 OR x20 1R  x20 1R 20x NP Eccentric Step Downs:    Forward   x10 0R x20      Slow march c pause counter   2 laps    Marches Slow c pause x20            Tandem walk 3 laps            Mini Squats  2x10  HOLD        Tandem walk at counter   2 laps   STS  STS 2x10  STS 2x10 STS 2x10 STS 2x10      STS   x10 no hands  x10 c foam under feet     gait tr                Wall Slides  nv  nv   5"x12 5"x12 5"x12 NP 2x10    Single Leg Press  22# 2x10  22#  2x10 27#x10 22#x10 22# 3x10 22# 2x10 22#  2x10 22#  2x10 NP 22# 2x10     Standing Knee Flexion  11# 2x10  22#  2x10 22# 2x10 22#  3x10    2x10 2x10    Modalities    CP R Knee   Extension Stretch  Post-Tx      NP   CP only  10'

## 2021-08-31 NOTE — H&P (VIEW-ONLY)
Pre-op visit  8/31/2021    Assessment and Plan     54 y o  female managed by Dr Claudia Gordon    1  Nephrolithiasis  ·  OR scheduled as below  ·  Will continue with medications to keep patient comfortable-ibuprofen, Percocet, oxybutynin, tamsulosin  ·  ER precautions discussed  ·  repeat CT stone study  ·  after acute phase of nephrolithiasis patient will need to follow-up with Nephrology  Prior stone analysis reveals 90% calcium oxalate  ·  follow up in the office as scheduled postoperatively    History and physical was performed for the patients upcoming  Cystoscopy with left-sided ureteroscopy, retrograde pyelogram and insertion of left ureteral stent, removal of right ureteral stent scheduled for  09/16/2021 with Dr Claudia Gordon  All questions and concerns regarding surgery have been addressed and answered  Will proceed with surgery as planned  History of Present Illness  Luis Alberto Day is a 54 y o  female here for history and physical prior to their upcoming  Cystoscopy with left-sided ureteroscopy, retrograde pyelogram and insertion of left ureteral stent, removal of right ureteral stent for  nephrolithiasis  The patient has had no overall changes in their health since their last visit and denies any prior complications with anesthesia  She denies recent onset of chest pain, shortness of breath and dizziness  She denies smoking and the use/ abuse of illicit substances and alcohol  Patient initially had cystoscopy, bilateral ureteroscopy with stent insertion performed 07/26/2021 for an 8 mm UVJ stone with severe left-sided hydroureteronephrosis performed 07/25/2021  Patient presents to the office today with a moderate amount of flank and abdominal pain with no relief with medications over the course of the last few weeks  She is no longer taking medications to assist with medical expulsive therapy as she when out  She continues to have moderate pain that is affecting her ability to work during the day    She reports urinary frequency, urgency, dysuria  She denies fever and chills  She does report hematuria  Review of Systems   Constitutional: Negative for chills and fever  Respiratory: Negative for cough and shortness of breath  Cardiovascular: Negative for chest pain  Gastrointestinal: Negative for abdominal distention, abdominal pain, blood in stool, nausea and vomiting  Genitourinary: Positive for flank pain, frequency and urgency  Negative for difficulty urinating, dysuria, enuresis and hematuria  Skin: Negative for rash  Past Medical History  Past Medical History:   Diagnosis Date    Anxiety     Asthma     Colon polyp     Depression     Diverticulitis     Diverticulitis of colon     Follicular cyst of ovary 12/23/2014    Former smoker     GERD (gastroesophageal reflux disease)     Glycosuria     Headache     Hyperlipidemia     Hypertension     Kidney calculi 1/11/2019    Obesity     Ovarian cyst     Overactive bladder     Overweight     Pulmonary nodule     Renal calculus     Tinea pedis of left foot 6/8/2020    Vertigo     Wears partial dentures     Lower plate -partial       Past Social History  Past Surgical History:   Procedure Laterality Date    APPENDECTOMY      BLADDER SUSPENSION      [facility]    CERVICAL BIOPSY  W/ LOOP ELECTRODE EXCISION      COLONOSCOPY      DILATION AND CURETTAGE OF UTERUS      FL RETROGRADE PYELOGRAM  7/26/2021    HYSTERECTOMY  2007    ovaries present bilaterally    AL COLONOSCOPY FLX DX W/COLLJ SPEC WHEN PFRMD N/A 9/26/2017    Procedure: EGD AND COLONOSCOPY;  Surgeon: Abraham Bennett MD;  Location: [facility];   Service: Gastroenterology    AL CYSTO/URETERO W/LITHOTRIPSY &INDWELL STENT INSRT Bilateral 7/26/2021    Procedure: CYSTOSCOPY URETEROSCOPY WITH LITHOTRIPSY HOLMIUM LASER, RETROGRADE PYELOGRAM AND INSERTION STENT URETERAL;  Surgeon: Garima Card MD;  Location: [facility] OR;  Service: Urology    AL REVISE MEDIAN N/CARPAL TUNNEL SURG Right 5/29/2018    Procedure: CARPAL TUNNEL RELEASE;  Surgeon: Rhea Merrill DO;  [facility];  Service: Orthopedics    [place] TOTAL KNEE ARTHROPLASTY Right 5/19/2021    Procedure: ARTHROPLASTY KNEE TOTAL;  Surgeon: Zahida Reyes MD;  [facility];  Service: Orthopedics    TONSILLECTOMY      TUBAL LIGATION      URETEROSCOPY  7/26/2021    Procedure: URETEROSCOPY, LASER AND BASKET STONE EXTRACTION;  Surgeon: Gloria Hollins MD;  [facility];  Service: Urology    WISDOM TOOTH EXTRACTION         Past Family History  Family History   Problem Relation Age of Onset    Diabetes type II Mother     Asthma Mother     Stroke Father     Pancreatic cancer Father 68    Diabetes type II Father     No Known Problems Maternal Grandmother     No Known Problems Maternal Grandfather     No Known Problems Paternal Grandmother     No Known Problems Paternal Grandfather     No Known Problems Sister     No Known Problems Sister     No Known Problems Sister     No Known Problems Sister     No Known Problems Sister     No Known Problems Maternal Aunt     No Known Problems Paternal Aunt        Past Social history  Social History     Socioeconomic History    Marital status: /Civil Union     Spouse name: Not on file    Number of children: Not on file    Years of education: Not on file    Highest education level: Not on file   Occupational History    Not on file   Tobacco Use    Smoking status: Former Smoker     Packs/day: 0 25     Years: 20 00     Pack years: 5 00     Types: Cigarettes     Quit date: 3/16/2018     Years since quitting: 3 4    Smokeless tobacco: Never Used    Tobacco comment: approx less than  half a pack   Vaping Use    Vaping Use: Never used   Substance and Sexual Activity    Alcohol use:  Yes     Alcohol/week: 3 0 standard drinks     Types: 3 Cans of beer per week     Comment: socially    Drug use: No     Comment: Denies    Sexual activity: Not Currently     Partners: Male   Other Topics Concern    Not on file   Social History Narrative    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      Social Determinants of Health     Financial Resource Strain:     Difficulty of Paying Living Expenses:    Food Insecurity:     Worried About Running Out of Food in the Last Year:     [address] in the Last Year:    Transportation Needs:     Lack of Transportation (Medical):      Lack of Transportation (Non-Medical):    Physical Activity:     Days of Exercise per Week:     Minutes of Exercise per Session:    Stress:     Feeling of Stress :    Social Connections:     Frequency of Communication with Friends and Family:     Frequency of Social Gatherings with Friends and Family:     Attends Rastafarian Services:     Active Member of Clubs or Organizations:     Attends Club or Organization Meetings:     Marital Status:    Intimate Partner Violence:     Fear of Current or Ex-Partner:     Emotionally Abused:     Physically Abused:     Sexually Abused:        Current Medications  Current Outpatient Medications   Medication Sig Dispense Refill    Ascorbic Acid (vitamin C) 1000 MG tablet Take 2,000 mg by mouth daily      atorvastatin (LIPITOR) 40 mg tablet Take 1 tablet (40 mg total) by mouth daily 90 tablet 1    Cholecalciferol (Vitamin D3) 50 MCG (2000 UT) TABS Take 2,000 Units by mouth daily      lisinopril (ZESTRIL) 10 mg tablet Take 1 5 tablets (15 mg total) by mouth daily 180 tablet 1    metFORMIN (GLUCOPHAGE) 1000 MG tablet Take 1 tablet (1,000 mg total) by mouth 2 (two) times a day with meals 180 tablet 1    metoprolol tartrate (LOPRESSOR) 25 mg tablet Take 0 5 tablets (12 5 mg total) by mouth every 12 (twelve) hours 30 tablet 0    multivitamin (THERAGRAN) TABS Take 1 tablet by mouth daily      omeprazole (PriLOSEC) 20 mg delayed release capsule Take 2 capsules (40 mg total) by mouth daily 90 capsule 1    ibuprofen (MOTRIN) 600 mg tablet Take 1 tablet (600 mg total) by mouth every 6 (six) hours as needed for mild pain 30 tablet 0    oxybutynin (DITROPAN) 5 mg tablet Take 1 tablet (5 mg total) by mouth 3 (three) times a day as needed (pain/pressure) 30 tablet 0    oxyCODONE-acetaminophen (PERCOCET) 5-325 mg per tablet Take 1 tablet by mouth every 4 (four) hours as needed for moderate painMax Daily Amount: 6 tablets 15 tablet 0    sertraline (ZOLOFT) 50 mg tablet Take 1 tablet (50 mg total) by mouth daily Takes in the am  (Patient not taking: Reported on 8/31/2021) 90 tablet 1    tamsulosin (FLOMAX) 0 4 mg Take 1 capsule (0 4 mg total) by mouth daily with dinner 30 capsule 0     No current facility-administered medications for this visit  Allergies  Allergies   Allergen Reactions    Clonazepam Other (See Comments)     Pt states "didn't feel right on it "    Morphine Other (See Comments) and Confusion     Annotation - [id number]: "dopey"  Gets silly    Venlafaxine Other (See Comments)     Unknown--pt states " MD was trying pt on different medications "         Past Medical History, Social History, Family History, medications and allergies were reviewed  Vitals  Vitals:    08/31/21 1518   BP: 128/70   Pulse: (!) 126   Weight: 76 7 kg (169 lb)   Height: 5' 3" (1 6 m)     Physical Exam    /70   Pulse (!) 126   Ht 5' 3" (1 6 m)   Wt 76 7 kg (169 lb)   BMI 29 94 kg/m²   General appearance:  moderate distress  Lungs: clear to auscultation bilaterally  Heart: regular rate and rhythm, S1, S2 normal, no murmur, click, rub or gallop  Abdomen:  soft, nontender  Extremities:  full range of motion, no edema noted    Skin:   Warm, dry and intact  Neurologic: Grossly normal     Hinton OfficerLEONARD

## 2021-08-31 NOTE — Clinical Note
Patient will need to have repeat CT stone study performed prior to proceeding with OR case to ensure present/location of stone since prior intervention performed

## 2021-09-01 LAB — BACTERIA UR CULT: NORMAL

## 2021-09-02 ENCOUNTER — APPOINTMENT (OUTPATIENT)
Dept: PHYSICAL THERAPY | Facility: REHABILITATION | Age: 56
End: 2021-09-02
Payer: COMMERCIAL

## 2021-09-02 ENCOUNTER — TELEPHONE (OUTPATIENT)
Dept: UROLOGY | Facility: MEDICAL CENTER | Age: 56
End: 2021-09-02

## 2021-09-03 ENCOUNTER — OFFICE VISIT (OUTPATIENT)
Dept: PHYSICAL THERAPY | Facility: REHABILITATION | Age: 56
End: 2021-09-03
Payer: COMMERCIAL

## 2021-09-03 DIAGNOSIS — Z96.651 STATUS POST TOTAL KNEE REPLACEMENT, RIGHT: Primary | ICD-10-CM

## 2021-09-03 DIAGNOSIS — M17.11 PRIMARY OSTEOARTHRITIS OF RIGHT KNEE: ICD-10-CM

## 2021-09-03 PROCEDURE — 97530 THERAPEUTIC ACTIVITIES: CPT

## 2021-09-03 PROCEDURE — 97110 THERAPEUTIC EXERCISES: CPT

## 2021-09-03 NOTE — PROGRESS NOTES
Daily Note     Today's date: 9/3/2021  Patient name: Levi Puri  : 1965  MRN: [id number]  Referring provider: Jarek Parker MD  Dx:   Encounter Diagnosis     ICD-10-CM    1  Status post total knee replacement, right  Z96 651    2  Primary osteoarthritis of right knee  M17 11                   Subjective: pt reports continues to work on step exercises at home; She noted continues to be most difficult  Objective: See treatment diary below      Assessment: Tolerated treatment well  Continues to exhibit most difficulty with eccentric control, but slowly improving  Good challenge with current program  Patient demonstrated fatigue post treatment, exhibited good technique with therapeutic exercises and would benefit from continued PT      Plan: Continue per plan of care  Progress treatment as tolerated  Precautions: vertigo, HTN, HLD, GERD, asthma, anxiety, DM     Daily Treatment Diary    /  Assessment  8/20  8/24 8/27 8/31 9/3 7/23 8/3 8/6 8/10 8/17   Eval/Reval            RE   FOTO            Comp   Manuals   Knee PROM  TE TE NT-not needed   SC       Patellar ROM TE     SC       HS & GS Stretch             IASTM L ITB/lat quads         TE(light to katrina)    Exercise Diary     Bike - AROM  L3 x6' L3x6' L3x6' L3x6' L3x6' L1 x10' l1 10' L1x10' L1x10' L3 x6'     LAQ 2# 2x10 2# 5" 2x10  3# 5" 2x10 3# 5"ecc lower 2x10 3# 5"ecc lower 2x10 2 5# 5"  2x10 2 5# 5"  2x10 5"  2x10 3# 5" 2x10     SAQ       2 5# 5"  2x10 2 5# 5"  2x10 5"  2x10 3# 5"2x10     TKE with 1/2 Foam                SLR Flexion 2#  18 2#  2x10 3# x18 2#   2x10 3# 2x10 2#  2x10 2#  2x10   2x10 x10 2#  2x10    SLR Abduction 2#  2x10 2#  2x10 2# 2x10 2#   2x10 3# 2x10 2#  2x10 2#  2x10   2x10 x10 2#   2x10    Step Ups:   Forward   1R  x20 1Rx20 1Rx20 1R x20   1R  20x 1R  20x NP      Step Ups:   Lateral  0R  x20 0R x20 0R x20 OR x20 1R  x20 1R 20x NP      Eccentric Step Downs:    Forward   x10 0R x20      Slow march c pause counter   2 laps Marches Slow c pause x20            Tandem walk 3 laps            Mini Squats  2x10  HOLD        Tandem walk at counter   2 laps   STS  STS 2x10  STS 2x10 STS 2x10 STS 2x10 STS 2x10     STS   x10 no hands  x10 c foam under feet     gait tr                Wall Slides  nv  nv   nv 5"x12 5"x12 NP 2x10    Single Leg Press  22# 2x10  22#  2x10 27#x10 22#x10 22# 3x10 22# 3x10 22#  2x10 22#  2x10 NP 22# 2x10     Standing Knee Flexion  11# 2x10  22#  2x10 22# 2x10 22#  3x10 33# 2x10   2x10 2x10    Modalities    CP R Knee   Extension Stretch  Post-Tx      NP   CP only  10'

## 2021-09-07 ENCOUNTER — OFFICE VISIT (OUTPATIENT)
Dept: PHYSICAL THERAPY | Facility: REHABILITATION | Age: 56
End: 2021-09-07
Payer: COMMERCIAL

## 2021-09-07 DIAGNOSIS — M17.11 PRIMARY OSTEOARTHRITIS OF RIGHT KNEE: ICD-10-CM

## 2021-09-07 DIAGNOSIS — Z96.651 STATUS POST TOTAL KNEE REPLACEMENT, RIGHT: Primary | ICD-10-CM

## 2021-09-07 PROCEDURE — 97110 THERAPEUTIC EXERCISES: CPT | Performed by: PHYSICAL THERAPIST

## 2021-09-07 PROCEDURE — 97530 THERAPEUTIC ACTIVITIES: CPT | Performed by: PHYSICAL THERAPIST

## 2021-09-07 PROCEDURE — 97112 NEUROMUSCULAR REEDUCATION: CPT | Performed by: PHYSICAL THERAPIST

## 2021-09-07 NOTE — PROGRESS NOTES
Daily Note     Today's date: 2021  Patient name: Deidre Venegas  : 1965  MRN: [id number]  Referring provider: Fermín Nair MD  Dx:   Encounter Diagnosis     ICD-10-CM    1  Status post total knee replacement, right  Z96 651    2  Primary osteoarthritis of right knee  M17 11                   Subjective: Pt comes to therapy denying pain or discomfort  Denies discomfort following last treatment session  Objective: See treatment diary below      Assessment: Tolerated treatment well  Patient demonstrated fatigue post treatment, exhibited good technique with therapeutic exercises and would benefit from continued PT      Plan: Progress treatment as tolerated  Precautions: vertigo, HTN, HLD, GERD, asthma, anxiety, DM     Daily Treatment Diary    /  Assessment  8/20  8/24 8/27 8/31 9/3 9/7 8/3 8/6 8/10 8/17   Eval/Reval            RE   FOTO            Comp   Manuals   Knee PROM  TE TE NT-not needed          Patellar ROM TE            HS & GS Stretch             IASTM L ITB/lat quads         TE(light to katrina)    Exercise Diary     Bike - AROM  L3 x6' L3x6' L3x6' L3x6' L3x6' L3x6' l1 10' L1x10' L1x10' L3 x6'     LAQ 2# 2x10 2# 5" 2x10  3# 5" 2x10 3# 5"ecc lower 2x10 3# 5"ecc lower 2x10 3# 5"ecc lower 2x10 2 5# 5"  2x10 5"  2x10 3# 5" 2x10     SAQ        2 5# 5"  2x10 5"  2x10 3# 5"2x10     TKE with 1/2 Foam                SLR Flexion 2#  18 2#  2x10 3# x18 2#   2x10 3# 2x10 3# 2x10 2#  2x10   2x10 x10 2#  2x10    SLR Abduction 2#  2x10 2#  2x10 2# 2x10 2#   2x10 3# 2x10 3# 2x10 2#  2x10   2x10 x10 2#   2x10    Step Ups:   Forward   1R  x20 1Rx20 1Rx20 1R x20   1R x20 1R  20x NP      Step Ups:   Lateral  0R  x20 0R x20 0R x20 OR x20 1R x20 1R 20x NP      Eccentric Step Downs:    Forward   x10 0R x20  1R x20    Slow march c pause counter   2 laps    Marches Slow c pause x20            Tandem walk 3 laps            Mini Squats  2x10  HOLD        Tandem walk at counter   2 laps   STS  STS 2x10  STS 2x10 STS 2x10 STS 2x10 STS 2x10 STS 2x10    STS   x10 no hands  x10 c foam under feet     gait tr                Wall Slides  nv  nv   nv  5"x12 NP 2x10    Single Leg Press  22# 2x10  22#  2x10 27#x10 22#x10 22# 3x10 22# 3x10 44#  2x10 22#  2x10 NP 22# 2x10     Standing Knee Flexion  11# 2x10  22#  2x10 22# 2x10 22#  3x10 33# 2x10 33# 2x10  2x10 2x10    Modalities    CP R Knee   Extension Stretch  Post-Tx      NP   CP only  10'

## 2021-09-08 ENCOUNTER — HOSPITAL ENCOUNTER (OUTPATIENT)
Dept: RADIOLOGY | Age: 56
Discharge: HOME | End: 2021-09-08
Payer: COMMERCIAL

## 2021-09-08 ENCOUNTER — APPOINTMENT (OUTPATIENT)
Dept: LAB | Age: 56
End: 2021-09-08
Payer: COMMERCIAL

## 2021-09-08 DIAGNOSIS — N20.0 NEPHROLITHIASIS: ICD-10-CM

## 2021-09-08 LAB
BACTERIA UR QL AUTO: ABNORMAL /HPF
BILIRUB UR QL STRIP: NEGATIVE
CLARITY UR: ABNORMAL
COLOR UR: ABNORMAL
GLUCOSE UR STRIP-MCNC: NEGATIVE MG/DL
HGB UR QL STRIP.AUTO: ABNORMAL
KETONES UR STRIP-MCNC: ABNORMAL MG/DL
LEUKOCYTE ESTERASE UR QL STRIP: ABNORMAL
NITRITE UR QL STRIP: NEGATIVE
NON-SQ EPI CELLS URNS QL MICRO: ABNORMAL /HPF
PH UR STRIP.AUTO: 6.5 [PH]
PROT UR STRIP-MCNC: ABNORMAL MG/DL
RBC #/AREA URNS AUTO: ABNORMAL /HPF
SP GR UR STRIP.AUTO: 1.02 (ref 1–1.03)
UROBILINOGEN UR QL STRIP.AUTO: 1 E.U./DL
WBC #/AREA URNS AUTO: ABNORMAL /HPF

## 2021-09-08 PROCEDURE — 81001 URINALYSIS AUTO W/SCOPE: CPT

## 2021-09-08 PROCEDURE — 87086 URINE CULTURE/COLONY COUNT: CPT

## 2021-09-08 PROCEDURE — G1004 CDSM NDSC: HCPCS

## 2021-09-08 PROCEDURE — 74176 CT ABD & PELVIS W/O CONTRAST: CPT

## 2021-09-09 ENCOUNTER — APPOINTMENT (OUTPATIENT)
Dept: PHYSICAL THERAPY | Facility: REHABILITATION | Age: 56
End: 2021-09-09
Payer: COMMERCIAL

## 2021-09-09 LAB
BACTERIA UR CULT: NORMAL
BACTERIA UR CULT: NORMAL

## 2021-09-10 ENCOUNTER — OFFICE VISIT (OUTPATIENT)
Dept: PHYSICAL THERAPY | Facility: REHABILITATION | Age: 56
End: 2021-09-10
Payer: COMMERCIAL

## 2021-09-10 DIAGNOSIS — M17.11 PRIMARY OSTEOARTHRITIS OF RIGHT KNEE: ICD-10-CM

## 2021-09-10 DIAGNOSIS — Z96.651 STATUS POST TOTAL KNEE REPLACEMENT, RIGHT: Primary | ICD-10-CM

## 2021-09-10 PROCEDURE — 97110 THERAPEUTIC EXERCISES: CPT | Performed by: PHYSICAL THERAPIST

## 2021-09-10 PROCEDURE — 97112 NEUROMUSCULAR REEDUCATION: CPT | Performed by: PHYSICAL THERAPIST

## 2021-09-10 PROCEDURE — 97530 THERAPEUTIC ACTIVITIES: CPT | Performed by: PHYSICAL THERAPIST

## 2021-09-10 NOTE — PROGRESS NOTES
Daily Note     Today's date: 9/10/2021  Patient name: Timo Guadalupe  : 1965  MRN: [id number]  Referring provider: Larisa Daley MD  Dx:   Encounter Diagnosis     ICD-10-CM    1  Status post total knee replacement, right  Z96 651    2  Primary osteoarthritis of right knee  M17 11                   Subjective: Pt presents to session with no new complaints  Objective: See treatment diary below  Assessment:  Pt with good tolerance to progression of program with appropriate challenge and fatigue, no increase in knee pain  Pt with most challenge with wall slides  Will continue to progress program as able  Pt will benefit from continued skilled PT intervention in order to address her remaining limitations and to restore maximal function  Plan: Progress treatment as tolerated  Precautions: vertigo, HTN, HLD, GERD, asthma, anxiety, DM     Daily Treatment Diary      Assessment  8/20  8/24 8/27 8/31 9/3 9/7 9/10      Eval/Reval               FOTO               Manuals   Knee PROM  TE TE NT-not needed          Patellar ROM TE            HS & GS Stretch             IASTM L ITB/lat quads             Exercise Diary     Bike - AROM  L3 x6' L3x6' L3x6' L3x6' L3x6' L3x6' L2x 10'       LAQ 2# 2x10 2# 5" 2x10  3# 5" 2x10 3# 5"ecc lower 2x10 3# 5"ecc lower 2x10 3# 5"ecc lower 2x10 4#  Eccentric 5"  2x10       SAQ               TKE with 1/2 Foam                SLR Flexion 2#  18 2#  2x10 3# x18 2#   2x10 3# 2x10 3# 2x10 3#  2x10       SLR Abduction 2#  2x10 2#  2x10 2# 2x10 2#   2x10 3# 2x10 3# 2x10 3#  2x10       Step Ups:   Forward   1R  x20 1Rx20 1Rx20 1R x20   1R x20 1R  x20       Step Ups:   Lateral  0R  x20 0R x20 0R x20 OR x20 1R x20 1R  x20       Eccentric Step Downs:    Forward   x10 0R x20  1R x20 1R  x20      Marches Slow c pause x20            Tandem walk 3 laps            Mini Squats  2x10  HOLD           STS  STS 2x10  STS 2x10 STS 2x10 STS 2x10 STS 2x10 STS 2x10 Wall Slides  5"x10       Knee Extension         11#  Eccentric lowering  2x10       Wall Slides  nv  nv   nv  5"x12      Single Leg Press  22# 2x10  22#  2x10 27#x10 22#x10 22# 3x10 22# 3x10 44#  2x10 22#  2x10       Standing Knee Flexion  11# 2x10  22#  2x10 22# 2x10 22#  3x10 33# 2x10 33# 2x10 33#  2x10      Modalities    CP R Knee   Extension Stretch  Post-Tx      NP

## 2021-09-13 NOTE — PRE-PROCEDURE INSTRUCTIONS
Pre-Surgery Instructions:   Medication Instructions    Ascorbic Acid (vitamin C) 1000 MG tablet Instructed patient per Anesthesia Guidelines   atorvastatin (LIPITOR) 40 mg tablet Instructed patient per Anesthesia Guidelines   Cholecalciferol (Vitamin D3) 50 MCG (2000 UT) TABS Instructed patient per Anesthesia Guidelines   ibuprofen (MOTRIN) 600 mg tablet Instructed patient per Anesthesia Guidelines   lisinopril (ZESTRIL) 10 mg tablet Instructed patient per Anesthesia Guidelines   metFORMIN (GLUCOPHAGE) 1000 MG tablet Instructed patient per Anesthesia Guidelines   multivitamin (THERAGRAN) TABS Instructed patient per Anesthesia Guidelines   omeprazole (PriLOSEC) 20 mg delayed release capsule Instructed patient per Anesthesia Guidelines   tamsulosin (FLOMAX) 0 4 mg Instructed patient per Anesthesia Guidelines  Patient states she will hold all of her medications the morning of surgery  Instructed to hold NSAIDs, vitamins, supplements now until after procedure

## 2021-09-14 ENCOUNTER — OFFICE VISIT (OUTPATIENT)
Dept: PHYSICAL THERAPY | Facility: REHABILITATION | Age: 56
End: 2021-09-14
Payer: COMMERCIAL

## 2021-09-14 ENCOUNTER — TELEPHONE (OUTPATIENT)
Dept: UROLOGY | Facility: MEDICAL CENTER | Age: 56
End: 2021-09-14

## 2021-09-14 DIAGNOSIS — M17.11 PRIMARY OSTEOARTHRITIS OF RIGHT KNEE: ICD-10-CM

## 2021-09-14 DIAGNOSIS — Z96.651 STATUS POST TOTAL KNEE REPLACEMENT, RIGHT: Primary | ICD-10-CM

## 2021-09-14 PROCEDURE — 97530 THERAPEUTIC ACTIVITIES: CPT | Performed by: PHYSICAL THERAPIST

## 2021-09-14 PROCEDURE — 97112 NEUROMUSCULAR REEDUCATION: CPT | Performed by: PHYSICAL THERAPIST

## 2021-09-14 PROCEDURE — 97110 THERAPEUTIC EXERCISES: CPT | Performed by: PHYSICAL THERAPIST

## 2021-09-14 NOTE — TELEPHONE ENCOUNTER
Patient of Dr Ana Maria Figueroa seen in the [facility] office  Yue Parker from Radiology called and advised of significant findings on CT renal stone study  Please advise

## 2021-09-14 NOTE — PROGRESS NOTES
Daily Note     Today's date: 2021  Patient name: Dg Leroy  : 1965  MRN: [id number]  Referring provider: Alma Ferreira MD  Dx:   Encounter Diagnosis     ICD-10-CM    1  Status post total knee replacement, right  Z96 651    2  Primary osteoarthritis of right knee  M17 11                   Subjective: Pt comes to therapy denying pain or discomfort  Denies discomfort following last treatment session  Objective: See treatment diary below      Assessment: Tolerated treatment well  Patient demonstrated fatigue post treatment, exhibited good technique with therapeutic exercises and would benefit from continued PT      Plan: Progress treatment as tolerated  Precautions: vertigo, HTN, HLD, GERD, asthma, anxiety, DM     Daily Treatment Diary      Assessment  8/20  8/24 8/27 8/31 9/3 9/7 9/10 9/14     Eval/Reval               FOTO               Manuals   Knee PROM  TE TE NT-not needed          Patellar ROM TE            HS & GS Stretch             IASTM L ITB/lat quads             Exercise Diary     Bike - AROM  L3 x6' L3x6' L3x6' L3x6' L3x6' L3x6' L2x 10' L2x 10'      LAQ 2# 2x10 2# 5" 2x10  3# 5" 2x10 3# 5"ecc lower 2x10 3# 5"ecc lower 2x10 3# 5"ecc lower 2x10 4#  Eccentric 5"  2x10 4#  ecc  5"  2x10      SAQ               TKE with 1/2 Foam                SLR Flexion 2#  18 2#  2x10 3# x18 2#   2x10 3# 2x10 3# 2x10 3#  2x10 4# 2x10      SLR Abduction 2#  2x10 2#  2x10 2# 2x10 2#   2x10 3# 2x10 3# 2x10 3#  2x10 4# 2x10      Step Ups:   Forward   1R  x20 1Rx20 1Rx20 1R x20   1R x20 1R  x20 1R  x20      Step Ups:   Lateral  0R  x20 0R x20 0R x20 OR x20 1R x20 1R  x20 1R  x20      Eccentric Step Downs:    Forward   x10 0R x20  1R x20 1R  x20 1R  x20     Marches Slow c pause x20            Tandem walk 3 laps            Mini Squats  2x10  HOLD           STS  STS 2x10  STS 2x10 STS 2x10 STS 2x10 STS 2x10 STS 2x10 Wall Slides  5"x10 Wall Slides  5"x10      Knee Extension         11#  Eccentric lowering  2x10 11#  Eccentric lowering  2x10      Wall Slides  nv  nv   nv  5"x12      Single Leg Press  22# 2x10  22#  2x10 27#x10 22#x10 22# 3x10 22# 3x10 44#  2x10 22#  2x10 44#  2x10      Standing Knee Flexion  11# 2x10  22#  2x10 22# 2x10 22#  3x10 33# 2x10 33# 2x10 33#  2x10 33#  2x10     Modalities    CP R Knee   Extension Stretch  Post-Tx      NP

## 2021-09-14 NOTE — TELEPHONE ENCOUNTER
IMPRESSION:  1  Interval improvement in bilateral hydronephrosis status post placement of ureteral stents, with mild residual pelvocaliectasis seen  The stents are in good position  There has been interval removal of previously noted right UPJ calculus      2   5 mm residual calculus in the left distal ureter at the site of previously noted obstructing stone (previously 8 mm)  There are additional calculi measuring 2 mm in the distal ureter and 4 mm in the proximal ureter, new from prior exam, likely   representing distal propagation of the intrarenal calyceal calculi      3   Multiple bilateral nonobstructing calyceal calculi, largest measuring 12 mm on the left and 8 mm on the right side, similar to prior exam      The study was marked in EPIC for significant notification  Routing to provider to review

## 2021-09-15 ENCOUNTER — ANESTHESIA EVENT (OUTPATIENT)
Dept: PERIOP | Facility: HOSPITAL | Age: 56
End: 2021-09-15
Payer: COMMERCIAL

## 2021-09-16 ENCOUNTER — ANESTHESIA (OUTPATIENT)
Dept: PERIOP | Facility: HOSPITAL | Age: 56
End: 2021-09-16
Payer: COMMERCIAL

## 2021-09-16 ENCOUNTER — TELEPHONE (OUTPATIENT)
Dept: UROLOGY | Facility: MEDICAL CENTER | Age: 56
End: 2021-09-16

## 2021-09-16 ENCOUNTER — HOSPITAL ENCOUNTER (OUTPATIENT)
Facility: HOSPITAL | Age: 56
Setting detail: OUTPATIENT SURGERY
Discharge: HOME | End: 2021-09-16
Attending: UROLOGY | Admitting: UROLOGY
Payer: COMMERCIAL

## 2021-09-16 ENCOUNTER — APPOINTMENT (OUTPATIENT)
Dept: RADIOLOGY | Facility: HOSPITAL | Age: 56
End: 2021-09-16
Payer: COMMERCIAL

## 2021-09-16 VITALS
WEIGHT: 169 LBS | HEIGHT: 63 IN | OXYGEN SATURATION: 98 % | SYSTOLIC BLOOD PRESSURE: 122 MMHG | BODY MASS INDEX: 29.95 KG/M2 | TEMPERATURE: 97.4 F | DIASTOLIC BLOOD PRESSURE: 58 MMHG | RESPIRATION RATE: 18 BRPM | HEART RATE: 98 BPM

## 2021-09-16 DIAGNOSIS — N20.0 NEPHROLITHIASIS: ICD-10-CM

## 2021-09-16 PROCEDURE — 52356 CYSTO/URETERO W/LITHOTRIPSY: CPT | Performed by: UROLOGY

## 2021-09-16 PROCEDURE — C1758 CATHETER, URETERAL: HCPCS | Performed by: UROLOGY

## 2021-09-16 PROCEDURE — 82360 CALCULUS ASSAY QUANT: CPT | Performed by: UROLOGY

## 2021-09-16 PROCEDURE — 76000 FLUOROSCOPY <1 HR PHYS/QHP: CPT

## 2021-09-16 PROCEDURE — C1894 INTRO/SHEATH, NON-LASER: HCPCS | Performed by: UROLOGY

## 2021-09-16 PROCEDURE — C2617 STENT, NON-COR, TEM W/O DEL: HCPCS | Performed by: UROLOGY

## 2021-09-16 PROCEDURE — C1769 GUIDE WIRE: HCPCS | Performed by: UROLOGY

## 2021-09-16 DEVICE — INLAY OPTIMA URETERAL STENT W/O GUIDEWIRE
Type: IMPLANTABLE DEVICE | Site: URETER | Status: FUNCTIONAL
Brand: BARD® INLAY OPTIMA® URETERAL STENT

## 2021-09-16 RX ORDER — DEXAMETHASONE SODIUM PHOSPHATE 10 MG/ML
INJECTION, SOLUTION INTRAMUSCULAR; INTRAVENOUS AS NEEDED
Status: DISCONTINUED | OUTPATIENT
Start: 2021-09-16 | End: 2021-09-16

## 2021-09-16 RX ORDER — CEFAZOLIN SODIUM 2 G/50ML
2000 SOLUTION INTRAVENOUS ONCE
Status: COMPLETED | OUTPATIENT
Start: 2021-09-16 | End: 2021-09-16

## 2021-09-16 RX ORDER — HYDROCODONE BITARTRATE AND ACETAMINOPHEN 5; 325 MG/1; MG/1
1 TABLET ORAL EVERY 6 HOURS PRN
Status: DISCONTINUED | OUTPATIENT
Start: 2021-09-16 | End: 2021-09-16 | Stop reason: HOSPADM

## 2021-09-16 RX ORDER — MIDAZOLAM HYDROCHLORIDE 2 MG/2ML
INJECTION, SOLUTION INTRAMUSCULAR; INTRAVENOUS AS NEEDED
Status: DISCONTINUED | OUTPATIENT
Start: 2021-09-16 | End: 2021-09-16

## 2021-09-16 RX ORDER — FENTANYL CITRATE/PF 50 MCG/ML
25 SYRINGE (ML) INJECTION
Status: DISCONTINUED | OUTPATIENT
Start: 2021-09-16 | End: 2021-09-16 | Stop reason: HOSPADM

## 2021-09-16 RX ORDER — FENTANYL CITRATE 50 UG/ML
INJECTION, SOLUTION INTRAMUSCULAR; INTRAVENOUS AS NEEDED
Status: DISCONTINUED | OUTPATIENT
Start: 2021-09-16 | End: 2021-09-16

## 2021-09-16 RX ORDER — MAGNESIUM HYDROXIDE 1200 MG/15ML
LIQUID ORAL AS NEEDED
Status: DISCONTINUED | OUTPATIENT
Start: 2021-09-16 | End: 2021-09-16 | Stop reason: HOSPADM

## 2021-09-16 RX ORDER — LIDOCAINE HYDROCHLORIDE 10 MG/ML
INJECTION, SOLUTION EPIDURAL; INFILTRATION; INTRACAUDAL; PERINEURAL AS NEEDED
Status: DISCONTINUED | OUTPATIENT
Start: 2021-09-16 | End: 2021-09-16

## 2021-09-16 RX ORDER — PHENAZOPYRIDINE HYDROCHLORIDE 200 MG/1
200 TABLET, FILM COATED ORAL 3 TIMES DAILY PRN
Qty: 10 TABLET | Refills: 0 | Status: SHIPPED | OUTPATIENT
Start: 2021-09-16

## 2021-09-16 RX ORDER — HYDROCODONE BITARTRATE AND ACETAMINOPHEN 5; 325 MG/1; MG/1
1 TABLET ORAL EVERY 4 HOURS PRN
Qty: 20 TABLET | Refills: 0 | Status: SHIPPED | OUTPATIENT
Start: 2021-09-16 | End: 2021-09-26

## 2021-09-16 RX ORDER — PROPOFOL 10 MG/ML
INJECTION, EMULSION INTRAVENOUS AS NEEDED
Status: DISCONTINUED | OUTPATIENT
Start: 2021-09-16 | End: 2021-09-16

## 2021-09-16 RX ORDER — SODIUM CHLORIDE 9 MG/ML
125 INJECTION, SOLUTION INTRAVENOUS CONTINUOUS
Status: DISCONTINUED | OUTPATIENT
Start: 2021-09-16 | End: 2021-09-16 | Stop reason: HOSPADM

## 2021-09-16 RX ORDER — ONDANSETRON 2 MG/ML
INJECTION INTRAMUSCULAR; INTRAVENOUS AS NEEDED
Status: DISCONTINUED | OUTPATIENT
Start: 2021-09-16 | End: 2021-09-16

## 2021-09-16 RX ORDER — CEPHALEXIN 500 MG/1
500 CAPSULE ORAL EVERY 6 HOURS SCHEDULED
Qty: 12 CAPSULE | Refills: 0 | Status: SHIPPED | OUTPATIENT
Start: 2021-09-16 | End: 2021-09-19

## 2021-09-16 RX ORDER — PHENAZOPYRIDINE HYDROCHLORIDE 200 MG/1
200 TABLET, FILM COATED ORAL ONCE
Status: COMPLETED | OUTPATIENT
Start: 2021-09-16 | End: 2021-09-16

## 2021-09-16 RX ORDER — ONDANSETRON 2 MG/ML
4 INJECTION INTRAMUSCULAR; INTRAVENOUS ONCE AS NEEDED
Status: DISCONTINUED | OUTPATIENT
Start: 2021-09-16 | End: 2021-09-16 | Stop reason: HOSPADM

## 2021-09-16 RX ADMIN — ONDANSETRON 4 MG: 2 INJECTION INTRAMUSCULAR; INTRAVENOUS at 10:22

## 2021-09-16 RX ADMIN — HYDROCODONE BITARTRATE AND ACETAMINOPHEN 1 TABLET: 5; 325 TABLET ORAL at 12:08

## 2021-09-16 RX ADMIN — FENTANYL CITRATE 50 MCG: 50 INJECTION INTRAMUSCULAR; INTRAVENOUS at 10:18

## 2021-09-16 RX ADMIN — FENTANYL CITRATE 50 MCG: 50 INJECTION INTRAMUSCULAR; INTRAVENOUS at 10:25

## 2021-09-16 RX ADMIN — LIDOCAINE HYDROCHLORIDE 100 MG: 10 INJECTION, SOLUTION EPIDURAL; INFILTRATION; INTRACAUDAL; PERINEURAL at 10:17

## 2021-09-16 RX ADMIN — SODIUM CHLORIDE 125 ML/HR: 0.9 INJECTION, SOLUTION INTRAVENOUS at 09:50

## 2021-09-16 RX ADMIN — DEXAMETHASONE SODIUM PHOSPHATE 4 MG: 10 INJECTION, SOLUTION INTRAMUSCULAR; INTRAVENOUS at 10:22

## 2021-09-16 RX ADMIN — CEFAZOLIN SODIUM 2000 MG: 2 SOLUTION INTRAVENOUS at 10:13

## 2021-09-16 RX ADMIN — MIDAZOLAM 2 MG: 1 INJECTION INTRAMUSCULAR; INTRAVENOUS at 10:06

## 2021-09-16 RX ADMIN — PHENAZOPYRIDINE 200 MG: 200 TABLET ORAL at 12:08

## 2021-09-16 RX ADMIN — PROPOFOL 200 MG: 10 INJECTION, EMULSION INTRAVENOUS at 10:17

## 2021-09-16 RX ADMIN — SODIUM CHLORIDE 125 ML/HR: 0.9 INJECTION, SOLUTION INTRAVENOUS at 11:40

## 2021-09-16 NOTE — ANESTHESIA PREPROCEDURE EVALUATION
Procedure:  CYSTOSCOPY URETEROSCOPY WITH LITHOTRIPSY HOLMIUM LASER, RETROGRADE PYELOGRAM AND INSERTION STENT URETERAL (Left Bladder)  REMOVAL STENT URETERAL (Right Bladder)    Relevant Problems   CARDIO   (+) Hypercholesteremia   (+) Hypertension      GI/HEPATIC   (+) GERD (gastroesophageal reflux disease)      /RENAL   (+) Kidney stone      MUSCULOSKELETAL   (+) Primary osteoarthritis of both knees        Physical Exam    Airway    Mallampati score: II  TM Distance: >3 FB  Neck ROM: full     Dental       Cardiovascular  Rhythm: regular, Rate: normal,     Pulmonary  Breath sounds clear to auscultation,     Other Findings        Anesthesia Plan  ASA Score- 3     Anesthesia Type- general with ASA Monitors  Additional Monitors:   Airway Plan: LMA  Plan Factors-    Chart reviewed  EKG reviewed  Existing labs reviewed  Patient summary reviewed  Induction- intravenous  Postoperative Plan-   Planned trial extubation    Informed Consent- Anesthetic plan and risks discussed with patient  I personally reviewed this patient with the CRNA  Discussed and agreed on the Anesthesia Plan with the CRNA  Sherre Soulier Procedure:  CYSTOSCOPY URETEROSCOPY WITH LITHOTRIPSY HOLMIUM LASER, RETROGRADE PYELOGRAM AND INSERTION STENT URETERAL (Left Bladder)  REMOVAL STENT URETERAL (Right Bladder)    Relevant Problems   CARDIO   (+) Hypercholesteremia   (+) Hypertension      GI/HEPATIC   (+) GERD (gastroesophageal reflux disease)      /RENAL   (+) Kidney stone      MUSCULOSKELETAL   (+) Primary osteoarthritis of both knees        Physical Exam    Airway    Mallampati score: II  TM Distance: >3 FB  Neck ROM: full     Dental       Cardiovascular  Rhythm: regular, Rate: normal, Cardiovascular exam normal    Pulmonary  Pulmonary exam normal Breath sounds clear to auscultation,     Other Findings        Anesthesia Plan  ASA Score- 3     Anesthesia Type- general with ASA Monitors           Additional Monitors:   Airway Plan:           Plan Factors-    Chart reviewed  Existing labs reviewed  Patient summary reviewed  Patient is not a current smoker  Patient instructed to abstain from smoking on day of procedure  Patient did not smoke on day of surgery  Obstructive sleep apnea risk education given perioperatively  Induction- intravenous  Postoperative Plan- Plan for postoperative opioid use  Informed Consent- Anesthetic plan and risks discussed with patient

## 2021-09-16 NOTE — DISCHARGE INSTRUCTIONS
Expect to see blood in the urine, and to experience urgency/frequency/burning with urination and dribbling  This is normal after urological procedures  Is also normal to experience some nausea after these procedures  Go back your regular diet carefully  It is normal to feel pain in the kidney when urinating and when the bladder is filling due to urine refluxing up to the kidney because of the open stent  The stent is necessary to keep the ureter open to allow clots and swelling to resolve and to allow the kidney to drain properly after instrumentation  Some stones may pass around the stent, but most stones pass after the stent is removed  The presence of the stent makes the ureter wider while it is in and after has been removed, allowing passage of larger fragments  Call for fever greater that 101 5, inability to urinate, prolonged nausea and vomiting, or severe pain not relieved by pain medications  Sherita Rankin Keep stent string taped- if it becomes dislodged or gets pulled out early, that is OK- simply remove it completely by pulling on string until it is completely out  No driving/operating machinery for 24 hours, and while taking narcotics  Take over the counter remedy of choice to avoid constipation  Drink plenty of fluids

## 2021-09-16 NOTE — PROGRESS NOTES
IV attempted in left hand unsuccessful  Spoke to [place] in [place] holding  OR will attempt IV and will address Metoprolol  Pt did not take Metoprolol this am Dr Maday Price aware

## 2021-09-16 NOTE — TELEPHONE ENCOUNTER
----- Message from Parth Garrido MD sent at 9/16/2021 11:03 AM EDT -----  Please call patient to have nurses remove stent by pulling on the string next week, then see 1 of us in 6 months with a renal ultrasound  Please also make referral to Nephrology

## 2021-09-16 NOTE — TELEPHONE ENCOUNTER
Pt is still inpatient at this time  She is scheduled with the nurse on 9- at 11am for stent with string removal  Once patient is discharged, office will contact her with appointment date and time

## 2021-09-16 NOTE — OP NOTE
OPERATIVE REPORT  PATIENT NAME: Emerson Atkinson    :  1965  MRN: [id number]  Pt Location: [facility] OR ROOM 03    SURGERY DATE: 2021    Surgeon(s) and Role:     * Néstor Swartz MD - Primary    Preop Diagnosis:  Nephrolithiasis [N20 0] left, left ureterolithiasis, bilateral indwelling stents    Post-Op Diagnosis Codes:     * Nephrolithiasis [N20 0] left, left ureterolithiasis, bilateral indwelling stents    Procedures:  Cystoscopy, right ureteral stent removal, left ureteroscopy both rigid and flexible, laser lithotripsy, stone basket extraction, left ureteral stent exchange    Specimen(s):  ID Type Source Tests Collected by Time Destination   A : LEFT URETERAL STONE Calculus Ureter, Left STONE ANALYSIS Néstor Swartz MD 2021 1051        Estimated Blood Loss:   Minimal    Drains:  * No LDAs found *    Anesthesia Type:   General    Operative Indications:  Left renal calculi and ureteral calculi, bilateral indwelling stents    Operative Findings:     ureteral and renal calculi, ureteral calculi extracted with basket and it with laser, renal calculi broken up into tiny passable fragments with high-powered laser    Complications:   None    Procedure and Technique:  63-year-old woman status post cystoscopy right ureteroscopy laser lithotripsy with bilateral ureteral stent placement for bilateral ureteral lithiasis by Dr Mica Damon a few weeks ago  She is now here for second-stage procedure with removal of the right ureteral stent and ureteroscopy laser lithotripsy of left ureteral and renal calculi  According to her most recent CT scan 2021, she has a 5 mm residual stone in the distal left ureter, and additional 2 mm stones and 4 mm stones in the proximal ureter  She has a 12 mm stone in left kidney as well  The procedure as well as risks of bleeding infection damage urinary tract and possible need for additional   dditional procedures were explained she gives informed consent      The patient was brought to the operating room identified properly  LMA was induced patient was prepped and draped in dorsal lithotomy position usual fashion  A time-out was performed  Cystoscopy was carried out with a 22 [place] cystoscopy sheath and 30 degree lens  The bladder was smooth not trabeculated there are no stones tumors or lesions  The orifices orthotopic intact with stents in place  I first grasped the left ureteral stent and brought out to the urethral meatus and fed a wire up the stent up into the renal pelvis  The left ureteral stent was discarded, and I clamped that wire to the side  I then removed the right ureteral stent intact  I then performed distal ureteroscopy with a rigid ureteral scope on the left side  I encountered a sizable stone, approximately 7 mm in the distal left ureter I removed this intact with a basket  I then placed a dual-lumen catheter over the existing wire and establish 2 wire access, and placed flexible scope up through a 35 cm 10/12 ureteral access sheath and found a large calculus in the left renal pelvis  A smaller calculus lower pole calyx  Using the 272 holmium laser fiber high-power, I dusted both stones  These were down into tiny passable fragments  I then performed ureteroscopy again and dusted other more proximal ureteral calculi  I performed ureteroscopy all the way to the ureteral orifice and there was nothing of any significant size  I then removed the scope and placed a 6 [place] by 26 cm stent over the wire and coils were established in renal pelvis and bladder  Bladder was drained the cystoscopy sheath and stent string was taped to the lower abdomen with Tegaderm     I was present for the entire procedure and A qualified resident physician was not available    Patient Disposition:  PACU  and extubated and stable    SIGNATURE: Néstor Swartz MD  DATE: September 16, 2021  TIME: 11:01 AM

## 2021-09-16 NOTE — INTERVAL H&P NOTE
H&P reviewed  After examining the patient I find no changes in the patients condition since the H&P had been written  Plan cystoscopy, removal right ureteral stent, and left ureteroscopy laser lithotripsy and left ureteral stent exchange  The risks of bleeding infection damage urinary tract and possible need for additional procedures were explained she gives informed consent      Vitals:    09/16/21 0904   BP: 160/69   Pulse: 102   Resp: 16   Temp: 98 2 °F (36 8 °C)   SpO2: 98%

## 2021-09-17 ENCOUNTER — TELEPHONE (OUTPATIENT)
Dept: UROLOGY | Facility: MEDICAL CENTER | Age: 56
End: 2021-09-17

## 2021-09-17 NOTE — TELEPHONE ENCOUNTER
Patient called and appointment was scheduled for her 6 months f/u   For March 17,2022 with Cain [facility]   Patient aware of U/S order to do prior to appointment

## 2021-09-17 NOTE — TELEPHONE ENCOUNTER
Pt discharged from hospital   Called and left msg on VM per comm consent that she is scheduled for stent w/string removal on 9/23 at 11:00 am with Nurse in [place]  Asked pt to contact office to confirm this appt

## 2021-09-17 NOTE — TELEPHONE ENCOUNTER
Spoke to pt s/p  CYSTOSCOPY URETEROSCOPY WITH LITHOTRIPSY HOLMIUM LASER, RETROGRADE PYELOGRAM AND INSERTION STENT URETERAL (Left Bladder) REMOVAL STENT URETERAL (Right Bladder) with Dr Herson Walter yesterday  Pt stated her stent came out yesterday in hospital prior to discharge and that doctor was aware  Pt advised to hydrate well and FU in 6 months with renal US  Number for central scheduling provided  Pt wishes to be seen in [facility] office for FU  Pt states she had a temp of 101 this morning and took 2 tylenol  She is currently taking Keflex 500 mgs every 6 hrs for 3 days prescribed by Dr Herson Walter  She was advised to call office later today if still febrile and to call on call provider over the weekend with any issues or concerns

## 2021-09-17 NOTE — TELEPHONE ENCOUNTER
Left message per communication form advising patient to call the office back to schedule appointment  Office number provided  Patient will need appointment in 6 months with US prior to appointment  Order already in her chart

## 2021-09-17 NOTE — TELEPHONE ENCOUNTER
Returned call to patient with providers recommendations  Patient states  She did take some tylenol that she is going to recheck her temperature  Vebilized understanding/ agreement to providers recommendations

## 2021-09-20 ENCOUNTER — TELEPHONE (OUTPATIENT)
Dept: INTERNAL MEDICINE CLINIC | Facility: CLINIC | Age: 56
End: 2021-09-20

## 2021-09-20 ENCOUNTER — OFFICE VISIT (OUTPATIENT)
Dept: URGENT CARE | Age: 56
End: 2021-09-20
Payer: COMMERCIAL

## 2021-09-20 ENCOUNTER — APPOINTMENT (OUTPATIENT)
Dept: RADIOLOGY | Age: 56
End: 2021-09-20
Payer: COMMERCIAL

## 2021-09-20 VITALS — RESPIRATION RATE: 18 BRPM | OXYGEN SATURATION: 94 % | HEART RATE: 115 BPM | TEMPERATURE: 96.9 F

## 2021-09-20 DIAGNOSIS — R31.9 URINARY TRACT INFECTION WITH HEMATURIA, SITE UNSPECIFIED: ICD-10-CM

## 2021-09-20 DIAGNOSIS — N39.0 URINARY TRACT INFECTION WITH HEMATURIA, SITE UNSPECIFIED: ICD-10-CM

## 2021-09-20 DIAGNOSIS — R50.9 FEVER, UNSPECIFIED FEVER CAUSE: Primary | ICD-10-CM

## 2021-09-20 DIAGNOSIS — R50.9 FEVER, UNSPECIFIED FEVER CAUSE: ICD-10-CM

## 2021-09-20 LAB
SL AMB  POCT GLUCOSE, UA: NEGATIVE
SL AMB LEUKOCYTE ESTERASE,UA: ABNORMAL
SL AMB POCT BILIRUBIN,UA: NEGATIVE
SL AMB POCT BLOOD,UA: ABNORMAL
SL AMB POCT CLARITY,UA: CLEAR
SL AMB POCT COLOR,UA: ABNORMAL
SL AMB POCT KETONES,UA: NEGATIVE
SL AMB POCT NITRITE,UA: POSITIVE
SL AMB POCT PH,UA: 6.5
SL AMB POCT SPECIFIC GRAVITY,UA: 1.01
SL AMB POCT URINE PROTEIN: ABNORMAL
SL AMB POCT UROBILINOGEN: NEGATIVE

## 2021-09-20 PROCEDURE — 87086 URINE CULTURE/COLONY COUNT: CPT | Performed by: NURSE PRACTITIONER

## 2021-09-20 PROCEDURE — 99213 OFFICE O/P EST LOW 20 MIN: CPT | Performed by: NURSE PRACTITIONER

## 2021-09-20 PROCEDURE — 71046 X-RAY EXAM CHEST 2 VIEWS: CPT

## 2021-09-20 PROCEDURE — 81002 URINALYSIS NONAUTO W/O SCOPE: CPT | Performed by: NURSE PRACTITIONER

## 2021-09-20 RX ORDER — SULFAMETHOXAZOLE AND TRIMETHOPRIM 800; 160 MG/1; MG/1
1 TABLET ORAL EVERY 12 HOURS SCHEDULED
Qty: 20 TABLET | Refills: 0 | Status: SHIPPED | OUTPATIENT
Start: 2021-09-20 | End: 2021-09-30

## 2021-09-20 NOTE — PROGRESS NOTES
[facility]        NAME: Tianna Childs is a 54 y o  female  : 1965    MRN: [id number]  DATE: 2021  TIME: 9:49 AM    Assessment and Plan   Fever, unspecified fever cause [R50 9]  1  Fever, unspecified fever cause  XR chest pa & lateral    POCT urine dip    Urine culture   2  Urinary tract infection with hematuria, site unspecified  sulfamethoxazole-trimethoprim (BACTRIM DS) 800-160 mg per tablet         Patient Instructions     Urine dip is positive; will send for culture  Stop cephalexin   Start bactrim DS BID  F/u with URO ASAP  Follow up with PCP in 3-5 days  Proceed to  ER if symptoms worsen  Chief Complaint     Chief Complaint   Patient presents with    Cough     bodyaches, post cystoscopy     Fever         History of Present Illness       HPI    presents to clinic with complaint of cough, body aches and fever  Highest temperature at home was 101 degrees  Cystoscopy was 4 days ago  CT had showed kidney stones on bilateral kidneys  She was discharged home with cephalexin 500 mg q i d , hydrocodone, and Pyridium  Review of Systems   Review of Systems   Constitutional: Positive for fever (100 & 101 degrees  took some tylenol)  HENT: Negative for sore throat and trouble swallowing  Respiratory: Positive for cough  Cardiovascular: Negative for chest pain  Gastrointestinal: Positive for abdominal pain (left side)  Musculoskeletal: Positive for myalgias (  Generalized body aches)  Neurological: Negative for light-headedness           Current Medications       Current Outpatient Medications:     Ascorbic Acid (vitamin C) 1000 MG tablet, Take 2,000 mg by mouth daily, Disp: , Rfl:     atorvastatin (LIPITOR) 40 mg tablet, Take 1 tablet (40 mg total) by mouth daily, Disp: 90 tablet, Rfl: 1    Cholecalciferol (Vitamin D3) 50 MCG (2000 UT) TABS, Take 2,000 Units by mouth daily, Disp: , Rfl:     HYDROcodone-acetaminophen (NORCO) 5-325 mg per tablet, Take 1 tablet by mouth every 4 (four) hours as needed for pain for up to 10 daysMax Daily Amount: 6 tablets, Disp: 20 tablet, Rfl: 0    ibuprofen (MOTRIN) 600 mg tablet, Take 1 tablet (600 mg total) by mouth every 6 (six) hours as needed for mild pain, Disp: 30 tablet, Rfl: 0    lisinopril (ZESTRIL) 10 mg tablet, Take 1 5 tablets (15 mg total) by mouth daily, Disp: 180 tablet, Rfl: 1    metFORMIN (GLUCOPHAGE) 1000 MG tablet, Take 1 tablet (1,000 mg total) by mouth 2 (two) times a day with meals, Disp: 180 tablet, Rfl: 1    metoprolol tartrate (LOPRESSOR) 25 mg tablet, Take 0 5 tablets (12 5 mg total) by mouth every 12 (twelve) hours (Patient taking differently: Take 12 5 mg by mouth daily ), Disp: 30 tablet, Rfl: 0    multivitamin (THERAGRAN) TABS, Take 1 tablet by mouth daily, Disp: , Rfl:     omeprazole (PriLOSEC) 20 mg delayed release capsule, Take 2 capsules (40 mg total) by mouth daily, Disp: 90 capsule, Rfl: 1    phenazopyridine (PYRIDIUM) 200 mg tablet, Take 1 tablet (200 mg total) by mouth 3 (three) times a day as needed for bladder spasms, Disp: 10 tablet, Rfl: 0    sertraline (ZOLOFT) 50 mg tablet, Take 1 tablet (50 mg total) by mouth daily Takes in the am , Disp: 90 tablet, Rfl: 1    sulfamethoxazole-trimethoprim (BACTRIM DS) 800-160 mg per tablet, Take 1 tablet by mouth every 12 (twelve) hours for 10 days, Disp: 20 tablet, Rfl: 0    tamsulosin (FLOMAX) 0 4 mg, Take 1 capsule (0 4 mg total) by mouth daily with dinner, Disp: 30 capsule, Rfl: 0    Current Allergies     Allergies as of 09/20/2021 - Reviewed 09/20/2021   Allergen Reaction Noted    Oxybutynin Anaphylaxis 09/16/2021    Clonazepam Other (See Comments) 09/25/2017    Morphine Other (See Comments) and Confusion 06/14/2017    Venlafaxine Other (See Comments) 09/25/2017            The following portions of the patient's history were reviewed and updated as appropriate: allergies, current medications, past family history, past medical history, past social history, past surgical history and problem list      Past Medical History:   Diagnosis Date    Anxiety     Asthma     Colon polyp     Depression     Diverticulitis     Diverticulitis of colon     Follicular cyst of ovary 12/23/2014    Former smoker     GERD (gastroesophageal reflux disease)     Glycosuria     Headache     Hyperlipidemia     Hypertension     Kidney calculi 1/11/2019    Obesity     Ovarian cyst     Overactive bladder     Overweight     Pulmonary nodule     Renal calculus     Tinea pedis of left foot 6/8/2020    Vertigo     Wears partial dentures     Lower plate -partial       Past Surgical History:   Procedure Laterality Date    APPENDECTOMY      BLADDER SUSPENSION      [facility]    CERVICAL BIOPSY  W/ LOOP ELECTRODE EXCISION      COLONOSCOPY      DILATION AND CURETTAGE OF UTERUS      FL RETROGRADE PYELOGRAM  7/26/2021    HYSTERECTOMY  2007    ovaries present bilaterally    JOINT REPLACEMENT      right knee    KS COLONOSCOPY FLX DX W/COLLJ SPEC WHEN PFRMD N/A 9/26/2017    Procedure: EGD AND COLONOSCOPY;  Surgeon: Lety Bowers MD;  Location: [facility];   Service: Gastroenterology    KS CYSTO/URETERO W/LITHOTRIPSY &INDWELL STENT INSRT Bilateral 7/26/2021    Procedure: CYSTOSCOPY URETEROSCOPY WITH LITHOTRIPSY HOLMIUM LASER, RETROGRADE PYELOGRAM AND INSERTION STENT URETERAL;  Surgeon: Giulia Catherine MD;  [facility];  Service: Urology    KS CYSTO/URETERO W/LITHOTRIPSY &INDWELL STENT INSRT Left 9/16/2021    Procedure: CYSTOSCOPY URETEROSCOPY WITH LITHOTRIPSY HOLMIUM LASER, RETROGRADE PYELOGRAM AND INSERTION STENT URETERAL;  Surgeon: Rambo Rosa MD;  [facility];  Service: Urology    KS REVISE MEDIAN N/CARPAL TUNNEL SURG Right 5/29/2018    Procedure: CARPAL TUNNEL RELEASE;  Surgeon: Lesa Fuller DO;  [facility];  Service: Orthopedics    KS TOTAL KNEE ARTHROPLASTY Right 5/19/2021    Procedure: ARTHROPLASTY KNEE TOTAL; Surgeon: Yoan Watt MD;  [facility];  Service: Orthopedics    REMOVAL URETERAL STENT Right 9/16/2021    Procedure: REMOVAL STENT URETERAL;  Surgeon: Porter Pizarro MD;  [facility];  Service: Urology    TONSILLECTOMY      TUBAL LIGATION      URETEROSCOPY  7/26/2021    Procedure: URETEROSCOPY, LASER AND BASKET STONE EXTRACTION;  Surgeon: Suly Hernandez MD;  [facility];  Service: Urology    WISDOM TOOTH EXTRACTION         Family History   Problem Relation Age of Onset    Diabetes type II Mother     Asthma Mother     Stroke Father     Pancreatic cancer Father 68    Diabetes type II Father     No Known Problems Maternal Grandmother     No Known Problems Maternal Grandfather     No Known Problems Paternal Grandmother     No Known Problems Paternal Grandfather     No Known Problems Sister     No Known Problems Sister     No Known Problems Sister     No Known Problems Sister     No Known Problems Sister     No Known Problems Maternal Aunt     No Known Problems Paternal Aunt          Medications have been verified  Objective   Pulse (!) 115   Temp (!) 96 9 °F (36 1 °C)   Resp 18   SpO2 94%   No LMP recorded  Patient has had a hysterectomy  Physical Exam     Physical Exam  Constitutional:       Appearance: She is ill-appearing  She is not diaphoretic  Cardiovascular:      Rate and Rhythm: Regular rhythm  Pulmonary:      Effort: Pulmonary effort is normal       Breath sounds: Normal breath sounds  No wheezing, rhonchi or rales  Abdominal:      Tenderness: There is abdominal tenderness (with palpation of the left lower flank and left lower quadrant of the abdomen)  There is no guarding or rebound

## 2021-09-21 ENCOUNTER — APPOINTMENT (OUTPATIENT)
Dept: PHYSICAL THERAPY | Facility: REHABILITATION | Age: 56
End: 2021-09-21
Payer: COMMERCIAL

## 2021-09-21 ENCOUNTER — TELEPHONE (OUTPATIENT)
Dept: INTERNAL MEDICINE CLINIC | Facility: CLINIC | Age: 56
End: 2021-09-21

## 2021-09-21 LAB — BACTERIA UR CULT: ABNORMAL

## 2021-09-21 NOTE — TELEPHONE ENCOUNTER
Please have patient continue to reach out to the provider who called her   If she isnt able to get through to speak to the provider by later this afternoon/evening then call back and we can review it but its not our result to be able to review with the patient technically

## 2021-09-21 NOTE — TELEPHONE ENCOUNTER
Pt called asking what she should do she went to urgent care yesterday  They did chest xray they called her this morning with results  And left a message to call them back  She cant get through to the them to talk to dr  What should she do? I told her to call and try again  She wants to know what the result is

## 2021-09-22 ENCOUNTER — RA CDI HCC (OUTPATIENT)
Dept: OTHER | Facility: HOSPITAL | Age: 56
End: 2021-09-22

## 2021-09-22 ENCOUNTER — TELEPHONE (OUTPATIENT)
Dept: URGENT CARE | Age: 56
End: 2021-09-22

## 2021-09-22 LAB
CALCIUM OXALATE DIHYDRATE MFR STONE IR: 90 %
COLOR STONE: NORMAL
COM MFR STONE: 10 %
COMMENT-STONE3: NORMAL
COMPOSITION: NORMAL
LABORATORY COMMENT REPORT: NORMAL
PHOTO: NORMAL
SIZE STONE: NORMAL MM
SPEC SOURCE SUBJ: NORMAL
STONE ANALYSIS-IMP: NORMAL
WT STONE: 92 MG

## 2021-09-22 NOTE — PROGRESS NOTES
[facility]  coding opportunities       Chart reviewed, no opportunity found: CHART REVIEWED, NO OPPORTUNITY FOUND                        Patients insurance company: [employer] (Medicare Advantage and Commercial)

## 2021-09-24 ENCOUNTER — OFFICE VISIT (OUTPATIENT)
Dept: PHYSICAL THERAPY | Facility: REHABILITATION | Age: 56
End: 2021-09-24
Payer: COMMERCIAL

## 2021-09-24 DIAGNOSIS — M17.11 PRIMARY OSTEOARTHRITIS OF RIGHT KNEE: ICD-10-CM

## 2021-09-24 DIAGNOSIS — Z96.651 STATUS POST TOTAL KNEE REPLACEMENT, RIGHT: Primary | ICD-10-CM

## 2021-09-24 PROCEDURE — 97530 THERAPEUTIC ACTIVITIES: CPT

## 2021-09-24 PROCEDURE — 97112 NEUROMUSCULAR REEDUCATION: CPT

## 2021-09-24 PROCEDURE — 97110 THERAPEUTIC EXERCISES: CPT

## 2021-09-24 NOTE — PROGRESS NOTES
Daily Note     Today's date: 2021  Patient name: Christa Branham  : 1965  MRN: [id number]  Referring provider: Rianna Gross,*  Dx:   Encounter Diagnosis     ICD-10-CM    1  Status post total knee replacement, right  Z96 651    2  Primary osteoarthritis of right knee  M17 11                   Subjective: Pt reports that she is doing well today feeling no pain in her R knee upon presentation  Notes that she was sick last week but is feeling better  Objective: See treatment diary below      Assessment: Tolerated treatment well  Pt was able to perform all exercises this session with frequent rest breaks taken due to fatigue  The bike was performed to start with 30 second rest breaks taken  Overall strengthening is improving demonstrating both competence and compliance with all exercises  Patient demonstrated fatigue post treatment, exhibited good technique with therapeutic exercises and would benefit from continued PT      Plan: Progress treatment as tolerated  Pt to be a D/C NV        Precautions: vertigo, HTN, HLD, GERD, asthma, anxiety, DM     Daily Treatment Diary      Assessment  8/20  8/24 8/27 8/31 9/3 9/7 9/10 9/14 9/24    Eval/Reval               FOTO               Manuals   Knee PROM  TE TE NT-not needed          Patellar ROM TE            HS & GS Stretch             IASTM L ITB/lat quads             Exercise Diary     Bike - AROM  L3 x6' L3x6' L3x6' L3x6' L3x6' L3x6' L2x 10' L2x 10' L2x10'     LAQ 2# 2x10 2# 5" 2x10  3# 5" 2x10 3# 5"ecc lower 2x10 3# 5"ecc lower 2x10 3# 5"ecc lower 2x10 4#  Eccentric 5"  2x10 4#  ecc  5"  2x10 4# ecc 5" 2x10     SAQ               TKE with 1/2 Foam                SLR Flexion 2#  18 2#  2x10 3# x18 2#   2x10 3# 2x10 3# 2x10 3#  2x10 4# 2x10 4# 2x10     SLR Abduction 2#  2x10 2#  2x10 2# 2x10 2#   2x10 3# 2x10 3# 2x10 3#  2x10 4# 2x10 4# 2x10     Step Ups:   Forward   1R  x20 1Rx20 1Rx20 1R x20   1R x20 1R  x20 1R  x20 1R x20     Step Ups:   Lateral 0R  x20 0R x20 0R x20 OR x20 1R x20 1R  x20 1R  x20 1R x20     Eccentric Step Downs:    Forward   x10 0R x20  1R x20 1R  x20 1R  x20 1R x20    Marches Slow c pause x20            Tandem walk 3 laps            Mini Squats  2x10  HOLD           STS  STS 2x10  STS 2x10 STS 2x10 STS 2x10 STS 2x10 STS 2x10 Wall Slides  5"x10 Wall Slides  5"x10 Wall slides 5"x10     Knee Extension         11#  Eccentric lowering  2x10 11#  Eccentric lowering  2x10 11#  Eccentric lowering  2x10     Wall Slides  nv  nv   nv  5"x12      Single Leg Press  22# 2x10  22#  2x10 27#x10 22#x10 22# 3x10 22# 3x10 44#  2x10 22#  2x10 44#  2x10 44# 2x10     Standing Knee Flexion  11# 2x10  22#  2x10 22# 2x10 22#  3x10 33# 2x10 33# 2x10 33#  2x10 33#  2x10 33# 2x10    Modalities    CP R Knee   Extension Stretch  Post-Tx      NP

## 2021-09-27 ENCOUNTER — TELEPHONE (OUTPATIENT)
Dept: INTERNAL MEDICINE CLINIC | Facility: CLINIC | Age: 56
End: 2021-09-27

## 2021-09-28 ENCOUNTER — OFFICE VISIT (OUTPATIENT)
Dept: PHYSICAL THERAPY | Facility: REHABILITATION | Age: 56
End: 2021-09-28
Payer: COMMERCIAL

## 2021-09-28 DIAGNOSIS — M17.11 PRIMARY OSTEOARTHRITIS OF RIGHT KNEE: ICD-10-CM

## 2021-09-28 DIAGNOSIS — Z96.651 STATUS POST TOTAL KNEE REPLACEMENT, RIGHT: Primary | ICD-10-CM

## 2021-09-28 PROCEDURE — 97112 NEUROMUSCULAR REEDUCATION: CPT | Performed by: PHYSICAL THERAPIST

## 2021-09-28 PROCEDURE — 97530 THERAPEUTIC ACTIVITIES: CPT | Performed by: PHYSICAL THERAPIST

## 2021-09-28 PROCEDURE — 97110 THERAPEUTIC EXERCISES: CPT | Performed by: PHYSICAL THERAPIST

## 2021-09-28 NOTE — PROGRESS NOTES
Daily Note     Today's date: 2021  Patient name: Tianna Childs  : 1965  MRN: [id number]  Referring provider: Danny Haynes,*  Dx:   Encounter Diagnosis     ICD-10-CM    1  Status post total knee replacement, right  Z96 651    2  Primary osteoarthritis of right knee  M17 11                   Subjective: Pt comes to therapy denying pain or discomfort  Denies discomfort following last treatment session  States she continues to have difficulty with negotiating stairs  Objective: See treatment diary below      Assessment: Tolerated treatment well  Patient demonstrated fatigue post treatment      Plan: Therapy care to be discontinued at present time due to exhaustion of insurance benefits  Precautions: vertigo, HTN, HLD, GERD, asthma, anxiety, DM     Daily Treatment Diary      Assessment  8/20  8/24 8/27 8/31 9/3 9/7 9/10 9/14 9/24 9/28   Eval/Reval               FOTO               Manuals   Knee PROM  TE TE NT-not needed          Patellar ROM TE            HS & GS Stretch             IASTM L ITB/lat quads             Exercise Diary     Bike - AROM  L3 x6' L3x6' L3x6' L3x6' L3x6' L3x6' L2x 10' L2x 10' L2x10' L2x10'    LAQ 2# 2x10 2# 5" 2x10  3# 5" 2x10 3# 5"ecc lower 2x10 3# 5"ecc lower 2x10 3# 5"ecc lower 2x10 4#  Eccentric 5"  2x10 4#  ecc  5"  2x10 4# ecc 5" 2x10     SAQ               TKE with 1/2 Foam                SLR Flexion 2#  18 2#  2x10 3# x18 2#   2x10 3# 2x10 3# 2x10 3#  2x10 4# 2x10 4# 2x10     SLR Abduction 2#  2x10 2#  2x10 2# 2x10 2#   2x10 3# 2x10 3# 2x10 3#  2x10 4# 2x10 4# 2x10     Step Ups:   Forward   1R  x20 1Rx20 1Rx20 1R x20   1R x20 1R  x20 1R  x20 1R x20 1R x20    Step Ups:   Lateral  0R  x20 0R x20 0R x20 OR x20 1R x20 1R  x20 1R  x20 1R x20 1R x20    Eccentric Step Downs:    Forward   x10 0R x20  1R x20 1R  x20 1R  x20 1R x20 1R x20   Marches Slow c pause x20            Tandem walk 3 laps            Mini Squats  2x10  HOLD           STS  STS 2x10  STS 2x10 STS 2x10 STS 2x10 STS 2x10 STS 2x10 Wall Slides  5"x10 Wall Slides  5"x10 Wall slides 5"x10 Wall slides 5"x10    Knee Extension         11#  Eccentric lowering  2x10 11#  Eccentric lowering  2x10 11#  Eccentric lowering  2x10     Wall Slides  nv  nv   nv  5"x12      Single Leg Press  22# 2x10  22#  2x10 27#x10 22#x10 22# 3x10 22# 3x10 44#  2x10 22#  2x10 44#  2x10 44# 2x10     Standing Knee Flexion  11# 2x10  22#  2x10 22# 2x10 22#  3x10 33# 2x10 33# 2x10 33#  2x10 33#  2x10 33# 2x10    Modalities    CP R Knee   Extension Stretch  Post-Tx      NP

## 2021-09-29 ENCOUNTER — OFFICE VISIT (OUTPATIENT)
Dept: INTERNAL MEDICINE CLINIC | Facility: CLINIC | Age: 56
End: 2021-09-29
Payer: COMMERCIAL

## 2021-09-29 VITALS
DIASTOLIC BLOOD PRESSURE: 68 MMHG | OXYGEN SATURATION: 98 % | SYSTOLIC BLOOD PRESSURE: 124 MMHG | WEIGHT: 202.6 LBS | HEIGHT: 63 IN | HEART RATE: 90 BPM | RESPIRATION RATE: 20 BRPM | TEMPERATURE: 97.8 F | BODY MASS INDEX: 35.9 KG/M2

## 2021-09-29 DIAGNOSIS — K21.9 GASTROESOPHAGEAL REFLUX DISEASE WITHOUT ESOPHAGITIS: ICD-10-CM

## 2021-09-29 DIAGNOSIS — E11.9 TYPE 2 DIABETES MELLITUS WITHOUT COMPLICATION, WITHOUT LONG-TERM CURRENT USE OF INSULIN (HCC): ICD-10-CM

## 2021-09-29 DIAGNOSIS — E55.9 VITAMIN D DEFICIENCY: ICD-10-CM

## 2021-09-29 DIAGNOSIS — M17.0 PRIMARY OSTEOARTHRITIS OF BOTH KNEES: ICD-10-CM

## 2021-09-29 DIAGNOSIS — Z12.31 ENCOUNTER FOR SCREENING MAMMOGRAM FOR MALIGNANT NEOPLASM OF BREAST: Primary | ICD-10-CM

## 2021-09-29 DIAGNOSIS — Z23 NEEDS FLU SHOT: ICD-10-CM

## 2021-09-29 DIAGNOSIS — E78.00 HYPERCHOLESTEREMIA: ICD-10-CM

## 2021-09-29 DIAGNOSIS — N20.0 KIDNEY STONE: ICD-10-CM

## 2021-09-29 DIAGNOSIS — E11.69 TYPE 2 DIABETES MELLITUS WITH OTHER SPECIFIED COMPLICATION, WITHOUT LONG-TERM CURRENT USE OF INSULIN (HCC): ICD-10-CM

## 2021-09-29 DIAGNOSIS — I15.9 SECONDARY HYPERTENSION: ICD-10-CM

## 2021-09-29 DIAGNOSIS — R00.0 TACHYCARDIA: ICD-10-CM

## 2021-09-29 LAB — SL AMB POCT HEMOGLOBIN AIC: 6.2 (ref ?–6.5)

## 2021-09-29 PROCEDURE — 4010F ACE/ARB THERAPY RXD/TAKEN: CPT | Performed by: NURSE PRACTITIONER

## 2021-09-29 PROCEDURE — 3008F BODY MASS INDEX DOCD: CPT | Performed by: NURSE PRACTITIONER

## 2021-09-29 PROCEDURE — 83036 HEMOGLOBIN GLYCOSYLATED A1C: CPT | Performed by: NURSE PRACTITIONER

## 2021-09-29 PROCEDURE — 90471 IMMUNIZATION ADMIN: CPT

## 2021-09-29 PROCEDURE — 90682 RIV4 VACC RECOMBINANT DNA IM: CPT

## 2021-09-29 PROCEDURE — 3044F HG A1C LEVEL LT 7.0%: CPT | Performed by: NURSE PRACTITIONER

## 2021-09-29 PROCEDURE — 99214 OFFICE O/P EST MOD 30 MIN: CPT | Performed by: NURSE PRACTITIONER

## 2021-09-29 RX ORDER — LISINOPRIL 10 MG/1
15 TABLET ORAL DAILY
Qty: 180 TABLET | Refills: 1 | Status: SHIPPED | OUTPATIENT
Start: 2021-09-29 | End: 2022-05-04 | Stop reason: SDUPTHER

## 2021-09-29 RX ORDER — OMEPRAZOLE 20 MG/1
40 CAPSULE, DELAYED RELEASE ORAL DAILY
Qty: 90 CAPSULE | Refills: 1 | Status: SHIPPED | OUTPATIENT
Start: 2021-09-29 | End: 2022-05-04 | Stop reason: SDUPTHER

## 2021-09-29 NOTE — ASSESSMENT & PLAN NOTE
Patient will continue with lisinopril 15 mg tablet daily  Continue current regimen -   Continue to monitor blood pressure at home  Goal BP is < 130/80  Contact our office for consistent elevations  Recommend low sodium diet  Exercise 30 minutes 5 times a week as tolerated    Recommend yearly eye exam

## 2021-09-29 NOTE — ASSESSMENT & PLAN NOTE
Lab Results   Component Value Date    HGBA1C 6 2 09/29/2021     Patient will continue with Metformin  Patient is to continue to work on diet and exercise  Limit sugars and carbohydrate intake  Avoid soda, juice, sweets, cookies, desserts, pasta, bread    Eat more whole grains, exercised 30 min of cardio at least 3 times a week  Also recommended daily foot exams to check for sores, and recommended yearly eye exams

## 2021-09-29 NOTE — PROGRESS NOTES
Assessment/Plan:    GERD (gastroesophageal reflux disease)  Continue with Prilosec 40mg  You may use OTC tums as needed  Recommend small frequent meals throughout the day  Avoid aggravating foods - spicy foods, acidic foods - such as tomato and citrus  Avoid alcohol  Do not lay down for at least 30 mins after eating  Diabetes [facility])  Lab Results   Component Value Date    HGBA1C 6 2 09/29/2021     Patient will continue with Metformin  Patient is to continue to work on diet and exercise  Limit sugars and carbohydrate intake  Avoid soda, juice, sweets, cookies, desserts, pasta, bread    Eat more whole grains, exercised 30 min of cardio at least 3 times a week  Also recommended daily foot exams to check for sores, and recommended yearly eye exams  Hypertension  Patient will continue with lisinopril 15 mg tablet daily  Continue current regimen -   Continue to monitor blood pressure at home  Goal BP is < 130/80  Contact our office for consistent elevations  Recommend low sodium diet  Exercise 30 minutes 5 times a week as tolerated  Recommend yearly eye exam       Primary osteoarthritis of both knees  Status post knee replacement, follows Dr Kristan Phoenix  Kidney stone  Follows urology  Vitamin D deficiency  Patient is to continue with vitamin-D supplementation  Hypercholesteremia    Patient will continue on Lipitor 40 mg tablet daily, continue with fish oil  Recommend healthy lifestyle choices for your cholesterol  Low fat/low cholesterol diet  Limit/avoid red meat  Eat more lean meat - chicken breast, ground turkey, fish  Exercise 30 mins at least 5 times a week as tolerated  BMI Counseling: Body mass index is 35 89 kg/m²   The BMI is above normal  Nutrition recommendations include decreasing portion sizes, encouraging healthy choices of fruits and vegetables, decreasing fast food intake, consuming healthier snacks, limiting drinks that contain sugar, moderation in carbohydrate intake, increasing intake of lean protein, reducing intake of saturated and trans fat and reducing intake of cholesterol  Exercise recommendations include moderate physical activity 150 minutes/week and exercising 3-5 times per week  Rationale for BMI follow-up plan is due to patient being overweight or obese  Diagnoses and all orders for this visit:    Encounter for screening mammogram for malignant neoplasm of breast  -     Mammo screening bilateral w cad; Future    Type 2 diabetes mellitus with other specified complication, without long-term current use of insulin ([facility] )  -     Ambulatory referral to Ophthalmology; Future  -     Hemoglobin A1C  -     Comprehensive metabolic panel; Future  -     CBC and differential  -     POCT hemoglobin A1c    Gastroesophageal reflux disease without esophagitis  -     omeprazole (PriLOSEC) 20 mg delayed release capsule; Take 2 capsules (40 mg total) by mouth daily    Type 2 diabetes mellitus without complication, without long-term current use of insulin ([facility])  -     metFORMIN (GLUCOPHAGE) 1000 MG tablet; Take 1 tablet (1,000 mg total) by mouth 2 (two) times a day with meals    Tachycardia  -     metoprolol tartrate (LOPRESSOR) 25 mg tablet; Take 0 5 tablets (12 5 mg total) by mouth every 12 (twelve) hours    Secondary hypertension  -     lisinopril (ZESTRIL) 10 mg tablet; Take 1 5 tablets (15 mg total) by mouth daily    Hypercholesteremia  -     Lipid panel    Needs flu shot  -     influenza vaccine, quadrivalent, recombinant, PF, 0 5 mL, for patients 18 yr+ (FLUBLOK)    Primary osteoarthritis of both knees    Kidney stone    Vitamin D deficiency    Other orders  -     Cancel: HEMOGLOBIN A1C W/ EAG ESTIMATION; Future          Subjective:      Patient ID: Juani Castelan is a 54 y o  female  Patient presents today for  follow-up on hypertension, hyperlipidemia and diabetes            Screenings:  Colonoscopy- 2016, she states that she had a lot polyps, she states she is due again for one  Gyn- has history of partial hytrectomy, she has not gone to her GYN in a while, she denies abnormal bleeding   Mammogram-   Had done  Hep C-   Had done 2018  Eye Doctor-sees eye doctor yearly  Dentist- she is due    The 10-year ASCVD risk score (Day Castellanos et al , 2013) is: 3 8%    Values used to calculate the score:      Age: 54 years      Sex: Female      Is Non- : No      Diabetic: Yes      Tobacco smoker: No      Systolic Blood Pressure: 008 mmHg      Is BP treated: Yes      HDL Cholesterol: 51 mg/dL      Total Cholesterol: 189 mg/dL          Hypertension  This is a chronic problem  The current episode started more than 1 month ago  The problem is unchanged  The problem is controlled  Pertinent negatives include no anxiety, blurred vision, chest pain, headaches, neck pain, palpitations, peripheral edema or shortness of breath  Risk factors for coronary artery disease include diabetes mellitus, dyslipidemia, sedentary lifestyle and stress  Treatments tried:   Patient currently taking lisinopril  The current treatment provides moderate improvement  Compliance problems include diet  Diabetes  She presents for her follow-up diabetic visit  She has type 2 diabetes mellitus  Her disease course has been stable  There are no hypoglycemic associated symptoms  Pertinent negatives for hypoglycemia include no dizziness or headaches  There are no diabetic associated symptoms  Pertinent negatives for diabetes include no blurred vision, no chest pain, no polydipsia, no polyphagia, no polyuria and no weakness  There are no hypoglycemic complications  There are no diabetic complications  Risk factors for coronary artery disease include sedentary lifestyle, stress, obesity and dyslipidemia  Current diabetic treatment includes oral agent (monotherapy)  She is compliant with treatment all of the time  She has not had a previous visit with a dietitian   She rarely participates in exercise  An ACE inhibitor/angiotensin II receptor blocker is being taken  She does not see a podiatrist Eye exam is not current  Hyperlipidemia  This is a chronic problem  The current episode started more than 1 year ago  The problem is controlled  Exacerbating diseases include diabetes and obesity  She has no history of hypothyroidism  Pertinent negatives include no chest pain or shortness of breath  Current antihyperlipidemic treatment includes statins, diet change and exercise  Compliance problems include adherence to diet and adherence to exercise  The following portions of the patient's history were reviewed and updated as appropriate: allergies, current medications, past family history, past medical history, past social history, past surgical history and problem list     Review of Systems   Constitutional: Negative for activity change, appetite change, chills, diaphoresis and fever  HENT: Negative for congestion, ear discharge, ear pain, postnasal drip, rhinorrhea, sinus pressure, sinus pain and sore throat  Eyes: Negative for blurred vision, pain, discharge, itching and visual disturbance  Respiratory: Negative for cough, chest tightness, shortness of breath and wheezing  Cardiovascular: Negative for chest pain, palpitations and leg swelling  Gastrointestinal: Negative for abdominal pain, constipation, diarrhea, nausea and vomiting  Endocrine: Negative for polydipsia, polyphagia and polyuria  Genitourinary: Negative for difficulty urinating, dysuria and urgency  Musculoskeletal: Negative for arthralgias, back pain and neck pain  Skin: Negative for rash and wound  Neurological: Negative for dizziness, weakness, numbness and headaches           Past Medical History:   Diagnosis Date    Anxiety     Asthma     Colon polyp     Depression     Diverticulitis     Diverticulitis of colon     Follicular cyst of ovary 12/23/2014    Former smoker     GERD (gastroesophageal reflux disease)     Glycosuria     Headache     Hyperlipidemia     Hypertension     Kidney calculi 1/11/2019    Obesity     Ovarian cyst     Overactive bladder     Overweight     Pulmonary nodule     Renal calculus     Tinea pedis of left foot 6/8/2020    Vertigo     Wears partial dentures     Lower plate -partial         Current Outpatient Medications:     Ascorbic Acid (vitamin C) 1000 MG tablet, Take 2,000 mg by mouth daily, Disp: , Rfl:     atorvastatin (LIPITOR) 40 mg tablet, Take 1 tablet (40 mg total) by mouth daily, Disp: 90 tablet, Rfl: 1    Cholecalciferol (Vitamin D3) 50 MCG (2000 UT) TABS, Take 2,000 Units by mouth daily, Disp: , Rfl:     ibuprofen (MOTRIN) 600 mg tablet, Take 1 tablet (600 mg total) by mouth every 6 (six) hours as needed for mild pain, Disp: 30 tablet, Rfl: 0    lisinopril (ZESTRIL) 10 mg tablet, Take 1 5 tablets (15 mg total) by mouth daily, Disp: 180 tablet, Rfl: 1    metFORMIN (GLUCOPHAGE) 1000 MG tablet, Take 1 tablet (1,000 mg total) by mouth 2 (two) times a day with meals, Disp: 180 tablet, Rfl: 1    metoprolol tartrate (LOPRESSOR) 25 mg tablet, Take 0 5 tablets (12 5 mg total) by mouth every 12 (twelve) hours, Disp: 30 tablet, Rfl: 0    multivitamin (THERAGRAN) TABS, Take 1 tablet by mouth daily, Disp: , Rfl:     omeprazole (PriLOSEC) 20 mg delayed release capsule, Take 2 capsules (40 mg total) by mouth daily, Disp: 90 capsule, Rfl: 1    phenazopyridine (PYRIDIUM) 200 mg tablet, Take 1 tablet (200 mg total) by mouth 3 (three) times a day as needed for bladder spasms, Disp: 10 tablet, Rfl: 0    sertraline (ZOLOFT) 50 mg tablet, Take 1 tablet (50 mg total) by mouth daily Takes in the am , Disp: 90 tablet, Rfl: 1    sulfamethoxazole-trimethoprim (BACTRIM DS) 800-160 mg per tablet, Take 1 tablet by mouth every 12 (twelve) hours for 10 days, Disp: 20 tablet, Rfl: 0    tamsulosin (FLOMAX) 0 4 mg, Take 1 capsule (0 4 mg total) by mouth daily with dinner (Patient not taking: Reported on 9/29/2021), Disp: 30 capsule, Rfl: 0    Allergies   Allergen Reactions    Oxybutynin Anaphylaxis     "feels like throat closing and can't swallow"    Clonazepam Other (See Comments)     Pt states "didn't feel right on it "    Morphine Other (See Comments) and Confusion     Annotation - [id number]: "dopey"  Gets silly    Venlafaxine Other (See Comments)     Unknown--pt states " MD was trying pt on different medications  Pt unsure of reaction "       Social History   Past Surgical History:   Procedure Laterality Date    APPENDECTOMY      BLADDER SUSPENSION      [facility]    CERVICAL BIOPSY  W/ LOOP ELECTRODE EXCISION      COLONOSCOPY      DILATION AND CURETTAGE OF UTERUS      FL RETROGRADE PYELOGRAM  7/26/2021    HYSTERECTOMY  2007    ovaries present bilaterally    JOINT REPLACEMENT      right knee    PA COLONOSCOPY FLX DX W/COLLJ SPEC WHEN PFRMD N/A 9/26/2017    Procedure: EGD AND COLONOSCOPY;  Surgeon: Yaw Urban MD;  Location: [facility];   Service: Gastroenterology    PA CYSTO/URETERO W/LITHOTRIPSY &INDWELL STENT INSRT Bilateral 7/26/2021    Procedure: CYSTOSCOPY URETEROSCOPY WITH LITHOTRIPSY HOLMIUM LASER, RETROGRADE PYELOGRAM AND INSERTION STENT URETERAL;  Surgeon: Devyn Jones MD;  [facility];  Service: Urology    PA CYSTO/URETERO W/LITHOTRIPSY &INDWELL STENT INSRT Left 9/16/2021    Procedure: CYSTOSCOPY URETEROSCOPY WITH LITHOTRIPSY HOLMIUM LASER, RETROGRADE PYELOGRAM AND INSERTION STENT URETERAL;  Surgeon: Renny Chapman MD;  [facility];  Service: Urology    PA REVISE MEDIAN N/CARPAL TUNNEL SURG Right 5/29/2018    Procedure: CARPAL TUNNEL RELEASE;  Surgeon: Dinah Lanes, DO;  [facility];  Service: Orthopedics    PA TOTAL KNEE ARTHROPLASTY Right 5/19/2021    Procedure: ARTHROPLASTY KNEE TOTAL;  Surgeon: Sunday Mckeon MD;  [facility];  Service: Orthopedics    REMOVAL URETERAL STENT Right 9/16/2021 Procedure: REMOVAL STENT URETERAL;  Surgeon: Enrico Bello MD;  [facility];  Service: Urology    TONSILLECTOMY      TUBAL LIGATION      URETEROSCOPY  7/26/2021    Procedure: URETEROSCOPY, LASER AND BASKET STONE EXTRACTION;  Surgeon: Ольга Shields MD;  Location:  MAIN OR;  Service: Urology    WISDOM TOOTH EXTRACTION       Family History   Problem Relation Age of Onset    Diabetes type II Mother     Asthma Mother     Stroke Father     Pancreatic cancer Father 68    Diabetes type II Father     No Known Problems Maternal Grandmother     No Known Problems Maternal Grandfather     No Known Problems Paternal Grandmother     No Known Problems Paternal Grandfather     No Known Problems Sister     No Known Problems Sister     No Known Problems Sister     No Known Problems Sister     No Known Problems Sister     No Known Problems Maternal Aunt     No Known Problems Paternal Aunt        Objective:  /68 (BP Location: Left arm, Patient Position: Sitting, Cuff Size: Standard)   Pulse 90   Temp 97 8 °F (36 6 °C) (Temporal)   Resp 20   Ht 5' 3" (1 6 m)   Wt 91 9 kg (202 lb 9 6 oz)   SpO2 98%   BMI 35 89 kg/m²     Recent Results (from the past 1344 hour(s))   CBC and differential    Collection Time: 08/09/21  7:21 AM   Result Value Ref Range    WBC 13 05 (H) 4 31 - 10 16 Thousand/uL    RBC 4 41 3 81 - 5 12 Million/uL    Hemoglobin 12 8 11 5 - 15 4 g/dL    Hematocrit 40 8 34 8 - 46 1 %    MCV 93 82 - 98 fL    MCH 29 0 26 8 - 34 3 pg    MCHC 31 4 31 4 - 37 4 g/dL    RDW 14 6 11 6 - 15 1 %    MPV 11 0 8 9 - 12 7 fL    Platelets 792 (H) 941 - 390 Thousands/uL    nRBC 0 /100 WBCs    Neutrophils Relative 61 43 - 75 %    Immat GRANS % 1 0 - 2 %    Lymphocytes Relative 25 14 - 44 %    Monocytes Relative 8 4 - 12 %    Eosinophils Relative 4 0 - 6 %    Basophils Relative 1 0 - 1 %    Neutrophils Absolute 8 05 (H) 1 85 - 7 62 Thousands/µL    Immature Grans Absolute 0 08 0 00 - 0 20 Thousand/uL    Lymphocytes Absolute 3 25 0 60 - 4 47 Thousands/µL    Monocytes Absolute 1 05 0 17 - 1 22 Thousand/µL    Eosinophils Absolute 0 53 0 00 - 0 61 Thousand/µL    Basophils Absolute 0 09 0 00 - 0 10 Thousands/µL   TSH, 3rd generation with Free T4 reflex    Collection Time: 08/09/21  7:21 AM   Result Value Ref Range    TSH 3RD GENERATON 1 060 0 358 - 3 740 uIU/mL   Vitamin D 25 hydroxy    Collection Time: 08/09/21  7:21 AM   Result Value Ref Range    Vit D, 25-Hydroxy 60 7 30 0 - 100 0 ng/mL   Comprehensive metabolic panel    Collection Time: 08/09/21  7:21 AM   Result Value Ref Range    Sodium 136 136 - 145 mmol/L    Potassium 4 4 3 5 - 5 3 mmol/L    Chloride 106 100 - 108 mmol/L    CO2 24 21 - 32 mmol/L    ANION GAP 6 4 - 13 mmol/L    BUN 13 5 - 25 mg/dL    Creatinine 0 75 0 60 - 1 30 mg/dL    Glucose, Fasting 113 (H) 65 - 99 mg/dL    Calcium 9 3 8 3 - 10 1 mg/dL    AST 19 5 - 45 U/L    ALT 37 12 - 78 U/L    Alkaline Phosphatase 116 46 - 116 U/L    Total Protein 7 1 6 4 - 8 2 g/dL    Albumin 3 5 3 5 - 5 0 g/dL    Total Bilirubin 0 27 0 20 - 1 00 mg/dL    eGFR 90 ml/min/1 73sq m   C-reactive protein    Collection Time: 08/09/21  7:21 AM   Result Value Ref Range    CRP 3 4 (H) <3 0 mg/L   UA w Reflex to Microscopic w Reflex to Culture -Lab Collect    Collection Time: 08/09/21  3:17 PM    Specimen: Urine, Clean Catch   Result Value Ref Range    Color, UA Red     Clarity, UA Turbid     Specific [place], UA 1 010 1 003 - 1 030    pH, UA 7 0 4 5, 5 0, 5 5, 6 0, 6 5, 7 0, 7 5, 8 0    Leukocytes, UA Moderate (A) Negative    Nitrite, UA Positive (A) Negative    Protein,  (2+) (A) Negative mg/dl    Glucose, UA Negative Negative mg/dl    Ketones, UA Trace (A) Negative mg/dl    Urobilinogen, UA 1 0 0 2, 1 0 E U /dl E U /dl    Bilirubin, UA Interference- unable to analyze (A) Negative    Blood, UA Large (A) Negative   Urine culture    Collection Time: 08/09/21  3:17 PM    Specimen: Urine, Clean Catch   Result Value Ref Range    Urine Culture 10,000-19,000 cfu/ml     Urine Microscopic    Collection Time: 08/09/21  3:17 PM   Result Value Ref Range    RBC, UA Innumerable (A) None Seen, 2-4 /hpf    WBC, UA Field obscured, unable to enumerate (A) None Seen, 2-4, 5-60 /hpf    Epithelial Cells Occasional None Seen, Occasional /hpf    Bacteria, UA None Seen None Seen, Occasional /hpf   CBC and differential    Collection Time: 08/13/21  9:55 AM   Result Value Ref Range    WBC 12 79 (H) 4 31 - 10 16 Thousand/uL    RBC 4 63 3 81 - 5 12 Million/uL    Hemoglobin 13 1 11 5 - 15 4 g/dL    Hematocrit 42 8 34 8 - 46 1 %    MCV 92 82 - 98 fL    MCH 28 3 26 8 - 34 3 pg    MCHC 30 6 (L) 31 4 - 37 4 g/dL    RDW 14 9 11 6 - 15 1 %    MPV 10 9 8 9 - 12 7 fL    Platelets 203 (H) 510 - 390 Thousands/uL    nRBC 0 /100 WBCs    Neutrophils Relative 66 43 - 75 %    Immat GRANS % 1 0 - 2 %    Lymphocytes Relative 23 14 - 44 %    Monocytes Relative 6 4 - 12 %    Eosinophils Relative 3 0 - 6 %    Basophils Relative 1 0 - 1 %    Neutrophils Absolute 8 49 (H) 1 85 - 7 62 Thousands/µL    Immature Grans Absolute 0 08 0 00 - 0 20 Thousand/uL    Lymphocytes Absolute 2 96 0 60 - 4 47 Thousands/µL    Monocytes Absolute 0 80 0 17 - 1 22 Thousand/µL    Eosinophils Absolute 0 36 0 00 - 0 61 Thousand/µL    Basophils Absolute 0 10 0 00 - 0 10 Thousands/µL   Comprehensive metabolic panel    Collection Time: 08/13/21  9:55 AM   Result Value Ref Range    Sodium 134 (L) 136 - 145 mmol/L    Potassium 4 4 3 5 - 5 3 mmol/L    Chloride 102 100 - 108 mmol/L    CO2 25 21 - 32 mmol/L    ANION GAP 7 4 - 13 mmol/L    BUN 16 5 - 25 mg/dL    Creatinine 1 02 0 60 - 1 30 mg/dL    Glucose 156 (H) 65 - 140 mg/dL    Calcium 9 6 8 3 - 10 1 mg/dL    AST 19 5 - 45 U/L    ALT 40 12 - 78 U/L    Alkaline Phosphatase 127 (H) 46 - 116 U/L    Total Protein 7 4 6 4 - 8 2 g/dL    Albumin 3 7 3 5 - 5 0 g/dL    Total Bilirubin 0 40 0 20 - 1 00 mg/dL    eGFR 62 ml/min/1 73sq m   Urine culture Collection Time: 08/31/21  3:46 PM    Specimen: Urine, Clean Catch   Result Value Ref Range    Urine Culture <10,000 cfu/ml     UA w Reflex to Microscopic w Reflex to Culture -Lab Collect    Collection Time: 09/08/21 10:53 AM    Specimen: Urine   Result Value Ref Range    Color, UA Dk Yellow     Clarity, UA Turbid     Specific [place], UA 1 018 1 003 - 1 030    pH, UA 6 5 4 5, 5 0, 5 5, 6 0, 6 5, 7 0, 7 5, 8 0    Leukocytes, UA Large (A) Negative    Nitrite, UA Negative Negative    Protein,  (3+) (A) Negative mg/dl    Glucose, UA Negative Negative mg/dl    Ketones, UA Trace (A) Negative mg/dl    Urobilinogen, UA 1 0 0 2, 1 0 E U /dl E U /dl    Bilirubin, UA Negative Negative    Blood, UA Large (A) Negative   Urine culture    Collection Time: 09/08/21 10:53 AM    Specimen: Urine, Clean Catch   Result Value Ref Range    Urine Culture <10,000 cfu/ml     Urine Microscopic    Collection Time: 09/08/21 10:53 AM   Result Value Ref Range    RBC, UA Innumerable (A) None Seen, 2-4 /hpf    WBC, UA Innumerable (A) None Seen, 2-4, 5-60 /hpf    Epithelial Cells Field obscured, unable to enumerate (A) None Seen, Occasional /hpf    Bacteria, UA Field obscured, unable to enumerate (A) None Seen, Occasional /hpf   Urine culture    Collection Time: 09/08/21 10:53 AM    Specimen: Urine   Result Value Ref Range    Urine Culture No Growth <1000 cfu/mL    Stone analysis    Collection Time: 09/16/21 10:51 AM   Result Value Ref Range    COLOR Tan     Size 6x5 mm    Stone Weight 92 mg    Composition Comment     Ca Oxalate,Dihydrate 90 %    Ca Oxalate,Monohydr  10 %    STONE COMMENT Comment     Please note Comment     Disclaimer Comment     Photo Comment     Stone Source Comment    POCT urine dip    Collection Time: 09/20/21  9:26 AM   Result Value Ref Range    LEUKOCYTE ESTERASE,UA trace     NITRITE,UA positive     SL AMB POCT UROBILINOGEN negative     POCT URINE PROTEIN trace      PH,UA 6 5     BLOOD,UA large     SPECIFIC GRAVITY,UA 1 010     KETONES,UA negative     BILIRUBIN,UA negative     GLUCOSE, UA negative      COLOR,UA yellowish pink     CLARITY,UA clear    Urine culture    Collection Time: 09/20/21 11:13 AM    Specimen: Urine, Clean Catch   Result Value Ref Range    Urine Culture <10,000 cfu/ml Pseudomonas species (A)    POCT hemoglobin A1c    Collection Time: 09/29/21  9:37 AM   Result Value Ref Range    Hemoglobin A1C 6 2 6 5            Physical Exam  Constitutional:       General: She is not in acute distress  Appearance: She is well-developed  She is not diaphoretic  HENT:      Head: Normocephalic and atraumatic  Right Ear: External ear normal       Left Ear: External ear normal       Nose: Nose normal       Mouth/Throat:      Pharynx: No oropharyngeal exudate  Eyes:      General:         Right eye: No discharge  Left eye: No discharge  Conjunctiva/sclera: Conjunctivae normal       Pupils: Pupils are equal, round, and reactive to light  Neck:      Thyroid: No thyromegaly  Cardiovascular:      Rate and Rhythm: Normal rate and regular rhythm  Heart sounds: Normal heart sounds  No murmur heard  No friction rub  No gallop  Pulmonary:      Effort: Pulmonary effort is normal  No respiratory distress  Breath sounds: Normal breath sounds  No stridor  No wheezing or rales  Abdominal:      General: Bowel sounds are normal  There is no distension  Palpations: Abdomen is soft  Tenderness: There is no abdominal tenderness  Musculoskeletal:      Cervical back: Normal range of motion and neck supple  Lymphadenopathy:      Cervical: No cervical adenopathy  Skin:     General: Skin is warm and dry  Findings: No erythema or rash  Neurological:      Mental Status: She is alert and oriented to person, place, and time  Psychiatric:         Behavior: Behavior normal          Thought Content:  Thought content normal          Judgment: Judgment normal

## 2021-10-14 ENCOUNTER — CONSULT (OUTPATIENT)
Dept: NEPHROLOGY | Facility: CLINIC | Age: 56
End: 2021-10-14
Payer: COMMERCIAL

## 2021-10-14 VITALS
DIASTOLIC BLOOD PRESSURE: 82 MMHG | HEART RATE: 84 BPM | BODY MASS INDEX: 30.44 KG/M2 | HEIGHT: 63 IN | WEIGHT: 171.8 LBS | SYSTOLIC BLOOD PRESSURE: 112 MMHG | RESPIRATION RATE: 16 BRPM

## 2021-10-14 DIAGNOSIS — N20.0 NEPHROLITHIASIS: ICD-10-CM

## 2021-10-14 DIAGNOSIS — E11.69 TYPE 2 DIABETES MELLITUS WITH OTHER SPECIFIED COMPLICATION, WITHOUT LONG-TERM CURRENT USE OF INSULIN (HCC): ICD-10-CM

## 2021-10-14 LAB
SL AMB  POCT GLUCOSE, UA: NEGATIVE
SL AMB LEUKOCYTE ESTERASE,UA: ABNORMAL
SL AMB POCT BILIRUBIN,UA: NEGATIVE
SL AMB POCT BLOOD,UA: ABNORMAL
SL AMB POCT CLARITY,UA: CLEAR
SL AMB POCT COLOR,UA: YELLOW
SL AMB POCT KETONES,UA: NEGATIVE
SL AMB POCT NITRITE,UA: NEGATIVE
SL AMB POCT PH,UA: 6
SL AMB POCT SPECIFIC GRAVITY,UA: 1.01
SL AMB POCT URINE PROTEIN: ABNORMAL
SL AMB POCT UROBILINOGEN: 0.2

## 2021-10-14 PROCEDURE — 3066F NEPHROPATHY DOC TX: CPT | Performed by: NURSE PRACTITIONER

## 2021-10-14 PROCEDURE — 81002 URINALYSIS NONAUTO W/O SCOPE: CPT | Performed by: STUDENT IN AN ORGANIZED HEALTH CARE EDUCATION/TRAINING PROGRAM

## 2021-10-14 PROCEDURE — 99244 OFF/OP CNSLTJ NEW/EST MOD 40: CPT | Performed by: STUDENT IN AN ORGANIZED HEALTH CARE EDUCATION/TRAINING PROGRAM

## 2021-10-18 ENCOUNTER — HOSPITAL ENCOUNTER (OUTPATIENT)
Dept: RADIOLOGY | Facility: IMAGING CENTER | Age: 56
Discharge: HOME | End: 2021-10-18
Payer: COMMERCIAL

## 2021-10-18 DIAGNOSIS — N20.0 NEPHROLITHIASIS: ICD-10-CM

## 2021-10-18 PROCEDURE — 76770 US EXAM ABDO BACK WALL COMP: CPT

## 2021-10-19 ENCOUNTER — TELEPHONE (OUTPATIENT)
Dept: NEPHROLOGY | Facility: CLINIC | Age: 56
End: 2021-10-19

## 2021-10-20 ENCOUNTER — TELEPHONE (OUTPATIENT)
Dept: UROLOGY | Facility: AMBULATORY SURGERY CENTER | Age: 56
End: 2021-10-20

## 2021-10-22 ENCOUNTER — OFFICE VISIT (OUTPATIENT)
Dept: UROLOGY | Facility: AMBULATORY SURGERY CENTER | Age: 56
End: 2021-10-22
Payer: COMMERCIAL

## 2021-10-22 VITALS
WEIGHT: 171 LBS | HEIGHT: 63 IN | BODY MASS INDEX: 30.3 KG/M2 | SYSTOLIC BLOOD PRESSURE: 138 MMHG | DIASTOLIC BLOOD PRESSURE: 80 MMHG | HEART RATE: 91 BPM

## 2021-10-22 DIAGNOSIS — N20.0 NEPHROLITHIASIS: Primary | ICD-10-CM

## 2021-10-22 LAB
SL AMB  POCT GLUCOSE, UA: ABNORMAL
SL AMB LEUKOCYTE ESTERASE,UA: ABNORMAL
SL AMB POCT BILIRUBIN,UA: ABNORMAL
SL AMB POCT BLOOD,UA: + 3
SL AMB POCT CLARITY,UA: ABNORMAL
SL AMB POCT COLOR,UA: YELLOW
SL AMB POCT KETONES,UA: ABNORMAL
SL AMB POCT NITRITE,UA: ABNORMAL
SL AMB POCT PH,UA: 7
SL AMB POCT SPECIFIC GRAVITY,UA: 1.02
SL AMB POCT URINE PROTEIN: + 100
SL AMB POCT UROBILINOGEN: 0.2

## 2021-10-22 PROCEDURE — 1036F TOBACCO NON-USER: CPT | Performed by: NURSE PRACTITIONER

## 2021-10-22 PROCEDURE — 99213 OFFICE O/P EST LOW 20 MIN: CPT | Performed by: NURSE PRACTITIONER

## 2021-10-22 PROCEDURE — 3008F BODY MASS INDEX DOCD: CPT | Performed by: NURSE PRACTITIONER

## 2021-10-22 PROCEDURE — 3079F DIAST BP 80-89 MM HG: CPT | Performed by: NURSE PRACTITIONER

## 2021-10-22 PROCEDURE — 81002 URINALYSIS NONAUTO W/O SCOPE: CPT | Performed by: NURSE PRACTITIONER

## 2021-10-22 PROCEDURE — 3075F SYST BP GE 130 - 139MM HG: CPT | Performed by: NURSE PRACTITIONER

## 2021-10-29 ENCOUNTER — APPOINTMENT (OUTPATIENT)
Dept: LAB | Facility: IMAGING CENTER | Age: 56
End: 2021-10-29
Payer: COMMERCIAL

## 2021-10-29 ENCOUNTER — HOSPITAL ENCOUNTER (OUTPATIENT)
Dept: RADIOLOGY | Facility: IMAGING CENTER | Age: 56
Discharge: HOME | End: 2021-10-29
Payer: COMMERCIAL

## 2021-10-29 ENCOUNTER — VBI (OUTPATIENT)
Dept: ADMINISTRATIVE | Facility: OTHER | Age: 56
End: 2021-10-29

## 2021-10-29 DIAGNOSIS — E11.69 TYPE 2 DIABETES MELLITUS WITH OTHER SPECIFIED COMPLICATION, WITHOUT LONG-TERM CURRENT USE OF INSULIN (HCC): ICD-10-CM

## 2021-10-29 DIAGNOSIS — N20.0 NEPHROLITHIASIS: ICD-10-CM

## 2021-10-29 LAB
ALBUMIN SERPL BCP-MCNC: 3.4 G/DL (ref 3.5–5)
ALP SERPL-CCNC: 115 U/L (ref 46–116)
ALT SERPL W P-5'-P-CCNC: 28 U/L (ref 12–78)
ANION GAP SERPL CALCULATED.3IONS-SCNC: 1 MMOL/L (ref 4–13)
AST SERPL W P-5'-P-CCNC: 15 U/L (ref 5–45)
BACTERIA UR QL AUTO: ABNORMAL /HPF
BASOPHILS # BLD AUTO: 0.05 THOUSANDS/ΜL (ref 0–0.1)
BASOPHILS NFR BLD AUTO: 1 % (ref 0–1)
BILIRUB SERPL-MCNC: 0.35 MG/DL (ref 0.2–1)
BILIRUB UR QL STRIP: NEGATIVE
BUN SERPL-MCNC: 20 MG/DL (ref 5–25)
CALCIUM ALBUM COR SERPL-MCNC: 9.5 MG/DL (ref 8.3–10.1)
CALCIUM SERPL-MCNC: 9 MG/DL (ref 8.3–10.1)
CHLORIDE SERPL-SCNC: 108 MMOL/L (ref 100–108)
CHOLEST SERPL-MCNC: 134 MG/DL (ref 50–200)
CLARITY UR: ABNORMAL
CO2 SERPL-SCNC: 29 MMOL/L (ref 21–32)
COLOR UR: YELLOW
CREAT SERPL-MCNC: 0.89 MG/DL (ref 0.6–1.3)
CREAT UR-MCNC: 78.4 MG/DL
EOSINOPHIL # BLD AUTO: 0.32 THOUSAND/ΜL (ref 0–0.61)
EOSINOPHIL NFR BLD AUTO: 4 % (ref 0–6)
ERYTHROCYTE [DISTWIDTH] IN BLOOD BY AUTOMATED COUNT: 16.2 % (ref 11.6–15.1)
EST. AVERAGE GLUCOSE BLD GHB EST-MCNC: 126 MG/DL
GFR SERPL CREATININE-BSD FRML MDRD: 73 ML/MIN/1.73SQ M
GLUCOSE P FAST SERPL-MCNC: 115 MG/DL (ref 65–99)
GLUCOSE UR STRIP-MCNC: NEGATIVE MG/DL
HBA1C MFR BLD: 6 %
HCT VFR BLD AUTO: 37.1 % (ref 34.8–46.1)
HDLC SERPL-MCNC: 40 MG/DL
HGB BLD-MCNC: 11.2 G/DL (ref 11.5–15.4)
HGB UR QL STRIP.AUTO: ABNORMAL
HYALINE CASTS #/AREA URNS LPF: ABNORMAL /LPF
IMM GRANULOCYTES # BLD AUTO: 0.03 THOUSAND/UL (ref 0–0.2)
IMM GRANULOCYTES NFR BLD AUTO: 0 % (ref 0–2)
KETONES UR STRIP-MCNC: NEGATIVE MG/DL
LDLC SERPL CALC-MCNC: 67 MG/DL (ref 0–100)
LEUKOCYTE ESTERASE UR QL STRIP: ABNORMAL
LYMPHOCYTES # BLD AUTO: 2.37 THOUSANDS/ΜL (ref 0.6–4.47)
LYMPHOCYTES NFR BLD AUTO: 29 % (ref 14–44)
MCH RBC QN AUTO: 27.5 PG (ref 26.8–34.3)
MCHC RBC AUTO-ENTMCNC: 30.2 G/DL (ref 31.4–37.4)
MCV RBC AUTO: 91 FL (ref 82–98)
MONOCYTES # BLD AUTO: 0.62 THOUSAND/ΜL (ref 0.17–1.22)
MONOCYTES NFR BLD AUTO: 8 % (ref 4–12)
NEUTROPHILS # BLD AUTO: 4.8 THOUSANDS/ΜL (ref 1.85–7.62)
NEUTS SEG NFR BLD AUTO: 58 % (ref 43–75)
NITRITE UR QL STRIP: NEGATIVE
NON-SQ EPI CELLS URNS QL MICRO: ABNORMAL /HPF
NONHDLC SERPL-MCNC: 94 MG/DL
NRBC BLD AUTO-RTO: 0 /100 WBCS
PH UR STRIP.AUTO: 6.5 [PH]
PLATELET # BLD AUTO: 420 THOUSANDS/UL (ref 149–390)
PMV BLD AUTO: 10.6 FL (ref 8.9–12.7)
POTASSIUM SERPL-SCNC: 4.4 MMOL/L (ref 3.5–5.3)
PROT SERPL-MCNC: 7.3 G/DL (ref 6.4–8.2)
PROT UR STRIP-MCNC: ABNORMAL MG/DL
PROT UR-MCNC: 47 MG/DL
PROT/CREAT UR: 0.6 MG/G{CREAT} (ref 0–0.1)
RBC # BLD AUTO: 4.08 MILLION/UL (ref 3.81–5.12)
RBC #/AREA URNS AUTO: ABNORMAL /HPF
SODIUM SERPL-SCNC: 138 MMOL/L (ref 136–145)
SP GR UR STRIP.AUTO: 1.02 (ref 1–1.03)
TRIGL SERPL-MCNC: 135 MG/DL
URATE SERPL-MCNC: 4.9 MG/DL (ref 2–6.8)
UROBILINOGEN UR QL STRIP.AUTO: 1 E.U./DL
WBC # BLD AUTO: 8.19 THOUSAND/UL (ref 4.31–10.16)
WBC #/AREA URNS AUTO: ABNORMAL /HPF

## 2021-10-29 PROCEDURE — 36415 COLL VENOUS BLD VENIPUNCTURE: CPT | Performed by: NURSE PRACTITIONER

## 2021-10-29 PROCEDURE — 82570 ASSAY OF URINE CREATININE: CPT | Performed by: STUDENT IN AN ORGANIZED HEALTH CARE EDUCATION/TRAINING PROGRAM

## 2021-10-29 PROCEDURE — 74176 CT ABD & PELVIS W/O CONTRAST: CPT

## 2021-10-29 PROCEDURE — 83036 HEMOGLOBIN GLYCOSYLATED A1C: CPT | Performed by: NURSE PRACTITIONER

## 2021-10-29 PROCEDURE — 80053 COMPREHEN METABOLIC PANEL: CPT

## 2021-10-29 PROCEDURE — 3061F NEG MICROALBUMINURIA REV: CPT | Performed by: NURSE PRACTITIONER

## 2021-10-29 PROCEDURE — 3044F HG A1C LEVEL LT 7.0%: CPT | Performed by: NURSE PRACTITIONER

## 2021-10-29 PROCEDURE — 85025 COMPLETE CBC W/AUTO DIFF WBC: CPT | Performed by: NURSE PRACTITIONER

## 2021-10-29 PROCEDURE — 84550 ASSAY OF BLOOD/URIC ACID: CPT

## 2021-10-29 PROCEDURE — 80061 LIPID PANEL: CPT | Performed by: NURSE PRACTITIONER

## 2021-10-29 PROCEDURE — 84156 ASSAY OF PROTEIN URINE: CPT | Performed by: STUDENT IN AN ORGANIZED HEALTH CARE EDUCATION/TRAINING PROGRAM

## 2021-10-29 PROCEDURE — G1004 CDSM NDSC: HCPCS

## 2021-10-29 PROCEDURE — 81001 URINALYSIS AUTO W/SCOPE: CPT | Performed by: STUDENT IN AN ORGANIZED HEALTH CARE EDUCATION/TRAINING PROGRAM

## 2021-11-01 ENCOUNTER — TELEPHONE (OUTPATIENT)
Dept: NEPHROLOGY | Facility: CLINIC | Age: 56
End: 2021-11-01

## 2021-11-01 DIAGNOSIS — R82.991 HYPOCITRATURIA: Primary | ICD-10-CM

## 2021-11-01 PROCEDURE — 3066F NEPHROPATHY DOC TX: CPT | Performed by: ORTHOPAEDIC SURGERY

## 2021-11-01 RX ORDER — POTASSIUM CITRATE 10 MEQ/1
10 TABLET, EXTENDED RELEASE ORAL
Qty: 120 TABLET | Refills: 2 | Status: SHIPPED | OUTPATIENT
Start: 2021-11-01 | End: 2021-11-04 | Stop reason: SDUPTHER

## 2021-11-03 ENCOUNTER — TELEPHONE (OUTPATIENT)
Dept: UROLOGY | Facility: MEDICAL CENTER | Age: 56
End: 2021-11-03

## 2021-11-03 ENCOUNTER — TELEPHONE (OUTPATIENT)
Dept: OTHER | Facility: HOSPITAL | Age: 56
End: 2021-11-03

## 2021-11-04 ENCOUNTER — TELEPHONE (OUTPATIENT)
Dept: NEPHROLOGY | Facility: CLINIC | Age: 56
End: 2021-11-04

## 2021-11-04 DIAGNOSIS — R82.991 HYPOCITRATURIA: ICD-10-CM

## 2021-11-05 RX ORDER — POTASSIUM CITRATE 10 MEQ/1
10 TABLET, EXTENDED RELEASE ORAL
Qty: 270 TABLET | Refills: 3 | Status: SHIPPED | OUTPATIENT
Start: 2021-11-05 | End: 2021-11-09 | Stop reason: SDUPTHER

## 2021-11-08 ENCOUNTER — PREP FOR PROCEDURE (OUTPATIENT)
Dept: UROLOGY | Facility: AMBULATORY SURGERY CENTER | Age: 56
End: 2021-11-08

## 2021-11-08 ENCOUNTER — PREP FOR PROCEDURE (OUTPATIENT)
Dept: UROLOGY | Facility: CLINIC | Age: 56
End: 2021-11-08

## 2021-11-08 ENCOUNTER — APPOINTMENT (OUTPATIENT)
Dept: LAB | Facility: IMAGING CENTER | Age: 56
End: 2021-11-08
Payer: COMMERCIAL

## 2021-11-08 DIAGNOSIS — N20.0 NEPHROLITHIASIS: Primary | ICD-10-CM

## 2021-11-08 PROCEDURE — 87086 URINE CULTURE/COLONY COUNT: CPT

## 2021-11-08 RX ORDER — CEFAZOLIN SODIUM 1 G/50ML
1000 SOLUTION INTRAVENOUS ONCE
Status: CANCELLED | OUTPATIENT
Start: 2021-11-08 | End: 2021-11-08

## 2021-11-09 DIAGNOSIS — R82.991 HYPOCITRATURIA: ICD-10-CM

## 2021-11-09 LAB — BACTERIA UR CULT: NORMAL

## 2021-11-09 RX ORDER — POTASSIUM CITRATE 10 MEQ/1
10 TABLET, EXTENDED RELEASE ORAL
Qty: 270 TABLET | Refills: 3 | Status: SHIPPED | OUTPATIENT
Start: 2021-11-09 | End: 2021-11-23 | Stop reason: SDUPTHER

## 2021-11-11 ENCOUNTER — HOSPITAL ENCOUNTER (OUTPATIENT)
Dept: RADIOLOGY | Facility: HOSPITAL | Age: 56
Discharge: HOME | End: 2021-11-11
Attending: ORTHOPAEDIC SURGERY
Payer: COMMERCIAL

## 2021-11-11 ENCOUNTER — HOSPITAL ENCOUNTER (OUTPATIENT)
Dept: RADIOLOGY | Facility: IMAGING CENTER | Age: 56
Discharge: HOME | End: 2021-11-11
Payer: COMMERCIAL

## 2021-11-11 ENCOUNTER — OFFICE VISIT (OUTPATIENT)
Dept: OBGYN CLINIC | Facility: HOSPITAL | Age: 56
End: 2021-11-11
Payer: COMMERCIAL

## 2021-11-11 VITALS
HEIGHT: 63 IN | DIASTOLIC BLOOD PRESSURE: 73 MMHG | BODY MASS INDEX: 30.11 KG/M2 | SYSTOLIC BLOOD PRESSURE: 152 MMHG | HEART RATE: 147 BPM

## 2021-11-11 VITALS — HEIGHT: 63 IN | BODY MASS INDEX: 30.12 KG/M2 | WEIGHT: 170 LBS

## 2021-11-11 DIAGNOSIS — Z96.651 AFTERCARE FOLLOWING RIGHT KNEE JOINT REPLACEMENT SURGERY: ICD-10-CM

## 2021-11-11 DIAGNOSIS — Z47.1 AFTERCARE FOLLOWING RIGHT KNEE JOINT REPLACEMENT SURGERY: ICD-10-CM

## 2021-11-11 DIAGNOSIS — Z47.1 AFTERCARE FOLLOWING RIGHT KNEE JOINT REPLACEMENT SURGERY: Primary | ICD-10-CM

## 2021-11-11 DIAGNOSIS — Z96.651 AFTERCARE FOLLOWING RIGHT KNEE JOINT REPLACEMENT SURGERY: Primary | ICD-10-CM

## 2021-11-11 DIAGNOSIS — Z12.31 ENCOUNTER FOR SCREENING MAMMOGRAM FOR MALIGNANT NEOPLASM OF BREAST: ICD-10-CM

## 2021-11-11 PROCEDURE — 99213 OFFICE O/P EST LOW 20 MIN: CPT | Performed by: ORTHOPAEDIC SURGERY

## 2021-11-11 PROCEDURE — 77067 SCR MAMMO BI INCL CAD: CPT

## 2021-11-11 PROCEDURE — 73560 X-RAY EXAM OF KNEE 1 OR 2: CPT

## 2021-11-11 PROCEDURE — 77063 BREAST TOMOSYNTHESIS BI: CPT

## 2021-11-11 PROCEDURE — 1036F TOBACCO NON-USER: CPT | Performed by: ORTHOPAEDIC SURGERY

## 2021-11-15 ENCOUNTER — TELEPHONE (OUTPATIENT)
Dept: OBGYN CLINIC | Facility: HOSPITAL | Age: 56
End: 2021-11-15

## 2021-11-15 RX ORDER — ACETAMINOPHEN 500 MG
500 TABLET ORAL EVERY 6 HOURS PRN
COMMUNITY

## 2021-11-16 ENCOUNTER — TELEPHONE (OUTPATIENT)
Dept: OBGYN CLINIC | Facility: HOSPITAL | Age: 56
End: 2021-11-16

## 2021-11-17 ENCOUNTER — ANESTHESIA EVENT (OUTPATIENT)
Dept: PERIOP | Facility: HOSPITAL | Age: 56
End: 2021-11-17
Payer: COMMERCIAL

## 2021-11-18 ENCOUNTER — HOSPITAL ENCOUNTER (OUTPATIENT)
Facility: HOSPITAL | Age: 56
Setting detail: OUTPATIENT SURGERY
Discharge: HOME | End: 2021-11-18
Attending: UROLOGY | Admitting: UROLOGY
Payer: COMMERCIAL

## 2021-11-18 ENCOUNTER — ANESTHESIA (OUTPATIENT)
Dept: PERIOP | Facility: HOSPITAL | Age: 56
End: 2021-11-18
Payer: COMMERCIAL

## 2021-11-18 ENCOUNTER — APPOINTMENT (OUTPATIENT)
Dept: RADIOLOGY | Facility: HOSPITAL | Age: 56
End: 2021-11-18
Payer: COMMERCIAL

## 2021-11-18 VITALS
HEIGHT: 63 IN | SYSTOLIC BLOOD PRESSURE: 119 MMHG | HEART RATE: 71 BPM | WEIGHT: 170 LBS | TEMPERATURE: 98 F | RESPIRATION RATE: 16 BRPM | DIASTOLIC BLOOD PRESSURE: 71 MMHG | OXYGEN SATURATION: 99 % | BODY MASS INDEX: 30.12 KG/M2

## 2021-11-18 DIAGNOSIS — N20.0 NEPHROLITHIASIS: Primary | ICD-10-CM

## 2021-11-18 LAB
GLUCOSE SERPL-MCNC: 126 MG/DL (ref 65–140)
GLUCOSE SERPL-MCNC: 135 MG/DL (ref 65–140)

## 2021-11-18 PROCEDURE — 82948 REAGENT STRIP/BLOOD GLUCOSE: CPT

## 2021-11-18 PROCEDURE — 74420 UROGRAPHY RTRGR +-KUB: CPT

## 2021-11-18 PROCEDURE — C1769 GUIDE WIRE: HCPCS | Performed by: UROLOGY

## 2021-11-18 PROCEDURE — 82360 CALCULUS ASSAY QUANT: CPT | Performed by: UROLOGY

## 2021-11-18 PROCEDURE — C1758 CATHETER, URETERAL: HCPCS | Performed by: UROLOGY

## 2021-11-18 PROCEDURE — 52356 CYSTO/URETERO W/LITHOTRIPSY: CPT | Performed by: UROLOGY

## 2021-11-18 PROCEDURE — C2617 STENT, NON-COR, TEM W/O DEL: HCPCS | Performed by: UROLOGY

## 2021-11-18 DEVICE — STENT URETERAL 6FR 24CM INLAY OPTIMA: Type: IMPLANTABLE DEVICE | Site: URETER | Status: FUNCTIONAL

## 2021-11-18 RX ORDER — ONDANSETRON 2 MG/ML
4 INJECTION INTRAMUSCULAR; INTRAVENOUS ONCE AS NEEDED
Status: DISCONTINUED | OUTPATIENT
Start: 2021-11-18 | End: 2021-11-18 | Stop reason: HOSPADM

## 2021-11-18 RX ORDER — CEFAZOLIN SODIUM 1 G/50ML
1000 SOLUTION INTRAVENOUS ONCE
Status: COMPLETED | OUTPATIENT
Start: 2021-11-18 | End: 2021-11-18

## 2021-11-18 RX ORDER — FENTANYL CITRATE/PF 50 MCG/ML
25 SYRINGE (ML) INJECTION
Status: DISCONTINUED | OUTPATIENT
Start: 2021-11-18 | End: 2021-11-18 | Stop reason: HOSPADM

## 2021-11-18 RX ORDER — FENTANYL CITRATE 50 UG/ML
INJECTION, SOLUTION INTRAMUSCULAR; INTRAVENOUS AS NEEDED
Status: DISCONTINUED | OUTPATIENT
Start: 2021-11-18 | End: 2021-11-18

## 2021-11-18 RX ORDER — LIDOCAINE HYDROCHLORIDE 10 MG/ML
INJECTION, SOLUTION EPIDURAL; INFILTRATION; INTRACAUDAL; PERINEURAL AS NEEDED
Status: DISCONTINUED | OUTPATIENT
Start: 2021-11-18 | End: 2021-11-18

## 2021-11-18 RX ORDER — ONDANSETRON 2 MG/ML
INJECTION INTRAMUSCULAR; INTRAVENOUS AS NEEDED
Status: DISCONTINUED | OUTPATIENT
Start: 2021-11-18 | End: 2021-11-18

## 2021-11-18 RX ORDER — GLYCOPYRROLATE 0.2 MG/ML
INJECTION INTRAMUSCULAR; INTRAVENOUS AS NEEDED
Status: DISCONTINUED | OUTPATIENT
Start: 2021-11-18 | End: 2021-11-18

## 2021-11-18 RX ORDER — CEPHALEXIN 500 MG/1
500 CAPSULE ORAL EVERY 12 HOURS SCHEDULED
Qty: 6 CAPSULE | Refills: 0 | Status: SHIPPED | OUTPATIENT
Start: 2021-11-18 | End: 2021-11-21

## 2021-11-18 RX ORDER — MAGNESIUM HYDROXIDE 1200 MG/15ML
LIQUID ORAL AS NEEDED
Status: DISCONTINUED | OUTPATIENT
Start: 2021-11-18 | End: 2021-11-18 | Stop reason: HOSPADM

## 2021-11-18 RX ORDER — PROPOFOL 10 MG/ML
INJECTION, EMULSION INTRAVENOUS AS NEEDED
Status: DISCONTINUED | OUTPATIENT
Start: 2021-11-18 | End: 2021-11-18

## 2021-11-18 RX ORDER — MIDAZOLAM HYDROCHLORIDE 2 MG/2ML
INJECTION, SOLUTION INTRAMUSCULAR; INTRAVENOUS AS NEEDED
Status: DISCONTINUED | OUTPATIENT
Start: 2021-11-18 | End: 2021-11-18

## 2021-11-18 RX ORDER — HYDROCODONE BITARTRATE AND ACETAMINOPHEN 5; 325 MG/1; MG/1
1 TABLET ORAL EVERY 4 HOURS PRN
Qty: 5 TABLET | Refills: 0 | Status: SHIPPED | OUTPATIENT
Start: 2021-11-18 | End: 2021-11-20

## 2021-11-18 RX ORDER — SODIUM CHLORIDE, SODIUM LACTATE, POTASSIUM CHLORIDE, CALCIUM CHLORIDE 600; 310; 30; 20 MG/100ML; MG/100ML; MG/100ML; MG/100ML
INJECTION, SOLUTION INTRAVENOUS CONTINUOUS PRN
Status: DISCONTINUED | OUTPATIENT
Start: 2021-11-18 | End: 2021-11-18

## 2021-11-18 RX ORDER — DEXAMETHASONE SODIUM PHOSPHATE 10 MG/ML
INJECTION, SOLUTION INTRAMUSCULAR; INTRAVENOUS AS NEEDED
Status: DISCONTINUED | OUTPATIENT
Start: 2021-11-18 | End: 2021-11-18

## 2021-11-18 RX ADMIN — MIDAZOLAM 2 MG: 1 INJECTION INTRAMUSCULAR; INTRAVENOUS at 07:27

## 2021-11-18 RX ADMIN — CEFAZOLIN SODIUM 1000 MG: 1 SOLUTION INTRAVENOUS at 07:36

## 2021-11-18 RX ADMIN — PROPOFOL 200 MG: 10 INJECTION, EMULSION INTRAVENOUS at 07:34

## 2021-11-18 RX ADMIN — SODIUM CHLORIDE, SODIUM LACTATE, POTASSIUM CHLORIDE, AND CALCIUM CHLORIDE: .6; .31; .03; .02 INJECTION, SOLUTION INTRAVENOUS at 07:24

## 2021-11-18 RX ADMIN — FENTANYL CITRATE 50 MCG: 50 INJECTION INTRAMUSCULAR; INTRAVENOUS at 07:37

## 2021-11-18 RX ADMIN — LIDOCAINE HYDROCHLORIDE 50 MG: 10 INJECTION, SOLUTION EPIDURAL; INFILTRATION; INTRACAUDAL; PERINEURAL at 07:34

## 2021-11-18 RX ADMIN — DEXAMETHASONE SODIUM PHOSPHATE 4 MG: 10 INJECTION, SOLUTION INTRAMUSCULAR; INTRAVENOUS at 07:39

## 2021-11-18 RX ADMIN — ONDANSETRON 4 MG: 2 INJECTION INTRAMUSCULAR; INTRAVENOUS at 07:38

## 2021-11-18 RX ADMIN — GLYCOPYRROLATE 0.2 MG: 0.2 INJECTION, SOLUTION INTRAMUSCULAR; INTRAVENOUS at 07:34

## 2021-11-18 RX ADMIN — FENTANYL CITRATE 25 MCG: 50 INJECTION INTRAMUSCULAR; INTRAVENOUS at 08:00

## 2021-11-19 ENCOUNTER — TELEPHONE (OUTPATIENT)
Dept: UROLOGY | Facility: AMBULATORY SURGERY CENTER | Age: 56
End: 2021-11-19

## 2021-11-19 DIAGNOSIS — R82.991 HYPOCITRATURIA: ICD-10-CM

## 2021-11-22 ENCOUNTER — PROCEDURE VISIT (OUTPATIENT)
Dept: UROLOGY | Facility: AMBULATORY SURGERY CENTER | Age: 56
End: 2021-11-22
Payer: COMMERCIAL

## 2021-11-22 VITALS
HEIGHT: 63 IN | HEART RATE: 97 BPM | DIASTOLIC BLOOD PRESSURE: 72 MMHG | BODY MASS INDEX: 30.51 KG/M2 | WEIGHT: 172.2 LBS | SYSTOLIC BLOOD PRESSURE: 122 MMHG

## 2021-11-22 DIAGNOSIS — N20.0 NEPHROLITHIASIS: Primary | ICD-10-CM

## 2021-11-22 PROCEDURE — 99024 POSTOP FOLLOW-UP VISIT: CPT | Performed by: UROLOGY

## 2021-11-22 PROCEDURE — 3008F BODY MASS INDEX DOCD: CPT | Performed by: ORTHOPAEDIC SURGERY

## 2021-11-22 PROCEDURE — 52310 CYSTOSCOPY AND TREATMENT: CPT | Performed by: NURSE PRACTITIONER

## 2021-11-22 RX ORDER — CEPHALEXIN 500 MG/1
500 CAPSULE ORAL EVERY 12 HOURS SCHEDULED
Qty: 6 CAPSULE | Refills: 0 | Status: SHIPPED | OUTPATIENT
Start: 2021-11-22 | End: 2021-11-25

## 2021-11-24 LAB
CALCIUM OXALATE DIHYDRATE MFR STONE IR: 80 %
COLOR STONE: NORMAL
COM MFR STONE: 20 %
COMMENT-STONE3: NORMAL
COMPOSITION: NORMAL
LABORATORY COMMENT REPORT: NORMAL
PHOTO: NORMAL
SIZE STONE: NORMAL MM
SPEC SOURCE SUBJ: NORMAL
STONE ANALYSIS-IMP: NORMAL
WT STONE: 30 MG

## 2021-11-24 RX ORDER — POTASSIUM CITRATE 10 MEQ/1
10 TABLET, EXTENDED RELEASE ORAL
Qty: 270 TABLET | Refills: 3 | Status: SHIPPED | OUTPATIENT
Start: 2021-11-24 | End: 2022-05-04 | Stop reason: SDUPTHER

## 2021-12-29 ENCOUNTER — APPOINTMENT (OUTPATIENT)
Dept: URGENT CARE | Facility: MEDICAL CENTER | Age: 56
End: 2021-12-29

## 2021-12-29 ENCOUNTER — APPOINTMENT (OUTPATIENT)
Dept: LAB | Facility: MEDICAL CENTER | Age: 56
End: 2021-12-29

## 2021-12-29 DIAGNOSIS — Z02.1 PRE-EMPLOYMENT HEALTH SCREENING EXAMINATION: Primary | ICD-10-CM

## 2021-12-29 DIAGNOSIS — Z02.1 PRE-EMPLOYMENT HEALTH SCREENING EXAMINATION: ICD-10-CM

## 2021-12-29 PROCEDURE — 36415 COLL VENOUS BLD VENIPUNCTURE: CPT

## 2021-12-29 PROCEDURE — 86480 TB TEST CELL IMMUN MEASURE: CPT

## 2021-12-30 LAB
GAMMA INTERFERON BACKGROUND BLD IA-ACNC: 0.03 IU/ML
M TB IFN-G BLD-IMP: NEGATIVE
M TB IFN-G CD4+ BCKGRND COR BLD-ACNC: 0 IU/ML
M TB IFN-G CD4+ BCKGRND COR BLD-ACNC: 0 IU/ML
MITOGEN IGNF BCKGRD COR BLD-ACNC: >10 IU/ML

## 2022-02-14 ENCOUNTER — TELEPHONE (OUTPATIENT)
Dept: INTERNAL MEDICINE CLINIC | Facility: OTHER | Age: 57
End: 2022-02-14

## 2022-04-25 ENCOUNTER — TELEPHONE (OUTPATIENT)
Dept: NEPHROLOGY | Facility: CLINIC | Age: 57
End: 2022-04-25

## 2022-04-25 DIAGNOSIS — N20.0 KIDNEY STONE: Primary | ICD-10-CM

## 2022-04-25 NOTE — TELEPHONE ENCOUNTER
Pt made appt for 7/14 now that she has ins again  She is asking for a refill for potassium citrate  Also,any labs before appt?

## 2022-04-27 ENCOUNTER — RA CDI HCC (OUTPATIENT)
Dept: OTHER | Facility: HOSPITAL | Age: 57
End: 2022-04-27

## 2022-04-27 NOTE — PROGRESS NOTES
[place] [id number]  coding opportunities          Chart Reviewed number of suggestions sent to Provider: 2   Refer to [facility] lab  E11 29, R80 9  T2DM with other diabetic kidney complication, Microalbuminemia ([place] [id number] )       E11 22: Type 2 diabetes mellitus with diabetic chronic kidney disease ([place] [id number] )   N18  2  Chronic kidney disease, stage 2  -----According to the ICD 10 coding guidelines, these above two conditions are presumed to have    a causal-effect relationship and thus are always coded together, unless the documentation states they are not related to each other           Patients Insurance        Commercial Insurance: [address] [place] [address]

## 2022-04-28 PROBLEM — N18.2 CKD (CHRONIC KIDNEY DISEASE) STAGE 2, GFR 60-89 ML/MIN: Status: ACTIVE | Noted: 2022-04-28

## 2022-04-28 PROBLEM — E11.29 TYPE 2 DIABETES MELLITUS WITH MICROALBUMINURIA (HCC): Status: ACTIVE | Noted: 2022-04-28

## 2022-04-28 PROBLEM — R80.9 TYPE 2 DIABETES MELLITUS WITH MICROALBUMINURIA (HCC): Status: ACTIVE | Noted: 2022-04-28

## 2022-04-28 PROBLEM — E11.22 TYPE 2 DIABETES MELLITUS WITH DIABETIC CHRONIC KIDNEY DISEASE (HCC): Status: ACTIVE | Noted: 2018-10-16

## 2022-05-04 ENCOUNTER — OFFICE VISIT (OUTPATIENT)
Dept: INTERNAL MEDICINE CLINIC | Facility: OTHER | Age: 57
End: 2022-05-04
Payer: COMMERCIAL

## 2022-05-04 VITALS
HEART RATE: 100 BPM | TEMPERATURE: 97.3 F | WEIGHT: 169 LBS | DIASTOLIC BLOOD PRESSURE: 72 MMHG | SYSTOLIC BLOOD PRESSURE: 120 MMHG | BODY MASS INDEX: 29.95 KG/M2 | HEIGHT: 63 IN | OXYGEN SATURATION: 98 %

## 2022-05-04 DIAGNOSIS — E55.9 VITAMIN D DEFICIENCY: ICD-10-CM

## 2022-05-04 DIAGNOSIS — N20.0 KIDNEY STONE: ICD-10-CM

## 2022-05-04 DIAGNOSIS — E78.00 HYPERCHOLESTEREMIA: ICD-10-CM

## 2022-05-04 DIAGNOSIS — E11.22 TYPE 2 DIABETES MELLITUS WITH CHRONIC KIDNEY DISEASE, WITHOUT LONG-TERM CURRENT USE OF INSULIN, UNSPECIFIED CKD STAGE (HCC): ICD-10-CM

## 2022-05-04 DIAGNOSIS — I15.9 SECONDARY HYPERTENSION: ICD-10-CM

## 2022-05-04 DIAGNOSIS — E11.9 TYPE 2 DIABETES MELLITUS WITHOUT COMPLICATION, WITHOUT LONG-TERM CURRENT USE OF INSULIN (HCC): ICD-10-CM

## 2022-05-04 DIAGNOSIS — R80.9 TYPE 2 DIABETES MELLITUS WITH MICROALBUMINURIA, WITHOUT LONG-TERM CURRENT USE OF INSULIN (HCC): ICD-10-CM

## 2022-05-04 DIAGNOSIS — F41.9 ANXIETY: ICD-10-CM

## 2022-05-04 DIAGNOSIS — Z12.12 ENCOUNTER FOR COLORECTAL CANCER SCREENING: Primary | ICD-10-CM

## 2022-05-04 DIAGNOSIS — N18.2 CKD (CHRONIC KIDNEY DISEASE) STAGE 2, GFR 60-89 ML/MIN: ICD-10-CM

## 2022-05-04 DIAGNOSIS — I70.90 ATHEROSCLEROSIS OF ARTERIES: ICD-10-CM

## 2022-05-04 DIAGNOSIS — Z12.11 ENCOUNTER FOR COLORECTAL CANCER SCREENING: Primary | ICD-10-CM

## 2022-05-04 DIAGNOSIS — R00.0 TACHYCARDIA: ICD-10-CM

## 2022-05-04 DIAGNOSIS — K21.9 GASTROESOPHAGEAL REFLUX DISEASE WITHOUT ESOPHAGITIS: ICD-10-CM

## 2022-05-04 DIAGNOSIS — R82.991 HYPOCITRATURIA: ICD-10-CM

## 2022-05-04 DIAGNOSIS — E11.29 TYPE 2 DIABETES MELLITUS WITH MICROALBUMINURIA, WITHOUT LONG-TERM CURRENT USE OF INSULIN (HCC): ICD-10-CM

## 2022-05-04 PROBLEM — Z01.818 PRE-OP EVALUATION: Status: RESOLVED | Noted: 2018-05-10 | Resolved: 2022-05-04

## 2022-05-04 LAB — SL AMB POCT HEMOGLOBIN AIC: 6.3 (ref ?–6.5)

## 2022-05-04 PROCEDURE — 1036F TOBACCO NON-USER: CPT | Performed by: NURSE PRACTITIONER

## 2022-05-04 PROCEDURE — 4010F ACE/ARB THERAPY RXD/TAKEN: CPT | Performed by: NURSE PRACTITIONER

## 2022-05-04 PROCEDURE — 3078F DIAST BP <80 MM HG: CPT | Performed by: NURSE PRACTITIONER

## 2022-05-04 PROCEDURE — 3044F HG A1C LEVEL LT 7.0%: CPT | Performed by: NURSE PRACTITIONER

## 2022-05-04 PROCEDURE — 3066F NEPHROPATHY DOC TX: CPT | Performed by: NURSE PRACTITIONER

## 2022-05-04 PROCEDURE — 99214 OFFICE O/P EST MOD 30 MIN: CPT | Performed by: NURSE PRACTITIONER

## 2022-05-04 PROCEDURE — 3074F SYST BP LT 130 MM HG: CPT | Performed by: NURSE PRACTITIONER

## 2022-05-04 PROCEDURE — 3725F SCREEN DEPRESSION PERFORMED: CPT | Performed by: NURSE PRACTITIONER

## 2022-05-04 PROCEDURE — 3008F BODY MASS INDEX DOCD: CPT | Performed by: NURSE PRACTITIONER

## 2022-05-04 PROCEDURE — 83036 HEMOGLOBIN GLYCOSYLATED A1C: CPT | Performed by: NURSE PRACTITIONER

## 2022-05-04 RX ORDER — LISINOPRIL 10 MG/1
10 TABLET ORAL DAILY
Qty: 90 TABLET | Refills: 1 | Status: SHIPPED | OUTPATIENT
Start: 2022-05-04

## 2022-05-04 RX ORDER — POTASSIUM CITRATE 10 MEQ/1
10 TABLET, EXTENDED RELEASE ORAL
Qty: 270 TABLET | Refills: 3 | Status: SHIPPED | OUTPATIENT
Start: 2022-05-04 | End: 2022-07-14 | Stop reason: SDUPTHER

## 2022-05-04 RX ORDER — OMEPRAZOLE 20 MG/1
40 CAPSULE, DELAYED RELEASE ORAL DAILY
Qty: 180 CAPSULE | Refills: 1 | Status: SHIPPED | OUTPATIENT
Start: 2022-05-04 | End: 2022-05-05 | Stop reason: SDUPTHER

## 2022-05-04 RX ORDER — ATORVASTATIN CALCIUM 40 MG/1
40 TABLET, FILM COATED ORAL DAILY
Qty: 90 TABLET | Refills: 1 | Status: SHIPPED | OUTPATIENT
Start: 2022-05-04

## 2022-05-04 NOTE — ASSESSMENT & PLAN NOTE
Lab Results   Component Value Date    HGBA1C 6 3 05/04/2022     Patient will continue with Metformin  Patient is to continue to work on diet and exercise  Limit sugars and carbohydrate intake  Avoid soda, juice, sweets, cookies, desserts, pasta, bread    Eat more whole grains, exercised 30 min of cardio at least 3 times a week  Also recommended daily foot exams to check for sores, and recommended yearly eye exams

## 2022-05-04 NOTE — ASSESSMENT & PLAN NOTE
Continue with Prilosec 40mg  You may use OTC tums as needed  Recommend small frequent meals throughout the day  Avoid aggravating foods - spicy foods, acidic foods - such as tomato and citrus  Avoid alcohol  Do not lay down for at least 30 mins after eating

## 2022-05-04 NOTE — PROGRESS NOTES
Assessment/Plan:    GERD (gastroesophageal reflux disease)  Continue with Prilosec 40mg  You may use OTC tums as needed  Recommend small frequent meals throughout the day  Avoid aggravating foods - spicy foods, acidic foods - such as tomato and citrus  Avoid alcohol  Do not lay down for at least 30 mins after eating  Type 2 diabetes mellitus with diabetic chronic kidney disease ([facility] [address] )  Lab Results   Component Value Date    HGBA1C 6 3 05/04/2022     Patient will continue with Metformin  Patient is to continue to work on diet and exercise  Limit sugars and carbohydrate intake  Avoid soda, juice, sweets, cookies, desserts, pasta, bread    Eat more whole grains, exercised 30 min of cardio at least 3 times a week  Also recommended daily foot exams to check for sores, and recommended yearly eye exams  Atherosclerosis of arteries  Patient currently taking statin  Hypertension  Patient will continue with lisinopril 10 mg tablet daily  Continue current regimen -   Continue to monitor blood pressure at home  Goal BP is < 130/80  Contact our office for consistent elevations  Recommend low sodium diet  Exercise 30 minutes 5 times a week as tolerated  Recommend yearly eye exam       Primary osteoarthritis of both knees  Status post knee replacement, follows Dr Delfina Calvillo  Kidney stone  Follows urology  CKD (chronic kidney disease) stage 2, GFR 60-89 ml/min  Lab Results   Component Value Date    EGFR 73 10/29/2021    EGFR 62 08/13/2021    EGFR 90 08/09/2021    CREATININE 0 89 10/29/2021    CREATININE 1 02 08/13/2021    CREATININE 0 75 08/09/2021   Avoid nephrotoxic agents  Encouraged good oral hydration    Vitamin D deficiency  Patient is to continue with vitamin-D supplementation  Hypercholesteremia    Patient will continue on Lipitor 40 mg tablet daily, continue with fish oil  Recommend healthy lifestyle choices for your cholesterol  Low fat/low cholesterol diet    Limit/avoid red meat   Eat more lean meat - chicken breast, ground turkey, fish  Exercise 30 mins at least 5 times a week as tolerated  Hypocitraturia  Follow nephrology  BMI Counseling: Body mass index is 29 94 kg/m²  The BMI is above normal  Nutrition recommendations include decreasing portion sizes, encouraging healthy choices of fruits and vegetables, decreasing fast food intake, consuming healthier snacks, limiting drinks that contain sugar, moderation in carbohydrate intake, increasing intake of lean protein, reducing intake of saturated and trans fat and reducing intake of cholesterol  Exercise recommendations include moderate physical activity 150 minutes/week and exercising 3-5 times per week  Rationale for BMI follow-up plan is due to patient being overweight or obese  Depression Screening and Follow-up Plan: Patient was screened for depression during today's encounter  They screened negative with a PHQ-2 score of 0  Diagnoses and all orders for this visit:    Encounter for colorectal cancer screening    Type 2 diabetes mellitus with chronic kidney disease, without long-term current use of insulin, unspecified CKD stage (HCC)  -     POCT hemoglobin A1c  -     Hemoglobin A1C    Gastroesophageal reflux disease without esophagitis  -     omeprazole (PriLOSEC) 20 mg delayed release capsule; Take 2 capsules (40 mg total) by mouth daily    Tachycardia    Type 2 diabetes mellitus without complication, without long-term current use of insulin (HCC)  -     metFORMIN (GLUCOPHAGE) 1000 MG tablet; Take 1 tablet (1,000 mg total) by mouth 2 (two) times a day with meals    Secondary hypertension  -     lisinopril (ZESTRIL) 10 mg tablet; Take 1 tablet (10 mg total) by mouth daily  -     CBC and differential  -     Comprehensive metabolic panel; Future    Hypercholesteremia  -     atorvastatin (LIPITOR) 40 mg tablet;  Take 1 tablet (40 mg total) by mouth daily  -     Lipid panel    Anxiety  -     sertraline (ZOLOFT) 50 mg tablet; Take 1 tablet (50 mg total) by mouth daily Takes in the am     Type 2 diabetes mellitus with microalbuminuria, without long-term current use of insulin (HCC)    Atherosclerosis of arteries    Kidney stone    CKD (chronic kidney disease) stage 2, GFR 60-89 ml/min    Vitamin D deficiency    Hypocitraturia  -     potassium citrate (UROCIT-K) 10 mEq; Take 1 tablet (10 mEq total) by mouth 3 (three) times a day with meals          Subjective:      Patient ID: Chan Sotomayor is a 64 y o  female  Patient presents today for  follow-up on hypertension, hyperlipidemia and diabetes  Denies any new concerns  Screenings:  Colonoscopy- 2017, she states that she had a lot polyps, she states she is due again for one  Gyn- has history of partial hytrectomy, she has not gone to her GYN in a while, she denies abnormal bleeding   Mammogram-   2021  Hep C-   Had done 2018  Eye Doctor-sees eye doctor yearly  Dentist- she is due    The 10-year ASCVD risk score (Angeles Kendall et al , 2013) is: 4 4%    Values used to calculate the score:      Age: 64 years      Sex: Female      Is Non- : No      Diabetic: Yes      Tobacco smoker: No      Systolic Blood Pressure: 083 mmHg      Is BP treated: Yes      HDL Cholesterol: 40 mg/dL      Total Cholesterol: 134 mg/dL          Hypertension  This is a chronic problem  The current episode started more than 1 month ago  The problem is unchanged  The problem is controlled  Pertinent negatives include no anxiety, blurred vision, chest pain, headaches, neck pain, palpitations, peripheral edema or shortness of breath  Risk factors for coronary artery disease include diabetes mellitus, dyslipidemia, sedentary lifestyle and stress  Treatments tried:   Patient currently taking lisinopril  The current treatment provides moderate improvement  Compliance problems include diet  Diabetes  She presents for her follow-up diabetic visit   She has type 2 diabetes mellitus  Her disease course has been stable  There are no hypoglycemic associated symptoms  Pertinent negatives for hypoglycemia include no dizziness or headaches  There are no diabetic associated symptoms  Pertinent negatives for diabetes include no blurred vision, no chest pain, no polydipsia, no polyphagia, no polyuria and no weakness  There are no hypoglycemic complications  There are no diabetic complications  Risk factors for coronary artery disease include sedentary lifestyle, stress, obesity and dyslipidemia  Current diabetic treatment includes oral agent (monotherapy)  She is compliant with treatment all of the time  She has not had a previous visit with a dietitian  She rarely participates in exercise  An ACE inhibitor/angiotensin II receptor blocker is being taken  She does not see a podiatrist Eye exam is not current  Hyperlipidemia  This is a chronic problem  The current episode started more than 1 year ago  The problem is controlled  Exacerbating diseases include diabetes and obesity  She has no history of hypothyroidism  Pertinent negatives include no chest pain or shortness of breath  Current antihyperlipidemic treatment includes statins, diet change and exercise  Compliance problems include adherence to diet and adherence to exercise  The following portions of the patient's history were reviewed and updated as appropriate: allergies, current medications, past family history, past medical history, past social history, past surgical history and problem list     Review of Systems   Constitutional: Negative for activity change, appetite change, chills, diaphoresis and fever  HENT: Negative for congestion, ear discharge, ear pain, postnasal drip, rhinorrhea, sinus pressure, sinus pain and sore throat  Eyes: Negative for blurred vision, pain, discharge, itching and visual disturbance  Respiratory: Negative for cough, chest tightness, shortness of breath and wheezing  Cardiovascular: Negative for chest pain, palpitations and leg swelling  Gastrointestinal: Negative for abdominal pain, constipation, diarrhea, nausea and vomiting  Endocrine: Negative for polydipsia, polyphagia and polyuria  Genitourinary: Negative for difficulty urinating, dysuria and urgency  Musculoskeletal: Negative for arthralgias, back pain and neck pain  Skin: Negative for rash and wound  Neurological: Negative for dizziness, weakness, numbness and headaches           Past Medical History:   Diagnosis Date    Anxiety     Asthma     Colon polyp     Depression     Diabetes mellitus ([facility] [address]     Diverticulitis     Diverticulitis of colon     Follicular cyst of ovary 12/23/2014    Former smoker     GERD (gastroesophageal reflux disease)     Glycosuria     Headache     Hyperlipidemia     Hypertension     Kidney calculi 1/11/2019    Kidney stone     Obesity     Ovarian cyst     Overactive bladder     Overweight     Pulmonary nodule     Renal calculus     Seasonal allergies     Tinea pedis of left foot 6/8/2020    Vertigo     Wears partial dentures     Lower plate -partial         Current Outpatient Medications:     acetaminophen (TYLENOL) 500 mg tablet, Take 500 mg by mouth every 6 (six) hours as needed for mild pain, Disp: , Rfl:     Ascorbic Acid (vitamin C) 1000 MG tablet, Take 2,000 mg by mouth daily, Disp: , Rfl:     atorvastatin (LIPITOR) 40 mg tablet, Take 1 tablet (40 mg total) by mouth daily, Disp: 90 tablet, Rfl: 1    Cholecalciferol (Vitamin D3) 50 MCG (2000 UT) TABS, Take 2,000 Units by mouth daily, Disp: , Rfl:     lisinopril (ZESTRIL) 10 mg tablet, Take 1 tablet (10 mg total) by mouth daily, Disp: 90 tablet, Rfl: 1    metFORMIN (GLUCOPHAGE) 1000 MG tablet, Take 1 tablet (1,000 mg total) by mouth 2 (two) times a day with meals, Disp: 180 tablet, Rfl: 1    multivitamin (THERAGRAN) TABS, Take 1 tablet by mouth daily, Disp: , Rfl:     omeprazole (PriLOSEC) 20 mg delayed release capsule, Take 2 capsules (40 mg total) by mouth daily, Disp: 180 capsule, Rfl: 1    potassium citrate (UROCIT-K) 10 mEq, Take 1 tablet (10 mEq total) by mouth 3 (three) times a day with meals, Disp: 270 tablet, Rfl: 3    sertraline (ZOLOFT) 50 mg tablet, Take 1 tablet (50 mg total) by mouth daily Takes in the am , Disp: 90 tablet, Rfl: 1    metoprolol tartrate (LOPRESSOR) 25 mg tablet, Take 0 5 tablets (12 5 mg total) by mouth every 12 (twelve) hours (Patient not taking: Reported on 5/4/2022 ), Disp: 30 tablet, Rfl: 0    phenazopyridine (PYRIDIUM) 200 mg tablet, Take 1 tablet (200 mg total) by mouth 3 (three) times a day as needed for bladder spasms, Disp: 10 tablet, Rfl: 0    Allergies   Allergen Reactions    Oxybutynin Anaphylaxis     "feels like throat closing and can't swallow"    Clonazepam Other (See Comments)     Pt states "didn't feel right on it "    Morphine Other (See Comments) and Confusion     Annotation - [id number]: "dopey"  Gets silly    Venlafaxine Other (See Comments)     Unknown--pt states " MD was trying pt on different medications  Pt unsure of reaction "       Social History   Past Surgical History:   Procedure Laterality Date    APPENDECTOMY      BLADDER SUSPENSION      [facility]    CERVICAL BIOPSY  W/ LOOP ELECTRODE EXCISION      COLONOSCOPY      DILATION AND CURETTAGE OF UTERUS      FL RETROGRADE PYELOGRAM  7/26/2021    FL RETROGRADE PYELOGRAM  11/18/2021    HYSTERECTOMY  2007    ovaries present bilaterally    JOINT REPLACEMENT Right 05/2021    right knee    MD COLONOSCOPY FLX DX W/COLLJ SPEC WHEN PFRMD N/A 9/26/2017    Procedure: EGD AND COLONOSCOPY;  Surgeon: Sung Jeronimo MD;  Location: [facility];   Service: Gastroenterology    MD CYSTO/URETERO W/LITHOTRIPSY &INDWELL STENT INSRT Bilateral 7/26/2021    Procedure: CYSTOSCOPY URETEROSCOPY WITH LITHOTRIPSY HOLMIUM LASER, RETROGRADE PYELOGRAM AND INSERTION STENT URETERAL;  Surgeon: Braden Gaytan, MD;  [facility];  Service: Urology    AK CYSTO/URETERO W/LITHOTRIPSY &INDWELL STENT INSRT Left 9/16/2021    Procedure: CYSTOSCOPY URETEROSCOPY WITH LITHOTRIPSY HOLMIUM LASER, RETROGRADE PYELOGRAM AND INSERTION STENT URETERAL;  Surgeon: Ruddy Langston MD;  [facility];  Service: Urology    AK CYSTO/URETERO W/LITHOTRIPSY &INDWELL STENT INSRT Left 11/18/2021    Procedure: CYSTOSCOPY URETEROSCOPY WITH LITHOTRIPSY HOLMIUM LASER, RETROGRADE PYELOGRAM AND INSERTION STENT URETERAL;  Surgeon: Riley Hopper MD;  [facility];  Service: Urology    AK REVISE MEDIAN N/CARPAL TUNNEL SURG Right 5/29/2018    Procedure: CARPAL TUNNEL RELEASE;  Surgeon: Casimer Ormond, DO;  [facility];  Service: Orthopedics    AK TOTAL KNEE ARTHROPLASTY Right 5/19/2021    Procedure: ARTHROPLASTY KNEE TOTAL;  Surgeon: Pineda Matthew MD;  [facility];  Service: Orthopedics    REMOVAL URETERAL STENT Right 9/16/2021    Procedure: REMOVAL STENT URETERAL;  Surgeon: Ruddy Langston MD;  [facility];  Service: Urology    TONSILLECTOMY      TUBAL LIGATION      URETEROSCOPY  7/26/2021    Procedure: URETEROSCOPY, LASER AND BASKET STONE EXTRACTION;  Surgeon: Neo Folres MD;  [facility];  Service: Urology    WISDOM TOOTH EXTRACTION       Family History   Problem Relation Age of Onset    Diabetes type II Mother     Asthma Mother     Stroke Father     Pancreatic cancer Father 68    Diabetes type II Father     No Known Problems Maternal Grandmother     No Known Problems Maternal Grandfather     No Known Problems Paternal Grandmother     No Known Problems Paternal Grandfather     No Known Problems Sister     No Known Problems Sister     No Known Problems Sister     No Known Problems Sister     No Known Problems Sister     No Known Problems Maternal Aunt     No Known Problems Paternal Aunt        Objective:  /72 (BP Location: Left arm, Patient Position: Sitting, Cuff Size: Adult)   Pulse 100   Temp (!) 97 3 °F (36 3 °C) (Tympanic)   Ht 5' 3" (1 6 m)   Wt 76 7 kg (169 lb)   SpO2 98% Comment: ra  BMI 29 94 kg/m²     Recent Results (from the past 1344 hour(s))   POCT hemoglobin A1c    Collection Time: 05/04/22  4:24 PM   Result Value Ref Range    Hemoglobin A1C 6 3 6 5            Physical Exam  Constitutional:       General: She is not in acute distress  Appearance: She is well-developed  She is not diaphoretic  HENT:      Head: Normocephalic and atraumatic  Right Ear: External ear normal       Left Ear: External ear normal       Nose: Nose normal       Mouth/Throat:      Pharynx: No oropharyngeal exudate  Eyes:      General:         Right eye: No discharge  Left eye: No discharge  Conjunctiva/sclera: Conjunctivae normal       Pupils: Pupils are equal, round, and reactive to light  Neck:      Thyroid: No thyromegaly  Cardiovascular:      Rate and Rhythm: Normal rate and regular rhythm  Heart sounds: Normal heart sounds  No murmur heard  No friction rub  No gallop  Pulmonary:      Effort: Pulmonary effort is normal  No respiratory distress  Breath sounds: Normal breath sounds  No stridor  No wheezing or rales  Abdominal:      General: Bowel sounds are normal  There is no distension  Palpations: Abdomen is soft  Tenderness: There is no abdominal tenderness  Musculoskeletal:      Cervical back: Normal range of motion and neck supple  Lymphadenopathy:      Cervical: No cervical adenopathy  Skin:     General: Skin is warm and dry  Findings: No erythema or rash  Neurological:      Mental Status: She is alert and oriented to person, place, and time  Psychiatric:         Behavior: Behavior normal          Thought Content:  Thought content normal          Judgment: Judgment normal

## 2022-05-04 NOTE — ASSESSMENT & PLAN NOTE
Patient will continue with lisinopril 10 mg tablet daily  Continue current regimen -   Continue to monitor blood pressure at home  Goal BP is < 130/80  Contact our office for consistent elevations  Recommend low sodium diet  Exercise 30 minutes 5 times a week as tolerated    Recommend yearly eye exam

## 2022-05-04 NOTE — ASSESSMENT & PLAN NOTE
Lab Results   Component Value Date    EGFR 73 10/29/2021    EGFR 62 08/13/2021    EGFR 90 08/09/2021    CREATININE 0 89 10/29/2021    CREATININE 1 02 08/13/2021    CREATININE 0 75 08/09/2021   Avoid nephrotoxic agents  Encouraged good oral hydration

## 2022-05-05 ENCOUNTER — TELEPHONE (OUTPATIENT)
Dept: INTERNAL MEDICINE CLINIC | Age: 57
End: 2022-05-05

## 2022-05-05 DIAGNOSIS — K21.9 GASTROESOPHAGEAL REFLUX DISEASE WITHOUT ESOPHAGITIS: ICD-10-CM

## 2022-05-05 RX ORDER — OMEPRAZOLE 40 MG/1
40 CAPSULE, DELAYED RELEASE ORAL DAILY
Qty: 90 CAPSULE | Refills: 0 | Status: SHIPPED | OUTPATIENT
Start: 2022-05-05

## 2022-05-05 NOTE — TELEPHONE ENCOUNTER
Pt called the office and stated she needs a PA for:    omeprazole (PriLOSEC) 20 mg delayed release capsule       Insurance co asked to call this number:    [phone]

## 2022-05-05 NOTE — TELEPHONE ENCOUNTER
Spoke to insurance, will not cover taking Omeprazole twice daily  Switched to omeprazole 40 mg daily  LMOM for pt  Of switch

## 2022-07-06 NOTE — TELEPHONE ENCOUNTER
Patient just had a insurance switch and it is effective as of 7/1/22 and she refused us to schedule it for her

## 2022-07-06 NOTE — TELEPHONE ENCOUNTER
LMOM for pt to call me at [facility] office    Pt now has insurance    Jodi Shannon wants to know if she had a chance to schedule eye exam or if she would like us to schedule it for her

## 2022-07-07 ENCOUNTER — TELEPHONE (OUTPATIENT)
Dept: NEPHROLOGY | Facility: CLINIC | Age: 57
End: 2022-07-07

## 2022-07-07 NOTE — TELEPHONE ENCOUNTER
Patient called requesting her lab slip be faxed to a [facility] lab location - explained to patient that the orders are already in the system and she can just go to any [facility]'s location to have them drawn

## 2022-07-08 ENCOUNTER — TELEPHONE (OUTPATIENT)
Dept: NEPHROLOGY | Facility: CLINIC | Age: 57
End: 2022-07-08

## 2022-07-12 NOTE — TELEPHONE ENCOUNTER
Called patient to remind her to bring new insurance card to her 7/14 appointment in [facility]  Patient provided new insurance information, but the coverage could not be added as it said the ID was wrong

## 2022-07-12 NOTE — PROGRESS NOTES
OFFICE FOLLOW UP - Nephrology   Dacia Gonsalves 64 y o  female MRN: [id number]               ASSESSMENT and PLAN:  Saddie Felty was seen today for follow-up and nephrolithiasis  Diagnoses and all orders for this visit:    Kidney stone  -     potassium citrate (UROCIT-K) 10 mEq; Take 1 tablet (10 mEq total) by mouth 3 (three) times a day with meals    CKD (chronic kidney disease) stage 2, GFR 60-89 ml/min    Primary hypertension    Type 2 diabetes mellitus with chronic kidney disease, without long-term current use of insulin, unspecified CKD stage (HCC)    Hypocitraturia  -     potassium citrate (UROCIT-K) 10 mEq; Take 1 tablet (10 mEq total) by mouth 3 (three) times a day with meals        Obstructive nephrolithiasis  Assessment and Plan:  Episodes: recurrent kidney stones with family hx of kidney stones  · Recurrent episodes of mild TOMMY: creatinine 0 7 to 1 02   · No current issues or stones   Previous Interventions: 9/2021 cystoscopy and left stent  Workup:  · Prior stone analysis reveals 90% calcium oxalate  · Litholink/urine: ordered   · Image: CT 9/2021  ? Right kidney: multiple non-obstructive calculi  ? Left Kidney: 5mm residual distal ureteral calculus  New left ureteral calculi 2 and 4mm  ?  Bilateral hydronephrosis improved s/p stents now removed  Risk Factors: weight and diet  Plan:   Hydrate at least 2000 mL of water per day   Continue to potassium citrate one tablet TID with meals   Will check renal U/S with last know CT renal stone survey done 10/2021    Chronic kidney disease II:  Assessment and plan  · Suspect secondary to recurrent TOMMY with nephrolithiasis  · After review medical records through [facility] everywhere 0 8-1 2  · No recent creatinine available  · Has known proteinuria  · Get bmp and urine studies this week  · Repeat BMP in U PCR with next office visit    Hypertension:  Assessment and plan:   Current blood pressure acceptable   Currently on lisinopril 10 mg daily,   Has some lost weight   Low-salt diet recommended   Keep blood pressure less than 130/80    Pseudohyponatremia  Assessment and plan:   Improves with glucose control   Unfortunately no updated blood work has been obtained for review   Will repeat BMP next week    Diabetes:  Assessment and plan:  [address] [place] [address] PCP as outpatient   Need adequate blood sugar control   Recent A1C 6 3 in may      Age related screening: Your primary caregiver may do yearly screening for colorectal cancer  It is recommended in all men and women over 48years old  You may have screening earlier if you have colon disease or a family history of colorectal cancer  Patient Instructions    All questions asked and answered  The patient has been instructed to call office at [phone] with any questions or concerns   Please obtain prescribed blood work and urine studies prior to next appointment    Avoid NSAID products which include: Motrin, Advil, Ibuprofen, Aleve, Naprosyn, Naproxen, Mobic or Celebrex     Low-salt diet no more than 2000 mg salt per day   Hydrate at least 2000 mL per day   Get BMP and urine studies next week   Return in six months with updated blood work and urine studies      HPI:    I had the pleasure of seeing Jorge Thomas today in the renal clinic for the continued management of kidney stone and hypertension  She was last seen on 10/14/2021 with Dr Cohn  Since the last visit, there has been no ER visits or hospitilizations  She reported changing jobs and didn't have insurance for a short period of time  Patient has no complaints at this time and is feeling well  Patient denies any chest pain, shortness of breath or swelling  No reported episodes of kidney stones  No updated blood work has been obtained and requesting to have a new order to get labs next week  ROS:   Review of Systems   Constitutional: Negative  Negative for activity change, appetite change, chills, diaphoresis, fatigue and fever     HENT: Negative  Negative for congestion and facial swelling  Respiratory: Negative  Cardiovascular: Negative  Gastrointestinal: Negative  Endocrine: Negative  Genitourinary: Negative  Musculoskeletal: Negative  Skin: Negative  Allergic/Immunologic: Negative  Neurological: Negative  Hematological: Negative  Psychiatric/Behavioral: Negative           Allergies: Oxybutynin, Clonazepam, Morphine, and Venlafaxine    Medications:   Current Outpatient Medications:     acetaminophen (TYLENOL) 500 mg tablet, Take 500 mg by mouth every 6 (six) hours as needed for mild pain, Disp: , Rfl:     Ascorbic Acid (vitamin C) 1000 MG tablet, Take 2,000 mg by mouth daily, Disp: , Rfl:     atorvastatin (LIPITOR) 40 mg tablet, Take 1 tablet (40 mg total) by mouth daily, Disp: 90 tablet, Rfl: 1    Cholecalciferol (Vitamin D3) 50 MCG (2000 UT) TABS, Take 2,000 Units by mouth daily, Disp: , Rfl:     lisinopril (ZESTRIL) 10 mg tablet, Take 1 tablet (10 mg total) by mouth daily, Disp: 90 tablet, Rfl: 1    metFORMIN (GLUCOPHAGE) 1000 MG tablet, Take 1 tablet (1,000 mg total) by mouth 2 (two) times a day with meals, Disp: 180 tablet, Rfl: 1    multivitamin (THERAGRAN) TABS, Take 1 tablet by mouth daily, Disp: , Rfl:     omeprazole (PriLOSEC) 40 MG capsule, Take 1 capsule (40 mg total) by mouth daily, Disp: 90 capsule, Rfl: 0    potassium citrate (UROCIT-K) 10 mEq, Take 1 tablet (10 mEq total) by mouth 3 (three) times a day with meals, Disp: 270 tablet, Rfl: 5    sertraline (ZOLOFT) 50 mg tablet, Take 1 tablet (50 mg total) by mouth daily Takes in the am , Disp: 90 tablet, Rfl: 1    metoprolol tartrate (LOPRESSOR) 25 mg tablet, Take 0 5 tablets (12 5 mg total) by mouth every 12 (twelve) hours (Patient not taking: No sig reported), Disp: 30 tablet, Rfl: 0    phenazopyridine (PYRIDIUM) 200 mg tablet, Take 1 tablet (200 mg total) by mouth 3 (three) times a day as needed for bladder spasms (Patient not taking: Reported on 7/14/2022), Disp: 10 tablet, Rfl: 0    Past Medical History:   Diagnosis Date    Anxiety     Asthma     Colon polyp     Depression     Diabetes mellitus ([address] )     Diverticulitis     Diverticulitis of colon     Follicular cyst of ovary 12/23/2014    Former smoker     GERD (gastroesophageal reflux disease)     Glycosuria     Headache     Hyperlipidemia     Hypertension     Kidney calculi 1/11/2019    Kidney stone     Obesity     Ovarian cyst     Overactive bladder     Overweight     Pulmonary nodule     Renal calculus     Seasonal allergies     Tinea pedis of left foot 6/8/2020    Vertigo     Wears partial dentures     Lower plate -partial     Past Surgical History:   Procedure Laterality Date    APPENDECTOMY      BLADDER SUSPENSION      [facility]    CERVICAL BIOPSY  W/ LOOP ELECTRODE EXCISION      COLONOSCOPY      DILATION AND CURETTAGE OF UTERUS      FL RETROGRADE PYELOGRAM  7/26/2021    FL RETROGRADE PYELOGRAM  11/18/2021    HYSTERECTOMY  2007    ovaries present bilaterally    JOINT REPLACEMENT Right 05/2021    right knee    IN COLONOSCOPY FLX DX W/COLLJ SPEC WHEN PFRMD N/A 9/26/2017    Procedure: EGD AND COLONOSCOPY;  Surgeon: Kandice Teixeira MD;  Location: [facility];   Service: Gastroenterology    IN CYSTO/URETERO W/LITHOTRIPSY &INDWELL STENT INSRT Bilateral 7/26/2021    Procedure: CYSTOSCOPY URETEROSCOPY WITH LITHOTRIPSY HOLMIUM LASER, RETROGRADE PYELOGRAM AND INSERTION STENT URETERAL;  Surgeon: Kaiser Maradiaga MD;  [facility];  Service: Urology    IN CYSTO/URETERO W/LITHOTRIPSY &INDWELL STENT INSRT Left 9/16/2021    Procedure: CYSTOSCOPY URETEROSCOPY WITH LITHOTRIPSY HOLMIUM LASER, RETROGRADE PYELOGRAM AND INSERTION STENT URETERAL;  Surgeon: Charleen Gil MD;  [facility];  Service: Urology    IN CYSTO/URETERO W/LITHOTRIPSY &INDWELL STENT INSRT Left 11/18/2021    Procedure: CYSTOSCOPY URETEROSCOPY WITH LITHOTRIPSY HOLMIUM LASER, RETROGRADE PYELOGRAM AND INSERTION STENT URETERAL;  Surgeon: Jorge Mujica MD;  [facility];  Service: Urology    ID REVISE MEDIAN N/CARPAL TUNNEL SURG Right 5/29/2018    Procedure: CARPAL TUNNEL RELEASE;  Surgeon: Lb Green DO;  [facility];  Service: Orthopedics    ID TOTAL KNEE ARTHROPLASTY Right 5/19/2021    Procedure: ARTHROPLASTY KNEE TOTAL;  Surgeon: Robson Weston MD;  [facility];  Service: Orthopedics    REMOVAL URETERAL STENT Right 9/16/2021    Procedure: REMOVAL STENT URETERAL;  Surgeon: Chantal Garcia MD;  [facility];  Service: Urology    TONSILLECTOMY      TUBAL LIGATION      URETEROSCOPY  7/26/2021    Procedure: URETEROSCOPY, LASER AND BASKET STONE EXTRACTION;  Surgeon: Galina Sam MD;  [facility];  Service: Urology    WISDOM TOOTH EXTRACTION       Family History   Problem Relation Age of Onset    Diabetes type II Mother     Asthma Mother     Stroke Father     Pancreatic cancer Father 68    Diabetes type II Father     No Known Problems Maternal Grandmother     No Known Problems Maternal Grandfather     No Known Problems Paternal Grandmother     No Known Problems Paternal Grandfather     No Known Problems Sister     No Known Problems Sister     No Known Problems Sister     No Known Problems Sister     No Known Problems Sister     No Known Problems Maternal Aunt     No Known Problems Paternal Aunt       reports that she quit smoking about 4 years ago  Her smoking use included cigarettes  She has a 5 00 pack-year smoking history  She has never used smokeless tobacco  She reports current alcohol use  She reports that she does not use drugs  Physical Exam:   Vitals:    07/14/22 0920   BP: 110/70   BP Location: Left arm   Patient Position: Sitting   Cuff Size: Adult   Pulse: 83   SpO2: 96%   Weight: 74 4 kg (164 lb)   Height: 5' 3" (1 6 m)     Body mass index is 29 05 kg/m²  Physical Exam  Vitals reviewed  Constitutional:       Appearance: Normal appearance  HENT:      Head: Normocephalic and atraumatic  Nose: Nose normal       Mouth/Throat:      Mouth: Mucous membranes are moist       Pharynx: Oropharynx is clear  Eyes:      Extraocular Movements: Extraocular movements intact  Conjunctiva/sclera: Conjunctivae normal    Cardiovascular:      Rate and Rhythm: Normal rate and regular rhythm  Pulses: Normal pulses  Heart sounds: Normal heart sounds  Pulmonary:      Effort: Pulmonary effort is normal       Breath sounds: Normal breath sounds  Abdominal:      General: Bowel sounds are normal       Palpations: Abdomen is soft  Musculoskeletal:         General: Normal range of motion  Cervical back: Normal range of motion and neck supple  Skin:     General: Skin is warm and dry  Neurological:      General: No focal deficit present  Mental Status: She is alert and oriented to person, place, and time  Psychiatric:         Mood and Affect: Mood normal          Behavior: Behavior normal          Thought Content: Thought content normal          Judgment: Judgment normal             Lab Results:              Portions of the record may have been created with voice recognition software  Occasional wrong word or "sound a like" substitutions may have occurred due to the inherent limitations of voice recognition software   Read the chart carefully and recognize,

## 2022-07-12 NOTE — PATIENT INSTRUCTIONS
All questions asked and answered  The patient has been instructed to call office at [phone] with any questions or concerns  Please obtain prescribed blood work and urine studies prior to next appointment   Avoid NSAID products which include:  Motrin, Advil, Ibuprofen, Aleve, Naprosyn, Naproxen, Mobic or Celebrex    Low-salt diet no more than 2000 mg salt per day  Hydrate at least 2000 mL per day  Get BMP and urine studies next week  Return in six months with updated blood work and urine studies

## 2022-07-14 ENCOUNTER — OFFICE VISIT (OUTPATIENT)
Dept: NEPHROLOGY | Facility: CLINIC | Age: 57
End: 2022-07-14
Payer: COMMERCIAL

## 2022-07-14 VITALS
WEIGHT: 164 LBS | DIASTOLIC BLOOD PRESSURE: 70 MMHG | OXYGEN SATURATION: 96 % | SYSTOLIC BLOOD PRESSURE: 110 MMHG | HEART RATE: 83 BPM | BODY MASS INDEX: 29.06 KG/M2 | HEIGHT: 63 IN

## 2022-07-14 DIAGNOSIS — E11.22 TYPE 2 DIABETES MELLITUS WITH CHRONIC KIDNEY DISEASE, WITHOUT LONG-TERM CURRENT USE OF INSULIN, UNSPECIFIED CKD STAGE (HCC): ICD-10-CM

## 2022-07-14 DIAGNOSIS — R82.991 HYPOCITRATURIA: ICD-10-CM

## 2022-07-14 DIAGNOSIS — N20.0 KIDNEY STONE: Primary | ICD-10-CM

## 2022-07-14 DIAGNOSIS — N18.2 CKD (CHRONIC KIDNEY DISEASE) STAGE 2, GFR 60-89 ML/MIN: ICD-10-CM

## 2022-07-14 DIAGNOSIS — I10 PRIMARY HYPERTENSION: ICD-10-CM

## 2022-07-14 PROCEDURE — 3066F NEPHROPATHY DOC TX: CPT | Performed by: NURSE PRACTITIONER

## 2022-07-14 PROCEDURE — 99213 OFFICE O/P EST LOW 20 MIN: CPT | Performed by: NURSE PRACTITIONER

## 2022-07-14 RX ORDER — POTASSIUM CITRATE 10 MEQ/1
10 TABLET, EXTENDED RELEASE ORAL
Qty: 270 TABLET | Refills: 5 | Status: SHIPPED | OUTPATIENT
Start: 2022-07-14

## 2022-07-20 LAB
CREAT ?TM UR-SCNC: 72.4 UMOL/L
EXT PROTEIN URINE: 57.2
PROT/CREAT UR: 0.79 MG/G{CREAT}

## 2022-07-20 PROCEDURE — 3061F NEG MICROALBUMINURIA REV: CPT | Performed by: NURSE PRACTITIONER

## 2022-08-16 DIAGNOSIS — F41.9 ANXIETY: ICD-10-CM

## 2022-08-16 DIAGNOSIS — K21.9 GASTROESOPHAGEAL REFLUX DISEASE WITHOUT ESOPHAGITIS: ICD-10-CM

## 2022-08-16 RX ORDER — OMEPRAZOLE 40 MG/1
40 CAPSULE, DELAYED RELEASE ORAL DAILY
Qty: 90 CAPSULE | Refills: 1 | Status: SHIPPED | OUTPATIENT
Start: 2022-08-16

## 2022-08-17 ENCOUNTER — TELEPHONE (OUTPATIENT)
Dept: INTERNAL MEDICINE CLINIC | Age: 57
End: 2022-08-17

## 2022-08-17 NOTE — TELEPHONE ENCOUNTER
LMOM for pt  To call office back to see if she had established with an eye Dr yet      Please transfer call to me     Thank you

## 2022-08-30 LAB
LEFT EYE DIABETIC RETINOPATHY: NORMAL
RIGHT EYE DIABETIC RETINOPATHY: NORMAL

## 2022-10-31 ENCOUNTER — TELEMEDICINE (OUTPATIENT)
Dept: INTERNAL MEDICINE CLINIC | Facility: OTHER | Age: 57
End: 2022-10-31

## 2022-10-31 VITALS — WEIGHT: 164 LBS | HEIGHT: 63 IN | TEMPERATURE: 103.1 F | BODY MASS INDEX: 29.06 KG/M2

## 2022-10-31 DIAGNOSIS — A08.4 VIRAL GASTROENTERITIS: Primary | ICD-10-CM

## 2022-10-31 RX ORDER — ONDANSETRON 4 MG/1
4 TABLET, FILM COATED ORAL EVERY 8 HOURS PRN
Qty: 20 TABLET | Refills: 0 | Status: SHIPPED | OUTPATIENT
Start: 2022-10-31 | End: 2022-11-06

## 2022-10-31 NOTE — PROGRESS NOTES
Virtual Brief Visit    Patient is located in the following state in which I hold an active license [place]     Assessment/Plan:    Viral Gastroenteritis   -Patient reports diarrhea since Saturday with watery diarrhea, fever to 103, fatigue, poor appetite, diffuse aches   -Self-checked fever of 101 8 today   -Has tried tylenol, has not helped much   -No recent travel, no recent antibiotics, no sick contacts   -Feels dehydrated, drinking lots of fluids- dry mouth, light headed when standing   -No bloody diarrhea, no focal abdominal pain  -Vaccinated x2 for COVID, not boosted  Negative home COVID test     Plan  -Differential viral gastroenteritis, possible COVID  -Zofran 4mg Q8 hours for nausea  -Instructed patient to drink lots of fluids to avoid dehydration  -PCR test for COVID, instructed patient to have test done at local urgent care   -Hold Lisinopril 10 mg until diarrhea symptoms resolve  -Advise to self quarantine at least until COVID pcr results are negative   -Work note until Monday 11/7/2022  -Advised patient that if her symptoms get worse or do not improve, she should schedule an in person follow up or present to the ED  Review of Systems   Constitutional: Positive for chills, fatigue and fever  Negative for unexpected weight change  Decreased appetite   HENT: Negative for hearing loss and sore throat  Dry mouth   Eyes: Negative for visual disturbance  Respiratory: Negative for cough and shortness of breath  Cardiovascular: Negative for chest pain and palpitations  Gastrointestinal: Positive for diarrhea and nausea  Negative for abdominal pain, blood in stool, constipation and vomiting  Yellow, nonbloody diarrhea    Endocrine: Negative for polyuria  Genitourinary: Negative for dysuria and hematuria  Musculoskeletal: Negative for back pain  Diffuse aching   Skin: Negative for rash and wound  Neurological: Positive for light-headedness   Negative for dizziness and headaches  When standing up   Psychiatric/Behavioral: Negative for dysphoric mood  The patient is not nervous/anxious  Problem List Items Addressed This Visit    None     Visit Diagnoses     Viral gastroenteritis    -  Primary    Relevant Medications    ondansetron (ZOFRAN) 4 mg tablet    Other Relevant Orders    COVID Only - Collected at [facility] or [facility]          Recent Visits  No visits were found meeting these conditions  Showing recent visits within past 7 days and meeting all other requirements  Today's Visits  Date Type Provider Dept   10/31/22 [address], MD [facility] [place]   Showing today's visits and meeting all other requirements  Future Appointments  No visits were found meeting these conditions  Showing future appointments within next 150 days and meeting all other requirements     Patient is a 63 y/o female with PMH including GERD, T2DM, HTN, CKD stage 2 who presented via telephone visit for diarrhea and fever  Patient was unable to connect to virtual visit so appointment was conducted via phone  Patient reports watery diarrhea,  fevers, diffuse body aches, fatigue and poor appetite that started on Saturday and has persisted  The diarrhea is mostly yellowy liquid, little stool as patient has not been eating  Denies inciting event, unusual food, no recent travel, no recent antibiotics  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  Denies bloody diarrhea, focal abdominal pain, vomiting  She says her fevers have been up to 103, 101 8 self measured today  She reports poor appetite and has a hard time eating food but has been drinking a lot of water  She has taken tylenol, which has not helped much  Reports throat feels dry and sometimes feeling light headed when sitting up  She is vaccinated x2 for COVID, but has not had a booster  She took a home COVID test today that was negative   Denies smoking, alcohol use, recreational drug use     Visit Time    Visit Start Time: 1:45  Visit Stop Time: 2:18  Total Visit Duration: 33 minutes

## 2022-10-31 NOTE — LETTER
October 31, 2022     Patient: Geoffrey Baker  YOB: 1965  Date of Visit: 10/31/2022      To Whom it May Concern:    Geoffrey Baker is under my professional care  Sandralashawn Guerrier was seen in my office on 10/31/2022  Sandra Guerrier may return to work on Monday, November 7th  If you have any questions or concerns, please don't hesitate to call           Sincerely,          Sid Saldana MD        CC: LEONARD Williamson

## 2022-10-31 NOTE — PATIENT INSTRUCTIONS
I have prescribed a medication for you, Zofran  Please pick it up from your pharmacy at your convenience  I have also ordered a COVID PCR test- you can have this done at a local urgent care  I recommend that you self-quarantine at least until your COVID test results  I recommend that you have good fluid intake to avoid dehydration  Please hold your Lisinopril for a few days until your diarrhea symptoms are resolving  I have written a work note excusing you until Monday, 11/7/2022  If your symptoms worsen or do not improve, you can contact the office to schedule an in person follow up or present to the ED

## 2022-11-01 ENCOUNTER — OFFICE VISIT (OUTPATIENT)
Dept: URGENT CARE | Age: 57
End: 2022-11-01

## 2022-11-01 VITALS
SYSTOLIC BLOOD PRESSURE: 87 MMHG | HEART RATE: 96 BPM | HEIGHT: 63 IN | TEMPERATURE: 99.2 F | RESPIRATION RATE: 16 BRPM | BODY MASS INDEX: 29.06 KG/M2 | DIASTOLIC BLOOD PRESSURE: 53 MMHG | WEIGHT: 164 LBS

## 2022-11-01 DIAGNOSIS — R19.7 DIARRHEA, UNSPECIFIED TYPE: ICD-10-CM

## 2022-11-01 DIAGNOSIS — R50.9 FEVER, UNSPECIFIED FEVER CAUSE: ICD-10-CM

## 2022-11-01 DIAGNOSIS — K52.9 GASTROENTERITIS: Primary | ICD-10-CM

## 2022-11-01 NOTE — PATIENT INSTRUCTIONS
Clear liquids (i e  Gatorade, Powerade, Pedialyte, etc but avoid red liquids) only for at least 6-8 hours after you last vomited then may advance to bland diet (ie  BRAT - bananas, applesauce, toast) as tolerated  Recommend avoiding any milk products, spicy, greasy foods and citrus products over the next 1-2 weeks  Advance diet as tolerated      Go to the emergency room if your symptoms are worsening (if you are unable to keep food and fluids down, if you have any blood in the stool, if you are feeling lightheaded or dizzy, your fevers or not being controlled with medication, for your feeling worse)    Follow-up with her primary care physician in 2-3 days if symptoms persist

## 2022-11-01 NOTE — LETTER
November 1, 2022     Patient: Nafisa Badillo   YOB: 1965   Date of Visit: 11/1/2022       To Whom It May Concern:    Please excuse Nafisa Justino 10/31/2022 through 11/04/2022           Sincerely,        Flakito Wheat PA-C

## 2022-11-02 ENCOUNTER — TELEPHONE (OUTPATIENT)
Dept: INTERNAL MEDICINE CLINIC | Facility: OTHER | Age: 57
End: 2022-11-02

## 2022-11-02 ENCOUNTER — TELEPHONE (OUTPATIENT)
Dept: URGENT CARE | Facility: CLINIC | Age: 57
End: 2022-11-02

## 2022-11-02 LAB
FLUAV RNA RESP QL NAA+PROBE: NEGATIVE
FLUBV RNA RESP QL NAA+PROBE: NEGATIVE
SARS-COV-2 RNA RESP QL NAA+PROBE: NEGATIVE

## 2022-11-02 NOTE — TELEPHONE ENCOUNTER
Pt  called and stated she continues with fatigues, fevers, body aches, chills and sweats, and sob on excretion      Patient would like a call back to discuss abx if needed

## 2022-11-02 NOTE — PROGRESS NOTES
[address]        NAME: Jason Bhagat is a 62 y o  female  : 1965    MRN: [id number]  DATE: 2022  TIME: 11:40 AM    Assessment and Plan   Gastroenteritis [K52 9]  1  Gastroenteritis  Cov/Flu-Collected at [facility]   2  Diarrhea, unspecified type  Cov/Flu-Collected at [facility]   3  Fever, unspecified fever cause  Cov/Flu-Collected at [facility]         Patient Instructions       Follow up with PCP in 3-5 days  Proceed to  ER if symptoms worsen  Chief Complaint     Chief Complaint   Patient presents with   • Diarrhea     Patient states on Saturday she started having high fevers as well as body aches, diarrhea, and congestion  History of Present Illness       Patient is here for evaluation of fevers, body aches, diarrhea  Patient states that the diarrhea is starting to slow down but she still have an 4 5 down movements a day  Patient is eating and drinking plenty of fluids  Patient states her urine is clear yellow  Review of Systems   Review of Systems   Constitutional: Positive for chills and fever  Negative for activity change, appetite change and diaphoresis  HENT: Negative  Eyes: Negative  Respiratory: Negative  Cardiovascular: Negative  Gastrointestinal: Positive for diarrhea  Negative for abdominal pain, blood in stool, constipation, nausea and vomiting  Neurological: Negative            Current Medications       Current Outpatient Medications:   •  acetaminophen (TYLENOL) 500 mg tablet, Take 500 mg by mouth every 6 (six) hours as needed for mild pain, Disp: , Rfl:   •  Ascorbic Acid (vitamin C) 1000 MG tablet, Take 2,000 mg by mouth daily, Disp: , Rfl:   •  atorvastatin (LIPITOR) 40 mg tablet, Take 1 tablet (40 mg total) by mouth daily, Disp: 90 tablet, Rfl: 1  •  Cholecalciferol (Vitamin D3) 50 MCG ( UT) TABS, Take 2,000 Units by mouth daily, Disp: , Rfl:   •  lisinopril (ZESTRIL) 10 mg tablet, Take 1 tablet (10 mg total) by mouth daily, Disp: 90 tablet, Rfl: 1  •  metFORMIN (GLUCOPHAGE) 1000 MG tablet, Take 1 tablet (1,000 mg total) by mouth 2 (two) times a day with meals, Disp: 180 tablet, Rfl: 1  •  metoprolol tartrate (LOPRESSOR) 25 mg tablet, Take 0 5 tablets (12 5 mg total) by mouth every 12 (twelve) hours, Disp: 30 tablet, Rfl: 0  •  multivitamin (THERAGRAN) TABS, Take 1 tablet by mouth daily, Disp: , Rfl:   •  omeprazole (PriLOSEC) 40 MG capsule, Take 1 capsule (40 mg total) by mouth daily, Disp: 90 capsule, Rfl: 1  •  ondansetron (ZOFRAN) 4 mg tablet, Take 1 tablet (4 mg total) by mouth every 8 (eight) hours as needed for nausea or vomiting, Disp: 20 tablet, Rfl: 0  •  potassium citrate (UROCIT-K) 10 mEq, Take 1 tablet (10 mEq total) by mouth 3 (three) times a day with meals, Disp: 270 tablet, Rfl: 5  •  sertraline (ZOLOFT) 50 mg tablet, Take 1 tablet (50 mg total) by mouth daily Takes in the am , Disp: 90 tablet, Rfl: 1  •  phenazopyridine (PYRIDIUM) 200 mg tablet, Take 1 tablet (200 mg total) by mouth 3 (three) times a day as needed for bladder spasms (Patient not taking: No sig reported), Disp: 10 tablet, Rfl: 0    Current Allergies     Allergies as of 11/01/2022 - Reviewed 11/01/2022   Allergen Reaction Noted   • Oxybutynin Anaphylaxis 09/16/2021   • Clonazepam Other (See Comments) 09/25/2017   • Morphine Other (See Comments) and Confusion 06/14/2017   • Venlafaxine Other (See Comments) 09/25/2017            The following portions of the patient's history were reviewed and updated as appropriate: allergies, current medications, past family history, past medical history, past social history, past surgical history and problem list      Past Medical History:   Diagnosis Date   • Anxiety    • Asthma    • Colon polyp    • Depression    • Diabetes mellitus ([facility] [address] )    • Diverticulitis    • Diverticulitis of colon    • Follicular cyst of ovary 12/23/2014   • Former smoker    • GERD (gastroesophageal reflux disease)    • Glycosuria    • Headache    • Hyperlipidemia    • Hypertension    • Kidney calculi 1/11/2019   • Kidney stone    • Obesity    • Ovarian cyst    • Overactive bladder    • Overweight    • Pulmonary nodule    • Renal calculus    • Seasonal allergies    • Tinea pedis of left foot 6/8/2020   • Vertigo    • Wears partial dentures     Lower plate -partial       Past Surgical History:   Procedure Laterality Date   • APPENDECTOMY     • BLADDER SUSPENSION      [facility]   • CERVICAL BIOPSY  W/ LOOP ELECTRODE EXCISION     • COLONOSCOPY     • DILATION AND CURETTAGE OF UTERUS     • FL RETROGRADE PYELOGRAM  7/26/2021   • FL RETROGRADE PYELOGRAM  11/18/2021   • HYSTERECTOMY  2007    ovaries present bilaterally   • JOINT REPLACEMENT Right 05/2021    right knee   • MA COLONOSCOPY FLX DX W/COLLJ SPEC WHEN PFRMD N/A 9/26/2017    Procedure: EGD AND COLONOSCOPY;  Surgeon: Annette Quigley MD;  Location: [facility];   Service: Gastroenterology   • MA CYSTO/URETERO W/LITHOTRIPSY &INDWELL STENT INSRT Bilateral 7/26/2021    Procedure: CYSTOSCOPY URETEROSCOPY WITH LITHOTRIPSY HOLMIUM LASER, RETROGRADE PYELOGRAM AND INSERTION STENT URETERAL;  Surgeon: Bouchra Tirado MD;  [facility];  Service: Urology   • MA CYSTO/URETERO W/LITHOTRIPSY &INDWELL STENT INSRT Left 9/16/2021    Procedure: CYSTOSCOPY URETEROSCOPY WITH LITHOTRIPSY HOLMIUM LASER, RETROGRADE PYELOGRAM AND INSERTION STENT URETERAL;  Surgeon: Sam Matias MD;  [facility];  Service: Urology   • MA CYSTO/URETERO W/LITHOTRIPSY &INDWELL STENT INSRT Left 11/18/2021    Procedure: CYSTOSCOPY URETEROSCOPY WITH LITHOTRIPSY HOLMIUM LASER, RETROGRADE PYELOGRAM AND INSERTION STENT URETERAL;  Surgeon: Latisha Mueller MD;  [facility];  Service: Urology   • MA REVISE MEDIAN N/CARPAL TUNNEL SURG Right 5/29/2018    Procedure: CARPAL TUNNEL RELEASE;  Surgeon: Olimpia Santiago DO;  [facility];  Service: Orthopedics   • MA TOTAL KNEE ARTHROPLASTY Right 5/19/2021    Procedure: ARTHROPLASTY KNEE TOTAL;  Surgeon: Emily Hall MD;  [facility];  Service: Orthopedics   • REMOVAL URETERAL STENT Right 9/16/2021    Procedure: REMOVAL STENT URETERAL;  Surgeon: Bala Campbell MD;  [facility];  Service: Urology   • TONSILLECTOMY     • TUBAL LIGATION     • URETEROSCOPY  7/26/2021    Procedure: URETEROSCOPY, LASER AND BASKET STONE EXTRACTION;  Surgeon: Enedina Nur MD;  [facility];  Service: Urology   • WISDOM TOOTH EXTRACTION         Family History   Problem Relation Age of Onset   • Diabetes type II Mother    • Asthma Mother    • Stroke Father    • Pancreatic cancer Father 68   • Diabetes type II Father    • No Known Problems Maternal Grandmother    • No Known Problems Maternal Grandfather    • No Known Problems Paternal Grandmother    • No Known Problems Paternal Grandfather    • No Known Problems Sister    • No Known Problems Sister    • No Known Problems Sister    • No Known Problems Sister    • No Known Problems Sister    • No Known Problems Maternal Aunt    • No Known Problems Paternal Aunt          Medications have been verified  Objective   BP (!) 87/53   Pulse 96   Temp 99 2 °F (37 3 °C)   Resp 16   Ht 5' 3" (1 6 m)   Wt 74 4 kg (164 lb)   BMI 29 05 kg/m²   No LMP recorded  Patient has had a hysterectomy  Physical Exam     Physical Exam  Vitals and nursing note reviewed  Constitutional:       General: She is not in acute distress  Appearance: Normal appearance  She is well-developed  She is not ill-appearing, toxic-appearing or diaphoretic  HENT:      Head: Normocephalic and atraumatic  Right Ear: Tympanic membrane and ear canal normal       Left Ear: Tympanic membrane and ear canal normal       Nose: Nose normal  No congestion or rhinorrhea  Mouth/Throat:      Mouth: Mucous membranes are moist       Pharynx: No oropharyngeal exudate or posterior oropharyngeal erythema  Eyes:      General:         Right eye: No discharge  Left eye: No discharge  Extraocular Movements: Extraocular movements intact  Conjunctiva/sclera: Conjunctivae normal       Pupils: Pupils are equal, round, and reactive to light  Cardiovascular:      Rate and Rhythm: Normal rate and regular rhythm  Heart sounds: Normal heart sounds  No murmur heard  Pulmonary:      Effort: Pulmonary effort is normal  No respiratory distress  Breath sounds: Normal breath sounds  No stridor  No wheezing, rhonchi or rales  Abdominal:      General: Abdomen is flat  Bowel sounds are normal  There is no distension  Tenderness: There is no abdominal tenderness  There is no guarding or rebound  Skin:     General: Skin is warm and dry  Findings: No rash  Neurological:      General: No focal deficit present  Mental Status: She is alert and oriented to person, place, and time  Psychiatric:         Mood and Affect: Mood normal          Behavior: Behavior normal          Thought Content:  Thought content normal          Judgment: Judgment normal

## 2022-11-02 NOTE — TELEPHONE ENCOUNTER
Please have patient reach out to Dr Atiya Maldonado had seen her unfortunately I do not see her for these concerns, if she is having ongoing concern she should be re-evaluated

## 2022-11-03 ENCOUNTER — APPOINTMENT (OUTPATIENT)
Dept: LAB | Facility: IMAGING CENTER | Age: 57
End: 2022-11-03

## 2022-11-03 ENCOUNTER — OFFICE VISIT (OUTPATIENT)
Dept: INTERNAL MEDICINE CLINIC | Facility: OTHER | Age: 57
End: 2022-11-03

## 2022-11-03 VITALS
WEIGHT: 160 LBS | SYSTOLIC BLOOD PRESSURE: 110 MMHG | TEMPERATURE: 98.1 F | HEART RATE: 94 BPM | HEIGHT: 63 IN | DIASTOLIC BLOOD PRESSURE: 64 MMHG | OXYGEN SATURATION: 98 % | BODY MASS INDEX: 28.35 KG/M2

## 2022-11-03 DIAGNOSIS — R50.9 FEVER, UNSPECIFIED FEVER CAUSE: ICD-10-CM

## 2022-11-03 DIAGNOSIS — R19.7 DIARRHEA OF PRESUMED INFECTIOUS ORIGIN: ICD-10-CM

## 2022-11-03 DIAGNOSIS — Z12.31 ENCOUNTER FOR SCREENING MAMMOGRAM FOR MALIGNANT NEOPLASM OF BREAST: ICD-10-CM

## 2022-11-03 DIAGNOSIS — R19.7 DIARRHEA OF PRESUMED INFECTIOUS ORIGIN: Primary | ICD-10-CM

## 2022-11-03 LAB
ALBUMIN SERPL BCP-MCNC: 2.3 G/DL (ref 3.5–5)
ALP SERPL-CCNC: 255 U/L (ref 46–116)
ALT SERPL W P-5'-P-CCNC: 63 U/L (ref 12–78)
ANION GAP SERPL CALCULATED.3IONS-SCNC: 9 MMOL/L (ref 4–13)
AST SERPL W P-5'-P-CCNC: 45 U/L (ref 5–45)
BASOPHILS # BLD AUTO: 0.08 THOUSANDS/ÂΜL (ref 0–0.1)
BASOPHILS NFR BLD AUTO: 1 % (ref 0–1)
BILIRUB SERPL-MCNC: 0.93 MG/DL (ref 0.2–1)
BUN SERPL-MCNC: 63 MG/DL (ref 5–25)
CALCIUM ALBUM COR SERPL-MCNC: 10.9 MG/DL (ref 8.3–10.1)
CALCIUM SERPL-MCNC: 9.5 MG/DL (ref 8.3–10.1)
CHLORIDE SERPL-SCNC: 101 MMOL/L (ref 96–108)
CO2 SERPL-SCNC: 23 MMOL/L (ref 21–32)
CREAT SERPL-MCNC: 2.44 MG/DL (ref 0.6–1.3)
EOSINOPHIL # BLD AUTO: 0.11 THOUSAND/ÂΜL (ref 0–0.61)
EOSINOPHIL NFR BLD AUTO: 1 % (ref 0–6)
ERYTHROCYTE [DISTWIDTH] IN BLOOD BY AUTOMATED COUNT: 15.3 % (ref 11.6–15.1)
GFR SERPL CREATININE-BSD FRML MDRD: 21 ML/MIN/1.73SQ M
GLUCOSE SERPL-MCNC: 155 MG/DL (ref 65–140)
HCT VFR BLD AUTO: 36.8 % (ref 34.8–46.1)
HGB BLD-MCNC: 11.8 G/DL (ref 11.5–15.4)
IMM GRANULOCYTES # BLD AUTO: 0.29 THOUSAND/UL (ref 0–0.2)
IMM GRANULOCYTES NFR BLD AUTO: 2 % (ref 0–2)
LYMPHOCYTES # BLD AUTO: 1 THOUSANDS/ÂΜL (ref 0.6–4.47)
LYMPHOCYTES NFR BLD AUTO: 7 % (ref 14–44)
MCH RBC QN AUTO: 28.4 PG (ref 26.8–34.3)
MCHC RBC AUTO-ENTMCNC: 32.1 G/DL (ref 31.4–37.4)
MCV RBC AUTO: 89 FL (ref 82–98)
MONOCYTES # BLD AUTO: 1.53 THOUSAND/ÂΜL (ref 0.17–1.22)
MONOCYTES NFR BLD AUTO: 11 % (ref 4–12)
NEUTROPHILS # BLD AUTO: 11.13 THOUSANDS/ÂΜL (ref 1.85–7.62)
NEUTS SEG NFR BLD AUTO: 78 % (ref 43–75)
NRBC BLD AUTO-RTO: 0 /100 WBCS
PLATELET # BLD AUTO: 236 THOUSANDS/UL (ref 149–390)
PMV BLD AUTO: 11.7 FL (ref 8.9–12.7)
POTASSIUM SERPL-SCNC: 4.4 MMOL/L (ref 3.5–5.3)
PROT SERPL-MCNC: 7.4 G/DL (ref 6.4–8.4)
RBC # BLD AUTO: 4.15 MILLION/UL (ref 3.81–5.12)
SODIUM SERPL-SCNC: 133 MMOL/L (ref 135–147)
WBC # BLD AUTO: 14.14 THOUSAND/UL (ref 4.31–10.16)

## 2022-11-03 NOTE — PATIENT INSTRUCTIONS
Check labs now, check stool studies  Continue to stay well hydrated, continue bland diet   Bananas, apple sauce, rice, toast    Continue tylenol as needed for fever    Follow up if symptoms worsen or fail to improve

## 2022-11-03 NOTE — PROGRESS NOTES
Assessment/Plan:    Problem List Items Addressed This Visit        Digestive    Diarrhea of presumed infectious origin - Primary     - symptoms are improving, afebrile today  - due to ongoing symptoms x 1 week will check CBC, CMP and stool studies   - no focal tenderness on exam so will hold off on imaging  - advised to continue to push fluids and remain well hydrated  - bland diet; bananas, applesauce, rice, toast  Avoid dairy, fatty/greasy foods, or spicy foods  - follow up if symptoms worsen or fail to improve          Relevant Orders    CBC and differential    Comprehensive metabolic panel    Clostridium difficile toxin by PCR    Stool Enteric Bacterial Panel by PCR    Ova and parasite examination       Other    Fever     - ongoing x 5 days and now afebrile today  - will check CBC, CMP and stool studies  - continue to monitor temp  Tylenol prn  - continue to push fluids and bland diet          Relevant Orders    CBC and differential    Comprehensive metabolic panel    Clostridium difficile toxin by PCR    Stool Enteric Bacterial Panel by PCR    Ova and parasite examination      Other Visit Diagnoses     Encounter for screening mammogram for malignant neoplasm of breast        Relevant Orders    Mammo screening bilateral w 3d & cad          M*Modal software was used to dictate this note  It may contain errors with dictating incorrect words or incorrect spelling  Please contact the provider directly with any questions  Subjective:      Patient ID: Alis Reyes is a 62 y o  female  HPI     Patient presents today with ongoing diarrhea, fever and body aches  Symptoms initially started 6 days ago on 10/29  She was seen for virtual evaluation on 10/31 and advised likely gastroenteritis versus COVID, recommended adequate hydration and Zofran for nausea  She was re-evaluated in urgent care on 11/01 and advised ongoing hydration and bland diet  Her COVID and influenza test is negative       She has been using tylenol every 6 hours and pushing fluids  Today is the first day without a fever  Last known fever was 101 yesterday  She was able to tolerate an english muffin this morning  She states she has been forcing herself to eat  She is keeping food down  She denies any nausea or vomiting  She continues with body aches and diarrhea  She did have 3 episodes of diarrhea this morning  She does note that her diarrhea is improving  She describes the diarrhea as watery and mucousy  No blood in the stool  She denies any urinary symptoms  Last dose of tylenol was last night  The following portions of the patient's history were reviewed and updated as appropriate: allergies, current medications, past family history, past medical history, past social history, past surgical history and problem list     Review of Systems   Constitutional: Positive for appetite change (diminished), chills, diaphoresis (improving) and fever  HENT: Positive for rhinorrhea (clear)  Negative for congestion  Respiratory: Negative for chest tightness and shortness of breath  Gastrointestinal: Positive for diarrhea  Negative for abdominal distention, abdominal pain, blood in stool, nausea and vomiting  Genitourinary: Negative for difficulty urinating, dysuria, flank pain, frequency, hematuria, urgency and vaginal bleeding           Past Medical History:   Diagnosis Date   • Anxiety    • Asthma    • Colon polyp    • Depression    • [facility]    • Diverticulitis    • Diverticulitis of colon    • Follicular cyst of ovary 12/23/2014   • Former smoker    • GERD (gastroesophageal reflux disease)    • Glycosuria    • Headache    • Hyperlipidemia    • Hypertension    • Kidney calculi 1/11/2019   • Kidney stone    • Obesity    • Ovarian cyst    • Overactive bladder    • Overweight    • Pulmonary nodule    • Renal calculus    • Seasonal allergies    • Tinea pedis of left foot 6/8/2020   • Vertigo    • Wears partial dentures Lower plate -partial         Current Outpatient Medications:   •  acetaminophen (TYLENOL) 500 mg tablet, Take 500 mg by mouth every 6 (six) hours as needed for mild pain, Disp: , Rfl:   •  Ascorbic Acid (vitamin C) 1000 MG tablet, Take 2,000 mg by mouth daily, Disp: , Rfl:   •  atorvastatin (LIPITOR) 40 mg tablet, Take 1 tablet (40 mg total) by mouth daily, Disp: 90 tablet, Rfl: 1  •  Cholecalciferol (Vitamin D3) 50 MCG (2000 UT) TABS, Take 2,000 Units by mouth daily, Disp: , Rfl:   •  lisinopril (ZESTRIL) 10 mg tablet, Take 1 tablet (10 mg total) by mouth daily, Disp: 90 tablet, Rfl: 1  •  metFORMIN (GLUCOPHAGE) 1000 MG tablet, Take 1 tablet (1,000 mg total) by mouth 2 (two) times a day with meals, Disp: 180 tablet, Rfl: 1  •  metoprolol tartrate (LOPRESSOR) 25 mg tablet, Take 0 5 tablets (12 5 mg total) by mouth every 12 (twelve) hours, Disp: 30 tablet, Rfl: 0  •  multivitamin (THERAGRAN) TABS, Take 1 tablet by mouth daily, Disp: , Rfl:   •  omeprazole (PriLOSEC) 40 MG capsule, Take 1 capsule (40 mg total) by mouth daily, Disp: 90 capsule, Rfl: 1  •  ondansetron (ZOFRAN) 4 mg tablet, Take 1 tablet (4 mg total) by mouth every 8 (eight) hours as needed for nausea or vomiting, Disp: 20 tablet, Rfl: 0  •  potassium citrate (UROCIT-K) 10 mEq, Take 1 tablet (10 mEq total) by mouth 3 (three) times a day with meals, Disp: 270 tablet, Rfl: 5  •  sertraline (ZOLOFT) 50 mg tablet, Take 1 tablet (50 mg total) by mouth daily Takes in the am , Disp: 90 tablet, Rfl: 1    Allergies   Allergen Reactions   • Oxybutynin Anaphylaxis     "feels like throat closing and can't swallow"   • Clonazepam Other (See Comments)     Pt states "didn't feel right on it "   • Morphine Other (See Comments) and Confusion     Annotation - [id number]: "dopey"  Gets silly   • Venlafaxine Other (See Comments)     Unknown--pt states " MD was trying pt on different medications   Pt unsure of reaction "       Social History   Past Surgical History: Procedure Laterality Date   • APPENDECTOMY     • BLADDER SUSPENSION      [facility]   • CERVICAL BIOPSY  W/ LOOP ELECTRODE EXCISION     • COLONOSCOPY     • DILATION AND CURETTAGE OF UTERUS     • FL RETROGRADE PYELOGRAM  7/26/2021   • FL RETROGRADE PYELOGRAM  11/18/2021   • HYSTERECTOMY  2007    ovaries present bilaterally   • JOINT REPLACEMENT Right 05/2021    right knee   • VT COLONOSCOPY FLX DX W/COLLJ SPEC WHEN PFRMD N/A 9/26/2017    Procedure: EGD AND COLONOSCOPY;  Surgeon: Yaneli Pineda MD;  Location: [facility] GI [facility];   Service: Gastroenterology   • VT CYSTO/URETERO W/LITHOTRIPSY &INDWELL STENT INSRT Bilateral 7/26/2021    Procedure: CYSTOSCOPY URETEROSCOPY WITH LITHOTRIPSY HOLMIUM LASER, RETROGRADE PYELOGRAM AND INSERTION STENT URETERAL;  Surgeon: Tyler Osorio MD;  [facility];  Service: Urology   • VT CYSTO/URETERO W/LITHOTRIPSY &INDWELL STENT INSRT Left 9/16/2021    Procedure: CYSTOSCOPY URETEROSCOPY WITH LITHOTRIPSY HOLMIUM LASER, RETROGRADE PYELOGRAM AND INSERTION STENT URETERAL;  Surgeon: Hugo Victoria MD;  [facility];  Service: Urology   • VT CYSTO/URETERO W/LITHOTRIPSY &INDWELL STENT INSRT Left 11/18/2021    Procedure: CYSTOSCOPY URETEROSCOPY WITH LITHOTRIPSY HOLMIUM LASER, RETROGRADE PYELOGRAM AND INSERTION STENT URETERAL;  Surgeon: Heath Glynn MD;  [facility];  Service: Urology   • VT REVISE MEDIAN N/CARPAL TUNNEL SURG Right 5/29/2018    Procedure: CARPAL TUNNEL RELEASE;  Surgeon: Alexis Dominguez DO;  [facility];  Service: Orthopedics   • VT TOTAL KNEE ARTHROPLASTY Right 5/19/2021    Procedure: ARTHROPLASTY KNEE TOTAL;  Surgeon: Amos Tolbert MD;  [facility];  Service: Orthopedics   • REMOVAL URETERAL STENT Right 9/16/2021    Procedure: REMOVAL STENT URETERAL;  Surgeon: Hugo Victoria MD;  [facility];  Service: Urology   • TONSILLECTOMY     • TUBAL LIGATION     • URETEROSCOPY  7/26/2021    Procedure: URETEROSCOPY, LASER AND BASKET STONE EXTRACTION;  Surgeon: Moshe Burrell MD;  [facility];  Service: Urology   • WISDOM TOOTH EXTRACTION       Family History   Problem Relation Age of Onset   • Diabetes type II Mother    • Asthma Mother    • Stroke Father    • Pancreatic cancer Father 68   • Diabetes type II Father    • No Known Problems Maternal Grandmother    • No Known Problems Maternal Grandfather    • No Known Problems Paternal Grandmother    • No Known Problems Paternal Grandfather    • No Known Problems Sister    • No Known Problems Sister    • No Known Problems Sister    • No Known Problems Sister    • No Known Problems Sister    • No Known Problems Maternal Aunt    • No Known Problems Paternal Aunt        Objective:  /64 (BP Location: Left arm, Patient Position: Sitting, Cuff Size: Standard)   Pulse 94   Temp 98 1 °F (36 7 °C) (Temporal)   Ht 5' 3" (1 6 m)   Wt 72 6 kg (160 lb)   SpO2 98%   BMI 28 34 kg/m²      Physical Exam  Vitals reviewed  Constitutional:       General: She is not in acute distress  Appearance: Normal appearance  She is ill-appearing (mild)  She is not diaphoretic  HENT:      Head: Normocephalic and atraumatic  Right Ear: Tympanic membrane and external ear normal       Left Ear: Tympanic membrane and external ear normal       Nose: Nose normal  No congestion or rhinorrhea  Mouth/Throat:      Mouth: Mucous membranes are moist       Pharynx: Oropharynx is clear  No oropharyngeal exudate or posterior oropharyngeal erythema  Eyes:      Extraocular Movements: Extraocular movements intact  Conjunctiva/sclera: Conjunctivae normal       Pupils: Pupils are equal, round, and reactive to light  Cardiovascular:      Rate and Rhythm: Normal rate and regular rhythm  Heart sounds: Normal heart sounds  Pulmonary:      Effort: Pulmonary effort is normal  No respiratory distress  Breath sounds: Normal breath sounds  No wheezing, rhonchi or rales     Abdominal: General: Bowel sounds are normal  There is no distension  Palpations: Abdomen is soft  There is no mass  Tenderness: There is abdominal tenderness (mild upper abdomen )  There is no right CVA tenderness, left CVA tenderness or guarding  Lymphadenopathy:      Cervical: No cervical adenopathy  Neurological:      Mental Status: She is alert  Mental status is at baseline     Psychiatric:         Mood and Affect: Mood normal          Behavior: Behavior normal

## 2022-11-03 NOTE — ASSESSMENT & PLAN NOTE
- symptoms are improving, afebrile today    - due to ongoing symptoms x 1 week will check CBC, CMP and stool studies   - no focal tenderness on exam so will hold off on imaging  - advised to continue to push fluids and remain well hydrated  - bland diet; bananas, applesauce, rice, toast  Avoid dairy, fatty/greasy foods, or spicy foods  - follow up if symptoms worsen or fail to improve

## 2022-11-03 NOTE — ASSESSMENT & PLAN NOTE
- ongoing x 5 days and now afebrile today  - will check CBC, CMP and stool studies  - continue to monitor temp   Tylenol prn  - continue to push fluids and bland diet

## 2022-11-04 ENCOUNTER — APPOINTMENT (OUTPATIENT)
Dept: LAB | Facility: IMAGING CENTER | Age: 57
End: 2022-11-04

## 2022-11-04 ENCOUNTER — HOSPITAL ENCOUNTER (INPATIENT)
Facility: HOSPITAL | Age: 57
LOS: 1 days | Discharge: HOME | End: 2022-11-06
Attending: EMERGENCY MEDICINE | Admitting: INTERNAL MEDICINE

## 2022-11-04 ENCOUNTER — TELEPHONE (OUTPATIENT)
Dept: INTERNAL MEDICINE CLINIC | Facility: OTHER | Age: 57
End: 2022-11-04

## 2022-11-04 DIAGNOSIS — N17.9 AKI (ACUTE KIDNEY INJURY) (HCC): ICD-10-CM

## 2022-11-04 DIAGNOSIS — R19.7 DIARRHEA: Primary | ICD-10-CM

## 2022-11-04 DIAGNOSIS — E11.22 TYPE 2 DIABETES MELLITUS WITH CHRONIC KIDNEY DISEASE, WITHOUT LONG-TERM CURRENT USE OF INSULIN, UNSPECIFIED CKD STAGE (HCC): ICD-10-CM

## 2022-11-04 DIAGNOSIS — R19.7 DIARRHEA OF PRESUMED INFECTIOUS ORIGIN: ICD-10-CM

## 2022-11-04 DIAGNOSIS — R50.9 FEVER, UNSPECIFIED FEVER CAUSE: ICD-10-CM

## 2022-11-04 DIAGNOSIS — N17.9 AKI (ACUTE KIDNEY INJURY) (HCC): Primary | ICD-10-CM

## 2022-11-04 PROBLEM — R74.01 TRANSAMINITIS: Status: ACTIVE | Noted: 2022-11-04

## 2022-11-04 LAB
ALBUMIN SERPL BCP-MCNC: 2.4 G/DL (ref 3.5–5)
ALP SERPL-CCNC: 413 U/L (ref 46–116)
ALT SERPL W P-5'-P-CCNC: 125 U/L (ref 12–78)
AMORPH URATE CRY URNS QL MICRO: ABNORMAL
ANION GAP SERPL CALCULATED.3IONS-SCNC: 8 MMOL/L (ref 4–13)
ANISOCYTOSIS BLD QL SMEAR: PRESENT
AST SERPL W P-5'-P-CCNC: 88 U/L (ref 5–45)
BACTERIA UR QL AUTO: ABNORMAL /HPF
BASOPHILS # BLD MANUAL: 0 THOUSAND/UL (ref 0–0.1)
BASOPHILS NFR MAR MANUAL: 0 % (ref 0–1)
BILIRUB SERPL-MCNC: 1.05 MG/DL (ref 0.2–1)
BILIRUB UR QL STRIP: NEGATIVE
BUN SERPL-MCNC: 55 MG/DL (ref 5–25)
CALCIUM ALBUM COR SERPL-MCNC: 11.3 MG/DL (ref 8.3–10.1)
CALCIUM SERPL-MCNC: 10 MG/DL (ref 8.3–10.1)
CHLORIDE SERPL-SCNC: 105 MMOL/L (ref 96–108)
CLARITY UR: ABNORMAL
CO2 SERPL-SCNC: 22 MMOL/L (ref 21–32)
COLOR UR: ABNORMAL
CREAT SERPL-MCNC: 1.85 MG/DL (ref 0.6–1.3)
EOSINOPHIL # BLD MANUAL: 0.16 THOUSAND/UL (ref 0–0.4)
EOSINOPHIL NFR BLD MANUAL: 1 % (ref 0–6)
ERYTHROCYTE [DISTWIDTH] IN BLOOD BY AUTOMATED COUNT: 15.3 % (ref 11.6–15.1)
FLUAV RNA RESP QL NAA+PROBE: NEGATIVE
FLUBV RNA RESP QL NAA+PROBE: NEGATIVE
GFR SERPL CREATININE-BSD FRML MDRD: 29 ML/MIN/1.73SQ M
GIANT PLATELETS BLD QL SMEAR: PRESENT
GLUCOSE SERPL-MCNC: 120 MG/DL (ref 65–140)
GLUCOSE UR STRIP-MCNC: NEGATIVE MG/DL
HCT VFR BLD AUTO: 36.6 % (ref 34.8–46.1)
HGB BLD-MCNC: 12.2 G/DL (ref 11.5–15.4)
HGB UR QL STRIP.AUTO: ABNORMAL
KETONES UR STRIP-MCNC: NEGATIVE MG/DL
LEUKOCYTE ESTERASE UR QL STRIP: ABNORMAL
LYMPHOCYTES # BLD AUTO: 0.97 THOUSAND/UL (ref 0.6–4.47)
LYMPHOCYTES # BLD AUTO: 6 % (ref 14–44)
MACROCYTES BLD QL AUTO: PRESENT
MCH RBC QN AUTO: 29.9 PG (ref 26.8–34.3)
MCHC RBC AUTO-ENTMCNC: 33.3 G/DL (ref 31.4–37.4)
MCV RBC AUTO: 90 FL (ref 82–98)
MONOCYTES # BLD AUTO: 1.61 THOUSAND/UL (ref 0–1.22)
MONOCYTES NFR BLD: 10 % (ref 4–12)
MUCOUS THREADS UR QL AUTO: ABNORMAL
NEUTROPHILS # BLD MANUAL: 13.04 THOUSAND/UL (ref 1.85–7.62)
NEUTS BAND NFR BLD MANUAL: 19 % (ref 0–8)
NEUTS SEG NFR BLD AUTO: 62 % (ref 43–75)
NITRITE UR QL STRIP: NEGATIVE
NON-SQ EPI CELLS URNS QL MICRO: ABNORMAL /HPF
PH UR STRIP.AUTO: 5.5 [PH] (ref 4.5–8)
PLASMA CELLS NFR BLD: 1 % (ref 0–0)
PLATELET # BLD AUTO: 350 THOUSANDS/UL (ref 149–390)
PLATELET BLD QL SMEAR: ADEQUATE
PLATELET CLUMP BLD QL SMEAR: PRESENT
PMV BLD AUTO: 10.5 FL (ref 8.9–12.7)
POLYCHROMASIA BLD QL SMEAR: PRESENT
POTASSIUM SERPL-SCNC: 4.8 MMOL/L (ref 3.5–5.3)
PROT SERPL-MCNC: 8.2 G/DL (ref 6.4–8.4)
PROT UR STRIP-MCNC: NEGATIVE MG/DL
RBC # BLD AUTO: 4.08 MILLION/UL (ref 3.81–5.12)
RBC #/AREA URNS AUTO: ABNORMAL /HPF
RBC MORPH BLD: PRESENT
RSV RNA RESP QL NAA+PROBE: NEGATIVE
SARS-COV-2 RNA RESP QL NAA+PROBE: NEGATIVE
SODIUM SERPL-SCNC: 135 MMOL/L (ref 135–147)
SP GR UR STRIP.AUTO: 1.01 (ref 1–1.03)
UROBILINOGEN UR QL STRIP.AUTO: 0.2 E.U./DL
VARIANT LYMPHS # BLD AUTO: 1 %
WBC # BLD AUTO: 16.1 THOUSAND/UL (ref 4.31–10.16)
WBC #/AREA URNS AUTO: ABNORMAL /HPF
WBC CLUMPS # UR AUTO: PRESENT /UL

## 2022-11-04 RX ORDER — SODIUM CHLORIDE 9 MG/ML
100 INJECTION, SOLUTION INTRAVENOUS CONTINUOUS
Status: CANCELLED | OUTPATIENT
Start: 2022-11-04

## 2022-11-04 RX ORDER — ATORVASTATIN CALCIUM 40 MG/1
40 TABLET, FILM COATED ORAL DAILY
Status: DISCONTINUED | OUTPATIENT
Start: 2022-11-05 | End: 2022-11-06 | Stop reason: HOSPADM

## 2022-11-04 RX ORDER — SODIUM CHLORIDE 9 MG/ML
100 INJECTION, SOLUTION INTRAVENOUS CONTINUOUS
Status: DISCONTINUED | OUTPATIENT
Start: 2022-11-04 | End: 2022-11-06

## 2022-11-04 RX ORDER — ONDANSETRON 4 MG/1
4 TABLET, ORALLY DISINTEGRATING ORAL EVERY 4 HOURS PRN
Status: DISCONTINUED | OUTPATIENT
Start: 2022-11-04 | End: 2022-11-06 | Stop reason: HOSPADM

## 2022-11-04 RX ORDER — ACETAMINOPHEN 325 MG/1
488 TABLET ORAL EVERY 6 HOURS PRN
Status: DISCONTINUED | OUTPATIENT
Start: 2022-11-04 | End: 2022-11-06 | Stop reason: HOSPADM

## 2022-11-04 RX ORDER — LISINOPRIL 10 MG/1
10 TABLET ORAL DAILY
Status: CANCELLED | OUTPATIENT
Start: 2022-11-05

## 2022-11-04 RX ADMIN — SODIUM CHLORIDE 1000 ML: 0.9 INJECTION, SOLUTION INTRAVENOUS at 15:27

## 2022-11-04 RX ADMIN — SODIUM CHLORIDE 100 ML/HR: 0.9 INJECTION, SOLUTION INTRAVENOUS at 21:00

## 2022-11-04 NOTE — ASSESSMENT & PLAN NOTE
- Currently on Lisinopril and Lopressor at home  - Also on Lipitor     Plan:    1) Hold home antiHTN b/c/o TOMMY

## 2022-11-04 NOTE — ASSESSMENT & PLAN NOTE
- AST 85, ,  with TBr 1 1  - No abdominal pain, no jaundice  - Unrelenting Diarrhea x 5d of unknown origin; assoc'd with fever and myalgia  - May be related to Hepatitis A in context of diarrhea and fever w/ myalgia and poor appetite  - Possible NEWELL?  Given Metabolic syndrome / obesity and h/o T2DM    Plan:    1) F/U Hepatitis panel  2) Monitor LFTs

## 2022-11-04 NOTE — LETTER
[address] [facility] [address]  [address]  [place] [address]  Dept: [phone]    November 6, 2022     Patient: Carley Doss   YOB: 1965   Date of Visit: 11/4/2022       To Whom it May Concern:    Carley Doss is under my professional care  She was seen in the hospital from 11/4/2022   to 11/06/22  She may return to work on 11/09/2022 without limitations  If you have any questions or concerns, please don't hesitate to call           Sincerely,   Melanie Sutton MD

## 2022-11-04 NOTE — PROGRESS NOTES
INTERNAL MEDICINE RESIDENCY SENIOR ADMISSION NOTE     Name: Jennifer Linton   Age & Sex: 62 y o  female   MRN: [id number]  Unit/Bed#: [facility] 812-01   Encounter: [id number]  Primary Care Provider: LEONARD Tolliver    Admit to team: SOD Team C     Patient seen and examined  Reviewed H&P per MD Anthony Cunningham with the assessment and plan with any exception/addition as noted below:    Principal Problem:    Protracted diarrhea  Active Problems:    GERD (gastroesophageal reflux disease)    Type 2 diabetes mellitus with diabetic chronic kidney disease (HCC)    Hypertension    CKD (chronic kidney disease) stage 2, GFR 60-89 ml/min    Type 2 diabetes mellitus with microalbuminuria ([facility] )    TOMMY (acute kidney injury) ([facility] )    Transaminitis     Chief Complaint   Patient presents with   • Diarrhea     Diarrhea X 1 week, sent by PCP after abnormal kidney labs  Loss of appetite, fevers at home and feeling dehydrated       Mrs Sarah Bills is a 62years old female with past medical history of hypertension, controlled type 2 diabetes mellitus, asthma, diverticulitis, GERD on Prilosec, hypertension, hyperlipidemia, anxiety and depression who came in with fever, myalgia and diarrhea  Patient reports she started having fever on 10/29/2022  She reports she was having intermittent fever and temperature up to 104F for which she has been taking 2 tablets Tylenol every Q 6 hourly  Patient reports she started having diarrhea which is mostly watery, no blood, at least 6 to 7 times a day  She reports it is mucousy and she started noticing some blood this evening  She reports of myalgia along with fever and diarrhea  No peculiar smell  She denies any nausea, vomiting, abdominal pain  She reports of loss of appetite  Denies any sick contact at home  She lives with her  who has no symptoms  Denies any antibiotics use, any recent trouble, eating outside food, hiking    She reports she drinks the water from the tap and she uses the water filter  Denies any similar episode in the past   She reports she had a colonoscopy many years ago, reported polyps removed  She does have history of diverticulitis in the past  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

## 2022-11-04 NOTE — ED ATTENDING ATTESTATION
11/4/2022  ITiffany DO, saw and evaluated the patient  I have discussed the patient with the resident/non-physician practitioner and agree with the resident's/non-physician practitioner's findings, Plan of Care, and MDM as documented in the resident's/non-physician practitioner's note, except where noted  All available labs and Radiology studies were reviewed  I was present for key portions of any procedure(s) performed by the resident/non-physician practitioner and I was immediately available to provide assistance  At this point I agree with the current assessment done in the Emergency Department  I have conducted an independent evaluation of this patient a history and physical is as follows:    54-year-old female accompanied by her   Six days ago she began having several episodes of nonbloody, non mucousy diarrhea, no abdominal pain but after prolonged diarrhea sometimes feeling somewhat weak and run down  No fever, no chills, no cough, shortness of breath, no pain anywhere  Seen in urgent care, negative COVID November 1st   Called PCP, had a virtual visit and yesterday had outpatient blood work, [place] 379[address], CMP remarkable for a new renal insufficiency creatinine 2 44  Stool O&P, bacterial panel and C diff panel obtained and pending  She was called today and advised by the PCP to come to the ED regarding the abnormal labs yesterday  There has been no recent travel history, no camping, no backpacking, no drinking from streams or other environmental exposures  No recent antibiotics      General:  Patient is well-appearing  Head:  Atraumatic  Eyes:  Conjunctiva pink  ENT:  Mucous membranes are moist  Neck:  Supple  Cardiac:  S1-S2, without murmurs  Lungs:  Clear to auscultation bilaterally  Abdomen:  Soft, nontender, normal bowel sounds, no CVA tenderness, no tympany, no rigidity, no guarding  Extremities:  Normal range of motion  Neurologic:  Awake, fluent speech, normal comprehension  AAOx3  Skin:  Pink warm and dry  Psychiatric:  Alert, pleasant, cooperative            ED Course       Labs Reviewed   CBC AND DIFFERENTIAL - Abnormal       Result Value Ref Range Status    WBC 16 10 (*) 4 31 - 10 16 Thousand/uL Final    RBC 4 08  3 81 - 5 12 Million/uL Final    Hemoglobin 12 2  11 5 - 15 4 g/dL Final    Hematocrit 36 6  34 8 - 46 1 % Final    MCV 90  82 - 98 fL Final    MCH 29 9  26 8 - 34 3 pg Final    MCHC 33 3  31 4 - 37 4 g/dL Final    RDW 15 3 (*) 11 6 - 15 1 % Final    MPV 10 5  8 9 - 12 7 fL Final    Platelets 691  881 - 390 Thousands/uL Final    Narrative: This is an appended report  These results have been appended to a previously verified report  COMPREHENSIVE METABOLIC PANEL - Abnormal    Sodium 135  135 - 147 mmol/L Final    Potassium 4 8  3 5 - 5 3 mmol/L Final    Chloride 105  96 - 108 mmol/L Final    CO2 22  21 - 32 mmol/L Final    ANION GAP 8  4 - 13 mmol/L Final    BUN 55 (*) 5 - 25 mg/dL Final    Creatinine 1 85 (*) 0 60 - 1 30 mg/dL Final    Comment: Standardized to IDMS reference method    Glucose 120  65 - 140 mg/dL Final    Comment: If the patient is fasting, the ADA then defines impaired fasting glucose as > 100 mg/dL and diabetes as > or equal to 123 mg/dL  Specimen collection should occur prior to Sulfasalazine administration due to the potential for falsely depressed results  Specimen collection should occur prior to Sulfapyridine administration due to the potential for falsely elevated results  Calcium 10 0  8 3 - 10 1 mg/dL Final    Corrected Calcium 11 3 (*) 8 3 - 10 1 mg/dL Final    AST 88 (*) 5 - 45 U/L Final    Comment: Specimen collection should occur prior to Sulfasalazine administration due to the potential for falsely depressed results   (*) 12 - 78 U/L Final    Comment: Specimen collection should occur prior to Sulfasalazine and/or Sulfapyridine administration due to the potential for falsely depressed results  Alkaline Phosphatase 413 (*) 46 - 116 U/L Final    Total Protein 8 2  6 4 - 8 4 g/dL Final    Albumin 2 4 (*) 3 5 - 5 0 g/dL Final    Total Bilirubin 1 05 (*) 0 20 - 1 00 mg/dL Final    Comment: Use of this assay is not recommended for patients undergoing treatment with eltrombopag due to the potential for falsely elevated results      eGFR 29  ml/min/1 73sq m Final    Narrative:     National Kidney Disease Foundation guidelines for Chronic Kidney Disease (CKD):   •  Stage 1 with normal or high GFR (GFR > 90 mL/min/1 73 square meters)  •  Stage 2 Mild CKD (GFR = 60-89 mL/min/1 73 square meters)  •  Stage 3A Moderate CKD (GFR = 45-59 mL/min/1 73 square meters)  •  Stage 3B Moderate CKD (GFR = 30-44 mL/min/1 73 square meters)  •  Stage 4 Severe CKD (GFR = 15-29 mL/min/1 73 square meters)  •  Stage 5 End Stage CKD (GFR <15 mL/min/1 73 square meters)  Note: GFR calculation is accurate only with a steady state creatinine   URINE MICROSCOPIC - Abnormal    RBC, UA 4-10 (*) None Seen, 1-2 /hpf Final    WBC, UA Innumerable (*) None Seen, 1-2 /hpf Final    Epithelial Cells Occasional  None Seen, Occasional /hpf Final    Bacteria, UA Moderate (*) None Seen, Occasional /hpf Final    MUCUS THREADS Occasional (*) None Seen Final    Amorphous Crystals, UA Occasional   Final    WBC Clumps Present   Final   URINE MACROSCOPIC, POC - Abnormal    Color, UA Rosa   Final    Clarity, UA Slightly Cloudy   Final    pH, UA 5 5  4 5 - 8 0 Final    Leukocytes, UA Small (*) Negative Final    Nitrite, UA Negative  Negative Final    Protein, UA Negative  Negative mg/dl Final    Glucose, UA Negative  Negative mg/dl Final    Ketones, UA Negative  Negative mg/dl Final    Urobilinogen, UA 0 2  0 2, 1 0 E U /dl E U /dl Final    Bilirubin, UA Negative  Negative Final    Occult Blood, UA Large (*) Negative Final    Specific [place], UA 1 015  1 003 - 1 030 Final    Narrative:     CLINITEK RESULT   MANUAL DIFFERENTIAL(PHLEBS DO NOT ORDER) - Abnormal Segmented % 62  43 - 75 % Final    Bands % 19 (*) 0 - 8 % Final    Lymphocytes % 6 (*) 14 - 44 % Final    Monocytes % 10  4 - 12 % Final    Eosinophils, % 1  0 - 6 % Final    Basophils % 0  0 - 1 % Final    Atypical Lymphocytes % 1 (*) <=0 % Final    Plasma Cells % 1 (*) 0 - 0 % Final    Absolute Neutrophils 13 04 (*) 1 85 - 7 62 Thousand/uL Final    Lymphocytes Absolute 0 97  0 60 - 4 47 Thousand/uL Final    Monocytes Absolute 1 61 (*) 0 00 - 1 22 Thousand/uL Final    Eosinophils Absolute 0 16  0 00 - 0 40 Thousand/uL Final    Basophils Absolute 0 00  0 00 - 0 10 Thousand/uL Final    Total Counted     Final    RBC Morphology Present   Final    Anisocytosis Present   Final    Macrocytes Present   Final    Polychromasia Present   Final    Platelet Estimate Adequate  Adequate Final    Clumped Platelets Present   Final    Giant PLTs Present   Final   COVID19, INFLUENZA A/B, RSV PCR, [facility] - Normal    SARS-CoV-2 Negative  Negative Final    Comment:      INFLUENZA A PCR Negative  Negative Final    Comment:      INFLUENZA B PCR Negative  Negative Final    Comment:      RSV PCR Negative  Negative Final    Comment:      Narrative:     FOR PEDIATRIC PATIENTS - copy/paste COVID Guidelines URL to browser: https://fields org/  ashx    SARS-CoV-2 assay is a Nucleic Acid Amplification assay intended for the  qualitative detection of nucleic acid from SARS-CoV-2 in nasopharyngeal  swabs  Results are for the presumptive identification of SARS-CoV-2 RNA  Positive results are indicative of infection with SARS-CoV-2, the virus  causing COVID-19, but do not rule out bacterial infection or co-infection  with other viruses  Laboratories within the [place] and its  territories are required to report all positive results to the appropriate  public health authorities   Negative results do not preclude SARS-CoV-2  infection and should not be used as the sole basis for treatment or other  patient management decisions  Negative results must be combined with  clinical observations, patient history, and epidemiological information  This test has not been FDA cleared or approved  This test has been authorized by FDA under an Emergency Use Authorization  (EUA)  This test is only authorized for the duration of time the  declaration that circumstances exist justifying the authorization of the  emergency use of an in vitro diagnostic tests for detection of SARS-CoV-2  virus and/or diagnosis of COVID-19 infection under section 564(b)(1) of  the Act, 21 U  S C  884GKX-7(Q)(5), unless the authorization is terminated  or revoked sooner  The test has been validated but independent review by FDA  and CLIA is pending  Test performed using [employer] GeneXpert: This RT-PCR assay targets N2,  a region unique to SARS-CoV-2  A conserved region in the E-gene was chosen  for pan-Sarbecovirus detection which includes SARS-CoV-2  According to CMS-2020-01-R, this platform meets the definition of high-throughput technology  URINE CULTURE     Patient overall well appearing, most likely has TOMMY secondary to volume loss from dehydration  No abdominal tenderness, do not believe she requires imaging  White count is elevated, may represent underlying infection  She has no significant risk factors in terms of travel history, no recent antibiotic usage  C diff and stool cultures have already been sent and pending  Urinalysis shows wbc's but she has no urinary symptoms and given her age I believe this is most likely asymptomatic bacteriuria and it would be inappropriate to treat her at this point with antibiotics specifically for the urinary tract infection        Critical Care Time  Procedures

## 2022-11-04 NOTE — ED PROVIDER NOTES
History  Chief Complaint   Patient presents with   • Diarrhea     Diarrhea X 1 week, sent by PCP after abnormal kidney labs  Loss of appetite, fevers at home and feeling dehydrated     63 y/o female patient presents to ED for fever and diarrhea x1 week  Patient had a fever of 103 a week ago  Patient has been having diarrhea daily and now improving  Patient was seen by PCP and had a creatinine of 2 44  Denies any nausea, vomiting, abdominal pain, chest pain, shortness of breath  States she feels denies weakness  States her fever has now improved  Denies taking any medications today  Patient also had C diff and stool culture sent  Prior to Admission Medications   Prescriptions Last Dose Informant Patient Reported? Taking?    Ascorbic Acid (vitamin C) 1000 MG tablet 11/4/2022 at Unknown time Self Yes Yes   Sig: Take 2,000 mg by mouth daily   Cholecalciferol (Vitamin D3) 50 MCG (2000 UT) TABS 11/4/2022 at Unknown time Self Yes Yes   Sig: Take 2,000 Units by mouth daily   acetaminophen (TYLENOL) 500 mg tablet 11/4/2022 at Unknown time Self Yes Yes   Sig: Take 500 mg by mouth every 6 (six) hours as needed for mild pain   atorvastatin (LIPITOR) 40 mg tablet 11/4/2022 at Unknown time Self No Yes   Sig: Take 1 tablet (40 mg total) by mouth daily   lisinopril (ZESTRIL) 10 mg tablet 11/4/2022 at Unknown time Self No Yes   Sig: Take 1 tablet (10 mg total) by mouth daily   metFORMIN (GLUCOPHAGE) 1000 MG tablet 11/4/2022 at Unknown time Self No Yes   Sig: Take 1 tablet (1,000 mg total) by mouth 2 (two) times a day with meals   metoprolol tartrate (LOPRESSOR) 25 mg tablet Not Taking at Unknown time Self No No   Sig: Take 0 5 tablets (12 5 mg total) by mouth every 12 (twelve) hours   Patient not taking: Reported on 11/4/2022   multivitamin (THERAGRAN) TABS 11/4/2022 at Unknown time Self Yes Yes   Sig: Take 1 tablet by mouth daily   omeprazole (PriLOSEC) 40 MG capsule 11/4/2022 at Unknown time Self No Yes   Sig: Take 1 capsule (40 mg total) by mouth daily   ondansetron (ZOFRAN) 4 mg tablet Not Taking at Unknown time Self No No   Sig: Take 1 tablet (4 mg total) by mouth every 8 (eight) hours as needed for nausea or vomiting   Patient not taking: Reported on 11/4/2022   potassium citrate (UROCIT-K) 10 mEq 11/4/2022 at Unknown time Self No Yes   Sig: Take 1 tablet (10 mEq total) by mouth 3 (three) times a day with meals   sertraline (ZOLOFT) 50 mg tablet 11/4/2022 at Unknown time Self No Yes   Sig: Take 1 tablet (50 mg total) by mouth daily Takes in the am       Facility-Administered Medications: None       Past Medical History:   Diagnosis Date   • Anxiety    • Asthma    • Colon polyp    • Depression    • Diabetes mellitus ([facility] [address] )    • Diverticulitis    • Diverticulitis of colon    • Follicular cyst of ovary 12/23/2014   • Former smoker    • GERD (gastroesophageal reflux disease)    • Glycosuria    • Headache    • Hyperlipidemia    • Hypertension    • Kidney calculi 1/11/2019   • Kidney stone    • Obesity    • Ovarian cyst    • Overactive bladder    • Overweight    • Pulmonary nodule    • Renal calculus    • Seasonal allergies    • Tinea pedis of left foot 6/8/2020   • Vertigo    • Wears partial dentures     Lower plate -partial       Past Surgical History:   Procedure Laterality Date   • APPENDECTOMY     • BLADDER SUSPENSION      [facility]   • CERVICAL BIOPSY  W/ LOOP ELECTRODE EXCISION     • COLONOSCOPY     • DILATION AND CURETTAGE OF UTERUS     • FL RETROGRADE PYELOGRAM  7/26/2021   • FL RETROGRADE PYELOGRAM  11/18/2021   • HYSTERECTOMY  2007    ovaries present bilaterally   • JOINT REPLACEMENT Right 05/2021    right knee   • LA COLONOSCOPY FLX DX W/COLLJ SPEC WHEN PFRMD N/A 9/26/2017    Procedure: EGD AND COLONOSCOPY;  Surgeon: Eder Rogers MD;  Location: [facility];   Service: Gastroenterology   • LA CYSTO/URETERO W/LITHOTRIPSY &INDWELL STENT INSRT Bilateral 7/26/2021    Procedure: CYSTOSCOPY URETEROSCOPY WITH LITHOTRIPSY HOLMIUM LASER, RETROGRADE PYELOGRAM AND INSERTION STENT URETERAL;  Surgeon: Siri Chaney MD;  [facility]: [facility];  Service: Urology   • SD CYSTO/URETERO W/LITHOTRIPSY &INDWELL STENT INSRT Left 9/16/2021    Procedure: CYSTOSCOPY URETEROSCOPY WITH LITHOTRIPSY HOLMIUM LASER, RETROGRADE PYELOGRAM AND INSERTION STENT URETERAL;  Surgeon: Bianca Washington MD;  Location: [facility];  Service: Urology   • SD CYSTO/URETERO W/LITHOTRIPSY &INDWELL STENT INSRT Left 11/18/2021    Procedure: CYSTOSCOPY URETEROSCOPY WITH LITHOTRIPSY HOLMIUM LASER, RETROGRADE PYELOGRAM AND INSERTION STENT URETERAL;  Surgeon: Prema Gage MD;  [facility];  Service: Urology   • SD REVISE MEDIAN N/CARPAL TUNNEL SURG Right 5/29/2018    Procedure: CARPAL TUNNEL RELEASE;  Surgeon: Vivek Galan DO;  Location: [facility];  Service: Orthopedics   • SD TOTAL KNEE ARTHROPLASTY Right 5/19/2021    Procedure: ARTHROPLASTY KNEE TOTAL;  Surgeon: Daisha Cook MD;  Location: [facility];  Service: Orthopedics   • REMOVAL URETERAL STENT Right 9/16/2021    Procedure: REMOVAL STENT URETERAL;  Surgeon: Bianca Washington MD;  [facility];  Service: Urology   • TONSILLECTOMY     • TUBAL LIGATION     • URETEROSCOPY  7/26/2021    Procedure: URETEROSCOPY, LASER AND BASKET STONE EXTRACTION;  Surgeon: Siri Chaney MD;  Location: [facility];  Service: Urology   • WISDOM TOOTH EXTRACTION         Family History   Problem Relation Age of Onset   • Diabetes type II Mother    • Asthma Mother    • Stroke Father    • Pancreatic cancer Father 68   • Diabetes type II Father    • No Known Problems Maternal Grandmother    • No Known Problems Maternal Grandfather    • No Known Problems Paternal Grandmother    • No Known Problems Paternal Grandfather    • No Known Problems Sister    • No Known Problems Sister    • No Known Problems Sister    • No Known Problems Sister    • No Known Problems Sister    • No Known Problems Maternal Aunt    • No Known Problems Paternal Aunt      I have reviewed and agree with the history as documented  E-Cigarette/Vaping   • E-Cigarette Use Never User    • Comments Denies      E-Cigarette/Vaping Substances   • Nicotine No    • THC No    • CBD No    • Flavoring No      Social History     Tobacco Use   • Smoking status: Former Smoker     Packs/day: 0 25     Years: 20 00     Pack years: 5 00     Types: Cigarettes     Start date:      Quit date: 3/16/2018     Years since quittin 6   • Smokeless tobacco: Never Used   • Tobacco comment: approx less than  half a pack   Vaping Use   • Vaping Use: Never used   Substance Use Topics   • Alcohol use: Not Currently     Comment: socially   • Drug use: No     Comment: Denies        Review of Systems   Constitutional: Positive for fatigue and fever  Negative for chills  HENT: Negative for congestion, rhinorrhea and sore throat  Respiratory: Negative for cough, chest tightness and shortness of breath  Cardiovascular: Negative for chest pain and leg swelling  Gastrointestinal: Positive for diarrhea  Negative for abdominal distention, abdominal pain, nausea and vomiting  Genitourinary: Negative for difficulty urinating and dysuria  Musculoskeletal: Negative for arthralgias, back pain and myalgias  Skin: Negative for rash and wound  Neurological: Negative for dizziness, weakness and headaches  All other systems reviewed and are negative  Physical Exam  ED Triage Vitals [22 1331]   Temperature Pulse Respirations Blood Pressure SpO2   98 2 °F (36 8 °C) 98 18 111/58 96 %      Temp Source Heart Rate Source Patient Position - Orthostatic VS BP Location FiO2 (%)   Oral Monitor -- -- --      Pain Score       No Pain             Orthostatic Vital Signs  Vitals:    22 1331 22 1530 22 1600 22 1645   BP: 111/58 100/58 105/56 101/55   Pulse: 98 92 94 92       Physical Exam  Vitals reviewed     Constitutional:       Appearance: Normal appearance  HENT:      Head: Normocephalic and atraumatic  Nose: Nose normal       Mouth/Throat:      Mouth: Mucous membranes are moist       Pharynx: Oropharynx is clear  Eyes:      Extraocular Movements: Extraocular movements intact  Conjunctiva/sclera: Conjunctivae normal    Cardiovascular:      Rate and Rhythm: Normal rate and regular rhythm  Pulses: Normal pulses  Heart sounds: Normal heart sounds  Pulmonary:      Effort: Pulmonary effort is normal       Breath sounds: Normal breath sounds  Abdominal:      General: Bowel sounds are normal       Palpations: Abdomen is soft  Tenderness: There is no abdominal tenderness  Musculoskeletal:         General: Normal range of motion  Cervical back: Normal range of motion  Skin:     General: Skin is warm and dry  Neurological:      General: No focal deficit present  Mental Status: She is alert and oriented to person, place, and time  Mental status is at baseline  ED Medications  Medications   sodium chloride 0 9 % bolus 1,000 mL (1,000 mL Intravenous New Bag 11/4/22 1527)       Diagnostic Studies  Results Reviewed     Procedure Component Value Units Date/Time    Calprotectin,Fecal [[id number]]     Lab Status: No result Specimen: Stool     Fecal leukocytes [[id number]]     Lab Status: No result Specimen: Stool     Urine Microscopic [[id number]]  (Abnormal) Collected: 11/04/22 1620    Lab Status: Final result Specimen: Urine, Other Updated: 11/04/22 1714     RBC, UA 4-10 /hpf      WBC, UA Innumerable /hpf      Epithelial Cells Occasional /hpf      Bacteria, UA Moderate /hpf      MUCUS THREADS Occasional     Amorphous Crystals, UA Occasional     WBC Clumps Present    Urine culture [[id number]] Collected: 11/04/22 1620    Lab Status:  In process Specimen: Urine, Other Updated: 11/04/22 1713    Urine Macroscopic, POC [[id number]]  (Abnormal) Collected: 11/04/22 1620    Lab Status: Final result Specimen: Urine Updated: 11/04/22 1622     Color, UA Rosa     Clarity, UA Slightly Cloudy     pH, UA 5 5     Leukocytes, UA Small     Nitrite, UA Negative     Protein, UA Negative mg/dl      Glucose, UA Negative mg/dl      Ketones, UA Negative mg/dl      Urobilinogen, UA 0 2 E U /dl      Bilirubin, UA Negative     Occult Blood, UA Large     Specific [place], UA 1 015    Narrative:      CLINITEK RESULT    FLU/RSV/COVID - if FLU/RSV clinically relevant [[id number]]  (Normal) Collected: 11/04/22 1530    Lab Status: Final result Specimen: Nares from Nose Updated: 11/04/22 1619     SARS-CoV-2 Negative     INFLUENZA A PCR Negative     INFLUENZA B PCR Negative     RSV PCR Negative    Narrative:      FOR PEDIATRIC PATIENTS - copy/paste COVID Guidelines URL to browser: [email or web address]  [employer]    SARS-CoV-2 assay is a Nucleic Acid Amplification assay intended for the  qualitative detection of nucleic acid from SARS-CoV-2 in nasopharyngeal  swabs  Results are for the presumptive identification of SARS-CoV-2 RNA  Positive results are indicative of infection with SARS-CoV-2, the virus  causing COVID-19, but do not rule out bacterial infection or co-infection  with other viruses  Laboratories within the [place] and its  territories are required to report all positive results to the appropriate  public health authorities  Negative results do not preclude SARS-CoV-2  infection and should not be used as the sole basis for treatment or other  patient management decisions  Negative results must be combined with  clinical observations, patient history, and epidemiological information  This test has not been FDA cleared or approved  This test has been authorized by FDA under an Emergency Use Authorization  (EUA)   This test is only authorized for the duration of time the  declaration that circumstances exist justifying the authorization of the  emergency use of an in vitro diagnostic tests for detection of SARS-CoV-2  virus and/or diagnosis of COVID-19 infection under section 564(b)(1) of  the Act, 21 U  S C  923UUF-7(Z)(6), unless the authorization is terminated  or revoked sooner  The test has been validated but independent review by FDA  and CLIA is pending  Test performed using [employer] GeneXpert: This RT-PCR assay targets N2,  a region unique to SARS-CoV-2  A conserved region in the E-gene was chosen  for pan-Sarbecovirus detection which includes SARS-CoV-2  According to CMS-2020-01-R, this platform meets the definition of high-[employer] of [place] technology  Manual Differential(PHLEBS Do Not Order) [[id number]]  (Abnormal) Collected: 11/04/22 1530    Lab Status: Final result Specimen: Blood from Arm, Right Updated: 11/04/22 1607     Segmented % 62 %      Bands % 19 %      Lymphocytes % 6 %      Monocytes % 10 %      Eosinophils, % 1 %      Basophils % 0 %      Atypical Lymphocytes % 1 %      Plasma Cells % 1 %      Absolute Neutrophils 13 04 Thousand/uL      Lymphocytes Absolute 0 97 Thousand/uL      Monocytes Absolute 1 61 Thousand/uL      Eosinophils Absolute 0 16 Thousand/uL      Basophils Absolute 0 00 Thousand/uL      Total Counted --     RBC Morphology Present     Anisocytosis Present     Macrocytes Present     Polychromasia Present     Platelet Estimate Adequate     Clumped Platelets Present     Giant PLTs Present    CBC and differential [[id number]]  (Abnormal) Collected: 11/04/22 1530    Lab Status: Final result Specimen: Blood from Arm, Right Updated: 11/04/22 1607     WBC 16 10 Thousand/uL      RBC 4 08 Million/uL      Hemoglobin 12 2 g/dL      Hematocrit 36 6 %      MCV 90 fL      MCH 29 9 pg      MCHC 33 3 g/dL      RDW 15 3 %      MPV 10 5 fL      Platelets 343 Thousands/uL     Narrative: This is an appended report  These results have been appended to a previously verified report      Comprehensive metabolic panel [[id number]]  (Abnormal) Collected: 11/04/22 1530    Lab Status: Final result Specimen: Blood from Arm, Right Updated: 11/04/22 1603     Sodium 135 mmol/L      Potassium 4 8 mmol/L      Chloride 105 mmol/L      CO2 22 mmol/L      ANION GAP 8 mmol/L      BUN 55 mg/dL      Creatinine 1 85 mg/dL      Glucose 120 mg/dL      Calcium 10 0 mg/dL      Corrected Calcium 11 3 mg/dL      AST 88 U/L       U/L      Alkaline Phosphatase 413 U/L      Total Protein 8 2 g/dL      Albumin 2 4 g/dL      Total Bilirubin 1 05 mg/dL      eGFR 29 ml/min/1 73sq m     Narrative:      National Kidney Disease Foundation guidelines for Chronic Kidney Disease (CKD):   •  Stage 1 with normal or high GFR (GFR > 90 mL/min/1 73 square meters)  •  Stage 2 Mild CKD (GFR = 60-89 mL/min/1 73 square meters)  •  Stage 3A Moderate CKD (GFR = 45-59 mL/min/1 73 square meters)  •  Stage 3B Moderate CKD (GFR = 30-44 mL/min/1 73 square meters)  •  Stage 4 Severe CKD (GFR = 15-29 mL/min/1 73 square meters)  •  Stage 5 End Stage CKD (GFR <15 mL/min/1 73 square meters)  Note: GFR calculation is accurate only with a steady state creatinine                 No orders to display         Procedures  Procedures      ED Course                                       MDM  Number of Diagnoses or Management Options  TOMMY (acute kidney injury) ([facility] )  Diarrhea  Diagnosis management comments: 61 y/o female patient presenting with diarrhea and fever  Denies abd pain and fever  Patient was seen by PCP and had elevated creatinine, likely due to dehydration  Exam WNL, dry oral mucosa  No abd tenderness  Labs show leukocytosis and elevated creatinine  Stool studies from PCP pending  Admitted to medicine for TOMMY, diarrheal illness          Amount and/or Complexity of Data Reviewed  Clinical lab tests: ordered and reviewed        Disposition  Final diagnoses:   Diarrhea   TOMMY (acute kidney injury) ([facility] )     Time reflects when diagnosis was documented in both MDM as applicable and the Disposition within this note     Time User Action Codes Description Comment    11/4/2022  5:12 PM Genny PRITCHARD [facility] Add [R19 7] Diarrhea     11/4/2022  5:12 PM Amrik Manjarrez Add [N17 9] TOMMY (acute kidney injury) [facility])       ED Disposition     ED Disposition   Admit    Condition   Stable    Date/Time   Fri Nov 4, 2022  5:12 PM    Comment   Case was discussed with Dr Hemanth Poole and the patient's admission status was agreed to be Admission Status: observation status to the service of Dr Lizzy Castle   Follow-up Information    None         Patient's Medications   Discharge Prescriptions    No medications on file     No discharge procedures on file  PDMP Review       Value Time User    PDMP Reviewed  Yes 9/16/2021 10:12 AM Suri Muse MD           ED Provider  Attending physically available and evaluated Olayinka Tellez  ANGELY managed the patient along with the ED Attending      Electronically Signed by         Tiffany Soto MD  11/04/22 0603

## 2022-11-04 NOTE — ASSESSMENT & PLAN NOTE
- Diarrhea x 5d, NBNB  Assoc'd w/ fever, decreased appetite  Notes some BRBPR however may likely be from injury via constant wiping  - Prior to diarrhea felt feverish and "ill"  However no longer w/ fever x2d   - Has "never gone to the bathroom this much in my life", states that at times she had incontinence/had diarrhea before being able to get to toilet  - h/o T2DM on MFN, takes 1000 mg total, been on this med for many years  - h/o GERD on Prilosec 40 qd; h/o Diverticulitis; current illness likely not 2/2 diverticulitis given lack of abdominal pain and bloody stool  - Dehydrated per physical exam and labs  - Inflammatory vs Osmotic diarrhea, difficult to reach conclusion at this point  Will need to f/u stool cdiff and O/P  Leukocytosis w/ band forms on CBC  No sick contacts, eating exotic/spoiled/raw foods, unsafe drinking water  - Currently w/o metabolic derangements, will need to follow BMP  - Currently normoTN, however in lln  Watch for HoTN  - ? Hepatitis A, in context of elevated transaminase     Plan:    1) Continue to hydrate w/ NS @ 100 cc/hr  Patient feels hungry and plans to breakfast this a m     Will follow patient's intake throughout the day  If intake adequate, can discontinue fluids  2) C   Diff negative, Stool enteric panel negative  3) C/w prilosec, zofran  4) Monitor HR, BP  5) F/U Calprotectin, Fecal Leukocyte  6) F/U Hepatitis panel

## 2022-11-04 NOTE — ASSESSMENT & PLAN NOTE
- Cr OP at 2 4, currently at 1 9  - No urinary sx's including no poly/oliguria  - No blood/clots/fat in urine  - H/o Kidney stones, however no w/o CVA tenderness  - U/A with Large blood, inummerable WBC, mod bacteria  - Likely 2/2 Dehydration, however must r/o UTI    Plan:    1) c/w IVF  Creatinine improved to 1 37 from 1 85    2) F/U UClx  3) Hold Lisinopril/Lopressor and other nephrotoxic agents  4) Avoid HoTN

## 2022-11-04 NOTE — TELEPHONE ENCOUNTER
Called to follow up with patient  She is afebrile but continues with diarrhea, denies any abdominal pain  I discussed with her labs show significant kidney injury and advised her she will need IV fludis and likely admission  Her WBC is also elevated > 14  She did return stool studies which are in process  Pt is agreeable to go to [facility] ED

## 2022-11-04 NOTE — H&P
INTERNAL MEDICINE RESIDENCY ADMISSION H&P     Name: Nadira Grier   Age & Sex: 62 y o  female   MRN: [id number]  Unit/Bed#: ED 25   Encounter: [id number]  Primary Care Provider: LEONARD Carranza    Code Status: Prior  Admission Status: INPATIENT   Disposition: Patient requires Med/Surg    Admit to team: SOD Team C     ASSESSMENT/PLAN     Principal Problem:    Protracted diarrhea  Active Problems:    GERD (gastroesophageal reflux disease)    Type 2 diabetes mellitus with diabetic chronic kidney disease ([place] [address] )    Hypertension    CKD (chronic kidney disease) stage 2, GFR 60-89 ml/min    Type 2 diabetes mellitus with microalbuminuria ([place] [address] )    TOMMY (acute kidney injury) ([place] [address] )    Transaminitis      Transaminitis  Assessment & Plan  - AST 85, ,  with TBr 1 1  - No abdominal pain, no jaundice  - Unrelenting Diarrhea x 5d of unknown origin; assoc'd with fever and myalgia  - May be related to Hepatitis A in context of diarrhea and fever w/ myalgia and poor appetite  - Possible NEWELL? Given Metabolic syndrome / obesity and h/o T2DM    Plan:    1) F/U Hepatitis panel  2) Monitor LFTs      TOMMY (acute kidney injury) ([place] [address] )  Assessment & Plan  - Cr OP at 2 4, currently at 1 9  - No urinary sx's including no poly/oliguria  - No blood/clots/fat in urine  - H/o Kidney stones, however no w/o CVA tenderness  - U/A with Large blood, inummerable WBC, mod bacteria  - Likely 2/2 Dehydration, however must r/o UTI    Plan:    1) c/w IVF  2) F/U UClx  3) Hold Lisinopril/Lopressor and other nephrotoxic agents  4) Avoid HoTN    Type 2 diabetes mellitus with microalbuminuria ([place] [address] )  Assessment & Plan  Lab Results   Component Value Date    HGBA1C 6 3 05/04/2022       No results for input(s): POCGLU in the last 72 hours  Blood Sugar Average: Last 72 hrs:    - On MFN at home   Diarrhea likely not 2/2 medication issue, pt w long-term use of MFN  - Most recent A1c at 6 3  - No need for IP insulin at the moment, consider SSi      Hypertension  Assessment & Plan  - Currently on Lisinopril and Lopressor at home  - Also on Lipitor     Plan:    1) Hold home antiHTN b/c/o TOMMY    * Protracted diarrhea  Assessment & Plan  - Diarrhea x 5d, NBNB  Assoc'd w/ fever, decreased appetite  Notes some BRBPR however may likely be from injury via constant wiping  - Prior to diarrhea felt feverish and "ill"  However no longer w/ fever x2d   - Has "never gone to the bathroom this much in my life", states that at times she had incontinence/had diarrhea before being able to get to toilet  - h/o T2DM on MFN, takes 1000 mg total, been on this med for many years  - h/o GERD on Prilosec 40 qd; h/o Diverticulitis; current illness likely not 2/2 diverticulitis given lack of abdominal pain and bloody stool  - Dehydrated per physical exam and labs  - Inflammatory vs Osmotic diarrhea, difficult to reach conclusion at this point  Will need to f/u stool cdiff and O/P  Leukocytosis w/ band forms on CBC  No sick contacts, eating exotic/spoiled/raw foods, unsafe drinking water  - Currently w/o metabolic derangements, will need to follow BMP  - Currently normoTN, however in lln  Watch for HoTN  - ? Hepatitis A, in context of elevated transaminase     Plan:    1) Continue to hydrate w/ NS @ 100 cc/hr  2) F/U Cdiff panel, Stool O/P  3) C/w prilosec, zofran  4) Monitor HR, BP  5) F/U Calprotectin, Fecal Leukocyte  6) F/U Hepatitis panel      VTE Pharmacologic Prophylaxis: Reason for no pharmacologic prophylaxis VTE Risk = 2  VTE Mechanical Prophylaxis: sequential compression device    CHIEF COMPLAINT     Chief Complaint   Patient presents with   • Diarrhea     Diarrhea X 1 week, sent by PCP after abnormal kidney labs    Loss of appetite, fevers at home and feeling dehydrated      HISTORY OF PRESENT ILLNESS     Miss Juan Bernabe is a 61 y/o woman with h/o CKD2, T2DM, diverticulitis, asthma, GERD on Prilosec, HTN on Lisinopril/Lopressor, HLD on Lipitor, anxiety/depression on zoloft who comes here with 5 days of NBNB diarrhea assoc'd w/ fever and lack of appetitie  She was in her usual state of health until she began to feel "ill" and had a fever  This was quickly f/b unrelenting diarrhea, along with myalgias  She says that she has "never gone to the bathroom this much in my life", and has had moments when she had diarrhea before being able to make it to the toilet  She went to urgent care 3 days prior to admission who tested her for COVID  Her COVID test was negative, and she was started on Zofran with recommendation to f/u with her PCP  Yesterday, she decided to reach out to her PCP as she was still concerned with the amount of diarrhea she was having  She was told she likely had an infection, and labs were drawn to investigate potential etiology of current illness  At this time, she had Cr of 2 4 and WBC 14k  She was told by her PCP to visit the ED of this hospital for concerns of TOMMY in the setting of dehydration 2/2 diarrhea  In the ED her VS were wnl, with sBP in the 100-110s  Labs showed WBC 16 w/ band forms, Cr 1 85, AST 88, ,   U/A was collected and showed large blood with slight LE, negative nitrites, innumerable WBC, and moderate bacteria  Stool was collected for Cdiff and O+P testing  She was given 1L push of NS x 2, and admitted to the Internal Medicine service  On evaluation, Miss Salomón Hong was seen Mike Ask in bed on her side  She had no c/o abdominal pain, CP, SOB, fever/chills, CVA tenderness, myalgias, HA, fatigue  Also is w/o any N/V  She says that she has been going to the bathroom less frequently since arriving to the ED  Her fever has resolved "for the past 2 days", along with the associated myalgia and ill feelings  She denies eating any exotic foods, spoiled/raw meat or dairy products, drinking unsafe drinking water, recent travel, or sick contacts  She works as a  and lives alone in [place]   She denies any EtOH, tobacco, or illicit drug use  She has not started any new medications over the past 3 months  She notes that she has had diverticulitis and kidney stones in the past, but does not believe her current sx's are attributable/similar to either  REVIEW OF SYSTEMS     Review of Systems   Constitutional: Positive for appetite change  Negative for activity change, chills, diaphoresis, fever and unexpected weight change  HENT: Negative for congestion, postnasal drip, rhinorrhea, sinus pain, sneezing, sore throat and tinnitus  Eyes: Negative for photophobia and visual disturbance  Respiratory: Negative for cough, chest tightness and shortness of breath  Cardiovascular: Negative for chest pain, palpitations and leg swelling  Gastrointestinal: Positive for diarrhea  Negative for abdominal distention, abdominal pain, anal bleeding, blood in stool, constipation, nausea and vomiting  Endocrine: Negative for polydipsia and polyuria  Genitourinary: Negative for difficulty urinating, dysuria, frequency, hematuria, urgency, vaginal discharge and vaginal pain  Musculoskeletal: Negative for arthralgias, joint swelling and myalgias  Skin: Negative for color change and pallor  Allergic/Immunologic: Negative for environmental allergies and food allergies  Neurological: Negative for dizziness, tremors, syncope, weakness, numbness and headaches  Hematological: Negative for adenopathy  Does not bruise/bleed easily  OBJECTIVE     Vitals:    22 1645 22 1701 22 1801 22 1830   BP: 101/55 95/50 100/57 98/57   BP Location:    Right arm   Pulse: 92 86 88 92   Resp: 18   18   Temp:       TempSrc:       SpO2: 96% 97% 97% 95%      Temperature:   Temp (24hrs), Av 2 °F (36 8 °C), Min:98 2 °F (36 8 °C), Max:98 2 °F (36 8 °C)    Temperature: 98 2 °F (36 8 °C)  Intake & Output:  I/O     None        Weights: There is no height or weight on file to calculate BMI    Weight (last 2 days)     None Physical Exam  Constitutional:       General: She is not in acute distress  Appearance: Normal appearance  She is not ill-appearing  HENT:      Nose: No congestion or rhinorrhea  Mouth/Throat:      Mouth: Mucous membranes are moist       Pharynx: Oropharynx is clear  No oropharyngeal exudate or posterior oropharyngeal erythema  Eyes:      General: No scleral icterus  Conjunctiva/sclera: Conjunctivae normal    Cardiovascular:      Rate and Rhythm: Normal rate and regular rhythm  Pulses: Normal pulses  Heart sounds: No murmur heard  No friction rub  No gallop  Pulmonary:      Effort: Pulmonary effort is normal  No respiratory distress  Breath sounds: No wheezing or rhonchi  Abdominal:      General: Abdomen is flat  Palpations: Abdomen is soft  Tenderness: There is no abdominal tenderness  There is no guarding or rebound  Musculoskeletal:         General: No swelling or tenderness  Right lower leg: No edema  Left lower leg: No edema  Skin:     General: Skin is warm and dry  Capillary Refill: Capillary refill takes less than 2 seconds  Coloration: Skin is not jaundiced or pale  Neurological:      Mental Status: She is alert         PAST MEDICAL HISTORY     Past Medical History:   Diagnosis Date   • Anxiety    • Asthma    • Colon polyp    • Depression    • Diabetes mellitus ([facility] [address] )    • Diverticulitis    • Diverticulitis of colon    • Follicular cyst of ovary 12/23/2014   • Former smoker    • GERD (gastroesophageal reflux disease)    • Glycosuria    • Headache    • Hyperlipidemia    • Hypertension    • Kidney calculi 1/11/2019   • Kidney stone    • Obesity    • Ovarian cyst    • Overactive bladder    • Overweight    • Pulmonary nodule    • Renal calculus    • Seasonal allergies    • Tinea pedis of left foot 6/8/2020   • Vertigo    • Wears partial dentures     Lower plate -partial     PAST SURGICAL HISTORY     Past Surgical History:   Procedure Laterality Date   • APPENDECTOMY     • BLADDER SUSPENSION      [facility]   • CERVICAL BIOPSY  W/ LOOP ELECTRODE EXCISION     • COLONOSCOPY     • DILATION AND CURETTAGE OF UTERUS     • FL RETROGRADE PYELOGRAM  7/26/2021   • FL RETROGRADE PYELOGRAM  11/18/2021   • HYSTERECTOMY  2007    ovaries present bilaterally   • JOINT REPLACEMENT Right 05/2021    right knee   • SC COLONOSCOPY FLX DX W/COLLJ SPEC WHEN PFRMD N/A 9/26/2017    Procedure: EGD AND COLONOSCOPY;  Surgeon: Jac Zaragoza MD;  Location: [facility];   Service: Gastroenterology   • SC CYSTO/URETERO W/LITHOTRIPSY &INDWELL STENT INSRT Bilateral 7/26/2021    Procedure: CYSTOSCOPY URETEROSCOPY WITH LITHOTRIPSY HOLMIUM LASER, RETROGRADE PYELOGRAM AND INSERTION STENT URETERAL;  Surgeon: Glo Killian MD;  [facility];  Service: Urology   • SC CYSTO/URETERO W/LITHOTRIPSY &INDWELL STENT INSRT Left 9/16/2021    Procedure: CYSTOSCOPY URETEROSCOPY WITH LITHOTRIPSY HOLMIUM LASER, RETROGRADE PYELOGRAM AND INSERTION STENT URETERAL;  Surgeon: Low Love MD;  Location[facility];  Service: Urology   • SC CYSTO/URETERO W/LITHOTRIPSY &INDWELL STENT INSRT Left 11/18/2021    Procedure: CYSTOSCOPY URETEROSCOPY WITH LITHOTRIPSY HOLMIUM LASER, RETROGRADE PYELOGRAM AND INSERTION STENT URETERAL;  Surgeon: Nadia De Leon MD;  [facility];  Service: Urology   • SC REVISE MEDIAN N/CARPAL TUNNEL SURG Right 5/29/2018    Procedure: CARPAL TUNNEL RELEASE;  Surgeon: Padma Erickson DO;  Location[facility];  Service: Orthopedics   • SC TOTAL KNEE ARTHROPLASTY Right 5/19/2021    Procedure: ARTHROPLASTY KNEE TOTAL;  Surgeon: Pieter Walls MD;  [facility];  Service: Orthopedics   • REMOVAL URETERAL STENT Right 9/16/2021    Procedure: REMOVAL STENT URETERAL;  Surgeon: Low Love MD;  [facility];  Service: Urology   • TONSILLECTOMY     • TUBAL LIGATION     • URETEROSCOPY  7/26/2021    Procedure: URETEROSCOPY, LASER AND BASKET STONE EXTRACTION;  Surgeon: Lisa Mills MD;  [facility];  Service: Urology   • WISDOM TOOTH EXTRACTION       SOCIAL & FAMILY HISTORY     Social History     Substance and Sexual Activity   Alcohol Use Not Currently    Comment: socially       Social History     Substance and Sexual Activity   Drug Use No    Comment: Denies     Social History     Tobacco Use   Smoking Status Former Smoker   • Packs/day: 0 25   • Years: 20 00   • Pack years: 5 00   • Types: Cigarettes   • Start date: 46   • Quit date: 3/16/2018   • Years since quittin 6   Smokeless Tobacco Never Used   Tobacco Comment    approx less than  half a pack     Family History   Problem Relation Age of Onset   • Diabetes type II Mother    • Asthma Mother    • Stroke Father    • Pancreatic cancer Father 68   • Diabetes type II Father    • No Known Problems Maternal Grandmother    • No Known Problems Maternal Grandfather    • No Known Problems Paternal Grandmother    • No Known Problems Paternal Grandfather    • No Known Problems Sister    • No Known Problems Sister    • No Known Problems Sister    • No Known Problems Sister    • No Known Problems Sister    • No Known Problems Maternal Aunt    • No Known Problems Paternal Aunt      LABORATORY DATA     Labs: I have personally reviewed pertinent reports      Results from last 7 days   Lab Units 22  1530 22  1018   WBC Thousand/uL 16 10* 14 14*   HEMOGLOBIN g/dL 12 2 11 8   HEMATOCRIT % 36 6 36 8   PLATELETS Thousands/uL 350 236   NEUTROS PCT %  --  78*   MONOS PCT %  --  11   MONO PCT % 10  --       Results from last 7 days   Lab Units 22  1530 22  1018   POTASSIUM mmol/L 4 8 4 4   CHLORIDE mmol/L 105 101   CO2 mmol/L 22 23   BUN mg/dL 55* 63*   CREATININE mg/dL 1 85* 2 44*   CALCIUM mg/dL 10 0 9 5   ALK PHOS U/L 413* 255*   ALT U/L 125* 63   AST U/L 88* 45                          Micro:  Lab Results   Component Value Date    URINECX 60,000-69,000 cfu/ml  2021 URINECX <10,000 cfu/ml Pseudomonas species (A) 09/20/2021    URINECX <10,000 cfu/ml  09/08/2021    URINECX No Growth <1000 cfu/mL 09/08/2021     IMAGING & DIAGNOSTIC TESTS     Imaging: I have personally reviewed pertinent reports  No results found  EKG, Pathology, and Other Studies: I have personally reviewed pertinent reports  ALLERGIES     Allergies   Allergen Reactions   • Oxybutynin Anaphylaxis     "feels like throat closing and can't swallow"   • Clonazepam Other (See Comments)     Pt states "didn't feel right on it "   • Morphine Other (See Comments) and Confusion     Annotation - [id number]: "dopey"  Gets silly   • Venlafaxine Other (See Comments)     Unknown--pt states " MD was trying pt on different medications  Pt unsure of reaction "     MEDICATIONS PRIOR TO ARRIVAL     Prior to Admission medications    Medication Sig Start Date End Date Taking?  Authorizing Provider   acetaminophen (TYLENOL) 500 mg tablet Take 500 mg by mouth every 6 (six) hours as needed for mild pain   Yes Historical Provider, MD   Ascorbic Acid (vitamin C) 1000 MG tablet Take 2,000 mg by mouth daily   Yes Historical Provider, MD   atorvastatin (LIPITOR) 40 mg tablet Take 1 tablet (40 mg total) by mouth daily 5/4/22  Yes LEONARD Pride   Cholecalciferol (Vitamin D3) 50 MCG (2000 UT) TABS Take 2,000 Units by mouth daily   Yes Historical Provider, MD   lisinopril (ZESTRIL) 10 mg tablet Take 1 tablet (10 mg total) by mouth daily 5/4/22  Yes LEONARD Pride   metFORMIN (GLUCOPHAGE) 1000 MG tablet Take 1 tablet (1,000 mg total) by mouth 2 (two) times a day with meals 5/4/22  Yes LEONARD Pride   multivitamin [facility]) TABS Take 1 tablet by mouth daily   Yes Historical Provider, MD   omeprazole (PriLOSEC) 40 MG capsule Take 1 capsule (40 mg total) by mouth daily 8/16/22  Yes LEONARD Pride   potassium citrate (UROCIT-K) 10 mEq Take 1 tablet (10 mEq total) by mouth 3 (three) times a day with meals 7/14/22  Yes LEONARD Griffin   sertraline (ZOLOFT) 50 mg tablet Take 1 tablet (50 mg total) by mouth daily Takes in the am  5/4/22  Yes LEONARD Thompson   metoprolol tartrate (LOPRESSOR) 25 mg tablet Take 0 5 tablets (12 5 mg total) by mouth every 12 (twelve) hours  Patient not taking: Reported on 11/4/2022 9/29/21   LEONARD Thompson   ondansetron [facility] 4 mg tablet Take 1 tablet (4 mg total) by mouth every 8 (eight) hours as needed for nausea or vomiting  Patient not taking: Reported on 11/4/2022 10/31/22   Prema Medina MD     MEDICATIONS ADMINISTERED IN LAST 24 HOURS     Medication Administration - last 24 hours from 11/03/2022 1925 to 11/04/2022 1925       Date/Time Order Dose Route Action Action by     11/04/2022 1627 sodium chloride 0 9 % bolus 1,000 mL 0 mL Intravenous Stopped Angi Schwartz RN     11/04/2022 1527 sodium chloride 0 9 % bolus 1,000 mL 1,000 mL Intravenous New Bag Angi Schwartz RN        CURRENT MEDICATIONS     No current facility-administered medications for this encounter  Admission Time  I spent 45 minutes admitting the patient  This involved direct patient contact where I performed a full history and physical, reviewing previous records, and reviewing laboratory and other diagnostic studies  Portions of the record may have been created with voice recognition software  Occasional wrong word or "sound a like" substitutions may have occurred due to the inherent limitations of voice recognition software  Read the chart carefully and recognize, using context, where substitutions have occurred      ==  [facility]  Internal Medicine Residency PGY-1

## 2022-11-04 NOTE — ASSESSMENT & PLAN NOTE
Lab Results   Component Value Date    HGBA1C 6 3 05/04/2022       No results for input(s): POCGLU in the last 72 hours  Blood Sugar Average: Last 72 hrs:    - On MFN at home   Diarrhea likely not 2/2 medication issue, pt w long-term use of MFN  - Most recent A1c at 6 3  - No need for IP insulin at the moment, consider SSi

## 2022-11-05 LAB
ALBUMIN SERPL BCP-MCNC: 1.9 G/DL (ref 3.5–5)
ALP SERPL-CCNC: 324 U/L (ref 46–116)
ALT SERPL W P-5'-P-CCNC: 103 U/L (ref 12–78)
ANION GAP SERPL CALCULATED.3IONS-SCNC: 6 MMOL/L (ref 4–13)
AST SERPL W P-5'-P-CCNC: 55 U/L (ref 5–45)
BASOPHILS # BLD MANUAL: 0 THOUSAND/UL (ref 0–0.1)
BASOPHILS NFR MAR MANUAL: 0 % (ref 0–1)
BILIRUB SERPL-MCNC: 0.79 MG/DL (ref 0.2–1)
BUN SERPL-MCNC: 46 MG/DL (ref 5–25)
C DIFF TOX GENS STL QL NAA+PROBE: NEGATIVE
CALCIUM ALBUM COR SERPL-MCNC: 10.5 MG/DL (ref 8.3–10.1)
CALCIUM SERPL-MCNC: 8.8 MG/DL (ref 8.3–10.1)
CAMPYLOBACTER DNA SPEC NAA+PROBE: NORMAL
CHLORIDE SERPL-SCNC: 112 MMOL/L (ref 96–108)
CO2 SERPL-SCNC: 22 MMOL/L (ref 21–32)
CREAT SERPL-MCNC: 1.37 MG/DL (ref 0.6–1.3)
DIFFERENTIAL COMMENT: ABNORMAL
EOSINOPHIL # BLD MANUAL: 0.14 THOUSAND/UL (ref 0–0.4)
EOSINOPHIL NFR BLD MANUAL: 1 % (ref 0–6)
ERYTHROCYTE [DISTWIDTH] IN BLOOD BY AUTOMATED COUNT: 15.3 % (ref 11.6–15.1)
GFR SERPL CREATININE-BSD FRML MDRD: 42 ML/MIN/1.73SQ M
GLUCOSE SERPL-MCNC: 123 MG/DL (ref 65–140)
HAV IGM SER QL: NORMAL
HBV CORE IGM SER QL: NORMAL
HBV SURFACE AG SER QL: NORMAL
HCT VFR BLD AUTO: 32.4 % (ref 34.8–46.1)
HCV AB SER QL: NORMAL
HGB BLD-MCNC: 10.8 G/DL (ref 11.5–15.4)
LYMPHOCYTES # BLD AUTO: 1.26 THOUSAND/UL (ref 0.6–4.47)
LYMPHOCYTES # BLD AUTO: 9 % (ref 14–44)
MAGNESIUM SERPL-MCNC: 1.9 MG/DL (ref 1.6–2.6)
MCH RBC QN AUTO: 29.8 PG (ref 26.8–34.3)
MCHC RBC AUTO-ENTMCNC: 33.3 G/DL (ref 31.4–37.4)
MCV RBC AUTO: 89 FL (ref 82–98)
METAMYELOCYTES NFR BLD MANUAL: 1 % (ref 0–1)
MONOCYTES # BLD AUTO: 1.12 THOUSAND/UL (ref 0–1.22)
MONOCYTES NFR BLD: 8 % (ref 4–12)
MYELOCYTES NFR BLD MANUAL: 1 % (ref 0–1)
NEUTROPHILS # BLD MANUAL: 10.46 THOUSAND/UL (ref 1.85–7.62)
NEUTS BAND NFR BLD MANUAL: 1 % (ref 0–8)
NEUTS SEG NFR BLD AUTO: 74 % (ref 43–75)
PHOSPHATE SERPL-MCNC: 3.6 MG/DL (ref 2.7–4.5)
PLASMA CELLS NFR BLD: 1 % (ref 0–0)
PLATELET # BLD AUTO: 352 THOUSANDS/UL (ref 149–390)
PLATELET BLD QL SMEAR: ADEQUATE
PMV BLD AUTO: 10.1 FL (ref 8.9–12.7)
POTASSIUM SERPL-SCNC: 4.9 MMOL/L (ref 3.5–5.3)
PROT SERPL-MCNC: 6.6 G/DL (ref 6.4–8.4)
RBC # BLD AUTO: 3.63 MILLION/UL (ref 3.81–5.12)
RBC MORPH BLD: PRESENT
SALMONELLA DNA SPEC QL NAA+PROBE: NORMAL
SHIGA TOXIN STX GENE SPEC NAA+PROBE: NORMAL
SHIGELLA DNA SPEC QL NAA+PROBE: NORMAL
SODIUM SERPL-SCNC: 140 MMOL/L (ref 135–147)
TOXIC GRANULES BLD QL SMEAR: PRESENT
TSH SERPL DL<=0.05 MIU/L-ACNC: 0.91 UIU/ML (ref 0.45–4.5)
VARIANT LYMPHS # BLD AUTO: 4 %
WBC # BLD AUTO: 13.95 THOUSAND/UL (ref 4.31–10.16)

## 2022-11-05 RX ADMIN — SERTRALINE HYDROCHLORIDE 50 MG: 50 TABLET ORAL at 08:10

## 2022-11-05 RX ADMIN — ATORVASTATIN CALCIUM 40 MG: 40 TABLET, FILM COATED ORAL at 08:10

## 2022-11-05 RX ADMIN — SODIUM CHLORIDE 100 ML/HR: 0.9 INJECTION, SOLUTION INTRAVENOUS at 17:25

## 2022-11-05 RX ADMIN — SODIUM CHLORIDE 100 ML/HR: 0.9 INJECTION, SOLUTION INTRAVENOUS at 07:54

## 2022-11-05 NOTE — UTILIZATION REVIEW
Initial Clinical Review    Observation 11/4 1713 changed to inpatient 11/5 1221  Pt requiring continued stay for IV fluids  Admission: Date/Time/Statement:   Admission Orders (From admission, onward)     Ordered        11/05/22 1221  Inpatient Admission  Once            11/04/22 1713  Place in Observation  Once                      Orders Placed This Encounter   Procedures   • Inpatient Admission     Standing Status:   Standing     Number of Occurrences:   1     Order Specific Question:   Level of Care     Answer:   Med Surg [16]     Order Specific Question:   Estimated length of stay     Answer:   More than 2 Midnights     Order Specific Question:   Certification     Answer:   I certify that inpatient services are medically necessary for this patient for a duration of greater than two midnights  See H&P and MD Progress Notes for additional information about the patient's course of treatment  ED Arrival Information     Expected   11/4/2022     Arrival   11/4/2022 13:23    Acuity   Emergent            Means of arrival   Walk-In    Escorted by   Self    Service   [facility]    Admission type   Emergency            Arrival complaint   TOMMY           Chief Complaint   Patient presents with   • Diarrhea     Diarrhea X 1 week, sent by PCP after abnormal kidney labs  Loss of appetite, fevers at home and feeling dehydrated       Initial Presentation: 62 y o  female with h/o CKD2, T2DM, diverticulitis, asthma, GERD on Prilosec, HTN on Lisinopril/Lopressor, HLD on Lipitor, anxiety/depression on zoloft who comes here with 5 days of NBNB diarrhea assoc'd w/ fever and lack of appetitie  She was in her usual state of health until she began to feel "ill" and had a fever  This was quickly f/b unrelenting diarrhea, along with myalgias  She says that she has "never gone to the bathroom this much in my life", and has had moments when she had diarrhea before being able to make it to the toilet   She went to urgent care 3 days prior to admission who tested her for COVID  Her COVID test was negative, and she was started on Zofran with recommendation to f/u with her PCP  Yesterday, she decided to reach out to her PCP as she was still concerned with the amount of diarrhea she was having  She was told she likely had an infection, and labs were drawn to investigate potential etiology of current illness  At this time, she had Cr of 2 4 and WBC 14k  She was told by her PCP to visit the ED of this hospital for concerns of TOMMY in the setting of dehydration 2/2 diarrhea  Plan: Observation for protracted diarrhea, TOMMY, transaminitis: IV fluids, follow up C diff panel, stool for OP, continue Prilosec and Zofran, follow up hepatitis panel, hold home lisinopril and lppressor due to TOMMY  11/5 Observation changed to inpatient  Internal medicine: Inflammatory vs Osmotic diarrhea, difficult to reach conclusion at this point  Will need to f/u stool cdiff and O/P  Continue IV fluids, follow up C diff panel, stool OP, continue prilosec and zofran, follow Calprotectin, fecal leukocyte and hepatitis panel  Date: 11/6 Day 2:    Internal medicine: continue IV fluids, C diff and stool enteric panel negative, continue with prilosec and zofran, follow up Calprotectin and fecal leukocyte and hepatitis panel, follow urine culture, continue to hold lisinopril and lopressor       ED Triage Vitals   Temperature Pulse Respirations Blood Pressure SpO2   11/04/22 1331 11/04/22 1331 11/04/22 1331 11/04/22 1331 11/04/22 1331   98 2 °F (36 8 °C) 98 18 111/58 96 %      Temp Source Heart Rate Source Patient Position - Orthostatic VS BP Location FiO2 (%)   11/04/22 1331 11/04/22 1331 11/04/22 1830 11/04/22 1830 --   Oral Monitor Lying Right arm       Pain Score       11/04/22 1331       No Pain          Wt Readings from Last 1 Encounters:   11/04/22 72 6 kg (160 lb)     Additional Vital Signs:     Date/Time Temp Pulse Resp BP MAP (mmHg) SpO2 O2 Device   11/06/22 07:48:06 97 5 °F (36 4 °C) 81 16 122/61 81 96 % --   11/05/22 22:14:29 97 9 °F (36 6 °C) 84 18 120/64 83 96 % --   11/05/22 14:35:42 97 5 °F (36 4 °C) 88 14 110/63 79 96 % --   11/05/22 0900 -- -- -- -- -- -- None (Room air)   11/05/22 07:22:42 97 5 °F (36 4 °C) 85 18 97/55 69 96 % --   11/04/22 22:19:31 97 2 °F (36 2 °C) Abnormal  84 18 97/55 69 95 % --   11/04/22 20:27:16 97 5 °F (36 4 °C) 89 18 97/55 69 96 % --   11/04/22 1830 -- 92 18 98/57 73 95 % None (Room air)   11/04/22 1801 -- 88 -- 100/57 76 97 % --   11/04/22 1701 -- 86 -- 95/50 69 97 % --   11/04/22 1645 -- 92 18 101/55 73 96 % None (Room air)   11/04/22 1600 -- 94 20 105/56 74 97 % --   11/04/22 1530 -- 92 18 100/58 -- 98 % None (Room air)         Pertinent Labs/Diagnostic Test Results:   No orders to display     Results from last 7 days   Lab Units 11/04/22  1530 11/01/22  1056   SARS-COV-2  Negative Negative     Results from last 7 days   Lab Units 11/06/22  0525 11/05/22  0505 11/04/22  1530 11/03/22  1018   WBC Thousand/uL 15 72* 13 95* 16 10* 14 14*   HEMOGLOBIN g/dL 10 2* 10 8* 12 2 11 8   HEMATOCRIT % 32 5* 32 4* 36 6 36 8   PLATELETS Thousands/uL 423* 352 350 236   NEUTROS ABS Thousands/µL  --   --   --  11 13*   BANDS PCT %  --  1 19*  --          Results from last 7 days   Lab Units 11/06/22  0525 11/05/22  0505 11/04/22  1530 11/03/22  1018   SODIUM mmol/L 143 140 135 133*   POTASSIUM mmol/L 4 5 4 9 4 8 4 4   CHLORIDE mmol/L 116* 112* 105 101   CO2 mmol/L 20* 22 22 23   ANION GAP mmol/L 7 6 8 9   BUN mg/dL 26* 46* 55* 63*   CREATININE mg/dL 1 00 1 37* 1 85* 2 44*   EGFR ml/min/1 73sq m 62 42 29 21   CALCIUM mg/dL 9 1 8 8 10 0 9 5   MAGNESIUM mg/dL  --  1 9  --   --    PHOSPHORUS mg/dL  --  3 6  --   --      Results from last 7 days   Lab Units 11/06/22  0525 11/05/22  0505 11/04/22  1530 11/03/22  1018   AST U/L 58* 55* 88* 45   ALT U/L 137* 103* 125* 63   ALK PHOS U/L 358* 324* 413* 255*   TOTAL PROTEIN g/dL 6 5 6 6 8 2 7 4   ALBUMIN g/dL 1  9* 1 9* 2 4* 2 3*   TOTAL BILIRUBIN mg/dL 0 83 0 79 1 05* 0 93         Results from last 7 days   Lab Units 11/06/22  0525 11/05/22  0505 11/04/22  1530 11/03/22  1018   GLUCOSE RANDOM mg/dL 126 123 120 155*         Results from last 7 days   Lab Units 11/05/22  0505   TSH 3RD GENERATON uIU/mL 0 910         Results from last 7 days   Lab Units 11/04/22  1620   CLARITY UA  Slightly Cloudy   COLOR UA  Rosa   SPEC GRAV UA  1 015   PH UA  5 5   GLUCOSE UA mg/dl Negative   KETONES UA mg/dl Negative   BLOOD UA  Large*   PROTEIN UA mg/dl Negative   NITRITE UA  Negative   BILIRUBIN UA  Negative   UROBILINOGEN UA E U /dl 0 2   LEUKOCYTES UA  Small*   WBC UA /hpf Innumerable*   RBC UA /hpf 4-10*   BACTERIA UA /hpf Moderate*   EPITHELIAL CELLS WET PREP /hpf Occasional   MUCUS THREADS  Occasional*     Results from last 7 days   Lab Units 11/04/22  1530 11/01/22  1056   INFLUENZA A PCR  Negative Negative   INFLUENZA B PCR  Negative Negative   RSV PCR  Negative  --          ED Treatment:   Medication Administration from 11/04/2022 1221 to 11/04/2022 2006       Date/Time Order Dose Route Action     11/04/2022 1527 sodium chloride 0 9 % bolus 1,000 mL 1,000 mL Intravenous New Bag        Past Medical History:   Diagnosis Date   • Anxiety    • Asthma    • Colon polyp    • Depression    • Diabetes mellitus ([facility] [address] )    • Diverticulitis    • Diverticulitis of colon    • Follicular cyst of ovary 12/23/2014   • Former smoker    • GERD (gastroesophageal reflux disease)    • Glycosuria    • Headache    • Hyperlipidemia    • Hypertension    • Kidney calculi 1/11/2019   • Kidney stone    • Obesity    • Ovarian cyst    • Overactive bladder    • Overweight    • Pulmonary nodule    • Renal calculus    • Seasonal allergies    • Tinea pedis of left foot 6/8/2020   • Vertigo    • Wears partial dentures     Lower plate -partial     Present on Admission:  • Hypertension  • Protracted diarrhea  • Type 2 diabetes mellitus with microalbuminuria [facility])      Admitting Diagnosis: Diarrhea [R19 7]  BMI 30 0-30 9,adult [Z68 30]  TOMMY (acute kidney injury) ([facility] [address] ) [N17 9]  Type 2 diabetes mellitus with chronic kidney disease, without long-term current use of insulin, unspecified CKD stage [facility] [E11 22]  Age/Sex: 62 y o  female  Admission Orders:  Scheduled Medications:  atorvastatin, 40 mg, Oral, Daily  sertraline, 50 mg, Oral, Daily      Continuous IV Infusions:  sodium chloride, 100 mL/hr, Intravenous, Continuous      PRN Meds:  acetaminophen, 488 mg, Oral, Q6H PRN  ondansetron, 4 mg, Oral, Q4H PRN        IP CONSULT TO CASE MANAGEMENT    Network Utilization Review Department  ATTENTION: Please call with any questions or concerns to [phone] and carefully listen to the prompts so that you are directed to the right person  All voicemails are confidential   López Quesada all requests for admission clinical reviews, approved or denied determinations and any other requests to dedicated fax number below belonging to the campus where the patient is receiving treatment   List of dedicated fax numbers for the Facilities:  [address] DENIALS (Administrative/Medical Necessity) [phone]   [address] (Maternity/NICU/Pediatrics) [phone]   91 Gaby Whatley [phone]   Jori Vang  [phone]   [address] [address] Wilmar Gill 28 [phone]   1550 [facility] Karin Emmanuel [facility] [address] [facility] [phone]

## 2022-11-05 NOTE — PROGRESS NOTES
INTERNAL MEDICINE RESIDENCY PROGRESS NOTE     Name: Jad Major   Age & Sex: 62 y o  female   MRN: [id number]  Unit/Bed#: [facility] 65-26   Encounter: [id number]  Team: SOD Team C     PATIENT INFORMATION     Name: Jad Major   Age & Sex: 62 y o  female   MRN: [id number]  Hospital Stay Days: 0    ASSESSMENT/PLAN     Principal Problem:    Protracted diarrhea  Active Problems:    Hypertension    Type 2 diabetes mellitus with microalbuminuria (HCC)    TOMMY (acute kidney injury) ([facility] [address] )    Transaminitis      * Protracted diarrhea  Assessment & Plan  - Diarrhea x 5d, NBNB  Assoc'd w/ fever, decreased appetite  Notes some BRBPR however may likely be from injury via constant wiping  - Prior to diarrhea felt feverish and "ill"  However no longer w/ fever x2d   - Has "never gone to the bathroom this much in my life", states that at times she had incontinence/had diarrhea before being able to get to toilet  - h/o T2DM on MFN, takes 1000 mg total, been on this med for many years  - h/o GERD on Prilosec 40 qd; h/o Diverticulitis; current illness likely not 2/2 diverticulitis given lack of abdominal pain and bloody stool  - Dehydrated per physical exam and labs  - Inflammatory vs Osmotic diarrhea, difficult to reach conclusion at this point  Will need to f/u stool cdiff and O/P  Leukocytosis w/ band forms on CBC  No sick contacts, eating exotic/spoiled/raw foods, unsafe drinking water  - Currently w/o metabolic derangements, will need to follow BMP  - Currently normoTN, however in lln  Watch for HoTN  - ? Hepatitis A, in context of elevated transaminase     Plan:    1) Continue to hydrate w/ NS @ 100 cc/hr  Patient feels hungry and plans to breakfast this a m     Will follow patient's intake throughout the day  If intake adequate, can discontinue fluids    2) F/U Cdiff panel, Stool O/P  3) C/w prilosec, zofran  4) Monitor HR, BP  5) F/U Calprotectin, Fecal Leukocyte  6) F/U Hepatitis panel    Transaminitis  Assessment & Plan  - AST 85, ,  with TBr 1 1  - No abdominal pain, no jaundice  - Unrelenting Diarrhea x 5d of unknown origin; assoc'd with fever and myalgia  - May be related to Hepatitis A in context of diarrhea and fever w/ myalgia and poor appetite  - Possible NEWELL? Given Metabolic syndrome / obesity and h/o T2DM    Plan:    1) F/U Hepatitis panel  2) Monitor LFTs      TOMMY (acute kidney injury) ([facility] )  Assessment & Plan  - Cr OP at 2 4, currently at 1 9  - No urinary sx's including no poly/oliguria  - No blood/clots/fat in urine  - H/o Kidney stones, however no w/o CVA tenderness  - U/A with Large blood, inummerable WBC, mod bacteria  - Likely 2/2 Dehydration, however must r/o UTI    Plan:    1) c/w IVF  Creatinine improved to 1 37 from 1 85  2) F/U UClx  3) Hold Lisinopril/Lopressor and other nephrotoxic agents  4) Avoid HoTN    Type 2 diabetes mellitus with microalbuminuria ([facility] )  Assessment & Plan  Lab Results   Component Value Date    HGBA1C 6 3 05/04/2022       No results for input(s): POCGLU in the last 72 hours  Blood Sugar Average: Last 72 hrs:    - On MFN at home  Diarrhea likely not 2/2 medication issue, pt w long-term use of MFN  - Most recent A1c at 6 3  - No need for IP insulin at the moment, consider SSi      Hypertension  Assessment & Plan  - Currently on Lisinopril and Lopressor at home  - Also on Lipitor     Plan:    1) Hold home antiHTN b/c/o TOMMY      Disposition:  Patient clinically improved today  Anticipate discharge within the next 24-48 hours  SUBJECTIVE     Patient seen and examined  No acute events overnight  Upon my encounter patient was lying comfortably in hospital bed  She states that she feels much better today compared to yesterday  Patient has not had a bowel movement since her admission  Presently denies any fever, chills, nausea, vomiting, diarrhea, constipation, chest pain, shortness a breath  Otherwise denies any new or worsening complaints      OBJECTIVE Vitals:    22 2027 22 2100 22 2219 22 0722   BP: 97/55  97/55 97/55   BP Location:       Pulse: 89  84 85   Resp: 18  18 18   Temp: 97 5 °F (36 4 °C)  (!) 97 2 °F (36 2 °C) 97 5 °F (36 4 °C)   TempSrc:       SpO2: 96%  95% 96%   Weight:  72 6 kg (160 lb)     Height:  5' 3" (1 6 m)        Temperature:   Temp (24hrs), Av 6 °F (36 4 °C), Min:97 2 °F (36 2 °C), Max:98 2 °F (36 8 °C)    Temperature: 97 5 °F (36 4 °C)  Intake & Output:  I/O        07 07 07 07 07 0700    IV Piggyback  1000     Total Intake(mL/kg)  1000 (13 8)     Net  +1000                Weights:        Body mass index is 28 34 kg/m²  Weight (last 2 days)     Date/Time Weight    22 2100 72 6 (160)        Physical Exam  Constitutional:       General: She is not in acute distress  Appearance: Normal appearance  She is not ill-appearing  Cardiovascular:      Rate and Rhythm: Normal rate and regular rhythm  Pulses: Normal pulses  Heart sounds: Normal heart sounds  No murmur heard  Pulmonary:      Effort: Pulmonary effort is normal  No respiratory distress  Breath sounds: Normal breath sounds  No wheezing, rhonchi or rales  Abdominal:      General: Abdomen is flat  Bowel sounds are normal  There is no distension  Palpations: Abdomen is soft  Tenderness: There is no abdominal tenderness  Musculoskeletal:      Right lower leg: No edema  Left lower leg: No edema  Neurological:      Mental Status: She is alert and oriented to person, place, and time  Psychiatric:         Mood and Affect: Mood normal          Behavior: Behavior normal          Thought Content: Thought content normal        LABORATORY DATA     Labs: I have personally reviewed pertinent reports    Results from last 7 days   Lab Units 22  0505 22  1530 22  1018   WBC Thousand/uL 13 95* 16 10* 14 14*   HEMOGLOBIN g/dL 10 8* 12 2 11 8   HEMATOCRIT % 32 4* 36 6 36 8   PLATELETS Thousands/uL 352 350 236   NEUTROS PCT %  --   --  78*   MONOS PCT %  --   --  11   MONO PCT % 8 10  --       Results from last 7 days   Lab Units 11/05/22  0505 11/04/22  1530 11/03/22  1018   POTASSIUM mmol/L 4 9 4 8 4 4   CHLORIDE mmol/L 112* 105 101   CO2 mmol/L 22 22 23   BUN mg/dL 46* 55* 63*   CREATININE mg/dL 1 37* 1 85* 2 44*   CALCIUM mg/dL 8 8 10 0 9 5   ALK PHOS U/L 324* 413* 255*   ALT U/L 103* 125* 63   AST U/L 55* 88* 45     Results from last 7 days   Lab Units 11/05/22  0505   MAGNESIUM mg/dL 1 9     Results from last 7 days   Lab Units 11/05/22  0505   PHOSPHORUS mg/dL 3 6                    IMAGING & DIAGNOSTIC TESTING     Radiology Results: I have personally reviewed pertinent reports  No results found  Other Diagnostic Testing: I have personally reviewed pertinent reports  ACTIVE MEDICATIONS     Current Facility-Administered Medications   Medication Dose Route Frequency   • acetaminophen (TYLENOL) tablet 488 mg  488 mg Oral Q6H PRN   • atorvastatin (LIPITOR) tablet 40 mg  40 mg Oral Daily   • ondansetron (ZOFRAN-ODT) dispersible tablet 4 mg  4 mg Oral Q4H PRN   • sertraline (ZOLOFT) tablet 50 mg  50 mg Oral Daily   • sodium chloride 0 9 % infusion  100 mL/hr Intravenous Continuous       VTE Pharmacologic Prophylaxis: Reason for no pharmacologic prophylaxis low risk  VTE Mechanical Prophylaxis: sequential compression device    Portions of the record may have been created with voice recognition software  Occasional wrong word or "sound a like" substitutions may have occurred due to the inherent limitations of voice recognition software    Read the chart carefully and recognize, using context, where substitutions have occurred   ==  [facility]  Internal Medicine Residency PGY-2

## 2022-11-05 NOTE — PLAN OF CARE
Problem: PAIN - ADULT  Goal: Verbalizes/displays adequate comfort level or baseline comfort level  Description: Interventions:  - Encourage patient to monitor pain and request assistance  - Assess pain using appropriate pain scale  - Administer analgesics based on type and severity of pain and evaluate response  - Implement non-pharmacological measures as appropriate and evaluate response  - Consider cultural and social influences on pain and pain management  - Notify physician/advanced practitioner if interventions unsuccessful or patient reports new pain  Outcome: Progressing     Problem: INFECTION - ADULT  Goal: Absence or prevention of progression during hospitalization  Description: INTERVENTIONS:  - Assess and monitor for signs and symptoms of infection  - Monitor lab/diagnostic results  - Monitor all insertion sites, i e  indwelling lines, tubes, and drains  - Monitor endotracheal if appropriate and nasal secretions for changes in amount and color  - [place] appropriate cooling/warming therapies per order  - Administer medications as ordered  - Instruct and encourage patient and family to use good hand hygiene technique  - Identify and instruct in appropriate isolation precautions for identified infection/condition  Outcome: Progressing  Goal: Absence of fever/infection during neutropenic period  Description: INTERVENTIONS:  - Monitor WBC    Outcome: Progressing     Problem: SAFETY ADULT  Goal: Patient will remain free of falls  Description: INTERVENTIONS:  - Educate patient/family on patient safety including physical limitations  - Instruct patient to call for assistance with activity   - Consult OT/PT to assist with strengthening/mobility   - Keep Call bell within reach  - Keep bed low and locked with side rails adjusted as appropriate  - Keep care items and personal belongings within reach  - Initiate and maintain comfort rounds  - Make Fall Risk Sign visible to staff  - Apply yellow socks and bracelet for high fall risk patients  - Consider moving patient to room near nurses station  Outcome: Progressing  Goal: Maintain or return to baseline ADL function  Description: INTERVENTIONS:  -  Assess patient's ability to carry out ADLs; assess patient's baseline for ADL function and identify physical deficits which impact ability to perform ADLs (bathing, care of mouth/teeth, toileting, grooming, dressing, etc )  - Assess/evaluate cause of self-care deficits   - Assess range of motion  - Assess patient's mobility; develop plan if impaired  - Assess patient's need for assistive devices and provide as appropriate  - Encourage maximum independence but intervene and supervise when necessary  - Involve family in performance of ADLs  - Assess for home care needs following discharge   - Consider OT consult to assist with ADL evaluation and planning for discharge  - Provide patient education as appropriate  Outcome: Progressing  Goal: Maintains/Returns to pre admission functional level  Description: INTERVENTIONS:  - Perform BMAT or MOVE assessment daily    - Set and communicate daily mobility goal to care team and patient/family/caregiver     - Collaborate with rehabilitation services on mobility goals if consulted  - Out of bed for toileting  - Record patient progress and toleration of activity level   Outcome: Progressing

## 2022-11-06 VITALS
OXYGEN SATURATION: 96 % | DIASTOLIC BLOOD PRESSURE: 61 MMHG | HEART RATE: 81 BPM | BODY MASS INDEX: 28.35 KG/M2 | RESPIRATION RATE: 16 BRPM | WEIGHT: 160 LBS | TEMPERATURE: 97.5 F | SYSTOLIC BLOOD PRESSURE: 122 MMHG | HEIGHT: 63 IN

## 2022-11-06 PROBLEM — R79.89 ELEVATED LFTS: Status: ACTIVE | Noted: 2022-11-04

## 2022-11-06 LAB
ALBUMIN SERPL BCP-MCNC: 1.9 G/DL (ref 3.5–5)
ALP SERPL-CCNC: 358 U/L (ref 46–116)
ALT SERPL W P-5'-P-CCNC: 137 U/L (ref 12–78)
ANION GAP SERPL CALCULATED.3IONS-SCNC: 7 MMOL/L (ref 4–13)
AST SERPL W P-5'-P-CCNC: 58 U/L (ref 5–45)
BILIRUB SERPL-MCNC: 0.83 MG/DL (ref 0.2–1)
BUN SERPL-MCNC: 26 MG/DL (ref 5–25)
CALCIUM ALBUM COR SERPL-MCNC: 10.8 MG/DL (ref 8.3–10.1)
CALCIUM SERPL-MCNC: 9.1 MG/DL (ref 8.3–10.1)
CHLORIDE SERPL-SCNC: 116 MMOL/L (ref 96–108)
CO2 SERPL-SCNC: 20 MMOL/L (ref 21–32)
CREAT SERPL-MCNC: 1 MG/DL (ref 0.6–1.3)
ERYTHROCYTE [DISTWIDTH] IN BLOOD BY AUTOMATED COUNT: 15.4 % (ref 11.6–15.1)
GFR SERPL CREATININE-BSD FRML MDRD: 62 ML/MIN/1.73SQ M
GLUCOSE SERPL-MCNC: 126 MG/DL (ref 65–140)
HCT VFR BLD AUTO: 32.5 % (ref 34.8–46.1)
HGB BLD-MCNC: 10.2 G/DL (ref 11.5–15.4)
MCH RBC QN AUTO: 29.2 PG (ref 26.8–34.3)
MCHC RBC AUTO-ENTMCNC: 31.4 G/DL (ref 31.4–37.4)
MCV RBC AUTO: 93 FL (ref 82–98)
NRBC BLD AUTO-RTO: 0 /100 WBCS
PLATELET # BLD AUTO: 423 THOUSANDS/UL (ref 149–390)
PMV BLD AUTO: 10.2 FL (ref 8.9–12.7)
POTASSIUM SERPL-SCNC: 4.5 MMOL/L (ref 3.5–5.3)
PROT SERPL-MCNC: 6.5 G/DL (ref 6.4–8.4)
RBC # BLD AUTO: 3.49 MILLION/UL (ref 3.81–5.12)
SODIUM SERPL-SCNC: 143 MMOL/L (ref 135–147)
WBC # BLD AUTO: 15.72 THOUSAND/UL (ref 4.31–10.16)

## 2022-11-06 RX ADMIN — ATORVASTATIN CALCIUM 40 MG: 40 TABLET, FILM COATED ORAL at 08:17

## 2022-11-06 RX ADMIN — SODIUM CHLORIDE 100 ML/HR: 0.9 INJECTION, SOLUTION INTRAVENOUS at 02:19

## 2022-11-06 RX ADMIN — SERTRALINE HYDROCHLORIDE 50 MG: 50 TABLET ORAL at 08:17

## 2022-11-06 NOTE — PROGRESS NOTES
INTERNAL MEDICINE RESIDENCY PROGRESS NOTE     Name: Jad Major   Age & Sex: 62 y o  female   MRN: [id number]  Unit/Bed#: [facility] 65-26   Encounter: [id number]  Team: SOD Team C     PATIENT INFORMATION     Name: Jad Major   Age & Sex: 62 y o  female   MRN: [id number]  Hospital Stay Days: 1    ASSESSMENT/PLAN     Principal Problem:    Protracted diarrhea  Active Problems:    Hypertension    Type 2 diabetes mellitus with microalbuminuria (HCC)    TOMMY (acute kidney injury) ([facility] [address] )    Transaminitis      * Protracted diarrhea  Assessment & Plan  - Diarrhea x 5d, NBNB  Assoc'd w/ fever, decreased appetite  Notes some BRBPR however may likely be from injury via constant wiping  - Prior to diarrhea felt feverish and "ill"  However no longer w/ fever x2d   - Has "never gone to the bathroom this much in my life", states that at times she had incontinence/had diarrhea before being able to get to toilet  - h/o T2DM on MFN, takes 1000 mg total, been on this med for many years  - h/o GERD on Prilosec 40 qd; h/o Diverticulitis; current illness likely not 2/2 diverticulitis given lack of abdominal pain and bloody stool  - Dehydrated per physical exam and labs  - Inflammatory vs Osmotic diarrhea, difficult to reach conclusion at this point  Will need to f/u stool cdiff and O/P  Leukocytosis w/ band forms on CBC  No sick contacts, eating exotic/spoiled/raw foods, unsafe drinking water  - Currently w/o metabolic derangements, will need to follow BMP  - Currently normoTN, however in lln  Watch for HoTN  - ? Hepatitis A, in context of elevated transaminase     Plan:    1) Continue to hydrate w/ NS @ 100 cc/hr  Patient feels hungry and plans to breakfast this a m     Will follow patient's intake throughout the day  If intake adequate, can discontinue fluids  2) C   Diff negative, Stool enteric panel negative  3) C/w prilosec, zofran  4) Monitor HR, BP  5) F/U Calprotectin, Fecal Leukocyte  6) F/U Hepatitis panel    Elevated LFTs  Assessment & Plan  - AST 85, ,  with TBr 1 1  - No abdominal pain, no jaundice  - Unrelenting Diarrhea x 5d of unknown origin; assoc'd with fever and myalgia  - May be related to Hepatitis A in context of diarrhea and fever w/ myalgia and poor appetite  - Possible NEWELL? Given Metabolic syndrome / obesity and h/o T2DM    Plan:    1) F/U Hepatitis panel  2) Monitor LFTs      TOMMY (acute kidney injury) ([facility] )  Assessment & Plan  - Cr OP at 2 4, currently at 1 9  - No urinary sx's including no poly/oliguria  - No blood/clots/fat in urine  - H/o Kidney stones, however no w/o CVA tenderness  - U/A with Large blood, inummerable WBC, mod bacteria  - Likely 2/2 Dehydration, however must r/o UTI    Plan:    1) c/w IVF  Creatinine improved to 1 37 from 1 85  2) F/U UClx  3) Hold Lisinopril/Lopressor and other nephrotoxic agents  4) Avoid HoTN    Type 2 diabetes mellitus with microalbuminuria ([facility] )  Assessment & Plan  Lab Results   Component Value Date    HGBA1C 6 3 05/04/2022       No results for input(s): POCGLU in the last 72 hours  Blood Sugar Average: Last 72 hrs:    - On MFN at home  Diarrhea likely not 2/2 medication issue, pt w long-term use of MFN  - Most recent A1c at 6 3  - No need for IP insulin at the moment, consider SSi      Hypertension  Assessment & Plan  - Currently on Lisinopril and Lopressor at home  - Also on Lipitor     Plan:    1) Hold home antiHTN b/c/o TOMMY      Disposition:  Will likely require inpatient stay for further monitoring for ongoing episodes of diarrhea     SUBJECTIVE     Patient seen and examined  No acute events overnight       OBJECTIVE   Vitals:  Temp:  [97 5 °F (36 4 °C)-97 9 °F (36 6 °C)] 97 9 °F (36 6 °C)  HR:  [84-88] 84  Resp:  [14-18] 18  BP: (110-120)/(63-64) 120/64    Relevant Labs:  K: 4 5  Mg:  Ph:   sCr: 1 00  Hgb: 10 2  WBC: 15 72  Troponins:     Drips:  Not on any drips    Intake and Outputs:  I/O       11/04 0701 11/05 0700 11/05 0701 11/06 0700 11/06 0701  11/07 0700    P  O   240     I V  (mL/kg)  1000 (13 8)     IV Piggyback 1000      Total Intake(mL/kg) 1000 (13 8) 1240 (17 1)     Net +1000 +1240                  Physical Exam  Vitals reviewed  HENT:      Head: Normocephalic  Cardiovascular:      Rate and Rhythm: Normal rate and regular rhythm  Pulses: Normal pulses  Heart sounds: Normal heart sounds  Pulmonary:      Effort: Pulmonary effort is normal       Breath sounds: Normal breath sounds  Abdominal:      General: Bowel sounds are normal       Palpations: Abdomen is soft  Skin:     General: Skin is warm and dry  Capillary Refill: Capillary refill takes less than 2 seconds  Neurological:      Mental Status: She is alert and oriented to person, place, and time  IMAGING & DIAGNOSTIC TESTING     Radiology Results: I have personally reviewed pertinent reports  No results found  Other Diagnostic Testing: I have personally reviewed pertinent reports  VTE Pharmacologic Prophylaxis: Reason for no pharmacologic prophylaxis concern for bleed  VTE Mechanical Prophylaxis: sequential compression device    Portions of the record may have been created with voice recognition software  Occasional wrong word or "sound a like" substitutions may have occurred due to the inherent limitations of voice recognition software  Read the chart carefully and recognize, using context, where substitutions have occurred        Viridiana Bullard MD  Internal Medicine Residency PGY-2  [facility]

## 2022-11-06 NOTE — PLAN OF CARE
Problem: SAFETY ADULT  Goal: Patient will remain free of falls  Description: INTERVENTIONS:  - Educate patient/family on patient safety including physical limitations  - Instruct patient to call for assistance with activity   - Consult OT/PT to assist with strengthening/mobility   - Keep Call bell within reach  - Keep bed low and locked with side rails adjusted as appropriate  - Keep care items and personal belongings within reach  - Initiate and maintain comfort rounds  - Make Fall Risk Sign visible to staff  Problem: Knowledge Deficit  Goal: Patient/family/caregiver demonstrates understanding of disease process, treatment plan, medications, and discharge instructions  Description: Complete learning assessment and assess knowledge base    Interventions:  - Provide teaching at level of understanding  - Provide teaching via preferred learning methods  Outcome: Progressing     Problem: DISCHARGE PLANNING  Goal: Discharge to home or other facility with appropriate resources  Description: INTERVENTIONS:  - Identify barriers to discharge w/patient and caregiver  - Arrange for needed discharge resources and transportation as appropriate  - Identify discharge learning needs (meds, wound care, etc )  - Arrange for interpretive services to assist at discharge as needed  - Refer to Case Management Department for coordinating discharge planning if the patient needs post-hospital services based on physician/advanced practitioner order or complex needs related to functional status, cognitive ability, or social support system  Outcome: Progressing     - Apply yellow socks and bracelet for high fall risk patients  - Consider moving patient to room near nurses station  Outcome: Progressing  Goal: Maintain or return to baseline ADL function  Description: INTERVENTIONS:  -  Assess patient's ability to carry out ADLs; assess patient's baseline for ADL function and identify physical deficits which impact ability to perform ADLs (bathing, care of mouth/teeth, toileting, grooming, dressing, etc )  - Assess/evaluate cause of self-care deficits   - Assess range of motion  - Assess patient's mobility; develop plan if impaired  - Assess patient's need for assistive devices and provide as appropriate  - Encourage maximum independence but intervene and supervise when necessary  - Involve family in performance of ADLs  - Assess for home care needs following discharge   - Consider OT consult to assist with ADL evaluation and planning for discharge  - Provide patient education as appropriate  Outcome: Progressing  Goal: Maintains/Returns to pre admission functional level  Description: INTERVENTIONS:  - Perform BMAT or MOVE assessment daily    - Set and communicate daily mobility goal to care team and patient/family/caregiver     - Collaborate with rehabilitation services on mobility goals if consulted  - Out of bed for toileting  - Record patient progress and toleration of activity level   Outcome: Progressing

## 2022-11-06 NOTE — DISCHARGE SUMMARY
INTERNAL MEDICINE RESIDENCY DISCHARGE SUMMARY     Ольга Parish   62 y o  female  MRN: [id number]  Room/Bed: [facility] 812/[facility] 65-26     [address] [address]   Encounter: [id number]    Principal Problem:    Protracted diarrhea  Active Problems:    Hypertension    Type 2 diabetes mellitus with microalbuminuria (HCC)    TOMMY (acute kidney injury) ([facility] [address] )    Elevated LFTs      * Protracted diarrhea  Assessment & Plan  - Diarrhea x 5d, NBNB  Assoc'd w/ fever, decreased appetite  Notes some BRBPR however may likely be from injury via constant wiping  - Prior to diarrhea felt feverish and "ill"  However no longer w/ fever x2d   - Has "never gone to the bathroom this much in my life", states that at times she had incontinence/had diarrhea before being able to get to toilet  - h/o T2DM on MFN, takes 1000 mg total, been on this med for many years  - h/o GERD on Prilosec 40 qd; h/o Diverticulitis; current illness likely not 2/2 diverticulitis given lack of abdominal pain and bloody stool  - Dehydrated per physical exam and labs  - Inflammatory vs Osmotic diarrhea, difficult to reach conclusion at this point  Will need to f/u stool cdiff and O/P  Leukocytosis w/ band forms on CBC  No sick contacts, eating exotic/spoiled/raw foods, unsafe drinking water  - Currently w/o metabolic derangements, will need to follow BMP  - Currently normoTN, however in lln  Watch for HoTN  - ? Hepatitis A, in context of elevated transaminase     Plan:    1) Continue to hydrate w/ NS @ 100 cc/hr  Patient feels hungry and plans to breakfast this a m     Will follow patient's intake throughout the day  If intake adequate, can discontinue fluids  2) C   Diff negative, Stool enteric panel negative  3) C/w prilosec, zofran  4) Monitor HR, BP  5) F/U Calprotectin, Fecal Leukocyte  6) F/U Hepatitis panel    Elevated LFTs  Assessment & Plan  - AST 85, ,  with TBr 1 1  - No abdominal pain, no jaundice  - Unrelenting Diarrhea x 5d of unknown origin; assoc'd with fever and myalgia  - May be related to Hepatitis A in context of diarrhea and fever w/ myalgia and poor appetite  - Possible NEWELL? Given Metabolic syndrome / obesity and h/o T2DM    Plan:    1) F/U Hepatitis panel  2) Monitor LFTs      TOMMY (acute kidney injury) ([facility] )  Assessment & Plan  - Cr OP at 2 4, currently at 1 9  - No urinary sx's including no poly/oliguria  - No blood/clots/fat in urine  - H/o Kidney stones, however no w/o CVA tenderness  - U/A with Large blood, inummerable WBC, mod bacteria  - Likely 2/2 Dehydration, however must r/o UTI    Plan:    1) c/w IVF  Creatinine improved to 1 37 from 1 85  2) F/U UClx  3) Hold Lisinopril/Lopressor and other nephrotoxic agents  4) Avoid HoTN    Type 2 diabetes mellitus with microalbuminuria ([facility] )  Assessment & Plan  Lab Results   Component Value Date    HGBA1C 6 3 05/04/2022       No results for input(s): POCGLU in the last 72 hours  Blood Sugar Average: Last 72 hrs:    - On MFN at home  Diarrhea likely not 2/2 medication issue, pt w long-term use of MFN  - Most recent A1c at 6 3  - No need for IP insulin at the moment, consider SSi      Hypertension  Assessment & Plan  - Currently on Lisinopril and Lopressor at home  - Also on Lipitor     Plan:    1) Hold home antiHTN b/c/o TOMMY        [address]     As per Dr Maisha Roa HPI "Mrs Chayo Cook is a 62years old female with past medical history of hypertension, controlled type 2 diabetes mellitus, asthma, diverticulitis, GERD on Prilosec, hypertension, hyperlipidemia, anxiety and depression who came in with fever, myalgia and diarrhea  Patient reports she started having fever on 10/29/2022  She reports she was having intermittent fever and temperature up to 104F for which she has been taking 2 tablets Tylenol every Q 6 hourly  Patient reports she started having diarrhea which is mostly watery, no blood, at least 6 to 7 times a day  She reports it is mucousy and she started noticing some blood this evening  She reports of myalgia along with fever and diarrhea  No peculiar smell  She denies any nausea, vomiting, abdominal pain  She reports of loss of appetite  Denies any sick contact at home  She lives with her  who has no symptoms  Denies any antibiotics use, any recent trouble, eating outside food, hiking  She reports she drinks the water from the tap and she uses the water filter  Denies any similar episode in the past   She reports she had a colonoscopy many years ago, reported polyps removed  She does have history of diverticulitis in the past  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  She reports she takes omeprazole for many years for GERD  She is a former smoker, quit smoking many years ago  Denies any alcohol or other illicit drug use "    Patient was placed on IV fluids with improvement in serum creatinine and subsequent resolution in diarrheal episodes  Stool enteric panel in C diff panel were negative  And electrolyte derangements were corrected as needed  Patient was advanced to regular diet and tolerated it well with transition to having BMs that have been regular and more solid  At this point, patient has medically stable for discharge to home  Work letter was provided to the patient  Patient is strongly urged to follow up with a BMP in 1 week  Patient also advised to hold off on lisinopril for the next 3-4 days due to recent TOMMY      DISCHARGE INFORMATION     PCP at Discharge:  Mesfin Langley, [address]      Admitting Provider: Heaven العلي MD  Admission Date: 11/4/2022    Discharge Provider: Heaven العلي*  Discharge Date: 11/6/2022    Discharge Disposition: Home/Self Care  Discharge Condition: stable  Discharge with Lines: no    Discharge Diet: regular diet  Activity Restrictions: none  Test Results Pending at Discharge: [facility]    Discharge Diagnoses:  Principal Problem:    Protracted diarrhea  Active Problems:    Hypertension    Type 2 diabetes mellitus with microalbuminuria ([facility])    TOMMY (acute kidney injury) ([facility])    Elevated LFTs  Resolved Problems:    * No resolved hospital problems  *      Consulting Providers:      Diagnostic & Therapeutic Procedures Performed:  No results found      Code Status: Level 1 - Full Code  Advance Directive & Living Will: <no information>  Power of :    POLST:      Medications:  Current Discharge Medication List      STOP taking these medications       potassium citrate (UROCIT-K) 10 mEq Comments:   Reason for Stopping:         ondansetron (ZOFRAN) 4 mg tablet Comments:   Reason for Stopping:             Current Discharge Medication List        Current Discharge Medication List      CONTINUE these medications which have NOT CHANGED    Details   acetaminophen (TYLENOL) 500 mg tablet Take 500 mg by mouth every 6 (six) hours as needed for mild pain      Ascorbic Acid (vitamin C) 1000 MG tablet Take 2,000 mg by mouth daily      atorvastatin (LIPITOR) 40 mg tablet Take 1 tablet (40 mg total) by mouth daily  Qty: 90 tablet, Refills: 1    Associated Diagnoses: Hypercholesteremia      Cholecalciferol (Vitamin D3) 50 MCG (2000 UT) TABS Take 2,000 Units by mouth daily      lisinopril (ZESTRIL) 10 mg tablet Take 1 tablet (10 mg total) by mouth daily  Qty: 90 tablet, Refills: 1    Associated Diagnoses: Secondary hypertension      metFORMIN (GLUCOPHAGE) 1000 MG tablet Take 1 tablet (1,000 mg total) by mouth 2 (two) times a day with meals  Qty: 180 tablet, Refills: 1    Associated Diagnoses: Type 2 diabetes mellitus without complication, without long-term current use of insulin ([facility])      multivitamin (THERAGRAN) TABS Take 1 tablet by mouth daily      omeprazole (PriLOSEC) 40 MG capsule Take 1 capsule (40 mg total) by mouth daily  Qty: 90 capsule, Refills: 1    Associated Diagnoses: Gastroesophageal reflux disease without esophagitis      sertraline (ZOLOFT) 50 mg tablet Take 1 tablet (50 mg total) by mouth daily Takes in the am   Qty: 90 tablet, Refills: 1    Associated Diagnoses: Anxiety      metoprolol tartrate (LOPRESSOR) 25 mg tablet Take 0 5 tablets (12 5 mg total) by mouth every 12 (twelve) hours  Qty: 30 tablet, Refills: 0    Associated Diagnoses: Tachycardia             Allergies: Allergies   Allergen Reactions   • Oxybutynin Anaphylaxis     "feels like throat closing and can't swallow"   • Clonazepam Other (See Comments)     Pt states "didn't feel right on it "   • Morphine Other (See Comments) and Confusion     Annotation - [id number]: "dopey"  Gets silly   • Venlafaxine Other (See Comments)     Unknown--pt states " MD was trying pt on different medications  Pt unsure of reaction "       FOLLOW-UP     PCP Outpatient Follow-up:  none required Follow up appointment provided    Consulting Providers Follow-up:  none required     Active Issues Requiring Follow-up:   none    Discharge Statement:   I spent 45 minutes discharging the patient  This time was spent on the day of discharge  I had direct contact with the patient on the day of discharge  Additional documentation is required if more than 30 minutes were spent on discharge  Portions of the record may have been created with voice recognition software  Occasional wrong word or "sound a like" substitutions may have occurred due to the inherent limitations of voice recognition software  Read the chart carefully and recognize, using context, where substitutions have occurred        Dariel Clark MD  Internal Medicine Residency PGY-2  [facility]

## 2022-11-07 ENCOUNTER — TRANSITIONAL CARE MANAGEMENT (OUTPATIENT)
Dept: INTERNAL MEDICINE CLINIC | Facility: OTHER | Age: 57
End: 2022-11-07

## 2022-11-07 ENCOUNTER — TELEPHONE (OUTPATIENT)
Dept: INTERNAL MEDICINE CLINIC | Facility: OTHER | Age: 57
End: 2022-11-07

## 2022-11-07 LAB
BACTERIA UR CULT: ABNORMAL

## 2022-11-07 NOTE — UTILIZATION REVIEW
NOTIFICATION OF ADMISSION DISCHARGE   This is a Notification of Discharge from [address]  Please be advised that this patient has been discharge from our facility  Below you will find the admission and discharge date and time including the patient’s disposition  UTILIZATION REVIEW CONTACT:  Bebo Braden  Utilization   Network Utilization Review Department  Phone: [phone] x carefully listen to the prompts  All voicemails are confidential   Email: [email or web address]     ADMISSION INFORMATION  PRESENTATION DATE: 11/4/2022  2:59 PM  OBERVATION ADMISSION DATE:   INPATIENT ADMISSION DATE: 11/5/22 12:21 PM   DISCHARGE DATE: 11/6/2022  3:21 PM  DISPOSITION: Home/Self Care Home/Self Care      IMPORTANT INFORMATION:  Send all requests for admission clinical reviews, approved or denied determinations and any other requests to dedicated fax number below belonging to the campus where the patient is receiving treatment   List of dedicated fax numbers:  [address] DENIALS (Administrative/Medical Necessity) [phone]   [address] (Maternity/NICU/Pediatrics) [phone]   [facility] [phone]   [facility] 87 [phone]   [address] [phone]   220 [facility] [phone] 90 [facility] [phone]   [address] [place] [address] 119 [phone]   [facility]  [phone]   405 [facility] [phone] 412 [facility] [address] [facility] [phone]

## 2022-11-09 ENCOUNTER — OFFICE VISIT (OUTPATIENT)
Dept: INTERNAL MEDICINE CLINIC | Facility: OTHER | Age: 57
End: 2022-11-09

## 2022-11-09 VITALS
DIASTOLIC BLOOD PRESSURE: 60 MMHG | HEART RATE: 110 BPM | HEIGHT: 63 IN | TEMPERATURE: 97.7 F | BODY MASS INDEX: 28.35 KG/M2 | OXYGEN SATURATION: 98 % | SYSTOLIC BLOOD PRESSURE: 94 MMHG | WEIGHT: 160 LBS

## 2022-11-09 DIAGNOSIS — R79.89 ELEVATED LFTS: ICD-10-CM

## 2022-11-09 DIAGNOSIS — E78.00 HYPERCHOLESTEREMIA: ICD-10-CM

## 2022-11-09 DIAGNOSIS — I10 PRIMARY HYPERTENSION: ICD-10-CM

## 2022-11-09 DIAGNOSIS — N17.9 AKI (ACUTE KIDNEY INJURY) (HCC): Primary | ICD-10-CM

## 2022-11-09 DIAGNOSIS — E11.22 TYPE 2 DIABETES MELLITUS WITH CHRONIC KIDNEY DISEASE, WITHOUT LONG-TERM CURRENT USE OF INSULIN, UNSPECIFIED CKD STAGE (HCC): ICD-10-CM

## 2022-11-09 DIAGNOSIS — N30.01 ACUTE CYSTITIS WITH HEMATURIA: ICD-10-CM

## 2022-11-09 DIAGNOSIS — N18.2 CKD (CHRONIC KIDNEY DISEASE) STAGE 2, GFR 60-89 ML/MIN: ICD-10-CM

## 2022-11-09 DIAGNOSIS — R19.7 PROTRACTED DIARRHEA: ICD-10-CM

## 2022-11-09 PROBLEM — R50.9 FEVER: Status: RESOLVED | Noted: 2022-11-03 | Resolved: 2022-11-09

## 2022-11-09 LAB
SL AMB  POCT GLUCOSE, UA: NORMAL
SL AMB LEUKOCYTE ESTERASE,UA: NORMAL
SL AMB POCT BILIRUBIN,UA: NORMAL
SL AMB POCT BLOOD,UA: NORMAL
SL AMB POCT CLARITY,UA: NORMAL
SL AMB POCT COLOR,UA: NORMAL
SL AMB POCT KETONES,UA: NORMAL
SL AMB POCT NITRITE,UA: NORMAL
SL AMB POCT PH,UA: 5.5
SL AMB POCT SPECIFIC GRAVITY,UA: 1.02
SL AMB POCT URINE PROTEIN: NORMAL
SL AMB POCT UROBILINOGEN: 1

## 2022-11-09 RX ORDER — CIPROFLOXACIN 500 MG/1
500 TABLET, FILM COATED ORAL EVERY 12 HOURS SCHEDULED
Qty: 14 TABLET | Refills: 0 | Status: SHIPPED | OUTPATIENT
Start: 2022-11-09 | End: 2022-11-16

## 2022-11-09 NOTE — ASSESSMENT & PLAN NOTE
Discussed with Dr Brenda Brown  Will get stat CT of abdomen and pelvis  Will get CBC CMP and send urine for culture  Advised patient to stay well hydrated  Will give work note for the rest of this week, follow-up on Monday or sooner if needed  Advised patient to stop lisinopril as patient is hypotensive and tachycardic  Continue to monitor blood pressures at home  Start on Cipro

## 2022-11-09 NOTE — LETTER
November 9, 2022     Patient: Jad Major  YOB: 1965  Date of Visit: 11/9/2022      To Whom it May Concern:    Jad Major is under my professional care  Esvin Powers was seen in my office on 11/9/2022  Esvin Powers will remain out of work until specified  If you have any questions or concerns, please don't hesitate to call           Sincerely,          LEONARD Rodriguez        CC: No Recipients

## 2022-11-09 NOTE — PROGRESS NOTES
Assessment/Plan:    Acute cystitis with hematuria  Discussed with Dr Leonel Thomas  Will get stat CT of abdomen and pelvis  Will get CBC CMP and send urine for culture  Advised patient to stay well hydrated  Will give work note for the rest of this week, follow-up on Monday or sooner if needed  Advised patient to stop lisinopril as patient is hypotensive and tachycardic  Continue to monitor blood pressures at home  Start on Cipro  Diagnoses and all orders for this visit:    TOMMY (acute kidney injury) ([facility] [address] )  -     CBC and differential; Future  -     Comprehensive metabolic panel; Future  -     Urine culture  -     CT abdomen pelvis w contrast; Future    Acute cystitis with hematuria  -     CBC and differential; Future  -     Comprehensive metabolic panel; Future  -     Urine culture  -     CT abdomen pelvis w contrast; Future  -     ciprofloxacin (CIPRO) 500 mg tablet; Take 1 tablet (500 mg total) by mouth every 12 (twelve) hours for 7 days    Type 2 diabetes mellitus with chronic kidney disease, without long-term current use of insulin, unspecified CKD stage (HCC)    Primary hypertension    CKD (chronic kidney disease) stage 2, GFR 60-89 ml/min    Hypercholesteremia    Elevated LFTs    Protracted diarrhea          Subjective:      Patient ID: Jersey Ruelas is a 62 y o  female  Patient presents today for a TCM appointment, she was admitted from 11/4-11/6 for diarrhea:    Hospitalization as per Dr Mirela Hernandez:  "As per Dr Candido FLOOD "Mrs Jenni Selby is a 61 years old female with past medical history of hypertension, controlled type 2 diabetes mellitus, asthma, diverticulitis, GERD on Prilosec, hypertension, hyperlipidemia, anxiety and depression who came in with fever, myalgia and diarrhea   Patient reports she started having fever on 10/29/2022   She reports she was having intermittent fever and temperature up to 104F for which she has been taking 2 tablets Tylenol every Q 6 hourly   Patient reports she started having diarrhea which is mostly watery, no blood, at least 6 to 7 times a day   She reports it is mucousy and she started noticing some blood this evening   She reports of myalgia along with fever and diarrhea  No peculiar smell   She denies any nausea, vomiting, abdominal pain  She reports of loss of appetite   Denies any sick contact at home  John Hearing lives with her  who has no symptoms   Denies any antibiotics use, any recent trouble, eating outside food, hiking   She reports she drinks the water from the tap and she uses the water filter   Denies any similar episode in the past   She reports she had a colonoscopy many years ago, reported polyps removed   She does have history of diverticulitis in the past  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  She reports she takes omeprazole for many years for GERD   She is a former smoker, quit smoking many years ago  Brittney Minder any alcohol or other illicit drug use "     Patient was placed on IV fluids with improvement in serum creatinine and subsequent resolution in diarrheal episodes  Stool enteric panel in C diff panel were negative  And electrolyte derangements were corrected as needed  Patient was advanced to regular diet and tolerated it well with transition to having BMs that have been regular and more solid  At this point, patient has medically stable for discharge to home  Work letter was provided to the patient  Patient is strongly urged to follow up with a BMP in 1 week  Patient also advised to hold off on lisinopril for the next 3-4 days due to recent TOMMY "    Continues w/ feeling very fatigued and slight weakness   Continues with diarrhea   Feels like she smell blood and a very fowl odor when BM      She also reports blood to her urine        TCM Call     Date and time call was made  11/7/2022  8:37 AM    Hospital care reviewed  Records reviewed    Patient was hospitialized at  [facility]    Date of Admission  11/04/22    Date of discharge  11/06/22    Diagnosis  protracted diarrhea    Disposition  Home    Were the patients medications reviewed and updated  Yes    Current Symptoms  Loose Stools    Loose stool severity  Mild      TCM Call     Post hospital issues  None    Should patient be enrolled in anticoag monitoring? No    Scheduled for follow up? Yes    Did you obtain your prescribed medications  Yes    Do you need help managing your prescriptions or medications  No    Is transportation to your appointment needed  No    I have advised the patient to call PCP with any new or worsening symptoms  Shayne Daniel CMA            The following portions of the patient's history were reviewed and updated as appropriate: allergies, current medications, past family history, past medical history, past social history, past surgical history and problem list     Review of Systems   Constitutional: Positive for fatigue  Negative for activity change, appetite change, chills, diaphoresis and fever  HENT: Negative for congestion, ear discharge, ear pain, postnasal drip, rhinorrhea, sinus pressure, sinus pain and sore throat  Eyes: Negative for pain, discharge, itching and visual disturbance  Respiratory: Negative for cough, chest tightness, shortness of breath and wheezing  Cardiovascular: Negative for chest pain, palpitations and leg swelling  Gastrointestinal: Positive for diarrhea  Negative for abdominal pain, constipation, nausea and vomiting  Endocrine: Negative for polydipsia, polyphagia and polyuria  Genitourinary: Positive for hematuria  Negative for difficulty urinating, dysuria and urgency  Musculoskeletal: Negative for arthralgias, back pain and neck pain  Skin: Negative for rash and wound  Neurological: Negative for dizziness, weakness, numbness and headaches           Past Medical History:   Diagnosis Date   • Anxiety    • Asthma    • Colon polyp    • Depression    • Diabetes mellitus ([facility] [address] )    • Diverticulitis    • Diverticulitis of colon    • Follicular cyst of ovary 12/23/2014   • Former smoker    • GERD (gastroesophageal reflux disease)    • Glycosuria    • Headache    • Hyperlipidemia    • Hypertension    • Kidney calculi 1/11/2019   • Kidney stone    • Obesity    • Ovarian cyst    • Overactive bladder    • Overweight    • Pulmonary nodule    • Renal calculus    • Seasonal allergies    • Tinea pedis of left foot 6/8/2020   • Vertigo    • Wears partial dentures     Lower plate -partial         Current Outpatient Medications:   •  acetaminophen (TYLENOL) 500 mg tablet, Take 500 mg by mouth every 6 (six) hours as needed for mild pain, Disp: , Rfl:   •  Ascorbic Acid (vitamin C) 1000 MG tablet, Take 2,000 mg by mouth daily, Disp: , Rfl:   •  atorvastatin (LIPITOR) 40 mg tablet, Take 1 tablet (40 mg total) by mouth daily, Disp: 90 tablet, Rfl: 1  •  Cholecalciferol (Vitamin D3) 50 MCG (2000 UT) TABS, Take 2,000 Units by mouth daily, Disp: , Rfl:   •  ciprofloxacin (CIPRO) 500 mg tablet, Take 1 tablet (500 mg total) by mouth every 12 (twelve) hours for 7 days, Disp: 14 tablet, Rfl: 0  •  lisinopril (ZESTRIL) 10 mg tablet, Take 1 tablet (10 mg total) by mouth daily, Disp: 90 tablet, Rfl: 1  •  metFORMIN (GLUCOPHAGE) 1000 MG tablet, Take 1 tablet (1,000 mg total) by mouth 2 (two) times a day with meals, Disp: 180 tablet, Rfl: 1  •  metoprolol tartrate (LOPRESSOR) 25 mg tablet, Take 0 5 tablets (12 5 mg total) by mouth every 12 (twelve) hours, Disp: 30 tablet, Rfl: 0  •  multivitamin (THERAGRAN) TABS, Take 1 tablet by mouth daily, Disp: , Rfl:   •  omeprazole (PriLOSEC) 40 MG capsule, Take 1 capsule (40 mg total) by mouth daily, Disp: 90 capsule, Rfl: 1  •  sertraline (ZOLOFT) 50 mg tablet, Take 1 tablet (50 mg total) by mouth daily Takes in the am , Disp: 90 tablet, Rfl: 1    Allergies   Allergen Reactions   • Oxybutynin Anaphylaxis     "feels like throat closing and can't swallow" • Clonazepam Other (See Comments)     Pt states "didn't feel right on it "   • Morphine Other (See Comments) and Confusion     Annotation - [id number]: "dopey"  Gets silly   • Venlafaxine Other (See Comments)     Unknown--pt states " MD was trying pt on different medications  Pt unsure of reaction "       Social History   Past Surgical History:   Procedure Laterality Date   • APPENDECTOMY     • BLADDER SUSPENSION      [facility]   • CERVICAL BIOPSY  W/ LOOP ELECTRODE EXCISION     • COLONOSCOPY     • DILATION AND CURETTAGE OF UTERUS     • FL RETROGRADE PYELOGRAM  7/26/2021   • FL RETROGRADE PYELOGRAM  11/18/2021   • HYSTERECTOMY  2007    ovaries present bilaterally   • JOINT REPLACEMENT Right 05/2021    right knee   • ME COLONOSCOPY FLX DX W/COLLJ SPEC WHEN PFRMD N/A 9/26/2017    Procedure: EGD AND COLONOSCOPY;  Surgeon: Teagan Whittaker MD;  Location: [facility];   Service: Gastroenterology   • ME CYSTO/URETERO W/LITHOTRIPSY &INDWELL STENT INSRT Bilateral 7/26/2021    Procedure: CYSTOSCOPY URETEROSCOPY WITH LITHOTRIPSY HOLMIUM LASER, RETROGRADE PYELOGRAM AND INSERTION STENT URETERAL;  Surgeon: Evelyne Vogel MD;  [facility];  Service: Urology   • ME CYSTO/URETERO W/LITHOTRIPSY &INDWELL STENT INSRT Left 9/16/2021    Procedure: CYSTOSCOPY URETEROSCOPY WITH LITHOTRIPSY HOLMIUM LASER, RETROGRADE PYELOGRAM AND INSERTION STENT URETERAL;  Surgeon: Thong Ortega MD;  Location: [facility];  Service: Urology   • ME CYSTO/URETERO W/LITHOTRIPSY &INDWELL STENT INSRT Left 11/18/2021    Procedure: CYSTOSCOPY URETEROSCOPY WITH LITHOTRIPSY HOLMIUM LASER, RETROGRADE PYELOGRAM AND INSERTION STENT URETERAL;  Surgeon: Denver Fey, MD;  [facility];  Service: Urology   • ME REVISE MEDIAN N/CARPAL TUNNEL SURG Right 5/29/2018    Procedure: CARPAL TUNNEL RELEASE;  Surgeon: Kirby Hughes DO;  Location[facility];  Service: Orthopedics   • ME TOTAL KNEE ARTHROPLASTY Right 5/19/2021    Procedure: ARTHROPLASTY KNEE TOTAL;  Surgeon: Sabino Robertson MD;  [facility];  Service: Orthopedics   • REMOVAL URETERAL STENT Right 9/16/2021    Procedure: REMOVAL STENT URETERAL;  Surgeon: Loy Ramos MD;  [facility];  Service: Urology   • TONSILLECTOMY     • TUBAL LIGATION     • URETEROSCOPY  7/26/2021    Procedure: URETEROSCOPY, LASER AND BASKET STONE EXTRACTION;  Surgeon: Leyla Palomo MD;  [facility];  Service: Urology   • WISDOM TOOTH EXTRACTION       Family History   Problem Relation Age of Onset   • Diabetes type II Mother    • Asthma Mother    • Stroke Father    • Pancreatic cancer Father 68   • Diabetes type II Father    • No Known Problems Maternal Grandmother    • No Known Problems Maternal Grandfather    • No Known Problems Paternal Grandmother    • No Known Problems Paternal Grandfather    • No Known Problems Sister    • No Known Problems Sister    • No Known Problems Sister    • No Known Problems Sister    • No Known Problems Sister    • No Known Problems Maternal Aunt    • No Known Problems Paternal Aunt        Objective:  BP 94/60 (BP Location: Left arm, Patient Position: Sitting, Cuff Size: Adult)   Pulse (!) 110   Temp 97 7 °F (36 5 °C) (Temporal)   Ht 5' 3" (1 6 m)   Wt 72 6 kg (160 lb)   SpO2 98%   BMI 28 34 kg/m²     Recent Results (from the past 1344 hour(s))   Cov/Flu-Collected at [facility] or [facility]    Collection Time: 11/01/22 10:56 AM    Specimen: Nose; Nares   Result Value Ref Range    SARS-CoV-2 Negative Negative    INFLUENZA A PCR Negative Negative    INFLUENZA B PCR Negative Negative   CBC and differential    Collection Time: 11/03/22 10:18 AM   Result Value Ref Range    WBC 14 14 (H) 4 31 - 10 16 Thousand/uL    RBC 4 15 3 81 - 5 12 Million/uL    Hemoglobin 11 8 11 5 - 15 4 g/dL    Hematocrit 36 8 34 8 - 46 1 %    MCV 89 82 - 98 fL    MCH 28 4 26 8 - 34 3 pg    MCHC 32 1 31 4 - 37 4 g/dL    RDW 15 3 (H) 11 6 - 15 1 %    MPV 11 7 8 9 - 12 7 fL    Platelets 399 335 - 390 Thousands/uL    nRBC 0 /100 WBCs    Neutrophils Relative 78 (H) 43 - 75 %    Immat GRANS % 2 0 - 2 %    Lymphocytes Relative 7 (L) 14 - 44 %    Monocytes Relative 11 4 - 12 %    Eosinophils Relative 1 0 - 6 %    Basophils Relative 1 0 - 1 %    Neutrophils Absolute 11 13 (H) 1 85 - 7 62 Thousands/µL    Immature Grans Absolute 0 29 (H) 0 00 - 0 20 Thousand/uL    Lymphocytes Absolute 1 00 0 60 - 4 47 Thousands/µL    Monocytes Absolute 1 53 (H) 0 17 - 1 22 Thousand/µL    Eosinophils Absolute 0 11 0 00 - 0 61 Thousand/µL    Basophils Absolute 0 08 0 00 - 0 10 Thousands/µL   Comprehensive metabolic panel    Collection Time: 11/03/22 10:18 AM   Result Value Ref Range    Sodium 133 (L) 135 - 147 mmol/L    Potassium 4 4 3 5 - 5 3 mmol/L    Chloride 101 96 - 108 mmol/L    CO2 23 21 - 32 mmol/L    ANION GAP 9 4 - 13 mmol/L    BUN 63 (H) 5 - 25 mg/dL    Creatinine 2 44 (H) 0 60 - 1 30 mg/dL    Glucose 155 (H) 65 - 140 mg/dL    Calcium 9 5 8 3 - 10 1 mg/dL    Corrected Calcium 10 9 (H) 8 3 - 10 1 mg/dL    AST 45 5 - 45 U/L    ALT 63 12 - 78 U/L    Alkaline Phosphatase 255 (H) 46 - 116 U/L    Total Protein 7 4 6 4 - 8 4 g/dL    Albumin 2 3 (L) 3 5 - 5 0 g/dL    Total Bilirubin 0 93 0 20 - 1 00 mg/dL    eGFR 21 ml/min/1 73sq m   Clostridium difficile toxin by PCR    Collection Time: 11/04/22 10:59 AM    Specimen: Per Rectum; Stool   Result Value Ref Range    C difficile toxin by PCR Negative Negative   Stool Enteric Bacterial Panel by PCR    Collection Time: 11/04/22 10:59 AM    Specimen: Rectum; Stool   Result Value Ref Range    Salmonella sp PCR None Detected None Detected    Shigella sp/Enteroinvasive E  coli (EIEC) PCR None Detected None Detected    Campylobacter sp (jejuni and coli) PCR None Detected None Detected    Shiga toxin 1/Shiga toxin 2 genes PCR None Detected None Detected   CBC and differential    Collection Time: 11/04/22  3:30 PM   Result Value Ref Range    WBC 16 10 (H) 4 31 - 10 16 Thousand/uL    RBC 4 08 3 81 - 5 12 Million/uL    Hemoglobin 12 2 11 5 - 15 4 g/dL    Hematocrit 36 6 34 8 - 46 1 %    MCV 90 82 - 98 fL    MCH 29 9 26 8 - 34 3 pg    MCHC 33 3 31 4 - 37 4 g/dL    RDW 15 3 (H) 11 6 - 15 1 %    MPV 10 5 8 9 - 12 7 fL    Platelets 922 299 - 899 Thousands/uL   Comprehensive metabolic panel    Collection Time: 11/04/22  3:30 PM   Result Value Ref Range    Sodium 135 135 - 147 mmol/L    Potassium 4 8 3 5 - 5 3 mmol/L    Chloride 105 96 - 108 mmol/L    CO2 22 21 - 32 mmol/L    ANION GAP 8 4 - 13 mmol/L    BUN 55 (H) 5 - 25 mg/dL    Creatinine 1 85 (H) 0 60 - 1 30 mg/dL    Glucose 120 65 - 140 mg/dL    Calcium 10 0 8 3 - 10 1 mg/dL    Corrected Calcium 11 3 (H) 8 3 - 10 1 mg/dL    AST 88 (H) 5 - 45 U/L     (H) 12 - 78 U/L    Alkaline Phosphatase 413 (H) 46 - 116 U/L    Total Protein 8 2 6 4 - 8 4 g/dL    Albumin 2 4 (L) 3 5 - 5 0 g/dL    Total Bilirubin 1 05 (H) 0 20 - 1 00 mg/dL    eGFR 29 ml/min/1 73sq m   FLU/RSV/COVID - if FLU/RSV clinically relevant    Collection Time: 11/04/22  3:30 PM    Specimen: Nose; Nares   Result Value Ref Range    SARS-CoV-2 Negative Negative    INFLUENZA A PCR Negative Negative    INFLUENZA B PCR Negative Negative    RSV PCR Negative Negative   Manual Differential(PHLEBS Do Not Order)    Collection Time: 11/04/22  3:30 PM   Result Value Ref Range    Segmented % 62 43 - 75 %    Bands % 19 (H) 0 - 8 %    Lymphocytes % 6 (L) 14 - 44 %    Monocytes % 10 4 - 12 %    Eosinophils, % 1 0 - 6 %    Basophils % 0 0 - 1 %    Atypical Lymphocytes % 1 (H) <=0 %    Plasma Cells % 1 (H) 0 - 0 %    Absolute Neutrophils 13 04 (H) 1 85 - 7 62 Thousand/uL    Lymphocytes Absolute 0 97 0 60 - 4 47 Thousand/uL    Monocytes Absolute 1 61 (H) 0 00 - 1 22 Thousand/uL    Eosinophils Absolute 0 16 0 00 - 0 40 Thousand/uL    Basophils Absolute 0 00 0 00 - 0 10 Thousand/uL    Total Counted      RBC Morphology Present     Anisocytosis Present     Macrocytes Present     Polychromasia Present     Platelet Estimate Adequate Adequate    Clumped Platelets Present     Giant PLTs Present    Urine Macroscopic, POC    Collection Time: 11/04/22  4:20 PM   Result Value Ref Range    Color, UA Rosa     Clarity, UA Slightly Cloudy     pH, UA 5 5 4 5 - 8 0    Leukocytes, UA Small (A) Negative    Nitrite, UA Negative Negative    Protein, UA Negative Negative mg/dl    Glucose, UA Negative Negative mg/dl    Ketones, UA Negative Negative mg/dl    Urobilinogen, UA 0 2 0 2, 1 0 E U /dl E U /dl    Bilirubin, UA Negative Negative    Occult Blood, UA Large (A) Negative    Specific Gravity, UA 1 015 1 003 - 1 030   Urine Microscopic    Collection Time: 11/04/22  4:20 PM   Result Value Ref Range    RBC, UA 4-10 (A) None Seen, 1-2 /hpf    WBC, UA Innumerable (A) None Seen, 1-2 /hpf    Epithelial Cells Occasional None Seen, Occasional /hpf    Bacteria, UA Moderate (A) None Seen, Occasional /hpf    MUCUS THREADS Occasional (A) None Seen    Amorphous Crystals, UA Occasional     WBC Clumps Present    Urine culture    Collection Time: 11/04/22  4:20 PM    Specimen: Urine, Other   Result Value Ref Range    Urine Culture 20,000-29,000 cfu/ml Pseudomonas aeruginosa (A)     Urine Culture <10,000 cfu/ml Pseudomonas aeruginosa (A)     Urine Culture 50,000-59,000 cfu/ml Lactobacillus species (A)        Susceptibility    Pseudomonas aeruginosa - RICHARD     ZID Performed Yes       Aztreonam ($$$)  <=4 Susceptible ug/ml     Cefepime ($) <=2 00 Susceptible ug/ml     Ceftazidime ($$) <=1 Susceptible ug/ml     Ciprofloxacin ($)  <=0 25 Susceptible ug/ml     Gentamicin ($$) <=2 Susceptible ug/ml     Imipenem 2 Susceptible ug/ml     Levofloxacin ($) <=0 50 Susceptible ug/ml     Meropenem ($$) <=1 00 Susceptible ug/ml     Piperacillin + Tazobactam ($$$) <=8 Susceptible ug/ml     Tobramycin ($) <=2 Susceptible ug/ml    Pseudomonas aeruginosa - RICHARD     ZID Performed Yes       Aztreonam ($$$)  <=4 Susceptible ug/ml     Cefepime ($) <=2 00 Susceptible ug/ml Ceftazidime ($$) <=1 Susceptible ug/ml     Ciprofloxacin ($)  <=0 25 Susceptible ug/ml     Gentamicin ($$) <=2 Susceptible ug/ml     Imipenem <=1 Susceptible ug/ml     Levofloxacin ($) <=0 50 Susceptible ug/ml     Meropenem ($$) <=1 00 Susceptible ug/ml     Piperacillin + Tazobactam ($$$) <=8 Susceptible ug/ml     Tobramycin ($) <=2 Susceptible ug/ml   Hepatitis panel, acute    Collection Time: 11/05/22  5:05 AM   Result Value Ref Range    Hepatitis B Surface Ag Non-reactive Non-reactive, NonReactive - Confirmed    Hep A IgM Non-reactive Non-reactive, Equivocal-Suggest Recollect    Hepatitis C Ab Non-reactive Non-reactive    Hep B C IgM Non-reactive Non-reactive   TSH, 3rd generation    Collection Time: 11/05/22  5:05 AM   Result Value Ref Range    TSH 3RD GENERATON 0 910 0 450 - 4 500 uIU/mL   Comprehensive metabolic panel    Collection Time: 11/05/22  5:05 AM   Result Value Ref Range    Sodium 140 135 - 147 mmol/L    Potassium 4 9 3 5 - 5 3 mmol/L    Chloride 112 (H) 96 - 108 mmol/L    CO2 22 21 - 32 mmol/L    ANION GAP 6 4 - 13 mmol/L    BUN 46 (H) 5 - 25 mg/dL    Creatinine 1 37 (H) 0 60 - 1 30 mg/dL    Glucose 123 65 - 140 mg/dL    Calcium 8 8 8 3 - 10 1 mg/dL    Corrected Calcium 10 5 (H) 8 3 - 10 1 mg/dL    AST 55 (H) 5 - 45 U/L     (H) 12 - 78 U/L    Alkaline Phosphatase 324 (H) 46 - 116 U/L    Total Protein 6 6 6 4 - 8 4 g/dL    Albumin 1 9 (L) 3 5 - 5 0 g/dL    Total Bilirubin 0 79 0 20 - 1 00 mg/dL    eGFR 42 ml/min/1 73sq m   Magnesium    Collection Time: 11/05/22  5:05 AM   Result Value Ref Range    Magnesium 1 9 1 6 - 2 6 mg/dL   Phosphorus    Collection Time: 11/05/22  5:05 AM   Result Value Ref Range    Phosphorus 3 6 2 7 - 4 5 mg/dL   CBC and differential    Collection Time: 11/05/22  5:05 AM   Result Value Ref Range    WBC 13 95 (H) 4 31 - 10 16 Thousand/uL    RBC 3 63 (L) 3 81 - 5 12 Million/uL    Hemoglobin 10 8 (L) 11 5 - 15 4 g/dL    Hematocrit 32 4 (L) 34 8 - 46 1 %    MCV 89 82 - 98 fL MCH 29 8 26 8 - 34 3 pg    MCHC 33 3 31 4 - 37 4 g/dL    RDW 15 3 (H) 11 6 - 15 1 %    MPV 10 1 8 9 - 12 7 fL    Platelets 670 183 - 911 Thousands/uL   Manual Differential(PHLEBS Do Not Order)    Collection Time: 11/05/22  5:05 AM   Result Value Ref Range    Segmented % 74 43 - 75 %    Bands % 1 0 - 8 %    Lymphocytes % 9 (L) 14 - 44 %    Monocytes % 8 4 - 12 %    Eosinophils, % 1 0 - 6 %    Basophils % 0 0 - 1 %    Metamyelocytes% 1 0 - 1 %    Myelocytes % 1 0 - 1 %    Atypical Lymphocytes % 4 (H) <=0 %    Plasma Cells % 1 (H) 0 - 0 %    Absolute Neutrophils 10 46 (H) 1 85 - 7 62 Thousand/uL    Lymphocytes Absolute 1 26 0 60 - 4 47 Thousand/uL    Monocytes Absolute 1 12 0 00 - 1 22 Thousand/uL    Eosinophils Absolute 0 14 0 00 - 0 40 Thousand/uL    Basophils Absolute 0 00 0 00 - 0 10 Thousand/uL    Total Counted      Toxic Granulation Present     RBC Morphology Present     Platelet Estimate Adequate Adequate    Differential Comment see note    CBC and differential    Collection Time: 11/06/22  5:25 AM   Result Value Ref Range    WBC 15 72 (H) 4 31 - 10 16 Thousand/uL    RBC 3 49 (L) 3 81 - 5 12 Million/uL    Hemoglobin 10 2 (L) 11 5 - 15 4 g/dL    Hematocrit 32 5 (L) 34 8 - 46 1 %    MCV 93 82 - 98 fL    MCH 29 2 26 8 - 34 3 pg    MCHC 31 4 31 4 - 37 4 g/dL    RDW 15 4 (H) 11 6 - 15 1 %    MPV 10 2 8 9 - 12 7 fL    Platelets 171 (H) 045 - 390 Thousands/uL    nRBC 0 /100 WBCs   Comprehensive metabolic panel    Collection Time: 11/06/22  5:25 AM   Result Value Ref Range    Sodium 143 135 - 147 mmol/L    Potassium 4 5 3 5 - 5 3 mmol/L    Chloride 116 (H) 96 - 108 mmol/L    CO2 20 (L) 21 - 32 mmol/L    ANION GAP 7 4 - 13 mmol/L    BUN 26 (H) 5 - 25 mg/dL    Creatinine 1 00 0 60 - 1 30 mg/dL    Glucose 126 65 - 140 mg/dL    Calcium 9 1 8 3 - 10 1 mg/dL    Corrected Calcium 10 8 (H) 8 3 - 10 1 mg/dL    AST 58 (H) 5 - 45 U/L     (H) 12 - 78 U/L    Alkaline Phosphatase 358 (H) 46 - 116 U/L    Total Protein 6 5 6 4 - 8 4 g/dL    Albumin 1 9 (L) 3 5 - 5 0 g/dL    Total Bilirubin 0 83 0 20 - 1 00 mg/dL    eGFR 62 ml/min/1 73sq m            Physical Exam  Constitutional:       General: She is not in acute distress  Appearance: She is well-developed  She is ill-appearing  She is not diaphoretic  Comments: pale   HENT:      Head: Normocephalic and atraumatic  Right Ear: External ear normal       Left Ear: External ear normal       Nose: Nose normal       Mouth/Throat:      Pharynx: No oropharyngeal exudate  Eyes:      General:         Right eye: No discharge  Left eye: No discharge  Conjunctiva/sclera: Conjunctivae normal       Pupils: Pupils are equal, round, and reactive to light  Neck:      Thyroid: No thyromegaly  Cardiovascular:      Rate and Rhythm: Normal rate and regular rhythm  Heart sounds: Normal heart sounds  No murmur heard  No friction rub  No gallop  Pulmonary:      Effort: Pulmonary effort is normal  No respiratory distress  Breath sounds: Normal breath sounds  No stridor  No wheezing or rales  Abdominal:      General: Bowel sounds are normal  There is no distension  Palpations: Abdomen is soft  Tenderness: There is no abdominal tenderness  Musculoskeletal:      Cervical back: Normal range of motion and neck supple  Lymphadenopathy:      Cervical: No cervical adenopathy  Skin:     General: Skin is warm and dry  Findings: No erythema or rash  Neurological:      Mental Status: She is alert and oriented to person, place, and time  Psychiatric:         Behavior: Behavior normal          Thought Content:  Thought content normal          Judgment: Judgment normal

## 2022-11-10 ENCOUNTER — ANESTHESIA (INPATIENT)
Dept: PERIOP | Facility: HOSPITAL | Age: 57
End: 2022-11-10

## 2022-11-10 ENCOUNTER — APPOINTMENT (INPATIENT)
Dept: RADIOLOGY | Facility: HOSPITAL | Age: 57
End: 2022-11-10

## 2022-11-10 ENCOUNTER — PREP FOR PROCEDURE (OUTPATIENT)
Dept: UROLOGY | Facility: CLINIC | Age: 57
End: 2022-11-10

## 2022-11-10 ENCOUNTER — TELEPHONE (OUTPATIENT)
Dept: OTHER | Facility: HOSPITAL | Age: 57
End: 2022-11-10

## 2022-11-10 ENCOUNTER — TELEPHONE (OUTPATIENT)
Dept: INTERNAL MEDICINE CLINIC | Facility: OTHER | Age: 57
End: 2022-11-10

## 2022-11-10 ENCOUNTER — HOSPITAL ENCOUNTER (OUTPATIENT)
Dept: RADIOLOGY | Facility: HOSPITAL | Age: 57
Discharge: HOME | End: 2022-11-10

## 2022-11-10 ENCOUNTER — APPOINTMENT (OUTPATIENT)
Dept: LAB | Facility: CLINIC | Age: 57
End: 2022-11-10

## 2022-11-10 ENCOUNTER — HOSPITAL ENCOUNTER (INPATIENT)
Facility: HOSPITAL | Age: 57
LOS: 1 days | Discharge: HOME | End: 2022-11-11
Attending: EMERGENCY MEDICINE | Admitting: INTERNAL MEDICINE

## 2022-11-10 ENCOUNTER — ANESTHESIA EVENT (INPATIENT)
Dept: PERIOP | Facility: HOSPITAL | Age: 57
End: 2022-11-10

## 2022-11-10 DIAGNOSIS — I15.9 SECONDARY HYPERTENSION: ICD-10-CM

## 2022-11-10 DIAGNOSIS — N20.0 KIDNEY STONE: ICD-10-CM

## 2022-11-10 DIAGNOSIS — N39.0 UTI (URINARY TRACT INFECTION): Primary | ICD-10-CM

## 2022-11-10 DIAGNOSIS — N30.01 ACUTE CYSTITIS WITH HEMATURIA: ICD-10-CM

## 2022-11-10 DIAGNOSIS — N17.9 AKI (ACUTE KIDNEY INJURY) (HCC): ICD-10-CM

## 2022-11-10 DIAGNOSIS — N18.2 CKD (CHRONIC KIDNEY DISEASE) STAGE 2, GFR 60-89 ML/MIN: ICD-10-CM

## 2022-11-10 DIAGNOSIS — N20.1 RIGHT URETERAL STONE: Primary | ICD-10-CM

## 2022-11-10 DIAGNOSIS — N12 PYELONEPHRITIS: Primary | ICD-10-CM

## 2022-11-10 DIAGNOSIS — N13.2 HYDRONEPHROSIS WITH OBSTRUCTING CALCULUS: ICD-10-CM

## 2022-11-10 LAB
ALBUMIN SERPL BCP-MCNC: 2.1 G/DL (ref 3.5–5)
ALP SERPL-CCNC: 259 U/L (ref 46–116)
ALT SERPL W P-5'-P-CCNC: 78 U/L (ref 12–78)
ANION GAP SERPL CALCULATED.3IONS-SCNC: 4 MMOL/L (ref 4–13)
AST SERPL W P-5'-P-CCNC: 21 U/L (ref 5–45)
BACTERIA UR QL AUTO: ABNORMAL /HPF
BASOPHILS # BLD AUTO: 0.05 THOUSANDS/ÂΜL (ref 0–0.1)
BASOPHILS NFR BLD AUTO: 0 % (ref 0–1)
BILIRUB SERPL-MCNC: 0.48 MG/DL (ref 0.2–1)
BILIRUB UR QL STRIP: NEGATIVE
BUN SERPL-MCNC: 20 MG/DL (ref 5–25)
CALCIUM ALBUM COR SERPL-MCNC: 10.4 MG/DL (ref 8.3–10.1)
CALCIUM SERPL-MCNC: 8.9 MG/DL (ref 8.3–10.1)
CHLORIDE SERPL-SCNC: 105 MMOL/L (ref 96–108)
CHOLEST SERPL-MCNC: 79 MG/DL
CLARITY UR: ABNORMAL
CO2 SERPL-SCNC: 28 MMOL/L (ref 21–32)
COLOR UR: COLORLESS
CREAT SERPL-MCNC: 1.01 MG/DL (ref 0.6–1.3)
EOSINOPHIL # BLD AUTO: 0.13 THOUSAND/ÂΜL (ref 0–0.61)
EOSINOPHIL NFR BLD AUTO: 1 % (ref 0–6)
ERYTHROCYTE [DISTWIDTH] IN BLOOD BY AUTOMATED COUNT: 14.6 % (ref 11.6–15.1)
EST. AVERAGE GLUCOSE BLD GHB EST-MCNC: 128 MG/DL
GFR SERPL CREATININE-BSD FRML MDRD: 61 ML/MIN/1.73SQ M
GLUCOSE P FAST SERPL-MCNC: 117 MG/DL (ref 65–99)
GLUCOSE SERPL-MCNC: 124 MG/DL (ref 65–140)
GLUCOSE UR STRIP-MCNC: ABNORMAL MG/DL
HBA1C MFR BLD: 6.1 %
HCT VFR BLD AUTO: 32.3 % (ref 34.8–46.1)
HDLC SERPL-MCNC: 22 MG/DL
HGB BLD-MCNC: 10.2 G/DL (ref 11.5–15.4)
HGB UR QL STRIP.AUTO: ABNORMAL
IMM GRANULOCYTES # BLD AUTO: 0.18 THOUSAND/UL (ref 0–0.2)
IMM GRANULOCYTES NFR BLD AUTO: 2 % (ref 0–2)
KETONES UR STRIP-MCNC: NEGATIVE MG/DL
LACTATE SERPL-SCNC: 1.1 MMOL/L (ref 0.5–2)
LDLC SERPL CALC-MCNC: 32 MG/DL (ref 0–100)
LEUKOCYTE ESTERASE UR QL STRIP: ABNORMAL
LYMPHOCYTES # BLD AUTO: 1.74 THOUSANDS/ÂΜL (ref 0.6–4.47)
LYMPHOCYTES NFR BLD AUTO: 15 % (ref 14–44)
MCH RBC QN AUTO: 29.6 PG (ref 26.8–34.3)
MCHC RBC AUTO-ENTMCNC: 31.6 G/DL (ref 31.4–37.4)
MCV RBC AUTO: 94 FL (ref 82–98)
MONOCYTES # BLD AUTO: 0.83 THOUSAND/ÂΜL (ref 0.17–1.22)
MONOCYTES NFR BLD AUTO: 7 % (ref 4–12)
MUCOUS THREADS UR QL AUTO: ABNORMAL
NEUTROPHILS # BLD AUTO: 9.05 THOUSANDS/ÂΜL (ref 1.85–7.62)
NEUTS SEG NFR BLD AUTO: 75 % (ref 43–75)
NITRITE UR QL STRIP: NEGATIVE
NON-SQ EPI CELLS URNS QL MICRO: ABNORMAL /HPF
NONHDLC SERPL-MCNC: 57 MG/DL
NRBC BLD AUTO-RTO: 0 /100 WBCS
PH UR STRIP.AUTO: 5.5 [PH]
PLATELET # BLD AUTO: 629 THOUSANDS/UL (ref 149–390)
PMV BLD AUTO: 10 FL (ref 8.9–12.7)
POTASSIUM SERPL-SCNC: 4 MMOL/L (ref 3.5–5.3)
PROT SERPL-MCNC: 7.5 G/DL (ref 6.4–8.4)
PROT UR STRIP-MCNC: ABNORMAL MG/DL
RBC # BLD AUTO: 3.45 MILLION/UL (ref 3.81–5.12)
RBC #/AREA URNS AUTO: ABNORMAL /HPF
SODIUM SERPL-SCNC: 137 MMOL/L (ref 135–147)
SP GR UR STRIP.AUTO: 1.02 (ref 1–1.03)
TRIGL SERPL-MCNC: 125 MG/DL
UROBILINOGEN UR STRIP-ACNC: <2 MG/DL
WBC # BLD AUTO: 11.98 THOUSAND/UL (ref 4.31–10.16)
WBC #/AREA URNS AUTO: ABNORMAL /HPF

## 2022-11-10 PROCEDURE — BT1D1ZZ FLUOROSCOPY OF RIGHT KIDNEY, URETER AND BLADDER USING LOW OSMOLAR CONTRAST: ICD-10-PCS | Performed by: UROLOGY

## 2022-11-10 PROCEDURE — 0T768DZ DILATION OF RIGHT URETER WITH INTRALUMINAL DEVICE, VIA NATURAL OR ARTIFICIAL OPENING ENDOSCOPIC: ICD-10-PCS | Performed by: UROLOGY

## 2022-11-10 DEVICE — INLAY OPTIMA URETERAL STENT W/NITINOL GUIDEWIRE
Type: IMPLANTABLE DEVICE | Site: URETER | Status: FUNCTIONAL
Brand: BARD® INLAY OPTIMA® URETERAL STENT WITH NICORE™ GUIDEWIRE

## 2022-11-10 RX ORDER — MIDAZOLAM HYDROCHLORIDE 2 MG/2ML
INJECTION, SOLUTION INTRAMUSCULAR; INTRAVENOUS AS NEEDED
Status: DISCONTINUED | OUTPATIENT
Start: 2022-11-10 | End: 2022-11-10

## 2022-11-10 RX ORDER — PROPOFOL 10 MG/ML
INJECTION, EMULSION INTRAVENOUS AS NEEDED
Status: DISCONTINUED | OUTPATIENT
Start: 2022-11-10 | End: 2022-11-10

## 2022-11-10 RX ORDER — MAGNESIUM HYDROXIDE 1200 MG/15ML
LIQUID ORAL AS NEEDED
Status: DISCONTINUED | OUTPATIENT
Start: 2022-11-10 | End: 2022-11-10 | Stop reason: HOSPADM

## 2022-11-10 RX ORDER — SODIUM CHLORIDE, SODIUM LACTATE, POTASSIUM CHLORIDE, CALCIUM CHLORIDE 600; 310; 30; 20 MG/100ML; MG/100ML; MG/100ML; MG/100ML
20 INJECTION, SOLUTION INTRAVENOUS CONTINUOUS
Status: DISCONTINUED | OUTPATIENT
Start: 2022-11-10 | End: 2022-11-11 | Stop reason: HOSPADM

## 2022-11-10 RX ORDER — ALBUMIN, HUMAN INJ 5% 5 %
SOLUTION INTRAVENOUS CONTINUOUS PRN
Status: DISCONTINUED | OUTPATIENT
Start: 2022-11-10 | End: 2022-11-10

## 2022-11-10 RX ORDER — SODIUM CHLORIDE, SODIUM GLUCONATE, SODIUM ACETATE, POTASSIUM CHLORIDE, MAGNESIUM CHLORIDE, SODIUM PHOSPHATE, DIBASIC, AND POTASSIUM PHOSPHATE .53; .5; .37; .037; .03; .012; .00082 G/100ML; G/100ML; G/100ML; G/100ML; G/100ML; G/100ML; G/100ML
100 INJECTION, SOLUTION INTRAVENOUS CONTINUOUS
Status: DISCONTINUED | OUTPATIENT
Start: 2022-11-10 | End: 2022-11-11 | Stop reason: HOSPADM

## 2022-11-10 RX ORDER — LIDOCAINE HYDROCHLORIDE 20 MG/ML
INJECTION, SOLUTION EPIDURAL; INFILTRATION; INTRACAUDAL; PERINEURAL AS NEEDED
Status: DISCONTINUED | OUTPATIENT
Start: 2022-11-10 | End: 2022-11-10

## 2022-11-10 RX ORDER — GABAPENTIN 100 MG/1
300 CAPSULE ORAL ONCE
OUTPATIENT
Start: 2022-11-10 | End: 2022-11-10

## 2022-11-10 RX ORDER — MELATONIN
2000 DAILY
Status: DISCONTINUED | OUTPATIENT
Start: 2022-11-10 | End: 2022-11-11 | Stop reason: HOSPADM

## 2022-11-10 RX ORDER — FENTANYL CITRATE 50 UG/ML
INJECTION, SOLUTION INTRAMUSCULAR; INTRAVENOUS AS NEEDED
Status: DISCONTINUED | OUTPATIENT
Start: 2022-11-10 | End: 2022-11-10

## 2022-11-10 RX ORDER — ONDANSETRON 2 MG/ML
4 INJECTION INTRAMUSCULAR; INTRAVENOUS ONCE AS NEEDED
Status: DISCONTINUED | OUTPATIENT
Start: 2022-11-10 | End: 2022-11-10 | Stop reason: HOSPADM

## 2022-11-10 RX ORDER — PANTOPRAZOLE SODIUM 40 MG/1
40 TABLET, DELAYED RELEASE ORAL
Status: DISCONTINUED | OUTPATIENT
Start: 2022-11-11 | End: 2022-11-11 | Stop reason: HOSPADM

## 2022-11-10 RX ORDER — ACETAMINOPHEN 325 MG/1
650 TABLET ORAL EVERY 6 HOURS PRN
Status: DISCONTINUED | OUTPATIENT
Start: 2022-11-10 | End: 2022-11-11 | Stop reason: HOSPADM

## 2022-11-10 RX ORDER — ATORVASTATIN CALCIUM 40 MG/1
40 TABLET, FILM COATED ORAL DAILY
Status: DISCONTINUED | OUTPATIENT
Start: 2022-11-10 | End: 2022-11-11 | Stop reason: HOSPADM

## 2022-11-10 RX ORDER — HYDROMORPHONE HCL IN WATER/PF 6 MG/30 ML
0.2 PATIENT CONTROLLED ANALGESIA SYRINGE INTRAVENOUS
Status: DISCONTINUED | OUTPATIENT
Start: 2022-11-10 | End: 2022-11-10 | Stop reason: HOSPADM

## 2022-11-10 RX ORDER — KETOROLAC TROMETHAMINE 30 MG/ML
15 INJECTION, SOLUTION INTRAMUSCULAR; INTRAVENOUS EVERY 6 HOURS PRN
Status: DISCONTINUED | OUTPATIENT
Start: 2022-11-10 | End: 2022-11-11 | Stop reason: HOSPADM

## 2022-11-10 RX ORDER — ONDANSETRON 2 MG/ML
INJECTION INTRAMUSCULAR; INTRAVENOUS AS NEEDED
Status: DISCONTINUED | OUTPATIENT
Start: 2022-11-10 | End: 2022-11-10

## 2022-11-10 RX ORDER — FENTANYL CITRATE/PF 50 MCG/ML
25 SYRINGE (ML) INJECTION
Status: DISCONTINUED | OUTPATIENT
Start: 2022-11-10 | End: 2022-11-10 | Stop reason: HOSPADM

## 2022-11-10 RX ORDER — ACETAMINOPHEN 325 MG/1
975 TABLET ORAL ONCE
OUTPATIENT
Start: 2022-11-10 | End: 2022-11-10

## 2022-11-10 RX ORDER — CIPROFLOXACIN 2 MG/ML
400 INJECTION, SOLUTION INTRAVENOUS ONCE
Status: COMPLETED | OUTPATIENT
Start: 2022-11-10 | End: 2022-11-10

## 2022-11-10 RX ADMIN — SODIUM CHLORIDE, SODIUM GLUCONATE, SODIUM ACETATE, POTASSIUM CHLORIDE, MAGNESIUM CHLORIDE, SODIUM PHOSPHATE, DIBASIC, AND POTASSIUM PHOSPHATE 100 ML/HR: .53; .5; .37; .037; .03; .012; .00082 INJECTION, SOLUTION INTRAVENOUS at 21:12

## 2022-11-10 RX ADMIN — CIPROFLOXACIN: 2 INJECTION, SOLUTION INTRAVENOUS at 19:50

## 2022-11-10 RX ADMIN — Medication 12.5 MG: at 15:01

## 2022-11-10 RX ADMIN — SODIUM CHLORIDE 1000 ML: 0.9 INJECTION, SOLUTION INTRAVENOUS at 11:34

## 2022-11-10 RX ADMIN — FENTANYL CITRATE 50 MCG: 50 INJECTION INTRAMUSCULAR; INTRAVENOUS at 19:55

## 2022-11-10 RX ADMIN — LIDOCAINE HYDROCHLORIDE 100 MG: 20 INJECTION, SOLUTION EPIDURAL; INFILTRATION; INTRACAUDAL; PERINEURAL at 19:47

## 2022-11-10 RX ADMIN — SODIUM CHLORIDE, SODIUM GLUCONATE, SODIUM ACETATE, POTASSIUM CHLORIDE, MAGNESIUM CHLORIDE, SODIUM PHOSPHATE, DIBASIC, AND POTASSIUM PHOSPHATE 100 ML/HR: .53; .5; .37; .037; .03; .012; .00082 INJECTION, SOLUTION INTRAVENOUS at 14:49

## 2022-11-10 RX ADMIN — IOHEXOL 100 ML: 350 INJECTION, SOLUTION INTRAVENOUS at 07:04

## 2022-11-10 RX ADMIN — MIDAZOLAM 2 MG: 1 INJECTION INTRAMUSCULAR; INTRAVENOUS at 19:40

## 2022-11-10 RX ADMIN — PROPOFOL 100 MG: 10 INJECTION, EMULSION INTRAVENOUS at 19:47

## 2022-11-10 RX ADMIN — ONDANSETRON 4 MG: 2 INJECTION INTRAMUSCULAR; INTRAVENOUS at 20:12

## 2022-11-10 RX ADMIN — ALBUMIN (HUMAN): 12.5 INJECTION, SOLUTION INTRAVENOUS at 19:58

## 2022-11-10 NOTE — ED PROVIDER NOTES
History  Chief Complaint   Patient presents with   • Weakness - Generalized     Pt reports being sick for over a week with weakness, loss of appetite, was admitted Friday for "dehydration", pt had CT done today and was told by PCP to come in for eval, pt reports having "large kidney stone"     Patient presents ER for evaluation of weakness, urinary tract infection and a kidney stone  Patient states that for around a week and half she has felt generally weak loss appetite and lethargic  Patient states that she was admitted to the hospital 1 week ago for dehydration  States that yesterday she was diagnosed with a UTI was started on Cipro  States that she took a dose last night and this morning  States that she had a CT scan today which showed a large right renal stone  States that she was told to come to the ER immediately for urology intervention  Patient states that she took her Cipro this morning however denies taking any other medication PTA  Patient states that she does take metformin for diabetes  States that her sugars have been pretty well controlled  Patient states that she has not had a fever today however was having intermittent fevers prior to today  States T-max 80° F  Patient denies any dizziness, headache, syncope, chest pain, shortness of breath, abdominal pain, vomiting, or any other concerning symptoms  Prior to Admission Medications   Prescriptions Last Dose Informant Patient Reported? Taking?    Ascorbic Acid (vitamin C) 1000 MG tablet  Self Yes No   Sig: Take 2,000 mg by mouth daily   Cholecalciferol (Vitamin D3) 50 MCG (2000 UT) TABS  Self Yes No   Sig: Take 2,000 Units by mouth daily   acetaminophen (TYLENOL) 500 mg tablet  Self Yes No   Sig: Take 500 mg by mouth every 6 (six) hours as needed for mild pain   atorvastatin (LIPITOR) 40 mg tablet  Self No No   Sig: Take 1 tablet (40 mg total) by mouth daily   ciprofloxacin (CIPRO) 500 mg tablet   No No   Sig: Take 1 tablet (500 mg total) by mouth every 12 (twelve) hours for 7 days   lisinopril (ZESTRIL) 10 mg tablet  Self No No   Sig: Take 1 tablet (10 mg total) by mouth daily   metFORMIN (GLUCOPHAGE) 1000 MG tablet  Self No No   Sig: Take 1 tablet (1,000 mg total) by mouth 2 (two) times a day with meals   metoprolol tartrate (LOPRESSOR) 25 mg tablet   No No   Sig: Take 0 5 tablets (12 5 mg total) by mouth every 12 (twelve) hours   multivitamin (THERAGRAN) TABS  Self Yes No   Sig: Take 1 tablet by mouth daily   omeprazole (PriLOSEC) 40 MG capsule  Self No No   Sig: Take 1 capsule (40 mg total) by mouth daily   sertraline (ZOLOFT) 50 mg tablet  Self No No   Sig: Take 1 tablet (50 mg total) by mouth daily Takes in the am       Facility-Administered Medications: None       Past Medical History:   Diagnosis Date   • Anxiety    • Asthma    • Colon polyp    • Depression    • Diabetes mellitus ([facility] [address] )    • Diverticulitis    • Diverticulitis of colon    • Follicular cyst of ovary 12/23/2014   • Former smoker    • GERD (gastroesophageal reflux disease)    • Glycosuria    • Headache    • Hyperlipidemia    • Hypertension    • Kidney calculi 1/11/2019   • Kidney stone    • Obesity    • Ovarian cyst    • Overactive bladder    • Overweight    • Pulmonary nodule    • Renal calculus    • Seasonal allergies    • Tinea pedis of left foot 6/8/2020   • Vertigo    • Wears partial dentures     Lower plate -partial       Past Surgical History:   Procedure Laterality Date   • APPENDECTOMY     • BLADDER SUSPENSION      [facility]   • CERVICAL BIOPSY  W/ LOOP ELECTRODE EXCISION     • COLONOSCOPY     • DILATION AND CURETTAGE OF UTERUS     • FL RETROGRADE PYELOGRAM  7/26/2021   • FL RETROGRADE PYELOGRAM  11/18/2021   • HYSTERECTOMY  2007    ovaries present bilaterally   • JOINT REPLACEMENT Right 05/2021    right knee   • WI COLONOSCOPY FLX DX W/COLLJ SPEC WHEN PFRMD N/A 9/26/2017    Procedure: EGD AND COLONOSCOPY;  Surgeon: Crystal Eagle MD;  Location: [facility];  Service: Gastroenterology   • WV CYSTO/URETERO W/LITHOTRIPSY &INDWELL STENT INSRT Bilateral 7/26/2021    Procedure: CYSTOSCOPY URETEROSCOPY WITH LITHOTRIPSY HOLMIUM LASER, RETROGRADE PYELOGRAM AND INSERTION STENT URETERAL;  Surgeon: Gricelda Sanchez MD;  [facility];  Service: Urology   • WV CYSTO/URETERO W/LITHOTRIPSY &INDWELL STENT INSRT Left 9/16/2021    Procedure: CYSTOSCOPY URETEROSCOPY WITH LITHOTRIPSY HOLMIUM LASER, RETROGRADE PYELOGRAM AND INSERTION STENT URETERAL;  Surgeon: Didier Long MD;  [facility];  Service: Urology   • WV CYSTO/URETERO W/LITHOTRIPSY &INDWELL STENT INSRT Left 11/18/2021    Procedure: CYSTOSCOPY URETEROSCOPY WITH LITHOTRIPSY HOLMIUM LASER, RETROGRADE PYELOGRAM AND INSERTION STENT URETERAL;  Surgeon: Vipul Solitario MD;  [facility];  Service: Urology   • WV REVISE MEDIAN N/CARPAL TUNNEL SURG Right 5/29/2018    Procedure: CARPAL TUNNEL RELEASE;  Surgeon: Mary Rogers DO;  [facility];  Service: Orthopedics   • WV TOTAL KNEE ARTHROPLASTY Right 5/19/2021    Procedure: ARTHROPLASTY KNEE TOTAL;  Surgeon: Army Radha MD;  [facility];  Service: Orthopedics   • REMOVAL URETERAL STENT Right 9/16/2021    Procedure: REMOVAL STENT URETERAL;  Surgeon: Didier Long MD;  [facility];  Service: Urology   • TONSILLECTOMY     • TUBAL LIGATION     • URETEROSCOPY  7/26/2021    Procedure: URETEROSCOPY, LASER AND BASKET STONE EXTRACTION;  Surgeon: Gricelda Sanchez MD;  [facility];  Service: Urology   • WISDOM TOOTH EXTRACTION         Family History   Problem Relation Age of Onset   • Diabetes type II Mother    • Asthma Mother    • Stroke Father    • Pancreatic cancer Father 68   • Diabetes type II Father    • No Known Problems Maternal Grandmother    • No Known Problems Maternal Grandfather    • No Known Problems Paternal Grandmother    • No Known Problems Paternal Grandfather    • No Known Problems Sister    • No Known Problems Sister    • No Known Problems Sister    • No Known Problems Sister    • No Known Problems Sister    • No Known Problems Maternal Aunt    • No Known Problems Paternal Aunt      I have reviewed and agree with the history as documented  E-Cigarette/Vaping   • E-Cigarette Use Never User    • Comments Denies      E-Cigarette/Vaping Substances   • Nicotine No    • THC No    • CBD No    • Flavoring No      Social History     Tobacco Use   • Smoking status: Former Smoker     Packs/day: 0 25     Years: 20 00     Pack years: 5 00     Types: Cigarettes     Start date:      Quit date: 3/16/2018     Years since quittin 6   • Smokeless tobacco: Never Used   • Tobacco comment: approx less than  half a pack   Vaping Use   • Vaping Use: Never used   Substance Use Topics   • Alcohol use: Not Currently     Comment: socially   • Drug use: No     Comment: Denies       Review of Systems   Constitutional: Positive for fatigue  HENT: Negative for congestion, rhinorrhea and sore throat  Respiratory: Negative for shortness of breath  Cardiovascular: Negative for chest pain  Gastrointestinal: Negative for abdominal pain, nausea and vomiting  Genitourinary: Negative for dysuria  Musculoskeletal: Negative for back pain and neck pain  Skin: Negative for rash  Neurological: Positive for weakness  Negative for numbness and headaches  All other systems reviewed and are negative  Physical Exam  Physical Exam  Constitutional:       Appearance: She is well-developed  HENT:      Head: Normocephalic and atraumatic  Nose: Nose normal    Eyes:      Conjunctiva/sclera: Conjunctivae normal    Cardiovascular:      Rate and Rhythm: Normal rate  Pulmonary:      Effort: Pulmonary effort is normal    Abdominal:      Palpations: Abdomen is soft  Musculoskeletal:         General: Normal range of motion  Cervical back: Normal range of motion  Skin:     General: Skin is warm        Capillary Refill: Capillary refill takes less than 2 seconds  Neurological:      Mental Status: She is alert and oriented to person, place, and time  Vital Signs  ED Triage Vitals [11/10/22 1112]   Temperature Pulse Respirations Blood Pressure SpO2   98 5 °F (36 9 °C) 98 20 115/55 97 %      Temp Source Heart Rate Source Patient Position - Orthostatic VS BP Location FiO2 (%)   Oral Monitor Lying Left arm --      Pain Score       No Pain           Vitals:    11/10/22 1112   BP: 115/55   Pulse: 98   Patient Position - Orthostatic VS: Lying         Visual Acuity      ED Medications  Medications   sodium chloride 0 9 % bolus 1,000 mL (1,000 mL Intravenous New Bag 11/10/22 1134)       Diagnostic Studies    CT ABDOMEN AND PELVIS WITH IV CONTRAST     INDICATION:   N17 9: Acute kidney failure, unspecified  N30 01: Acute cystitis with hematuria      COMPARISON:  CT abdomen pelvis October 29, 2021  Correlation renal ultrasound October 28, 2021     TECHNIQUE:  CT examination of the abdomen and pelvis was performed  Axial, sagittal, and coronal 2D reformatted images were created from the source data and submitted for interpretation      Radiation dose length product (DLP) for this visit:  678 48 mGy-cm   This examination, like all CT scans performed in the [facility], was performed utilizing techniques to minimize radiation dose exposure, including the use of iterative   reconstruction and automated exposure control      IV Contrast:  100 mL of iohexol (OMNIPAQUE)  Enteric Contrast:  Enteric contrast was not administered      FINDINGS:     ABDOMEN     LOWER CHEST:  No clinically significant abnormality identified in the visualized lower chest      LIVER/BILIARY TREE:  Unremarkable      GALLBLADDER:  No calcified gallstones  No pericholecystic inflammatory change      SPLEEN:  Unremarkable      PANCREAS:  Unremarkable      ADRENAL GLANDS:  Unremarkable      KIDNEYS/URETERS:  Delayed enhancement of the right kidney  Multiple regions of heterogeneous hypoattenuation throughout the right kidney including an area of diminished cortical-medullary differentiation in the upper pole without a discrete   rim-enhancing wall (series 601, image 71)  14 mm calculus at the right renal pelvis (series 601, image 68)  Multiple additional right renal calculi measuring up to 6 mm  Abnormal urothelial enhancement predominantly at the renal pelvis  Periureteral   and perinephric fat stranding  Mild right hydronephrosis  Several right renal cysts measuring up to 2 5 cm      Multiple left renal calculi measuring up to 7 mm  No left hydroureteronephrosis      STOMACH AND BOWEL:  2     APPENDIX:  There are expected postoperative changes of appendectomy      ABDOMINOPELVIC CAVITY:  No ascites  No pneumoperitoneum  No lymphadenopathy      VESSELS:  Atherosclerotic changes are present  No evidence of aneurysm      PELVIS     REPRODUCTIVE ORGANS:  Surgical changes of prior hysterectomy      URINARY BLADDER:  Unremarkable      ABDOMINAL WALL/INGUINAL REGIONS:  Unremarkable      OSSEOUS STRUCTURES:  No acute fracture or destructive osseous lesion  Spinal degenerative changes are noted      IMPRESSION:     Obstructing 1 5 cm calculus at the right renal pelvis eliciting mild right hydronephrosis and significant right renal and periureteral inflammation concerning for pyelonephritis  There is no discrete renal abscess at this time, though the patient is at   risk for developing an abscess  Urgent urologic consultation is advised  There are multiple additional bilateral nonobstructing renal calculi       Latest Reference Range & Units 11/10/22 07:25   Sodium 135 - 147 mmol/L 137   Potassium 3 5 - 5 3 mmol/L 4 0   Chloride 96 - 108 mmol/L 105   CO2 21 - 32 mmol/L 28   Anion Gap 4 - 13 mmol/L 4   BUN 5 - 25 mg/dL 20   Creatinine 0 60 - 1 30 mg/dL 1 01   GLUCOSE FASTING 65 - 99 mg/dL 117 (H)   Calcium 8 3 - 10 1 mg/dL 8 9   CORRECTED CALCIUM 8 3 - 10 1 mg/dL 10 4 (H)   AST 5 - 45 U/L 21   ALT 12 - 78 U/L 78   Alkaline Phosphatase 46 - 116 U/L 259 (H)   Total Protein 6 4 - 8 4 g/dL 7 5   Albumin 3 5 - 5 0 g/dL 2 1 (L)   TOTAL BILIRUBIN 0 20 - 1 00 mg/dL 0 48   eGFR ml/min/1 73sq m 61   Cholesterol See Comment mg/dL 79   Triglycerides See Comment mg/dL 125   HDL >=50 mg/dL 22 (L)   Non-HDL Cholesterol mg/dl 57   LDL Calculated 0 - 100 mg/dL 32   WBC 4 31 - 10 16 Thousand/uL 11 98 (H)   Red Blood Cell Count 3 81 - 5 12 Million/uL 3 45 (L)   Hemoglobin 11 5 - 15 4 g/dL 10 2 (L)   HCT 34 8 - 46 1 % 32 3 (L)   MCV 82 - 98 fL 94   MCH 26 8 - 34 3 pg 29 6   MCHC 31 4 - 37 4 g/dL 31 6   RDW 11 6 - 15 1 % 14 6   Platelet Count 180 - 390 Thousands/uL 629 (H)   MPV 8 9 - 12 7 fL 10 0   nRBC /100 WBCs 0   Neutrophils % 43 - 75 % 75   Immat GRANS % 0 - 2 % 2   Lymphocytes Relative 14 - 44 % 15   Monocytes Relative 4 - 12 % 7   Eosinophils 0 - 6 % 1   Basophils Relative 0 - 1 % 0   Immature Grans Absolute 0 00 - 0 20 Thousand/uL 0 18   Absolute Neutrophils 1 85 - 7 62 Thousands/µL 9 05 (H)   Lymphocytes Absolute 0 60 - 4 47 Thousands/µL 1 74   Absolute Monocytes 0 17 - 1 22 Thousand/µL 0 83   Absolute Eosinophils 0 00 - 0 61 Thousand/µL 0 13   Basophils Absolute 0 00 - 0 10 Thousands/µL 0 05   Hemoglobin A1C Normal 3 8-5 6%; PreDiabetic 5 7-6 4%; Diabetic >=6 5%; Glycemic control for adults with diabetes <7 0% % 6 1 (H)   eAG, EST AVG Glucose mg/dl 128   (H): Data is abnormally high  (L): Data is abnormally low        Results Reviewed     Procedure Component Value Units Date/Time    Urine Microscopic [[id number]]  (Abnormal) Collected: 11/10/22 1136    Lab Status: Final result Specimen: Urine, Clean Catch Updated: 11/10/22 1243     RBC, UA Innumerable /hpf      WBC, UA Innumerable /hpf      Epithelial Cells Occasional /hpf      Bacteria, UA None Seen /hpf      MUCUS THREADS Occasional    Urine culture [[id number]] Collected: 11/10/22 1136    Lab Status:  In process Specimen: Urine, Clean Catch Updated: 11/10/22 1243    Lactic acid [[id number]]  (Normal) Collected: 11/10/22 1130    Lab Status: Final result Specimen: Blood from Arm, Right Updated: 11/10/22 1206     LACTIC ACID 1 1 mmol/L     Narrative:      Result may be elevated if tourniquet was used during collection  UA w Reflex to Microscopic w Reflex to Culture [[id number]]  (Abnormal) Collected: 11/10/22 1136    Lab Status: Final result Specimen: Urine, Clean Catch Updated: 11/10/22 1201     Color, UA Colorless     Clarity, UA Turbid     Specific [place], UA 1 020     pH, UA 5 5     Leukocytes, UA Moderate     Nitrite, UA Negative     Protein, UA Trace mg/dl      Glucose, UA Trace mg/dl      Ketones, UA Negative mg/dl      Urobilinogen, UA <2 0 mg/dl      Bilirubin, UA Negative     Occult Blood, UA Large    Blood culture #1 [[id number]] Collected: 11/10/22 1140    Lab Status: In process Specimen: Blood from Arm, Left Updated: 11/10/22 1144    Blood culture #2 [[id number]] Collected: 11/10/22 1130    Lab Status: In process Specimen: Blood from Arm, Right Updated: 11/10/22 1135                 No orders to display              Procedures  Procedures         ED Course  ED Course as of 11/10/22 1750   Thu Nov 10, 2022   1218 Reviewed with urology, states "Okay keep NPO, do we know if she has been NPO? Admit to medicine  Discussing OR timing , will get back to you and will see patient" Hold off on abx at this time as patient took cipro this morning and urology states will give dose of abx later today prior to OR   1245 Patient had CBC and CMP drawn this morning   1259 SOD tiger texted for admission                                             MDM     Patient nontoxic appearing in the ER  Patient had lab work this morning, see above  Blood culture sent  Reviewed CT scan stay, see above    Reviewed with Urology who recommends admission to Medicine Team, hold off on antibiotics now as she took her Cipro this morning and they will give her another dose later  Will take to OR later today  Patient stable during ER stay  Disposition  Final diagnoses:   UTI (urinary tract infection)   Kidney stone     Time reflects when diagnosis was documented in both MDM as applicable and the Disposition within this note     Time User Action Codes Description Comment    11/10/2022 12:15 PM Andrez Aldana Add [N39 0] UTI (urinary tract infection)     11/10/2022 12:15 PM Nemes, Gayla Felty Add [N20 0] Kidney stone       ED Disposition     None      Follow-up Information    None         Patient's Medications   Discharge Prescriptions    No medications on file       No discharge procedures on file      PDMP Review       Value Time User    PDMP Reviewed  Yes 9/16/2021 10:12 AM Manuela Dumont MD          ED Provider  Electronically Signed by           Nicky Garcia PA-C  11/10/22 8330 Xander Frazier PA-C  11/10/22 9950

## 2022-11-10 NOTE — ASSESSMENT & PLAN NOTE
Patient on lisinopril and lopressor PTA  BP normotensive on presentation    · Continue to hold lisinopril, can discuss resuming at PCP visit  · Continue PTA lopressor 12 5 mg BID

## 2022-11-10 NOTE — CONSULTS
Consults: UROLOGY  Johanna Glass 62 y o  female [id number]   Unit/Bed #: -01  Encounter: [id number]        Assessment  & Plan  :  Nephrolithiasis:    -CT scan on outpatient revealed a 1 5 cm obstructing right renal pelvis stone resulting in mild right hydronephrosis with significant right renal and bre ureteral inflammation concerning for pyelonephritis  -leukocytosis of 12 this admission  -no lactic acidosis  -creatinine of 1 01  -patient afebrile and hemodynamically stable  -urine testing today revealed innumerable rbc's, wbc's, no bacteria seen, urine culture from 11/04 revealed positive culture growing Pseudomonas pansensitive, patient has history of ESBL  -discussed with patient procedure in depth and the reasoning and need for decompression of her renal unit  Discussed risks including bleeding, infection, damage to nearby structures such as kidney ureter bladder need for additional stone procedures  Discussed given her current infection, patient will require cystoscopy retrograde pyelogram ureteral stent insertion on the right, she require antibiotics and treatment of UTI and infection and confirmed negative urine culture prior to ureteroscopy for large stone  Typically 4 weeks out from prior stent insertion  As an outpatient procedure  Discussed with to expect with ureteral stent including frequency, urgency, dysuria , hematuria intermittently and flank pain with urination  -patient NPO  -plan for cystoscopy retrograde pyelogram ureteral stent today, may get pushed based off of time frame hopefully will be today otherwise will be tomorrow or can be done urgently patient decompensates    Currently hemodynamically stable          Subjective :    Johanna Glass  is a 62 y o  female with a history of recurrent nephrolithiasis known to our practice and has required surgical intervention for nephrolithiasis in the past   Patient recently presented to her primary care physician on 11/03 due to persistent feelings of fatigue, decreased appetite, fevers, nausea and vomiting, diarrhea  She presented to the to the emergency room for evaluation on 11/04 she was treated for dehydration in discharge  She followed up with her primary care physician yesterday and she continued to feel poorly and not progress  Urine cultures from 11/04 were positive for Pseudomonas UTI  Primary care physician ordered stat outpatient CT scan which revealed a 1 5 cm obstructing right renal pelvis stone resulting in mild right hydronephrosis and significant right renal in bre ureteral inflammation concerning for pyelonephritis  This resulted in patient presenting to the emergency room for further evaluation  Patient denies any flank pain  She reports that she had some mild pain on the right side but overall is not similar to her prior stones  She does follow with Nephrology and has close follow-up with them for her recurrent stone formation  She currently denies any nausea or vomiting, continues to feel weak and fatigued  Overall genuinely not well  She is currently taking oral ciprofloxacin which she started initial dose yesterday and the dose this a m             Allergies   Allergen Reactions   • Oxybutynin Anaphylaxis     "feels like throat closing and can't swallow"   • Clonazepam Other (See Comments)     Pt states "didn't feel right on it "   • Morphine Other (See Comments) and Confusion     Annotation - [id number]: "dopey"  Gets silly   • Venlafaxine Other (See Comments)     Unknown--pt states " MD was trying pt on different medications   Pt unsure of reaction "      Current Outpatient Medications   Medication Instructions   • acetaminophen (TYLENOL) 500 mg, Oral, Every 6 hours PRN   • atorvastatin (LIPITOR) 40 mg, Oral, Daily   • ciprofloxacin (CIPRO) 500 mg, Oral, Every 12 hours scheduled   • lisinopril (ZESTRIL) 10 mg, Oral, Daily   • metFORMIN (GLUCOPHAGE) 1,000 mg, Oral, 2 times daily with meals   • metoprolol tartrate (LOPRESSOR) 12 5 mg, Oral, Every 12 hours scheduled   • multivitamin (THERAGRAN) TABS 1 tablet, Oral, Daily   • omeprazole (PRILOSEC) 40 mg, Oral, Daily   • sertraline (ZOLOFT) 50 mg, Oral, Daily, Takes in the am    • vitamin C 2,000 mg, Oral, Daily   • Vitamin D3 2,000 Units, Oral, Daily      Past Medical History:   Diagnosis Date   • Anxiety    • Asthma    • Colon polyp    • Depression    • [facility]    • Diverticulitis    • Diverticulitis of colon    • Follicular cyst of ovary 12/23/2014   • Former smoker    • GERD (gastroesophageal reflux disease)    • Glycosuria    • Headache    • Hyperlipidemia    • Hypertension    • Kidney calculi 1/11/2019   • Kidney stone    • Obesity    • Ovarian cyst    • Overactive bladder    • Overweight    • Pulmonary nodule    • Renal calculus    • Seasonal allergies    • Tinea pedis of left foot 6/8/2020   • Vertigo    • Wears partial dentures     Lower plate -partial     Past Surgical History:   Procedure Laterality Date   • APPENDECTOMY     • BLADDER SUSPENSION      [facility]   • CERVICAL BIOPSY  W/ LOOP ELECTRODE EXCISION     • COLONOSCOPY     • DILATION AND CURETTAGE OF UTERUS     • FL RETROGRADE PYELOGRAM  7/26/2021   • FL RETROGRADE PYELOGRAM  11/18/2021   • HYSTERECTOMY  2007    ovaries present bilaterally   • JOINT REPLACEMENT Right 05/2021    right knee   • DE COLONOSCOPY FLX DX W/COLLJ SPEC WHEN PFRMD N/A 9/26/2017    Procedure: EGD AND COLONOSCOPY;  Surgeon: Belem Rasmussen MD;  Location: [facility];   Service: Gastroenterology   • DE CYSTO/URETERO W/LITHOTRIPSY &INDWELL STENT INSRT Bilateral 7/26/2021    Procedure: CYSTOSCOPY URETEROSCOPY WITH LITHOTRIPSY HOLMIUM LASER, RETROGRADE PYELOGRAM AND INSERTION STENT URETERAL;  Surgeon: Gracy Kebede MD;  Location: [facility] OR;  Service: Urology   • DE CYSTO/URETERO W/LITHOTRIPSY &INDWELL STENT INSRT Left 9/16/2021    Procedure: CYSTOSCOPY URETEROSCOPY WITH LITHOTRIPSY HOLMIUM LASER, RETROGRADE PYELOGRAM AND INSERTION STENT URETERAL;  Surgeon: Celina Joya MD;  Location[facility];  Service: Urology   • PA CYSTO/URETERO W/LITHOTRIPSY &INDWELL STENT INSRT Left 11/18/2021    Procedure: CYSTOSCOPY URETEROSCOPY WITH LITHOTRIPSY HOLMIUM LASER, RETROGRADE PYELOGRAM AND INSERTION STENT URETERAL;  Surgeon: Moy Oliveira MD;  [facility];  Service: Urology   • PA REVISE MEDIAN N/CARPAL TUNNEL SURG Right 5/29/2018    Procedure: CARPAL TUNNEL RELEASE;  Surgeon: Apolinar Hollis DO;  Location[facility];  Service: Orthopedics   • PA TOTAL KNEE ARTHROPLASTY Right 5/19/2021    Procedure: ARTHROPLASTY KNEE TOTAL;  Surgeon: Aurelia Ordonez MD;  Location[facility];  Service: Orthopedics   • REMOVAL URETERAL STENT Right 9/16/2021    Procedure: REMOVAL STENT URETERAL;  Surgeon: Celina Joya MD;  [facility];  Service: Urology   • TONSILLECTOMY     • TUBAL LIGATION     • URETEROSCOPY  7/26/2021    Procedure: URETEROSCOPY, LASER AND BASKET STONE EXTRACTION;  Surgeon: Luh Linda MD;  Location[facility];  Service: Urology   • WISDOM TOOTH EXTRACTION       Family History   Problem Relation Age of Onset   • Diabetes type II Mother    • Asthma Mother    • Stroke Father    • Pancreatic cancer Father 68   • Diabetes type II Father    • No Known Problems Maternal Grandmother    • No Known Problems Maternal Grandfather    • No Known Problems Paternal Grandmother    • No Known Problems Paternal Grandfather    • No Known Problems Sister    • No Known Problems Sister    • No Known Problems Sister    • No Known Problems Sister    • No Known Problems Sister    • No Known Problems Maternal Aunt    • No Known Problems Paternal Aunt      Social History     Socioeconomic History   • Marital status: /Civil Union     Spouse name: None   • Number of children: None   • Years of education: None   • Highest education level: None   Occupational History   • None   Tobacco Use   • Smoking status: Former Smoker Packs/day: 0 25     Years: 20 00     Pack years: 5 00     Types: Cigarettes     Start date: 46     Quit date: 3/16/2018     Years since quittin 6   • Smokeless tobacco: Never Used   • Tobacco comment: approx less than  half a pack   Vaping Use   • Vaping Use: Never used   Substance and Sexual Activity   • Alcohol use: Not Currently     Comment: socially   • Drug use: No     Comment: Denies   • Sexual activity: Not Currently     Partners: Male   Other Topics Concern   • None   Social History Narrative    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      Social Determinants of Health     Financial Resource Strain: Not on file   Food Insecurity: Not on file   Transportation Needs: Not on file   Physical Activity: Not on file   Stress: Not on file   Social Connections: Not on file   Intimate Partner Violence: Not on file   Housing Stability: Not on file        Review of Systems   Constitutional: Positive for appetite change, chills and fever  HENT: Negative  Eyes: Negative  Respiratory: Negative  Cardiovascular: Negative  Gastrointestinal: Positive for abdominal pain, diarrhea, nausea and vomiting  Endocrine: Negative  Genitourinary: Positive for dysuria, flank pain and hematuria  Negative for difficulty urinating and frequency  Skin: Negative  Allergic/Immunologic: Negative  Neurological: Negative  Hematological: Negative  Psychiatric/Behavioral: Negative  Objective     Physical Exam  Constitutional:       General: She is not in acute distress  Appearance: She is normal weight  She is not ill-appearing, toxic-appearing or diaphoretic  HENT:      Head: Normocephalic and atraumatic  Right Ear: External ear normal       Left Ear: External ear normal       Nose: Nose normal       Mouth/Throat:      Pharynx: Oropharynx is clear  Eyes:      General: No scleral icterus  Conjunctiva/sclera: Conjunctivae normal    Cardiovascular:      Rate and Rhythm: Normal rate and regular rhythm  Pulses: Normal pulses  Heart sounds: No murmur heard  No friction rub  No gallop  Pulmonary:      Effort: Pulmonary effort is normal  No respiratory distress  Breath sounds: No wheezing, rhonchi or rales  Abdominal:      General: Bowel sounds are normal  There is no distension  Palpations: Abdomen is soft  Tenderness: There is abdominal tenderness  There is right CVA tenderness  Comments: Mild right CVA tenderness   Musculoskeletal:         General: Normal range of motion  Cervical back: Normal range of motion  Skin:     General: Skin is warm and dry  Neurological:      General: No focal deficit present  Mental Status: She is alert and oriented to person, place, and time  Psychiatric:         Mood and Affect: Mood normal          Behavior: Behavior normal          Thought Content: Thought content normal          Judgment: Judgment normal                 Imaging:  CT ABDOMEN AND PELVIS WITH IV CONTRAST     INDICATION:   N17 9: Acute kidney failure, unspecified  N30 01: Acute cystitis with hematuria      COMPARISON:  CT abdomen pelvis October 29, 2021  Correlation renal ultrasound October 28, 2021     TECHNIQUE:  CT examination of the abdomen and pelvis was performed  Axial, sagittal, and coronal 2D reformatted images were created from the source data and submitted for interpretation      Radiation dose length product (DLP) for this visit:  678 48 mGy-cm     This examination, like all CT scans performed in the [facility], was performed utilizing techniques to minimize radiation dose exposure, including the use of iterative   reconstruction and automated exposure control      IV Contrast:  100 mL of iohexol (OMNIPAQUE)  Enteric Contrast:  Enteric contrast was not administered      FINDINGS:     ABDOMEN     LOWER CHEST:  No clinically significant abnormality identified in the visualized lower chest      LIVER/BILIARY TREE:  Unremarkable      GALLBLADDER: No calcified gallstones  No pericholecystic inflammatory change      SPLEEN:  Unremarkable      PANCREAS:  Unremarkable      ADRENAL GLANDS:  Unremarkable      KIDNEYS/URETERS:  Delayed enhancement of the right kidney  Multiple regions of heterogeneous hypoattenuation throughout the right kidney including an area of diminished cortical-medullary differentiation in the upper pole without a discrete   rim-enhancing wall (series 601, image 71)  14 mm calculus at the right renal pelvis (series 601, image 68)  Multiple additional right renal calculi measuring up to 6 mm  Abnormal urothelial enhancement predominantly at the renal pelvis  Periureteral   and perinephric fat stranding  Mild right hydronephrosis  Several right renal cysts measuring up to 2 5 cm      Multiple left renal calculi measuring up to 7 mm  No left hydroureteronephrosis      STOMACH AND BOWEL:  2     APPENDIX:  There are expected postoperative changes of appendectomy      ABDOMINOPELVIC CAVITY:  No ascites  No pneumoperitoneum  No lymphadenopathy      VESSELS:  Atherosclerotic changes are present  No evidence of aneurysm      PELVIS     REPRODUCTIVE ORGANS:  Surgical changes of prior hysterectomy      URINARY BLADDER:  Unremarkable      ABDOMINAL WALL/INGUINAL REGIONS:  Unremarkable      OSSEOUS STRUCTURES:  No acute fracture or destructive osseous lesion  Spinal degenerative changes are noted      IMPRESSION:     Obstructing 1 5 cm calculus at the right renal pelvis eliciting mild right hydronephrosis and significant right renal and periureteral inflammation concerning for pyelonephritis  There is no discrete renal abscess at this time, though the patient is at   risk for developing an abscess  Urgent urologic consultation is advised  There are multiple additional bilateral nonobstructing renal calculi      The study was marked in EPIC for immediate notification      Labs:  Lab Results   Component Value Date    SODIUM 137 11/10/2022 K 4 0 11/10/2022     11/10/2022    CO2 28 11/10/2022    BUN 20 11/10/2022    CREATININE 1 01 11/10/2022    GLUC 126 11/06/2022    CALCIUM 8 9 11/10/2022         Lab Results   Component Value Date    WBC 11 98 (H) 11/10/2022    HGB 10 2 (L) 11/10/2022    HCT 32 3 (L) 11/10/2022    MCV 94 11/10/2022     (H) 11/10/2022         VTE Pharmacologic Prophylaxis: none will defer to primary team  VTE Mechanical Prophylaxis: sequential compression device     Nuris Moore PA-C

## 2022-11-10 NOTE — TELEPHONE ENCOUNTER
Contacted patient regarding lab results and CT findings to which is consistent with "Obstructing 1 5 cm calculus at the right renal pelvis eliciting mild right hydronephrosis and significant right renal and periureteral inflammation concerning for pyelonephritis  There is no discrete renal abscess at this time, though the patient is at risk for developing an abscess  Urgent urologic consultation is advised"  I spoke with the patient and she was agreeable to going to the ER for treatment and evaluation by Urology

## 2022-11-10 NOTE — ASSESSMENT & PLAN NOTE
Lab Results   Component Value Date    EGFR 88 11/11/2022    EGFR 61 11/10/2022    EGFR 62 11/06/2022    CREATININE 0 75 11/11/2022    CREATININE 1 01 11/10/2022    CREATININE 1 00 11/06/2022     Cr 1 01 on presentation, within patient's baseline  Had TOMMY last week during admission, which resolved    · Will continue to hold lisinopril at this time  Continue with adequate hydration  Continue to monitor in outpatient setting

## 2022-11-10 NOTE — H&P
INTERNAL MEDICINE RESIDENCY ADMISSION H&P     Name: Bonnie Alvarado   Age & Sex: 62 y o  female   MRN: [id number]  Unit/Bed#: MS 80-65   Encounter: [id number]  Primary Care Provider: LEONARD Pavon    Code Status: Level 1 - Full Code  Admission Status: INPATIENT   Disposition: Patient requires Med/Surg    Admit to team: SOD Team C     ASSESSMENT/PLAN     Principal Problem:    Pyelonephritis  Active Problems:    CKD (chronic kidney disease) stage 2, GFR 60-89 ml/min    Hypertension    Type 2 diabetes mellitus with diabetic chronic kidney disease (HCC)    GERD (gastroesophageal reflux disease)    Vitamin D deficiency    Hypercholesteremia    Anxiety      * Pyelonephritis  Assessment & Plan  Patient with UA consistent with infection during last admission but not treated with ABX as was not having urinary symptoms  Urine culture positive for pseudomonas  Patient went to PCPs office day after discharge and had hematuria  Patient started on ciprofloxacin PO and sent for STAT CTAP which revealed 1 5 cm obstructing calculus in R renal pelvis with mild hydronephrosis and concerning for pyelonephritis  Cr stable  UA shows innumerable RBC, WBC but no bacteria in the setting of getting 2 doses of ciprofloxacin outpatient  · Urology consulted, plan for OR  · NPO for OR  · 100 cc/h isolyte for now  · Per discussion with ED provider, Urology instructed to not order antibiotics for now as ciprofloxacin already dosed for today  They will give antibiotics perioperatively  · Follow up Urology recommendations for standing ABX  · Trend Cr  · Monitor WBC and fever curve    CKD (chronic kidney disease) stage 2, GFR 60-89 ml/min  Assessment & Plan  Lab Results   Component Value Date    EGFR 61 11/10/2022    EGFR 62 11/06/2022    EGFR 42 11/05/2022    CREATININE 1 01 11/10/2022    CREATININE 1 00 11/06/2022    CREATININE 1 37 (H) 11/05/2022     Cr 1 01 on presentation, within patient's baseline   Had TOMMY last week during admission, which resolved  · Will continue to hold lisinopril at this time  Trend creatinine  Avoid nephrotoxins  Trend I/Os, daily weights  Avoid hypotension  Renally dose medications    Hypertension  Assessment & Plan  Patient on lisinopril and lopressor PTA  BP normotensive on presentation  · Continue to hold lisinopril in setting of recent TOMMY  · Can consider restarting if Cr remains stable if BP allows  · Continue PTA lopressor 12 5 mg BID      Type 2 diabetes mellitus with diabetic chronic kidney disease ([facility] [address] )  Assessment & Plan  Lab Results   Component Value Date    HGBA1C 6 1 (H) 11/10/2022       No results for input(s): POCGLU in the last 72 hours  Blood Sugar Average: Last 72 hrs: Well controlled just on metformin  · Hold metformin while inpatient  · Monitor BG on BMP  · If trending up, can give SSI - holding SSI for now while NPO    Anxiety  Assessment & Plan  Continue PTA zoloft 50 mg qd    Hypercholesteremia  Assessment & Plan  Continue PTA Lipitor 40 mg qd    Vitamin D deficiency  Assessment & Plan  Continue vitamin D supplementation    GERD (gastroesophageal reflux disease)  Assessment & Plan  Continue pantoprazole 40 mg qd (home prilosec is nonformulary)      VTE Pharmacologic Prophylaxis: Reason for no pharmacologic prophylaxis Holding for OR  VTE Mechanical Prophylaxis: sequential compression device    CHIEF COMPLAINT     Chief Complaint   Patient presents with   • Weakness - Generalized     Pt reports being sick for over a week with weakness, loss of appetite, was admitted Friday for "dehydration", pt had CT done today and was told by PCP to come in for eval, pt reports having "large kidney stone"      HISTORY OF PRESENT ILLNESS     Shasha Fraser is a 63 yo F with PMH of NIDDM2, CKD, HTN, asthma, diverticulitis, HLD, anxiety, depression who presents at the recommendation of PCPs office for kidney stone      Patient was recently admitted from 11/4-11/6 after presenting with fever and diarrhea  She was admitted for dehydration and TOMMY and treated with IVF  TOMMY and diarrhea resolved prior to discharge  Of note her UA was consistent with infection at that time but she was felt not to be symptomatic from this and so she was not treated with antibiotics  Patient was seen by her PCP on 11/7 with continued fatigue, weakness, and diarrhea  She gave urine sample at that visit and it had gross hematuria  Patient was Rx'd PO ciprofloxacin and took 1 dose yesterday and 1 dose today  She was also sent for STAT CTAP which showed obstructing 1 5 cm calculus at renal pelvis with mild right hydronephrosis and right renal and periureteral inflammation  She was recommended to come to the ED for Urology follow up  In the ED, VS T 98 5, HR 98, RR 20, /55, spO2 97%  Labs significant for WBC 11 98, Cr 1 01, Alk phos 259  UA showing moderate leukocytes, large blood, innumerable RBC and WBC but no bacteria  Previous urine culture growing Pseudomonas  Patient seen by Urology who plan for OR tonight  Because patient dose ciprofloxacin already today, per ED provider, Urology says they will hold off on antibiotics for now and will dose antibiotics pre-op  Patient seen at the bedside  She reports weakness, fatigue, malaise, poor appetite  Her urine has become more clear now  She denies urinary symptoms, fever, or chills  Her last BM was yesterday  REVIEW OF SYSTEMS     Review of Systems   Constitutional: Positive for appetite change and fatigue  Negative for chills and fever  HENT: Negative for sore throat  Respiratory: Negative for cough and shortness of breath  Cardiovascular: Negative for chest pain, palpitations and leg swelling  Gastrointestinal: Positive for diarrhea  Negative for abdominal pain, nausea and vomiting  Endocrine: Negative for polyuria  Genitourinary: Positive for hematuria  Negative for decreased urine volume, difficulty urinating, dysuria, frequency and urgency  Skin: Positive for pallor  Neurological: Positive for weakness  Negative for dizziness, light-headedness and headaches  Psychiatric/Behavioral: Negative for confusion  OBJECTIVE     Vitals:    11/10/22 1458 11/10/22 1500 11/10/22 1610 11/10/22 1610   BP: 110/65  109/62 109/62   BP Location:       Pulse: 83  76 77   Resp:       Temp: 97 7 °F (36 5 °C)  98 5 °F (36 9 °C) 98 5 °F (36 9 °C)   TempSrc:       SpO2: 96% 97% 97% 97%      Temperature:   Temp (24hrs), Av 3 °F (36 8 °C), Min:97 7 °F (36 5 °C), Max:98 5 °F (36 9 °C)    Temperature: 98 5 °F (36 9 °C)  Intake & Output:  I/O        07 07 0701  11/10 0700 11/10 07 07    P  O    0    IV Piggyback   500    Total Intake   500    Net   +500           Unmeasured Urine Occurrence   1 x        Weights: There is no height or weight on file to calculate BMI  Weight (last 2 days)     None        Physical Exam  Constitutional:       General: She is not in acute distress  HENT:      Mouth/Throat:      Mouth: Mucous membranes are moist    Eyes:      Extraocular Movements: Extraocular movements intact  Conjunctiva/sclera: Conjunctivae normal    Cardiovascular:      Rate and Rhythm: Normal rate and regular rhythm  Heart sounds: Normal heart sounds  Pulmonary:      Effort: Pulmonary effort is normal       Breath sounds: Normal breath sounds  Abdominal:      Tenderness: There is abdominal tenderness in the right lower quadrant  There is right CVA tenderness  There is no left CVA tenderness  Musculoskeletal:      Cervical back: Neck supple  Right lower leg: No edema  Left lower leg: No edema  Skin:     General: Skin is warm and dry  Neurological:      Mental Status: She is alert  Psychiatric:         Speech: Speech normal          Behavior: Behavior normal  Behavior is cooperative         PAST MEDICAL HISTORY     Past Medical History:   Diagnosis Date   • Anxiety    • Asthma    • Colon polyp    • Depression    • Diabetes mellitus ([facility] [address] )    • Diverticulitis    • Diverticulitis of colon    • Follicular cyst of ovary 12/23/2014   • Former smoker    • GERD (gastroesophageal reflux disease)    • Glycosuria    • Headache    • Hyperlipidemia    • Hypertension    • Kidney calculi 1/11/2019   • Kidney stone    • Obesity    • Ovarian cyst    • Overactive bladder    • Overweight    • Pulmonary nodule    • Renal calculus    • Seasonal allergies    • Tinea pedis of left foot 6/8/2020   • Vertigo    • Wears partial dentures     Lower plate -partial     PAST SURGICAL HISTORY     Past Surgical History:   Procedure Laterality Date   • APPENDECTOMY     • BLADDER SUSPENSION      [facility]   • CERVICAL BIOPSY  W/ LOOP ELECTRODE EXCISION     • COLONOSCOPY     • DILATION AND CURETTAGE OF UTERUS     • FL RETROGRADE PYELOGRAM  7/26/2021   • FL RETROGRADE PYELOGRAM  11/18/2021   • HYSTERECTOMY  2007    ovaries present bilaterally   • JOINT REPLACEMENT Right 05/2021    right knee   • AK COLONOSCOPY FLX DX W/COLLJ SPEC WHEN PFRMD N/A 9/26/2017    Procedure: EGD AND COLONOSCOPY;  Surgeon: Shruthi Lovett MD;  Location: [facility];   Service: Gastroenterology   • AK CYSTO/URETERO W/LITHOTRIPSY &INDWELL STENT INSRT Bilateral 7/26/2021    Procedure: CYSTOSCOPY URETEROSCOPY WITH LITHOTRIPSY HOLMIUM LASER, RETROGRADE PYELOGRAM AND INSERTION STENT URETERAL;  Surgeon: Sarthak Elizondo MD;  [facility];  Service: Urology   • AK CYSTO/URETERO W/LITHOTRIPSY &INDWELL STENT INSRT Left 9/16/2021    Procedure: CYSTOSCOPY URETEROSCOPY WITH LITHOTRIPSY HOLMIUM LASER, RETROGRADE PYELOGRAM AND INSERTION STENT URETERAL;  Surgeon: Clarence Ospina MD;  Location[facility];  Service: Urology   • AK CYSTO/URETERO W/LITHOTRIPSY &INDWELL STENT INSRT Left 11/18/2021    Procedure: CYSTOSCOPY URETEROSCOPY WITH LITHOTRIPSY HOLMIUM LASER, RETROGRADE PYELOGRAM AND INSERTION STENT URETERAL;  Surgeon: Hi Franz MD;  [facility];  Service: Urology   • UT REVISE MEDIAN N/CARPAL TUNNEL SURG Right 2018    Procedure: CARPAL TUNNEL RELEASE;  Surgeon: Jerald Dexter DO;  Location: [facility];  Service: Orthopedics   • UT TOTAL KNEE ARTHROPLASTY Right 2021    Procedure: ARTHROPLASTY KNEE TOTAL;  Surgeon: Kera Stafford MD;  [facility];  Service: Orthopedics   • REMOVAL URETERAL STENT Right 2021    Procedure: REMOVAL STENT URETERAL;  Surgeon: Shantell Davis MD;  [facility];  Service: Urology   • TONSILLECTOMY     • TUBAL LIGATION     • URETEROSCOPY  2021    Procedure: URETEROSCOPY, LASER AND BASKET STONE EXTRACTION;  Surgeon: Ratna Yusuf MD;  [facility];  Service: Urology   • WISDOM TOOTH EXTRACTION       SOCIAL & FAMILY HISTORY     Social History     Substance and Sexual Activity   Alcohol Use Not Currently    Comment: socially     Substance and Sexual Activity   Alcohol Use Not Currently    Comment: socially        Substance and Sexual Activity   Drug Use No    Comment: Denies     Social History     Tobacco Use   Smoking Status Former Smoker   • Packs/day: 0 25   • Years: 20 00   • Pack years: 5 00   • Types: Cigarettes   • Start date: 46   • Quit date: 3/16/2018   • Years since quittin 6   Smokeless Tobacco Never Used   Tobacco Comment    approx less than  half a pack     Family History   Problem Relation Age of Onset   • Diabetes type II Mother    • Asthma Mother    • Stroke Father    • Pancreatic cancer Father 68   • Diabetes type II Father    • No Known Problems Maternal Grandmother    • No Known Problems Maternal Grandfather    • No Known Problems Paternal Grandmother    • No Known Problems Paternal Grandfather    • No Known Problems Sister    • No Known Problems Sister    • No Known Problems Sister    • No Known Problems Sister    • No Known Problems Sister    • No Known Problems Maternal Aunt    • No Known Problems Paternal Aunt      LABORATORY DATA     Labs:  I have personally reviewed pertinent reports  Results from last 7 days   Lab Units 11/10/22  0725 11/06/22  0525 11/05/22  0505 11/04/22  1530   WBC Thousand/uL 11 98* 15 72* 13 95* 16 10*   HEMOGLOBIN g/dL 10 2* 10 2* 10 8* 12 2   HEMATOCRIT % 32 3* 32 5* 32 4* 36 6   PLATELETS Thousands/uL 629* 423* 352 350   NEUTROS PCT % 75  --   --   --    MONOS PCT % 7  --   --   --    MONO PCT %  --   --  8 10      Results from last 7 days   Lab Units 11/10/22  0725 11/06/22  0525 11/05/22  0505   POTASSIUM mmol/L 4 0 4 5 4 9   CHLORIDE mmol/L 105 116* 112*   CO2 mmol/L 28 20* 22   BUN mg/dL 20 26* 46*   CREATININE mg/dL 1 01 1 00 1 37*   CALCIUM mg/dL 8 9 9 1 8 8   ALK PHOS U/L 259* 358* 324*   ALT U/L 78 137* 103*   AST U/L 21 58* 55*     Results from last 7 days   Lab Units 11/05/22  0505   MAGNESIUM mg/dL 1 9     Results from last 7 days   Lab Units 11/05/22  0505   PHOSPHORUS mg/dL 3 6          Results from last 7 days   Lab Units 11/10/22  1130   LACTIC ACID mmol/L 1 1         Micro:  Lab Results   Component Value Date    BLOODCX Received in Microbiology Lab  Culture in Progress  11/10/2022    BLOODCX Received in Microbiology Lab  Culture in Progress  11/10/2022    URINECX (A) 11/09/2022     70,000-79,000 cfu/ml Oxidase Positive gram negative merlene    URINECX 20,000-29,000 cfu/ml Pseudomonas aeruginosa (A) 11/04/2022    URINECX <10,000 cfu/ml Pseudomonas aeruginosa (A) 11/04/2022    URINECX 50,000-59,000 cfu/ml Lactobacillus species (A) 11/04/2022     IMAGING & DIAGNOSTIC TESTS     Imaging: I have personally reviewed pertinent reports  CT abdomen pelvis w contrast    Result Date: 11/10/2022  Impression: Obstructing 1 5 cm calculus at the right renal pelvis eliciting mild right hydronephrosis and significant right renal and periureteral inflammation concerning for pyelonephritis  There is no discrete renal abscess at this time, though the patient is at risk for developing an abscess  Urgent urologic consultation is advised    There are multiple additional bilateral nonobstructing renal calculi  The study was marked in [facility] for immediate notification  Workstation performed: [id number]     EKG, Pathology, and Other Studies: I have personally reviewed pertinent reports  ALLERGIES     Allergies   Allergen Reactions   • Oxybutynin Anaphylaxis     "feels like throat closing and can't swallow"   • Clonazepam Other (See Comments)     Pt states "didn't feel right on it "   • Morphine Other (See Comments) and Confusion     Annotation - [id number]: "dopey"  Gets silly   • Venlafaxine Other (See Comments)     Unknown--pt states " MD was trying pt on different medications  Pt unsure of reaction "     MEDICATIONS PRIOR TO ARRIVAL     Prior to Admission medications    Medication Sig Start Date End Date Taking?  Authorizing Provider   Ascorbic Acid (vitamin C) 1000 MG tablet Take 2,000 mg by mouth daily   Yes Historical Provider, MD   atorvastatin (LIPITOR) 40 mg tablet Take 1 tablet (40 mg total) by mouth daily 5/4/22  Yes LEONARD Rockwell   Cholecalciferol (Vitamin D3) 50 MCG (2000 UT) TABS Take 2,000 Units by mouth daily   Yes Historical Provider, MD   ciprofloxacin (CIPRO) 500 mg tablet Take 1 tablet (500 mg total) by mouth every 12 (twelve) hours for 7 days 11/9/22 11/16/22 Yes LEONARD Rockwell   lisinopril (ZESTRIL) 10 mg tablet Take 1 tablet (10 mg total) by mouth daily 5/4/22  Yes LEONARD Rockwell   metFORMIN (GLUCOPHAGE) 1000 MG tablet Take 1 tablet (1,000 mg total) by mouth 2 (two) times a day with meals 5/4/22  Yes LEONARD Rcokwell   metoprolol tartrate (LOPRESSOR) 25 mg tablet Take 0 5 tablets (12 5 mg total) by mouth every 12 (twelve) hours 9/29/21  Yes LEONARD Rockwell   multivitamin [facility]) TABS Take 1 tablet by mouth daily   Yes Historical Provider, MD   omeprazole (PriLOSEC) 40 MG capsule Take 1 capsule (40 mg total) by mouth daily 8/16/22  Yes LEONARD Rockwell   sertraline (ZOLOFT) 50 mg tablet Take 1 tablet (50 mg total) by mouth daily Takes in the am  5/4/22  Yes LEONARD Andrews   acetaminophen (TYLENOL) 500 mg tablet Take 500 mg by mouth every 6 (six) hours as needed for mild pain    Historical Provider, MD     MEDICATIONS ADMINISTERED IN LAST 24 HOURS     Medication Administration - last 24 hours from 11/09/2022 1745 to 11/10/2022 1745       Date/Time Order Dose Route Action Action by     11/10/2022 1445 sodium chloride 0 9 % bolus 1,000 mL 0 mL Intravenous [facility]pped [facility]     11/10/2022 1134 sodium chloride 0 9 % bolus 1,000 mL 1,000 mL Intravenous New Bag Zay Watkins, RN     11/10/2022 1444 atorvastatin (LIPITOR) tablet 40 mg 40 mg Oral Not Given [facility], RN     11/10/2022 1444 cholecalciferol (VITAMIN D3) tablet 2,000 Units 2,000 Units Oral Not Given [facility], RN     11/10/2022 1501 metoprolol tartrate (LOPRESSOR) partial tablet 12 5 mg 12 5 mg Oral Given [facility], RN     11/10/2022 1444 sertraline (ZOLOFT) tablet 50 mg 50 mg Oral Not Given [facility], RN     11/10/2022 1449 multi-electrolyte (PLASMALYTE-A/ISOLYTE-S PH 7 4) IV solution 100 mL/hr Intravenous New Bag Nan Baker, CHERYLE        CURRENT MEDICATIONS     Current Facility-Administered Medications   Medication Dose Route Frequency Provider Last Rate   • acetaminophen  650 mg Oral Q6H PRN Gaston Cruz MD     • atorvastatin  40 mg Oral Daily Gaston Cruz MD     • cholecalciferol  2,000 Units Oral Daily Gaston Cruz MD     • ciprofloxacin  400 mg Intravenous Once Radha CEDRICK UnderwoodC     • ketorolac  15 mg Intravenous Q6H PRN Gaston Cruz MD     • metoprolol tartrate  12 5 mg Oral Q12H 211 Kimberli Augustine MD     • multi-electrolyte  100 mL/hr Intravenous Continuous Gaston Cruz  mL/hr (11/10/22 1445)   • [START ON 11/11/2022] pantoprazole  40 mg Oral Early Morning Gaston Cruz MD     • sertraline  50 mg Oral Daily Gaston Cruz MD multi-electrolyte, 100 mL/hr, Last Rate: 100 mL/hr (11/10/22 1449)      acetaminophen, 650 mg, Q6H PRN  ketorolac, 15 mg, Q6H PRN        Admission Time  I spent 45 minutes admitting the patient  This involved direct patient contact where I performed a full history and physical, reviewing previous records, and reviewing laboratory and other diagnostic studies  Portions of the record may have been created with voice recognition software  Occasional wrong word or "sound a like" substitutions may have occurred due to the inherent limitations of voice recognition software    Read the chart carefully and recognize, using context, where substitutions have occurred     ==  Reema Hollingsworth, [address]  Internal Medicine Residency PGY-2

## 2022-11-10 NOTE — ASSESSMENT & PLAN NOTE
Patient with UA consistent with infection during last admission but not treated with ABX as was not having urinary symptoms  Urine culture positive for pseudomonas  Patient went to PCPs office day after discharge and had hematuria  Patient started on ciprofloxacin PO and sent for STAT CTAP which revealed 1 5 cm obstructing calculus in R renal pelvis with mild hydronephrosis and concerning for pyelonephritis  Cr stable  UA shows innumerable RBC, WBC but no bacteria in the setting of getting 2 doses of ciprofloxacin outpatient  Went to OR yesterday with urology for cystoscopy retrograde pyelogram and right ureteral stent  · Will continue previously prescribed cipro for 3 days    · Follow-up scheduled with urology and nephrology

## 2022-11-10 NOTE — ASSESSMENT & PLAN NOTE
Lab Results   Component Value Date    HGBA1C 6 1 (H) 11/10/2022       Recent Labs     11/10/22  2051   POCGLU 124       Blood Sugar Average: Last 72 hrs: Well controlled just on metformin    · Can resume metformin upon discharge

## 2022-11-11 VITALS
HEART RATE: 79 BPM | TEMPERATURE: 98.4 F | DIASTOLIC BLOOD PRESSURE: 66 MMHG | RESPIRATION RATE: 15 BRPM | SYSTOLIC BLOOD PRESSURE: 92 MMHG | OXYGEN SATURATION: 97 %

## 2022-11-11 PROBLEM — N12 PYELONEPHRITIS: Status: RESOLVED | Noted: 2022-11-10 | Resolved: 2022-11-11

## 2022-11-11 LAB
ANION GAP SERPL CALCULATED.3IONS-SCNC: 9 MMOL/L (ref 4–13)
BACTERIA UR CULT: NORMAL
BACTERIA UR CULT: NORMAL
BASOPHILS # BLD AUTO: 0.03 THOUSANDS/ÂΜL (ref 0–0.1)
BASOPHILS NFR BLD AUTO: 0 % (ref 0–1)
BUN SERPL-MCNC: 12 MG/DL (ref 5–25)
CALCIUM SERPL-MCNC: 7.2 MG/DL (ref 8.3–10.1)
CHLORIDE SERPL-SCNC: 105 MMOL/L (ref 96–108)
CO2 SERPL-SCNC: 24 MMOL/L (ref 21–32)
CREAT SERPL-MCNC: 0.75 MG/DL (ref 0.6–1.3)
EOSINOPHIL # BLD AUTO: 0.1 THOUSAND/ÂΜL (ref 0–0.61)
EOSINOPHIL NFR BLD AUTO: 1 % (ref 0–6)
ERYTHROCYTE [DISTWIDTH] IN BLOOD BY AUTOMATED COUNT: 14.6 % (ref 11.6–15.1)
GFR SERPL CREATININE-BSD FRML MDRD: 88 ML/MIN/1.73SQ M
GLUCOSE SERPL-MCNC: 152 MG/DL (ref 65–140)
HCT VFR BLD AUTO: 24.1 % (ref 34.8–46.1)
HGB BLD-MCNC: 7.5 G/DL (ref 11.5–15.4)
IMM GRANULOCYTES # BLD AUTO: 0.1 THOUSAND/UL (ref 0–0.2)
IMM GRANULOCYTES NFR BLD AUTO: 1 % (ref 0–2)
LYMPHOCYTES # BLD AUTO: 1.76 THOUSANDS/ÂΜL (ref 0.6–4.47)
LYMPHOCYTES NFR BLD AUTO: 22 % (ref 14–44)
MCH RBC QN AUTO: 29.2 PG (ref 26.8–34.3)
MCHC RBC AUTO-ENTMCNC: 31.1 G/DL (ref 31.4–37.4)
MCV RBC AUTO: 94 FL (ref 82–98)
MONOCYTES # BLD AUTO: 0.57 THOUSAND/ÂΜL (ref 0.17–1.22)
MONOCYTES NFR BLD AUTO: 7 % (ref 4–12)
NEUTROPHILS # BLD AUTO: 5.43 THOUSANDS/ÂΜL (ref 1.85–7.62)
NEUTS SEG NFR BLD AUTO: 69 % (ref 43–75)
NRBC BLD AUTO-RTO: 0 /100 WBCS
PLATELET # BLD AUTO: 449 THOUSANDS/UL (ref 149–390)
PMV BLD AUTO: 9.6 FL (ref 8.9–12.7)
POTASSIUM SERPL-SCNC: 4.7 MMOL/L (ref 3.5–5.3)
RBC # BLD AUTO: 2.57 MILLION/UL (ref 3.81–5.12)
SODIUM SERPL-SCNC: 138 MMOL/L (ref 135–147)
WBC # BLD AUTO: 7.99 THOUSAND/UL (ref 4.31–10.16)

## 2022-11-11 RX ORDER — TAMSULOSIN HYDROCHLORIDE 0.4 MG/1
0.4 CAPSULE ORAL
Qty: 30 CAPSULE | Refills: 1 | Status: SHIPPED | OUTPATIENT
Start: 2022-11-11

## 2022-11-11 RX ORDER — PHENAZOPYRIDINE HYDROCHLORIDE 200 MG/1
200 TABLET, FILM COATED ORAL 3 TIMES DAILY PRN
Qty: 15 TABLET | Refills: 1 | Status: SHIPPED | OUTPATIENT
Start: 2022-11-11

## 2022-11-11 RX ORDER — CIPROFLOXACIN 500 MG/1
500 TABLET, FILM COATED ORAL EVERY 12 HOURS SCHEDULED
Status: DISCONTINUED | OUTPATIENT
Start: 2022-11-11 | End: 2022-11-11 | Stop reason: HOSPADM

## 2022-11-11 RX ADMIN — CIPROFLOXACIN 500 MG: 500 TABLET, FILM COATED ORAL at 11:57

## 2022-11-11 RX ADMIN — PANTOPRAZOLE SODIUM 40 MG: 40 TABLET, DELAYED RELEASE ORAL at 05:19

## 2022-11-11 RX ADMIN — SODIUM CHLORIDE, SODIUM GLUCONATE, SODIUM ACETATE, POTASSIUM CHLORIDE, MAGNESIUM CHLORIDE, SODIUM PHOSPHATE, DIBASIC, AND POTASSIUM PHOSPHATE 100 ML/HR: .53; .5; .37; .037; .03; .012; .00082 INJECTION, SOLUTION INTRAVENOUS at 08:28

## 2022-11-11 RX ADMIN — ATORVASTATIN CALCIUM 40 MG: 40 TABLET, FILM COATED ORAL at 08:26

## 2022-11-11 RX ADMIN — SERTRALINE HYDROCHLORIDE 50 MG: 50 TABLET ORAL at 08:26

## 2022-11-11 RX ADMIN — Medication 2000 UNITS: at 08:26

## 2022-11-11 NOTE — PLAN OF CARE
Problem: PAIN - ADULT  Goal: Verbalizes/displays adequate comfort level or baseline comfort level  Description: Interventions:  - Encourage patient to monitor pain and request assistance  - Assess pain using appropriate pain scale  - Administer analgesics based on type and severity of pain and evaluate response  - Implement non-pharmacological measures as appropriate and evaluate response  - Consider cultural and social influences on pain and pain management  - Notify physician/advanced practitioner if interventions unsuccessful or patient reports new pain  Outcome: Progressing     Problem: INFECTION - ADULT  Goal: Absence or prevention of progression during hospitalization  Description: INTERVENTIONS:  - Assess and monitor for signs and symptoms of infection  - Monitor lab/diagnostic results  - Monitor all insertion sites, i e  indwelling lines, tubes, and drains  - Monitor endotracheal if appropriate and nasal secretions for changes in amount and color  - [place] appropriate cooling/warming therapies per order  - Administer medications as ordered  - Instruct and encourage patient and family to use good hand hygiene technique  - Identify and instruct in appropriate isolation precautions for identified infection/condition  Outcome: Progressing  Goal: Absence of fever/infection during neutropenic period  Description: INTERVENTIONS:  - Monitor WBC    Outcome: Progressing     Problem: DISCHARGE PLANNING  Goal: Discharge to home or other facility with appropriate resources  Description: INTERVENTIONS:  - Identify barriers to discharge w/patient and caregiver  - Arrange for needed discharge resources and transportation as appropriate  - Identify discharge learning needs (meds, wound care, etc )  - Arrange for interpretive services to assist at discharge as needed  - Refer to Case Management Department for coordinating discharge planning if the patient needs post-hospital services based on physician/advanced practitioner order or complex needs related to functional status, cognitive ability, or social support system  Outcome: Progressing

## 2022-11-11 NOTE — ANESTHESIA POSTPROCEDURE EVALUATION
Post-Op Assessment Note    CV Status:  Stable  Pain Score: 0    Pain management: adequate     Mental Status:  Alert   Hydration Status:  Stable   PONV Controlled:  Controlled   Airway Patency:  Patent      Post Op Vitals Reviewed: Yes      Staff: CRNA, Anesthesiologist         No complications documented      BP   102/55   Temp 99 2   Pulse 74   Resp 21   SpO2 100

## 2022-11-11 NOTE — ASSESSMENT & PLAN NOTE
· POD#1 right ureteral stent insertion  · OR case requested for definitive outpatient management of right proximal ureteral stone  · Recommend discharge home on antibiotics, currently on Cipro  · Flomax, Pyridium for stent colic sent to pharmacy  · Message sent to [facility] office to schedule follow-up  · ER precautions reviewed for any fevers or chills

## 2022-11-11 NOTE — TELEPHONE ENCOUNTER
Left message for patient to call office to see how she is doing post procedure and provide post op follow up

## 2022-11-11 NOTE — OP NOTE
OPERATIVE REPORT  PATIENT NAME: Paty Kowalski    :  1965  MRN: [id number]  Pt Location:  CYSTO ROOM 01    SURGERY DATE: 11/10/2022    Surgeon(s) and Role:     * Denise Lares MD - Primary    Preop Diagnosis:  Right obstructive appearing  UPJ stone  Concern for urinary tract infection    Post-Op Diagnosis Codes:  Right obstructive appearing  UPJ stone  Concern for urinary tract infection    Procedure(s) (LRB):  CYSTOSCOPY RETROGRADE PYELOGRAM WITH INSERTION STENT URETERAL (Right)    Specimen(s):  ID Type Source Tests Collected by Time Destination   A : BLADDER URINE Urine Urinary Bladder URINE CULTURE Denise Lares MD 11/10/2022 2011        Estimated Blood Loss:   Minimal    Drains:  * No LDAs found *    Anesthesia Type:   General    Operative Indications:  Patient with malaise for several days presented to emergency room and admitted with CT scan findings of right UPJ stone with mild hydronephrosis and also right pyelonephritis verses possibly developing renal abscess  Operative Findings:  Mildly cloudy urine in bladder sent for culture  Right retrograde with mild hydronephrosis  Cloudy urine at but not obviously purulent seen after stent placed sent for culture    Complications:   None    Procedure and Technique:  The patient was brought to the operating room  Anesthesia was induced  They were moved to dorsal lithotomy position  Antibiotics were confirmed  A time-out was performed identifying the correct patient, site, and procedure  A rigid 25 Citizen of the [place] cystoscope was introduced into the bladder  The urethra was unremarkable  The bladder was quickly surveyed and without any significant abnormalities other than mildly cloudy urine which was collected for culture  A  film was taken which did suggested a right proximal stone  Attention was turned to the right ureteral orifice  A 5 Citizen of the [place] open-end catheter was used to intubate the ureteral orifice    A gentle retrograde pyeloureterogram was performed which showed a normal-appearing ureter with minimal hydronephrosis  A Solo wire was passed through the open-ended catheter up the ureter into the right renal pelvis under fluoroscopic guidance  The 5 [place] open-ended catheter was removed  A 6x 24 stent was placed over the wire under visual guidance in the bladder and fluoroscopic guidance proximally  Once seen to be in appropriate position the wire was removed and a good coil was seen in the collecting system on fluoroscopy and visually in the bladder  The stent string was not left on  After stent placement cloudy urine was seen emanating from around the stent  It was not obviously purulence  It was collected and sent for culture  The bladder was drained  This completed the case  The patient was woken from anesthesia and transferred to PACU in good condition  PLAN:  Patient will be arranged for outpatient Urology follow-up to set up definitive stone surgery once potential infection has cleared       A qualified resident physician was not available    Patient Disposition:  PACU         SIGNATURE: Isacc Ayala MD  DATE: November 10, 2022  TIME: 8:12 PM

## 2022-11-11 NOTE — UTILIZATION REVIEW
Initial Clinical Review    Admission: Date/Time/Statement:   Admission Orders (From admission, onward)     Ordered        11/10/22 1307  1 [facility],[address]  Once                      Orders Placed This Encounter   Procedures   • INPATIENT ADMISSION     Standing Status:   Standing     Number of Occurrences:   1     Order Specific Question:   Level of Care     Answer:   Med Surg [16]     Order Specific Question:   Estimated length of stay     Answer:   More than 2 Midnights     Order Specific Question:   Certification     Answer:   I certify that inpatient services are medically necessary for this patient for a duration of greater than two midnights  See H&P and MD Progress Notes for additional information about the patient's course of treatment  ED Arrival Information     Expected   11/10/2022     Arrival   11/10/2022 11:06    Acuity   Urgent            Means of arrival   Walk-In    Escorted by   Self    Service   [facility]    Admission type   Emergency            Arrival complaint   Pyelonephritis           Chief Complaint   Patient presents with   • Weakness - Generalized     Pt reports being sick for over a week with weakness, loss of appetite, was admitted Friday for "dehydration", pt had CT done today and was told by PCP to come in for eval, pt reports having "large kidney stone"     Initial Presentation: 62 y o  female to ED presents for kidney stone per PCP  Recent hospitalization from 11/4 to 11/6 for fever and diarrhea  Treated for dehydration and TOMMY with IVF then discharge  UA was consistent with infection at that time but she was felt not to be symptomatic from this and so she was not treated with antibiotics  Seen by PCP on 11/7 with continued fatigue, weakness, and diarrhea  Noted gross hematuria ion urine sample  Pt was given po ciprofloxain, took 1 dose yesterday and today   Had STAT CTAP which showed obstructing 1 5 cm calculus at renal pelvis with mild right hydronephrosis and right renal and periureteral inflammation  She was recommended to come to the ED for Urology follow up  UA showing moderate leukocytes, large blood, innumerable RBC and WBC but no bacteria  Previous urine culture growing Pseudomonas  PMH for NIDDM2, CKD stage 2, HTN, asthma, diverticulitis, HLD, anxiety, depression and Vit D deficiency and GERD  Admit Inpatient level of care for Pyelonephritis and CKD stage 2  Urology consult for OR  NPO  IVFs  Hold off on antibiotics for now  Given preop dose  Trend creat, baseline 1 01  [employer] Continue to hold lisinopril at this time  On exam; abdominal tenderness, right lower quadrant and right CVA tenderness  11/10  Urology cons; Nephrolithiasis  Leukocytosis 12  Plan for OR today  NPO  CT on outpatient revealed a 1 5 cm obstructing right renal pelvis stone resulting in mild right hydronephrosis with significant right renal and bre ureteral inflammation concerning for pyelonephritis  11/10 OR - S/p CYSTOSCOPY RETROGRADE PYELOGRAM WITH INSERTION STENT URETERAL (Right)  Operative Findings:  Mildly cloudy urine in bladder sent for culture  Right retrograde with mild hydronephrosis  Cloudy urine at but not obviously purulent seen after stent placed sent for culture    Date: 11/11  Day 2:   Per Urology; Currently on antibiotics  Flomax, Pyridium for stent colic  Tolerating diet      ED Triage Vitals [11/10/22 1112]   Temperature Pulse Respirations Blood Pressure SpO2   98 5 °F (36 9 °C) 98 20 115/55 97 %      Temp Source Heart Rate Source Patient Position - Orthostatic VS BP Location FiO2 (%)   Oral Monitor Lying Left arm --      Pain Score       No Pain          Wt Readings from Last 1 Encounters:   11/09/22 72 6 kg (160 lb)     Additional Vital Signs:   11/11/22 0715 98 4 °F (36 9 °C) 79 15 92/66 75 97 % None (Room air) -- Sitting   11/10/22 22:08:35 97 6 °F (36 4 °C) 67 -- 106/56 73 98 % -- -- --   11/10/22 2130 -- 72 18 100/60 76 98 % None (Room air) WDL --   11/10/22 2115 -- 68 18 103/63 73 97 % None (Room air) WDL --   11/10/22 2100 -- 72 14 98/46   Abnormal    63 Abnormal  97 % None (Room air) WDL --   11/10/22 2045 -- 72 20 93/41   Abnormal  60 Abnormal  95 % None (Room air) WDL --   11/10/22 2030 -- 74 24   Abnormal  102/55 81 99 % None (Room air) -- --   11/10/22 2024 99 2 °F (37 3 °C) 70 16 124/55 64 Abnormal  100 % None (Room air) [facility]        Pertinent Labs/Diagnostic Test Results:   11/10  CT abd/pelvis - Obstructing 1 5 cm calculus at the right renal pelvis eliciting mild right hydronephrosis and significant right renal and periureteral inflammation concerning for pyelonephritis  There is no discrete renal abscess at this time, though the patient is at   risk for developing an abscess  Urgent urologic consultation is advised  There are multiple additional bilateral nonobstructing renal calculi      Results from last 7 days   Lab Units 11/04/22  1530   SARS-COV-2  Negative     Lab Units 11/11/22  0821 11/10/22  0725   WBC Thousand/uL 7 99 11 98*   HEMOGLOBIN g/dL 7 5* 10 2*   HEMATOCRIT % 24 1* 32 3*   PLATELETS Thousands/uL 449* 629*   NEUTROS ABS Thousands/µL 5 43 9 05*   BANDS PCT %  --   --          Lab Units 11/11/22  0821 11/10/22  0725   SODIUM mmol/L 138 137   POTASSIUM mmol/L 4 7 4 0   CHLORIDE mmol/L 105 105   CO2 mmol/L 24 28   ANION GAP mmol/L 9 4   BUN mg/dL 12 20   CREATININE mg/dL 0 75 1 01   EGFR ml/min/1 73sq m 88 61   CALCIUM mg/dL 7 2* 8 9   MAGNESIUM mg/dL  --   --    PHOSPHORUS mg/dL  --   --      Lab Units 11/10/22  0725   AST U/L 21   ALT U/L 78   ALK PHOS U/L 259*   TOTAL PROTEIN g/dL 7 5   ALBUMIN g/dL 2 1*   TOTAL BILIRUBIN mg/dL 0 48     Results from last 7 days   Lab Units 11/10/22  2051   POC GLUCOSE mg/dl 124     Lab Units 11/11/22  0821   GLUCOSE RANDOM mg/dL 152*         Results from last 7 days   Lab Units 11/10/22  0725   HEMOGLOBIN A1C % 6 1*   EAG mg/dl 128         Results from last 7 days   Lab Units 11/10/22  1130   LACTIC ACID mmol/L 1 1 Lab Units 11/10/22  1136 11/09/22  1718   CLARITY UA  Turbid cloudy   COLOR UA  Colorless red   SPEC GRAV UA  1 020  --    PH UA  5 5  --    GLUCOSE UA mg/dl Trace* Neg   KETONES UA mg/dl Negative 15 mg/dL   BLOOD UA  Large* Large   PROTEIN UA mg/dl Trace* >=300 mg/dL   NITRITE UA  Negative neg   BILIRUBIN UA  Negative  --    BILIRUBIN UA POC   --  Moderate   UROBILINOGEN UA   --  1 0   UROBILINOGEN UA (BE) mg/dl <2 0  --    LEUKOCYTES UA  Moderate* Large   WBC UA /hpf Innumerable*  --    RBC UA /hpf Innumerable*  --    BACTERIA UA /hpf None Seen  --    EPITHELIAL CELLS WET PREP /hpf Occasional  --    MUCUS THREADS  Occasional*  --      Results from last 7 days   Lab Units 11/04/22  1530   INFLUENZA A PCR  Negative   INFLUENZA B PCR  Negative   RSV PCR  Negative       Lab Units 11/10/22  1140 11/10/22  1136 11/10/22  1130 11/09/22  1717   BLOOD CULTURE  Received in Microbiology Lab  Culture in Progress  --  Received in Microbiology Lab  Culture in Progress    --    URINE CULTURE   --  No Growth <1000 cfu/mL  --  70,000-79,000 cfu/ml Pseudomonas aeruginosa*       ED Treatment:   Medication Administration from 11/10/2022 1038 to 11/10/2022 1428       Date/Time Order Dose Route Action     11/10/2022 1134 sodium chloride 0 9 % bolus 1,000 mL 1,000 mL Intravenous New Bag        Past Medical History:   Diagnosis Date   • Anxiety    • Asthma    • Colon polyp    • Depression    • Diabetes mellitus ([facility] [address] )    • Diverticulitis    • Diverticulitis of colon    • Follicular cyst of ovary 12/23/2014   • Former smoker    • GERD (gastroesophageal reflux disease)    • Glycosuria    • Headache    • Hyperlipidemia    • Hypertension    • Kidney calculi 1/11/2019   • Kidney stone    • Obesity    • Ovarian cyst    • Overactive bladder    • Overweight    • Pulmonary nodule    • Renal calculus    • Seasonal allergies    • Tinea pedis of left foot 6/8/2020   • Vertigo    • Wears partial dentures     Lower plate -partial     Present on Admission:  • Type 2 diabetes mellitus with diabetic chronic kidney disease ([facility] [address] )  • Hypertension  • CKD (chronic kidney disease) stage 2, GFR 60-89 ml/min  • Anxiety  • GERD (gastroesophageal reflux disease)  • Hypercholesteremia  • Vitamin D deficiency      Admitting Diagnosis: Kidney stone [N20 0]  UTI (urinary tract infection) [N39 0]  Pyelonephritis [N12]  Age/Sex: 62 y o  female     Admission Orders:  Scheduled Medications:  atorvastatin, 40 mg, Oral, Daily  cholecalciferol, 2,000 Units, Oral, Daily  ciprofloxacin, 500 mg, Oral, Q12H [address]  metoprolol tartrate, 12 5 mg, Oral, Q12H CARMELO  pantoprazole, 40 mg, Oral, Early Morning  sertraline, 50 mg, Oral, Daily    ciprofloxacin (CIPRO) IVPB (premix in 5% dextrose) 400 mg 200 mL  Dose: 400 mg  Freq: Once Route: IV  Last Dose: Stopped (11/10/22 1958)  Start: 11/10/22 1745 End: 11/10/22 1958    Continuous IV Infusions:  multi-electrolyte, 100 mL/hr, Intravenous, Continuous      PRN Meds:  acetaminophen, 650 mg, Oral, Q6H PRN  ketorolac, 15 mg, Intravenous, Q6H PRN      Bld culture x2  IP CONSULT TO UROLOGY    Network Utilization Review Department  ATTENTION: Please call with any questions or concerns to [phone] and carefully listen to the prompts so that you are directed to the right person  All voicemails are confidential   Angie Zaidi all requests for admission clinical reviews, approved or denied determinations and any other requests to dedicated fax number below belonging to the campus where the patient is receiving treatment   List of dedicated fax numbers for the Facilities:  [address] DENIALS (Administrative/Medical Necessity) [phone]   [address] (Maternity/NICU/Pediatrics) [phone]   [address] [phone]   [facility] [place] [phone]   [place] [phone]   [address] Lakeisha Kowalski [phone]   [address] [facility] [address] [phone]

## 2022-11-11 NOTE — DISCHARGE INSTR - AVS FIRST PAGE
Follow Ups:  Please drink at least 2 L of water a day  Notify Urology for any pain or discomfort secondary to the stent  The office should call you to schedule follow-up in the next couple of weeks  Please return to the ER for any fevers or chills  Appointment with your PCP on 11/14 is already scheduled      Medication Changes:   Take Flomax daily to help with discomfort from stent  You can take Pyridium as needed for stent discomfort  Hold Lisinopril at this time until told to restart from your PCP  Continue with Ciprofloxacin 500mg BID for another 3 days from tomorrow  Take a stool softener daily to prevent constipation

## 2022-11-11 NOTE — ANESTHESIA PREPROCEDURE EVALUATION
Procedure:  CYSTOSCOPY RETROGRADE PYELOGRAM WITH INSERTION STENT URETERAL (Right Bladder)    Relevant Problems   CARDIO   (+) Hypercholesteremia   (+) Hypertension      ENDO   (+) Type 2 diabetes mellitus with diabetic chronic kidney disease (HCC)   (+) Type 2 diabetes mellitus with microalbuminuria (HCC)      GI/HEPATIC   (+) GERD (gastroesophageal reflux disease)      /RENAL   (+) TOMMY (acute kidney injury) ([facility] [address] )   (+) CKD (chronic kidney disease) stage 2, GFR 60-89 ml/min   (+) Kidney stone      MUSCULOSKELETAL   (+) Primary osteoarthritis of both knees      NEURO/PSYCH   (+) Anxiety        CAD/PCI/MI/CHF -- denies  COPD/ASTHMA/MERVAT -- denies  PROBS WITH PRIOR ANESTHESIA -- denies  NPO STATUS CONFIRMED    Breakfast 0930  No N/V    Lab Results   Component Value Date    SODIUM 137 11/10/2022    K 4 0 11/10/2022    BUN 20 11/10/2022    CREATININE 1 01 11/10/2022    EGFR 61 11/10/2022     Lab Results   Component Value Date    HGBA1C 6 1 (H) 11/10/2022       Lab Results   Component Value Date    HGB 10 2 (L) 11/10/2022    HGB 10 2 (L) 11/06/2022    HGB 10 8 (L) 11/05/2022     (H) 11/10/2022     (H) 11/06/2022     11/05/2022     Lab Results   Component Value Date    WBC 11 98 (H) 11/10/2022       Lab Results   Component Value Date    CREATININE 1 01 11/10/2022    CREATININE 1 00 11/06/2022    CREATININE 1 37 (H) 11/05/2022       Lab Results   Component Value Date    INR 0 84 04/20/2021     Lab Results   Component Value Date    PTT 27 04/20/2021       No results found for: LACTATE      Type and Screen  B                      Physical Exam    Airway    Mallampati score: II  TM Distance: >3 FB       Dental       Cardiovascular      Pulmonary      Other Findings        Anesthesia Plan  ASA Score- 2     Anesthesia Type- general with ASA Monitors  Additional Monitors:   Airway Plan: LMA      Comment:  Asberry Opitz, M D , have personally seen and evaluated the patient prior to anesthetic care   I have reviewed the pre-anesthetic record, and other medical records if appropriate to the anesthetic care  If a CRNA is involved in the case, I have reviewed the CRNA assessment, if present, and agree  Risks/benefits and alternatives discussed with patient including possible PONV, sore throat, and possibility of rare anesthetic and surgical emergencies          Plan Factors-    Chart reviewed  Existing labs reviewed  Patient summary reviewed  Patient instructed to abstain from smoking on day of procedure  There is medical exclusion for perioperative obstructive sleep apnea risk education  Induction- intravenous  Postoperative Plan- Plan for postoperative opioid use  Planned trial extubation    Informed Consent- Anesthetic plan and risks discussed with patient  I personally reviewed this patient with the CRNA  Discussed and agreed on the Anesthesia Plan with the CRNA  Jorge Quezada

## 2022-11-11 NOTE — DISCHARGE SUMMARY
INTERNAL MEDICINE RESIDENCY DISCHARGE SUMMARY     Jasbri Hernandez   62 y o  female  MRN: [id number]  Room/Bed: /MS 8065     [facility] MED SURG 7   Encounter: [id number]    Principal Problem:    Pyelonephritis  Active Problems:    GERD (gastroesophageal reflux disease)    Vitamin D deficiency    Hypercholesteremia    Type 2 diabetes mellitus with diabetic chronic kidney disease (HCC)    Hypertension    Anxiety    CKD (chronic kidney disease) stage 2, GFR 60-89 ml/min      * Pyelonephritis  Assessment & Plan  Patient with UA consistent with infection during last admission but not treated with ABX as was not having urinary symptoms  Urine culture positive for pseudomonas  Patient went to PCPs office day after discharge and had hematuria  Patient started on ciprofloxacin PO and sent for STAT CTAP which revealed 1 5 cm obstructing calculus in R renal pelvis with mild hydronephrosis and concerning for pyelonephritis  Cr stable  UA shows innumerable RBC, WBC but no bacteria in the setting of getting 2 doses of ciprofloxacin outpatient  Went to OR yesterday with urology for cystoscopy retrograde pyelogram and right ureteral stent  · Will continue previously prescribed cipro for 3 days  · Follow-up scheduled with urology and nephrology    CKD (chronic kidney disease) stage 2, GFR 60-89 ml/min  Assessment & Plan  Lab Results   Component Value Date    EGFR 88 11/11/2022    EGFR 61 11/10/2022    EGFR 62 11/06/2022    CREATININE 0 75 11/11/2022    CREATININE 1 01 11/10/2022    CREATININE 1 00 11/06/2022     Cr 1 01 on presentation, within patient's baseline  Had TOMMY last week during admission, which resolved  · Will continue to hold lisinopril at this time  Continue with adequate hydration  Continue to monitor in outpatient setting    Anxiety  Assessment & Plan  Continue PTA zoloft 50 mg qd    Hypertension  Assessment & Plan  Patient on lisinopril and lopressor PTA   BP normotensive on presentation  · Continue to hold lisinopril, can discuss resuming at PCP visit  · Continue PTA lopressor 12 5 mg BID      Type 2 diabetes mellitus with diabetic chronic kidney disease [facility])  Assessment & Plan  Lab Results   Component Value Date    HGBA1C 6 1 (H) 11/10/2022       Recent Labs     11/10/22  2051   POCGLU 124       Blood Sugar Average: Last 72 hrs: Well controlled just on metformin  · Can resume metformin upon discharge    Hypercholesteremia  Assessment & Plan  Continue PTA Lipitor 40 mg qd    Vitamin D deficiency  Assessment & Plan  Continue vitamin D supplementation    GERD (gastroesophageal reflux disease)  Assessment & Plan  Continue home prilosec on discharge      [address]     Patient with PMH HTN, CKD2, kidney stones, and lithotripsy, had previously been admitted with fever and diarrhea  Presented to PCP for follow-up, found to have hematuria, hypotension, and tachycardia  Was prescribed cipro and sent for labs and stat CT  A/P  Found to have obstructing 1 5 cm calculus at the right renal pelvis eliciting mild right hydronephrosis and significant right renal and periureteral inflammation concerning for pyelonephritis  No discrete renal abscess, urgent urologic consultation advised  Pt sent to the ED for management  Was found to be afebrile, normotensive, stable vitals, no lactic acidosis, baseline creatinine  Had pansensitive pseudomonas growing on culture from prior admission  Underwent cystoscopy retrograde pyelogram and right ureteral stent placement with urology the evening of 11/10/2022 with plans for definitive stone surgery after infection has cleared  Will finish home cipro to clear infection  Pt scheduled for follow ups with PCP, urology, and nephrology      Follow up with PCP regarding:   - Completion of 3 more days of antibiotics  - Discuss discontinuation or resumption of lisinopril pending kidney function and blood pressure  - Discuss results of BMP and CBC order for Monday  - Ensure patient goes to follow-up appointments with urology and nephrology    DISCHARGE INFORMATION     PCP at Discharge: Mae VALLE    Admitting Provider: Jesusita Real MD  Admission Date: 11/10/2022    Discharge Provider: Jesusita Real MD  Discharge Date: 11/11/2022    Discharge Disposition: Home/Self Care  Discharge Condition: good  Discharge with Lines: no    Discharge Diet: regular diet  Activity Restrictions: no lifting more than 10 pounds  Test Results Pending at Discharge: Urine cultures, blood cultures    Discharge Diagnoses:  Principal Problem:    Pyelonephritis  Active Problems:    GERD (gastroesophageal reflux disease)    Vitamin D deficiency    Hypercholesteremia    Type 2 diabetes mellitus with diabetic chronic kidney disease ([facility] [address] )    Hypertension    Anxiety    CKD (chronic kidney disease) stage 2, GFR 60-89 ml/min  Resolved Problems:    * No resolved hospital problems  *      Consulting Providers: Urology- Letty Lobo MD      Diagnostic & Therapeutic Procedures Performed:  CT abdomen pelvis w contrast    Result Date: 11/10/2022  Impression: Obstructing 1 5 cm calculus at the right renal pelvis eliciting mild right hydronephrosis and significant right renal and periureteral inflammation concerning for pyelonephritis  There is no discrete renal abscess at this time, though the patient is at risk for developing an abscess  Urgent urologic consultation is advised  There are multiple additional bilateral nonobstructing renal calculi  The study was marked in [facility] for immediate notification   Workstation performed: [id number]       Code Status: Level 1 - Full Code  Advance Directive & Living Will: <no information>  Power of :    POLST:      Medications:  Current Discharge Medication List        Current Discharge Medication List        Current Discharge Medication List      CONTINUE these medications which have NOT CHANGED    Details   Ascorbic Acid (vitamin C) 1000 MG tablet Take 2,000 mg by mouth daily      atorvastatin (LIPITOR) 40 mg tablet Take 1 tablet (40 mg total) by mouth daily  Qty: 90 tablet, Refills: 1    Associated Diagnoses: Hypercholesteremia      Cholecalciferol (Vitamin D3) 50 MCG (2000 UT) TABS Take 2,000 Units by mouth daily      ciprofloxacin (CIPRO) 500 mg tablet Take 1 tablet (500 mg total) by mouth every 12 (twelve) hours for 7 days  Qty: 14 tablet, Refills: 0    Associated Diagnoses: Acute cystitis with hematuria      lisinopril (ZESTRIL) 10 mg tablet Take 1 tablet (10 mg total) by mouth daily  Qty: 90 tablet, Refills: 1    Associated Diagnoses: Secondary hypertension      metFORMIN (GLUCOPHAGE) 1000 MG tablet Take 1 tablet (1,000 mg total) by mouth 2 (two) times a day with meals  Qty: 180 tablet, Refills: 1    Associated Diagnoses: Type 2 diabetes mellitus without complication, without long-term current use of insulin ([facility])      metoprolol tartrate (LOPRESSOR) 25 mg tablet Take 0 5 tablets (12 5 mg total) by mouth every 12 (twelve) hours  Qty: 30 tablet, Refills: 0    Associated Diagnoses: Tachycardia      multivitamin (THERAGRAN) TABS Take 1 tablet by mouth daily      omeprazole (PriLOSEC) 40 MG capsule Take 1 capsule (40 mg total) by mouth daily  Qty: 90 capsule, Refills: 1    Associated Diagnoses: Gastroesophageal reflux disease without esophagitis      sertraline (ZOLOFT) 50 mg tablet Take 1 tablet (50 mg total) by mouth daily Takes in the am   Qty: 90 tablet, Refills: 1    Associated Diagnoses: Anxiety      acetaminophen (TYLENOL) 500 mg tablet Take 500 mg by mouth every 6 (six) hours as needed for mild pain             Allergies:   Allergies   Allergen Reactions   • Oxybutynin Anaphylaxis     "feels like throat closing and can't swallow"   • Clonazepam Other (See Comments)     Pt states "didn't feel right on it "   • Morphine Other (See Comments) and Confusion     Eating [facility] - [id number]: "dopey"  Gets silly   • Venlafaxine Other (See Comments)     Unknown--pt states " MD was trying pt on different medications  Pt unsure of reaction "       FOLLOW-UP     PCP Outpatient Follow-up:  yes      Follow up with PCP [address]  Follow up appointment for TCM on 11/14/2022    Consulting Providers Follow-up:  yes      Physician name: Jade VALLE  Specialty: Urology  Office phone number: [phone]  Follow up scheduled for 12/02/2022    Physician: Victorina Delgado MD  Specialty: Nephrology  Follow up scheduled for 01/19/2023      Active Issues Requiring Follow-up:   yes     Issue: Kidney stone, UTI  What is Needed: Continue Cipro from home for 3 days, follow-up with urology, nephrology, and PCP  Follow-up Appointments Arranged: Yes       Discharge Statement:   I spent 30 minutes minutes discharging the patient  This time was spent on the day of discharge  I had direct contact with the patient on the day of discharge  Additional documentation is required if more than 30 minutes were spent on discharge  Portions of the record may have been created with voice recognition software  Occasional wrong word or "sound a like" substitutions may have occurred due to the inherent limitations of voice recognition software    Read the chart carefully and recognize, using context, where substitutions have occurred     ==  [address] [facility]  Internal Medicine Sub-Intern

## 2022-11-11 NOTE — PROGRESS NOTES
Progress Note - Urology  María Mosley 1965, 62 y o  female MRN: [id number]    Unit/Bed#: -01 Encounter: [id number]    * Pyelonephritis  Assessment & Plan  · POD#1 right ureteral stent insertion  · OR case requested for definitive outpatient management of right proximal ureteral stone  · Recommend discharge home on antibiotics, currently on Cipro  · Flomax, Pyridium for stent colic sent to pharmacy  · Message sent to [place] office to schedule follow-up  · ER precautions reviewed for any fevers or chills        Subjective:  Ambulatory in room without complaints offered  Denies any fevers or chills  Tolerating her diet  Denies any significant stent discomfort  24 HR EVENTS:   no significant events  Patient has  no complaints  Review of Systems   Constitutional: Negative for activity change, appetite change, chills, fatigue, fever and unexpected weight change  HENT: Negative for facial swelling  Eyes: Negative for discharge  Respiratory: Negative  Negative for cough and shortness of breath  Cardiovascular: Negative for chest pain and leg swelling  Gastrointestinal: Negative  Negative for abdominal distention, abdominal pain, constipation, diarrhea, nausea and vomiting  Endocrine: Negative  Genitourinary: Negative  Negative for decreased urine volume, difficulty urinating, dysuria, enuresis, flank pain, frequency, genital sores, hematuria and urgency  Musculoskeletal: Negative for back pain and myalgias  Skin: Negative for pallor and rash  Allergic/Immunologic: Negative  Negative for immunocompromised state  Neurological: Negative for facial asymmetry and speech difficulty  Psychiatric/Behavioral: Negative for agitation and confusion  Objective:  Nursing Rounds:   Vitals: Blood pressure 92/66, pulse 79, temperature 98 4 °F (36 9 °C), temperature source Oral, resp  rate 15, SpO2 97 %  ,There is no height or weight on file to calculate BMI    INS & OUTS:  I/O last 24 hours: In: 3076 7 [I V :2126 7; IV Piggyback:950]  Out: -     Physical Exam  Vitals and nursing note reviewed  Constitutional:       General: She is not in acute distress  Appearance: Normal appearance  She is normal weight  She is not ill-appearing, toxic-appearing or diaphoretic  HENT:      Head: Normocephalic  Pulmonary:      Effort: Pulmonary effort is normal  No respiratory distress  Abdominal:      General: Abdomen is flat  There is no distension  Musculoskeletal:         General: No swelling  Cervical back: Normal range of motion  Skin:     General: Skin is warm and dry  Neurological:      General: No focal deficit present  Mental Status: She is alert and oriented to person, place, and time  Psychiatric:         Mood and Affect: Mood normal          Behavior: Behavior normal          Thought Content: Thought content normal          Judgment: Judgment normal          Imaging:  CT ABDOMEN AND PELVIS WITH IV CONTRAST     INDICATION:   N17 9: Acute kidney failure, unspecified  N30 01: Acute cystitis with hematuria      COMPARISON:  CT abdomen pelvis October 29, 2021  Correlation renal ultrasound October 28, 2021     TECHNIQUE:  CT examination of the abdomen and pelvis was performed  Axial, sagittal, and coronal 2D reformatted images were created from the source data and submitted for interpretation      Radiation dose length product (DLP) for this visit:  678 48 mGy-cm     This examination, like all CT scans performed in the [facility], was performed utilizing techniques to minimize radiation dose exposure, including the use of iterative   reconstruction and automated exposure control      IV Contrast:  100 mL of iohexol (OMNIPAQUE)  Enteric Contrast:  Enteric contrast was not administered      FINDINGS:     ABDOMEN     LOWER CHEST:  No clinically significant abnormality identified in the visualized lower chest      LIVER/BILIARY TREE: Unremarkable      GALLBLADDER:  No calcified gallstones  No pericholecystic inflammatory change      SPLEEN:  Unremarkable      PANCREAS:  Unremarkable      ADRENAL GLANDS:  Unremarkable      KIDNEYS/URETERS:  Delayed enhancement of the right kidney  Multiple regions of heterogeneous hypoattenuation throughout the right kidney including an area of diminished cortical-medullary differentiation in the upper pole without a discrete   rim-enhancing wall (series 601, image 71)  14 mm calculus at the right renal pelvis (series 601, image 68)  Multiple additional right renal calculi measuring up to 6 mm  Abnormal urothelial enhancement predominantly at the renal pelvis  Periureteral   and perinephric fat stranding  Mild right hydronephrosis  Several right renal cysts measuring up to 2 5 cm      Multiple left renal calculi measuring up to 7 mm  No left hydroureteronephrosis      STOMACH AND BOWEL:  2     APPENDIX:  There are expected postoperative changes of appendectomy      ABDOMINOPELVIC CAVITY:  No ascites  No pneumoperitoneum  No lymphadenopathy      VESSELS:  Atherosclerotic changes are present  No evidence of aneurysm      PELVIS     REPRODUCTIVE ORGANS:  Surgical changes of prior hysterectomy      URINARY BLADDER:  Unremarkable      ABDOMINAL WALL/INGUINAL REGIONS:  Unremarkable      OSSEOUS STRUCTURES:  No acute fracture or destructive osseous lesion  Spinal degenerative changes are noted      IMPRESSION:     Obstructing 1 5 cm calculus at the right renal pelvis eliciting mild right hydronephrosis and significant right renal and periureteral inflammation concerning for pyelonephritis  There is no discrete renal abscess at this time, though the patient is at   risk for developing an abscess  Urgent urologic consultation is advised    There are multiple additional bilateral nonobstructing renal calculi      The study was marked in EPIC for immediate notification        Imaging reviewed - both report and images personally reviewed       Labs:  Recent Labs     11/10/22  0725 11/11/22  0821   WBC 11 98* 7 99       Recent Labs     11/10/22  0725 11/11/22  0821   HGB 10 2* 7 5*     Recent Labs     11/10/22  0725 11/11/22  0821   HCT 32 3* 24 1*     Recent Labs     11/10/22  0725 11/11/22  0821   CREATININE 1 01 0 75     Lab Results   Component Value Date    HGB 7 5 (L) 11/11/2022    HCT 24 1 (L) 11/11/2022    WBC 7 99 11/11/2022     (H) 11/11/2022     Lab Results   Component Value Date    K 4 7 11/11/2022     11/11/2022    CO2 24 11/11/2022    BUN 12 11/11/2022    CREATININE 0 75 11/11/2022    CALCIUM 7 2 (L) 11/11/2022     Urinalysis:  Innumerable WBC's per HPF and innumerable RBC's per HPF  Urine Culture: Growth: No growth    History:    Past Medical History:   Diagnosis Date   • Anxiety    • Asthma    • Colon polyp    • Depression    • Diabetes mellitus ([facility] [address] )    • Diverticulitis    • Diverticulitis of colon    • Follicular cyst of ovary 12/23/2014   • Former smoker    • GERD (gastroesophageal reflux disease)    • Glycosuria    • Headache    • Hyperlipidemia    • Hypertension    • Kidney calculi 1/11/2019   • Kidney stone    • Obesity    • Ovarian cyst    • Overactive bladder    • Overweight    • Pulmonary nodule    • Renal calculus    • Seasonal allergies    • Tinea pedis of left foot 6/8/2020   • Vertigo    • Wears partial dentures     Lower plate -partial     Past Surgical History:   Procedure Laterality Date   • APPENDECTOMY     • BLADDER SUSPENSION      [facility]   • CERVICAL BIOPSY  W/ LOOP ELECTRODE EXCISION     • COLONOSCOPY     • DILATION AND CURETTAGE OF UTERUS     • FL RETROGRADE PYELOGRAM  7/26/2021   • FL RETROGRADE PYELOGRAM  11/18/2021   • HYSTERECTOMY  2007    ovaries present bilaterally   • JOINT REPLACEMENT Right 05/2021    right knee   • NM COLONOSCOPY FLX DX W/COLLJ SPEC WHEN PFRMD N/A 9/26/2017    Procedure: EGD AND COLONOSCOPY;  Surgeon: Belem Rasmussen MD;  Location: [facility] GI LAB; Service: Gastroenterology   • VA CYSTO/URETERO W/LITHOTRIPSY &INDWELL STENT INSRT Bilateral 7/26/2021    Procedure: CYSTOSCOPY URETEROSCOPY WITH LITHOTRIPSY HOLMIUM LASER, RETROGRADE PYELOGRAM AND INSERTION STENT URETERAL;  Surgeon: Walker Bruce MD;  [facility];  Service: Urology   • VA CYSTO/URETERO W/LITHOTRIPSY &INDWELL STENT INSRT Left 9/16/2021    Procedure: CYSTOSCOPY URETEROSCOPY WITH LITHOTRIPSY HOLMIUM LASER, RETROGRADE PYELOGRAM AND INSERTION STENT URETERAL;  Surgeon: Malcom Ponce MD;  Location[facility];  Service: Urology   • VA CYSTO/URETERO W/LITHOTRIPSY &INDWELL STENT INSRT Left 11/18/2021    Procedure: CYSTOSCOPY URETEROSCOPY WITH LITHOTRIPSY HOLMIUM LASER, RETROGRADE PYELOGRAM AND INSERTION STENT URETERAL;  Surgeon: Xiao Suazo MD;  [facility];  Service: Urology   • VA REVISE MEDIAN N/CARPAL TUNNEL SURG Right 5/29/2018    Procedure: CARPAL TUNNEL RELEASE;  Surgeon: Janae Jordan DO;  [facility];  Service: Orthopedics   • VA TOTAL KNEE ARTHROPLASTY Right 5/19/2021    Procedure: ARTHROPLASTY KNEE TOTAL;  Surgeon: Brittny Mueller MD;  Location[facility];  Service: Orthopedics   • REMOVAL URETERAL STENT Right 9/16/2021    Procedure: REMOVAL STENT URETERAL;  Surgeon: Malcom Ponce MD;  [facility];  Service: Urology   • TONSILLECTOMY     • TUBAL LIGATION     • URETEROSCOPY  7/26/2021    Procedure: URETEROSCOPY, LASER AND BASKET STONE EXTRACTION;  Surgeon: Walker Bruce MD;  [facility];  Service: Urology   • WISDOM TOOTH EXTRACTION       Family History   Problem Relation Age of Onset   • Diabetes type II Mother    • Asthma Mother    • Stroke Father    • Pancreatic cancer Father 68   • Diabetes type II Father    • No Known Problems Maternal Grandmother    • No Known Problems Maternal Grandfather    • No Known Problems Paternal Grandmother    • No Known Problems Paternal Grandfather    • No Known Problems Sister    • No Known Problems Sister    • No Known Problems Sister    • No Known Problems Sister    • No Known Problems Sister    • No Known Problems Maternal Aunt    • No Known Problems Paternal Aunt      Social History     Socioeconomic History   • Marital status: /Civil Union     Spouse name: None   • Number of children: None   • Years of education: None   • Highest education level: None   Occupational History   • None   Tobacco Use   • Smoking status: Former Smoker     Packs/day: 0 25     Years: 20 00     Pack years: 5 00     Types: Cigarettes     Start date:      Quit date: 3/16/2018     Years since quittin 6   • Smokeless tobacco: Never Used   • Tobacco comment: approx less than  half a pack   Vaping Use   • Vaping Use: Never used   Substance and Sexual Activity   • Alcohol use: Not Currently     Comment: socially   • Drug use: No     Comment: Denies   • Sexual activity: Not Currently     Partners: Male   Other Topics Concern   • None   Social History Narrative    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      Social Determinants of Health     Financial Resource Strain: Not on file   Food Insecurity: Not on file   Transportation Needs: Not on file   Physical Activity: Not on file   Stress: Not on file   Social Connections: Not on file   Intimate Partner Violence: Not on file   Housing Stability: Not on file       The following portions of the patient's history were reviewed and updated as appropriate: allergies, current medications, past family history, past medical history, past social history, past surgical history and problem list    LEONARD Grossman  Date: 2022 Time: 12:40 PM

## 2022-11-11 NOTE — TELEPHONE ENCOUNTER
Patient had right ureteral stent placed for UPJ stone associated what appears to be right pyelonephritis with possibly developing abscess    Plan for definitive stone surgery once urinary tract infection is cleared  Plan to see her as an outpatient in the interim

## 2022-11-13 LAB
BACTERIA BLD CULT: NORMAL
BACTERIA BLD CULT: NORMAL
BACTERIA UR CULT: ABNORMAL
BACTERIA UR CULT: ABNORMAL

## 2022-11-14 ENCOUNTER — OFFICE VISIT (OUTPATIENT)
Dept: INTERNAL MEDICINE CLINIC | Facility: CLINIC | Age: 57
End: 2022-11-14

## 2022-11-14 ENCOUNTER — TRANSITIONAL CARE MANAGEMENT (OUTPATIENT)
Dept: INTERNAL MEDICINE CLINIC | Facility: OTHER | Age: 57
End: 2022-11-14

## 2022-11-14 ENCOUNTER — APPOINTMENT (OUTPATIENT)
Dept: LAB | Facility: IMAGING CENTER | Age: 57
End: 2022-11-14

## 2022-11-14 VITALS
DIASTOLIC BLOOD PRESSURE: 80 MMHG | HEIGHT: 63 IN | HEART RATE: 95 BPM | WEIGHT: 156.4 LBS | TEMPERATURE: 97.3 F | OXYGEN SATURATION: 98 % | BODY MASS INDEX: 27.71 KG/M2 | SYSTOLIC BLOOD PRESSURE: 110 MMHG

## 2022-11-14 DIAGNOSIS — Z23 NEED FOR INFLUENZA VACCINATION: Primary | ICD-10-CM

## 2022-11-14 DIAGNOSIS — E11.9 TYPE 2 DIABETES MELLITUS WITHOUT COMPLICATION, WITHOUT LONG-TERM CURRENT USE OF INSULIN (HCC): ICD-10-CM

## 2022-11-14 DIAGNOSIS — N12 PYELONEPHRITIS: ICD-10-CM

## 2022-11-14 DIAGNOSIS — I10 PRIMARY HYPERTENSION: ICD-10-CM

## 2022-11-14 DIAGNOSIS — N39.0 UTI (URINARY TRACT INFECTION): ICD-10-CM

## 2022-11-14 DIAGNOSIS — N17.9 AKI (ACUTE KIDNEY INJURY) (HCC): ICD-10-CM

## 2022-11-14 DIAGNOSIS — F41.9 ANXIETY: ICD-10-CM

## 2022-11-14 DIAGNOSIS — E11.22 TYPE 2 DIABETES MELLITUS WITH CHRONIC KIDNEY DISEASE, WITHOUT LONG-TERM CURRENT USE OF INSULIN, UNSPECIFIED CKD STAGE (HCC): ICD-10-CM

## 2022-11-14 DIAGNOSIS — K21.9 GASTROESOPHAGEAL REFLUX DISEASE WITHOUT ESOPHAGITIS: ICD-10-CM

## 2022-11-14 DIAGNOSIS — R00.0 TACHYCARDIA: ICD-10-CM

## 2022-11-14 DIAGNOSIS — E78.00 HYPERCHOLESTEREMIA: ICD-10-CM

## 2022-11-14 LAB
ANION GAP SERPL CALCULATED.3IONS-SCNC: 4 MMOL/L (ref 4–13)
BASOPHILS # BLD AUTO: 0.06 THOUSANDS/ÂΜL (ref 0–0.1)
BASOPHILS NFR BLD AUTO: 1 % (ref 0–1)
BUN SERPL-MCNC: 13 MG/DL (ref 5–25)
CALCIUM SERPL-MCNC: 10 MG/DL (ref 8.3–10.1)
CHLORIDE SERPL-SCNC: 104 MMOL/L (ref 96–108)
CO2 SERPL-SCNC: 28 MMOL/L (ref 21–32)
CREAT SERPL-MCNC: 1.01 MG/DL (ref 0.6–1.3)
EOSINOPHIL # BLD AUTO: 0.16 THOUSAND/ÂΜL (ref 0–0.61)
EOSINOPHIL NFR BLD AUTO: 2 % (ref 0–6)
ERYTHROCYTE [DISTWIDTH] IN BLOOD BY AUTOMATED COUNT: 14.2 % (ref 11.6–15.1)
GFR SERPL CREATININE-BSD FRML MDRD: 61 ML/MIN/1.73SQ M
GLUCOSE P FAST SERPL-MCNC: 110 MG/DL (ref 65–99)
HCT VFR BLD AUTO: 36.1 % (ref 34.8–46.1)
HGB BLD-MCNC: 11.1 G/DL (ref 11.5–15.4)
IMM GRANULOCYTES # BLD AUTO: 0.08 THOUSAND/UL (ref 0–0.2)
IMM GRANULOCYTES NFR BLD AUTO: 1 % (ref 0–2)
LYMPHOCYTES # BLD AUTO: 2.73 THOUSANDS/ÂΜL (ref 0.6–4.47)
LYMPHOCYTES NFR BLD AUTO: 27 % (ref 14–44)
MCH RBC QN AUTO: 29.3 PG (ref 26.8–34.3)
MCHC RBC AUTO-ENTMCNC: 30.7 G/DL (ref 31.4–37.4)
MCV RBC AUTO: 95 FL (ref 82–98)
MONOCYTES # BLD AUTO: 0.68 THOUSAND/ÂΜL (ref 0.17–1.22)
MONOCYTES NFR BLD AUTO: 7 % (ref 4–12)
NEUTROPHILS # BLD AUTO: 6.52 THOUSANDS/ÂΜL (ref 1.85–7.62)
NEUTS SEG NFR BLD AUTO: 62 % (ref 43–75)
NRBC BLD AUTO-RTO: 0 /100 WBCS
PLATELET # BLD AUTO: 591 THOUSANDS/UL (ref 149–390)
PMV BLD AUTO: 10.5 FL (ref 8.9–12.7)
POTASSIUM SERPL-SCNC: 4.6 MMOL/L (ref 3.5–5.3)
RBC # BLD AUTO: 3.79 MILLION/UL (ref 3.81–5.12)
SODIUM SERPL-SCNC: 136 MMOL/L (ref 135–147)
WBC # BLD AUTO: 10.23 THOUSAND/UL (ref 4.31–10.16)

## 2022-11-14 RX ORDER — ATORVASTATIN CALCIUM 40 MG/1
40 TABLET, FILM COATED ORAL DAILY
Qty: 90 TABLET | Refills: 1 | Status: SHIPPED | OUTPATIENT
Start: 2022-11-14

## 2022-11-14 RX ORDER — OMEPRAZOLE 40 MG/1
40 CAPSULE, DELAYED RELEASE ORAL DAILY
Qty: 90 CAPSULE | Refills: 1 | Status: SHIPPED | OUTPATIENT
Start: 2022-11-14

## 2022-11-14 NOTE — UTILIZATION REVIEW
NOTIFICATION OF ADMISSION DISCHARGE   This is a Notification of Discharge from [address]  Please be advised that this patient has been discharge from our facility  Below you will find the admission and discharge date and time including the patient’s disposition  UTILIZATION REVIEW CONTACT:  Trey Rowe  Utilization   Network Utilization Review Department  Phone: [phone] x carefully listen to the prompts  All voicemails are confidential   Email: [email or web address]     ADMISSION INFORMATION  PRESENTATION DATE: 11/10/2022 11:10 AM  OBERVATION ADMISSION DATE:   INPATIENT ADMISSION DATE: 11/10/22  1:07 PM   DISCHARGE DATE: 11/11/2022  3:56 PM  DISPOSITION: Home/Self Care Home/Self Care      IMPORTANT INFORMATION:  Send all requests for admission clinical reviews, approved or denied determinations and any other requests to dedicated fax number below belonging to the campus where the patient is receiving treatment   List of dedicated fax numbers:  [address] DENIALS (Administrative/Medical Necessity) [phone]   [address] (Maternity/NICU/Pediatrics) [phone]   [facility] [phone]   [place] [address] [phone]   [address] [phone]   [address] [facility] [phone] 90 [facility] [phone]   [address] [place] [address] 119 [phone]   [facility]  [phone]   [address] [facility] [phone]   412 [facility] [address] [facility] [phone]

## 2022-11-14 NOTE — ASSESSMENT & PLAN NOTE
Continue to hold lisinopril continue on lopressor  Continue current regimen -   Continue to monitor blood pressure at home  Goal BP is < 130/80  Contact our office for consistent elevations  Recommend low sodium diet  Exercise 30 minutes 5 times a week as tolerated    Recommend yearly eye exam

## 2022-11-14 NOTE — LETTER
November 14, 2022     Patient: Breonna Yeager  YOB: 1965  Date of Visit: 11/14/2022      To Whom it May Concern:    Breonna Yeager is under my professional care  Sofinicole Garrison was seen in my office on 11/14/2022  Nargis Garrison may return to work on 11/15/22  If you have any questions or concerns, please don't hesitate to call           Sincerely,          LEONARD Quiroz        CC: No Recipients

## 2022-11-14 NOTE — PROGRESS NOTES
Assessment/Plan:    Type 2 diabetes mellitus with diabetic chronic kidney disease ([facility] )  Lab Results   Component Value Date    HGBA1C 6 1 (H) 11/10/2022     Patient will continue with Metformin  Patient is to continue to work on diet and exercise  Limit sugars and carbohydrate intake  Avoid soda, juice, sweets, cookies, desserts, pasta, bread    Eat more whole grains, exercised 30 min of cardio at least 3 times a week  Also recommended daily foot exams to check for sores, and recommended yearly eye exams  Hypertension  Continue to hold lisinopril continue on lopressor  Continue current regimen -   Continue to monitor blood pressure at home  Goal BP is < 130/80  Contact our office for consistent elevations  Recommend low sodium diet  Exercise 30 minutes 5 times a week as tolerated  Recommend yearly eye exam        Pyelonephritis  Status post right urethral stent  Continue full course of Cipro  Continue Flomax improved review  He follow-up with urology and nephrology    TOMMY (acute kidney injury) ([place] [address] )  Has resolved  Continue good oral hydration  Avoid nephrotoxic medications              Diagnoses and all orders for this visit:    Need for influenza vaccination  -     influenza vaccine, quadrivalent, recombinant, PF, 0 5 mL, for patients 18 yr+ (FLUBLOK)    Hypercholesteremia  -     atorvastatin (LIPITOR) 40 mg tablet; Take 1 tablet (40 mg total) by mouth daily    Type 2 diabetes mellitus without complication, without long-term current use of insulin (HCC)  -     metFORMIN (GLUCOPHAGE) 1000 MG tablet; Take 1 tablet (1,000 mg total) by mouth 2 (two) times a day with meals    Tachycardia  -     metoprolol tartrate (LOPRESSOR) 25 mg tablet; Take 0 5 tablets (12 5 mg total) by mouth every 12 (twelve) hours    Gastroesophageal reflux disease without esophagitis  -     omeprazole (PriLOSEC) 40 MG capsule;  Take 1 capsule (40 mg total) by mouth daily    Anxiety  -     sertraline (ZOLOFT) 50 mg tablet; Take 1 tablet (50 mg total) by mouth daily Takes in the am     Type 2 diabetes mellitus with chronic kidney disease, without long-term current use of insulin, unspecified CKD stage ([facility] )    Primary hypertension    Pyelonephritis    TOMMY (acute kidney injury) ([facility] )          Subjective:      Patient ID: Brandy Daniel is a 62 y o  female  Patient presents today for transition of care appointment, patient was admitted from 11/10-11/11 for pyelonephritis  Details of hospital course as per Dr Lui Parents  "Patient with PMH HTN, CKD2, kidney stones, and lithotripsy, had previously been admitted with fever and diarrhea  Presented to PCP for follow-up, found to have hematuria, hypotension, and tachycardia  Was prescribed cipro and sent for labs and stat CT  A/P  Found to have obstructing 1 5 cm calculus at the right renal pelvis eliciting mild right hydronephrosis and significant right renal and periureteral inflammation concerning for pyelonephritis  No discrete renal abscess, urgent urologic consultation advised  Pt sent to the ED for management  Was found to be afebrile, normotensive, stable vitals, no lactic acidosis, baseline creatinine  Had pansensitive pseudomonas growing on culture from prior admission  Underwent cystoscopy retrograde pyelogram and right ureteral stent placement with urology the evening of 11/10/2022 with plans for definitive stone surgery after infection has cleared  Will finish home cipro to clear infection   Pt scheduled for follow ups with PCP, urology, and nephrology      Follow up with PCP regarding:   - Completion of 3 more days of antibiotics  - Discuss discontinuation or resumption of lisinopril pending kidney function and blood pressure  - Discuss results of BMP and CBC order for Monday  - Ensure patient goes to follow-up appointments with urology and nephrology"    Follow-up appointment with Urology on 12/2  Follow-up with Nephrology on 01/19    Patient reports that she is feeling much better, she continues on Cipro  She has pending CBC-D and CMP      The following portions of the patient's history were reviewed and updated as appropriate: allergies, current medications, past family history, past medical history, past social history, past surgical history and problem list     Review of Systems   Constitutional: Negative for activity change, appetite change, chills, diaphoresis and fever  HENT: Negative for congestion, ear discharge, ear pain, postnasal drip, rhinorrhea, sinus pressure, sinus pain and sore throat  Eyes: Negative for pain, discharge, itching and visual disturbance  Respiratory: Negative for cough, chest tightness, shortness of breath and wheezing  Cardiovascular: Negative for chest pain, palpitations and leg swelling  Gastrointestinal: Negative for abdominal pain, constipation, diarrhea, nausea and vomiting  Endocrine: Negative for polydipsia, polyphagia and polyuria  Genitourinary: Negative for difficulty urinating, dysuria, hematuria and urgency  Musculoskeletal: Negative for arthralgias, back pain and neck pain  Skin: Negative for rash and wound  Neurological: Negative for dizziness, weakness, numbness and headaches           Past Medical History:   Diagnosis Date   • Anxiety    • Asthma    • Colon polyp    • Depression    • Diabetes mellitus ([facility] )    • Diverticulitis    • Diverticulitis of colon    • Follicular cyst of ovary 12/23/2014   • Former smoker    • GERD (gastroesophageal reflux disease)    • Glycosuria    • Headache    • Hyperlipidemia    • Hypertension    • Kidney calculi 1/11/2019   • Kidney stone    • Obesity    • Ovarian cyst    • Overactive bladder    • Overweight    • Pulmonary nodule    • Renal calculus    • Seasonal allergies    • Tinea pedis of left foot 6/8/2020   • Vertigo    • Wears partial dentures     Lower plate -partial         Current Outpatient Medications:   •  acetaminophen (TYLENOL) 500 mg tablet, Take 500 mg by mouth every 6 (six) hours as needed for mild pain, Disp: , Rfl:   •  Ascorbic Acid (vitamin C) 1000 MG tablet, Take 2,000 mg by mouth daily, Disp: , Rfl:   •  atorvastatin (LIPITOR) 40 mg tablet, Take 1 tablet (40 mg total) by mouth daily, Disp: 90 tablet, Rfl: 1  •  Cholecalciferol (Vitamin D3) 50 MCG (2000 UT) TABS, Take 2,000 Units by mouth daily, Disp: , Rfl:   •  ciprofloxacin (CIPRO) 500 mg tablet, Take 1 tablet (500 mg total) by mouth every 12 (twelve) hours for 7 days, Disp: 14 tablet, Rfl: 0  •  metFORMIN (GLUCOPHAGE) 1000 MG tablet, Take 1 tablet (1,000 mg total) by mouth 2 (two) times a day with meals, Disp: 180 tablet, Rfl: 1  •  metoprolol tartrate (LOPRESSOR) 25 mg tablet, Take 0 5 tablets (12 5 mg total) by mouth every 12 (twelve) hours, Disp: 180 tablet, Rfl: 1  •  multivitamin (THERAGRAN) TABS, Take 1 tablet by mouth daily, Disp: , Rfl:   •  omeprazole (PriLOSEC) 40 MG capsule, Take 1 capsule (40 mg total) by mouth daily, Disp: 90 capsule, Rfl: 1  •  phenazopyridine (PYRIDIUM) 200 mg tablet, Take 1 tablet (200 mg total) by mouth 3 (three) times a day as needed for bladder spasms, Disp: 15 tablet, Rfl: 1  •  sertraline (ZOLOFT) 50 mg tablet, Take 1 tablet (50 mg total) by mouth daily Takes in the am , Disp: 90 tablet, Rfl: 1  •  tamsulosin (FLOMAX) 0 4 mg, Take 1 capsule (0 4 mg total) by mouth daily with dinner, Disp: 30 capsule, Rfl: 1    Allergies   Allergen Reactions   • Oxybutynin Anaphylaxis     "feels like throat closing and can't swallow"   • Clonazepam Other (See Comments)     Pt states "didn't feel right on it "   • Morphine Other (See Comments) and Confusion     Annotation - [id number]: "dopey"  Gets silly   • Venlafaxine Other (See Comments)     Unknown--pt states " MD was trying pt on different medications   Pt unsure of reaction "       Social History   Past Surgical History:   Procedure Laterality Date   • APPENDECTOMY     • BLADDER SUSPENSION      [facility]   • CERVICAL BIOPSY  W/ LOOP ELECTRODE EXCISION     • COLONOSCOPY     • DILATION AND CURETTAGE OF UTERUS     • FL RETROGRADE PYELOGRAM  7/26/2021   • FL RETROGRADE PYELOGRAM  11/18/2021   • FL RETROGRADE PYELOGRAM  11/10/2022   • HYSTERECTOMY  2007    ovaries present bilaterally   • JOINT REPLACEMENT Right 05/2021    right knee   • OK COLONOSCOPY FLX DX W/COLLJ SPEC WHEN PFRMD N/A 9/26/2017    Procedure: EGD AND COLONOSCOPY;  Surgeon: Guido Blas MD;  Location: [facility];   Service: Gastroenterology   • OK CYSTO/URETERO W/LITHOTRIPSY &INDWELL STENT INSRT Bilateral 7/26/2021    Procedure: CYSTOSCOPY URETEROSCOPY WITH LITHOTRIPSY HOLMIUM LASER, RETROGRADE PYELOGRAM AND INSERTION STENT URETERAL;  Surgeon: Lesley Underwood MD;  Location: [facility];  Service: Urology   • OK CYSTO/URETERO W/LITHOTRIPSY &INDWELL STENT INSRT Left 9/16/2021    Procedure: CYSTOSCOPY URETEROSCOPY WITH LITHOTRIPSY HOLMIUM LASER, RETROGRADE PYELOGRAM AND INSERTION STENT URETERAL;  Surgeon: Katie Iraheta MD;  [facility];  Service: Urology   • OK CYSTO/URETERO W/LITHOTRIPSY &INDWELL STENT INSRT Left 11/18/2021    Procedure: CYSTOSCOPY URETEROSCOPY WITH LITHOTRIPSY HOLMIUM LASER, RETROGRADE PYELOGRAM AND INSERTION STENT URETERAL;  Surgeon: Evangelina Liz MD;  [facility];  Service: Urology   • OK CYSTOURETHROSCOPY,URETER CATHETER Right 11/10/2022    Procedure: CYSTOSCOPY RETROGRADE PYELOGRAM WITH INSERTION STENT URETERAL;  Surgeon: Jane Jernigan MD;  [facility];  Service: Urology   • OK REVISE MEDIAN N/CARPAL TUNNEL SURG Right 5/29/2018    Procedure: CARPAL TUNNEL RELEASE;  Surgeon: Tip Bedolla DO;  Location: [facility];  Service: Orthopedics   • OK TOTAL KNEE ARTHROPLASTY Right 5/19/2021    Procedure: ARTHROPLASTY KNEE TOTAL;  Surgeon: Elisa Shore MD;  [facility];  Service: Orthopedics   • REMOVAL URETERAL STENT Right 9/16/2021    Procedure: REMOVAL STENT URETERAL;  Surgeon: Katie Iraheta MD;  [facility];  Service: Urology   • TONSILLECTOMY     • TUBAL LIGATION     • URETEROSCOPY  7/26/2021    Procedure: URETEROSCOPY, LASER AND BASKET STONE EXTRACTION;  Surgeon: Chris Santos MD;  Location:  MAIN OR;  Service: Urology   • WISDOM TOOTH EXTRACTION       Family History   Problem Relation Age of Onset   • Diabetes type II Mother    • Asthma Mother    • Stroke Father    • Pancreatic cancer Father 68   • Diabetes type II Father    • No Known Problems Maternal Grandmother    • No Known Problems Maternal Grandfather    • No Known Problems Paternal Grandmother    • No Known Problems Paternal Grandfather    • No Known Problems Sister    • No Known Problems Sister    • No Known Problems Sister    • No Known Problems Sister    • No Known Problems Sister    • No Known Problems Maternal Aunt    • No Known Problems Paternal Aunt        Objective:  /80 (BP Location: Left arm, Patient Position: Sitting, Cuff Size: Standard)   Pulse 95   Temp (!) 97 3 °F (36 3 °C) (Temporal)   Ht 5' 3 19" (1 605 m)   Wt 70 9 kg (156 lb 6 4 oz)   SpO2 98%   BMI 27 54 kg/m²     Recent Results (from the past 1344 hour(s))   Cov/Flu-Collected at [facility] or [facility]    Collection Time: 11/01/22 10:56 AM    Specimen: Nose; Nares   Result Value Ref Range    SARS-CoV-2 Negative Negative    INFLUENZA A PCR Negative Negative    INFLUENZA B PCR Negative Negative   CBC and differential    Collection Time: 11/03/22 10:18 AM   Result Value Ref Range    WBC 14 14 (H) 4 31 - 10 16 Thousand/uL    RBC 4 15 3 81 - 5 12 Million/uL    Hemoglobin 11 8 11 5 - 15 4 g/dL    Hematocrit 36 8 34 8 - 46 1 %    MCV 89 82 - 98 fL    MCH 28 4 26 8 - 34 3 pg    MCHC 32 1 31 4 - 37 4 g/dL    RDW 15 3 (H) 11 6 - 15 1 %    MPV 11 7 8 9 - 12 7 fL    Platelets 517 676 - 228 Thousands/uL    nRBC 0 /100 WBCs    Neutrophils Relative 78 (H) 43 - 75 %    Immat GRANS % 2 0 - 2 %    Lymphocytes Relative 7 (L) 14 - 44 %    Monocytes Relative 11 4 - 12 %    Eosinophils Relative 1 0 - 6 %    Basophils Relative 1 0 - 1 %    Neutrophils Absolute 11 13 (H) 1 85 - 7 62 Thousands/µL    Immature Grans Absolute 0 29 (H) 0 00 - 0 20 Thousand/uL    Lymphocytes Absolute 1 00 0 60 - 4 47 Thousands/µL    Monocytes Absolute 1 53 (H) 0 17 - 1 22 Thousand/µL    Eosinophils Absolute 0 11 0 00 - 0 61 Thousand/µL    Basophils Absolute 0 08 0 00 - 0 10 Thousands/µL   Comprehensive metabolic panel    Collection Time: 11/03/22 10:18 AM   Result Value Ref Range    Sodium 133 (L) 135 - 147 mmol/L    Potassium 4 4 3 5 - 5 3 mmol/L    Chloride 101 96 - 108 mmol/L    CO2 23 21 - 32 mmol/L    ANION GAP 9 4 - 13 mmol/L    BUN 63 (H) 5 - 25 mg/dL    Creatinine 2 44 (H) 0 60 - 1 30 mg/dL    Glucose 155 (H) 65 - 140 mg/dL    Calcium 9 5 8 3 - 10 1 mg/dL    Corrected Calcium 10 9 (H) 8 3 - 10 1 mg/dL    AST 45 5 - 45 U/L    ALT 63 12 - 78 U/L    Alkaline Phosphatase 255 (H) 46 - 116 U/L    Total Protein 7 4 6 4 - 8 4 g/dL    Albumin 2 3 (L) 3 5 - 5 0 g/dL    Total Bilirubin 0 93 0 20 - 1 00 mg/dL    eGFR 21 ml/min/1 73sq m   Clostridium difficile toxin by PCR    Collection Time: 11/04/22 10:59 AM    Specimen: Per Rectum; Stool   Result Value Ref Range    C difficile toxin by PCR Negative Negative   Stool Enteric Bacterial Panel by PCR    Collection Time: 11/04/22 10:59 AM    Specimen: Rectum; Stool   Result Value Ref Range    Salmonella sp PCR None Detected None Detected    Shigella sp/Enteroinvasive E  coli (EIEC) PCR None Detected None Detected    Campylobacter sp (jejuni and coli) PCR None Detected None Detected    Shiga toxin 1/Shiga toxin 2 genes PCR None Detected None Detected   CBC and differential    Collection Time: 11/04/22  3:30 PM   Result Value Ref Range    WBC 16 10 (H) 4 31 - 10 16 Thousand/uL    RBC 4 08 3 81 - 5 12 Million/uL    Hemoglobin 12 2 11 5 - 15 4 g/dL    Hematocrit 36 6 34 8 - 46 1 %    MCV 90 82 - 98 fL    MCH 29 9 26 8 - 34 3 pg    MCHC 33 3 31 4 - 37 4 g/dL    RDW 15 3 (H) 11 6 - 15 1 %    MPV 10 5 8 9 - 12 7 fL    Platelets 987 044 - 989 Thousands/uL   Comprehensive metabolic panel    Collection Time: 11/04/22  3:30 PM   Result Value Ref Range    Sodium 135 135 - 147 mmol/L    Potassium 4 8 3 5 - 5 3 mmol/L    Chloride 105 96 - 108 mmol/L    CO2 22 21 - 32 mmol/L    ANION GAP 8 4 - 13 mmol/L    BUN 55 (H) 5 - 25 mg/dL    Creatinine 1 85 (H) 0 60 - 1 30 mg/dL    Glucose 120 65 - 140 mg/dL    Calcium 10 0 8 3 - 10 1 mg/dL    Corrected Calcium 11 3 (H) 8 3 - 10 1 mg/dL    AST 88 (H) 5 - 45 U/L     (H) 12 - 78 U/L    Alkaline Phosphatase 413 (H) 46 - 116 U/L    Total Protein 8 2 6 4 - 8 4 g/dL    Albumin 2 4 (L) 3 5 - 5 0 g/dL    Total Bilirubin 1 05 (H) 0 20 - 1 00 mg/dL    eGFR 29 ml/min/1 73sq m   FLU/RSV/COVID - if FLU/RSV clinically relevant    Collection Time: 11/04/22  3:30 PM    Specimen: Nose; Nares   Result Value Ref Range    SARS-CoV-2 Negative Negative    INFLUENZA A PCR Negative Negative    INFLUENZA B PCR Negative Negative    RSV PCR Negative Negative   Manual Differential(PHLEBS Do Not Order)    Collection Time: 11/04/22  3:30 PM   Result Value Ref Range    Segmented % 62 43 - 75 %    Bands % 19 (H) 0 - 8 %    Lymphocytes % 6 (L) 14 - 44 %    Monocytes % 10 4 - 12 %    Eosinophils, % 1 0 - 6 %    Basophils % 0 0 - 1 %    Atypical Lymphocytes % 1 (H) <=0 %    Plasma Cells % 1 (H) 0 - 0 %    Absolute Neutrophils 13 04 (H) 1 85 - 7 62 Thousand/uL    Lymphocytes Absolute 0 97 0 60 - 4 47 Thousand/uL    Monocytes Absolute 1 61 (H) 0 00 - 1 22 Thousand/uL    Eosinophils Absolute 0 16 0 00 - 0 40 Thousand/uL    Basophils Absolute 0 00 0 00 - 0 10 Thousand/uL    Total Counted      RBC Morphology Present     Anisocytosis Present     Macrocytes Present     Polychromasia Present     Platelet Estimate Adequate Adequate    Clumped Platelets Present     Giant PLTs Present    Urine Macroscopic, POC    Collection Time: 11/04/22  4:20 PM   Result Value Ref Range    Color, UA Rosa     Clarity, UA Slightly Cloudy     pH, UA 5 5 4 5 - 8 0    Leukocytes, UA Small (A) Negative    Nitrite, UA Negative Negative    Protein, UA Negative Negative mg/dl    Glucose, UA Negative Negative mg/dl    Ketones, UA Negative Negative mg/dl    Urobilinogen, UA 0 2 0 2, 1 0 E U /dl E U /dl    Bilirubin, UA Negative Negative    Occult Blood, UA Large (A) Negative    Specific Gravity, UA 1 015 1 003 - 1 030   Urine Microscopic    Collection Time: 11/04/22  4:20 PM   Result Value Ref Range    RBC, UA 4-10 (A) None Seen, 1-2 /hpf    WBC, UA Innumerable (A) None Seen, 1-2 /hpf    Epithelial Cells Occasional None Seen, Occasional /hpf    Bacteria, UA Moderate (A) None Seen, Occasional /hpf    MUCUS THREADS Occasional (A) None Seen    Amorphous Crystals, UA Occasional     WBC Clumps Present    Urine culture    Collection Time: 11/04/22  4:20 PM    Specimen: Urine, Other   Result Value Ref Range    Urine Culture 20,000-29,000 cfu/ml Pseudomonas aeruginosa (A)     Urine Culture <10,000 cfu/ml Pseudomonas aeruginosa (A)     Urine Culture 50,000-59,000 cfu/ml Lactobacillus species (A)        Susceptibility    Pseudomonas aeruginosa - RICHARD     ZID Performed Yes       Aztreonam ($$$)  <=4 Susceptible ug/ml     Cefepime ($) <=2 00 Susceptible ug/ml     Ceftazidime ($$) <=1 Susceptible ug/ml     Ciprofloxacin ($)  <=0 25 Susceptible ug/ml     Gentamicin ($$) <=2 Susceptible ug/ml     Imipenem 2 Susceptible ug/ml     Levofloxacin ($) <=0 50 Susceptible ug/ml     Meropenem ($$) <=1 00 Susceptible ug/ml     Piperacillin + Tazobactam ($$$) <=8 Susceptible ug/ml     Tobramycin ($) <=2 Susceptible ug/ml    Pseudomonas aeruginosa - RICHARD     ZID Performed Yes       Aztreonam ($$$)  <=4 Susceptible ug/ml     Cefepime ($) <=2 00 Susceptible ug/ml     Ceftazidime ($$) <=1 Susceptible ug/ml     Ciprofloxacin ($)  <=0 25 Susceptible ug/ml     Gentamicin ($$) <=2 Susceptible ug/ml     Imipenem <=1 Susceptible ug/ml     Levofloxacin ($) <=0 50 Susceptible ug/ml Meropenem ($$) <=1 00 Susceptible ug/ml     Piperacillin + Tazobactam ($$$) <=8 Susceptible ug/ml     Tobramycin ($) <=2 Susceptible ug/ml   Hepatitis panel, acute    Collection Time: 11/05/22  5:05 AM   Result Value Ref Range    Hepatitis B Surface Ag Non-reactive Non-reactive, NonReactive - Confirmed    Hep A IgM Non-reactive Non-reactive, Equivocal-Suggest Recollect    Hepatitis C Ab Non-reactive Non-reactive    Hep B C IgM Non-reactive Non-reactive   TSH, 3rd generation    Collection Time: 11/05/22  5:05 AM   Result Value Ref Range    TSH 3RD GENERATON 0 910 0 450 - 4 500 uIU/mL   Comprehensive metabolic panel    Collection Time: 11/05/22  5:05 AM   Result Value Ref Range    Sodium 140 135 - 147 mmol/L    Potassium 4 9 3 5 - 5 3 mmol/L    Chloride 112 (H) 96 - 108 mmol/L    CO2 22 21 - 32 mmol/L    ANION GAP 6 4 - 13 mmol/L    BUN 46 (H) 5 - 25 mg/dL    Creatinine 1 37 (H) 0 60 - 1 30 mg/dL    Glucose 123 65 - 140 mg/dL    Calcium 8 8 8 3 - 10 1 mg/dL    Corrected Calcium 10 5 (H) 8 3 - 10 1 mg/dL    AST 55 (H) 5 - 45 U/L     (H) 12 - 78 U/L    Alkaline Phosphatase 324 (H) 46 - 116 U/L    Total Protein 6 6 6 4 - 8 4 g/dL    Albumin 1 9 (L) 3 5 - 5 0 g/dL    Total Bilirubin 0 79 0 20 - 1 00 mg/dL    eGFR 42 ml/min/1 73sq m   Magnesium    Collection Time: 11/05/22  5:05 AM   Result Value Ref Range    Magnesium 1 9 1 6 - 2 6 mg/dL   Phosphorus    Collection Time: 11/05/22  5:05 AM   Result Value Ref Range    Phosphorus 3 6 2 7 - 4 5 mg/dL   CBC and differential    Collection Time: 11/05/22  5:05 AM   Result Value Ref Range    WBC 13 95 (H) 4 31 - 10 16 Thousand/uL    RBC 3 63 (L) 3 81 - 5 12 Million/uL    Hemoglobin 10 8 (L) 11 5 - 15 4 g/dL    Hematocrit 32 4 (L) 34 8 - 46 1 %    MCV 89 82 - 98 fL    MCH 29 8 26 8 - 34 3 pg    MCHC 33 3 31 4 - 37 4 g/dL    RDW 15 3 (H) 11 6 - 15 1 %    MPV 10 1 8 9 - 12 7 fL    Platelets 796 747 - 591 Thousands/uL   Manual Differential(PHLEBS Do Not Order)    Collection Time: 11/05/22  5:05 AM   Result Value Ref Range    Segmented % 74 43 - 75 %    Bands % 1 0 - 8 %    Lymphocytes % 9 (L) 14 - 44 %    Monocytes % 8 4 - 12 %    Eosinophils, % 1 0 - 6 %    Basophils % 0 0 - 1 %    Metamyelocytes% 1 0 - 1 %    Myelocytes % 1 0 - 1 %    Atypical Lymphocytes % 4 (H) <=0 %    Plasma Cells % 1 (H) 0 - 0 %    Absolute Neutrophils 10 46 (H) 1 85 - 7 62 Thousand/uL    Lymphocytes Absolute 1 26 0 60 - 4 47 Thousand/uL    Monocytes Absolute 1 12 0 00 - 1 22 Thousand/uL    Eosinophils Absolute 0 14 0 00 - 0 40 Thousand/uL    Basophils Absolute 0 00 0 00 - 0 10 Thousand/uL    Total Counted      Toxic Granulation Present     RBC Morphology Present     Platelet Estimate Adequate Adequate    Differential Comment see note    CBC and differential    Collection Time: 11/06/22  5:25 AM   Result Value Ref Range    WBC 15 72 (H) 4 31 - 10 16 Thousand/uL    RBC 3 49 (L) 3 81 - 5 12 Million/uL    Hemoglobin 10 2 (L) 11 5 - 15 4 g/dL    Hematocrit 32 5 (L) 34 8 - 46 1 %    MCV 93 82 - 98 fL    MCH 29 2 26 8 - 34 3 pg    MCHC 31 4 31 4 - 37 4 g/dL    RDW 15 4 (H) 11 6 - 15 1 %    MPV 10 2 8 9 - 12 7 fL    Platelets 765 (H) 057 - 390 Thousands/uL    nRBC 0 /100 WBCs   Comprehensive metabolic panel    Collection Time: 11/06/22  5:25 AM   Result Value Ref Range    Sodium 143 135 - 147 mmol/L    Potassium 4 5 3 5 - 5 3 mmol/L    Chloride 116 (H) 96 - 108 mmol/L    CO2 20 (L) 21 - 32 mmol/L    ANION GAP 7 4 - 13 mmol/L    BUN 26 (H) 5 - 25 mg/dL    Creatinine 1 00 0 60 - 1 30 mg/dL    Glucose 126 65 - 140 mg/dL    Calcium 9 1 8 3 - 10 1 mg/dL    Corrected Calcium 10 8 (H) 8 3 - 10 1 mg/dL    AST 58 (H) 5 - 45 U/L     (H) 12 - 78 U/L    Alkaline Phosphatase 358 (H) 46 - 116 U/L    Total Protein 6 5 6 4 - 8 4 g/dL    Albumin 1 9 (L) 3 5 - 5 0 g/dL    Total Bilirubin 0 83 0 20 - 1 00 mg/dL    eGFR 62 ml/min/1 73sq m   Urine culture    Collection Time: 11/09/22  5:17 PM    Specimen: Urine, Clean Catch   Result Value Ref Range    Urine Culture >100,000 cfu/ml     POCT urine dip auto non-scope    Collection Time: 11/09/22  5:18 PM   Result Value Ref Range     COLOR,UA red     CLARITY,UA cloudy     SPECIFIC GRAVITY,UA 1 020      PH,UA 5 5     LEUKOCYTE ESTERASE,UA Large     NITRITE,UA neg     GLUCOSE, UA Neg     KETONES,UA 15 mg/dL     BILIRUBIN,UA Moderate     BLOOD,UA Large     POCT URINE PROTEIN >=300 mg/dL     SL AMB POCT UROBILINOGEN 1 0    Hemoglobin A1C    Collection Time: 11/10/22  7:25 AM   Result Value Ref Range    Hemoglobin A1C 6 1 (H) Normal 3 8-5 6%; PreDiabetic 5 7-6 4%;  Diabetic >=6 5%; Glycemic control for adults with diabetes <7 0% %     mg/dl   Lipid panel    Collection Time: 11/10/22  7:25 AM   Result Value Ref Range    Cholesterol 79 See Comment mg/dL    Triglycerides 125 See Comment mg/dL    HDL, Direct 22 (L) >=50 mg/dL    LDL Calculated 32 0 - 100 mg/dL    Non-HDL-Chol (CHOL-HDL) 57 mg/dl   CBC and differential    Collection Time: 11/10/22  7:25 AM   Result Value Ref Range    WBC 11 98 (H) 4 31 - 10 16 Thousand/uL    RBC 3 45 (L) 3 81 - 5 12 Million/uL    Hemoglobin 10 2 (L) 11 5 - 15 4 g/dL    Hematocrit 32 3 (L) 34 8 - 46 1 %    MCV 94 82 - 98 fL    MCH 29 6 26 8 - 34 3 pg    MCHC 31 6 31 4 - 37 4 g/dL    RDW 14 6 11 6 - 15 1 %    MPV 10 0 8 9 - 12 7 fL    Platelets 124 (H) 426 - 390 Thousands/uL    nRBC 0 /100 WBCs    Neutrophils Relative 75 43 - 75 %    Immat GRANS % 2 0 - 2 %    Lymphocytes Relative 15 14 - 44 %    Monocytes Relative 7 4 - 12 %    Eosinophils Relative 1 0 - 6 %    Basophils Relative 0 0 - 1 %    Neutrophils Absolute 9 05 (H) 1 85 - 7 62 Thousands/µL    Immature Grans Absolute 0 18 0 00 - 0 20 Thousand/uL    Lymphocytes Absolute 1 74 0 60 - 4 47 Thousands/µL    Monocytes Absolute 0 83 0 17 - 1 22 Thousand/µL    Eosinophils Absolute 0 13 0 00 - 0 61 Thousand/µL    Basophils Absolute 0 05 0 00 - 0 10 Thousands/µL   Comprehensive metabolic panel    Collection Time: 11/10/22 7:25 AM   Result Value Ref Range    Sodium 137 135 - 147 mmol/L    Potassium 4 0 3 5 - 5 3 mmol/L    Chloride 105 96 - 108 mmol/L    CO2 28 21 - 32 mmol/L    ANION GAP 4 4 - 13 mmol/L    BUN 20 5 - 25 mg/dL    Creatinine 1 01 0 60 - 1 30 mg/dL    Glucose, Fasting 117 (H) 65 - 99 mg/dL    Calcium 8 9 8 3 - 10 1 mg/dL    Corrected Calcium 10 4 (H) 8 3 - 10 1 mg/dL    AST 21 5 - 45 U/L    ALT 78 12 - 78 U/L    Alkaline Phosphatase 259 (H) 46 - 116 U/L    Total Protein 7 5 6 4 - 8 4 g/dL    Albumin 2 1 (L) 3 5 - 5 0 g/dL    Total Bilirubin 0 48 0 20 - 1 00 mg/dL    eGFR 61 ml/min/1 73sq m   Lactic acid    Collection Time: 11/10/22 11:30 AM   Result Value Ref Range    LACTIC ACID 1 1 0 5 - 2 0 mmol/L   Blood culture #2    Collection Time: 11/10/22 11:30 AM    Specimen: Arm, Right; Blood   Result Value Ref Range    Blood Culture No Growth at 72 hrs      UA w Reflex to Microscopic w Reflex to Culture    Collection Time: 11/10/22 11:36 AM    Specimen: Urine, Clean Catch   Result Value Ref Range    Color, UA Colorless     Clarity, UA Turbid     Specific [place], UA 1 020 1 003 - 1 030    pH, UA 5 5 4 5, 5 0, 5 5, 6 0, 6 5, 7 0, 7 5, 8 0    Leukocytes, UA Moderate (A) Negative    Nitrite, UA Negative Negative    Protein, UA Trace (A) Negative mg/dl    Glucose, UA Trace (A) Negative mg/dl    Ketones, UA Negative Negative mg/dl    Urobilinogen, UA <2 0 <2 0 mg/dl mg/dl    Bilirubin, UA Negative Negative    Occult Blood, UA Large (A) Negative   Urine Microscopic    Collection Time: 11/10/22 11:36 AM   Result Value Ref Range    RBC, UA Innumerable (A) None Seen, 1-2 /hpf    WBC, UA Innumerable (A) None Seen, 1-2 /hpf    Epithelial Cells Occasional None Seen, Occasional /hpf    Bacteria, UA None Seen None Seen, Occasional /hpf    MUCUS THREADS Occasional (A) None Seen   Urine culture    Collection Time: 11/10/22 11:36 AM    Specimen: Urine, Clean Catch   Result Value Ref Range    Urine Culture No Growth <1000 cfu/mL    Blood culture #1    Collection Time: 11/10/22 11:40 AM    Specimen: Arm, Left; Blood   Result Value Ref Range    Blood Culture No Growth at 72 hrs      Urine culture    Collection Time: 11/10/22  8:11 PM    Specimen: Urinary Bladder; Urine   Result Value Ref Range    Urine Culture 40,000-49,000 cfu/ml Lactobacillus species (A)    Urine culture    Collection Time: 11/10/22  8:13 PM    Specimen: Urine, Renal, Right   Result Value Ref Range    Urine Culture 40,000-49,000 cfu/ml Lactobacillus species (A)    Fingerstick Glucose (POCT)    Collection Time: 11/10/22  8:51 PM   Result Value Ref Range    POC Glucose 124 65 - 140 mg/dl   Basic metabolic panel    Collection Time: 11/11/22  8:21 AM   Result Value Ref Range    Sodium 138 135 - 147 mmol/L    Potassium 4 7 3 5 - 5 3 mmol/L    Chloride 105 96 - 108 mmol/L    CO2 24 21 - 32 mmol/L    ANION GAP 9 4 - 13 mmol/L    BUN 12 5 - 25 mg/dL    Creatinine 0 75 0 60 - 1 30 mg/dL    Glucose 152 (H) 65 - 140 mg/dL    Calcium 7 2 (L) 8 3 - 10 1 mg/dL    eGFR 88 ml/min/1 73sq m   CBC and differential    Collection Time: 11/11/22  8:21 AM   Result Value Ref Range    WBC 7 99 4 31 - 10 16 Thousand/uL    RBC 2 57 (L) 3 81 - 5 12 Million/uL    Hemoglobin 7 5 (L) 11 5 - 15 4 g/dL    Hematocrit 24 1 (L) 34 8 - 46 1 %    MCV 94 82 - 98 fL    MCH 29 2 26 8 - 34 3 pg    MCHC 31 1 (L) 31 4 - 37 4 g/dL    RDW 14 6 11 6 - 15 1 %    MPV 9 6 8 9 - 12 7 fL    Platelets 998 (H) 556 - 390 Thousands/uL    nRBC 0 /100 WBCs    Neutrophils Relative 69 43 - 75 %    Immat GRANS % 1 0 - 2 %    Lymphocytes Relative 22 14 - 44 %    Monocytes Relative 7 4 - 12 %    Eosinophils Relative 1 0 - 6 %    Basophils Relative 0 0 - 1 %    Neutrophils Absolute 5 43 1 85 - 7 62 Thousands/µL    Immature Grans Absolute 0 10 0 00 - 0 20 Thousand/uL    Lymphocytes Absolute 1 76 0 60 - 4 47 Thousands/µL    Monocytes Absolute 0 57 0 17 - 1 22 Thousand/µL    Eosinophils Absolute 0 10 0 00 - 0 61 Thousand/µL    Basophils Absolute 0  03 0 00 - 0 10 Thousands/µL            Physical Exam  Constitutional:       General: She is not in acute distress  Appearance: She is well-developed  She is not diaphoretic  HENT:      Head: Normocephalic and atraumatic  Right Ear: External ear normal       Left Ear: External ear normal       Nose: Nose normal       Mouth/Throat:      Pharynx: No oropharyngeal exudate  Eyes:      General:         Right eye: No discharge  Left eye: No discharge  Conjunctiva/sclera: Conjunctivae normal       Pupils: Pupils are equal, round, and reactive to light  Neck:      Thyroid: No thyromegaly  Cardiovascular:      Rate and Rhythm: Normal rate and regular rhythm  Heart sounds: Normal heart sounds  No murmur heard  No friction rub  No gallop  Pulmonary:      Effort: Pulmonary effort is normal  No respiratory distress  Breath sounds: Normal breath sounds  No stridor  No wheezing or rales  Abdominal:      General: Bowel sounds are normal  There is no distension  Palpations: Abdomen is soft  Tenderness: There is no abdominal tenderness  Musculoskeletal:      Cervical back: Normal range of motion and neck supple  Lymphadenopathy:      Cervical: No cervical adenopathy  Skin:     General: Skin is warm and dry  Findings: No erythema or rash  Neurological:      Mental Status: She is alert and oriented to person, place, and time  Psychiatric:         Behavior: Behavior normal          Thought Content:  Thought content normal          Judgment: Judgment normal

## 2022-11-14 NOTE — ASSESSMENT & PLAN NOTE
Lab Results   Component Value Date    HGBA1C 6 1 (H) 11/10/2022     Patient will continue with Metformin  Patient is to continue to work on diet and exercise  Limit sugars and carbohydrate intake  Avoid soda, juice, sweets, cookies, desserts, pasta, bread    Eat more whole grains, exercised 30 min of cardio at least 3 times a week  Also recommended daily foot exams to check for sores, and recommended yearly eye exams

## 2022-11-14 NOTE — ASSESSMENT & PLAN NOTE
Status post right urethral stent  Continue full course of Cipro  Continue Flomax improved review  He follow-up with urology and nephrology

## 2022-11-15 LAB
BACTERIA BLD CULT: NORMAL
BACTERIA BLD CULT: NORMAL

## 2022-12-01 NOTE — PROGRESS NOTES
12/2/2022  Mabeline Gear  1965  [id number]      Assessment  Nephrolithiasis s/p right ureteral stent (11/10/2022)    Pam Nevarez is a 62 y o  female being managed by our office  Patient scheduled for cystoscopy, right sided ureteroscopy, holmium laser lithotripsy, retrograde pyelogram, and ureteral stent exchange on 12/21/2022  We reviewed the risks and benefits of this procedure including but not limited to bleeding, infection, damage to nearby structures such as bladder/ureter/kidney, need for nephrostomy tube, need for additional surgeries  Patient verbalized understanding  Surgical consent signed in the office today  Urine dip appears negative for infection, will send for preop urine culture  She will continue to follow with Nephrology  Her next appointment is scheduled in January 2023  Patient expresses frustration for recurrent nephrolithiasis episodes  All questions were answered  Patient was advised to call sooner with any questions or issues  History of Present Illness  62 y o  female presents today to discuss her upcoming surgery  Patient was seen by our service during hospitalization on 11/10/2022  She had reported to weeks of symptoms including fatigue, decreased appetite, and diarrhea  Patient found to have 1 5 cm obstructing right renal pelvis stone with mild hydronephrosis and findings consistent with pyelonephritis  She underwent placement of right ureteral stent on 11/10/2022 by Dr Marcial Cross  Patient has a history of recurrent nephrolithiasis had is following with Nephrology  Patient on potassium citrate daily  She has undergone at least 4 surgical procedures for kidney stones over the last 1 year  Patient denies any pain or discomfort with ureteral stent  She denies any fever, chills, gross hematuria, or dysuria  Review of Systems  Review of Systems   Constitutional: Negative  HENT: Negative  Respiratory: Negative  Cardiovascular: Negative  Gastrointestinal: Negative  Genitourinary: Negative for difficulty urinating, dysuria, flank pain, frequency, hematuria and urgency  Musculoskeletal: Negative  Skin: Negative  Neurological: Negative  Psychiatric/Behavioral: Negative  Past Medical History  Past Medical History:   Diagnosis Date   • Anxiety    • Asthma    • Colon polyp    • Depression    • Diabetes mellitus ([facility] [address] )    • Diverticulitis    • Diverticulitis of colon    • Follicular cyst of ovary 12/23/2014   • Former smoker    • GERD (gastroesophageal reflux disease)    • Glycosuria    • Headache    • Hyperlipidemia    • Hypertension    • Kidney calculi 1/11/2019   • Kidney stone    • Obesity    • Ovarian cyst    • Overactive bladder    • Overweight    • Pulmonary nodule    • Renal calculus    • Seasonal allergies    • Tinea pedis of left foot 6/8/2020   • Vertigo    • Wears partial dentures     Lower plate -partial       Surgical History  Past Surgical History:   Procedure Laterality Date   • APPENDECTOMY     • BLADDER SUSPENSION      [facility]   • CERVICAL BIOPSY  W/ LOOP ELECTRODE EXCISION     • COLONOSCOPY     • DILATION AND CURETTAGE OF UTERUS     • FL RETROGRADE PYELOGRAM  7/26/2021   • FL RETROGRADE PYELOGRAM  11/18/2021   • FL RETROGRADE PYELOGRAM  11/10/2022   • HYSTERECTOMY  2007    ovaries present bilaterally   • JOINT REPLACEMENT Right 05/2021    right knee   • RI COLONOSCOPY FLX DX W/COLLJ SPEC WHEN PFRMD N/A 9/26/2017    Procedure: EGD AND COLONOSCOPY;  Surgeon: Peterson Suárez MD;  Location: [facility];   Service: Gastroenterology   • RI CYSTO/URETERO W/LITHOTRIPSY &INDWELL STENT INSRT Bilateral 7/26/2021    Procedure: CYSTOSCOPY URETEROSCOPY WITH LITHOTRIPSY HOLMIUM LASER, RETROGRADE PYELOGRAM AND INSERTION STENT URETERAL;  Surgeon: Viktor Quinonez MD;  Location: [facility] OR;  Service: Urology   • RI CYSTO/URETERO W/LITHOTRIPSY &INDWELL STENT INSRT Left 9/16/2021    Procedure: CYSTOSCOPY URETEROSCOPY WITH LITHOTRIPSY HOLMIUM LASER, RETROGRADE PYELOGRAM AND INSERTION STENT URETERAL;  Surgeon: Serg Cortes MD;  Location[facility];  Service: Urology   • IA CYSTO/URETERO W/LITHOTRIPSY &INDWELL STENT INSRT Left 11/18/2021    Procedure: CYSTOSCOPY URETEROSCOPY WITH LITHOTRIPSY HOLMIUM LASER, RETROGRADE PYELOGRAM AND INSERTION STENT URETERAL;  Surgeon: Alicja Michelle MD;  Location[facility];  Service: Urology   • IA CYSTOURETHROSCOPY,URETER CATHETER Right 11/10/2022    Procedure: CYSTOSCOPY RETROGRADE PYELOGRAM WITH INSERTION STENT URETERAL;  Surgeon: Justice Zepeda MD;  [facility];  Service: Urology   • IA REVISE MEDIAN N/CARPAL TUNNEL SURG Right 5/29/2018    Procedure: CARPAL TUNNEL RELEASE;  Surgeon: Hailey Vu DO;  Location[facility];  Service: Orthopedics   • IA TOTAL KNEE ARTHROPLASTY Right 5/19/2021    Procedure: ARTHROPLASTY KNEE TOTAL;  Surgeon: Quan Wilkins MD;  [facility];  Service: Orthopedics   • REMOVAL URETERAL STENT Right 9/16/2021    Procedure: REMOVAL STENT URETERAL;  Surgeon: Serg Cortes MD;  [facility];  Service: Urology   • TONSILLECTOMY     • TUBAL LIGATION     • URETEROSCOPY  7/26/2021    Procedure: URETEROSCOPY, LASER AND BASKET STONE EXTRACTION;  Surgeon: Nurys Villagomez MD;  Location[facility];  Service: Urology   • WISDOM TOOTH EXTRACTION         Family History  Family History   Problem Relation Age of Onset   • Diabetes type II Mother    • Asthma Mother    • Stroke Father    • Pancreatic cancer Father 68   • Diabetes type II Father    • No Known Problems Maternal Grandmother    • No Known Problems Maternal Grandfather    • No Known Problems Paternal Grandmother    • No Known Problems Paternal Grandfather    • No Known Problems Sister    • No Known Problems Sister    • No Known Problems Sister    • No Known Problems Sister    • No Known Problems Sister    • No Known Problems Maternal Aunt    • No Known Problems Paternal Aunt        Social History  Social History     Socioeconomic History   • Marital status: /Civil Union     Spouse name: Not on file   • Number of children: Not on file   • Years of education: Not on file   • Highest education level: Not on file   Occupational History   • Not on file   Tobacco Use   • Smoking status: Former     Packs/day: 0 25     Years: 20 00     Pack years: 5 00     Types: Cigarettes     Start date: 46     Quit date: 3/16/2018     Years since quittin 7   • Smokeless tobacco: Never   • Tobacco comments:     approx less than  half a pack   Vaping Use   • Vaping Use: Never used   Substance and Sexual Activity   • Alcohol use: Not Currently     Comment: socially   • Drug use: No     Comment: Denies   • Sexual activity: Not Currently     Partners: Male   Other Topics Concern   • Not on file   Social History Narrative    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      Social Determinants of Health     Financial Resource Strain: Not on file   Food Insecurity: Not on file   Transportation Needs: Not on file   Physical Activity: Not on file   Stress: Not on file   Social Connections: Not on file   Intimate Partner Violence: Not on file   Housing Stability: Not on file       Current Medications  Current Outpatient Medications   Medication Sig Dispense Refill   • acetaminophen (TYLENOL) 500 mg tablet Take 500 mg by mouth every 6 (six) hours as needed for mild pain     • Ascorbic Acid (vitamin C) 1000 MG tablet Take 2,000 mg by mouth daily     • atorvastatin (LIPITOR) 40 mg tablet Take 1 tablet (40 mg total) by mouth daily 90 tablet 1   • Cholecalciferol (Vitamin D3) 50 MCG (2000 UT) TABS Take 2,000 Units by mouth daily     • metFORMIN (GLUCOPHAGE) 1000 MG tablet Take 1 tablet (1,000 mg total) by mouth 2 (two) times a day with meals 180 tablet 1   • metoprolol tartrate (LOPRESSOR) 25 mg tablet Take 0 5 tablets (12 5 mg total) by mouth every 12 (twelve) hours 180 tablet 1   • multivitamin (THERAGRAN) TABS Take 1 tablet by mouth daily     • omeprazole (PriLOSEC) 40 MG capsule Take 1 capsule (40 mg total) by mouth daily 90 capsule 1   • phenazopyridine (PYRIDIUM) 200 mg tablet Take 1 tablet (200 mg total) by mouth 3 (three) times a day as needed for bladder spasms 15 tablet 1   • sertraline (ZOLOFT) 50 mg tablet Take 1 tablet (50 mg total) by mouth daily Takes in the am  90 tablet 1   • tamsulosin (FLOMAX) 0 4 mg Take 1 capsule (0 4 mg total) by mouth daily with dinner 30 capsule 1     No current facility-administered medications for this visit  Allergies  Allergies   Allergen Reactions   • Oxybutynin Anaphylaxis     "feels like throat closing and can't swallow"   • Clonazepam Other (See Comments)     Pt states "didn't feel right on it "   • Morphine Other (See Comments) and Confusion     Annotation - [id number]: "dopey"  Gets silly   • Venlafaxine Other (See Comments)     Unknown--pt states " MD was trying pt on different medications  Pt unsure of reaction "       Vitals  Vitals:    12/02/22 0737   BP: 138/82   BP Location: Right arm   Patient Position: Sitting   Cuff Size: Standard   Pulse: 93   Resp: 18   SpO2: 97%   Weight: 70 8 kg (156 lb)   Height: 5' 3" (1 6 m)       Physical Exam  Physical Exam  Constitutional:       Appearance: Normal appearance  She is well-developed  HENT:      Head: Normocephalic  Eyes:      Pupils: Pupils are equal, round, and reactive to light  Cardiovascular:      Rate and Rhythm: Normal rate and regular rhythm  Pulses: Normal pulses  Heart sounds: Normal heart sounds  Pulmonary:      Effort: Pulmonary effort is normal  No respiratory distress  Breath sounds: No stridor  No wheezing, rhonchi or rales  Comments: Coarse lung sounds on left side  Chest:      Chest wall: No tenderness  Abdominal:      General: Abdomen is flat  Bowel sounds are normal  There is no distension  Palpations: Abdomen is soft  There is no mass  Tenderness: There is no abdominal tenderness   There is no right CVA tenderness, left CVA tenderness, guarding or rebound  Hernia: No hernia is present  Musculoskeletal:         General: Normal range of motion  Cervical back: Normal range of motion  Right lower leg: No edema  Left lower leg: No edema  Skin:     General: Skin is warm and dry  Capillary Refill: Capillary refill takes less than 2 seconds  Neurological:      General: No focal deficit present  Mental Status: She is alert and oriented to person, place, and time  Psychiatric:         Mood and Affect: Mood normal          Behavior: Behavior normal          Thought Content:  Thought content normal          Judgment: Judgment normal

## 2022-12-02 ENCOUNTER — APPOINTMENT (OUTPATIENT)
Dept: LAB | Facility: HOSPITAL | Age: 57
End: 2022-12-02

## 2022-12-02 ENCOUNTER — OFFICE VISIT (OUTPATIENT)
Dept: UROLOGY | Facility: AMBULATORY SURGERY CENTER | Age: 57
End: 2022-12-02

## 2022-12-02 VITALS
RESPIRATION RATE: 18 BRPM | WEIGHT: 156 LBS | SYSTOLIC BLOOD PRESSURE: 138 MMHG | BODY MASS INDEX: 27.64 KG/M2 | OXYGEN SATURATION: 97 % | HEART RATE: 93 BPM | DIASTOLIC BLOOD PRESSURE: 82 MMHG | HEIGHT: 63 IN

## 2022-12-02 DIAGNOSIS — N20.0 NEPHROLITHIASIS: Primary | ICD-10-CM

## 2022-12-02 LAB
BACTERIA UR QL AUTO: ABNORMAL /HPF
BILIRUB UR QL STRIP: NEGATIVE
CLARITY UR: CLEAR
COLOR UR: ABNORMAL
GLUCOSE UR STRIP-MCNC: NEGATIVE MG/DL
HGB UR QL STRIP.AUTO: NEGATIVE
KETONES UR STRIP-MCNC: NEGATIVE MG/DL
LEUKOCYTE ESTERASE UR QL STRIP: ABNORMAL
NITRITE UR QL STRIP: NEGATIVE
NON-SQ EPI CELLS URNS QL MICRO: ABNORMAL /HPF
PH UR STRIP.AUTO: 7 [PH]
PROT UR STRIP-MCNC: NEGATIVE MG/DL
RBC #/AREA URNS AUTO: ABNORMAL /HPF
SL AMB  POCT GLUCOSE, UA: NORMAL
SL AMB LEUKOCYTE ESTERASE,UA: NORMAL
SL AMB POCT BILIRUBIN,UA: NORMAL
SL AMB POCT BLOOD,UA: NORMAL
SL AMB POCT CLARITY,UA: CLEAR
SL AMB POCT COLOR,UA: YELLOW
SL AMB POCT KETONES,UA: NORMAL
SL AMB POCT NITRITE,UA: NORMAL
SL AMB POCT PH,UA: 5
SL AMB POCT SPECIFIC GRAVITY,UA: 1
SL AMB POCT URINE PROTEIN: NORMAL
SL AMB POCT UROBILINOGEN: 0.2
SP GR UR STRIP.AUTO: 1.01 (ref 1–1.03)
UROBILINOGEN UR STRIP-ACNC: <2 MG/DL
WBC #/AREA URNS AUTO: ABNORMAL /HPF

## 2022-12-04 LAB — BACTERIA UR CULT: NORMAL

## 2022-12-05 LAB — BACTERIA UR CULT: NORMAL

## 2022-12-29 ENCOUNTER — TELEPHONE (OUTPATIENT)
Dept: NEPHROLOGY | Facility: CLINIC | Age: 57
End: 2022-12-29

## 2022-12-29 NOTE — TELEPHONE ENCOUNTER
Reschedule Appointment   Person speaking to  Patient   Date of original appointment 01/19/23    New appointment date 02/02/23   Patient on dialysis no   Location [facility]    Provider Dr Willi Ann   Additional Information Patient is having surgery

## 2023-01-08 PROBLEM — N30.01 ACUTE CYSTITIS WITH HEMATURIA: Status: RESOLVED | Noted: 2022-11-09 | Resolved: 2023-01-08

## 2023-01-10 PROBLEM — N12 PYELONEPHRITIS: Status: RESOLVED | Noted: 2022-11-10 | Resolved: 2023-01-10

## 2023-01-11 RX ORDER — POTASSIUM CITRATE 10 MEQ/1
10 TABLET, EXTENDED RELEASE ORAL
COMMUNITY

## 2023-01-11 NOTE — PRE-PROCEDURE INSTRUCTIONS
Pre-Surgery Instructions:   Medication Instructions   • acetaminophen (TYLENOL) 500 mg tablet Uses PRN- OK to take day of surgery   • Ascorbic Acid (vitamin C) 1000 MG tablet Stop taking   • Cholecalciferol (Vitamin D3) 50 MCG (2000 UT) TABS Stop taking   • metFORMIN (GLUCOPHAGE) 1000 MG tablet Hold day of surgery  • multivitamin (THERAGRAN) TABS Stop taking   • omeprazole (PriLOSEC) 40 MG capsule Take day of surgery  • phenazopyridine (PYRIDIUM) 200 mg tablet Hold day of surgery  • potassium citrate (UROCIT-K) 10 mEq Hold day of surgery  • sertraline (ZOLOFT) 50 mg tablet Take day of surgery  Have you had / have a sore throat? No  Have you had / have a cough less than 1 week? No  Have you had / have a fever greater than 100 0 - 100  4? No  Are you experiencing any shortness of breath? No    Review with patient via phone medications and showering instructions  Instructed to avoid all ASA and OTC Vit/Supp prior to surgery and to avoid NSAIDs 3 days prior to surgery per anesthesia instructions  Tylenol ok to take prn  Advised ASC call with surgery schedule time, nothing eat or drink after midnight  Verbalized understanding

## 2023-01-17 ENCOUNTER — HOSPITAL ENCOUNTER (OUTPATIENT)
Facility: HOSPITAL | Age: 58
Setting detail: OUTPATIENT SURGERY
Discharge: HOME | End: 2023-01-17
Attending: UROLOGY | Admitting: UROLOGY

## 2023-01-17 ENCOUNTER — APPOINTMENT (OUTPATIENT)
Dept: RADIOLOGY | Facility: HOSPITAL | Age: 58
End: 2023-01-17

## 2023-01-17 ENCOUNTER — ANESTHESIA EVENT (OUTPATIENT)
Dept: PERIOP | Facility: HOSPITAL | Age: 58
End: 2023-01-17

## 2023-01-17 ENCOUNTER — ANESTHESIA (OUTPATIENT)
Dept: PERIOP | Facility: HOSPITAL | Age: 58
End: 2023-01-17

## 2023-01-17 VITALS
SYSTOLIC BLOOD PRESSURE: 104 MMHG | HEIGHT: 63 IN | RESPIRATION RATE: 18 BRPM | HEART RATE: 69 BPM | DIASTOLIC BLOOD PRESSURE: 75 MMHG | WEIGHT: 156 LBS | BODY MASS INDEX: 27.64 KG/M2 | TEMPERATURE: 96.1 F | OXYGEN SATURATION: 98 %

## 2023-01-17 DIAGNOSIS — N20.1 RIGHT URETERAL STONE: Primary | ICD-10-CM

## 2023-01-17 LAB
GLUCOSE SERPL-MCNC: 120 MG/DL (ref 65–140)
GLUCOSE SERPL-MCNC: 133 MG/DL (ref 65–140)

## 2023-01-17 DEVICE — INLAY OPTIMA URETERAL STENT W/O GUIDEWIRE
Type: IMPLANTABLE DEVICE | Site: KIDNEY | Status: FUNCTIONAL
Brand: BARD® INLAY OPTIMA® URETERAL STENT

## 2023-01-17 RX ORDER — IBUPROFEN 600 MG/1
600 TABLET ORAL EVERY 6 HOURS PRN
Status: DISCONTINUED | OUTPATIENT
Start: 2023-01-17 | End: 2023-01-17 | Stop reason: HOSPADM

## 2023-01-17 RX ORDER — MIDAZOLAM HYDROCHLORIDE 2 MG/2ML
INJECTION, SOLUTION INTRAMUSCULAR; INTRAVENOUS AS NEEDED
Status: DISCONTINUED | OUTPATIENT
Start: 2023-01-17 | End: 2023-01-17

## 2023-01-17 RX ORDER — PROPOFOL 10 MG/ML
INJECTION, EMULSION INTRAVENOUS AS NEEDED
Status: DISCONTINUED | OUTPATIENT
Start: 2023-01-17 | End: 2023-01-17

## 2023-01-17 RX ORDER — LEVOFLOXACIN 5 MG/ML
750 INJECTION, SOLUTION INTRAVENOUS ONCE
Status: COMPLETED | OUTPATIENT
Start: 2023-01-17 | End: 2023-01-17

## 2023-01-17 RX ORDER — GABAPENTIN 300 MG/1
300 CAPSULE ORAL ONCE
Status: COMPLETED | OUTPATIENT
Start: 2023-01-17 | End: 2023-01-17

## 2023-01-17 RX ORDER — ACETAMINOPHEN 325 MG/1
975 TABLET ORAL ONCE
Status: COMPLETED | OUTPATIENT
Start: 2023-01-17 | End: 2023-01-17

## 2023-01-17 RX ORDER — LEVOFLOXACIN 500 MG/1
500 TABLET, FILM COATED ORAL EVERY 24 HOURS
Qty: 3 TABLET | Refills: 0 | Status: SHIPPED | OUTPATIENT
Start: 2023-01-18 | End: 2023-01-21

## 2023-01-17 RX ORDER — FENTANYL CITRATE/PF 50 MCG/ML
25 SYRINGE (ML) INJECTION
Status: DISCONTINUED | OUTPATIENT
Start: 2023-01-17 | End: 2023-01-17 | Stop reason: HOSPADM

## 2023-01-17 RX ORDER — SODIUM CHLORIDE, SODIUM LACTATE, POTASSIUM CHLORIDE, CALCIUM CHLORIDE 600; 310; 30; 20 MG/100ML; MG/100ML; MG/100ML; MG/100ML
INJECTION, SOLUTION INTRAVENOUS CONTINUOUS PRN
Status: DISCONTINUED | OUTPATIENT
Start: 2023-01-17 | End: 2023-01-17

## 2023-01-17 RX ORDER — ONDANSETRON 2 MG/ML
INJECTION INTRAMUSCULAR; INTRAVENOUS AS NEEDED
Status: DISCONTINUED | OUTPATIENT
Start: 2023-01-17 | End: 2023-01-17

## 2023-01-17 RX ORDER — LIDOCAINE HYDROCHLORIDE 10 MG/ML
INJECTION, SOLUTION EPIDURAL; INFILTRATION; INTRACAUDAL; PERINEURAL AS NEEDED
Status: DISCONTINUED | OUTPATIENT
Start: 2023-01-17 | End: 2023-01-17

## 2023-01-17 RX ORDER — MAGNESIUM HYDROXIDE 1200 MG/15ML
LIQUID ORAL AS NEEDED
Status: DISCONTINUED | OUTPATIENT
Start: 2023-01-17 | End: 2023-01-17 | Stop reason: HOSPADM

## 2023-01-17 RX ORDER — DIPHENHYDRAMINE HYDROCHLORIDE 50 MG/ML
12.5 INJECTION INTRAMUSCULAR; INTRAVENOUS ONCE AS NEEDED
Status: DISCONTINUED | OUTPATIENT
Start: 2023-01-17 | End: 2023-01-17 | Stop reason: HOSPADM

## 2023-01-17 RX ORDER — CEFAZOLIN SODIUM 2 G/50ML
2000 SOLUTION INTRAVENOUS ONCE
Status: DISCONTINUED | OUTPATIENT
Start: 2023-01-17 | End: 2023-01-17

## 2023-01-17 RX ORDER — ONDANSETRON 2 MG/ML
4 INJECTION INTRAMUSCULAR; INTRAVENOUS ONCE AS NEEDED
Status: DISCONTINUED | OUTPATIENT
Start: 2023-01-17 | End: 2023-01-17 | Stop reason: HOSPADM

## 2023-01-17 RX ORDER — FENTANYL CITRATE 50 UG/ML
INJECTION, SOLUTION INTRAMUSCULAR; INTRAVENOUS AS NEEDED
Status: DISCONTINUED | OUTPATIENT
Start: 2023-01-17 | End: 2023-01-17

## 2023-01-17 RX ORDER — HYDROMORPHONE HCL IN WATER/PF 6 MG/30 ML
0.2 PATIENT CONTROLLED ANALGESIA SYRINGE INTRAVENOUS
Status: DISCONTINUED | OUTPATIENT
Start: 2023-01-17 | End: 2023-01-17 | Stop reason: HOSPADM

## 2023-01-17 RX ADMIN — FENTANYL CITRATE 25 MCG: 50 INJECTION INTRAMUSCULAR; INTRAVENOUS at 08:37

## 2023-01-17 RX ADMIN — PROPOFOL 170 MG: 10 INJECTION, EMULSION INTRAVENOUS at 08:07

## 2023-01-17 RX ADMIN — ACETAMINOPHEN 975 MG: 325 TABLET, FILM COATED ORAL at 07:16

## 2023-01-17 RX ADMIN — ONDANSETRON 4 MG: 2 INJECTION INTRAMUSCULAR; INTRAVENOUS at 08:02

## 2023-01-17 RX ADMIN — LEVOFLOXACIN: 750 INJECTION, SOLUTION INTRAVENOUS at 08:05

## 2023-01-17 RX ADMIN — LIDOCAINE HYDROCHLORIDE 50 MG: 10 INJECTION, SOLUTION EPIDURAL; INFILTRATION; INTRACAUDAL; PERINEURAL at 08:07

## 2023-01-17 RX ADMIN — FENTANYL CITRATE 25 MCG: 50 INJECTION INTRAMUSCULAR; INTRAVENOUS at 08:55

## 2023-01-17 RX ADMIN — GABAPENTIN 300 MG: 300 CAPSULE ORAL at 07:16

## 2023-01-17 RX ADMIN — FENTANYL CITRATE 25 MCG: 50 INJECTION INTRAMUSCULAR; INTRAVENOUS at 08:14

## 2023-01-17 RX ADMIN — SODIUM CHLORIDE, SODIUM LACTATE, POTASSIUM CHLORIDE, AND CALCIUM CHLORIDE: .6; .31; .03; .02 INJECTION, SOLUTION INTRAVENOUS at 08:02

## 2023-01-17 RX ADMIN — FENTANYL CITRATE 25 MCG: 50 INJECTION INTRAMUSCULAR; INTRAVENOUS at 08:27

## 2023-01-17 RX ADMIN — MIDAZOLAM 2 MG: 1 INJECTION INTRAMUSCULAR; INTRAVENOUS at 08:02

## 2023-01-17 NOTE — H&P
Lisa Padilla  1965  [id number]        Assessment  Nephrolithiasis s/p right ureteral stent (11/10/2022)     Discussion  Venus Obrien is a 62 y o  female being managed by our office  Patient scheduled for cystoscopy, right sided ureteroscopy, holmium laser lithotripsy, retrograde pyelogram, and ureteral stent exchange on 12/21/2022  We reviewed the risks and benefits of this procedure including but not limited to bleeding, infection, damage to nearby structures such as bladder/ureter/kidney, need for nephrostomy tube, need for additional surgeries  Patient verbalized understanding  Surgical consent signed in the office today  Urine dip appears negative for infection, will send for preop urine culture  She will continue to follow with Nephrology  Her next appointment is scheduled in January 2023  Patient expresses frustration for recurrent nephrolithiasis episodes  All questions were answered  Patient was advised to call sooner with any questions or issues      ADDENDUM  Patient now presents for 2nd stage ureteroscopy  Procedure, risks, and benefits discussed  Consent obtained for right ureteroscopy, laser, stone extraction, stent  All questions answered to their satisfaction  Bea Pallas, MD        History of Present Illness  62 y o  female presents today to discuss her upcoming surgery  Patient was seen by our service during hospitalization on 11/10/2022  She had reported to weeks of symptoms including fatigue, decreased appetite, and diarrhea  Patient found to have 1 5 cm obstructing right renal pelvis stone with mild hydronephrosis and findings consistent with pyelonephritis  She underwent placement of right ureteral stent on 11/10/2022 by Dr Jorge Mills  Patient has a history of recurrent nephrolithiasis had is following with Nephrology  Patient on potassium citrate daily  She has undergone at least 4 surgical procedures for kidney stones over the last 1 year    Patient denies any pain or discomfort with ureteral stent  She denies any fever, chills, gross hematuria, or dysuria      Review of Systems  Review of Systems   Constitutional: Negative  HENT: Negative  Respiratory: Negative  Cardiovascular: Negative  Gastrointestinal: Negative  Genitourinary: Negative for difficulty urinating, dysuria, flank pain, frequency, hematuria and urgency  Musculoskeletal: Negative  Skin: Negative  Neurological: Negative  Psychiatric/Behavioral: Negative           Past Medical History  Medical History        Past Medical History:   Diagnosis Date   • Anxiety     • Asthma     • Colon polyp     • Depression     • Diabetes mellitus ([facility] [address] )     • Diverticulitis     • Diverticulitis of colon     • Follicular cyst of ovary 12/23/2014   • Former smoker     • GERD (gastroesophageal reflux disease)     • Glycosuria     • Headache     • Hyperlipidemia     • Hypertension     • Kidney calculi 1/11/2019   • Kidney stone     • Obesity     • Ovarian cyst     • Overactive bladder     • Overweight     • Pulmonary nodule     • Renal calculus     • Seasonal allergies     • Tinea pedis of left foot 6/8/2020   • Vertigo     • Wears partial dentures       Lower plate -partial            Surgical History  Surgical History         Past Surgical History:   Procedure Laterality Date   • APPENDECTOMY       • BLADDER SUSPENSION         [facility]   • CERVICAL BIOPSY  W/ LOOP ELECTRODE EXCISION       • COLONOSCOPY       • DILATION AND CURETTAGE OF UTERUS       • FL RETROGRADE PYELOGRAM   7/26/2021   • FL RETROGRADE PYELOGRAM   11/18/2021   • FL RETROGRADE PYELOGRAM   11/10/2022   • HYSTERECTOMY   2007     ovaries present bilaterally   • JOINT REPLACEMENT Right 05/2021     right knee   • IN COLONOSCOPY FLX DX W/COLLJ SPEC WHEN PFRMD N/A 9/26/2017     Procedure: EGD AND COLONOSCOPY;  Surgeon: Luis De Paz MD;  Location: [facility];   Service: Gastroenterology   • IN CYSTO/URETERO W/LITHOTRIPSY &INDWELL STENT INSRT Bilateral 7/26/2021     Procedure: CYSTOSCOPY URETEROSCOPY WITH LITHOTRIPSY HOLMIUM LASER, RETROGRADE PYELOGRAM AND INSERTION STENT URETERAL;  Surgeon: Lidia Suazo MD;  [facility];  Service: Urology   • SC CYSTO/URETERO W/LITHOTRIPSY &INDWELL STENT INSRT Left 9/16/2021     Procedure: CYSTOSCOPY URETEROSCOPY WITH LITHOTRIPSY HOLMIUM LASER, RETROGRADE PYELOGRAM AND INSERTION STENT URETERAL;  Surgeon: Kai Mccartney MD;  [facility];  Service: Urology   • SC CYSTO/URETERO W/LITHOTRIPSY &INDWELL STENT INSRT Left 11/18/2021     Procedure: CYSTOSCOPY URETEROSCOPY WITH LITHOTRIPSY HOLMIUM LASER, RETROGRADE PYELOGRAM AND INSERTION STENT URETERAL;  Surgeon: Cande Rubalcava MD;  [facility];  Service: Urology   • SC CYSTOURETHROSCOPY,URETER CATHETER Right 11/10/2022     Procedure: CYSTOSCOPY RETROGRADE PYELOGRAM WITH INSERTION STENT URETERAL;  Surgeon: Farhad Best MD;  [facility];  Service: Urology   • SC REVISE MEDIAN N/CARPAL TUNNEL SURG Right 5/29/2018     Procedure: CARPAL TUNNEL RELEASE;  Surgeon: Julee Mares DO;  [facility];  Service: Orthopedics   • SC TOTAL KNEE ARTHROPLASTY Right 5/19/2021     Procedure: ARTHROPLASTY KNEE TOTAL;  Surgeon: Christin Chow MD;  [facility];  Service: Orthopedics   • REMOVAL URETERAL STENT Right 9/16/2021     Procedure: REMOVAL STENT URETERAL;  Surgeon: Kai Mccartney MD;  [facility];  Service: Urology   • TONSILLECTOMY       • TUBAL LIGATION       • URETEROSCOPY   7/26/2021     Procedure: URETEROSCOPY, LASER AND BASKET STONE EXTRACTION;  Surgeon: Lidia Suazo MD;  [facility];  Service: Urology   • WISDOM TOOTH EXTRACTION                Family History  Family History         Family History   Problem Relation Age of Onset   • Diabetes type II Mother     • Asthma Mother     • Stroke Father     • Pancreatic cancer Father 68   • Diabetes type II Father     • No Known Problems Maternal Grandmother     • No Known Problems Maternal Grandfather     • No Known Problems Paternal Grandmother     • No Known Problems Paternal Grandfather     • No Known Problems Sister     • No Known Problems Sister     • No Known Problems Sister     • No Known Problems Sister     • No Known Problems Sister     • No Known Problems Maternal Aunt     • No Known Problems Paternal Aunt              Social History  Social History               Socioeconomic History   • Marital status: /Civil Union       Spouse name: Not on file   • Number of children: Not on file   • Years of education: Not on file   • Highest education level: Not on file   Occupational History   • Not on file   Tobacco Use   • Smoking status: Former       Packs/day: 0 25       Years: 20 00       Pack years: 5 00       Types: Cigarettes       Start date: 46       Quit date: 3/16/2018       Years since quittin 7   • Smokeless tobacco: Never   • Tobacco comments:       approx less than  half a pack   Vaping Use   • Vaping Use: Never used   Substance and Sexual Activity   • Alcohol use: Not Currently       Comment: socially   • Drug use:  No       Comment: Denies   • Sexual activity: Not Currently       Partners: Male   Other Topics Concern   • Not on file   Social History Narrative     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       Social Determinants of Health      Financial Resource Strain: Not on file   Food Insecurity: Not on file   Transportation Needs: Not on file   Physical Activity: Not on file   Stress: Not on file   Social Connections: Not on file   Intimate Partner Violence: Not on file   Housing Stability: Not on file            Current Medications  Current Medications          Current Outpatient Medications   Medication Sig Dispense Refill   • acetaminophen (TYLENOL) 500 mg tablet Take 500 mg by mouth every 6 (six) hours as needed for mild pain       • Ascorbic Acid (vitamin C) 1000 MG tablet Take 2,000 mg by mouth daily       • atorvastatin (LIPITOR) 40 mg tablet Take 1 tablet (40 mg total) by mouth daily 90 tablet 1   • Cholecalciferol (Vitamin D3) 50 MCG (2000 UT) TABS Take 2,000 Units by mouth daily       • metFORMIN (GLUCOPHAGE) 1000 MG tablet Take 1 tablet (1,000 mg total) by mouth 2 (two) times a day with meals 180 tablet 1   • metoprolol tartrate (LOPRESSOR) 25 mg tablet Take 0 5 tablets (12 5 mg total) by mouth every 12 (twelve) hours 180 tablet 1   • multivitamin (THERAGRAN) TABS Take 1 tablet by mouth daily       • omeprazole (PriLOSEC) 40 MG capsule Take 1 capsule (40 mg total) by mouth daily 90 capsule 1   • phenazopyridine (PYRIDIUM) 200 mg tablet Take 1 tablet (200 mg total) by mouth 3 (three) times a day as needed for bladder spasms 15 tablet 1   • sertraline (ZOLOFT) 50 mg tablet Take 1 tablet (50 mg total) by mouth daily Takes in the am  90 tablet 1   • tamsulosin (FLOMAX) 0 4 mg Take 1 capsule (0 4 mg total) by mouth daily with dinner 30 capsule 1      No current facility-administered medications for this visit             Allergies        Allergies   Allergen Reactions   • Oxybutynin Anaphylaxis       "feels like throat closing and can't swallow"   • Clonazepam Other (See Comments)       Pt states "didn't feel right on it "   • Morphine Other (See Comments) and Confusion       Annotation - [id number]: "dopey"  Gets silly   • Venlafaxine Other (See Comments)       Unknown--pt states " MD was trying pt on different medications  Pt unsure of reaction  "         Vitals  /62   Pulse 79   Temp 97 5 °F (36 4 °C) (Tympanic)   Resp 18   Ht 5' 3" (1 6 m)   Wt 70 8 kg (156 lb)   SpO2 95%   BMI 27 63 kg/m²               Physical Exam  Physical Exam  Constitutional:       Appearance: Normal appearance  She is well-developed  HENT:      Head: Normocephalic  Eyes:      Pupils: Pupils are equal, round, and reactive to light  Cardiovascular:      Rate and Rhythm: Normal rate and regular rhythm  Pulses: Normal pulses        Heart sounds: Normal heart sounds  Pulmonary:      Effort: Pulmonary effort is normal  No respiratory distress  Breath sounds: No stridor  No wheezing, rhonchi or rales  Comments: Coarse lung sounds on left side  Chest:      Chest wall: No tenderness  Abdominal:      General: Abdomen is flat  Bowel sounds are normal  There is no distension  Palpations: Abdomen is soft  There is no mass  Tenderness: There is no abdominal tenderness  There is no right CVA tenderness, left CVA tenderness, guarding or rebound  Hernia: No hernia is present  Musculoskeletal:         General: Normal range of motion  Cervical back: Normal range of motion  Right lower leg: No edema  Left lower leg: No edema  Skin:     General: Skin is warm and dry  Capillary Refill: Capillary refill takes less than 2 seconds  Neurological:      General: No focal deficit present  Mental Status: She is alert and oriented to person, place, and time  Psychiatric:         Mood and Affect: Mood normal          Behavior: Behavior normal          Thought Content:  Thought content normal          Judgment: Judgment normal

## 2023-01-17 NOTE — OP NOTE
OPERATIVE REPORT  PATIENT NAME: Doc Barksdale    :  1965  MRN: [id number]  Pt Location: [facility]    SURGERY DATE: 2023    Surgeon(s) and Role:     Roverto Sosa MD - Primary    Preop Diagnosis:  Right ureteral stone [N20 1]    Post-Op Diagnosis Codes:     * Right ureteral stone [N20 1]    Procedure(s) (LRB):  CYSTOSCOPY URETEROSCOPY WITH LITHOTRIPSY HOLMIUM LASER, RETROGRADE PYELOGRAM AND INSERTION STENT URETERAL, BASKET STONE EXTRACTION (Right)    Specimen(s):  ID Type Source Tests Collected by Time Destination   A :  Calculus Kidney, Right STONE ANALYSIS Kvng Ospina MD 2023 1017        Estimated Blood Loss:   Minimal    Drains:  * No LDAs found *    Anesthesia Type:   General    Operative Indications:  Right ureteral stone [N20 1]      Operative Findings:  Right UPJ stone, large  Smaller lwer pole stones    Complications:   None    Procedure and Technique:  The patient was identified, brought to the operating room, and placed on the table in supine position  After induction of general anesthesia, the patient was placed in dorsal lithotomy position and prepped and draped in the usual sterile fashion  A complete formal timeout was performed  The 25 German rigid cystoscope was placed per urethra and cystoscopy was performed  There was no bladder abnormality identified  The right ureteral stent was identified and grasped with a grasper, brought out through the meatus, and cannulated with a Solo wire  A dual-lumen catheter was placed followed by a second guidewire and ureteral access sheath  The disposable flexible ureteroscope was placed and the stone was identified within the renal pelvis  Additional stones seen in lower pole  A holmium laser fiber was placed and laser lithotripsy was performed  All stones fragmented  Representative stone fragments were retrieved with zero tip basket    At this point, a retrograde pyelogram was performed delineating the upper urinary tract anatomy  No further filling defect identified  The ureteral length measured  The safety wire was backloaded into the cystoscope and a 24 cm x 6 Cuban double-J stent was placed with string  The patient tolerated the procedure well and was transferred to the recovery room awake alert and in stable condition  Plan: stent/string x1 week       I was present for the entire procedure    Patient Disposition:  PACU         SIGNATURE: Jorge Alberto Tyler MD  DATE: January 17, 2023  TIME: 8:59 AM

## 2023-01-17 NOTE — ANESTHESIA POSTPROCEDURE EVALUATION
Post-Op Assessment Note    CV Status:  Stable  Pain Score: 0    Pain management: adequate  Multimodal analgesia used between 6 hours prior to anesthesia start to PACU discharge    Mental Status:  Alert and awake   Hydration Status:  Euvolemic and stable   PONV Controlled:  Controlled   Airway Patency:  Patent   Two or more mitigation strategies used for obstructive sleep apnea   Post Op Vitals Reviewed: Yes      Staff: CRNA         No notable events documented      BP   129/81   Temp   96 8   Pulse  70   Resp   12   SpO2   100

## 2023-01-17 NOTE — ANESTHESIA PREPROCEDURE EVALUATION
Procedure:  CYSTOSCOPY URETEROSCOPY WITH LITHOTRIPSY HOLMIUM LASER, RETROGRADE PYELOGRAM AND INSERTION STENT URETERAL (Right: Abdomen)    Relevant Problems   CARDIO   (+) Hypercholesteremia   (+) Hypertension      ENDO   (+) Type 2 diabetes mellitus with diabetic chronic kidney disease (HCC)   (+) Type 2 diabetes mellitus with microalbuminuria (HCC)      GI/HEPATIC   (+) GERD (gastroesophageal reflux disease)      /RENAL   (+) TOMMY (acute kidney injury) (HCC)   (+) CKD (chronic kidney disease) stage 2, GFR 60-89 ml/min   (+) Kidney stone      MUSCULOSKELETAL   (+) Primary osteoarthritis of both knees      NEURO/PSYCH   (+) Anxiety        Physical Exam    Airway    Mallampati score: II  TM Distance: >3 FB  Neck ROM: full     Dental   No notable dental hx     Cardiovascular      Pulmonary      Other Findings        Anesthesia Plan  ASA Score- 2     Anesthesia Type- general with ASA Monitors  Additional Monitors:   Airway Plan: LMA  Plan Factors-Exercise tolerance (METS): >4 METS  Chart reviewed  Patient summary reviewed  Patient is not a current smoker  Induction- intravenous  Postoperative Plan-     Informed Consent- Anesthetic plan and risks discussed with patient  I personally reviewed this patient with the CRNA  Discussed and agreed on the Anesthesia Plan with the CRNA  Roanna Schlatter

## 2023-01-18 ENCOUNTER — TELEPHONE (OUTPATIENT)
Dept: UROLOGY | Facility: MEDICAL CENTER | Age: 58
End: 2023-01-18

## 2023-01-19 NOTE — TELEPHONE ENCOUNTER
Pt called again about setting up an appointment for stent removal incase she can't get it out herself and she can only do next Thursday    Pt can be reached at [phone]

## 2023-01-19 NOTE — TELEPHONE ENCOUNTER
Pt called and stated she will take 10:30am on 1/26/23 for her stent removal    Pt call [phone]

## 2023-01-19 NOTE — TELEPHONE ENCOUNTER
Left message advising for patient to call back  Advised we can have the stent removed on Thursday (1/26) in the [place] office      When patient calls back, please offer following appointments available at this time for a nurse visit to have stent removed:     8:30, 9:00, 10:30 or 11:00 AM

## 2023-01-22 LAB
CALCIUM OXALATE DIHYDRATE MFR STONE IR: 90 %
COLOR STONE: NORMAL
COM MFR STONE: 10 %
COMMENT-STONE3: NORMAL
COMPOSITION: NORMAL
LABORATORY COMMENT REPORT: NORMAL
PHOTO: NORMAL
SIZE STONE: NORMAL MM
SPEC SOURCE SUBJ: NORMAL
STONE ANALYSIS-IMP: NORMAL
STONE ANALYSIS-IMP: NORMAL
WT STONE: 7 MG

## 2023-01-24 ENCOUNTER — TELEPHONE (OUTPATIENT)
Dept: NEPHROLOGY | Facility: CLINIC | Age: 58
End: 2023-01-24

## 2023-01-26 ENCOUNTER — HOSPITAL ENCOUNTER (OUTPATIENT)
Dept: RADIOLOGY | Facility: IMAGING CENTER | Age: 58
End: 2023-01-26

## 2023-01-26 ENCOUNTER — TELEPHONE (OUTPATIENT)
Dept: NEPHROLOGY | Facility: CLINIC | Age: 58
End: 2023-01-26

## 2023-01-26 VITALS — WEIGHT: 156.09 LBS | BODY MASS INDEX: 27.66 KG/M2 | HEIGHT: 63 IN

## 2023-01-26 DIAGNOSIS — Z12.31 ENCOUNTER FOR SCREENING MAMMOGRAM FOR MALIGNANT NEOPLASM OF BREAST: ICD-10-CM

## 2023-01-30 ENCOUNTER — APPOINTMENT (OUTPATIENT)
Dept: LAB | Facility: IMAGING CENTER | Age: 58
End: 2023-01-30

## 2023-01-30 DIAGNOSIS — N20.0 KIDNEY STONE: ICD-10-CM

## 2023-01-30 DIAGNOSIS — R39.89 SUSPECTED UTI: ICD-10-CM

## 2023-01-30 DIAGNOSIS — Z01.812 PRE-OPERATIVE LABORATORY EXAMINATION: ICD-10-CM

## 2023-01-30 DIAGNOSIS — N18.2 CKD (CHRONIC KIDNEY DISEASE) STAGE 2, GFR 60-89 ML/MIN: ICD-10-CM

## 2023-01-30 LAB
ALBUMIN SERPL BCP-MCNC: 3.3 G/DL (ref 3.5–5)
ANION GAP SERPL CALCULATED.3IONS-SCNC: 5 MMOL/L (ref 4–13)
BUN SERPL-MCNC: 18 MG/DL (ref 5–25)
CALCIUM ALBUM COR SERPL-MCNC: 9.8 MG/DL (ref 8.3–10.1)
CALCIUM SERPL-MCNC: 9.2 MG/DL (ref 8.3–10.1)
CHLORIDE SERPL-SCNC: 105 MMOL/L (ref 96–108)
CO2 SERPL-SCNC: 28 MMOL/L (ref 21–32)
CREAT SERPL-MCNC: 0.78 MG/DL (ref 0.6–1.3)
CREAT UR-MCNC: 28.5 MG/DL
GFR SERPL CREATININE-BSD FRML MDRD: 84 ML/MIN/1.73SQ M
GLUCOSE SERPL-MCNC: 124 MG/DL (ref 65–140)
MAGNESIUM SERPL-MCNC: 2.1 MG/DL (ref 1.6–2.6)
PHOSPHATE SERPL-MCNC: 3 MG/DL (ref 2.7–4.5)
POTASSIUM SERPL-SCNC: 3.8 MMOL/L (ref 3.5–5.3)
PROT UR-MCNC: 11 MG/DL
PROT/CREAT UR: 0.39 MG/G{CREAT} (ref 0–0.1)
SODIUM SERPL-SCNC: 138 MMOL/L (ref 135–147)

## 2023-01-31 LAB — BACTERIA UR CULT: NORMAL

## 2023-02-01 ENCOUNTER — TELEPHONE (OUTPATIENT)
Dept: NEPHROLOGY | Facility: CLINIC | Age: 58
End: 2023-02-01

## 2023-02-01 NOTE — TELEPHONE ENCOUNTER
Appointment Confirmation   Person confirmed appointment with  If not patient, name of the person Patient    Date and time of appointment 2/2/23 4p    Patient acknowledged and will be at appointment? yes    Did you advise the patient that they will need a urine sample if they are a new patient?  N/A    Did you advise the patient to bring their current medications for verification? (including any OTC) Yes    Additional Information

## 2023-02-01 NOTE — TELEPHONE ENCOUNTER
patient left voice mail stating she will be late to appointment tomorrow  I returned call and asked if patient is able to come in at [facility]  Patient appointment is rescheduled for 02/02/23 at 4

## 2023-02-02 ENCOUNTER — OFFICE VISIT (OUTPATIENT)
Dept: NEPHROLOGY | Facility: CLINIC | Age: 58
End: 2023-02-02

## 2023-02-02 ENCOUNTER — TELEPHONE (OUTPATIENT)
Dept: NEPHROLOGY | Facility: CLINIC | Age: 58
End: 2023-02-02

## 2023-02-02 VITALS
BODY MASS INDEX: 28.17 KG/M2 | SYSTOLIC BLOOD PRESSURE: 110 MMHG | WEIGHT: 159 LBS | HEART RATE: 79 BPM | HEIGHT: 63 IN | DIASTOLIC BLOOD PRESSURE: 68 MMHG

## 2023-02-02 DIAGNOSIS — I10 PRIMARY HYPERTENSION: ICD-10-CM

## 2023-02-02 DIAGNOSIS — E11.22 TYPE 2 DIABETES MELLITUS WITH CHRONIC KIDNEY DISEASE, WITHOUT LONG-TERM CURRENT USE OF INSULIN, UNSPECIFIED CKD STAGE (HCC): ICD-10-CM

## 2023-02-02 DIAGNOSIS — R80.1 PERSISTENT PROTEINURIA: ICD-10-CM

## 2023-02-02 DIAGNOSIS — N20.0 NEPHROLITHIASIS: ICD-10-CM

## 2023-02-02 DIAGNOSIS — N17.9 AKI (ACUTE KIDNEY INJURY) (HCC): Primary | ICD-10-CM

## 2023-02-02 DIAGNOSIS — N12 PYELONEPHRITIS: ICD-10-CM

## 2023-02-02 RX ORDER — POTASSIUM CITRATE 10 MEQ/1
10 TABLET, EXTENDED RELEASE ORAL
Qty: 180 TABLET | Refills: 3 | Status: SHIPPED | OUTPATIENT
Start: 2023-02-02

## 2023-02-02 NOTE — PROGRESS NOTES
NEPHROLOGY OUTPATIENT PROGRESS NOTE   Shavon Steen 62 y o  female MRN: [id number]  DATE: 2/8/2023    Reason for visit: No chief complaint on file  Patient Instructions   Thank you for coming to your visit today  As we discussed you kidney function is normal, your creatinine is 0 78mg/fL Please follow the recommendations below       • Recommend low sodium (salt) food    • Avoid nonsteroidal anti-inflammatory drugs such as Naprosyn, ibuprofen, Aleve, Advil, Celebrex, Meloxicam (Mobic) etc   You can use Tylenol as needed if you do not have any liver condition to be concerned about    • Try to avoid medications such as pantoprazole or  Protonix/Nexium or Esomeprazole)/Prilosec or omeprazole/Prevacid or lansoprazole/AcipHex or Rabeprazole  If you are able to, use Pepcid as this is safer for your kidneys  • Try to exercise at least 30 minutes 3 days a week to begin with with an ultimate goal of 5 days a week for at least 30 minutes    Next Visit in 6 months with results   If you need to see us earlier we can change the appointment for you      Connor Ornelas MD  Nephrology Attending     Measures to reduce stone development:    · Please Drink  oz of water or 2 5L-3 L a day, throughout the day  · Avoid salt/low-salt diet   · Try to decrease animal protein intake, dairy protein and vegetable base protein are better  · Increase fruits and vegetables as much as possible  · Avoid calcium products such as Tums or other types of calcium containing antacids, you can use Pepcid for indigestion (but you do not have to restrict your dietary calcium)  · Avoid excessive vitamin C  · Try to avoid oxalate products (please refer to the diet sheet)  · Limit fructose and sucrose type drinks such as coke              Diagnoses and all orders for this visit:    TOMMY (acute kidney injury) ([facility] [address] )  -     Basic metabolic panel; Future  -     potassium citrate (UROCIT-K) 10 mEq;  Take 1 tablet (10 mEq total) by mouth 3 (three) times a day with meals    Type 2 diabetes mellitus with chronic kidney disease, without long-term current use of insulin, unspecified CKD stage (HCC)    Primary hypertension    Nephrolithiasis    Persistent proteinuria    Pyelonephritis        Assessment/Plan:  55 yo woman with PMH DM, HTN, nephrolithiasis s/p lithotripsy + cystoscopy + left stent , OA  Patient was admitted in [facility] in [facility]22 with pyelonephritis  Patient is here for follow up for recurrent nephrolithiasis         PLAN:  # Nephrolithiasis  • Episodes: recurrent kidney stones  First episode 30 years ago with episodes of obstruction  • Family hx of kidney stones  • Recurrent episodes with TOMMY: creatinine 0 78mg/dL  • Interventions:   • 9/2021 cystoscopy and left stent  • 12/2022 lithotripsy  • Prior stone analysis reveals 90% calcium oxalate  • Litholink/urine:   • Low urine volume   • Increase oxalate   •  borderline low citrate  • Image: CT 9/2021  ? Right kidney: multiple non-obstructive calculi  ? Left Kidney: 5mm residual distal ureteral calculus  New left ureteral calculi 2 and 4mm  ?  Bilateral hydronephrosis improved s/p stents now removed  • Risk Factors: weight, diet  • Recommend increase fluid intake   • Low oxalate diet  • Potassium citrate 10meq tid     #Protienuria  · UPCR 0 39 g/g  · Urine dipstick last 12/2022 with no albumin   · Will check SPEP   · Possible tubular proteinuria      #hypertension  • Volume: euvolemic   • Current BP: normotensive 110/68mmhg,  goal <140/90  • Low sodium diet   • Lisinorpil 10mg   • Metoprolol 25mg bid  • Advised to maintain a good AMELIE control to prevent CKD     #Pyelonephritris   · Admitted in Nov 2022   · Urine Cx + for Pseudomona   · Treated with antibiotics   · Now resolved      #Diabetes   • HA1C , 6 1 well controlled   • Advised to continue with good DM control to prevent CKD      #Anemia   · hgb 11 1mg/d  · Need to check iron panel   · F/u with PCP     SUBJECTIVE / INTERVAL HISTORY:  57 y o  female presents in follow up of nephrolithiasis  Patient underwent lithotripsy last Dec 2022 and had pyelonephritis in Nov 2022  Now feels well but she is frustrated because of the multiple kidney stones  Prem Boone denies any recent illness/hospitalizations/medication changes since last office visit  Review of Systems   Constitutional: Negative for activity change  HENT: Negative for congestion  Eyes: Negative for discharge  Respiratory: Negative for shortness of breath and stridor  Gastrointestinal: Negative for abdominal pain  Endocrine: Negative for cold intolerance  Genitourinary: Negative for dysuria  Musculoskeletal: Negative for arthralgias  Skin: Negative for pallor and rash  Neurological: Negative for dizziness  Psychiatric/Behavioral: Negative for agitation  OBJECTIVE:  /68 (BP Location: Left arm, Patient Position: Sitting, Cuff Size: Standard)   Pulse 79   Ht 5' 3" (1 6 m)   Wt 72 1 kg (159 lb)   BMI 28 17 kg/m²  Body mass index is 28 17 kg/m²      Physical exam:  Physical Exam   General:  Obese, no acute distress at this time  Skin:  No acute rash  Eyes:  No scleral icterus and noninjected  ENT:  mucous membranes moist  Neck:  no carotid bruits  Chest:  Clear to auscultation percussion, good respiratory effort, no use of accessory respiratory muscles  CVS:  Regular rate and rhythm without rub   Abdomen:  soft and nontender   Extremities:  no significant lower extremity edema  Neuro:  No gross focality  Psych:  Alert , cooperative       Medications:    Current Outpatient Medications:   •  acetaminophen (TYLENOL) 500 mg tablet, Take 500 mg by mouth every 6 (six) hours as needed for mild pain, Disp: , Rfl:   •  Ascorbic Acid (vitamin C) 1000 MG tablet, Take 2,000 mg by mouth daily, Disp: , Rfl:   •  atorvastatin (LIPITOR) 40 mg tablet, Take 1 tablet (40 mg total) by mouth daily, Disp: 90 tablet, Rfl: 1  •  Cholecalciferol (Vitamin D3) 50 MCG (2000 UT) TABS, Take 2,000 Units by mouth daily, Disp: , Rfl:   •  metFORMIN (GLUCOPHAGE) 1000 MG tablet, Take 1 tablet (1,000 mg total) by mouth 2 (two) times a day with meals, Disp: 180 tablet, Rfl: 1  •  multivitamin (THERAGRAN) TABS, Take 1 tablet by mouth daily, Disp: , Rfl:   •  omeprazole (PriLOSEC) 40 MG capsule, Take 1 capsule (40 mg total) by mouth daily, Disp: 90 capsule, Rfl: 1  •  potassium citrate (UROCIT-K) 10 mEq, Take 1 tablet (10 mEq total) by mouth 3 (three) times a day with meals, Disp: 180 tablet, Rfl: 3  •  sertraline (ZOLOFT) 50 mg tablet, Take 1 tablet (50 mg total) by mouth daily Takes in the am , Disp: 90 tablet, Rfl: 1    Allergies: Allergies as of 02/02/2023 - Reviewed 02/02/2023   Allergen Reaction Noted   • Oxybutynin Anaphylaxis 09/16/2021   • Clonazepam Other (See Comments) 09/25/2017   • Morphine Other (See Comments) and Confusion 06/14/2017   • Venlafaxine Other (See Comments) 09/25/2017       The following portions of the patient's history were reviewed and updated as appropriate: past family history, past surgical history and problem list     Laboratory Results:  Lab Results   Component Value Date    SODIUM 138 01/30/2023    K 3 8 01/30/2023     01/30/2023    CO2 28 01/30/2023    BUN 18 01/30/2023    CREATININE 0 78 01/30/2023    GLUC 124 01/30/2023    CALCIUM 9 2 01/30/2023        Lab Results   Component Value Date    CALCIUM 9 2 01/30/2023    PHOS 3 0 01/30/2023       Portions of the record may have been created with voice recognition software  Occasional wrong word or "sound a like" substitutions may have occurred due to the inherent limitations of voice recognition software  Read the chart carefully and recognize, using context, where substitutions have occurred

## 2023-02-02 NOTE — TELEPHONE ENCOUNTER
Patient checked out for her visit today and requested to call our office to schedule her next follow up appointment instead of scheduling now  States that she will be starting a new job and does not know what her schedule will be

## 2023-02-02 NOTE — PATIENT INSTRUCTIONS
Thank you for coming to your visit today  As we discussed you kidney function is normal, your creatinine is 0 78mg/fL Please follow the recommendations below       Recommend low sodium (salt) food    Avoid nonsteroidal anti-inflammatory drugs such as Naprosyn, ibuprofen, Aleve, Advil, Celebrex, Meloxicam (Mobic) etc   You can use Tylenol as needed if you do not have any liver condition to be concerned about    Try to avoid medications such as pantoprazole or  Protonix/Nexium or Esomeprazole)/Prilosec or omeprazole/Prevacid or lansoprazole/AcipHex or Rabeprazole  If you are able to, use Pepcid as this is safer for your kidneys      Try to exercise at least 30 minutes 3 days a week to begin with with an ultimate goal of 5 days a week for at least 30 minutes    Next Visit in 6 months with results   If you need to see us earlier we can change the appointment for you      Hao Cannon MD  Nephrology Attending     Measures to reduce stone development:    Please Drink  oz of water or 2 5L-3 L a day, throughout the day  Avoid salt/low-salt diet   Try to decrease animal protein intake, dairy protein and vegetable base protein are better  Increase fruits and vegetables as much as possible  Avoid calcium products such as Tums or other types of calcium containing antacids, you can use Pepcid for indigestion (but you do not have to restrict your dietary calcium)  Avoid excessive vitamin C  Try to avoid oxalate products (please refer to the diet sheet)  Limit fructose and sucrose type drinks such as coke

## 2023-02-08 PROBLEM — N20.0 KIDNEY STONE: Status: RESOLVED | Noted: 2020-06-08 | Resolved: 2023-02-08

## 2023-02-08 PROBLEM — R80.1 PERSISTENT PROTEINURIA: Status: ACTIVE | Noted: 2023-02-08

## 2023-02-08 PROBLEM — N17.9 AKI (ACUTE KIDNEY INJURY) (HCC): Status: RESOLVED | Noted: 2022-11-04 | Resolved: 2023-02-08

## 2023-02-19 ENCOUNTER — TELEPHONE (OUTPATIENT)
Dept: OTHER | Facility: OTHER | Age: 58
End: 2023-02-19

## 2023-02-19 NOTE — TELEPHONE ENCOUNTER
Patient is calling regarding cancelling an appointment      Date/Time: 2/23/2023 @ 0900    Patient was rescheduled: YES [] NO [x]    Patient requesting call back to reschedule: YES [] NO [x]

## 2023-04-09 PROBLEM — N12 PYELONEPHRITIS: Status: RESOLVED | Noted: 2022-11-10 | Resolved: 2023-04-09

## 2023-07-06 ENCOUNTER — TELEPHONE (OUTPATIENT)
Dept: INTERNAL MEDICINE CLINIC | Age: 58
End: 2023-07-06

## 2023-07-06 DIAGNOSIS — E78.00 HYPERCHOLESTEREMIA: ICD-10-CM

## 2023-07-06 DIAGNOSIS — I10 PRIMARY HYPERTENSION: ICD-10-CM

## 2023-07-06 DIAGNOSIS — E11.22 TYPE 2 DIABETES MELLITUS WITH CHRONIC KIDNEY DISEASE, WITHOUT LONG-TERM CURRENT USE OF INSULIN, UNSPECIFIED CKD STAGE (HCC): Primary | ICD-10-CM

## 2023-07-06 NOTE — TELEPHONE ENCOUNTER
Patient is calling to request blood work for her next appointment. She is starting a new job on 07/24/2023. She wanted to come in and see you after the blood work was done, but nothing is available for her to be seen by you due to her work scheduled and your openings. Patient will call back to schedule her appointment with you when she has new insurance with her new job.

## 2023-07-07 NOTE — TELEPHONE ENCOUNTER
I placed orders I am willing to fit her in get some times she can come in and I will see what I can accomodate.

## 2023-11-08 ENCOUNTER — OFFICE VISIT (OUTPATIENT)
Dept: INTERNAL MEDICINE CLINIC | Facility: OTHER | Age: 58
End: 2023-11-08
Payer: COMMERCIAL

## 2023-11-08 VITALS
TEMPERATURE: 97 F | HEIGHT: 63 IN | WEIGHT: 173 LBS | HEART RATE: 80 BPM | OXYGEN SATURATION: 98 % | DIASTOLIC BLOOD PRESSURE: 80 MMHG | SYSTOLIC BLOOD PRESSURE: 142 MMHG | BODY MASS INDEX: 30.65 KG/M2

## 2023-11-08 DIAGNOSIS — N18.2 CKD (CHRONIC KIDNEY DISEASE) STAGE 2, GFR 60-89 ML/MIN: ICD-10-CM

## 2023-11-08 DIAGNOSIS — E55.9 VITAMIN D DEFICIENCY: ICD-10-CM

## 2023-11-08 DIAGNOSIS — I10 PRIMARY HYPERTENSION: ICD-10-CM

## 2023-11-08 DIAGNOSIS — E11.22 TYPE 2 DIABETES MELLITUS WITH CHRONIC KIDNEY DISEASE, WITHOUT LONG-TERM CURRENT USE OF INSULIN, UNSPECIFIED CKD STAGE (HCC): ICD-10-CM

## 2023-11-08 DIAGNOSIS — E11.29 TYPE 2 DIABETES MELLITUS WITH MICROALBUMINURIA, WITHOUT LONG-TERM CURRENT USE OF INSULIN: ICD-10-CM

## 2023-11-08 DIAGNOSIS — E78.00 HYPERCHOLESTEREMIA: ICD-10-CM

## 2023-11-08 DIAGNOSIS — K21.9 GASTROESOPHAGEAL REFLUX DISEASE WITHOUT ESOPHAGITIS: ICD-10-CM

## 2023-11-08 DIAGNOSIS — Z23 NEED FOR INFLUENZA VACCINATION: ICD-10-CM

## 2023-11-08 DIAGNOSIS — N20.0 NEPHROLITHIASIS: ICD-10-CM

## 2023-11-08 DIAGNOSIS — F41.9 ANXIETY: ICD-10-CM

## 2023-11-08 DIAGNOSIS — N17.9 AKI (ACUTE KIDNEY INJURY) (HCC): ICD-10-CM

## 2023-11-08 DIAGNOSIS — Z23 ENCOUNTER FOR IMMUNIZATION: ICD-10-CM

## 2023-11-08 DIAGNOSIS — Z12.31 ENCOUNTER FOR SCREENING MAMMOGRAM FOR MALIGNANT NEOPLASM OF BREAST: Primary | ICD-10-CM

## 2023-11-08 DIAGNOSIS — E11.9 TYPE 2 DIABETES MELLITUS WITHOUT COMPLICATION, WITHOUT LONG-TERM CURRENT USE OF INSULIN (HCC): ICD-10-CM

## 2023-11-08 DIAGNOSIS — R80.9 TYPE 2 DIABETES MELLITUS WITH MICROALBUMINURIA, WITHOUT LONG-TERM CURRENT USE OF INSULIN: ICD-10-CM

## 2023-11-08 DIAGNOSIS — I70.90 ATHEROSCLEROSIS OF ARTERIES: ICD-10-CM

## 2023-11-08 LAB
CREAT UR-MCNC: 50.1 MG/DL
MICROALBUMIN UR-MCNC: 51.4 MG/L
MICROALBUMIN/CREAT 24H UR: 103 MG/G CREATININE (ref 0–30)
SL AMB POCT HEMOGLOBIN AIC: 6.7 (ref ?–6.5)

## 2023-11-08 PROCEDURE — 82043 UR ALBUMIN QUANTITATIVE: CPT | Performed by: NURSE PRACTITIONER

## 2023-11-08 PROCEDURE — 90471 IMMUNIZATION ADMIN: CPT

## 2023-11-08 PROCEDURE — 83036 HEMOGLOBIN GLYCOSYLATED A1C: CPT | Performed by: NURSE PRACTITIONER

## 2023-11-08 PROCEDURE — 99214 OFFICE O/P EST MOD 30 MIN: CPT | Performed by: NURSE PRACTITIONER

## 2023-11-08 PROCEDURE — 82570 ASSAY OF URINE CREATININE: CPT | Performed by: NURSE PRACTITIONER

## 2023-11-08 PROCEDURE — 90686 IIV4 VACC NO PRSV 0.5 ML IM: CPT

## 2023-11-08 RX ORDER — OMEPRAZOLE 40 MG/1
40 CAPSULE, DELAYED RELEASE ORAL DAILY
Qty: 90 CAPSULE | Refills: 1 | Status: SHIPPED | OUTPATIENT
Start: 2023-11-08

## 2023-11-08 RX ORDER — LISINOPRIL 5 MG/1
5 TABLET ORAL DAILY
Qty: 90 TABLET | Refills: 1 | Status: SHIPPED | OUTPATIENT
Start: 2023-11-08

## 2023-11-08 RX ORDER — ATORVASTATIN CALCIUM 40 MG/1
40 TABLET, FILM COATED ORAL DAILY
Qty: 90 TABLET | Refills: 1 | Status: SHIPPED | OUTPATIENT
Start: 2023-11-08 | End: 2023-11-13 | Stop reason: SDUPTHER

## 2023-11-08 RX ORDER — POTASSIUM CITRATE 10 MEQ/1
10 TABLET, EXTENDED RELEASE ORAL
Qty: 270 TABLET | Refills: 1 | Status: SHIPPED | OUTPATIENT
Start: 2023-11-08

## 2023-11-08 NOTE — ASSESSMENT & PLAN NOTE
Lab Results   Component Value Date    EGFR 84 01/30/2023    EGFR 61 11/14/2022    EGFR 88 11/11/2022    CREATININE 0.78 01/30/2023    CREATININE 1.01 11/14/2022    CREATININE 0.75 11/11/2022   -Encouraged good oral hydration  -Avoid nephrotoxins

## 2023-11-08 NOTE — ASSESSMENT & PLAN NOTE
HBA1C 6.7    Patient will continue with Metformin. Patient is to continue to work on diet and exercise. Limit sugars and carbohydrate intake. Avoid soda, juice, sweets, cookies, desserts, pasta, bread. Eat more whole grains, exercised 30 min of cardio at least 3 times a week. Also recommended daily foot exams to check for sores, and recommended yearly eye exams.

## 2023-11-08 NOTE — PROGRESS NOTES
Assessment/Plan:    GERD (gastroesophageal reflux disease)  Continue home prilosec     Type 2 diabetes mellitus with diabetic chronic kidney disease ([address])  HBA1C 6.7    Patient will continue with Metformin. Patient is to continue to work on diet and exercise. Limit sugars and carbohydrate intake. Avoid soda, juice, sweets, cookies, desserts, pasta, bread. Eat more whole grains, exercised 30 min of cardio at least 3 times a week. Also recommended daily foot exams to check for sores, and recommended yearly eye exams. Hypertension  -uncontrolled, start lisinopril 5mg    Atherosclerosis of arteries  Patient currently taking statin. Nephrolithiasis  Follows urology. CKD (chronic kidney disease) stage 2, GFR 60-89 ml/min  Lab Results   Component Value Date    EGFR 84 01/30/2023    EGFR 61 11/14/2022    EGFR 88 11/11/2022    CREATININE 0.78 01/30/2023    CREATININE 1.01 11/14/2022    CREATININE 0.75 11/11/2022   -Encouraged good oral hydration  -Avoid nephrotoxins    Vitamin D deficiency  Continue vitamin D supplementation    Hypercholesteremia  Continue Lipitor    Anxiety  Well-controlled on Zoloft      BMI Counseling: Body mass index is 30.65 kg/m². The BMI is above normal. Nutrition recommendations include decreasing portion sizes, encouraging healthy choices of fruits and vegetables, decreasing fast food intake, consuming healthier snacks, limiting drinks that contain sugar, moderation in carbohydrate intake, increasing intake of lean protein, reducing intake of saturated and trans fat and reducing intake of cholesterol. Exercise recommendations include moderate physical activity 150 minutes/week and exercising 3-5 times per week. Rationale for BMI follow-up plan is due to patient being overweight or obese. Depression Screening and Follow-up Plan: Patient was screened for depression during today's encounter. They screened negative with a PHQ-2 score of 0.            Diagnoses and all orders for this visit:    Encounter for screening mammogram for malignant neoplasm of breast  -     Mammo screening bilateral w 3d & cad; Future    Encounter for immunization  -     influenza vaccine, quadrivalent, 0.5 mL, preservative-free, for adult and pediatric patients 6 mos+ (AFLURIA, FLUARIX, FLULAVAL, FLUZONE)    Need for influenza vaccination  -     influenza vaccine, quadrivalent, 0.5 mL, preservative-free, for adult and pediatric patients 6 mos+ (AFLURIA, FLUARIX, FLULAVAL, FLUZONE)    Anxiety  -     sertraline (ZOLOFT) 50 mg tablet; Take 1 tablet (50 mg total) by mouth daily Takes in the am.    Gastroesophageal reflux disease without esophagitis  -     omeprazole (PriLOSEC) 40 MG capsule; Take 1 capsule (40 mg total) by mouth daily    TOMMY (acute kidney injury) ([address])  -     potassium citrate (UROCIT-K) 10 mEq; Take 1 tablet (10 mEq total) by mouth 3 (three) times a day with meals    Hypercholesteremia  -     atorvastatin (LIPITOR) 40 mg tablet; Take 1 tablet (40 mg total) by mouth daily    Type 2 diabetes mellitus without complication, without long-term current use of insulin (HCC)  -     Albumin / creatinine urine ratio; Future  -     metFORMIN (GLUCOPHAGE) 1000 MG tablet; Take 1 tablet (1,000 mg total) by mouth 2 (two) times a day with meals  -     Comprehensive metabolic panel; Future  -     CBC and differential  -     Lipid panel  -     Hemoglobin A1C; Future  -     Comprehensive metabolic panel; Future  -     CBC and differential; Future  -     Lipid panel; Future    Primary hypertension  -     lisinopril (ZESTRIL) 5 mg tablet;  Take 1 tablet (5 mg total) by mouth daily    Type 2 diabetes mellitus with microalbuminuria, without long-term current use of insulin     Type 2 diabetes mellitus with chronic kidney disease, without long-term current use of insulin, unspecified CKD stage (HCC)    Atherosclerosis of arteries    Nephrolithiasis    CKD (chronic kidney disease) stage 2, GFR 60-89 ml/min    Vitamin D deficiency          Subjective:      Patient ID: Ruthie Smith is a 62 y.o. female. Patient presents today for  follow-up on hypertension, hyperlipidemia and diabetes. Denies any new concerns. She is due for blood work   Has a new job, more sedentary has gained some weight       Screenings:  Colonoscopy- 2017, she states that she had a lot polyps, she states she is due again for one  Gyn- has history of partial hytrectomy, she has not gone to her GYN in a while, she denies abnormal bleeding   Mammogram-   2023      The 10-year ASCVD risk score (Unique Paulson, et al., 2013) is: 4.4%    Values used to calculate the score:      Age: 64 years      Sex: Female      Is Non- : No      Diabetic: Yes      Tobacco smoker: No      Systolic Blood Pressure: 317 mmHg      Is BP treated: Yes      HDL Cholesterol: 40 mg/dL      Total Cholesterol: 134 mg/dL          Hypertension  This is a chronic problem. The current episode started more than 1 month ago. The problem is unchanged. The problem is uncontrolled (she reports SBP 140s at home). Pertinent negatives include no anxiety, blurred vision, chest pain, headaches, neck pain, palpitations, peripheral edema or shortness of breath. Risk factors for coronary artery disease include diabetes mellitus, dyslipidemia, sedentary lifestyle and stress. Treatments tried: Patient currently taking lisinopril. The current treatment provides moderate improvement. Compliance problems include diet. Diabetes  She presents for her follow-up diabetic visit. She has type 2 (HBA1C 6.7) diabetes mellitus. Her disease course has been stable. There are no hypoglycemic associated symptoms. Pertinent negatives for hypoglycemia include no dizziness or headaches. There are no diabetic associated symptoms. Pertinent negatives for diabetes include no blurred vision, no chest pain, no polydipsia, no polyphagia, no polyuria and no weakness.  There are no hypoglycemic complications. There are no diabetic complications. Risk factors for coronary artery disease include sedentary lifestyle, stress, obesity and dyslipidemia. Current diabetic treatment includes oral agent (monotherapy). She is compliant with treatment all of the time. She has not had a previous visit with a dietitian. She rarely participates in exercise. An ACE inhibitor/angiotensin II receptor blocker is being taken. She does not see a podiatrist.Eye exam is not current. Hyperlipidemia  This is a chronic problem. The current episode started more than 1 year ago. The problem is controlled. Exacerbating diseases include diabetes and obesity. She has no history of hypothyroidism. Pertinent negatives include no chest pain or shortness of breath. Current antihyperlipidemic treatment includes statins, diet change and exercise. Compliance problems include adherence to diet and adherence to exercise. The following portions of the patient's history were reviewed and updated as appropriate: allergies, current medications, past family history, past medical history, past social history, past surgical history, and problem list.    Review of Systems   Constitutional:  Negative for activity change, appetite change, chills, diaphoresis and fever. HENT:  Negative for congestion, ear discharge, ear pain, postnasal drip, rhinorrhea, sinus pressure, sinus pain and sore throat. Eyes:  Negative for blurred vision, pain, discharge, itching and visual disturbance. Respiratory:  Negative for cough, chest tightness, shortness of breath and wheezing. Cardiovascular:  Negative for chest pain, palpitations and leg swelling. Gastrointestinal:  Negative for abdominal pain, constipation, diarrhea, nausea and vomiting. Endocrine: Negative for polydipsia, polyphagia and polyuria. Genitourinary:  Negative for difficulty urinating, dysuria and urgency. Musculoskeletal:  Negative for arthralgias, back pain and neck pain. Skin:  Negative for rash and wound. Neurological:  Negative for dizziness, weakness, numbness and headaches.          Past Medical History:   Diagnosis Date    Anxiety     Asthma     Colon polyp     Depression     Diabetes mellitus ([address])     Diverticulitis     Diverticulitis of colon     Follicular cyst of ovary 12/23/2014    Former smoker     GERD (gastroesophageal reflux disease)     Glycosuria     Headache     Hyperlipidemia     Hypertension     Kidney calculi 1/11/2019    Kidney stone     Obesity     Ovarian cyst     Overactive bladder     Overweight     Pulmonary nodule     Renal calculus     Seasonal allergies     Tinea pedis of left foot 6/8/2020    Vertigo     Wears partial dentures     Lower plate -partial         Current Outpatient Medications:     acetaminophen (TYLENOL) 500 mg tablet, Take 500 mg by mouth every 6 (six) hours as needed for mild pain, Disp: , Rfl:     Ascorbic Acid (vitamin C) 1000 MG tablet, Take 2,000 mg by mouth daily, Disp: , Rfl:     atorvastatin (LIPITOR) 40 mg tablet, Take 1 tablet (40 mg total) by mouth daily, Disp: 90 tablet, Rfl: 1    Cholecalciferol (Vitamin D3) 50 MCG (2000 UT) TABS, Take 2,000 Units by mouth daily, Disp: , Rfl:     lisinopril (ZESTRIL) 5 mg tablet, Take 1 tablet (5 mg total) by mouth daily, Disp: 90 tablet, Rfl: 1    metFORMIN (GLUCOPHAGE) 1000 MG tablet, Take 1 tablet (1,000 mg total) by mouth 2 (two) times a day with meals, Disp: 180 tablet, Rfl: 1    multivitamin (THERAGRAN) TABS, Take 1 tablet by mouth daily, Disp: , Rfl:     omeprazole (PriLOSEC) 40 MG capsule, Take 1 capsule (40 mg total) by mouth daily, Disp: 90 capsule, Rfl: 1    potassium citrate (UROCIT-K) 10 mEq, Take 1 tablet (10 mEq total) by mouth 3 (three) times a day with meals, Disp: 270 tablet, Rfl: 1    sertraline (ZOLOFT) 50 mg tablet, Take 1 tablet (50 mg total) by mouth daily Takes in the am., Disp: 90 tablet, Rfl: 1    Allergies   Allergen Reactions    Oxybutynin Anaphylaxis "feels like throat closing and can't swallow"    Clonazepam Other (See Comments)     Pt states "didn't feel right on it."    Morphine Other (See Comments) and Confusion     Annotation - [id number]: "dopey"  Gets silly    Venlafaxine Other (See Comments)     Unknown--pt states " MD was trying pt on different medications. Pt unsure of reaction."       Social History   Past Surgical History:   Procedure Laterality Date    APPENDECTOMY      BLADDER SUSPENSION      [facility]    CERVICAL BIOPSY  W/ LOOP ELECTRODE EXCISION      COLONOSCOPY      DILATION AND CURETTAGE OF UTERUS      FL RETROGRADE PYELOGRAM  7/26/2021    FL RETROGRADE PYELOGRAM  11/18/2021    FL RETROGRADE PYELOGRAM  11/10/2022    FL RETROGRADE PYELOGRAM  1/17/2023    HYSTERECTOMY  2007    ovaries present bilaterally    JOINT REPLACEMENT Right 05/2021    right knee    AL ARTHRP KNE CONDYLE&PLATU MEDIAL&LAT COMPARTMENTS Right 5/19/2021    Procedure: ARTHROPLASTY KNEE TOTAL;  Surgeon: Jesus Manuel Shah MD;  Location: [facility];  Service: Orthopedics    AL COLONOSCOPY FLX DX W/COLLJ SPEC WHEN PFRMD N/A 9/26/2017    Procedure: EGD AND COLONOSCOPY;  Surgeon: Chandan Gama MD;  Location: [facility];   Service: Gastroenterology    AL CYSTO BLADDER W/URETERAL CATHETERIZATION Right 11/10/2022    Procedure: CYSTOSCOPY RETROGRADE PYELOGRAM WITH INSERTION STENT URETERAL;  Surgeon: Yared De Los Santos MD;  Location: [facility];  Service: Urology    AL CYSTO/URETERO W/LITHOTRIPSY &INDWELL STENT INSRT Bilateral 7/26/2021    Procedure: CYSTOSCOPY URETEROSCOPY WITH LITHOTRIPSY HOLMIUM LASER, RETROGRADE PYELOGRAM AND INSERTION STENT URETERAL;  Surgeon: Farhana Valero MD;  [facility];  Service: Urology    AL CYSTO/URETERO W/LITHOTRIPSY &INDWELL STENT INSRT Left 9/16/2021    Procedure: CYSTOSCOPY URETEROSCOPY WITH LITHOTRIPSY HOLMIUM LASER, RETROGRADE PYELOGRAM AND INSERTION STENT URETERAL;  Surgeon: Shorty Ramos MD;  Location: [facility];  Service: Urology    AL CYSTO/URETERO W/LITHOTRIPSY &INDWELL STENT INSRT Left 11/18/2021    Procedure: CYSTOSCOPY URETEROSCOPY WITH LITHOTRIPSY HOLMIUM LASER, RETROGRADE PYELOGRAM AND INSERTION STENT URETERAL;  Surgeon: Christine Hill MD;  [facility];  Service: Urology    AZ CYSTO/URETERO W/LITHOTRIPSY &INDWELL STENT INSRT Right 1/17/2023    Procedure: CYSTOSCOPY URETEROSCOPY WITH LITHOTRIPSY HOLMIUM LASER, RETROGRADE PYELOGRAM AND EXCHANGE STENT URETERAL, BASKET STONE EXTRACTION;  Surgeon: Christine Hill MD;  [facility];  Service: Urology    AZ NEUROPLASTY &/TRANSPOS MEDIAN NRV CARPAL TUNNE Right 5/29/2018    Procedure: CARPAL TUNNEL RELEASE;  Surgeon: Alana Hunt DO;  [facility];  Service: Orthopedics    REMOVAL URETERAL STENT Right 9/16/2021    Procedure: REMOVAL STENT URETERAL;  Surgeon: Mahesh Preciado MD;  [facility];  Service: Urology    TONSILLECTOMY      TUBAL LIGATION      URETEROSCOPY  7/26/2021    Procedure: URETEROSCOPY, LASER AND BASKET STONE EXTRACTION;  Surgeon: Janae Ospina MD;  [facility];  Service: Urology    WISDOM TOOTH EXTRACTION       Family History   Problem Relation Age of Onset    Diabetes type II Mother     Asthma Mother     Stroke Father     Pancreatic cancer Father 68    Diabetes type II Father     No Known Problems Sister     No Known Problems Sister     No Known Problems Sister     No Known Problems Sister     No Known Problems Maternal Grandmother     No Known Problems Maternal Grandfather     No Known Problems Paternal Grandmother     No Known Problems Paternal Grandfather     No Known Problems Maternal Aunt     No Known Problems Paternal Aunt     No Known Problems Brother     No Known Problems Son     No Known Problems Son        Objective:  /80 (BP Location: Left arm, Patient Position: Sitting, Cuff Size: Large)   Pulse 80   Temp (!) 97 °F (36.1 °C) (Temporal)   Ht 5' 3" (1.6 m)   Wt 78.5 kg (173 lb)   SpO2 98% Comment: room air BMI 30.65 kg/m²     No results found for this or any previous visit (from the past 1344 hour(s)). Physical Exam  Constitutional:       General: She is not in acute distress. Appearance: She is well-developed. She is not diaphoretic. HENT:      Head: Normocephalic and atraumatic. Right Ear: External ear normal.      Left Ear: External ear normal.      Nose: Nose normal.      Mouth/Throat:      Mouth: Mucous membranes are moist.      Pharynx: No oropharyngeal exudate or posterior oropharyngeal erythema. Eyes:      General:         Right eye: No discharge. Left eye: No discharge. Conjunctiva/sclera: Conjunctivae normal.      Pupils: Pupils are equal, round, and reactive to light. Neck:      Thyroid: No thyromegaly. Cardiovascular:      Rate and Rhythm: Normal rate and regular rhythm. Pulses: no weak pulses          Dorsalis pedis pulses are 2+ on the right side and 2+ on the left side. Posterior tibial pulses are 2+ on the right side and 2+ on the left side. Heart sounds: Normal heart sounds. No murmur heard. No friction rub. No gallop. Pulmonary:      Effort: Pulmonary effort is normal. No respiratory distress. Breath sounds: Normal breath sounds. No stridor. No wheezing or rales. Abdominal:      General: Bowel sounds are normal. There is no distension. Palpations: Abdomen is soft. Tenderness: There is no abdominal tenderness. Musculoskeletal:      Cervical back: Normal range of motion and neck supple. Feet:      Right foot:      Skin integrity: No ulcer, skin breakdown, erythema, warmth, callus or dry skin. Left foot:      Skin integrity: No ulcer, skin breakdown, erythema, warmth, callus or dry skin. Lymphadenopathy:      Cervical: No cervical adenopathy. Skin:     General: Skin is warm and dry. Findings: No erythema or rash. Neurological:      Mental Status: She is alert and oriented to person, place, and time. Psychiatric:         Behavior: Behavior normal.         Thought Content: Thought content normal.         Judgment: Judgment normal.         Diabetic Foot Exam    Patient's shoes and socks removed. Right Foot/Ankle   Right Foot Inspection  Skin Exam: skin normal and skin intact. No dry skin, no warmth, no callus, no erythema, no maceration, no abnormal color, no pre-ulcer, no ulcer and no callus. Toe Exam: ROM and strength within normal limits. Sensory   Monofilament testing: intact    Vascular  Capillary refills: < 3 seconds  The right DP pulse is 2+. The right PT pulse is 2+. Left Foot/Ankle  Left Foot Inspection  Skin Exam: skin normal and skin intact. No dry skin, no warmth, no erythema, no maceration, normal color, no pre-ulcer, no ulcer and no callus. Toe Exam: ROM and strength within normal limits. Sensory   Monofilament testing: intact    Vascular  Capillary refills: < 3 seconds  The left DP pulse is 2+. The left PT pulse is 2+.      Assign Risk Category  No deformity present  No loss of protective sensation  No weak pulses  Risk: 0

## 2023-11-11 ENCOUNTER — APPOINTMENT (OUTPATIENT)
Dept: LAB | Facility: IMAGING CENTER | Age: 58
End: 2023-11-11
Payer: COMMERCIAL

## 2023-11-11 DIAGNOSIS — E11.9 TYPE 2 DIABETES MELLITUS WITHOUT COMPLICATION, WITHOUT LONG-TERM CURRENT USE OF INSULIN (HCC): ICD-10-CM

## 2023-11-11 DIAGNOSIS — E11.22 TYPE 2 DIABETES MELLITUS WITH CHRONIC KIDNEY DISEASE, WITHOUT LONG-TERM CURRENT USE OF INSULIN, UNSPECIFIED CKD STAGE (HCC): ICD-10-CM

## 2023-11-11 LAB
ALBUMIN SERPL BCP-MCNC: 4.1 G/DL (ref 3.5–5)
ALP SERPL-CCNC: 83 U/L (ref 34–104)
ALT SERPL W P-5'-P-CCNC: 39 U/L (ref 7–52)
ANION GAP SERPL CALCULATED.3IONS-SCNC: 8 MMOL/L
AST SERPL W P-5'-P-CCNC: 28 U/L (ref 13–39)
BASOPHILS # BLD AUTO: 0.05 THOUSANDS/ÂΜL (ref 0–0.1)
BASOPHILS NFR BLD AUTO: 1 % (ref 0–1)
BILIRUB SERPL-MCNC: 0.6 MG/DL (ref 0.2–1)
BUN SERPL-MCNC: 20 MG/DL (ref 5–25)
CALCIUM SERPL-MCNC: 9.7 MG/DL (ref 8.4–10.2)
CHLORIDE SERPL-SCNC: 104 MMOL/L (ref 96–108)
CHOLEST SERPL-MCNC: 189 MG/DL
CO2 SERPL-SCNC: 28 MMOL/L (ref 21–32)
CREAT SERPL-MCNC: 0.96 MG/DL (ref 0.6–1.3)
EOSINOPHIL # BLD AUTO: 0.35 THOUSAND/ÂΜL (ref 0–0.61)
EOSINOPHIL NFR BLD AUTO: 5 % (ref 0–6)
ERYTHROCYTE [DISTWIDTH] IN BLOOD BY AUTOMATED COUNT: 13.7 % (ref 11.6–15.1)
GFR SERPL CREATININE-BSD FRML MDRD: 65 ML/MIN/1.73SQ M
GLUCOSE P FAST SERPL-MCNC: 119 MG/DL (ref 65–99)
HCT VFR BLD AUTO: 45.5 % (ref 34.8–46.1)
HDLC SERPL-MCNC: 39 MG/DL
HGB BLD-MCNC: 15.3 G/DL (ref 11.5–15.4)
IMM GRANULOCYTES # BLD AUTO: 0.02 THOUSAND/UL (ref 0–0.2)
IMM GRANULOCYTES NFR BLD AUTO: 0 % (ref 0–2)
LDLC SERPL CALC-MCNC: 113 MG/DL (ref 0–100)
LYMPHOCYTES # BLD AUTO: 2.05 THOUSANDS/ÂΜL (ref 0.6–4.47)
LYMPHOCYTES NFR BLD AUTO: 28 % (ref 14–44)
MCH RBC QN AUTO: 31.7 PG (ref 26.8–34.3)
MCHC RBC AUTO-ENTMCNC: 33.6 G/DL (ref 31.4–37.4)
MCV RBC AUTO: 94 FL (ref 82–98)
MONOCYTES # BLD AUTO: 0.76 THOUSAND/ÂΜL (ref 0.17–1.22)
MONOCYTES NFR BLD AUTO: 10 % (ref 4–12)
NEUTROPHILS # BLD AUTO: 4.07 THOUSANDS/ÂΜL (ref 1.85–7.62)
NEUTS SEG NFR BLD AUTO: 56 % (ref 43–75)
NONHDLC SERPL-MCNC: 150 MG/DL
NRBC BLD AUTO-RTO: 0 /100 WBCS
PLATELET # BLD AUTO: 254 THOUSANDS/UL (ref 149–390)
PMV BLD AUTO: 11.5 FL (ref 8.9–12.7)
POTASSIUM SERPL-SCNC: 4 MMOL/L (ref 3.5–5.3)
PROT SERPL-MCNC: 6.6 G/DL (ref 6.4–8.4)
RBC # BLD AUTO: 4.83 MILLION/UL (ref 3.81–5.12)
SODIUM SERPL-SCNC: 140 MMOL/L (ref 135–147)
TRIGL SERPL-MCNC: 187 MG/DL
WBC # BLD AUTO: 7.3 THOUSAND/UL (ref 4.31–10.16)

## 2023-11-11 PROCEDURE — 36415 COLL VENOUS BLD VENIPUNCTURE: CPT

## 2023-11-11 PROCEDURE — 80053 COMPREHEN METABOLIC PANEL: CPT

## 2023-11-13 ENCOUNTER — TELEPHONE (OUTPATIENT)
Dept: INTERNAL MEDICINE CLINIC | Age: 58
End: 2023-11-13

## 2023-11-13 DIAGNOSIS — E78.00 HYPERCHOLESTEREMIA: ICD-10-CM

## 2023-11-13 PROBLEM — R80.9 URINE TEST POSITIVE FOR MICROALBUMINURIA: Status: ACTIVE | Noted: 2023-11-13

## 2023-11-13 RX ORDER — ATORVASTATIN CALCIUM 80 MG/1
80 TABLET, FILM COATED ORAL DAILY
Qty: 90 TABLET | Refills: 1 | Status: SHIPPED | OUTPATIENT
Start: 2023-11-13

## 2023-11-13 NOTE — TELEPHONE ENCOUNTER
Patient called the office and asked if you would please review her labs and call her and leave message on machine because she wont be able to answer it. Thank you!

## 2024-03-06 ENCOUNTER — RA CDI HCC (OUTPATIENT)
Dept: OTHER | Facility: HOSPITAL | Age: 59
End: 2024-03-06

## 2024-03-06 NOTE — PROGRESS NOTES
HCC coding opportunities          Chart Reviewed number of suggestions sent to Provider: 1   E11.36    This is a reminder to address (resolve/update/assess) ALL HCC (risk adjustment) codes as found on active problem list for 2024 as patient scores reset to zero ZA.  Also, just a reminder to please review and assess all other chronic conditions for 2024  Patients Insurance        Commercial Insurance: [employer]

## 2024-03-27 ENCOUNTER — OFFICE VISIT (OUTPATIENT)
Dept: INTERNAL MEDICINE CLINIC | Facility: OTHER | Age: 59
End: 2024-03-27
Payer: COMMERCIAL

## 2024-03-27 VITALS
TEMPERATURE: 98.2 F | WEIGHT: 159 LBS | OXYGEN SATURATION: 97 % | SYSTOLIC BLOOD PRESSURE: 132 MMHG | HEART RATE: 92 BPM | BODY MASS INDEX: 28.17 KG/M2 | HEIGHT: 63 IN | DIASTOLIC BLOOD PRESSURE: 80 MMHG

## 2024-03-27 DIAGNOSIS — I10 PRIMARY HYPERTENSION: ICD-10-CM

## 2024-03-27 DIAGNOSIS — N18.2 CKD (CHRONIC KIDNEY DISEASE) STAGE 2, GFR 60-89 ML/MIN: ICD-10-CM

## 2024-03-27 DIAGNOSIS — I70.90 ATHEROSCLEROSIS OF ARTERIES: ICD-10-CM

## 2024-03-27 DIAGNOSIS — F41.9 ANXIETY: ICD-10-CM

## 2024-03-27 DIAGNOSIS — Z13.228 SCREENING FOR METABOLIC DISORDER: Primary | ICD-10-CM

## 2024-03-27 DIAGNOSIS — N17.9 AKI (ACUTE KIDNEY INJURY) (HCC): ICD-10-CM

## 2024-03-27 DIAGNOSIS — E11.22 TYPE 2 DIABETES MELLITUS WITH CHRONIC KIDNEY DISEASE, WITHOUT LONG-TERM CURRENT USE OF INSULIN, UNSPECIFIED CKD STAGE (HCC): ICD-10-CM

## 2024-03-27 DIAGNOSIS — E11.9 TYPE 2 DIABETES MELLITUS WITHOUT COMPLICATION, WITHOUT LONG-TERM CURRENT USE OF INSULIN (HCC): ICD-10-CM

## 2024-03-27 DIAGNOSIS — E78.5 HYPERLIPIDEMIA, UNSPECIFIED HYPERLIPIDEMIA TYPE: ICD-10-CM

## 2024-03-27 DIAGNOSIS — R73.01 ELEVATED FASTING GLUCOSE: ICD-10-CM

## 2024-03-27 DIAGNOSIS — E78.00 HYPERCHOLESTEREMIA: ICD-10-CM

## 2024-03-27 DIAGNOSIS — N20.0 NEPHROLITHIASIS: ICD-10-CM

## 2024-03-27 DIAGNOSIS — K21.9 GASTROESOPHAGEAL REFLUX DISEASE WITHOUT ESOPHAGITIS: ICD-10-CM

## 2024-03-27 DIAGNOSIS — E55.9 VITAMIN D DEFICIENCY: ICD-10-CM

## 2024-03-27 PROBLEM — R00.0 TACHYCARDIA: Status: RESOLVED | Noted: 2021-08-11 | Resolved: 2024-03-27

## 2024-03-27 PROBLEM — R06.83 SNORING: Status: RESOLVED | Noted: 2018-10-16 | Resolved: 2024-03-27

## 2024-03-27 PROBLEM — R19.7 PROTRACTED DIARRHEA: Status: RESOLVED | Noted: 2022-11-03 | Resolved: 2024-03-27

## 2024-03-27 PROCEDURE — 99214 OFFICE O/P EST MOD 30 MIN: CPT | Performed by: NURSE PRACTITIONER

## 2024-03-27 RX ORDER — POTASSIUM CITRATE 10 MEQ/1
10 TABLET, EXTENDED RELEASE ORAL
Qty: 270 TABLET | Refills: 1 | Status: SHIPPED | OUTPATIENT
Start: 2024-03-27

## 2024-03-27 RX ORDER — ATORVASTATIN CALCIUM 80 MG/1
80 TABLET, FILM COATED ORAL DAILY
Qty: 90 TABLET | Refills: 1 | Status: SHIPPED | OUTPATIENT
Start: 2024-03-27

## 2024-03-27 RX ORDER — LISINOPRIL 5 MG/1
5 TABLET ORAL DAILY
Qty: 90 TABLET | Refills: 1 | Status: SHIPPED | OUTPATIENT
Start: 2024-03-27

## 2024-03-27 RX ORDER — OMEPRAZOLE 40 MG/1
40 CAPSULE, DELAYED RELEASE ORAL DAILY
Qty: 90 CAPSULE | Refills: 1 | Status: SHIPPED | OUTPATIENT
Start: 2024-03-27

## 2024-03-27 NOTE — ASSESSMENT & PLAN NOTE
Lab Results   Component Value Date    EGFR 65 11/11/2023    EGFR 84 01/30/2023    EGFR 61 11/14/2022    CREATININE 0.96 11/11/2023    CREATININE 0.78 01/30/2023    CREATININE 1.01 11/14/2022   -Avoid nephrotoxins, advised to stay well-hydrated

## 2024-03-27 NOTE — PROGRESS NOTES
Assessment/Plan:    Hypertension  -Controlled on lisinopril    Atherosclerosis of arteries  Patient currently taking statin.    GERD (gastroesophageal reflux disease)  Continue home prilosec     Type 2 diabetes mellitus with diabetic chronic kidney disease (HCC)  HBA1C 6.7    Patient will continue with Metformin.  Patient is to continue to work on diet and exercise.  Limit sugars and carbohydrate intake.    Avoid soda, juice, sweets, cookies, desserts, pasta, bread.   Eat more whole grains, exercised 30 min of cardio at least 3 times a week.  Also recommended daily foot exams to check for sores, and recommended yearly eye exams.          Nephrolithiasis  Follows urology.     CKD (chronic kidney disease) stage 2, GFR 60-89 ml/min  Lab Results   Component Value Date    EGFR 65 11/11/2023    EGFR 84 01/30/2023    EGFR 61 11/14/2022    CREATININE 0.96 11/11/2023    CREATININE 0.78 01/30/2023    CREATININE 1.01 11/14/2022   -Avoid nephrotoxins, advised to stay well-hydrated    Anxiety  Well-controlled on Zoloft    Vitamin D deficiency  Continue vitamin D supplementation    Hypercholesteremia  Continue Lipitor    BMI 30.0-30.9,adult  Encouraged well-balanced diet and increase daily exercise              Diagnoses and all orders for this visit:    Screening for metabolic disorder  -     CBC and differential  -     Comprehensive metabolic panel; Future  -     Lipid panel    Hypercholesteremia  -     atorvastatin (LIPITOR) 80 mg tablet; Take 1 tablet (80 mg total) by mouth daily    Primary hypertension  -     lisinopril (ZESTRIL) 5 mg tablet; Take 1 tablet (5 mg total) by mouth daily    Type 2 diabetes mellitus without complication, without long-term current use of insulin (HCC)  -     metFORMIN (GLUCOPHAGE) 1000 MG tablet; Take 1 tablet (1,000 mg total) by mouth 2 (two) times a day with meals  -     Albumin / creatinine urine ratio; Future    Gastroesophageal reflux disease without esophagitis  -     omeprazole (PriLOSEC)  40 MG capsule; Take 1 capsule (40 mg total) by mouth daily    TOMMY (acute kidney injury) (HCC)  -     potassium citrate (UROCIT-K) 10 mEq; Take 1 tablet (10 mEq total) by mouth 3 (three) times a day with meals    Anxiety  -     sertraline (ZOLOFT) 50 mg tablet; Take 1 tablet (50 mg total) by mouth daily Takes in the am.    Elevated fasting glucose  -     Hemoglobin A1C    Hyperlipidemia, unspecified hyperlipidemia type  -     Lipid panel    Type 2 diabetes mellitus with chronic kidney disease, without long-term current use of insulin, unspecified CKD stage (HCC)    Atherosclerosis of arteries    Nephrolithiasis    CKD (chronic kidney disease) stage 2, GFR 60-89 ml/min    Vitamin D deficiency    BMI 30.0-30.9,adult          Subjective:      Patient ID: Sakshi Tirado is a 58 y.o. female.    Patient presents today for  follow-up on hypertension, hyperlipidemia and diabetes.      Denies any new concerns.   She is due for blood work     She reports that she has been watching what she eats, and she has been walking more and lost about 15 lbs       Screenings:  Colonoscopy- 2017, she states that she had a lot polyps, she states she is due again for one  Gyn- has history of partial hytrectomy, she has not gone to her GYN in a while, she denies abnormal bleeding   Mammogram-   2023      The 10-year ASCVD risk score (Jessica GARNER, et al., 2019) is: 9%    Values used to calculate the score:      Age: 58 years      Sex: Female      Is Non- : No      Diabetic: Yes      Tobacco smoker: No      Systolic Blood Pressure: 132 mmHg      Is BP treated: Yes      HDL Cholesterol: 39 mg/dL      Total Cholesterol: 189 mg/dL            Diabetes  She presents for her follow-up diabetic visit. She has type 2 (HBA1C 6.7) diabetes mellitus. Her disease course has been stable. There are no hypoglycemic associated symptoms. Pertinent negatives for hypoglycemia include no dizziness or headaches. There are no diabetic  associated symptoms. Pertinent negatives for diabetes include no blurred vision, no chest pain, no polydipsia, no polyphagia, no polyuria and no weakness. There are no hypoglycemic complications. There are no diabetic complications. Risk factors for coronary artery disease include sedentary lifestyle, stress, obesity and dyslipidemia. Current diabetic treatment includes oral agent (monotherapy). She is compliant with treatment all of the time. She has not had a previous visit with a dietitian. She rarely participates in exercise. An ACE inhibitor/angiotensin II receptor blocker is being taken. She does not see a podiatrist.Eye exam is not current.   Hypertension  This is a chronic problem. The current episode started more than 1 month ago. The problem is unchanged. The problem is uncontrolled (she reports SBP 140s at home). Pertinent negatives include no anxiety, blurred vision, chest pain, headaches, neck pain, palpitations, peripheral edema or shortness of breath. Risk factors for coronary artery disease include diabetes mellitus, dyslipidemia, sedentary lifestyle and stress. Treatments tried: Patient currently taking lisinopril. The current treatment provides moderate improvement. Compliance problems include diet.    Hyperlipidemia  This is a chronic problem. The current episode started more than 1 year ago. The problem is controlled. Exacerbating diseases include diabetes and obesity. She has no history of hypothyroidism. Pertinent negatives include no chest pain or shortness of breath. Current antihyperlipidemic treatment includes statins, diet change and exercise. Compliance problems include adherence to diet and adherence to exercise.        The following portions of the patient's history were reviewed and updated as appropriate: allergies, current medications, past family history, past medical history, past social history, past surgical history, and problem list.    Review of Systems   Constitutional:   Negative for activity change, appetite change, chills, diaphoresis and fever.   HENT:  Negative for congestion, ear discharge, ear pain, postnasal drip, rhinorrhea, sinus pressure, sinus pain and sore throat.    Eyes:  Negative for blurred vision, pain, discharge, itching and visual disturbance.   Respiratory:  Negative for cough, chest tightness, shortness of breath and wheezing.    Cardiovascular:  Negative for chest pain, palpitations and leg swelling.   Gastrointestinal:  Negative for abdominal pain, constipation, diarrhea, nausea and vomiting.   Endocrine: Negative for polydipsia, polyphagia and polyuria.   Genitourinary:  Negative for difficulty urinating, dysuria and urgency.   Musculoskeletal:  Negative for arthralgias, back pain and neck pain.   Skin:  Negative for rash and wound.   Neurological:  Negative for dizziness, weakness, numbness and headaches.         Past Medical History:   Diagnosis Date    Anxiety     Asthma     Colon polyp     Depression     Diabetes mellitus (HCC)     Diverticulitis     Diverticulitis of colon     Follicular cyst of ovary 12/23/2014    Former smoker     GERD (gastroesophageal reflux disease)     Glycosuria     Headache     Hyperlipidemia     Hypertension     Kidney calculi 1/11/2019    Kidney stone     Obesity     Ovarian cyst     Overactive bladder     Overweight     Pulmonary nodule     Renal calculus     Seasonal allergies     Tinea pedis of left foot 6/8/2020    Vertigo     Wears partial dentures     Lower plate -partial         Current Outpatient Medications:     acetaminophen (TYLENOL) 500 mg tablet, Take 500 mg by mouth every 6 (six) hours as needed for mild pain, Disp: , Rfl:     Ascorbic Acid (vitamin C) 1000 MG tablet, Take 2,000 mg by mouth daily, Disp: , Rfl:     atorvastatin (LIPITOR) 80 mg tablet, Take 1 tablet (80 mg total) by mouth daily, Disp: 90 tablet, Rfl: 1    Cholecalciferol (Vitamin D3) 50 MCG (2000 UT) TABS, Take 2,000 Units by mouth daily, Disp: ,  "Rfl:     lisinopril (ZESTRIL) 5 mg tablet, Take 1 tablet (5 mg total) by mouth daily, Disp: 90 tablet, Rfl: 1    metFORMIN (GLUCOPHAGE) 1000 MG tablet, Take 1 tablet (1,000 mg total) by mouth 2 (two) times a day with meals, Disp: 180 tablet, Rfl: 1    multivitamin (THERAGRAN) TABS, Take 1 tablet by mouth daily, Disp: , Rfl:     omeprazole (PriLOSEC) 40 MG capsule, Take 1 capsule (40 mg total) by mouth daily, Disp: 90 capsule, Rfl: 1    potassium citrate (UROCIT-K) 10 mEq, Take 1 tablet (10 mEq total) by mouth 3 (three) times a day with meals, Disp: 270 tablet, Rfl: 1    sertraline (ZOLOFT) 50 mg tablet, Take 1 tablet (50 mg total) by mouth daily Takes in the am., Disp: 90 tablet, Rfl: 1    Allergies   Allergen Reactions    Oxybutynin Anaphylaxis     \"feels like throat closing and can't swallow\"    Clonazepam Other (See Comments)     Pt states \"didn't feel right on it.\"    Morphine Other (See Comments) and Confusion     Annotation - [id number]: \"dopey\"  Gets silly    Venlafaxine Other (See Comments)     Unknown--pt states \" MD was trying pt on different medications. Pt unsure of reaction.\"       Social History   Past Surgical History:   Procedure Laterality Date    APPENDECTOMY      BLADDER SUSPENSION      [facility]    CERVICAL BIOPSY  W/ LOOP ELECTRODE EXCISION      COLONOSCOPY      DILATION AND CURETTAGE OF UTERUS      FL RETROGRADE PYELOGRAM  7/26/2021    FL RETROGRADE PYELOGRAM  11/18/2021    FL RETROGRADE PYELOGRAM  11/10/2022    FL RETROGRADE PYELOGRAM  1/17/2023    HYSTERECTOMY  2007    ovaries present bilaterally    JOINT REPLACEMENT Right 05/2021    right knee    CA ARTHRP KNE CONDYLE&PLATU MEDIAL&LAT COMPARTMENTS Right 5/19/2021    Procedure: ARTHROPLASTY KNEE TOTAL;  Surgeon: Bon Broderick MD;  [facility]: [facility];  Service: Orthopedics    CA COLONOSCOPY FLX DX W/COLLJ SPEC WHEN PFRMD N/A 9/26/2017    Procedure: EGD AND COLONOSCOPY;  Surgeon: Reed Hardy MD;  Location: [facility];  Service: " Gastroenterology    SC CYSTO BLADDER W/URETERAL CATHETERIZATION Right 11/10/2022    Procedure: CYSTOSCOPY RETROGRADE PYELOGRAM WITH INSERTION STENT URETERAL;  Surgeon: Burton Senior MD;  Location: [facility];  Service: Urology    SC CYSTO/URETERO W/LITHOTRIPSY &INDWELL STENT INSRT Bilateral 7/26/2021    Procedure: CYSTOSCOPY URETEROSCOPY WITH LITHOTRIPSY HOLMIUM LASER, RETROGRADE PYELOGRAM AND INSERTION STENT URETERAL;  Surgeon: Pete Lopez MD;  [facility];  Service: Urology    SC CYSTO/URETERO W/LITHOTRIPSY &INDWELL STENT INSRT Left 9/16/2021    Procedure: CYSTOSCOPY URETEROSCOPY WITH LITHOTRIPSY HOLMIUM LASER, RETROGRADE PYELOGRAM AND INSERTION STENT URETERAL;  Surgeon: Cesar Keller MD;  Location: [facility];  Service: Urology    SC CYSTO/URETERO W/LITHOTRIPSY &INDWELL STENT INSRT Left 11/18/2021    Procedure: CYSTOSCOPY URETEROSCOPY WITH LITHOTRIPSY HOLMIUM LASER, RETROGRADE PYELOGRAM AND INSERTION STENT URETERAL;  Surgeon: Vicente Colby MD;  [facility];  Service: Urology    SC CYSTO/URETERO W/LITHOTRIPSY &INDWELL STENT INSRT Right 1/17/2023    Procedure: CYSTOSCOPY URETEROSCOPY WITH LITHOTRIPSY HOLMIUM LASER, RETROGRADE PYELOGRAM AND EXCHANGE STENT URETERAL, BASKET STONE EXTRACTION;  Surgeon: Vicente Colby MD;  [facility];  Service: Urology    SC NEUROPLASTY &/TRANSPOS MEDIAN NRV CARPAL TUNNE Right 5/29/2018    Procedure: CARPAL TUNNEL RELEASE;  Surgeon: Amish Parkinson DO;  [facility];  Service: Orthopedics    REMOVAL URETERAL STENT Right 9/16/2021    Procedure: REMOVAL STENT URETERAL;  Surgeon: Cesar Keller MD;  [facility];  Service: Urology    TONSILLECTOMY      TUBAL LIGATION      URETEROSCOPY  7/26/2021    Procedure: URETEROSCOPY, LASER AND BASKET STONE EXTRACTION;  Surgeon: Pete Lopez MD;  [facility];  Service: Urology    WISDOM TOOTH EXTRACTION       Family History   Problem Relation Age of Onset    Diabetes type II  "Mother     Asthma Mother     Stroke Father     Pancreatic cancer Father 73    Diabetes type II Father     No Known Problems Sister     No Known Problems Sister     No Known Problems Sister     No Known Problems Sister     No Known Problems Maternal Grandmother     No Known Problems Maternal Grandfather     No Known Problems Paternal Grandmother     No Known Problems Paternal Grandfather     No Known Problems Maternal Aunt     No Known Problems Paternal Aunt     No Known Problems Brother     No Known Problems Son     No Known Problems Son        Objective:  /80 (BP Location: Left arm, Patient Position: Sitting, Cuff Size: Adult)   Pulse 92   Temp 98.2 °F (36.8 °C) (Temporal)   Ht 5' 3\" (1.6 m)   Wt 72.1 kg (159 lb)   SpO2 97%   BMI 28.17 kg/m²     No results found for this or any previous visit (from the past 1344 hour(s)).         Physical Exam  Constitutional:       General: She is not in acute distress.     Appearance: She is well-developed. She is not diaphoretic.   HENT:      Head: Normocephalic and atraumatic.      Right Ear: External ear normal.      Left Ear: External ear normal.      Nose: Nose normal.      Mouth/Throat:      Mouth: Mucous membranes are moist.      Pharynx: No oropharyngeal exudate or posterior oropharyngeal erythema.   Eyes:      General:         Right eye: No discharge.         Left eye: No discharge.      Conjunctiva/sclera: Conjunctivae normal.      Pupils: Pupils are equal, round, and reactive to light.   Neck:      Thyroid: No thyromegaly.   Cardiovascular:      Rate and Rhythm: Normal rate and regular rhythm.      Heart sounds: Normal heart sounds. No murmur heard.     No friction rub. No gallop.   Pulmonary:      Effort: Pulmonary effort is normal. No respiratory distress.      Breath sounds: Normal breath sounds. No stridor. No wheezing or rales.   Abdominal:      General: Bowel sounds are normal. There is no distension.      Palpations: Abdomen is soft.      Tenderness: " There is no abdominal tenderness.   Musculoskeletal:      Cervical back: Normal range of motion and neck supple.   Lymphadenopathy:      Cervical: No cervical adenopathy.   Skin:     General: Skin is warm and dry.      Findings: No erythema or rash.   Neurological:      Mental Status: She is alert and oriented to person, place, and time.   Psychiatric:         Behavior: Behavior normal.         Thought Content: Thought content normal.         Judgment: Judgment normal.

## 2024-04-09 ENCOUNTER — TELEPHONE (OUTPATIENT)
Dept: INTERNAL MEDICINE CLINIC | Age: 59
End: 2024-04-09

## 2024-04-09 NOTE — TELEPHONE ENCOUNTER
Patient called office she had labs done 4/6/24 through her employer and is worried about results.  Patient states her HDL was 48, triglycerides 186 and glucose 117.  Patient states she was only taking Lipitor 40 mg not Lipitor 80 mg.  Patient took 2 tablets of the 40 mg Lipitor last night after realizing she was not taking 80 mg. Patient is asking if she needs to make any changes from her results?    Please advise     Thank you

## 2024-04-14 ENCOUNTER — TELEPHONE (OUTPATIENT)
Dept: UROLOGY | Facility: CLINIC | Age: 59
End: 2024-04-14

## 2024-04-14 ENCOUNTER — APPOINTMENT (EMERGENCY)
Dept: RADIOLOGY | Facility: HOSPITAL | Age: 59
DRG: 854 | End: 2024-04-14
Payer: COMMERCIAL

## 2024-04-14 ENCOUNTER — HOSPITAL ENCOUNTER (INPATIENT)
Facility: HOSPITAL | Age: 59
LOS: 1 days | Discharge: HOME | DRG: 854 | End: 2024-04-16
Attending: EMERGENCY MEDICINE | Admitting: INTERNAL MEDICINE
Payer: COMMERCIAL

## 2024-04-14 ENCOUNTER — PREP FOR PROCEDURE (OUTPATIENT)
Dept: UROLOGY | Facility: CLINIC | Age: 59
End: 2024-04-14

## 2024-04-14 ENCOUNTER — ANESTHESIA (EMERGENCY)
Dept: PERIOP | Facility: HOSPITAL | Age: 59
DRG: 854 | End: 2024-04-14
Payer: COMMERCIAL

## 2024-04-14 ENCOUNTER — ANESTHESIA EVENT (EMERGENCY)
Dept: PERIOP | Facility: HOSPITAL | Age: 59
DRG: 854 | End: 2024-04-14
Payer: COMMERCIAL

## 2024-04-14 DIAGNOSIS — N20.1 URETEROLITHIASIS: Primary | ICD-10-CM

## 2024-04-14 DIAGNOSIS — N39.0 ACUTE UTI: ICD-10-CM

## 2024-04-14 DIAGNOSIS — N20.1 LEFT URETERAL STONE: ICD-10-CM

## 2024-04-14 DIAGNOSIS — N20.1 LEFT URETERAL STONE: Primary | ICD-10-CM

## 2024-04-14 DIAGNOSIS — R10.9 FLANK PAIN: ICD-10-CM

## 2024-04-14 DIAGNOSIS — N39.0 UTI (URINARY TRACT INFECTION): ICD-10-CM

## 2024-04-14 DIAGNOSIS — N13.30 HYDRONEPHROSIS: ICD-10-CM

## 2024-04-14 DIAGNOSIS — A41.52 SEPSIS DUE TO PSEUDOMONAS SPECIES WITHOUT ACUTE ORGAN DYSFUNCTION (HCC): ICD-10-CM

## 2024-04-14 DIAGNOSIS — N20.0 NEPHROLITHIASIS: ICD-10-CM

## 2024-04-14 LAB
ALBUMIN SERPL BCP-MCNC: 3.9 G/DL (ref 3.5–5)
ALP SERPL-CCNC: 92 U/L (ref 34–104)
ALT SERPL W P-5'-P-CCNC: 20 U/L (ref 7–52)
ANION GAP SERPL CALCULATED.3IONS-SCNC: 7 MMOL/L (ref 4–13)
AST SERPL W P-5'-P-CCNC: 15 U/L (ref 13–39)
BACTERIA UR QL AUTO: ABNORMAL /HPF
BASOPHILS # BLD AUTO: 0.06 THOUSANDS/ÂΜL (ref 0–0.1)
BASOPHILS NFR BLD AUTO: 0 % (ref 0–1)
BILIRUB SERPL-MCNC: 0.45 MG/DL (ref 0.2–1)
BILIRUB UR QL STRIP: NEGATIVE
BUN SERPL-MCNC: 19 MG/DL (ref 5–25)
CALCIUM SERPL-MCNC: 9.1 MG/DL (ref 8.4–10.2)
CHLORIDE SERPL-SCNC: 100 MMOL/L (ref 96–108)
CLARITY UR: ABNORMAL
CO2 SERPL-SCNC: 30 MMOL/L (ref 21–32)
COLOR UR: YELLOW
CREAT SERPL-MCNC: 0.89 MG/DL (ref 0.6–1.3)
EOSINOPHIL # BLD AUTO: 0.1 THOUSAND/ÂΜL (ref 0–0.61)
EOSINOPHIL NFR BLD AUTO: 1 % (ref 0–6)
ERYTHROCYTE [DISTWIDTH] IN BLOOD BY AUTOMATED COUNT: 13.9 % (ref 11.6–15.1)
GFR SERPL CREATININE-BSD FRML MDRD: 71 ML/MIN/1.73SQ M
GLUCOSE SERPL-MCNC: 109 MG/DL (ref 65–140)
GLUCOSE SERPL-MCNC: 137 MG/DL (ref 65–140)
GLUCOSE UR STRIP-MCNC: NEGATIVE MG/DL
HCT VFR BLD AUTO: 40.9 % (ref 34.8–46.1)
HGB BLD-MCNC: 13.3 G/DL (ref 11.5–15.4)
HGB UR QL STRIP.AUTO: ABNORMAL
IMM GRANULOCYTES # BLD AUTO: 0.11 THOUSAND/UL (ref 0–0.2)
IMM GRANULOCYTES NFR BLD AUTO: 1 % (ref 0–2)
KETONES UR STRIP-MCNC: NEGATIVE MG/DL
LEUKOCYTE ESTERASE UR QL STRIP: ABNORMAL
LYMPHOCYTES # BLD AUTO: 3.11 THOUSANDS/ÂΜL (ref 0.6–4.47)
LYMPHOCYTES NFR BLD AUTO: 19 % (ref 14–44)
MCH RBC QN AUTO: 30 PG (ref 26.8–34.3)
MCHC RBC AUTO-ENTMCNC: 32.5 G/DL (ref 31.4–37.4)
MCV RBC AUTO: 92 FL (ref 82–98)
MONOCYTES # BLD AUTO: 1.31 THOUSAND/ÂΜL (ref 0.17–1.22)
MONOCYTES NFR BLD AUTO: 8 % (ref 4–12)
MUCOUS THREADS UR QL AUTO: ABNORMAL
NEUTROPHILS # BLD AUTO: 12.09 THOUSANDS/ÂΜL (ref 1.85–7.62)
NEUTS SEG NFR BLD AUTO: 71 % (ref 43–75)
NITRITE UR QL STRIP: POSITIVE
NON-SQ EPI CELLS URNS QL MICRO: ABNORMAL /HPF
NRBC BLD AUTO-RTO: 0 /100 WBCS
PH UR STRIP.AUTO: 6.5 [PH]
PLATELET # BLD AUTO: 328 THOUSANDS/UL (ref 149–390)
PMV BLD AUTO: 10.5 FL (ref 8.9–12.7)
POTASSIUM SERPL-SCNC: 3.7 MMOL/L (ref 3.5–5.3)
PROCALCITONIN SERPL-MCNC: 0.07 NG/ML
PROT SERPL-MCNC: 6.7 G/DL (ref 6.4–8.4)
PROT UR STRIP-MCNC: ABNORMAL MG/DL
RBC # BLD AUTO: 4.43 MILLION/UL (ref 3.81–5.12)
RBC #/AREA URNS AUTO: ABNORMAL /HPF
SODIUM SERPL-SCNC: 137 MMOL/L (ref 135–147)
SP GR UR STRIP.AUTO: 1.01 (ref 1–1.03)
UROBILINOGEN UR STRIP-ACNC: <2 MG/DL
WBC # BLD AUTO: 16.78 THOUSAND/UL (ref 4.31–10.16)
WBC #/AREA URNS AUTO: ABNORMAL /HPF
WBC CLUMPS # UR AUTO: PRESENT /UL

## 2024-04-14 PROCEDURE — 87077 CULTURE AEROBIC IDENTIFY: CPT | Performed by: UROLOGY

## 2024-04-14 PROCEDURE — 52332 CYSTOSCOPY AND TREATMENT: CPT | Performed by: UROLOGY

## 2024-04-14 PROCEDURE — 85025 COMPLETE CBC W/AUTO DIFF WBC: CPT

## 2024-04-14 PROCEDURE — 87186 SC STD MICRODIL/AGAR DIL: CPT | Performed by: EMERGENCY MEDICINE

## 2024-04-14 PROCEDURE — 96365 THER/PROPH/DIAG IV INF INIT: CPT

## 2024-04-14 PROCEDURE — 74176 CT ABD & PELVIS W/O CONTRAST: CPT

## 2024-04-14 PROCEDURE — 82948 REAGENT STRIP/BLOOD GLUCOSE: CPT

## 2024-04-14 PROCEDURE — 36415 COLL VENOUS BLD VENIPUNCTURE: CPT

## 2024-04-14 PROCEDURE — 87186 SC STD MICRODIL/AGAR DIL: CPT | Performed by: UROLOGY

## 2024-04-14 PROCEDURE — C2617 STENT, NON-COR, TEM W/O DEL: HCPCS | Performed by: UROLOGY

## 2024-04-14 PROCEDURE — 99245 OFF/OP CONSLTJ NEW/EST HI 55: CPT | Performed by: UROLOGY

## 2024-04-14 PROCEDURE — 74420 UROGRAPHY RTRGR +-KUB: CPT

## 2024-04-14 PROCEDURE — 81001 URINALYSIS AUTO W/SCOPE: CPT | Performed by: EMERGENCY MEDICINE

## 2024-04-14 PROCEDURE — 80053 COMPREHEN METABOLIC PANEL: CPT

## 2024-04-14 PROCEDURE — 0T778DZ DILATION OF LEFT URETER WITH INTRALUMINAL DEVICE, VIA NATURAL OR ARTIFICIAL OPENING ENDOSCOPIC: ICD-10-PCS | Performed by: UROLOGY

## 2024-04-14 PROCEDURE — 87086 URINE CULTURE/COLONY COUNT: CPT | Performed by: UROLOGY

## 2024-04-14 PROCEDURE — C1758 CATHETER, URETERAL: HCPCS | Performed by: UROLOGY

## 2024-04-14 PROCEDURE — 84145 PROCALCITONIN (PCT): CPT

## 2024-04-14 PROCEDURE — C1769 GUIDE WIRE: HCPCS | Performed by: UROLOGY

## 2024-04-14 PROCEDURE — BT1F1ZZ FLUOROSCOPY OF LEFT KIDNEY, URETER AND BLADDER USING LOW OSMOLAR CONTRAST: ICD-10-PCS | Performed by: INTERNAL MEDICINE

## 2024-04-14 PROCEDURE — 87077 CULTURE AEROBIC IDENTIFY: CPT | Performed by: EMERGENCY MEDICINE

## 2024-04-14 PROCEDURE — 87086 URINE CULTURE/COLONY COUNT: CPT | Performed by: EMERGENCY MEDICINE

## 2024-04-14 PROCEDURE — 99284 EMERGENCY DEPT VISIT MOD MDM: CPT

## 2024-04-14 PROCEDURE — 87040 BLOOD CULTURE FOR BACTERIA: CPT

## 2024-04-14 RX ORDER — METOCLOPRAMIDE HYDROCHLORIDE 5 MG/ML
10 INJECTION INTRAMUSCULAR; INTRAVENOUS ONCE AS NEEDED
Status: DISCONTINUED | OUTPATIENT
Start: 2024-04-14 | End: 2024-04-14 | Stop reason: HOSPADM

## 2024-04-14 RX ORDER — ALBUTEROL SULFATE 90 UG/1
AEROSOL, METERED RESPIRATORY (INHALATION) AS NEEDED
Status: DISCONTINUED | OUTPATIENT
Start: 2024-04-14 | End: 2024-04-14

## 2024-04-14 RX ORDER — ROCURONIUM BROMIDE 10 MG/ML
INJECTION, SOLUTION INTRAVENOUS AS NEEDED
Status: DISCONTINUED | OUTPATIENT
Start: 2024-04-14 | End: 2024-04-14

## 2024-04-14 RX ORDER — HYDROMORPHONE HCL/PF 1 MG/ML
0.5 SYRINGE (ML) INJECTION
Status: DISCONTINUED | OUTPATIENT
Start: 2024-04-14 | End: 2024-04-14 | Stop reason: HOSPADM

## 2024-04-14 RX ORDER — DEXAMETHASONE SODIUM PHOSPHATE 10 MG/ML
INJECTION, SOLUTION INTRAMUSCULAR; INTRAVENOUS AS NEEDED
Status: DISCONTINUED | OUTPATIENT
Start: 2024-04-14 | End: 2024-04-14

## 2024-04-14 RX ORDER — FENTANYL CITRATE/PF 50 MCG/ML
50 SYRINGE (ML) INJECTION
Status: DISCONTINUED | OUTPATIENT
Start: 2024-04-14 | End: 2024-04-14 | Stop reason: HOSPADM

## 2024-04-14 RX ORDER — FENTANYL CITRATE 50 UG/ML
INJECTION, SOLUTION INTRAMUSCULAR; INTRAVENOUS AS NEEDED
Status: DISCONTINUED | OUTPATIENT
Start: 2024-04-14 | End: 2024-04-14

## 2024-04-14 RX ORDER — ONDANSETRON 2 MG/ML
INJECTION INTRAMUSCULAR; INTRAVENOUS AS NEEDED
Status: DISCONTINUED | OUTPATIENT
Start: 2024-04-14 | End: 2024-04-14

## 2024-04-14 RX ORDER — PROPOFOL 10 MG/ML
INJECTION, EMULSION INTRAVENOUS AS NEEDED
Status: DISCONTINUED | OUTPATIENT
Start: 2024-04-14 | End: 2024-04-14

## 2024-04-14 RX ORDER — SUCCINYLCHOLINE/SOD CL,ISO/PF 100 MG/5ML
SYRINGE (ML) INTRAVENOUS AS NEEDED
Status: DISCONTINUED | OUTPATIENT
Start: 2024-04-14 | End: 2024-04-14

## 2024-04-14 RX ORDER — LIDOCAINE HYDROCHLORIDE 10 MG/ML
INJECTION, SOLUTION EPIDURAL; INFILTRATION; INTRACAUDAL; PERINEURAL AS NEEDED
Status: DISCONTINUED | OUTPATIENT
Start: 2024-04-14 | End: 2024-04-14

## 2024-04-14 RX ORDER — SODIUM CHLORIDE, SODIUM LACTATE, POTASSIUM CHLORIDE, CALCIUM CHLORIDE 600; 310; 30; 20 MG/100ML; MG/100ML; MG/100ML; MG/100ML
INJECTION, SOLUTION INTRAVENOUS CONTINUOUS PRN
Status: DISCONTINUED | OUTPATIENT
Start: 2024-04-14 | End: 2024-04-14

## 2024-04-14 RX ORDER — MAGNESIUM HYDROXIDE 1200 MG/15ML
LIQUID ORAL AS NEEDED
Status: DISCONTINUED | OUTPATIENT
Start: 2024-04-14 | End: 2024-04-14 | Stop reason: HOSPADM

## 2024-04-14 RX ORDER — LEVOFLOXACIN 5 MG/ML
500 INJECTION, SOLUTION INTRAVENOUS ONCE
OUTPATIENT
Start: 2024-04-14 | End: 2024-04-14

## 2024-04-14 RX ADMIN — SODIUM CHLORIDE, SODIUM LACTATE, POTASSIUM CHLORIDE, AND CALCIUM CHLORIDE: .6; .31; .03; .02 INJECTION, SOLUTION INTRAVENOUS at 21:39

## 2024-04-14 RX ADMIN — DEXAMETHASONE SODIUM PHOSPHATE 10 MG: 10 INJECTION, SOLUTION INTRAMUSCULAR; INTRAVENOUS at 22:10

## 2024-04-14 RX ADMIN — PROPOFOL 50 MG: 10 INJECTION, EMULSION INTRAVENOUS at 22:15

## 2024-04-14 RX ADMIN — FENTANYL CITRATE 100 MCG: 50 INJECTION INTRAMUSCULAR; INTRAVENOUS at 22:03

## 2024-04-14 RX ADMIN — SUGAMMADEX 300 MG: 100 INJECTION, SOLUTION INTRAVENOUS at 22:24

## 2024-04-14 RX ADMIN — LIDOCAINE HYDROCHLORIDE 100 MG: 10 INJECTION, SOLUTION EPIDURAL; INFILTRATION; INTRACAUDAL; PERINEURAL at 22:06

## 2024-04-14 RX ADMIN — PROPOFOL 150 MG: 10 INJECTION, EMULSION INTRAVENOUS at 22:06

## 2024-04-14 RX ADMIN — ROCURONIUM BROMIDE 20 MG: 10 INJECTION, SOLUTION INTRAVENOUS at 22:17

## 2024-04-14 RX ADMIN — ONDANSETRON 4 MG: 2 INJECTION INTRAMUSCULAR; INTRAVENOUS at 22:10

## 2024-04-14 RX ADMIN — ALBUTEROL SULFATE 24 PUFF: 90 AEROSOL, METERED RESPIRATORY (INHALATION) at 22:15

## 2024-04-14 RX ADMIN — CEFTRIAXONE SODIUM 2000 MG: 10 INJECTION, POWDER, FOR SOLUTION INTRAVENOUS at 20:29

## 2024-04-14 RX ADMIN — Medication 100 MG: at 22:06

## 2024-04-14 NOTE — ED PROVIDER NOTES
History  Chief Complaint   Patient presents with    Flank Pain     Reports onset of lower back pain more onto L side, reports feeling unwell all week and decreased appetite, denies any urinary symp. Hx kidney stones     Patient is a 59 yo female with PMH as below who presents for evaluation of left sided flank pain. Patient states pain has ongoing for the past 2 days. Pain waxes and wanes, does not radiate. Patient reports she has been feeling overall unwell for the past week. She endorses nausea, decreased appetite, abdominal bloating, and fatigue. Also has cough productive of clear sputum and sneezing. Has not improved with use of Claritin. Did record a temp of 100.8 approx 1 week ago but has not had recurrence of fever.  Denies chest pain, SOB, vomiting, diarrhea, constipation, blood in stool, blood in urine, or dysuria. Last saw nephrology in 2023.         Prior to Admission Medications   Prescriptions Last Dose Informant Patient Reported? Taking?   Ascorbic Acid (vitamin C) 1000 MG tablet 4/14/2024 Self Yes Yes   Sig: Take 2,000 mg by mouth daily   Cholecalciferol (Vitamin D3) 50 MCG (2000 UT) TABS 4/14/2024 Self Yes Yes   Sig: Take 2,000 Units by mouth daily   acetaminophen (TYLENOL) 500 mg tablet Past Week Self Yes Yes   Sig: Take 500 mg by mouth every 6 (six) hours as needed for mild pain   atorvastatin (LIPITOR) 80 mg tablet 4/13/2024  No Yes   Sig: Take 1 tablet (80 mg total) by mouth daily   lisinopril (ZESTRIL) 5 mg tablet 4/14/2024  No Yes   Sig: Take 1 tablet (5 mg total) by mouth daily   metFORMIN (GLUCOPHAGE) 1000 MG tablet 4/14/2024  No Yes   Sig: Take 1 tablet (1,000 mg total) by mouth 2 (two) times a day with meals   multivitamin (THERAGRAN) TABS 4/14/2024 Self Yes Yes   Sig: Take 1 tablet by mouth daily   omeprazole (PriLOSEC) 40 MG capsule 4/14/2024  No Yes   Sig: Take 1 capsule (40 mg total) by mouth daily   potassium citrate (UROCIT-K) 10 mEq 4/14/2024  No Yes   Sig: Take 1 tablet (10 mEq  total) by mouth 3 (three) times a day with meals   sertraline (ZOLOFT) 50 mg tablet 4/14/2024  No Yes   Sig: Take 1 tablet (50 mg total) by mouth daily Takes in the am.      Facility-Administered Medications: None       Past Medical History:   Diagnosis Date    Anxiety     Asthma     Colon polyp     Depression     Diabetes mellitus (HCC)     Diverticulitis     Diverticulitis of colon     Follicular cyst of ovary 12/23/2014    Former smoker     GERD (gastroesophageal reflux disease)     Glycosuria     Headache     Hyperlipidemia     Hypertension     Kidney calculi 1/11/2019    Kidney stone     Obesity     Ovarian cyst     Overactive bladder     Overweight     Pulmonary nodule     Renal calculus     Seasonal allergies     Tinea pedis of left foot 6/8/2020    Vertigo     Wears partial dentures     Lower plate -partial       Past Surgical History:   Procedure Laterality Date    APPENDECTOMY      BLADDER SUSPENSION      [facility]    CERVICAL BIOPSY  W/ LOOP ELECTRODE EXCISION      COLONOSCOPY      DILATION AND CURETTAGE OF UTERUS      FL RETROGRADE PYELOGRAM  7/26/2021    FL RETROGRADE PYELOGRAM  11/18/2021    FL RETROGRADE PYELOGRAM  11/10/2022    FL RETROGRADE PYELOGRAM  1/17/2023    HYSTERECTOMY  2007    ovaries present bilaterally    JOINT REPLACEMENT Right 05/2021    right knee    NY ARTHRP KNE CONDYLE&PLATU MEDIAL&LAT COMPARTMENTS Right 5/19/2021    Procedure: ARTHROPLASTY KNEE TOTAL;  Surgeon: Bon Broderick MD;  [facility];  Service: Orthopedics    NY COLONOSCOPY FLX DX W/COLLJ SPEC WHEN PFRMD N/A 9/26/2017    Procedure: EGD AND COLONOSCOPY;  Surgeon: Reed Hardy MD;  Location: [facility];  Service: Gastroenterology    NY CYSTO BLADDER W/URETERAL CATHETERIZATION Right 11/10/2022    Procedure: CYSTOSCOPY RETROGRADE PYELOGRAM WITH INSERTION STENT URETERAL;  Surgeon: Burton Senior MD;  [facility];  Service: Urology    NY CYSTO/URETERO W/LITHOTRIPSY &INDWELL STENT INSRT Bilateral 7/26/2021     Procedure: CYSTOSCOPY URETEROSCOPY WITH LITHOTRIPSY HOLMIUM LASER, RETROGRADE PYELOGRAM AND INSERTION STENT URETERAL;  Surgeon: Pete Lopez MD;  [facility];  Service: Urology    PA CYSTO/URETERO W/LITHOTRIPSY &INDWELL STENT INSRT Left 9/16/2021    Procedure: CYSTOSCOPY URETEROSCOPY WITH LITHOTRIPSY HOLMIUM LASER, RETROGRADE PYELOGRAM AND INSERTION STENT URETERAL;  Surgeon: Cesar Keller MD;  [facility];  Service: Urology    PA CYSTO/URETERO W/LITHOTRIPSY &INDWELL STENT INSRT Left 11/18/2021    Procedure: CYSTOSCOPY URETEROSCOPY WITH LITHOTRIPSY HOLMIUM LASER, RETROGRADE PYELOGRAM AND INSERTION STENT URETERAL;  Surgeon: Vicente Colby MD;  [facility];  Service: Urology    PA CYSTO/URETERO W/LITHOTRIPSY &INDWELL STENT INSRT Right 1/17/2023    Procedure: CYSTOSCOPY URETEROSCOPY WITH LITHOTRIPSY HOLMIUM LASER, RETROGRADE PYELOGRAM AND EXCHANGE STENT URETERAL, BASKET STONE EXTRACTION;  Surgeon: Vicente Colby MD;  [facility];  Service: Urology    PA NEUROPLASTY &/TRANSPOS MEDIAN NRV CARPAL TUNNE Right 5/29/2018    Procedure: CARPAL TUNNEL RELEASE;  Surgeon: Amish Parkinson DO;  [facility];  Service: Orthopedics    REMOVAL URETERAL STENT Right 9/16/2021    Procedure: REMOVAL STENT URETERAL;  Surgeon: Cesar Keller MD;  [facility];  Service: Urology    TONSILLECTOMY      TUBAL LIGATION      URETEROSCOPY  7/26/2021    Procedure: URETEROSCOPY, LASER AND BASKET STONE EXTRACTION;  Surgeon: Pete Lopez MD;  [facility];  Service: Urology    WISDOM TOOTH EXTRACTION         Family History   Problem Relation Age of Onset    Diabetes type II Mother     Asthma Mother     Stroke Father     Pancreatic cancer Father 73    Diabetes type II Father     No Known Problems Sister     No Known Problems Sister     No Known Problems Sister     No Known Problems Sister     No Known Problems Maternal Grandmother     No Known Problems Maternal Grandfather      No Known Problems Paternal Grandmother     No Known Problems Paternal Grandfather     No Known Problems Maternal Aunt     No Known Problems Paternal Aunt     No Known Problems Brother     No Known Problems Son     No Known Problems Son      I have reviewed and agree with the history as documented.    E-Cigarette/Vaping    E-Cigarette Use Never User     Comments Denies      E-Cigarette/Vaping Substances    Nicotine No     THC No     CBD No     Flavoring No      Social History     Tobacco Use    Smoking status: Former     Current packs/day: 0.00     Average packs/day: 0.3 packs/day for 34.2 years (8.6 ttl pk-yrs)     Types: Cigarettes     Start date:      Quit date: 3/16/2018     Years since quittin.0    Smokeless tobacco: Never    Tobacco comments:     approx less than  half a pack   Vaping Use    Vaping status: Never Used   Substance Use Topics    Alcohol use: Not Currently     Comment: socially    Drug use: Never     Comment: Denies        Review of Systems   Constitutional:  Positive for appetite change, chills and fever.   Gastrointestinal:  Positive for abdominal distention and nausea.   Genitourinary:  Positive for flank pain.   All other systems reviewed and are negative.      Physical Exam  ED Triage Vitals   Temperature Pulse Respirations Blood Pressure SpO2   24 1857 24 18524 18524 18524   98.9 °F (37.2 °C) (!) 120 20 123/79 98 %      Temp Source Heart Rate Source Patient Position - Orthostatic VS BP Location FiO2 (%)   24 1857 24 1857 24 18524 --   Temporal Monitor Sitting Left arm       Pain Score       24 2232       No Pain             Orthostatic Vital Signs  Vitals:    24 2234 24 2247 24 2301 24 2313   BP: 118/65 102/64 120/63 120/63   Pulse: (!) 109 (!) 106 (!) 110 (!) 110   Patient Position - Orthostatic VS:   Sitting        Physical Exam  Constitutional:       General: She is not in acute  distress.     Appearance: She is ill-appearing. She is not toxic-appearing or diaphoretic.   HENT:      Head: Normocephalic and atraumatic.      Nose: Nose normal.      Mouth/Throat:      Mouth: Mucous membranes are moist.      Pharynx: Oropharynx is clear.   Eyes:      Conjunctiva/sclera: Conjunctivae normal.   Cardiovascular:      Rate and Rhythm: Regular rhythm. Tachycardia present.      Heart sounds: Normal heart sounds.   Pulmonary:      Effort: Pulmonary effort is normal.      Breath sounds: Normal breath sounds.      Comments: Intermittent dry cough     Abdominal:      General: There is distension.      Palpations: Abdomen is soft.      Tenderness: There is no abdominal tenderness. There is left CVA tenderness. There is no right CVA tenderness.   Musculoskeletal:         General: No swelling. Normal range of motion.      Cervical back: Normal range of motion and neck supple.   Skin:     General: Skin is warm and dry.      Capillary Refill: Capillary refill takes less than 2 seconds.   Neurological:      General: No focal deficit present.      Mental Status: She is alert.   Psychiatric:         Mood and Affect: Mood normal.         Behavior: Behavior normal.         ED Medications  Medications   ceftriaxone (ROCEPHIN) 2 g/50 mL in dextrose IVPB (0 mg Intravenous Stopped 4/14/24 2134)       Diagnostic Studies  Results Reviewed       Procedure Component Value Units Date/Time    Procalcitonin [[id number]]  (Normal) Collected: 04/14/24 2133    Lab Status: Final result Specimen: Blood from Arm, Right Updated: 04/14/24 2217     Procalcitonin 0.07 ng/ml     Blood culture [[id number]] Collected: 04/14/24 2133    Lab Status: In process Specimen: Blood from Arm, Left Updated: 04/14/24 2144    Blood culture [[id number]] Collected: 04/14/24 2133    Lab Status: In process Specimen: Blood from Arm, Right Updated: 04/14/24 2144    Comprehensive metabolic panel [[id number]] Collected: 04/14/24 2029    Lab Status: Final result  Specimen: Blood from Arm, Left Updated: 04/14/24 2057     Sodium 137 mmol/L      Potassium 3.7 mmol/L      Chloride 100 mmol/L      CO2 30 mmol/L      ANION GAP 7 mmol/L      BUN 19 mg/dL      Creatinine 0.89 mg/dL      Glucose 137 mg/dL      Calcium 9.1 mg/dL      AST 15 U/L      ALT 20 U/L      Alkaline Phosphatase 92 U/L      Total Protein 6.7 g/dL      Albumin 3.9 g/dL      Total Bilirubin 0.45 mg/dL      eGFR 71 ml/min/1.73sq m     Narrative:      National Kid[employer] Foundation guidelines for Chronic Kidney Disease (CKD):     Stage 1 with normal or high GFR (GFR > 90 mL/min/1.73 square meters)    Stage 2 Mild CKD (GFR = 60-89 mL/min/1.73 square meters)    Stage 3A Moderate CKD (GFR = 45-59 mL/min/1.73 square meters)    Stage 3B Moderate CKD (GFR = 30-44 mL/min/1.73 square meters)    Stage 4 Severe CKD (GFR = 15-29 mL/min/1.73 square meters)    Stage 5 End Stage CKD (GFR <15 mL/min/1.73 square meters)  Note: GFR calculation is accurate only with a steady state creatinine    CBC and differential [[id number]]  (Abnormal) Collected: 04/14/24 2029    Lab Status: Final result Specimen: Blood from Arm, Left Updated: 04/14/24 2037     WBC 16.78 Thousand/uL      RBC 4.43 Million/uL      Hemoglobin 13.3 g/dL      Hematocrit 40.9 %      MCV 92 fL      MCH 30.0 pg      MCHC 32.5 g/dL      RDW 13.9 %      MPV 10.5 fL      Platelets 328 Thousands/uL      nRBC 0 /100 WBCs      Segmented % 71 %      Immature Grans % 1 %      Lymphocytes % 19 %      Monocytes % 8 %      Eosinophils Relative 1 %      Basophils Relative 0 %      Absolute Neutrophils 12.09 Thousands/µL      Absolute Immature Grans 0.11 Thousand/uL      Absolute Lymphocytes 3.11 Thousands/µL      Absolute Monocytes 1.31 Thousand/µL      Eosinophils Absolute 0.10 Thousand/µL      Basophils Absolute 0.06 Thousands/µL     Urine Microscopic [[id number]]  (Abnormal) Collected: 04/14/24 1904    Lab Status: Final result Specimen: Urine, Clean Catch Updated: 04/14/24  1918     RBC, UA 20-30 /hpf      WBC, UA Innumerable /hpf      Epithelial Cells None Seen /hpf      Bacteria, UA Occasional /hpf      MUCUS THREADS Occasional     WBC Clumps Present    Urine culture [[id number]] Collected: 04/14/24 1904    Lab Status: In process Specimen: Urine, Clean Catch Updated: 04/14/24 1918    UA w Reflex to Microscopic w Reflex to Culture [[id number]]  (Abnormal) Collected: 04/14/24 1904    Lab Status: Final result Specimen: Urine, Clean Catch Updated: 04/14/24 1913     Color, UA Yellow     Clarity, UA Extra Turbid     Specific Gravity, UA 1.013     pH, UA 6.5     Leukocytes, UA Large     Nitrite, UA Positive     Protein, UA 30 (1+) mg/dl      Glucose, UA Negative mg/dl      Ketones, UA Negative mg/dl      Urobilinogen, UA <2.0 mg/dl      Bilirubin, UA Negative     Occult Blood, UA Small                   CT renal stone study abdomen pelvis wo contrast   Final Result by Gelacio Davis MD (04/14 2058)      Bilateral nephrolithiasis with 5 x 5 x 6 mm calculus at the left ureterovesicular junction with associated severe left hydroureteronephrosis.         Workstation performed: [id number]         FL retrograde pyelogram    (Results Pending)         Procedures  Procedures      ED Course                                       Medical Decision Making  Sakshi Tirado is a 58 y.o. who presents with complaints of left sided flank pain, also generally feeling unwell for 1 week     Vital signs are tachycardic, otherwise stable, afebrile  PE: notable for left CVA tenderness    Ddx: cannot exclude nephrolithiasis given history vs. Pyelonephritis vs. UTI  Patient may have concurrent viral URI    Plan: UA concerning for UTI   CT stone study significant for B/L nephrolithiasis with stone in left UVJ, left hydronephrosis   Blood cultures x2 pending   CMP WNL, creatinine at baseline  WBC's elevated  Procal wnl  Rocephin 2 g given  Urology consulted     Disposition: admit to [facility] with plan for OR tonight per  urolgoy.              Amount and/or Complexity of Data Reviewed  Labs: ordered.  Radiology: ordered.    Risk  Decision regarding hospitalization.          Disposition  Final diagnoses:   UTI (urinary tract infection)   Ureterolithiasis   Hydronephrosis     Time reflects when diagnosis was documented in both MDM as applicable and the Disposition within this note       Time User Action Codes Description Comment    4/14/2024  9:16 PM Rhoda Sampson Add [N20.0] Nephrolithiasis     4/14/2024  9:16 PM Rhoda Sampson Add [R10.9] Flank pain     4/14/2024 10:00 PM Burton Seinor Add [N20.1] Left ureteral stone     4/14/2024 10:01 PM Burton Senior Add [N39.0] Acute UTI     4/14/2024 10:03 PM FacRachna talamantes Add [N39.0] UTI (urinary tract infection)     4/14/2024 10:03 PM FacchianoDiRachna Add [N20.1] Ureterolithiasis     4/14/2024 10:03 PM FacRachna talamantes Add [N13.30] Hydronephrosis     4/14/2024 10:03 PM FacjazlynanoRachna Modify [N20.0] Nephrolithiasis     4/14/2024 10:03 PM FacRachna talamantes Modify [N20.1] Ureterolithiasis     4/14/2024 10:15 PM Rose Hernandes Modify [N20.0] Nephrolithiasis     4/14/2024 10:15 PM Rose Hernandes Modify [R10.9] Flank pain           ED Disposition       ED Disposition   Admit    Condition   Stable    Date/Time   Sun Apr 14, 2024 10:03 PM    Comment   Case was discussed with Dr. Lima and the patient's admission status was agreed to be Admission Status: inpatient status to [facility].             Follow-up Information    None         Current Discharge Medication List        CONTINUE these medications which have NOT CHANGED    Details   acetaminophen (TYLENOL) 500 mg tablet Take 500 mg by mouth every 6 (six) hours as needed for mild pain      Ascorbic Acid (vitamin C) 1000 MG tablet Take 2,000 mg by mouth daily      atorvastatin (LIPITOR) 80 mg tablet Take 1 tablet (80 mg total) by mouth daily  Qty: 90 tablet, Refills: 1    Associated Diagnoses: Hypercholesteremia      Cholecalciferol  (Vitamin D3) 50 MCG (2000 UT) TABS Take 2,000 Units by mouth daily      lisinopril (ZESTRIL) 5 mg tablet Take 1 tablet (5 mg total) by mouth daily  Qty: 90 tablet, Refills: 1    Associated Diagnoses: Primary hypertension      metFORMIN (GLUCOPHAGE) 1000 MG tablet Take 1 tablet (1,000 mg total) by mouth 2 (two) times a day with meals  Qty: 180 tablet, Refills: 1    Associated Diagnoses: Type 2 diabetes mellitus without complication, without long-term current use of insulin (Formerly [facility])      multivitamin (THERAGRAN) TABS Take 1 tablet by mouth daily      omeprazole (PriLOSEC) 40 MG capsule Take 1 capsule (40 mg total) by mouth daily  Qty: 90 capsule, Refills: 1    Associated Diagnoses: Gastroesophageal reflux disease without esophagitis      potassium citrate (UROCIT-K) 10 mEq Take 1 tablet (10 mEq total) by mouth 3 (three) times a day with meals  Qty: 270 tablet, Refills: 1    Associated Diagnoses: TOMMY (acute kidney injury) (Former[facility])      sertraline (ZOLOFT) 50 mg tablet Take 1 tablet (50 mg total) by mouth daily Takes in the am.  Qty: 90 tablet, Refills: 1    Associated Diagnoses: Anxiety           No discharge procedures on file.    PDMP Review         Value Time User    PDMP Reviewed  Yes 9/16/2021 10:12 AM Cesar Keller MD             ED Provider  Attending physically available and evaluated Sakshi Tirado. I managed the patient along with the ED Attending.    Electronically Signed by           Rachna Rinaldi MD  04/14/24 [id number]

## 2024-04-14 NOTE — LETTER
[facility]  [address] [address]  Dept: [phone]    April 16, 2024     Patient: Sakshi Tirado   YOB: 1965   Date of Visit: 4/14/2024       To Whom it May Concern:    Sakshi Tirado is under my professional care. She was seen in the hospital from 4/14/2024 to 04/16/24. She may return to work on Monday 4/22/24  without limitations.    If you have any questions or concerns, please don't hesitate to call.         Thank you         Arian Tim, DO

## 2024-04-15 PROBLEM — A41.9 SEPSIS WITHOUT ACUTE ORGAN DYSFUNCTION (HCC): Status: ACTIVE | Noted: 2024-04-15

## 2024-04-15 PROBLEM — Q62.11 HYDRONEPHROSIS WITH URETEROPELVIC JUNCTION (UPJ) OBSTRUCTION: Status: ACTIVE | Noted: 2020-06-08

## 2024-04-15 LAB
ANION GAP SERPL CALCULATED.3IONS-SCNC: 10 MMOL/L (ref 4–13)
BUN SERPL-MCNC: 16 MG/DL (ref 5–25)
CALCIUM SERPL-MCNC: 8.9 MG/DL (ref 8.4–10.2)
CHLORIDE SERPL-SCNC: 102 MMOL/L (ref 96–108)
CO2 SERPL-SCNC: 30 MMOL/L (ref 21–32)
CREAT SERPL-MCNC: 0.83 MG/DL (ref 0.6–1.3)
ERYTHROCYTE [DISTWIDTH] IN BLOOD BY AUTOMATED COUNT: 13.9 % (ref 11.6–15.1)
GFR SERPL CREATININE-BSD FRML MDRD: 77 ML/MIN/1.73SQ M
GLUCOSE SERPL-MCNC: 135 MG/DL (ref 65–140)
GLUCOSE SERPL-MCNC: 143 MG/DL (ref 65–140)
GLUCOSE SERPL-MCNC: 144 MG/DL (ref 65–140)
GLUCOSE SERPL-MCNC: 148 MG/DL (ref 65–140)
GLUCOSE SERPL-MCNC: 153 MG/DL (ref 65–140)
HCT VFR BLD AUTO: 43 % (ref 34.8–46.1)
HGB BLD-MCNC: 13.8 G/DL (ref 11.5–15.4)
MCH RBC QN AUTO: 29.9 PG (ref 26.8–34.3)
MCHC RBC AUTO-ENTMCNC: 32.1 G/DL (ref 31.4–37.4)
MCV RBC AUTO: 93 FL (ref 82–98)
PLATELET # BLD AUTO: 315 THOUSANDS/UL (ref 149–390)
PMV BLD AUTO: 11.3 FL (ref 8.9–12.7)
POTASSIUM SERPL-SCNC: 4.2 MMOL/L (ref 3.5–5.3)
RBC # BLD AUTO: 4.61 MILLION/UL (ref 3.81–5.12)
SODIUM SERPL-SCNC: 142 MMOL/L (ref 135–147)
WBC # BLD AUTO: 13.29 THOUSAND/UL (ref 4.31–10.16)

## 2024-04-15 PROCEDURE — 85027 COMPLETE CBC AUTOMATED: CPT | Performed by: INTERNAL MEDICINE

## 2024-04-15 PROCEDURE — RECHECK: Performed by: PHYSICIAN ASSISTANT

## 2024-04-15 PROCEDURE — 82948 REAGENT STRIP/BLOOD GLUCOSE: CPT

## 2024-04-15 PROCEDURE — 80048 BASIC METABOLIC PNL TOTAL CA: CPT | Performed by: INTERNAL MEDICINE

## 2024-04-15 PROCEDURE — NC001 PR NO CHARGE: Performed by: UROLOGY

## 2024-04-15 PROCEDURE — 99232 SBSQ HOSP IP/OBS MODERATE 35: CPT | Performed by: UROLOGY

## 2024-04-15 PROCEDURE — 99222 1ST HOSP IP/OBS MODERATE 55: CPT | Performed by: INTERNAL MEDICINE

## 2024-04-15 RX ORDER — INSULIN LISPRO 100 [IU]/ML
2-12 INJECTION, SOLUTION INTRAVENOUS; SUBCUTANEOUS
Status: DISCONTINUED | OUTPATIENT
Start: 2024-04-15 | End: 2024-04-16 | Stop reason: HOSPADM

## 2024-04-15 RX ORDER — SODIUM CHLORIDE 9 MG/ML
100 INJECTION, SOLUTION INTRAVENOUS CONTINUOUS
Status: DISPENSED | OUTPATIENT
Start: 2024-04-15 | End: 2024-04-16

## 2024-04-15 RX ORDER — INSULIN LISPRO 100 [IU]/ML
1-6 INJECTION, SOLUTION INTRAVENOUS; SUBCUTANEOUS
Status: DISCONTINUED | OUTPATIENT
Start: 2024-04-15 | End: 2024-04-16 | Stop reason: HOSPADM

## 2024-04-15 RX ORDER — ONDANSETRON 2 MG/ML
4 INJECTION INTRAMUSCULAR; INTRAVENOUS EVERY 6 HOURS PRN
Status: DISCONTINUED | OUTPATIENT
Start: 2024-04-15 | End: 2024-04-16 | Stop reason: HOSPADM

## 2024-04-15 RX ORDER — ATORVASTATIN CALCIUM 80 MG/1
80 TABLET, FILM COATED ORAL DAILY
Status: DISCONTINUED | OUTPATIENT
Start: 2024-04-15 | End: 2024-04-16 | Stop reason: HOSPADM

## 2024-04-15 RX ORDER — ENOXAPARIN SODIUM 100 MG/ML
40 INJECTION SUBCUTANEOUS DAILY
Status: DISCONTINUED | OUTPATIENT
Start: 2024-04-15 | End: 2024-04-16 | Stop reason: HOSPADM

## 2024-04-15 RX ORDER — ACETAMINOPHEN 325 MG/1
650 TABLET ORAL EVERY 6 HOURS PRN
Status: DISCONTINUED | OUTPATIENT
Start: 2024-04-15 | End: 2024-04-16 | Stop reason: HOSPADM

## 2024-04-15 RX ORDER — PANTOPRAZOLE SODIUM 40 MG/1
40 TABLET, DELAYED RELEASE ORAL
Status: DISCONTINUED | OUTPATIENT
Start: 2024-04-15 | End: 2024-04-16 | Stop reason: HOSPADM

## 2024-04-15 RX ORDER — LISINOPRIL 5 MG/1
5 TABLET ORAL DAILY
Status: DISCONTINUED | OUTPATIENT
Start: 2024-04-15 | End: 2024-04-16 | Stop reason: HOSPADM

## 2024-04-15 RX ADMIN — Medication 2000 UNITS: at 08:00

## 2024-04-15 RX ADMIN — SODIUM CHLORIDE 100 ML/HR: 0.9 INJECTION, SOLUTION INTRAVENOUS at 02:48

## 2024-04-15 RX ADMIN — B-COMPLEX W/ C & FOLIC ACID TAB 1 TABLET: TAB at 08:00

## 2024-04-15 RX ADMIN — PANTOPRAZOLE SODIUM 40 MG: 40 TABLET, DELAYED RELEASE ORAL at 05:14

## 2024-04-15 RX ADMIN — CEFTRIAXONE SODIUM 1000 MG: 10 INJECTION, POWDER, FOR SOLUTION INTRAVENOUS at 21:38

## 2024-04-15 RX ADMIN — ATORVASTATIN CALCIUM 80 MG: 80 TABLET, FILM COATED ORAL at 08:00

## 2024-04-15 RX ADMIN — ENOXAPARIN SODIUM 40 MG: 40 INJECTION SUBCUTANEOUS at 08:01

## 2024-04-15 RX ADMIN — SERTRALINE HYDROCHLORIDE 50 MG: 50 TABLET ORAL at 08:00

## 2024-04-15 NOTE — PROGRESS NOTES
CORINNA STUDENT  Inpatient Progress Note for TRAINING ONLY  Not Part of Legal Medical Record     Progress Note - Sakshi Tirado 58 y.o. female MRN: [id number]  Unit/Bed#: [facility] 934-01 Encounter: [id number]        Anxiety  Assessment & Plan  Cont on zoloft    Hydronephrosis with ureteropelvic junction (UPJ) obstruction  Assessment & Plan  Urology on board  CT a/p revealing: Bilateral nephrolithiasis with 5 x 5 x 6 mm calculus at the left ureterovesicular junction with associated severe left hydroureteronephrosis.   S/p L ureteral stent placement  Cont supportive care    Hypertension  Assessment & Plan  Stable, cont lisinopril    Type 2 diabetes mellitus with diabetic chronic kidney disease (HCC)  Assessment & Plan  Lab Results   Component Value Date    HGBA1C 6.7 (A) 11/08/2023       Recent Labs     04/14/24  2238 04/15/24  0740   POCGLU 109 144*         Blood Sugar Average: Last 72 hrs:  (P) 126.5  Currently on insulin      GERD (gastroesophageal reflux disease)  Assessment & Plan  Continue PPI    * Sepsis without acute organ dysfunction (HCC)  Assessment & Plan  Met SIRS criteria with tachycardia, leukocytosis  Source secondary to infected renal calculi/cystitis  Cont on IV CTX  S/p L ureteral stent placement  Pending urine and blood culture   Cont supportive care         VTE Pharmacologic Prophylaxis:   Pharmacologic: Enoxaparin (Lovenox)  Mechanical VTE Prophylaxis in Place: Yes    Patient Centered Rounds: {Patient Centered Rounds:19792}    Discussions with Specialists or Other Care Team Provider: ***    Education and Discussions with Family / Patient: ***    Time Spent for Care: {Time; Time 15 min - 1 hour:58767}.  More than 50% of total time spent on counseling and coordination of care as described above.    Current Length of Stay: 0 day(s)    Current Patient Status: Inpatient   Certification Statement: {Certification Statement:48314}    Discharge Plan: ***    Code Status: Level 1 - Full Code    Subjective:   She is  overall feeling well and has no current complaints. L flank pain has resolved. Slight irritation with urination and frequency. No hematuria or abd pain. Increased appetite and was able to tolerate breakfast this AM. Passed gas but no BM. No complaints overnight.      Objective:     Vitals:   Temp (24hrs), Av.4 °F (36.9 °C), Min:97.7 °F (36.5 °C), Max:99 °F (37.2 °C)    Temp:  [97.7 °F (36.5 °C)-99 °F (37.2 °C)] 98.3 °F (36.8 °C)  HR:  [] 79  Resp:  [14-24] 17  BP: ()/(55-79) 96/55  SpO2:  [90 %-100 %] 98 %  Body mass index is 27.61 kg/m².     Input and Output Summary (last 24 hours):       Intake/Output Summary (Last 24 hours) at 4/15/2024 1032  Last data filed at 4/15/2024 0900  Gross per 24 hour   Intake 1050 ml   Output --   Net 1050 ml       Physical Exam:     Physical Exam  Vitals and nursing note reviewed.   Constitutional:       Appearance: Normal appearance.   HENT:      Mouth/Throat:      Mouth: Mucous membranes are moist.   Cardiovascular:      Rate and Rhythm: Normal rate and regular rhythm.      Pulses: Normal pulses.      Heart sounds: Normal heart sounds. No murmur heard.  Pulmonary:      Effort: Pulmonary effort is normal.      Breath sounds: Normal breath sounds.   Abdominal:      General: Abdomen is flat. Bowel sounds are normal. There is no distension.      Palpations: Abdomen is soft.      Tenderness: There is no abdominal tenderness. There is no right CVA tenderness, left CVA tenderness, guarding or rebound.   Neurological:      Mental Status: She is alert.           Historical Information   Past Medical History:   Diagnosis Date    Anxiety     Asthma     Colon polyp     Depression     Diabetes mellitus (HCC)     Diverticulitis     Diverticulitis of colon     Follicular cyst of ovary 2014    Former smoker     GERD (gastroesophageal reflux disease)     Glycosuria     Headache     Hyperlipidemia     Hypertension     Kidney calculi 2019    Kidney stone     Obesity      Ovarian cyst     Overactive bladder     Overweight     Pulmonary nodule     Renal calculus     Seasonal allergies     Tinea pedis of left foot 6/8/2020    Vertigo     Wears partial dentures     Lower plate -partial     Past Surgical History:   Procedure Laterality Date    APPENDECTOMY      BLADDER SUSPENSION      [facility]    CERVICAL BIOPSY  W/ LOOP ELECTRODE EXCISION      COLONOSCOPY      DILATION AND CURETTAGE OF UTERUS      FL RETROGRADE PYELOGRAM  7/26/2021    FL RETROGRADE PYELOGRAM  11/18/2021    FL RETROGRADE PYELOGRAM  11/10/2022    FL RETROGRADE PYELOGRAM  1/17/2023    FL RETROGRADE PYELOGRAM  4/14/2024    HYSTERECTOMY  2007    ovaries present bilaterally    JOINT REPLACEMENT Right 05/2021    right knee    KS ARTHRP KNE CONDYLE&PLATU MEDIAL&LAT COMPARTMENTS Right 5/19/2021    Procedure: ARTHROPLASTY KNEE TOTAL;  Surgeon: Bon Broderick MD;  [facility];  Service: Orthopedics    KS COLONOSCOPY FLX DX W/COLLJ SPEC WHEN PFRMD N/A 9/26/2017    Procedure: EGD AND COLONOSCOPY;  Surgeon: Reed Hardy MD;  Location: [facility];  Service: Gastroenterology    KS CYSTO BLADDER W/URETERAL CATHETERIZATION Right 11/10/2022    Procedure: CYSTOSCOPY RETROGRADE PYELOGRAM WITH INSERTION STENT URETERAL;  Surgeon: Burton Senior MD;  [facility];  Service: Urology    KS CYSTO/URETERO W/LITHOTRIPSY &INDWELL STENT INSRT Bilateral 7/26/2021    Procedure: CYSTOSCOPY URETEROSCOPY WITH LITHOTRIPSY HOLMIUM LASER, RETROGRADE PYELOGRAM AND INSERTION STENT URETERAL;  Surgeon: Pete Lopez MD;  [facility];  Service: Urology    KS CYSTO/URETERO W/LITHOTRIPSY &INDWELL STENT INSRT Left 9/16/2021    Procedure: CYSTOSCOPY URETEROSCOPY WITH LITHOTRIPSY HOLMIUM LASER, RETROGRADE PYELOGRAM AND INSERTION STENT URETERAL;  Surgeon: Cesar Keller MD;  [facility];  Service: Urology    KS CYSTO/URETERO W/LITHOTRIPSY &INDWELL STENT INSRT Left 11/18/2021    Procedure: CYSTOSCOPY URETEROSCOPY WITH  LITHOTRIPSY HOLMIUM LASER, RETROGRADE PYELOGRAM AND INSERTION STENT URETERAL;  Surgeon: Vicente Colby MD;  [facility];  Service: Urology    HI CYSTO/URETERO W/LITHOTRIPSY &INDWELL STENT INSRT Right 2023    Procedure: CYSTOSCOPY URETEROSCOPY WITH LITHOTRIPSY HOLMIUM LASER, RETROGRADE PYELOGRAM AND EXCHANGE STENT URETERAL, BASKET STONE EXTRACTION;  Surgeon: Vicente Colby MD;  [facility];  Service: Urology    HI CYSTO/URETERO W/LITHOTRIPSY &INDWELL STENT INSRT Left 2024    Procedure: CYSTOSCOPY URETEROSCOPY WITH LITHOTRIPSY HOLMIUM LASER, RETROGRADE PYELOGRAM AND INSERTION STENT URETERAL;  Surgeon: Burton Senior MD;  [facility];  Service: Urology    HI NEUROPLASTY &/TRANSPOS MEDIAN NRV CARPAL TUNNE Right 2018    Procedure: CARPAL TUNNEL RELEASE;  Surgeon: Amish Parkinson DO;  [facility];  Service: Orthopedics    REMOVAL URETERAL STENT Right 2021    Procedure: REMOVAL STENT URETERAL;  Surgeon: Cesar Keller MD;  [facility];  Service: Urology    TONSILLECTOMY      TUBAL LIGATION      URETEROSCOPY  2021    Procedure: URETEROSCOPY, LASER AND BASKET STONE EXTRACTION;  Surgeon: Pete Lopez MD;  [facility];  Service: Urology    WISDOM TOOTH EXTRACTION       Social History   Social History     Substance and Sexual Activity   Alcohol Use Not Currently    Comment: socially     Social History     Substance and Sexual Activity   Drug Use Never    Comment: Denies     Social History     Tobacco Use   Smoking Status Former    Current packs/day: 0.00    Average packs/day: 0.3 packs/day for 34.2 years (8.6 ttl pk-yrs)    Types: Cigarettes    Start date:     Quit date: 3/16/2018    Years since quittin.0   Smokeless Tobacco Never   Tobacco Comments    approx less than  half a pack     Family History: non-contributory    Meds/Allergies   all medications and allergies reviewed  Allergies   Allergen Reactions    Oxybutynin Anaphylaxis  "    \"feels like throat closing and can't swallow\"    Clonazepam Other (See Comments)     Pt states \"didn't feel right on it.\"    Morphine Other (See Comments) and Confusion     Annotation - 14Jun2017: \"dopey\"  Gets silly    Venlafaxine Other (See Comments)     Unknown--pt states \" MD was trying pt on different medications. Pt unsure of reaction.\"       Additional Data:     Labs:    Results from last 7 days   Lab Units 04/15/24  0437 04/14/24  2029   WBC Thousand/uL 13.29* 16.78*   HEMOGLOBIN g/dL 13.8 13.3   HEMATOCRIT % 43.0 40.9   PLATELETS Thousands/uL 315 328   SEGS PCT %  --  71   LYMPHO PCT %  --  19   MONO PCT %  --  8   EOS PCT %  --  1     Results from last 7 days   Lab Units 04/15/24  0437 04/14/24 2029   SODIUM mmol/L 142 137   POTASSIUM mmol/L 4.2 3.7   CHLORIDE mmol/L 102 100   CO2 mmol/L 30 30   BUN mg/dL 16 19   CREATININE mg/dL 0.83 0.89   ANION GAP mmol/L 10 7   CALCIUM mg/dL 8.9 9.1   ALBUMIN g/dL  --  3.9   TOTAL BILIRUBIN mg/dL  --  0.45   ALK PHOS U/L  --  92   ALT U/L  --  20   AST U/L  --  15   GLUCOSE RANDOM mg/dL 153* 137         Results from last 7 days   Lab Units 04/15/24  0740 04/14/24  2238   POC GLUCOSE mg/dl 144* 109         Results from last 7 days   Lab Units 04/14/24  2133   PROCALCITONIN ng/ml 0.07         * I Have Reviewed All Lab Data Listed Above.  * Additional Pertinent Lab Tests Reviewed: All Labs Within Last 24 Hours Reviewed    Imaging:    Imaging Reports Reviewed Today Include: FL retrograde pyelogram   Imaging Personally Reviewed by Myself Includes:  ***    Recent Cultures (last 7 days):     Results from last 7 days   Lab Units 04/14/24  2133   BLOOD CULTURE  Received in Microbiology Lab. Culture in Progress.  Received in Microbiology Lab. Culture in Progress.       Last 24 Hours Medication List:   Current Facility-Administered Medications   Medication Dose Route Frequency Provider Last Rate    acetaminophen  650 mg Oral Q6H PRN Lala Lima DO      " atorvastatin  80 mg Oral Daily Lala Nailakristina Lima, DO      cefTRIAXone  1,000 mg Intravenous Q24H Lala Nailakristina Lima, DO      Cholecalciferol  2,000 Units Oral Daily Lala Nailakristina Lima, DO      enoxaparin  40 mg Subcutaneous Daily Lala Naila Lima, DO      insulin lispro  1-6 Units Subcutaneous HS Lalaash Lima, DO      insulin lispro  2-12 Units Subcutaneous TID AC Lala Naila Clarence, DO      lisinopril  5 mg Oral Daily Lala Nailakristina Lima, DO      multivitamin stress formula  1 tablet Oral Daily Lala Nailakristina Lima, DO      ondansetron  4 mg Intravenous Q6H PRN Lala Naila Clarence, DO      pantoprazole  40 mg Oral Early Morning Lala Naila Clarence, DO      sertraline  50 mg Oral Daily Lala Naila Clarence, DO      sodium chloride  100 mL/hr Intravenous Continuous Lala Naila Clarence,  mL/hr (04/15/24 0248)        Today, Patient Was Seen By: Sue Villegas    ** Please Note: Dictation voice to text software may have been used in the creation of this document. **{zulema ip progress note specialty templates:42774}

## 2024-04-15 NOTE — PROGRESS NOTES
[facility]  Progress Note  Name: Sakshi Tirado I  MRN: [id number]  Unit/Bed#: [facility] 934-01 I Date of Admission: 4/14/2024   Date of Service: 4/15/2024 I Hospital Day: 0    Assessment/Plan   * Sepsis without acute organ dysfunction (HCC)  Assessment & Plan  POA with tachycardia, leukocytosis with suspected urinary source in the setting of obstructing ureteral stone  Currently on IV ceftriaxone pending urine and blood cultures  See management of hydronephrosis/ureteral stone outlined below  Trend temps, WBC and hemodynamics  Tachycardia improved and leukocytosis downtrending    Hydronephrosis with ureteropelvic junction (UPJ) obstruction  Assessment & Plan  Presented with 2 days of left flank pain and 1 week history of nausea poor appetite and fevers at home.  Also endorsing urinary urgency.  CT a/p revealing: Bilateral nephrolithiasis with 5 x 5 x 6 mm calculus at the left ureterovesicular junction with associated severe left hydroureteronephrosis.   Urology consult and recommendations appreciated  S/p L ureteral stent placement on 4/14  Outpatient follow-up for definitive stone management  Renal function stable, antibiotic therapy as above    Type 2 diabetes mellitus with diabetic chronic kidney disease (HCC)  Assessment & Plan  Lab Results   Component Value Date    HGBA1C 6.7 (A) 11/08/2023       Recent Labs     04/14/24  2238 04/15/24  0740   POCGLU 109 144*         Blood Sugar Average: Last 72 hrs:  (P) 126.5  Well-controlled based off most recent A1c  Hold metformin while inpatient, resume on discharge  Accu-Cheks with sliding scale insulin  Carb controlled diet      GERD (gastroesophageal reflux disease)  Assessment & Plan  Continue PPI    Anxiety  Assessment & Plan  Continue PTA Zoloft    Primary hypertension  Assessment & Plan  Blood pressure low normal on review  Continue home dose lisinopril           VTE Pharmacologic Prophylaxis: VTE Score: 5 High Risk (Score >/= 5) -  Pharmacological DVT Prophylaxis Ordered: enoxaparin (Lovenox). Sequential Compression Devices Ordered.    Mobility:   Basic Mobility Inpatient Raw Score: 24  JH-HLM Goal: 8: Walk 250 feet or more  JH-HLM Achieved: 8: Walk 250 feet ot more  JH-HLM Goal achieved. Continue to encourage appropriate mobility.    Patient Centered Rounds: I performed bedside rounds with nursing staff today.   Discussions with Specialists or Other Care Team Provider: ***    Education and Discussions with Family / Patient: {Family Communication:89730}    Total Time Spent on Date of Encounter in care of patient: 20 mins. This time was spent on one or more of the following: performing physical exam; counseling and coordination of care; obtaining or reviewing history; documenting in the medical record; reviewing/ordering tests, medications or procedures; communicating with other healthcare professionals and discussing with patient's family/caregivers.    Current Length of Stay: 0 day(s)  Current Patient Status: Inpatient   Certification Statement: The patient will continue to require additional inpatient hospital stay due to IV antibiotic pending cultures  Discharge Plan: Anticipate discharge later today or tomorrow to home.    Code Status: Level 1 - Full Code    Subjective:   ***    Objective:     Vitals:   Temp (24hrs), Av.4 °F (36.9 °C), Min:97.7 °F (36.5 °C), Max:99 °F (37.2 °C)    Temp:  [97.7 °F (36.5 °C)-99 °F (37.2 °C)] 98.3 °F (36.8 °C)  HR:  [] 79  Resp:  [14-24] 17  BP: ()/(55-79) 96/55  SpO2:  [90 %-100 %] 98 %  Body mass index is 27.61 kg/m².     Input and Output Summary (last 24 hours):     Intake/Output Summary (Last 24 hours) at 4/15/2024 1048  Last data filed at 4/15/2024 0900  Gross per 24 hour   Intake 1050 ml   Output --   Net 1050 ml       Physical Exam:   Physical Exam ***    Additional Data:     Labs:  Results from last 7 days   Lab Units 04/15/24  0437 24   WBC Thousand/uL 13.29* 16.78*    HEMOGLOBIN g/dL 13.8 13.3   HEMATOCRIT % 43.0 40.9   PLATELETS Thousands/uL 315 328   SEGS PCT %  --  71   LYMPHO PCT %  --  19   MONO PCT %  --  8   EOS PCT %  --  1     Results from last 7 days   Lab Units 04/15/24  0437 04/14/24  2029   SODIUM mmol/L 142 137   POTASSIUM mmol/L 4.2 3.7   CHLORIDE mmol/L 102 100   CO2 mmol/L 30 30   BUN mg/dL 16 19   CREATININE mg/dL 0.83 0.89   ANION GAP mmol/L 10 7   CALCIUM mg/dL 8.9 9.1   ALBUMIN g/dL  --  3.9   TOTAL BILIRUBIN mg/dL  --  0.45   ALK PHOS U/L  --  92   ALT U/L  --  20   AST U/L  --  15   GLUCOSE RANDOM mg/dL 153* 137         Results from last 7 days   Lab Units 04/15/24  0740 04/14/24  2238   POC GLUCOSE mg/dl 144* 109         Results from last 7 days   Lab Units 04/14/24  2133   PROCALCITONIN ng/ml 0.07       Lines/Drains:  Invasive Devices       Peripheral Intravenous Line  Duration             Peripheral IV 04/14/24 Left Antecubital <1 day                          Imaging: Reviewed radiology reports from this admission including: abdominal/pelvic CT    Recent Cultures (last 7 days):   Results from last 7 days   Lab Units 04/14/24  2133   BLOOD CULTURE  Received in Microbiology Lab. Culture in Progress.  Received in Microbiology Lab. Culture in Progress.       Last 24 Hours Medication List:   Current Facility-Administered Medications   Medication Dose Route Frequency Provider Last Rate    acetaminophen  650 mg Oral Q6H PRN Lala Lima, DO      atorvastatin  80 mg Oral Daily Lala Lima, DO      cefTRIAXone  1,000 mg Intravenous Q24H Lala Lima, DO      Cholecalciferol  2,000 Units Oral Daily Lala Lima, DO      enoxaparin  40 mg Subcutaneous Daily Lala Lima, DO      insulin lispro  1-6 Units Subcutaneous HS Lala Lima, DO      insulin lispro  2-12 Units Subcutaneous TID AC Lala Lima, DO      lisinopril  5 mg Oral Daily Lala Lima, DO      multivitamin  stress formula  1 tablet Oral Daily Lala Naila Clarence, DO      ondansetron  4 mg Intravenous Q6H PRN Lala Naila Clarence, DO      pantoprazole  40 mg Oral Early Morning Lala Naila Clarence, DO      sertraline  50 mg Oral Daily Lala Naila Clarence, DO      sodium chloride  100 mL/hr Intravenous Continuous Lala Naila Clarence,  mL/hr (04/15/24 0248)        Today, Patient Was Seen By: Radha Fernandez PA-C    **Please Note: This note may have been constructed using a voice recognition system.**

## 2024-04-15 NOTE — PLAN OF CARE
Problem: PAIN - ADULT  Goal: Verbalizes/displays adequate comfort level or baseline comfort level  Description: Interventions:  - Encourage patient to monitor pain and request assistance  - Assess pain using appropriate pain scale  - Administer analgesics based on type and severity of pain and evaluate response  - Implement non-pharmacological measures as appropriate and evaluate response  - Consider cultural and social influences on pain and pain management  - Notify physician/advanced practitioner if interventions unsuccessful or patient reports new pain  Outcome: Progressing     Problem: INFECTION - ADULT  Goal: Absence or prevention of progression during hospitalization  Description: INTERVENTIONS:  - Assess and monitor for signs and symptoms of infection  - Monitor lab/diagnostic results  - Monitor all insertion sites, i.e. indwelling lines, tubes, and drains  - Monitor endotracheal if appropriate and nasal secretions for changes in amount and color  - Park[place] appropriate cooling/warming therapies per order  - Administer medications as ordered  - Instruct and encourage patient and family to use good hand hygiene technique  - Identify and instruct in appropriate isolation precautions for identified infection/condition  Outcome: Progressing     Problem: SAFETY ADULT  Goal: Patient will remain free of falls  Description: INTERVENTIONS:  - Educate patient/family on patient safety including physical limitations  - Instruct patient to call for assistance with activity   - Consult OT/PT to assist with strengthening/mobility   - Keep Call bell within reach  - Keep bed low and locked with side rails adjusted as appropriate  - Keep care items and personal belongings within reach  - Initiate and maintain comfort rounds  - Make Fall Risk Sign visible to staff  - Offer Toileting every  Hours, in advance of need  - Initiate/Maintain alarm  - Obtain necessary fall risk management equipment:   - Apply yellow socks and  bracelet for high fall risk patients  - Consider moving patient to room near nurses station  Outcome: Progressing

## 2024-04-15 NOTE — PROGRESS NOTES
[facility] Internal Medicine Post Admit Check:     Date of visit: 04/15/24    The patient was admitted earlier to the [facility] Internal Medicine Service.  Please see initial intake history and physical for detailed admission history.  This is a supplemental update following a post admit checkup.    She reports feeling well today, endorses some burning with urination but otherwise denies pain.  Discussed plan to continue IV antibiotic and await urine and blood cultures prior to discharge.    Assessment/Plan   * Sepsis without acute organ dysfunction (HCC)  Assessment & Plan  POA with tachycardia, leukocytosis with suspected urinary source in the setting of obstructing ureteral stone  Currently on IV ceftriaxone pending urine and blood cultures  See management of hydronephrosis/ureteral stone outlined below  Trend temps, WBC and hemodynamics  Tachycardia improved and leukocytosis downtrending    Hydronephrosis with ureteropelvic junction (UPJ) obstruction  Assessment & Plan  Presented with 2 days of left flank pain and 1 week history of nausea poor appetite and fevers at home.  Also endorsing urinary urgency.  CT a/p revealing: Bilateral nephrolithiasis with 5 x 5 x 6 mm calculus at the left ureterovesicular junction with associated severe left hydroureteronephrosis.   Urology consult and recommendations appreciated  S/p L ureteral stent placement on 4/14  Outpatient follow-up for definitive stone management  Renal function stable, antibiotic therapy as above    Type 2 diabetes mellitus with diabetic chronic kidney disease (HCC)  Assessment & Plan  Lab Results   Component Value Date    HGBA1C 6.7 (A) 11/08/2023       Recent Labs     04/14/24  2238 04/15/24  0740   POCGLU 109 144*         Blood Sugar Average: Last 72 hrs:  (P) 126.5  Well-controlled based off most recent A1c  Hold metformin while inpatient, resume on discharge  Accu-Cheks with sliding scale insulin  Carb controlled diet      GERD (gastroesophageal  reflux disease)  Assessment & Plan  Continue PPI    Anxiety  Assessment & Plan  Continue PTA Zoloft    Primary hypertension  Assessment & Plan  Blood pressure low normal on review  Continue home dose lisinopril             CEDRICK CatesC

## 2024-04-15 NOTE — PLAN OF CARE
Problem: PAIN - ADULT  Goal: Verbalizes/displays adequate comfort level or baseline comfort level  Description: Interventions:  - Encourage patient to monitor pain and request assistance  - Assess pain using appropriate pain scale  - Administer analgesics based on type and severity of pain and evaluate response  - Implement non-pharmacological measures as appropriate and evaluate response  - Consider cultural and social influences on pain and pain management  - Notify physician/advanced practitioner if interventions unsuccessful or patient reports new pain  Outcome: Progressing     Problem: INFECTION - ADULT  Goal: Absence or prevention of progression during hospitalization  Description: INTERVENTIONS:  - Assess and monitor for signs and symptoms of infection  - Monitor lab/diagnostic results  - Monitor all insertion sites, i.e. indwelling lines, tubes, and drains  - Monitor endotracheal if appropriate and nasal secretions for changes in amount and color  - [place] appropriate cooling/warming therapies per order  - Administer medications as ordered  - Instruct and encourage patient and family to use good hand hygiene technique  - Identify and instruct in appropriate isolation precautions for identified infection/condition  Outcome: Progressing  Goal: Absence of fever/infection during neutropenic period  Description: INTERVENTIONS:  - Monitor WBC    Outcome: Progressing     Problem: SAFETY ADULT  Goal: Patient will remain free of falls  Description: INTERVENTIONS:  - Educate patient/family on patient safety including physical limitations  - Instruct patient to call for assistance with activity   - Consult OT/PT to assist with strengthening/mobility   - Keep Call bell within reach  - Keep bed low and locked with side rails adjusted as appropriate  - Keep care items and personal belongings within reach  - Initiate and maintain comfort rounds  - Make Fall Risk Sign visible to staff  - Apply yellow socks and bracelet  for high fall risk patients  - Consider moving patient to room near nurses station  Outcome: Progressing  Goal: Maintain or return to baseline ADL function  Description: INTERVENTIONS:  -  Assess patient's ability to carry out ADLs; assess patient's baseline for ADL function and identify physical deficits which impact ability to perform ADLs (bathing, care of mouth/teeth, toileting, grooming, dressing, etc.)  - Assess/evaluate cause of self-care deficits   - Assess range of motion  - Assess patient's mobility; develop plan if impaired  - Assess patient's need for assistive devices and provide as appropriate  - Encourage maximum independence but intervene and supervise when necessary  - Involve family in performance of ADLs  - Assess for home care needs following discharge   - Consider OT consult to assist with ADL evaluation and planning for discharge  - Provide patient education as appropriate  Outcome: Progressing  Goal: Maintains/Returns to pre admission functional level  Description: INTERVENTIONS:  - Perform AM-PAC 6 Click Basic Mobility/ Daily Activity assessment daily.  - Set and communicate daily mobility goal to care team and patient/family/caregiver.   - Collaborate with rehabilitation services on mobility goals if consulted  - Perform Range of Motion 4 times a day.  - Reposition patient every 2 hours.  - Dangle patient 4 times a day  - Stand patient 3 times a day  - Ambulate patient 3 times a day  - Out of bed to chair 3 times a day   - Out of bed for meals 3 times a day  - Out of bed for toileting  - Record patient progress and toleration of activity level   Outcome: Progressing     Problem: Nutrition/Hydration-ADULT  Goal: Nutrient/Hydration intake appropriate for improving, restoring or maintaining nutritional needs  Description: Monitor and assess patient's nutrition/hydration status for malnutrition. Collaborate with interdisciplinary team and initiate plan and interventions as ordered.  Monitor  patient's weight and dietary intake as ordered or per policy. Utilize nutrition screening tool and intervene as necessary. Determine patient's food preferences and provide high-protein, high-caloric foods as appropriate.     INTERVENTIONS:  - Monitor oral intake, urinary output, labs, and treatment plans  - Assess nutrition and hydration status and recommend course of action  - Evaluate amount of meals eaten  - Assist patient with eating if necessary   - Allow adequate time for meals  - Recommend/ encourage appropriate diets, oral nutritional supplements, and vitamin/mineral supplements  - Order, calculate, and assess calorie counts as needed  - Recommend, monitor, and adjust tube feedings and TPN/PPN based on assessed needs  - Assess need for intravenous fluids  - Provide specific nutrition/hydration education as appropriate  - Include patient/family/caregiver in decisions related to nutrition  Outcome: Progressing

## 2024-04-15 NOTE — ANESTHESIA POSTPROCEDURE EVALUATION
Post-Op Assessment Note    CV Status:  Stable  Pain Score: 0    Pain management: adequate       Mental Status:  Alert and awake   Hydration Status:  Euvolemic and stable   PONV Controlled:  None   Airway Patency:  Patent     Post Op Vitals Reviewed: Yes      Staff: CRNA               /65 (04/14/24 2234)    Temp 99 °F (37.2 °C) (04/14/24 2234)    Pulse (!) 109 (04/14/24 2234)   Resp 14 (04/14/24 2234)    SpO2 98 % (04/14/24 2234)

## 2024-04-15 NOTE — ANESTHESIA PREPROCEDURE EVALUATION
Procedure:  CYSTOSCOPY URETEROSCOPY WITH LITHOTRIPSY HOLMIUM LASER, RETROGRADE PYELOGRAM AND INSERTION STENT URETERAL (Left: Bladder)    Relevant Problems   CARDIO   (+) Hypercholesteremia   (+) Hypertension      ENDO   (+) Type 2 diabetes mellitus with diabetic chronic kidney disease (HCC)      GI/HEPATIC   (+) GERD (gastroesophageal reflux disease)      /RENAL   (+) CKD (chronic kidney disease) stage 2, GFR 60-89 ml/min   (+) Nephrolithiasis      MUSCULOSKELETAL   (+) Primary osteoarthritis of both knees      NEURO/PSYCH   (+) Anxiety        Physical Exam    Airway    Mallampati score: II  TM Distance: >3 FB  Neck ROM: full     Dental   No notable dental hx     Cardiovascular      Pulmonary      Other Findings  post-pubertal.      Anesthesia Plan  ASA Score- 2 Emergent    Anesthesia Type- general with ASA Monitors.         Additional Monitors:     Airway Plan: ETT and LMA.           Plan Factors-Exercise tolerance (METS): >4 METS.    Chart reviewed.   Existing labs reviewed. Patient summary reviewed.    Patient is not a current smoker.      There is medical exclusion for perioperative obstructive sleep apnea risk education.        Induction- intravenous.    Postoperative Plan- Plan for postoperative opioid use.     Informed Consent- Anesthetic plan and risks discussed with patient.  I personally reviewed this patient with the CRNA. Discussed and agreed on the Anesthesia Plan with the CRNA..

## 2024-04-15 NOTE — OP NOTE
OPERATIVE REPORT  PATIENT NAME: Sakshi Tirado    :  1965  MRN: [id number]  Pt Location: [facility] ROOM 01    SURGERY DATE: 2024    Surgeons and Role:     * Burton Senior MD - Primary    Preop Diagnosis:  Nephrolithiasis [N20.0]  Flank pain [R10.9]  Left ureteral stone  Concern for complicated UTI    Post-Op Diagnosis Codes:     * Nephrolithiasis [N20.0]     * Flank pain [R10.9]  Left ureteral stone  Concern for complicated UTI    Procedure(s):  Left - CYSTOSCOPY URETEROSCOPY WITH LITHOTRIPSY HOLMIUM LASER. RETROGRADE PYELOGRAM AND INSERTION STENT URETERAL    Specimen(s):  ID Type Source Tests Collected by Time Destination   A : left kidney urine Urine Urine, Cystoscopic URINE CULTURE Burton Senior MD 2024 2141        Estimated Blood Loss:   Minimal    Drains:  * No LDAs found *    Anesthesia Type:   General    Operative Indications:  Patient is to recur nephrolithiasis presented to emergency room with at least 2 days of flank pain associated with fever at home with leukocytosis found in the ER with nitrite positive urine and CT scan showing large distal ureteral stone with significant hydroureteronephrosis.    Operative Findings:  Obstructive distal ureteral stone which cannot get contrast around it but able to place stent with relatively mild resistance    Complications:   None    Procedure and Technique:  The patient was brought to the operating room.  Anesthesia was induced.  They were moved to dorsal lithotomy position.  Antibiotics were confirmed.  A time-out was performed identifying the correct patient, site, and procedure.     A rigid 22 Portuguese cystoscope was introduced into the bladder.  The urethra was unremarkable.  The bladder was quickly surveyed and without any significant abnormalities.  A  film was taken which did not show evidence of stone.    Attention was turned to the left ureteral orifice.  A 5 Portuguese open-end catheter was used to intubate the ureteral orifice.  A gentle  retrograde pyeloureterogram was performed which could not get any contrast around the stone.  A Solo wire was passed through the open-ended catheter up the ureter into the left renal pelvis under fluoroscopic guidance.  The 5 Algerian open-ended catheter was advanced, wire removed and retrograde performed showing significant hydroureteronephrosis.  The wire was replaced and a 5 Algerian open-ended catheter was removed A 6x 24 stent was placed over the wire under visual guidance in the bladder and fluoroscopic guidance proximally.  Once seen to be in appropriate position the wire was removed and a good coil was seen in the collecting system on fluoroscopy and visually in the bladder.  The stent string was not left on.    The bladder was drained. This completed the case.  The patient was woken from anesthesia and transferred to PACU in good condition.    PLAN:  Patient will be arranged for outpatient Urology follow-up to set up definitive stone surgery once potential infection has cleared.     A qualified resident physician was not available.    Patient Disposition:  PACU         SIGNATURE: Burton Senior MD  DATE: April 14, 2024  TIME: 10:24 PM

## 2024-04-15 NOTE — ASSESSMENT & PLAN NOTE
Urology on board  CT a/p revealing: Bilateral nephrolithiasis with 5 x 5 x 6 mm calculus at the left ureterovesicular junction with associated severe left hydroureteronephrosis.   S/p L ureteral stent placement  Cont supportive care

## 2024-04-15 NOTE — ASSESSMENT & PLAN NOTE
Lab Results   Component Value Date    HGBA1C 6.7 (A) 11/08/2023       Recent Labs     04/14/24  2238   POCGLU 109       Blood Sugar Average: Last 72 hrs:  (P) 109  Will place on ISS

## 2024-04-15 NOTE — TELEPHONE ENCOUNTER
Left ureteral stent placed for distal stone and infection.  Pt has issues with stent colic in the past so will try to get definitive stone surgery soon as possible her infection is cleared.

## 2024-04-15 NOTE — ASSESSMENT & PLAN NOTE
Lab Results   Component Value Date    HGBA1C 6.7 (A) 11/08/2023       Recent Labs     04/14/24  2238 04/15/24  0740   POCGLU 109 144*         Blood Sugar Average: Last 72 hrs:  (P) 126.5  Well-controlled based off most recent A1c  Hold metformin while inpatient, resume on discharge  Accu-Cheks with sliding scale insulin  Carb controlled diet

## 2024-04-15 NOTE — ASSESSMENT & PLAN NOTE
POA with tachycardia, leukocytosis with suspected urinary source in the setting of obstructing ureteral stone  Currently on IV ceftriaxone pending urine and blood cultures  See management of hydronephrosis/ureteral stone outlined below  Trend temps, WBC and hemodynamics  Tachycardia improved and leukocytosis downtrending

## 2024-04-15 NOTE — ASSESSMENT & PLAN NOTE
S/p left stent placement 4/14/2024  Definitive stone management at interval  WBC 13.29, trending down  Creat 0.83, stable  Afebrile but was tachycardic overnight systolic blood pressure in the 90s  Medical optimization and discharge per primary team  Empiric antibiotics tailored to culture  Follow-up with urology outpatient for definitive stone management

## 2024-04-15 NOTE — ED ATTENDING ATTESTATION
4/14/2024   IKelley MD, saw and evaluated the patient. I have discussed the patient with the resident/non-physician practitioner and agree with the resident's/non-physician practitioner's findings, Plan of Care, and MDM as documented in the resident's/non-physician practitioner's note, except where noted. All available labs and Radiology studies were reviewed.  I was present for key portions of any procedure(s) performed by the resident/non-physician practitioner and I was immediately available to provide assistance.       At this point I agree with the current assessment done in the Emergency Department.  I have conducted an independent evaluation of this patient a history and physical is as follows:    Unit/Bed#: [facility] Encounter: [id number]    Chief Complaint   Patient presents with    Flank Pain     Reports onset of lower back pain more onto L side, reports feeling unwell all week and decreased appetite, denies any urinary symp. Hx kidney stones     Left flank pain x 2 days, feels unwell x 1 week, abdominal bloating, cough, congestion, fever up to 100.8 now resolved.    Left CVA tenderness.       Physical Exam  ED Triage Vitals [04/14/24 1857]   Temperature Pulse Respirations Blood Pressure SpO2   98.9 °F (37.2 °C) (!) 120 20 123/79 98 %      Temp Source Heart Rate Source Patient Position - Orthostatic VS BP Location FiO2 (%)   Temporal Monitor Sitting Left arm --      Pain Score       --           Vital signs and nursing notes reviewed    ** IF YOU ARE READING THIS, THE EXAM TEMPLATE BELOW HAS NOT BEEN UPDATED**    CONSTITUTIONAL: female appearing stated age resting in bed, in no acute distress  HEENT: atraumatic, normocephalic. Sclera anicteric, conjunctiva are not injected. Moist oral mucosa  CARDIOVASCULAR/CHEST: RRR, no M/R/G. 2+ radial pulses  PULMONARY: Breathing comfortably on RA. Breath sounds are equal and clear to auscultation  ABDOMEN: non-distended. BS present, normoactive. Non-tender  MSK:  moves all extremities, no deformities, no peripheral edema, no calf asymmetry  NEURO: Awake, alert, and oriented x 3. Face symmetric. Moves all extremities spontaneously. No focal neurologic deficits  SKIN: Warm, appears well-perfused  MENTAL STATUS: Normal affect      Labs and Imaging  Labs Reviewed   UA W REFLEX TO MICROSCOPIC WITH REFLEX TO CULTURE - Abnormal       Result Value Ref Range Status    Color, UA Yellow   Final    Clarity, UA Extra Turbid   Final    Specific Gravity, UA 1.013  1.003 - 1.030 Final    pH, UA 6.5  4.5, 5.0, 5.5, 6.0, 6.5, 7.0, 7.5, 8.0 Final    Leukocytes, UA Large (*) Negative Final    Nitrite, UA Positive (*) Negative Final    Protein, UA 30 (1+) (*) Negative mg/dl Final    Glucose, UA Negative  Negative mg/dl Final    Ketones, UA Negative  Negative mg/dl Final    Urobilinogen, UA <2.0  <2.0 mg/dl mg/dl Final    Bilirubin, UA Negative  Negative Final    Occult Blood, UA Small (*) Negative Final   URINE MICROSCOPIC - Abnormal    RBC, UA 20-30 (*) None Seen, 1-2 /hpf Final    WBC, UA Innumerable (*) None Seen, 1-2 /hpf Final    Epithelial Cells None Seen  None Seen, Occasional /hpf Final    Bacteria, UA Occasional  None Seen, Occasional /hpf Final    MUCUS THREADS Occasional (*) None Seen Final    WBC Clumps Present   Final   URINE CULTURE       No orders to display         Procedures  Procedures        ED Course  Medications - No data to display

## 2024-04-15 NOTE — ASSESSMENT & PLAN NOTE
Presented with 2 days of left flank pain and 1 week history of nausea poor appetite and fevers at home.  Also endorsing urinary urgency.  CT a/p revealing: Bilateral nephrolithiasis with 5 x 5 x 6 mm calculus at the left ureterovesicular junction with associated severe left hydroureteronephrosis.   Urology consult and recommendations appreciated  S/p L ureteral stent placement on 4/14  Outpatient follow-up for definitive stone management  Renal function stable, antibiotic therapy as above

## 2024-04-15 NOTE — CONSULTS
UROLOGY CONSULTATION NOTE     Patient Identifiers: Sakshi Tirado (MRN [id number])  Service Requesting Consultation: ER    Service Providing Consultation:  Urology, Burton Senior MD    Date of Service: 4/14/2024  Consults    Reason for Consultation: left ureteral stone and infection    History of Present Illness:     Sakshi Tirado is a 58 y.o. old with a history of recurrent nephrolithiasis presented to the emergency room with left-sided flank pain for 2 days associated with nausea decreased appetite and fatigue.  She said she had a temperature of 100.8 within the last week.  Denies chest pain shortness of breath vomiting or diarrhea.  In the emergency room leukocytosis of 17,000 and urinalysis showed nitrite positive urine.    Past Medical, Past Surgical History:     Past Medical History:   Diagnosis Date    Anxiety     Asthma     Colon polyp     Depression     Diabetes mellitus (HCC)     Diverticulitis     Diverticulitis of colon     Follicular cyst of ovary 12/23/2014    Former smoker     GERD (gastroesophageal reflux disease)     Glycosuria     Headache     Hyperlipidemia     Hypertension     Kidney calculi 1/11/2019    Kidney stone     Obesity     Ovarian cyst     Overactive bladder     Overweight     Pulmonary nodule     Renal calculus     Seasonal allergies     Tinea pedis of left foot 6/8/2020    Vertigo     Wears partial dentures     Lower plate -partial   :    Past Surgical History:   Procedure Laterality Date    APPENDECTOMY      BLADDER SUSPENSION      [facility]    CERVICAL BIOPSY  W/ LOOP ELECTRODE EXCISION      COLONOSCOPY      DILATION AND CURETTAGE OF UTERUS      FL RETROGRADE PYELOGRAM  7/26/2021    FL RETROGRADE PYELOGRAM  11/18/2021    FL RETROGRADE PYELOGRAM  11/10/2022    FL RETROGRADE PYELOGRAM  1/17/2023    HYSTERECTOMY  2007    ovaries present bilaterally    JOINT REPLACEMENT Right 05/2021    right knee    OR ARTHRP KNE CONDYLE&PLATU MEDIAL&LAT COMPARTMENTS Right 5/19/2021    Procedure:  ARTHROPLASTY KNEE TOTAL;  Surgeon: Bon Broderick MD;  [facility];  Service: Orthopedics    PA COLONOSCOPY FLX DX W/COLLJ SPEC WHEN PFRMD N/A 9/26/2017    Procedure: EGD AND COLONOSCOPY;  Surgeon: Reed Hardy MD;  Location: [facility];  Service: Gastroenterology    PA CYSTO BLADDER W/URETERAL CATHETERIZATION Right 11/10/2022    Procedure: CYSTOSCOPY RETROGRADE PYELOGRAM WITH INSERTION STENT URETERAL;  Surgeon: Burton Senior MD;  [facility];  Service: Urology    PA CYSTO/URETERO W/LITHOTRIPSY &INDWELL STENT INSRT Bilateral 7/26/2021    Procedure: CYSTOSCOPY URETEROSCOPY WITH LITHOTRIPSY HOLMIUM LASER, RETROGRADE PYELOGRAM AND INSERTION STENT URETERAL;  Surgeon: Pete Lopez MD;  [facility];  Service: Urology    PA CYSTO/URETERO W/LITHOTRIPSY &INDWELL STENT INSRT Left 9/16/2021    Procedure: CYSTOSCOPY URETEROSCOPY WITH LITHOTRIPSY HOLMIUM LASER, RETROGRADE PYELOGRAM AND INSERTION STENT URETERAL;  Surgeon: Cesar Keller MD;  [facility];  Service: Urology    PA CYSTO/URETERO W/LITHOTRIPSY &INDWELL STENT INSRT Left 11/18/2021    Procedure: CYSTOSCOPY URETEROSCOPY WITH LITHOTRIPSY HOLMIUM LASER, RETROGRADE PYELOGRAM AND INSERTION STENT URETERAL;  Surgeon: Vicente Colby MD;  [facility];  Service: Urology    PA CYSTO/URETERO W/LITHOTRIPSY &INDWELL STENT INSRT Right 1/17/2023    Procedure: CYSTOSCOPY URETEROSCOPY WITH LITHOTRIPSY HOLMIUM LASER, RETROGRADE PYELOGRAM AND EXCHANGE STENT URETERAL, BASKET STONE EXTRACTION;  Surgeon: Vicente Colby MD;  [facility];  Service: Urology    PA NEUROPLASTY &/TRANSPOS MEDIAN NRV CARPAL TUNNE Right 5/29/2018    Procedure: CARPAL TUNNEL RELEASE;  Surgeon: Amish Parkinson DO;  [facility];  Service: Orthopedics    REMOVAL URETERAL STENT Right 9/16/2021    Procedure: REMOVAL STENT URETERAL;  Surgeon: Cesar Keller MD;  [facility];  Service: Urology    TONSILLECTOMY      TUBAL LIGATION       "URETEROSCOPY  2021    Procedure: URETEROSCOPY, LASER AND BASKET STONE EXTRACTION;  Surgeon: Pete Lopez MD;  [facility];  Service: Urology    WISDOM TOOTH EXTRACTION     :    Medications, Allergies:     No current facility-administered medications for this encounter.       Allergies:  Allergies   Allergen Reactions    Oxybutynin Anaphylaxis     \"feels like throat closing and can't swallow\"    Clonazepam Other (See Comments)     Pt states \"didn't feel right on it.\"    Morphine Other (See Comments) and Confusion     Annotation - 2017: \"dopey\"  Gets silly    Venlafaxine Other (See Comments)     Unknown--pt states \" MD was trying pt on different medications. Pt unsure of reaction.\"   :    Social and Family History:   Social History:   Social History     Tobacco Use    Smoking status: Former     Current packs/day: 0.00     Average packs/day: 0.3 packs/day for 34.2 years (8.6 ttl pk-yrs)     Types: Cigarettes     Start date:      Quit date: 3/16/2018     Years since quittin.0    Smokeless tobacco: Never    Tobacco comments:     approx less than  half a pack   Vaping Use    Vaping status: Never Used   Substance Use Topics    Alcohol use: Not Currently     Comment: socially    Drug use: Never     Comment: Denies   .    Social History     Tobacco Use   Smoking Status Former    Current packs/day: 0.00    Average packs/day: 0.3 packs/day for 34.2 years (8.6 ttl pk-yrs)    Types: Cigarettes    Start date:     Quit date: 3/16/2018    Years since quittin.0   Smokeless Tobacco Never   Tobacco Comments    approx less than  half a pack       Family History:  Family History   Problem Relation Age of Onset    Diabetes type II Mother     Asthma Mother     Stroke Father     Pancreatic cancer Father 73    Diabetes type II Father     No Known Problems Sister     No Known Problems Sister     No Known Problems Sister     No Known Problems Sister     No Known Problems Maternal Grandmother     No " Known Problems Maternal Grandfather     No Known Problems Paternal Grandmother     No Known Problems Paternal Grandfather     No Known Problems Maternal Aunt     No Known Problems Paternal Aunt     No Known Problems Brother     No Known Problems Son     No Known Problems Son    :     Review of Systems:     General: Fever, chills, or night sweats: positive  Cardiac: Negative for chest pain.    Pulmonary: Negative for shortness of breath.  Gastrointestinal: Abdominal pain positive.  Nausea, vomiting, or diarrhea positive,  Genitourinary: See HPI above.  Patient does not have hematuria.  All other systems queried were negative.    Physical Exam:   General: Patient is pleasant and in NAD. Awake and alert  /60   Pulse 100   Temp 98.9 °F (37.2 °C) (Temporal)   Resp 17   SpO2 97% Temp (24hrs), Av.9 °F (37.2 °C), Min:98.9 °F (37.2 °C), Max:98.9 °F (37.2 °C)  current; Temperature: 98.9 °F (37.2 °C)  I/O last 24 hours:  In: 50 [IV Piggyback:50]  Out: -   Cardiac: Peripheral edema: negative  Pulmonary: Non-labored breathing  Abdomen: Soft, non-tender, non-distended.  No surgical scars.  No masses, tenderness, hernias noted.    Genitourinary: Positive CVA tenderness, negative suprapubic tenderness.      Labs:     Lab Results   Component Value Date    HGB 13.3 2024    HCT 40.9 2024    WBC 16.78 (H) 2024     2024   ]    Lab Results   Component Value Date    K 3.7 2024     2024    CO2 30 2024    BUN 19 2024    CREATININE 0.89 2024    CALCIUM 9.1 2024   ]    Imaging:   I personally reviewed the images and report of the following studies, and reviewed them with the patient:    CT Abdomen/Pelvis: Distal left ureteral stone and hydronephrosis      ASSESSMENT:     58 y.o. old female with left ureteral stone with concern for infection based on nitrite positive urine and leukocytosis and feeling malaise..      PLAN:       Left ureteral stent  placement.  The patient says she finds stents very comfortable and wants to avoid a stent.  I explained to her that stone surgery in setting of infection can lead to sepsis and even death.  The other option rather than the stent is a PCN tube.  She does not want this though after we discussed.  She wants to move forward with stent placement.  She understands we will keep the stent in place until definitive stone surgery is done.      Thank you for allowing me to participate in this patients’ care.  Please do not hesitate to call with any additional questions.  Burton Senior MD    Total time of encounter was 40 minutes, of which 30 minutes was spent on counseling.

## 2024-04-15 NOTE — ASSESSMENT & PLAN NOTE
Met SIRS criteria with tachycardia, leukocytosis  Source secondary to infected renal calculi/cystitis  Cont on IV CTX  S/p L ureteral stent placement  F/u urine, blood cx  Cont supportive care

## 2024-04-15 NOTE — H&P
[facility]  H&P  Name: Sakshi Tirado 58 y.o. female I MRN: [id number]  Unit/Bed#: [facility]PHP 934-01 I Date of Admission: 4/14/2024   Date of Service: 4/15/2024 I Hospital Day: 0      Assessment/Plan   * Sepsis without acute organ dysfunction (HCC)  Assessment & Plan  Met SIRS criteria with tachycardia, leukocytosis  Source secondary to infected renal calculi/cystitis  Cont on IV CTX  S/p L ureteral stent placement  F/u urine, blood cx  Cont supportive care    Nephrolithiasis  Assessment & Plan  Urology on board  CT a/p revealing: Bilateral nephrolithiasis with 5 x 5 x 6 mm calculus at the left ureterovesicular junction with associated severe left hydroureteronephrosis.   S/p L ureteral stent placement  Cont supportive care    Anxiety  Assessment & Plan  Cont on zoloft    Hypertension  Assessment & Plan  Stable, cont lisinopril    Type 2 diabetes mellitus with diabetic chronic kidney disease (HCC)  Assessment & Plan  Lab Results   Component Value Date    HGBA1C 6.7 (A) 11/08/2023       Recent Labs     04/14/24  2238   POCGLU 109       Blood Sugar Average: Last 72 hrs:  (P) 109  Will place on ISS      GERD (gastroesophageal reflux disease)  Assessment & Plan  Cont ppi       VTE Prophylaxis: Enoxaparin (Lovenox)  / sequential compression device   Code Status: Full code  POLST: There is no POLST form on file for this patient (pre-hospital)  Discussion with family: Plan of care d/w patient    Anticipated Length of Stay:  Patient will be admitted on an Inpatient basis with an anticipated length of stay of  > 2 midnights.   Justification for Hospital Stay: Sepsis, nephrolithiasis    Total Time for Visit, including Counseling / Coordination of Care: 60 minutes.  Greater than 50% of this total time spent on direct patient counseling and coordination of care.    Chief Complaint:   L Flank pain x 2 days.    History of Present Illness:    Sakshi Tirado is a 58 y.o. female medical hx significant  for htn, DM II, nephrolithiasis presents to the ed with c/o L flank pain x 2 days. She also reports that over the past 1 week she has been having nausea, decreased appetite. She also reports of urinary urgency. She reports of fever as well.   Upon presentation to the ED, meeting SIRS criteria. She has since been taken to the OR for L ureteral stent placement.   Upon my eval, s/p procedure, she reports of resolved L flank pain.    Review of Systems:    Review of Systems   Constitutional:  Positive for appetite change and fever.   Gastrointestinal:  Positive for nausea.   Genitourinary:  Positive for flank pain and frequency.       Past Medical and Surgical History:     Past Medical History:   Diagnosis Date    Anxiety     Asthma     Colon polyp     Depression     Diabetes mellitus (HCC)     Diverticulitis     Diverticulitis of colon     Follicular cyst of ovary 12/23/2014    Former smoker     GERD (gastroesophageal reflux disease)     Glycosuria     Headache     Hyperlipidemia     Hypertension     Kidney calculi 1/11/2019    Kidney stone     Obesity     Ovarian cyst     Overactive bladder     Overweight     Pulmonary nodule     Renal calculus     Seasonal allergies     Tinea pedis of left foot 6/8/2020    Vertigo     Wears partial dentures     Lower plate -partial       Past Surgical History:   Procedure Laterality Date    APPENDECTOMY      BLADDER SUSPENSION      [facility]    CERVICAL BIOPSY  W/ LOOP ELECTRODE EXCISION      COLONOSCOPY      DILATION AND CURETTAGE OF UTERUS      FL RETROGRADE PYELOGRAM  7/26/2021    FL RETROGRADE PYELOGRAM  11/18/2021    FL RETROGRADE PYELOGRAM  11/10/2022    FL RETROGRADE PYELOGRAM  1/17/2023    HYSTERECTOMY  2007    ovaries present bilaterally    JOINT REPLACEMENT Right 05/2021    right knee    MI ARTHRP KNE CONDYLE&PLATU MEDIAL&LAT COMPARTMENTS Right 5/19/2021    Procedure: ARTHROPLASTY KNEE TOTAL;  Surgeon: Bon Broderick MD;  [facility];  Service: Orthopedics    MI  COLONOSCOPY FLX DX W/COLLJ SPEC WHEN PFRMD N/A 9/26/2017    Procedure: EGD AND COLONOSCOPY;  Surgeon: Reed Hardy MD;  Location: [facility];  Service: Gastroenterology    RI CYSTO BLADDER W/URETERAL CATHETERIZATION Right 11/10/2022    Procedure: CYSTOSCOPY RETROGRADE PYELOGRAM WITH INSERTION STENT URETERAL;  Surgeon: Burton Senior MD;  [facility];  Service: Urology    RI CYSTO/URETERO W/LITHOTRIPSY &INDWELL STENT INSRT Bilateral 7/26/2021    Procedure: CYSTOSCOPY URETEROSCOPY WITH LITHOTRIPSY HOLMIUM LASER, RETROGRADE PYELOGRAM AND INSERTION STENT URETERAL;  Surgeon: Pete Lopez MD;  [facility];  Service: Urology    RI CYSTO/URETERO W/LITHOTRIPSY &INDWELL STENT INSRT Left 9/16/2021    Procedure: CYSTOSCOPY URETEROSCOPY WITH LITHOTRIPSY HOLMIUM LASER, RETROGRADE PYELOGRAM AND INSERTION STENT URETERAL;  Surgeon: Cesar Keller MD;  [facility];  Service: Urology    RI CYSTO/URETERO W/LITHOTRIPSY &INDWELL STENT INSRT Left 11/18/2021    Procedure: CYSTOSCOPY URETEROSCOPY WITH LITHOTRIPSY HOLMIUM LASER, RETROGRADE PYELOGRAM AND INSERTION STENT URETERAL;  Surgeon: Vicente Colby MD;  [facility];  Service: Urology    RI CYSTO/URETERO W/LITHOTRIPSY &INDWELL STENT INSRT Right 1/17/2023    Procedure: CYSTOSCOPY URETEROSCOPY WITH LITHOTRIPSY HOLMIUM LASER, RETROGRADE PYELOGRAM AND EXCHANGE STENT URETERAL, BASKET STONE EXTRACTION;  Surgeon: Vicente Colby MD;  [facility];  Service: Urology    RI NEUROPLASTY &/TRANSPOS MEDIAN NRV CARPAL TUNNE Right 5/29/2018    Procedure: CARPAL TUNNEL RELEASE;  Surgeon: Amish Parkinson DO;  [facility];  Service: Orthopedics    REMOVAL URETERAL STENT Right 9/16/2021    Procedure: REMOVAL STENT URETERAL;  Surgeon: Cesar Keller MD;  [facility];  Service: Urology    TONSILLECTOMY      TUBAL LIGATION      URETEROSCOPY  7/26/2021    Procedure: URETEROSCOPY, LASER AND BASKET STONE EXTRACTION;  Surgeon: Pete COLON  "MD John;  [facility];  Service: Urology    WISDOM TOOTH EXTRACTION         Meds/Allergies:    Prior to Admission medications    Medication Sig Start Date End Date Taking? Authorizing Provider   acetaminophen (TYLENOL) 500 mg tablet Take 500 mg by mouth every 6 (six) hours as needed for mild pain   Yes Historical Provider, MD   Ascorbic Acid (vitamin C) 1000 MG tablet Take 2,000 mg by mouth daily   Yes Historical Provider, MD   atorvastatin (LIPITOR) 80 mg tablet Take 1 tablet (80 mg total) by mouth daily 3/27/24  Yes LEONARD Haynes   Cholecalciferol (Vitamin D3) 50 MCG (2000 UT) TABS Take 2,000 Units by mouth daily   Yes Historical Provider, MD   lisinopril (ZESTRIL) 5 mg tablet Take 1 tablet (5 mg total) by mouth daily 3/27/24  Yes LEONARD Haynes   metFORMIN (GLUCOPHAGE) 1000 MG tablet Take 1 tablet (1,000 mg total) by mouth 2 (two) times a day with meals 3/27/24  Yes LEONARD Haynes   multivitamin (THERAGRAN) TABS Take 1 tablet by mouth daily   Yes Historical Provider, MD   omeprazole (PriLOSEC) 40 MG capsule Take 1 capsule (40 mg total) by mouth daily 3/27/24  Yes LEONARD Haynes   potassium citrate (UROCIT-K) 10 mEq Take 1 tablet (10 mEq total) by mouth 3 (three) times a day with meals 3/27/24  Yes LEONARD Haynes   sertraline (ZOLOFT) 50 mg tablet Take 1 tablet (50 mg total) by mouth daily Takes in the am. 3/27/24  Yes LEONARD Haynes     I have reviewed home medications with patient personally.    Allergies:   Allergies   Allergen Reactions    Oxybutynin Anaphylaxis     \"feels like throat closing and can't swallow\"    Clonazepam Other (See Comments)     Pt states \"didn't feel right on it.\"    Morphine Other (See Comments) and Confusion     Annotation - 14Jun2017: \"dopey\"  Gets silly    Venlafaxine Other (See Comments)     Unknown--pt states \" MD was trying pt on different medications. Pt unsure of reaction.\"       Social History:   " "  Marital Status: /Civil Union   Occupation:   Patient Pre-hospital Living Situation: Resides at home w/   Patient Pre-hospital Level of Mobility: independent  Patient Pre-hospital Diet Restrictions: none  Substance Use History:   Social History     Substance and Sexual Activity   Alcohol Use Not Currently    Comment: socially     Social History     Tobacco Use   Smoking Status Former    Current packs/day: 0.00    Average packs/day: 0.3 packs/day for 34.2 years (8.6 ttl pk-yrs)    Types: Cigarettes    Start date:     Quit date: 3/16/2018    Years since quittin.0   Smokeless Tobacco Never   Tobacco Comments    approx less than  half a pack     Social History     Substance and Sexual Activity   Drug Use Never    Comment: Denies       Family History:    Family History   Problem Relation Age of Onset    Diabetes type II Mother     Asthma Mother     Stroke Father     Pancreatic cancer Father 73    Diabetes type II Father     No Known Problems Sister     No Known Problems Sister     No Known Problems Sister     No Known Problems Sister     No Known Problems Maternal Grandmother     No Known Problems Maternal Grandfather     No Known Problems Paternal Grandmother     No Known Problems Paternal Grandfather     No Known Problems Maternal Aunt     No Known Problems Paternal Aunt     No Known Problems Brother     No Known Problems Son     No Known Problems Son        Physical Exam:     Vitals:   Blood Pressure: 120/63 (04/15/24 0111)  Pulse: (!) 110 (04/15/24 0111)  Temperature: 97.7 °F (36.5 °C) (04/15/24 0111)  Temp Source: Oral (04/15/24 0111)  Respirations: (!) 24 (04/15/24 0111)  Height: 5' 3\" (160 cm) (04/15/24 0111)  Weight - Scale: 70.7 kg (155 lb 13.8 oz) (04/15/24 0111)  SpO2: 91 % (24 2330)    Physical Exam  Cardiovascular:      Rate and Rhythm: Normal rate and regular rhythm.      Pulses: Normal pulses.      Heart sounds: Normal heart sounds.   Pulmonary:      Effort: Pulmonary effort is " normal. No respiratory distress.      Breath sounds: Normal breath sounds. No wheezing or rales.   Abdominal:      General: Abdomen is flat. Bowel sounds are normal. There is no distension.      Palpations: Abdomen is soft.      Tenderness: There is no abdominal tenderness. There is no right CVA tenderness, left CVA tenderness or guarding.   Musculoskeletal:         General: Normal range of motion.      Cervical back: Normal range of motion and neck supple.      Right lower leg: No edema.      Left lower leg: No edema.   Skin:     General: Skin is warm and dry.   Neurological:      General: No focal deficit present.      Mental Status: She is alert and oriented to person, place, and time. Mental status is at baseline.      Cranial Nerves: No cranial nerve deficit.      Motor: No weakness.         Additional Data:     Lab Results: I have personally reviewed pertinent reports.      Results from last 7 days   Lab Units 04/14/24 2029   WBC Thousand/uL 16.78*   HEMOGLOBIN g/dL 13.3   HEMATOCRIT % 40.9   PLATELETS Thousands/uL 328   SEGS PCT % 71   LYMPHO PCT % 19   MONO PCT % 8   EOS PCT % 1     Results from last 7 days   Lab Units 04/14/24 2029   SODIUM mmol/L 137   POTASSIUM mmol/L 3.7   CHLORIDE mmol/L 100   CO2 mmol/L 30   BUN mg/dL 19   CREATININE mg/dL 0.89   ANION GAP mmol/L 7   CALCIUM mg/dL 9.1   ALBUMIN g/dL 3.9   TOTAL BILIRUBIN mg/dL 0.45   ALK PHOS U/L 92   ALT U/L 20   AST U/L 15   GLUCOSE RANDOM mg/dL 137         Results from last 7 days   Lab Units 04/14/24  2238   POC GLUCOSE mg/dl 109         Results from last 7 days   Lab Units 04/14/24  2133   PROCALCITONIN ng/ml 0.07       Imaging: I have personally reviewed pertinent reports.      CT renal stone study abdomen pelvis wo contrast   Final Result by Gelacio Davis MD (04/14 2058)      Bilateral nephrolithiasis with 5 x 5 x 6 mm calculus at the left ureterovesicular junction with associated severe left hydroureteronephrosis.         Workstation  performed: [id number]         FL retrograde pyelogram    (Results Pending)       EKG, Pathology, and Other Studies Reviewed on Admission:   EKG:     Allscripts / Epic Records Reviewed: Yes     ** Please Note: This note has been constructed using a voice recognition system. **

## 2024-04-15 NOTE — DISCHARGE INSTR - AVS FIRST PAGE
We placed stent in your ureter (kidney tube) to help urine drain around the stone. This should help with pain (although the stent often causes its own type of discomfort) and importantly significantly reduce the risk for worsening infection.   We will attempt to perform the surgery again in 2 to 6 weeks after your likely infection has cleared.    We will arrange this outpatient surgery.    We will provide to medications to help you tolerate your stent.    Please take your medications as prescribed with caution for comfort.  Most importantly please drink 6-8 glasses of water per day    Please call with any questions or concerns.    Burton Senior MD  [facility]  [phone]      WHAT IS A STENT?   A stent is a thin, flexible piece of plastic that will hold open your ureter while the remaining small pieces of stone pass. This allows your kidney to drain easily and prevents you from having to “pass” these small stone pieces on your own, which could be painful. The stent is about 12 inches long and looks and feels like a thin piece of spaghetti.    How can I relieve ureteral stent pain?  Ureteral stents can be uncomfortable. You may have pelvic pain or a pulling sensation when you pee. Over-the-counter pain relievers such as nonsteroidal anti-inflammatory drugs (NSAIDs) can help.    Do I need to restrict activities while I have a ureteral stent?  You may need to restrict physical activities for the first week after the procedure. If your job doesn’t require lifting heavy objects, you should be able to return to work as usual within 24 hours after the procedure.    Your provider may recommend not having sex for the first week after stent placement to reduce the risk of a UTI.    What’s the prognosis for someone who has ureteral stents?  Ureteral stents are generally safe. They don’t typically cause any long-term problems.    Despite the risk of annoying side effects, ureteral stents are helpful.  Ureteral stents often allow kidney stones to pass. They also work well to resolve ureteral obstructions. Left untreated, a ureteral obstruction can lead to life-threatening kidney failure and sepsis.    WHEN TO CALL THE DOCTOR  When should I call the doctor?  You should call your healthcare provider if you experience:    Clots of tissue in urine.  Dark, cloudy or foul-smelling urine.  Difficulty urinating or extreme pain or burning sensation when peeing.  Kidney pain (dull ache deep on the left or right side of the spine).  Nausea and vomiting.  Signs of infection, such as fever or chills.    ADDITIONAL DETAILS  What is a kidney stent?  Rarely, a healthcare provider can’t place a ureteral stent due to scarring or other problems. You may need a nephrostomy (kidney stent) instead. To perform nephrostomy, a radiologist inserts a stent (tube) directly into a kidney. The kidney stent drains urine from the kidney into a bag outside of the body, bypassing the ureters and bladder.

## 2024-04-15 NOTE — PROGRESS NOTES
Progress Note - Urology  Sakshi Tirado 1965, 58 y.o. female MRN: [id number]    Unit/Bed#: [facility] 934-01 Encounter: [id number]    Hydronephrosis with ureteropelvic junction (UPJ) obstruction  Assessment & Plan  S/p left stent placement 4/14/2024  Definitive stone management at interval  WBC 13.29, trending down  Creat 0.83, stable  Afebrile but was tachycardic overnight systolic blood pressure in the 90s  Medical optimization and discharge per primary team  Empiric antibiotics tailored to culture  Follow-up with urology outpatient for definitive stone management        Urology will sign off but remain available for any further inpatient needs. Please feel free to contact the provider [facility] with questions or concerns.     Subjective: Patient is status post urgent left stent placement due to leukocytosis manage tachycardia.  Noted to have significant pyuria.  Urine culture pending. She is hopeful for discharge and is feeling improved.  Reports previously removing stents at home due to discomfort. She is being treated for stent colic currently and appears comfortable    24 HR EVENTS:   no significant events.      Patient has  complaints of mild stent discomfort.       Review of Systems   Constitutional:  Negative for activity change, appetite change, chills, fatigue, fever and unexpected weight change.   HENT:  Negative for facial swelling.    Eyes:  Negative for discharge.   Respiratory: Negative.  Negative for cough and shortness of breath.    Cardiovascular:  Negative for chest pain and leg swelling.   Gastrointestinal: Negative.  Negative for abdominal distention, abdominal pain, constipation, diarrhea, nausea and vomiting.   Endocrine: Negative.    Genitourinary: Negative.  Negative for decreased urine volume, difficulty urinating, dysuria, enuresis, flank pain, frequency, genital sores, hematuria and urgency.   Musculoskeletal:  Negative for back pain  "and myalgias.   Skin:  Negative for pallor and rash.   Allergic/Immunologic: Negative.  Negative for immunocompromised state.   Neurological:  Negative for facial asymmetry and speech difficulty.   Psychiatric/Behavioral:  Negative for agitation and confusion.        Objective:    Vitals: Blood pressure 96/55, pulse 79, temperature 98.3 °F (36.8 °C), resp. rate 17, height 5' 3\" (1.6 m), weight 70.7 kg (155 lb 13.8 oz), SpO2 98%.,Body mass index is 27.61 kg/m².  INS & OUTS:  I/O last 24 hours:  In: 1050 [P.O.:300; I.V.:700; IV Piggyback:50]  Out: -     Physical Exam  Vitals and nursing note reviewed.   Constitutional:       General: She is not in acute distress.     Appearance: Normal appearance. She is not ill-appearing or toxic-appearing.   HENT:      Head: Normocephalic.   Cardiovascular:      Rate and Rhythm: Normal rate.   Pulmonary:      Effort: Pulmonary effort is normal. No respiratory distress.   Abdominal:      General: Abdomen is flat. There is no distension.   Musculoskeletal:         General: No swelling.   Skin:     General: Skin is warm and dry.   Neurological:      General: No focal deficit present.      Mental Status: She is alert and oriented to person, place, and time.   Psychiatric:         Mood and Affect: Mood normal.         Behavior: Behavior normal.         Thought Content: Thought content normal.         Judgment: Judgment normal.         Imaging:  CT ABDOMEN AND PELVIS WITHOUT IV CONTRAST - LOW DOSE RENAL STONE     INDICATION: left CVA tenderness, h/o stone.     COMPARISON: CT of the abdomen pelvis on November 10, 2022.     TECHNIQUE: Low radiation dose thin section CT examination of the abdomen and pelvis was performed without intravenous or oral contrast according to a protocol specifically designed to evaluate for urinary tract calculus. Axial, sagittal, and coronal 2D   reformatted images were created from the source data and submitted for interpretation. Evaluation for pathology in the " abdomen and pelvis that is unrelated to urinary tract calculi is limited.     Radiation dose length product (DLP) for this visit: 355.7 mGy-cm . This examination, like all CT scans performed in the [facility], was performed utilizing techniques to minimize radiation dose exposure, including the use of iterative   reconstruction and automated exposure control.     URINARY TRACT FINDINGS:     RIGHT KIDNEY AND URETER: Calculi measure up to 5 mm within the right kidney.. No hydronephrosis or hydroureter. Vascular calcifications.     LEFT KIDNEY AND URETER: 5 x 5 x 6 mm calculus at the left ureterovesicular junction (series 2, image 128). There is severe left hydronephrosis and hydroureter. Additional calculi measure up to 5 mm in the left kidney.     URINARY BLADDER: Unremarkable.        ADDITIONAL FINDINGS:     LOWER CHEST: No clinically significant abnormality in the visualized lower chest.     SOLID VISCERA: Limited low radiation dose noncontrast CT evaluation demonstrates no clinically significant abnormality of the imaged portions of the liver, spleen, pancreas, or adrenal glands.     GALLBLADDER/BILIARY TREE: No calcified gallstones. No pericholecystic inflammatory change. No biliary dilation.     STOMACH AND BOWEL: Colonic diverticulosis without evidence of diverticulitis. No bowel obstruction.     APPENDIX: Surgically absent.     ABDOMINOPELVIC CAVITY: No ascites. No pneumoperitoneum. No lymphadenopathy.     VESSELS: Unremarkable for patient's age.     REPRODUCTIVE ORGANS: Post hysterectomy.     ABDOMINAL WALL/INGUINAL REGIONS: Unremarkable.     BONES: No acute fracture or suspicious osseous lesion.     IMPRESSION:     Bilateral nephrolithiasis with 5 x 5 x 6 mm calculus at the left ureterovesicular junction with associated severe left hydroureteronephrosis.        Workstation performed: [id number]     Imaging reviewed - both report and images personally reviewed.     Labs:  Recent Labs      04/14/24  2029 04/15/24  0437   WBC 16.78* 13.29*       Recent Labs     04/14/24  2029 04/15/24  0437   HGB 13.3 13.8     Recent Labs     04/14/24  2029 04/15/24  0437   HCT 40.9 43.0     Recent Labs     04/14/24  2029 04/15/24  0437   CREATININE 0.89 0.83     Lab Results   Component Value Date    HGB 13.8 04/15/2024    HCT 43.0 04/15/2024    WBC 13.29 (H) 04/15/2024     04/15/2024     Lab Results   Component Value Date    K 4.2 04/15/2024     04/15/2024    CO2 30 04/15/2024    BUN 16 04/15/2024    CREATININE 0.83 04/15/2024    CALCIUM 8.9 04/15/2024     Urinalysis: innumerable WBC's per HPF  Urine Culture: Pending    History:    Past Medical History:   Diagnosis Date    Anxiety     Asthma     Colon polyp     Depression     Diabetes mellitus (HCC)     Diverticulitis     Diverticulitis of colon     Follicular cyst of ovary 12/23/2014    Former smoker     GERD (gastroesophageal reflux disease)     Glycosuria     Headache     Hyperlipidemia     Hypertension     Kidney calculi 1/11/2019    Kidney stone     Obesity     Ovarian cyst     Overactive bladder     Overweight     Pulmonary nodule     Renal calculus     Seasonal allergies     Tinea pedis of left foot 6/8/2020    Vertigo     Wears partial dentures     Lower plate -partial     Past Surgical History:   Procedure Laterality Date    APPENDECTOMY      BLADDER SUSPENSION      [facility] Uro G[facility]    CERVICAL BIOPSY  W/ LOOP ELECTRODE EXCISION      COLONOSCOPY      DILATION AND CURETTAGE OF UTERUS      FL RETROGRADE PYELOGRAM  7/26/2021    FL RETROGRADE PYELOGRAM  11/18/2021    FL RETROGRADE PYELOGRAM  11/10/2022    FL RETROGRADE PYELOGRAM  1/17/2023    FL RETROGRADE PYELOGRAM  4/14/2024    HYSTERECTOMY  2007    ovaries present bilaterally    JOINT REPLACEMENT Right 05/2021    right knee    WA ARTHRP KNE CONDYLE&PLATU MEDIAL&LAT COMPARTMENTS Right 5/19/2021    Procedure: ARTHROPLASTY KNEE TOTAL;  Surgeon: Bon Broderick MD;  Location: [facility];  Service:  Orthopedics    VT COLONOSCOPY FLX DX W/COLLJ SPEC WHEN PFRMD N/A 9/26/2017    Procedure: EGD AND COLONOSCOPY;  Surgeon: Reed Hardy MD;  Location: [facility];  Service: Gastroenterology    VT CYSTO BLADDER W/URETERAL CATHETERIZATION Right 11/10/2022    Procedure: CYSTOSCOPY RETROGRADE PYELOGRAM WITH INSERTION STENT URETERAL;  Surgeon: Burton Senior MD;  Location: [facility];  Service: Urology    VT CYSTO/URETERO W/LITHOTRIPSY &INDWELL STENT INSRT Bilateral 7/26/2021    Procedure: CYSTOSCOPY URETEROSCOPY WITH LITHOTRIPSY HOLMIUM LASER, RETROGRADE PYELOGRAM AND INSERTION STENT URETERAL;  Surgeon: Pete Lopez MD;  [facility];  Service: Urology    VT CYSTO/URETERO W/LITHOTRIPSY &INDWELL STENT INSRT Left 9/16/2021    Procedure: CYSTOSCOPY URETEROSCOPY WITH LITHOTRIPSY HOLMIUM LASER, RETROGRADE PYELOGRAM AND INSERTION STENT URETERAL;  Surgeon: Cesar Keller MD;  Location: [facility];  Service: Urology    VT CYSTO/URETERO W/LITHOTRIPSY &INDWELL STENT INSRT Left 11/18/2021    Procedure: CYSTOSCOPY URETEROSCOPY WITH LITHOTRIPSY HOLMIUM LASER, RETROGRADE PYELOGRAM AND INSERTION STENT URETERAL;  Surgeon: Vicente Colby MD;  [facility];  Service: Urology    VT CYSTO/URETERO W/LITHOTRIPSY &INDWELL STENT INSRT Right 1/17/2023    Procedure: CYSTOSCOPY URETEROSCOPY WITH LITHOTRIPSY HOLMIUM LASER, RETROGRADE PYELOGRAM AND EXCHANGE STENT URETERAL, BASKET STONE EXTRACTION;  Surgeon: Vicente Colby MD;  [facility];  Service: Urology    VT CYSTO/URETERO W/LITHOTRIPSY &INDWELL STENT INSRT Left 4/14/2024    Procedure: CYSTOSCOPY URETEROSCOPY WITH LITHOTRIPSY HOLMIUM LASER, RETROGRADE PYELOGRAM AND INSERTION STENT URETERAL;  Surgeon: Burton Senior MD;  [facility];  Service: Urology    VT NEUROPLASTY &/TRANSPOS MEDIAN NRV CARPAL TUNNE Right 5/29/2018    Procedure: CARPAL TUNNEL RELEASE;  Surgeon: Amish Parkinson DO;  Location: [facility];  Service: Orthopedics    REMOVAL URETERAL  STENT Right 2021    Procedure: REMOVAL STENT URETERAL;  Surgeon: Cesar Keller MD;  [facility];  Service: Urology    TONSILLECTOMY      TUBAL LIGATION      URETEROSCOPY  2021    Procedure: URETEROSCOPY, LASER AND BASKET STONE EXTRACTION;  Surgeon: Pete Lopez MD;  Location:  MAIN OR;  Service: Urology    WISDOM TOOTH EXTRACTION       Family History   Problem Relation Age of Onset    Diabetes type II Mother     Asthma Mother     Stroke Father     Pancreatic cancer Father 73    Diabetes type II Father     No Known Problems Sister     No Known Problems Sister     No Known Problems Sister     No Known Problems Sister     No Known Problems Maternal Grandmother     No Known Problems Maternal Grandfather     No Known Problems Paternal Grandmother     No Known Problems Paternal Grandfather     No Known Problems Maternal Aunt     No Known Problems Paternal Aunt     No Known Problems Brother     No Known Problems Son     No Known Problems Son      Social History     Socioeconomic History    Marital status: /Civil Union     Spouse name: None    Number of children: None    Years of education: None    Highest education level: None   Occupational History    None   Tobacco Use    Smoking status: Former     Current packs/day: 0.00     Average packs/day: 0.3 packs/day for 34.2 years (8.6 ttl pk-yrs)     Types: Cigarettes     Start date:      Quit date: 3/16/2018     Years since quittin.0    Smokeless tobacco: Never    Tobacco comments:     approx less than  half a pack   Vaping Use    Vaping status: Never Used   Substance and Sexual Activity    Alcohol use: Not Currently     Comment: socially    Drug use: Never     Comment: Denies    Sexual activity: Not Currently     Partners: Male   Other Topics Concern    None   Social History Narrative    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      Social Determinants of Health     Financial Resource Strain: Not on file   Food Insecurity: Not on file   Transportation Needs:  Not on file   Physical Activity: Not on file   Stress: Not on file   Social Connections: Not on file   Intimate Partner Violence: Not on file   Housing Stability: Not on file       The following portions of the patient's history were reviewed and updated as appropriate: allergies, current medications, past family history, past medical history, past social history, past surgical history and problem list    LEONARD Mcfadden  Date: 4/15/2024 Time: 12:22 PM

## 2024-04-15 NOTE — ED ATTENDING ATTESTATION
4/14/2024   IKelley MD, saw and evaluated the patient. I have discussed the patient with the resident/non-physician practitioner and agree with the resident's/non-physician practitioner's findings, Plan of Care, and MDM as documented in the resident's/non-physician practitioner's note, except where noted. All available labs and Radiology studies were reviewed.  I was present for key portions of any procedure(s) performed by the resident/non-physician practitioner and I was immediately available to provide assistance.       At this point I agree with the current assessment done in the Emergency Department.  I have conducted an independent evaluation of this patient a history and physical is as follows:    Unit/Bed#: [facility] Encounter: [id number]    Chief Complaint   Patient presents with    Flank Pain     Reports onset of lower back pain more onto L side, reports feeling unwell all week and decreased appetite, denies any urinary symp. Hx kidney stones     Left flank pain x 2 days, feels unwell x 1 week, abdominal bloating, cough, congestion, fever up to 100.8 now resolved.    Left CVA tenderness.       Physical Exam  ED Triage Vitals [04/14/24 1857]   Temperature Pulse Respirations Blood Pressure SpO2   98.9 °F (37.2 °C) (!) 120 20 123/79 98 %      Temp Source Heart Rate Source Patient Position - Orthostatic VS BP Location FiO2 (%)   Temporal Monitor Sitting Left arm --      Pain Score       --           Vital signs and nursing notes reviewed    ** IF YOU ARE READING THIS, THE EXAM TEMPLATE BELOW HAS NOT BEEN UPDATED**    CONSTITUTIONAL: female appearing stated age resting in bed, in no acute distress  HEENT: atraumatic, normocephalic. Sclera anicteric, conjunctiva are not injected. Moist oral mucosa  CARDIOVASCULAR/CHEST: RRR, no M/R/G. 2+ radial pulses  PULMONARY: Breathing comfortably on RA. Breath sounds are equal and clear to auscultation  ABDOMEN: non-distended. BS present, normoactive. Non-tender  MSK:  moves all extremities, no deformities, no peripheral edema, no calf asymmetry  NEURO: Awake, alert, and oriented x 3. Face symmetric. Moves all extremities spontaneously. No focal neurologic deficits  SKIN: Warm, appears well-perfused  MENTAL STATUS: Normal affect      Labs and Imaging  Labs Reviewed   UA W REFLEX TO MICROSCOPIC WITH REFLEX TO CULTURE - Abnormal       Result Value Ref Range Status    Color, UA Yellow   Final    Clarity, UA Extra Turbid   Final    Specific Gravity, UA 1.013  1.003 - 1.030 Final    pH, UA 6.5  4.5, 5.0, 5.5, 6.0, 6.5, 7.0, 7.5, 8.0 Final    Leukocytes, UA Large (*) Negative Final    Nitrite, UA Positive (*) Negative Final    Protein, UA 30 (1+) (*) Negative mg/dl Final    Glucose, UA Negative  Negative mg/dl Final    Ketones, UA Negative  Negative mg/dl Final    Urobilinogen, UA <2.0  <2.0 mg/dl mg/dl Final    Bilirubin, UA Negative  Negative Final    Occult Blood, UA Small (*) Negative Final   URINE MICROSCOPIC - Abnormal    RBC, UA 20-30 (*) None Seen, 1-2 /hpf Final    WBC, UA Innumerable (*) None Seen, 1-2 /hpf Final    Epithelial Cells None Seen  None Seen, Occasional /hpf Final    Bacteria, UA Occasional  None Seen, Occasional /hpf Final    MUCUS THREADS Occasional (*) None Seen Final    WBC Clumps Present   Final   URINE CULTURE       No orders to display         Procedures  Procedures        ED Course  Medications - No data to display

## 2024-04-16 LAB
ANION GAP SERPL CALCULATED.3IONS-SCNC: 7 MMOL/L (ref 4–13)
BACTERIA UR CULT: ABNORMAL
BUN SERPL-MCNC: 16 MG/DL (ref 5–25)
CALCIUM SERPL-MCNC: 8.4 MG/DL (ref 8.4–10.2)
CHLORIDE SERPL-SCNC: 107 MMOL/L (ref 96–108)
CO2 SERPL-SCNC: 27 MMOL/L (ref 21–32)
CREAT SERPL-MCNC: 0.79 MG/DL (ref 0.6–1.3)
ERYTHROCYTE [DISTWIDTH] IN BLOOD BY AUTOMATED COUNT: 14.2 % (ref 11.6–15.1)
GFR SERPL CREATININE-BSD FRML MDRD: 82 ML/MIN/1.73SQ M
GLUCOSE SERPL-MCNC: 100 MG/DL (ref 65–140)
GLUCOSE SERPL-MCNC: 108 MG/DL (ref 65–140)
HCT VFR BLD AUTO: 38.8 % (ref 34.8–46.1)
HGB BLD-MCNC: 12.5 G/DL (ref 11.5–15.4)
MCH RBC QN AUTO: 30.2 PG (ref 26.8–34.3)
MCHC RBC AUTO-ENTMCNC: 32.2 G/DL (ref 31.4–37.4)
MCV RBC AUTO: 94 FL (ref 82–98)
PLATELET # BLD AUTO: 314 THOUSANDS/UL (ref 149–390)
PMV BLD AUTO: 11 FL (ref 8.9–12.7)
POTASSIUM SERPL-SCNC: 3.7 MMOL/L (ref 3.5–5.3)
RBC # BLD AUTO: 4.14 MILLION/UL (ref 3.81–5.12)
SODIUM SERPL-SCNC: 141 MMOL/L (ref 135–147)
WBC # BLD AUTO: 12.79 THOUSAND/UL (ref 4.31–10.16)

## 2024-04-16 PROCEDURE — 80048 BASIC METABOLIC PNL TOTAL CA: CPT | Performed by: PHYSICIAN ASSISTANT

## 2024-04-16 PROCEDURE — 85027 COMPLETE CBC AUTOMATED: CPT | Performed by: PHYSICIAN ASSISTANT

## 2024-04-16 PROCEDURE — 99239 HOSP IP/OBS DSCHRG MGMT >30: CPT | Performed by: INTERNAL MEDICINE

## 2024-04-16 PROCEDURE — 82948 REAGENT STRIP/BLOOD GLUCOSE: CPT

## 2024-04-16 RX ORDER — CIPROFLOXACIN 500 MG/1
500 TABLET, FILM COATED ORAL EVERY 12 HOURS SCHEDULED
Qty: 10 TABLET | Refills: 0 | Status: SHIPPED | OUTPATIENT
Start: 2024-04-16 | End: 2024-04-21

## 2024-04-16 RX ADMIN — ATORVASTATIN CALCIUM 80 MG: 80 TABLET, FILM COATED ORAL at 08:05

## 2024-04-16 RX ADMIN — ENOXAPARIN SODIUM 40 MG: 40 INJECTION SUBCUTANEOUS at 08:05

## 2024-04-16 RX ADMIN — PANTOPRAZOLE SODIUM 40 MG: 40 TABLET, DELAYED RELEASE ORAL at 05:23

## 2024-04-16 RX ADMIN — B-COMPLEX W/ C & FOLIC ACID TAB 1 TABLET: TAB at 08:05

## 2024-04-16 RX ADMIN — SERTRALINE HYDROCHLORIDE 50 MG: 50 TABLET ORAL at 08:05

## 2024-04-16 RX ADMIN — Medication 2000 UNITS: at 08:05

## 2024-04-16 NOTE — ASSESSMENT & PLAN NOTE
POA with tachycardia, leukocytosis with suspected urinary source in the setting of obstructing ureteral stone  Urine culture positive for Pseudomonas  Sepsis has resolved  Patient will be discharged home on oral Cipro x 5 days

## 2024-04-16 NOTE — DISCHARGE SUMMARY
[facility]  Discharge- Sakshi Tirado 1965, 58 y.o. female MRN: [id number]  [facility] Encounter: [id number]  Primary Care Provider: LEONARD Haynes   Date and time admitted to hospital: 4/14/2024  7:34 PM    * Sepsis without acute organ dysfunction (HCC)  Assessment & Plan  POA with tachycardia, leukocytosis with suspected urinary source in the setting of obstructing ureteral stone  Urine culture positive for Pseudomonas  Sepsis has resolved  Patient will be discharged home on oral Cipro x 5 days    Hydronephrosis with ureteropelvic junction (UPJ) obstruction  Assessment & Plan  Presented with 2 days of left flank pain and 1 week history of nausea poor appetite and fevers at home.  Also endorsing urinary urgency.  CT a/p revealing: Bilateral nephrolithiasis with 5 x 5 x 6 mm calculus at the left ureterovesicular junction with associated severe left hydroureteronephrosis.   Urology consult and recommendations appreciated  S/p L ureteral stent placement on 4/14  Outpatient follow-up for definitive stone management  Renal function stable, antibiotic therapy as above    Anxiety  Assessment & Plan  Continue PTA Zoloft    Primary hypertension  Assessment & Plan    Continue home dose lisinopril    Type 2 diabetes mellitus with diabetic chronic kidney disease (HCC)  Assessment & Plan  Lab Results   Component Value Date    HGBA1C 6.7 (A) 11/08/2023       Recent Labs     04/15/24  1130 04/15/24  1632 04/15/24  2100 04/16/24  0721   POCGLU 148* 135 143* 108         Blood Sugar Average: Last 72 hrs:  (P) [id number]  Well-controlled based off most recent A1c  Hold metformin while inpatient, resume on discharge  Accu-Cheks with sliding scale insulin  Carb controlled diet      GERD (gastroesophageal reflux disease)  Assessment & Plan  Continue PPI        Medical Problems       Resolved Problems  Date Reviewed: 4/16/2024   None       Discharging Physician /  "Practitioner: Arian Tim DO  PCP: LEONARD Haynes  Admission Date:   Admission Orders (From admission, onward)       Ordered        04/15/24 0039  INPATIENT ADMISSION  Once                          Discharge Date: 04/16/24    Consultations During Hospital Stay:  Urology    Procedures Performed:   Cystoscopy with left ureteral stent placement    Significant Findings / Test Results:   As above    Incidental Findings:   none    Test Results Pending at Discharge (will require follow up):   none     Outpatient Tests Requested:  none    Complications:  none    Reason for Admission:   Sepsis secondary to UTI  Nephrolithiasis with severe left hydroureteronephrosis    Hospital Course:   Sakshi Tirado is a 58 y.o. female patient who originally presented to the hospital on 4/14/2024 due to left flank pain for 2 days  Found to have obstructive ureteral stone  Status post cystoscopy with left ureteral stent placement without complication  Patient clinically improving on above treatment and IV antibiotic  Urine culture was positive for Pseudomonas  Currently patient is in stable condition, she will be discharged on oral Cipro x 5 days with outpatient urology follow-up for definitive stone management      Please see above list of diagnoses and related plan for additional information.   Work excuse was given until Monday 4/22    Condition at Discharge: stable    Discharge Day Visit / Exam:   Subjective:    Patient seen and examined  Comfortable in bed  No event overnight  Denied abdominal pain  No nausea vomiting or diarrhea  Vitals: Blood Pressure: 103/62 (04/16/24 0723)  Pulse: 65 (04/16/24 0723)  Temperature: 98.1 °F (36.7 °C) (04/16/24 0723)  Temp Source: Oral (04/15/24 0111)  Respirations: 18 (04/16/24 0723)  Height: 5' 3\" (160 cm) (04/15/24 0111)  Weight - Scale: 70.7 kg (155 lb 13.8 oz) (04/15/24 0111)  SpO2: 96 % (04/16/24 0723)  Exam:   Physical Exam  Vitals reviewed.   Constitutional:       Appearance: Normal " appearance. She is not ill-appearing.   HENT:      Head: Normocephalic and atraumatic.      Mouth/Throat:      Mouth: Mucous membranes are moist.      Pharynx: No oropharyngeal exudate.   Eyes:      Extraocular Movements: Extraocular movements intact.      Conjunctiva/sclera: Conjunctivae normal.      Pupils: Pupils are equal, round, and reactive to light.   Cardiovascular:      Rate and Rhythm: Normal rate and regular rhythm.      Pulses: Normal pulses.      Heart sounds: Normal heart sounds. No murmur heard.  Pulmonary:      Effort: Pulmonary effort is normal. No respiratory distress.      Breath sounds: Normal breath sounds. No wheezing.   Abdominal:      General: Bowel sounds are normal. There is no distension.      Palpations: Abdomen is soft.      Tenderness: There is no abdominal tenderness.   Musculoskeletal:         General: Normal range of motion.      Cervical back: Normal range of motion and neck supple.      Right lower leg: No edema.      Left lower leg: No edema.   Skin:     General: Skin is warm and dry.      Findings: No rash.   Neurological:      General: No focal deficit present.      Mental Status: She is alert and oriented to person, place, and time.      Cranial Nerves: No cranial nerve deficit.   Psychiatric:         Mood and Affect: Mood normal.            Discussion with Family: Patient    Discharge instructions/Information to patient and family:   See after visit summary for information provided to patient and family.      Provisions for Follow-Up Care:  See after visit summary for information related to follow-up care and any pertinent home health orders.      Mobility at time of Discharge:   Basic Mobility Inpatient Raw Score: 24  JH-HLM Goal: 8: Walk 250 feet or more  JH-HLM Achieved: 7: Walk 25 feet or more  HLM Goal NOT achieved. Continue to encourage mobility in post discharge setting.     Disposition:   Home    Planned Readmission: no     Discharge Statement:  I spent 45 minutes  discharging the patient. This time was spent on the day of discharge. I had direct contact with the patient on the day of discharge. Greater than 50% of the total time was spent examining patient, answering all patient questions, arranging and discussing plan of care with patient as well as directly providing post-discharge instructions.  Additional time then spent on discharge activities.    Discharge Medications:  See after visit summary for reconciled discharge medications provided to patient and/or family.      **Please Note: This note may have been constructed using a voice recognition system**

## 2024-04-16 NOTE — CASE MANAGEMENT
Case Management Discharge Planning Note    Patient name Sakshi Tirado  Location [facility] 934/[facility] 934-01 MRN [id number]  : 1965 Date 2024       Current Admission Date: 2024  Current Admission Diagnosis:Sepsis without acute organ dysfunction (HCC)   Patient Active Problem List    Diagnosis Date Noted    Sepsis without acute organ dysfunction (HCC) 04/15/2024    Urine test positive for microalbuminuria 2023    Persistent proteinuria 2023    Elevated LFTs 2022    Hypocitraturia 2022    CKD (chronic kidney disease) stage 2, GFR 60-89 ml/min 2022    Anxiety     Aftercare following right knee joint replacement surgery 2021    Status post total knee replacement, right 2021    Hydronephrosis with ureteropelvic junction (UPJ) obstruction 2020    Stress incontinence, female 2020    Primary osteoarthritis of both knees 2019    Seasonal allergies 2019    BMI 30.0-30.9,adult 2019    Type 2 diabetes mellitus with diabetic chronic kidney disease (HCC) 10/16/2018    Primary hypertension 10/16/2018    Vitamin D deficiency 2018    Bilateral carpal tunnel syndrome 2018    Hypercholesteremia 2017    GERD (gastroesophageal reflux disease) 2017    Atherosclerosis of arteries 2017    Follicular cyst of ovary 2014      LOS (days): 1  Geometric Mean LOS (GMLOS) (days):   Days to GMLOS:     OBJECTIVE:  Risk of Unplanned Readmission Score: 7.49         Current admission status: Inpatient   Preferred Pharmacy:   [facility] [address] [address]  Phone: [phone] Fax: [phone]    [id number] [place] [facility] [address]  [address] [facility] [address]  Phone: [phone] Fax: [phone]    [facility] [place] - [address]  [address]  [place]  [address]  Phone: [phone] Fax: [phone]    Primary Care Provider: LEONARD Haynes    Primary Insurance: BLUE CROSS  Secondary Insurance:     DISCHARGE DETAILS:           IMM Given (Date):: 04/16/24  IMM Given to:: Patient     Additional Comments: DC order noted.  No CM needs noted.        IMM reviewed with Patient who express understanding and agreement with discharge plan.

## 2024-04-16 NOTE — PLAN OF CARE
Problem: PAIN - ADULT  Goal: Verbalizes/displays adequate comfort level or baseline comfort level  Description: Interventions:  - Encourage patient to monitor pain and request assistance  - Assess pain using appropriate pain scale  - Administer analgesics based on type and severity of pain and evaluate response  - Implement non-pharmacological measures as appropriate and evaluate response  - Consider cultural and social influences on pain and pain management  - Notify physician/advanced practitioner if interventions unsuccessful or patient reports new pain  Outcome: Progressing     Problem: INFECTION - ADULT  Goal: Absence or prevention of progression during hospitalization  Description: INTERVENTIONS:  - Assess and monitor for signs and symptoms of infection  - Monitor lab/diagnostic results  - Monitor all insertion sites, i.e. indwelling lines, tubes, and drains  - Monitor endotracheal if appropriate and nasal secretions for changes in amount and color  - Mittie appropriate cooling/warming therapies per order  - Administer medications as ordered  - Instruct and encourage patient and family to use good hand hygiene technique  - Identify and instruct in appropriate isolation precautions for identified infection/condition  Outcome: Progressing     Problem: Nutrition/Hydration-ADULT  Goal: Nutrient/Hydration intake appropriate for improving, restoring or maintaining nutritional needs  Description: Monitor and assess patient's nutrition/hydration status for malnutrition. Collaborate with interdisciplinary team and initiate plan and interventions as ordered.  Monitor patient's weight and dietary intake as ordered or per policy. Utilize nutrition screening tool and intervene as necessary. Determine patient's food preferences and provide high-protein, high-caloric foods as appropriate.     INTERVENTIONS:  - Monitor oral intake, urinary output, labs, and treatment plans  - Assess nutrition and hydration status and  recommend course of action  - Evaluate amount of meals eaten  - Assist patient with eating if necessary   - Allow adequate time for meals  - Recommend/ encourage appropriate diets, oral nutritional supplements, and vitamin/mineral supplements  - Order, calculate, and assess calorie counts as needed  - Recommend, monitor, and adjust tube feedings and TPN/PPN based on assessed needs  - Assess need for intravenous fluids  - Provide specific nutrition/hydration education as appropriate  - Include patient/family/caregiver in decisions related to nutrition  Outcome: Progressing

## 2024-04-16 NOTE — UTILIZATION REVIEW
NOTIFICATION OF INPATIENT ADMISSION      AUTHORIZATION REQUEST   SERVICING FACILITY:   [facility]  Address: [address] [place] [address]  Tax ID: [id number]  NPI: [id number] ATTENDING PROVIDER:  Attending Name and NPI#: Arian Tim Do [[id number]]  Address: [address] [place] [address]  Phone: [phone]   ADMISSION INFORMATION:  Place of Service: Inpatient [facility]  Place of Service Code: 21  Inpatient Admission Date/Time: 4/15/24 12:39 AM  Discharge Date/Time: 4/16/2024 11:21 AM  Admitting Diagnosis Code/Description:  Nephrolithiasis [N20.0]  Flank pain [R10.9]     UTILIZATION REVIEW CONTACT:  Amber Ward Utilization   Network Utilization Review Department  Phone: [phone]  Fax: [phone]  Email: [email or web address]  Contact for approvals/pending authorizations, clinical reviews, and discharge.     PHYSICIAN ADVISORY SERVICES:  Medical Necessity Denial & Yqcf-ki-Nkyu Review  Phone: [phone]  Fax: [phone]  Email: [email or web address]     DISCHARGE SUPPORT TEAM:  For Patients Discharge Needs & Updates  Phone: [phone] opt. 2 Fax: [phone]  Email: [email or web address]

## 2024-04-16 NOTE — ASSESSMENT & PLAN NOTE
Lab Results   Component Value Date    HGBA1C 6.7 (A) 11/08/2023       Recent Labs     04/15/24  1130 04/15/24  1632 04/15/24  2100 04/16/24  0721   POCGLU 148* 135 143* 108         Blood Sugar Average: Last 72 hrs:  (P) [id number]  Well-controlled based off most recent A1c  Hold metformin while inpatient, resume on discharge  Accu-Cheks with sliding scale insulin  Carb controlled diet

## 2024-04-16 NOTE — UTILIZATION REVIEW
Initial Clinical Review    Admission: Date/Time/Statement:   Admission Orders (From admission, onward)       Ordered        04/15/24 0039  INPATIENT ADMISSION  Once            Pending  INPATIENT ADMISSION  Once                          Orders Placed This Encounter   Procedures    INPATIENT ADMISSION     Standing Status:   Standing     Number of Occurrences:   1     Order Specific Question:   Level of Care     Answer:   Med Surg [16]     Order Specific Question:   Estimated length of stay     Answer:   More than 2 Midnights     Order Specific Question:   Certification     Answer:   I certify that inpatient services are medically necessary for this patient for a duration of greater than two midnights. See H&P and MD Progress Notes for additional information about the patient's course of treatment.     ED Arrival Information       Expected   -    Arrival   4/14/2024 18:51    Acuity   Urgent              Means of arrival   Walk-In    Escorted by   Self    Service   Hospitalist    Admission type   Emergency              Arrival complaint   Flank Pain             Chief Complaint   Patient presents with    Flank Pain     Reports onset of lower back pain more onto L side, reports feeling unwell all week and decreased appetite, denies any urinary symp. Hx kidney stones       Initial Presentation: 58 y.o. female medical hx significant for htn, DM II, nephrolithiasis presents to the ed with c/o L flank pain x 2 days. She also reports that over the past 1 week she has been having nausea, decreased appetite. She also reports of urinary urgency. She reports of fever as well. Plan: Inpatient admission for evaluation and treatment of sepsis, nephrolithiasis, anxiety, HTN, DM, GERD: IV ceftriaxone, Urology consult, follow urine and blood cultures, continue Zoloft and lisinopril, start SSI, continue PPI.     Urology consult: left-sided flank pain for 2 days associated with nausea decreased appetite and fatigue. She said she had a  temperature of 100.8 within the last week. In the emergency room leukocytosis of 17,000 and urinalysis showed nitrite positive urine. Plan for ureteral stent placement.     Procedure(s):  Left - CYSTOSCOPY URETEROSCOPY WITH LITHOTRIPSY HOLMIUM LASER. RETROGRADE PYELOGRAM AND INSERTION STENT URETERAL    Date: 4/16 Day: 2: Pt discharged to home with self care on 4/16.      ED Triage Vitals   Temperature Pulse Respirations Blood Pressure SpO2   04/14/24 1857 04/14/24 1857 04/14/24 1857 04/14/24 1857 04/14/24 1857   98.9 °F (37.2 °C) (!) 120 20 123/79 98 %      Temp Source Heart Rate Source Patient Position - Orthostatic VS BP Location FiO2 (%)   04/14/24 1857 04/14/24 1857 04/14/24 1857 04/14/24 1857 --   Temporal Monitor Sitting Left arm       Pain Score       04/14/24 2232       No Pain          Wt Readings from Last 1 Encounters:   04/15/24 70.7 kg (155 lb 13.8 oz)     Additional Vital Signs:     Date/Time Temp Pulse Resp BP MAP (mmHg) SpO2 Calculated FIO2 (%) - Nasal Cannula Nasal Cannula O2 Flow Rate (L/min) O2 Device   04/16/24 07:23:34 98.1 °F (36.7 °C) 65 18 103/62 76 96 % -- -- --   04/15/24 22:21:24 98.1 °F (36.7 °C) 77 20 109/78 88 97 % -- -- --   04/15/24 2041 -- -- -- -- -- -- -- -- None (Room air)   04/15/24 16:20:11 98.7 °F (37.1 °C) 63 21 107/79 88 96 % -- -- --   04/15/24 0802 -- -- -- -- -- -- -- -- None (Room air)   04/15/24 07:41:27 98.3 °F (36.8 °C) 79 17 96/55 69 98 % -- -- --   04/15/24 0111 97.7 °F (36.5 °C) 110 Abnormal  24 Abnormal  120/63 -- -- -- -- --   04/14/24 2330 -- -- -- -- -- 91 % -- -- None (Room air)   04/14/24 2313 97.7 °F (36.5 °C) 110 Abnormal  24 Abnormal  120/63 -- -- -- -- --   04/14/24 23:01:31 97.7 °F (36.5 °C) 110 Abnormal  -- 120/63 82 90 % -- -- None (Room air)   04/14/24 2247 98.7 °F (37.1 °C) 106 Abnormal  24 Abnormal  102/64 81 94 % -- -- None (Room air)   04/14/24 2234 99 °F (37.2 °C) 109 Abnormal  14 118/65 -- 98 % 44 6 L/min Nasal cannula   04/14/24 2232 99 °F (37.2  °C) 106 Abnormal  24 Abnormal  118/65 98 100 % 36 4 L/min Nasal cannula   04/14/24 2030 -- 100 -- 125/60 86 97 % -- -- --   04/14/24 1945 -- 106 Abnormal  17 133/60 86 97 % -- -- --   04/14/24 1939 -- 112 Abnormal  17 133/60 -- 97 % -- -- None (Room air)     Pertinent Labs/Diagnostic Test Results:   FL retrograde pyelogram   Final Result by Ronald Roach MD (04/15 0842)      Fluoroscopic guidance provided for left retrograde pyelogram.      Please see separate procedure report for additional details.         Workstation performed: [id number]         CT renal stone study abdomen pelvis wo contrast   Final Result by Gelacio Davis MD (04/14 2058)      Bilateral nephrolithiasis with 5 x 5 x 6 mm calculus at the left ureterovesicular junction with associated severe left hydroureteronephrosis.         Workstation performed: [id number]               Results from last 7 days   Lab Units 04/16/24  0624 04/15/24  0437 04/14/24  2029   WBC Thousand/uL 12.79* 13.29* 16.78*   HEMOGLOBIN g/dL 12.5 13.8 13.3   HEMATOCRIT % 38.8 43.0 40.9   PLATELETS Thousands/uL 314 315 328   TOTAL NEUT ABS Thousands/µL  --   --  12.09*         Results from last 7 days   Lab Units 04/16/24  0624 04/15/24  0437 04/14/24  2029   SODIUM mmol/L 141 142 137   POTASSIUM mmol/L 3.7 4.2 3.7   CHLORIDE mmol/L 107 102 100   CO2 mmol/L 27 30 30   ANION GAP mmol/L 7 10 7   BUN mg/dL 16 16 19   CREATININE mg/dL 0.79 0.83 0.89   EGFR ml/min/1.73sq m 82 77 71   CALCIUM mg/dL 8.4 8.9 9.1     Results from last 7 days   Lab Units 04/14/24 2029   AST U/L 15   ALT U/L 20   ALK PHOS U/L 92   TOTAL PROTEIN g/dL 6.7   ALBUMIN g/dL 3.9   TOTAL BILIRUBIN mg/dL 0.45     Results from last 7 days   Lab Units 04/16/24  0721 04/15/24  2100 04/15/24  1632 04/15/24  1130 04/15/24  0740 04/14/24  2238   POC GLUCOSE mg/dl 108 143* 135 148* 144* 109     Results from last 7 days   Lab Units 04/16/24  0624 04/15/24  0437 04/14/24  2029   GLUCOSE RANDOM mg/dL 100 153* 137            Results from last 7 days   Lab Units 04/14/24 2133   PROCALCITONIN ng/ml 0.07           Results from last 7 days   Lab Units 04/14/24  1904   CLARITY UA  Extra Turbid   COLOR UA  Yellow   SPEC GRAV UA  1.013   PH UA  6.5   GLUCOSE UA mg/dl Negative   KETONES UA mg/dl Negative   BLOOD UA  Small*   PROTEIN UA mg/dl 30 (1+)*   NITRITE UA  Positive*   BILIRUBIN UA  Negative   UROBILINOGEN UA (BE) mg/dl <2.0   LEUKOCYTES UA  Large*   WBC UA /hpf Innumerable*   RBC UA /hpf 20-30*   BACTERIA UA /hpf Occasional   EPITHELIAL CELLS WET PREP /hpf None Seen   MUCUS THREADS  Occasional*           Results from last 7 days   Lab Units 04/14/24 2133 04/14/24 1904   BLOOD CULTURE  No Growth at 24 hrs.  No Growth at 24 hrs.  --    URINE CULTURE   --  >100,000 cfu/ml Pseudomonas aeruginosa*         ED Treatment:   Medication Administration from 04/14/2024 1851 to 04/14/2024 2142         Date/Time Order Dose Route Action     04/14/2024 2029 EDT ceftriaxone (ROCEPHIN) 2 g/50 mL in dextrose IVPB 2,000 mg Intravenous New Bag          Past Medical History:   Diagnosis Date    Anxiety     Asthma     Colon polyp     Depression     Diabetes mellitus (HCC)     Diverticulitis     Diverticulitis of colon     Follicular cyst of ovary 12/23/2014    Former smoker     GERD (gastroesophageal reflux disease)     Glycosuria     Headache     Hyperlipidemia     Hypertension     Kidney calculi 1/11/2019    Kidney stone     Obesity     Ovarian cyst     Overactive bladder     Overweight     Pulmonary nodule     Renal calculus     Seasonal allergies     Tinea pedis of left foot 6/8/2020    Vertigo     Wears partial dentures     Lower plate -partial     Present on Admission:   GERD (gastroesophageal reflux disease)   Type 2 diabetes mellitus with diabetic chronic kidney disease (HCC)   Primary hypertension   Hydronephrosis with ureteropelvic junction (UPJ) obstruction   Anxiety      Admitting Diagnosis: Nephrolithiasis [N20.0]  Flank pain  [R10.9]  Age/Sex: 58 y.o. female  Admission Orders:  Scheduled Medications:  atorvastatin, 80 mg, Oral, Daily  cefTRIAXone, 1,000 mg, Intravenous, Q24H  Cholecalciferol, 2,000 Units, Oral, Daily  enoxaparin, 40 mg, Subcutaneous, Daily  insulin lispro, 1-6 Units, Subcutaneous, HS  insulin lispro, 2-12 Units, Subcutaneous, TID AC  lisinopril, 5 mg, Oral, Daily  multivitamin stress formula, 1 tablet, Oral, Daily  pantoprazole, 40 mg, Oral, Early Morning  sertraline, 50 mg, Oral, Daily      Continuous IV Infusions:     PRN Meds:  acetaminophen, 650 mg, Oral, Q6H PRN  ondansetron, 4 mg, Intravenous, Q6H PRN        IP CONSULT TO UROLOGY  IP CONSULT TO UROLOGY    Network Utilization Review Department  ATTENTION: Please call with any questions or concerns to [phone] and carefully listen to the prompts so that you are directed to the right person. All voicemails are confidential.   For Discharge needs, contact Care Management DC Support Team at [phone] opt. 2  Send all requests for admission clinical reviews, approved or denied determinations and any other requests to dedicated fax number below belonging to the campus where the patient is receiving treatment. List of dedicated fax numbers for the Facilities:  FACILITY NAME UR FAX NUMBER   ADMISSION DENIALS (Administrative/Medical Necessity) [phone]   DISCHARGE SUPPORT TEAM (NETWORK) [phone]   PARENT CHILD HEALTH (Maternity/NICU/Pediatrics) [phone]   [facility] [phone]   [facility] [phone]   [facility] [phone]   [facility] [phone]   [facility] [phone]   [facility] [phone]   [facility] [phone]   [facility] [phone]   [facility] [phone]   [facility] -  [facility] [phone]   General acute hospital [phone]   [facility] [phone]

## 2024-04-16 NOTE — ANESTHESIA POSTPROCEDURE EVALUATION
Post-Op Assessment Note    CV Status:  Stable    Pain management: adequate       Mental Status:  Alert and awake   Hydration Status:  Euvolemic   PONV Controlled:  Controlled   Airway Patency:  Patent     Post Op Vitals Reviewed: Yes    No anethesia notable event occurred.    Staff: Anesthesiologist, with CRNAs               BP      Temp      Pulse     Resp      SpO2

## 2024-04-17 ENCOUNTER — PREP FOR PROCEDURE (OUTPATIENT)
Dept: UROLOGY | Facility: AMBULATORY SURGERY CENTER | Age: 59
End: 2024-04-17

## 2024-04-17 ENCOUNTER — TRANSITIONAL CARE MANAGEMENT (OUTPATIENT)
Dept: INTERNAL MEDICINE CLINIC | Facility: OTHER | Age: 59
End: 2024-04-17

## 2024-04-17 ENCOUNTER — TELEPHONE (OUTPATIENT)
Dept: UROLOGY | Facility: AMBULATORY SURGERY CENTER | Age: 59
End: 2024-04-17

## 2024-04-17 DIAGNOSIS — E11.22 TYPE 2 DIABETES MELLITUS WITH CHRONIC KIDNEY DISEASE, WITHOUT LONG-TERM CURRENT USE OF INSULIN, UNSPECIFIED CKD STAGE (HCC): ICD-10-CM

## 2024-04-17 DIAGNOSIS — N20.0 NEPHROLITHIASIS: Primary | ICD-10-CM

## 2024-04-17 DIAGNOSIS — R39.89 SUSPECTED UTI: ICD-10-CM

## 2024-04-17 DIAGNOSIS — Z01.812 PRE-OPERATIVE LABORATORY EXAMINATION: ICD-10-CM

## 2024-04-17 LAB — BACTERIA UR CULT: ABNORMAL

## 2024-04-17 NOTE — TELEPHONE ENCOUNTER
Post Op Note    Sakshi Tirado is a 58 y.o. female s/p   CYSTOSCOPY URETEROSCOPY WITH LITHOTRIPSY HOLMIUM LASER, RETROGRADE PYELOGRAM AND INSERTION STENT URETERAL (Left: Bladder)    performed 04/14/2024.  Sakshi Tirado is a patient of Dr. Dr. Senior and is seen at the [place] office.     How would you rate your pain on a scale from 1 to 10, 10 being the worst pain ever? 0  Have you had a fever? No  Do you have any difficulty urinating? No  Do you have any other questions or concerns that I can address at this time? Patient reports no pain. Did get surgery scheduled. Has appt with PCP for this Friday coming and for BW. States is feeling fine. No questions or concerns at this time. Advised to contact office with any questions or concerns. Verbalized understanding.

## 2024-04-17 NOTE — UTILIZATION REVIEW
NOTIFICATION OF ADMISSION DISCHARGE   This is a Notification of Discharge from [facility]. Please be advised that this patient has been discharge from our facility. Below you will find the admission and discharge date and time including the patient’s disposition.   UTILIZATION REVIEW CONTACT:  Amber Ward  Utilization   Network Utilization Review Department  Phone: [phone] x carefully listen to the prompts. All voicemails are confidential.  Email: [email or web address]     ADMISSION INFORMATION  PRESENTATION DATE: 4/14/2024  7:34 PM  OBERVATION ADMISSION DATE:   INPATIENT ADMISSION DATE: 4/15/24 12:39 AM   DISCHARGE DATE: 4/16/2024 11:21 AM   DISPOSITION:Home/Self Care    Network Utilization Review Department  ATTENTION: Please call with any questions or concerns to [phone] and carefully listen to the prompts so that you are directed to the right person. All voicemails are confidential.   For Discharge needs, contact Care Management DC Support Team at [phone] opt. 2  Send all requests for admission clinical reviews, approved or denied determinations and any other requests to dedicated fax number below belonging to the campus where the patient is receiving treatment. List of dedicated fax numbers for the Facilities:  FACILITY NAME UR FAX NUMBER   ADMISSION DENIALS (Administrative/Medical Necessity) [phone]   DISCHARGE SUPPORT TEAM ([facility]) [phone]   PARENT CHILD HEALTH (Maternity/NICU/Pediatrics) [phone]   [facility] [phone]   [facility] [phone]   [facility] [phone]   [facility] [phone]   [facility] [phone]   [facility] [phone]   [facility] [phone]   [facility] [phone]    [facility] [phone]   [facility] [phone]   [facility] [phone]   [facility] [phone]

## 2024-04-17 NOTE — TELEPHONE ENCOUNTER
Spoke with patient and confirmed surgery date of: 5/3/24  Type of surgery: cysto/ L URS/ L RGP/ L Stent  Operating physician: Dr. Senior  [facility]    Verbally went over prep with patient on: 4/17/24  NPO  Bowel prep? No  Hospital calls afternoon prior with arrival time -Calls Friday afternoon for Monday surgeries  Patient needs ride to and from surgery (outpatient/inpatient)   Pre-op testing to be done 2 weeks prior to surgery  Urine culture/ A1C  Blood thinners:   Aspirin/ vitamins  Clearances needed: none    Mailed/emailed to patient on:  Copy of packet scanned into Media on: 4/17/24  Labs in packet  Soap / Bowel prep in packet  Pre-op & Post-op in packet  N/A    Consent: on admit     English

## 2024-04-19 ENCOUNTER — APPOINTMENT (OUTPATIENT)
Dept: LAB | Facility: IMAGING CENTER | Age: 59
End: 2024-04-19
Payer: COMMERCIAL

## 2024-04-19 ENCOUNTER — OFFICE VISIT (OUTPATIENT)
Age: 59
End: 2024-04-19
Payer: COMMERCIAL

## 2024-04-19 VITALS
TEMPERATURE: 97.8 F | HEART RATE: 84 BPM | WEIGHT: 155.8 LBS | OXYGEN SATURATION: 97 % | BODY MASS INDEX: 27.61 KG/M2 | HEIGHT: 63 IN | SYSTOLIC BLOOD PRESSURE: 110 MMHG | DIASTOLIC BLOOD PRESSURE: 70 MMHG

## 2024-04-19 DIAGNOSIS — Z13.228 SCREENING FOR METABOLIC DISORDER: ICD-10-CM

## 2024-04-19 DIAGNOSIS — F41.9 ANXIETY: ICD-10-CM

## 2024-04-19 DIAGNOSIS — I10 PRIMARY HYPERTENSION: Primary | ICD-10-CM

## 2024-04-19 DIAGNOSIS — R39.89 SUSPECTED UTI: ICD-10-CM

## 2024-04-19 DIAGNOSIS — K21.9 GASTROESOPHAGEAL REFLUX DISEASE WITHOUT ESOPHAGITIS: ICD-10-CM

## 2024-04-19 DIAGNOSIS — N18.2 CKD (CHRONIC KIDNEY DISEASE) STAGE 2, GFR 60-89 ML/MIN: ICD-10-CM

## 2024-04-19 DIAGNOSIS — E11.22 TYPE 2 DIABETES MELLITUS WITH CHRONIC KIDNEY DISEASE, WITHOUT LONG-TERM CURRENT USE OF INSULIN, UNSPECIFIED CKD STAGE (HCC): ICD-10-CM

## 2024-04-19 DIAGNOSIS — E11.9 TYPE 2 DIABETES MELLITUS WITHOUT COMPLICATION, WITHOUT LONG-TERM CURRENT USE OF INSULIN (HCC): ICD-10-CM

## 2024-04-19 DIAGNOSIS — N20.0 NEPHROLITHIASIS: ICD-10-CM

## 2024-04-19 DIAGNOSIS — E78.00 HYPERCHOLESTEREMIA: ICD-10-CM

## 2024-04-19 DIAGNOSIS — Z01.812 PRE-OPERATIVE LABORATORY EXAMINATION: ICD-10-CM

## 2024-04-19 DIAGNOSIS — Q62.11 HYDRONEPHROSIS WITH URETEROPELVIC JUNCTION (UPJ) OBSTRUCTION: ICD-10-CM

## 2024-04-19 LAB
ALBUMIN SERPL BCP-MCNC: 3.7 G/DL (ref 3.5–5)
ALP SERPL-CCNC: 86 U/L (ref 34–104)
ALT SERPL W P-5'-P-CCNC: 32 U/L (ref 7–52)
ANION GAP SERPL CALCULATED.3IONS-SCNC: 9 MMOL/L (ref 4–13)
AST SERPL W P-5'-P-CCNC: 26 U/L (ref 13–39)
BASOPHILS # BLD AUTO: 0.06 THOUSANDS/ÂΜL (ref 0–0.1)
BASOPHILS NFR BLD AUTO: 1 % (ref 0–1)
BILIRUB SERPL-MCNC: 0.25 MG/DL (ref 0.2–1)
BUN SERPL-MCNC: 19 MG/DL (ref 5–25)
CALCIUM SERPL-MCNC: 9.4 MG/DL (ref 8.4–10.2)
CHLORIDE SERPL-SCNC: 101 MMOL/L (ref 96–108)
CHOLEST SERPL-MCNC: 105 MG/DL
CO2 SERPL-SCNC: 30 MMOL/L (ref 21–32)
CREAT SERPL-MCNC: 0.85 MG/DL (ref 0.6–1.3)
CREAT UR-MCNC: 72.2 MG/DL
EOSINOPHIL # BLD AUTO: 0.23 THOUSAND/ÂΜL (ref 0–0.61)
EOSINOPHIL NFR BLD AUTO: 2 % (ref 0–6)
ERYTHROCYTE [DISTWIDTH] IN BLOOD BY AUTOMATED COUNT: 13.9 % (ref 11.6–15.1)
EST. AVERAGE GLUCOSE BLD GHB EST-MCNC: 140 MG/DL
GFR SERPL CREATININE-BSD FRML MDRD: 75 ML/MIN/1.73SQ M
GLUCOSE P FAST SERPL-MCNC: 111 MG/DL (ref 65–99)
HBA1C MFR BLD: 6.5 %
HCT VFR BLD AUTO: 44.5 % (ref 34.8–46.1)
HDLC SERPL-MCNC: 34 MG/DL
HGB BLD-MCNC: 13.7 G/DL (ref 11.5–15.4)
IMM GRANULOCYTES # BLD AUTO: 0.08 THOUSAND/UL (ref 0–0.2)
IMM GRANULOCYTES NFR BLD AUTO: 1 % (ref 0–2)
LDLC SERPL CALC-MCNC: 42 MG/DL (ref 0–100)
LYMPHOCYTES # BLD AUTO: 3.68 THOUSANDS/ÂΜL (ref 0.6–4.47)
LYMPHOCYTES NFR BLD AUTO: 34 % (ref 14–44)
MCH RBC QN AUTO: 29.5 PG (ref 26.8–34.3)
MCHC RBC AUTO-ENTMCNC: 30.8 G/DL (ref 31.4–37.4)
MCV RBC AUTO: 96 FL (ref 82–98)
MICROALBUMIN UR-MCNC: 338.8 MG/L
MICROALBUMIN/CREAT 24H UR: 469 MG/G CREATININE (ref 0–30)
MONOCYTES # BLD AUTO: 0.8 THOUSAND/ÂΜL (ref 0.17–1.22)
MONOCYTES NFR BLD AUTO: 7 % (ref 4–12)
NEUTROPHILS # BLD AUTO: 6.1 THOUSANDS/ÂΜL (ref 1.85–7.62)
NEUTS SEG NFR BLD AUTO: 55 % (ref 43–75)
NONHDLC SERPL-MCNC: 71 MG/DL
NRBC BLD AUTO-RTO: 0 /100 WBCS
PLATELET # BLD AUTO: 420 THOUSANDS/UL (ref 149–390)
PMV BLD AUTO: 10.8 FL (ref 8.9–12.7)
POTASSIUM SERPL-SCNC: 4.4 MMOL/L (ref 3.5–5.3)
PROT SERPL-MCNC: 6.6 G/DL (ref 6.4–8.4)
RBC # BLD AUTO: 4.65 MILLION/UL (ref 3.81–5.12)
SODIUM SERPL-SCNC: 140 MMOL/L (ref 135–147)
TRIGL SERPL-MCNC: 146 MG/DL
WBC # BLD AUTO: 10.95 THOUSAND/UL (ref 4.31–10.16)

## 2024-04-19 PROCEDURE — 87086 URINE CULTURE/COLONY COUNT: CPT

## 2024-04-19 PROCEDURE — 36415 COLL VENOUS BLD VENIPUNCTURE: CPT

## 2024-04-19 PROCEDURE — 80053 COMPREHEN METABOLIC PANEL: CPT

## 2024-04-19 PROCEDURE — 99496 TRANSJ CARE MGMT HIGH F2F 7D: CPT | Performed by: NURSE PRACTITIONER

## 2024-04-19 NOTE — ASSESSMENT & PLAN NOTE
Lab Results   Component Value Date    EGFR 82 04/16/2024    EGFR 77 04/15/2024    EGFR 71 04/14/2024    CREATININE 0.79 04/16/2024    CREATININE 0.83 04/15/2024    CREATININE 0.89 04/14/2024   Avoid nephrotoxins, advised to stay well-hydrated

## 2024-04-19 NOTE — ASSESSMENT & PLAN NOTE
Status post ureteral stent  Due for follow-up with urology   Continue antibiotic  Urine culture pending

## 2024-04-19 NOTE — PROGRESS NOTES
Assessment & Plan     1. Primary hypertension  Assessment & Plan:  -Controlled on lisinopril      2. Gastroesophageal reflux disease without esophagitis  Assessment & Plan:  Continue home prilosec       3. Type 2 diabetes mellitus with chronic kidney disease, without long-term current use of insulin, unspecified CKD stage (HCC)  Assessment & Plan:  HBA1C 6.7    Patient will continue with Metformin.  Patient is to continue to work on diet and exercise.  Limit sugars and carbohydrate intake.    Avoid soda, juice, sweets, cookies, desserts, pasta, bread.   Eat more whole grains, exercised 30 min of cardio at least 3 times a week.  Also recommended daily foot exams to check for sores, and recommended yearly eye exams.            4. Hydronephrosis with ureteropelvic junction (UPJ) obstruction  Assessment & Plan:  Status post ureteral stent  Due for follow-up with urology   Continue antibiotic  Urine culture pending      5. CKD (chronic kidney disease) stage 2, GFR 60-89 ml/min  Assessment & Plan:  Lab Results   Component Value Date    EGFR 82 04/16/2024    EGFR 77 04/15/2024    EGFR 71 04/14/2024    CREATININE 0.79 04/16/2024    CREATININE 0.83 04/15/2024    CREATININE 0.89 04/14/2024   Avoid nephrotoxins, advised to stay well-hydrated      6. Anxiety  Assessment & Plan:  Well-controlled on Zoloft      7. Hypercholesteremia  Assessment & Plan:  Continue Lipitor           Subjective     Transitional Care Management Review:   Sakshi Tirado is a 58 y.o. female here for TCM follow up.     During the TCM phone call patient stated:  TCM Call       Date and time call was made  4/17/2024 11:03 AM    Hospital care reviewed  Records reviewed    Patient was hospitialized at  [facility]    Date of Admission  04/14/24    Date of discharge  04/16/24    Diagnosis  sepsis without acute organ dysfunction    Disposition  Home    Were the patients medications reviewed and updated  Yes    Current Symptoms  None    Loose stool  "severity  Mild          TCM Call       Post hospital issues  None    Should patient be enrolled in anticoag monitoring?  No    Scheduled for follow up?  Yes    Did you obtain your prescribed medications  Yes    Do you need help managing your prescriptions or medications  No    Is transportation to your appointment needed  No    I have advised the patient to call PCP with any new or worsening symptoms  Shayne Daniel [facility]          Patient presents today for a transition of care appointment patient was admitted to the hospital from April 14 to April 16 for sepsis.    Hospital course as per Dr. Tim:  \"Hospital Course:   Sakshi Tirado is a 58 y.o. female patient who originally presented to the hospital on 4/14/2024 due to left flank pain for 2 days  Found to have obstructive ureteral stone  Status post cystoscopy with left ureteral stent placement without complication  Patient clinically improving on above treatment and IV antibiotic  Urine culture was positive for Pseudomonas  Currently patient is in stable condition, she will be discharged on oral Cipro x 5 days with outpatient urology follow-up for definitive stone management        Please see above list of diagnoses and related plan for additional information.   Work excuse was given until Monday 4/22\"    Urology follow-up scheduled for May 5    CBC is continuing to trend down, current WBC count 10.95  Feels that she has been a little nauseous and fatigued since hospitalization denies any fevers      Review of Systems   Constitutional:  Positive for fatigue. Negative for activity change, appetite change, chills, diaphoresis and fever.   HENT:  Negative for congestion, ear discharge, ear pain, postnasal drip, rhinorrhea, sinus pressure, sinus pain and sore throat.    Eyes:  Negative for pain, discharge, itching and visual disturbance.   Respiratory:  Negative for cough, chest tightness, shortness of breath and wheezing.    Cardiovascular:  Negative for chest " "pain, palpitations and leg swelling.   Gastrointestinal:  Positive for nausea. Negative for abdominal pain, constipation, diarrhea and vomiting.   Endocrine: Negative for polydipsia, polyphagia and polyuria.   Genitourinary:  Negative for difficulty urinating, dysuria and urgency.   Musculoskeletal:  Negative for arthralgias, back pain and neck pain.   Skin:  Negative for rash and wound.   Neurological:  Negative for dizziness, weakness, numbness and headaches.       Objective     /70 (BP Location: Left arm, Patient Position: Sitting, Cuff Size: Standard)   Pulse 84   Temp 97.8 °F (36.6 °C) (Temporal)   Ht 5' 2.68\" (1.592 m) Comment: without shoes  Wt 70.7 kg (155 lb 12.8 oz)   SpO2 97%   BMI 27.88 kg/m²      Physical Exam  Vitals and nursing note reviewed.   Constitutional:       General: She is not in acute distress.     Appearance: She is well-developed.   HENT:      Head: Normocephalic and atraumatic.   Eyes:      Conjunctiva/sclera: Conjunctivae normal.   Cardiovascular:      Rate and Rhythm: Normal rate and regular rhythm.      Heart sounds: No murmur heard.  Pulmonary:      Effort: Pulmonary effort is normal. No respiratory distress.      Breath sounds: Normal breath sounds.   Abdominal:      Palpations: Abdomen is soft.      Tenderness: There is no abdominal tenderness.   Musculoskeletal:         General: No swelling.      Cervical back: Neck supple.   Skin:     General: Skin is warm and dry.      Capillary Refill: Capillary refill takes less than 2 seconds.   Neurological:      Mental Status: She is alert.   Psychiatric:         Mood and Affect: Mood normal.       Medications have been reviewed by provider in current encounter    LEONARD Haynes         "

## 2024-04-20 LAB
BACTERIA BLD CULT: NORMAL
BACTERIA BLD CULT: NORMAL
BACTERIA UR CULT: NORMAL

## 2024-04-22 VITALS
RESPIRATION RATE: 20 BRPM | WEIGHT: 155.87 LBS | TEMPERATURE: 98.7 F | HEART RATE: 75 BPM | DIASTOLIC BLOOD PRESSURE: 68 MMHG | HEIGHT: 63 IN | SYSTOLIC BLOOD PRESSURE: 110 MMHG | OXYGEN SATURATION: 95 % | BODY MASS INDEX: 27.62 KG/M2

## 2024-04-24 NOTE — PRE-PROCEDURE INSTRUCTIONS
Pre-Surgery Instructions:   Medication Instructions    acetaminophen (TYLENOL) 500 mg tablet Uses PRN- OK to take day of surgery    Ascorbic Acid (vitamin C) 1000 MG tablet Stop taking 7 days prior to surgery.    atorvastatin (LIPITOR) 80 mg tablet Take day of surgery.    Cholecalciferol (Vitamin D3) 50 MCG (2000 UT) TABS Stop taking 7 days prior to surgery.    lisinopril (ZESTRIL) 5 mg tablet Hold day of surgery.    metFORMIN (GLUCOPHAGE) 1000 MG tablet Hold day of surgery.    multivitamin (THERAGRAN) TABS Stop taking 7 days prior to surgery.    omeprazole (PriLOSEC) 40 MG capsule Take day of surgery.    potassium citrate (UROCIT-K) 10 mEq Hold day of surgery.    sertraline (ZOLOFT) 50 mg tablet Take day of surgery.      Spoke with pt via phone.    Medication instructions for day surgery reviewed. Please use only a sip of water to take your instructed medications. Avoid all over the counter vitamins, supplements and NSAIDS for one week prior to surgery per anesthesia guidelines. Tylenol is ok to take as needed.     You will receive a call one business day prior to surgery with an arrival time and hospital directions. If your surgery is scheduled on a Monday, the hospital will be calling you on the Friday prior to your surgery. If you have not heard from anyone by 8pm, please call the hospital supervisor through the hospital  at [phone]. ([place] [phone] or [place] [phone]).    Do not eat or drink anything after midnight the night before your surgery, including candy, mints, lifesavers, or chewing gum. Do not drink alcohol 24hrs before your surgery. Try not to smoke at least 24hrs before your surgery.       Follow the pre surgery showering instructions as listed in the “My Surgical Experience Booklet” or otherwise provided by your surgeon's office. Do not use a blade to shave the surgical area 1 week before surgery. It is okay to use a clean electric clippers up to 24 hours before surgery.  Do not apply any lotions, creams, including makeup, cologne, deodorant, or perfumes after showering on the day of your surgery. Do not use dry shampoo, hair spray, hair gel, or any type of hair products.     No contact lenses, eye make-up, or artificial eyelashes. Remove nail polish, including gel polish, and any artificial, gel, or acrylic nails if possible. Remove all jewelry including rings and body piercing jewelry.     Wear causal clothing that is easy to take on and off. Consider your type of surgery.    Keep any valuables, jewelry, piercings at home. Please bring any specially ordered equipment (sling, braces) if indicated.    Arrange for a responsible person to drive you to and from the hospital on the day of your surgery. Please confirm the visitor policy for the day of your procedure when you receive your phone call with an arrival time.     Call the surgeon's office with any new illnesses, exposures, or additional questions prior to surgery.    Please reference your “My Surgical Experience Booklet” for additional information to prepare for your upcoming surgery.

## 2024-04-26 ENCOUNTER — HOSPITAL ENCOUNTER (OUTPATIENT)
Dept: RADIOLOGY | Facility: IMAGING CENTER | Age: 59
End: 2024-04-26
Payer: COMMERCIAL

## 2024-04-26 VITALS — BODY MASS INDEX: 28.52 KG/M2 | WEIGHT: 155 LBS | HEIGHT: 62 IN

## 2024-04-26 DIAGNOSIS — Z12.31 ENCOUNTER FOR SCREENING MAMMOGRAM FOR MALIGNANT NEOPLASM OF BREAST: ICD-10-CM

## 2024-04-26 PROCEDURE — 77067 SCR MAMMO BI INCL CAD: CPT

## 2024-04-26 PROCEDURE — 77063 BREAST TOMOSYNTHESIS BI: CPT

## 2024-04-29 ENCOUNTER — TELEPHONE (OUTPATIENT)
Age: 59
End: 2024-04-29

## 2024-04-29 NOTE — TELEPHONE ENCOUNTER
Patient had called in regards to receiving phone calls, but no voicemail's are being left.     Was unable to find anything in regards to these calls.     Please advise back to patient, if there is any concerns.

## 2024-05-02 ENCOUNTER — ANESTHESIA EVENT (OUTPATIENT)
Dept: PERIOP | Facility: HOSPITAL | Age: 59
End: 2024-05-02
Payer: COMMERCIAL

## 2024-05-02 NOTE — ANESTHESIA PREPROCEDURE EVALUATION
Procedure:  CYSTOSCOPY URETEROSCOPY WITH LITHOTRIPSY HOLMIUM LASER, RETROGRADE PYELOGRAM AND INSERTION STENT URETERAL (Left: Abdomen)    Relevant Problems   CARDIO   (+) Hypercholesteremia   (+) Primary hypertension      ENDO   (+) Type 2 diabetes mellitus with diabetic chronic kidney disease (HCC)      GI/HEPATIC   (+) GERD (gastroesophageal reflux disease)      /RENAL   (+) CKD (chronic kidney disease) stage 2, GFR 60-89 ml/min   (+) Hydronephrosis with ureteropelvic junction (UPJ) obstruction      MUSCULOSKELETAL   (+) Primary osteoarthritis of both knees      NEURO/PSYCH   (+) Anxiety        Physical Exam    Airway    Mallampati score: II  TM Distance: >3 FB  Neck ROM: full     Dental       Cardiovascular      Pulmonary      Other Findings  post-pubertal.      Anesthesia Plan  ASA Score- 2     Anesthesia Type- general with ASA Monitors.         Additional Monitors:     Airway Plan: LMA.           Plan Factors-    Chart reviewed.    Patient summary reviewed.                  Induction- intravenous.    Postoperative Plan-     Informed Consent- Anesthetic plan and risks discussed with patient.  I personally reviewed this patient with the CRNA. Discussed and agreed on the Anesthesia Plan with the CRNA..

## 2024-05-03 ENCOUNTER — TELEPHONE (OUTPATIENT)
Dept: UROLOGY | Facility: AMBULATORY SURGERY CENTER | Age: 59
End: 2024-05-03

## 2024-05-03 ENCOUNTER — ANESTHESIA (OUTPATIENT)
Dept: PERIOP | Facility: HOSPITAL | Age: 59
End: 2024-05-03
Payer: COMMERCIAL

## 2024-05-03 ENCOUNTER — HOSPITAL ENCOUNTER (OUTPATIENT)
Facility: HOSPITAL | Age: 59
Setting detail: OUTPATIENT SURGERY
Discharge: HOME | End: 2024-05-03
Attending: UROLOGY | Admitting: UROLOGY
Payer: COMMERCIAL

## 2024-05-03 ENCOUNTER — TELEPHONE (OUTPATIENT)
Age: 59
End: 2024-05-03

## 2024-05-03 ENCOUNTER — APPOINTMENT (OUTPATIENT)
Dept: RADIOLOGY | Facility: HOSPITAL | Age: 59
End: 2024-05-03
Payer: COMMERCIAL

## 2024-05-03 VITALS
HEART RATE: 62 BPM | SYSTOLIC BLOOD PRESSURE: 114 MMHG | RESPIRATION RATE: 18 BRPM | OXYGEN SATURATION: 98 % | WEIGHT: 153 LBS | BODY MASS INDEX: 28.16 KG/M2 | HEIGHT: 62 IN | TEMPERATURE: 98.2 F | DIASTOLIC BLOOD PRESSURE: 51 MMHG

## 2024-05-03 DIAGNOSIS — N20.1 LEFT URETERAL STONE: Primary | ICD-10-CM

## 2024-05-03 LAB
GLUCOSE SERPL-MCNC: 113 MG/DL (ref 65–140)
GLUCOSE SERPL-MCNC: 90 MG/DL (ref 65–140)

## 2024-05-03 PROCEDURE — 52356 CYSTO/URETERO W/LITHOTRIPSY: CPT | Performed by: UROLOGY

## 2024-05-03 PROCEDURE — NC001 PR NO CHARGE: Performed by: UROLOGY

## 2024-05-03 PROCEDURE — C2617 STENT, NON-COR, TEM W/O DEL: HCPCS | Performed by: UROLOGY

## 2024-05-03 PROCEDURE — C1769 GUIDE WIRE: HCPCS | Performed by: UROLOGY

## 2024-05-03 PROCEDURE — 74420 UROGRAPHY RTRGR +-KUB: CPT

## 2024-05-03 PROCEDURE — 82360 CALCULUS ASSAY QUANT: CPT | Performed by: UROLOGY

## 2024-05-03 PROCEDURE — 82948 REAGENT STRIP/BLOOD GLUCOSE: CPT

## 2024-05-03 PROCEDURE — C1758 CATHETER, URETERAL: HCPCS | Performed by: UROLOGY

## 2024-05-03 RX ORDER — METOCLOPRAMIDE HYDROCHLORIDE 5 MG/ML
5 INJECTION INTRAMUSCULAR; INTRAVENOUS ONCE AS NEEDED
Status: DISCONTINUED | OUTPATIENT
Start: 2024-05-03 | End: 2024-05-03 | Stop reason: HOSPADM

## 2024-05-03 RX ORDER — PHENYLEPHRINE HCL IN 0.9% NACL 1 MG/10 ML
SYRINGE (ML) INTRAVENOUS AS NEEDED
Status: DISCONTINUED | OUTPATIENT
Start: 2024-05-03 | End: 2024-05-03

## 2024-05-03 RX ORDER — ONDANSETRON 2 MG/ML
INJECTION INTRAMUSCULAR; INTRAVENOUS AS NEEDED
Status: DISCONTINUED | OUTPATIENT
Start: 2024-05-03 | End: 2024-05-03

## 2024-05-03 RX ORDER — CEFDINIR 300 MG/1
300 CAPSULE ORAL EVERY 12 HOURS SCHEDULED
Qty: 6 CAPSULE | Refills: 0 | Status: SHIPPED | OUTPATIENT
Start: 2024-05-03 | End: 2024-05-06

## 2024-05-03 RX ORDER — LIDOCAINE HYDROCHLORIDE 10 MG/ML
INJECTION, SOLUTION EPIDURAL; INFILTRATION; INTRACAUDAL; PERINEURAL AS NEEDED
Status: DISCONTINUED | OUTPATIENT
Start: 2024-05-03 | End: 2024-05-03

## 2024-05-03 RX ORDER — MAGNESIUM HYDROXIDE 1200 MG/15ML
LIQUID ORAL AS NEEDED
Status: DISCONTINUED | OUTPATIENT
Start: 2024-05-03 | End: 2024-05-03 | Stop reason: HOSPADM

## 2024-05-03 RX ORDER — ONDANSETRON 2 MG/ML
4 INJECTION INTRAMUSCULAR; INTRAVENOUS ONCE AS NEEDED
Status: DISCONTINUED | OUTPATIENT
Start: 2024-05-03 | End: 2024-05-03 | Stop reason: HOSPADM

## 2024-05-03 RX ORDER — SODIUM CHLORIDE, SODIUM LACTATE, POTASSIUM CHLORIDE, CALCIUM CHLORIDE 600; 310; 30; 20 MG/100ML; MG/100ML; MG/100ML; MG/100ML
75 INJECTION, SOLUTION INTRAVENOUS CONTINUOUS
Status: DISCONTINUED | OUTPATIENT
Start: 2024-05-03 | End: 2024-05-03 | Stop reason: HOSPADM

## 2024-05-03 RX ORDER — MIDAZOLAM HYDROCHLORIDE 2 MG/2ML
INJECTION, SOLUTION INTRAMUSCULAR; INTRAVENOUS AS NEEDED
Status: DISCONTINUED | OUTPATIENT
Start: 2024-05-03 | End: 2024-05-03

## 2024-05-03 RX ORDER — OXYCODONE HYDROCHLORIDE 5 MG/1
5 TABLET ORAL EVERY 4 HOURS PRN
Status: DISCONTINUED | OUTPATIENT
Start: 2024-05-03 | End: 2024-05-03 | Stop reason: HOSPADM

## 2024-05-03 RX ORDER — MEPERIDINE HYDROCHLORIDE 25 MG/ML
12.5 INJECTION INTRAMUSCULAR; INTRAVENOUS; SUBCUTANEOUS
Status: DISCONTINUED | OUTPATIENT
Start: 2024-05-03 | End: 2024-05-03 | Stop reason: HOSPADM

## 2024-05-03 RX ORDER — LEVOFLOXACIN 5 MG/ML
INJECTION, SOLUTION INTRAVENOUS CONTINUOUS PRN
Status: DISCONTINUED | OUTPATIENT
Start: 2024-05-03 | End: 2024-05-03

## 2024-05-03 RX ORDER — DEXAMETHASONE SODIUM PHOSPHATE 10 MG/ML
INJECTION, SOLUTION INTRAMUSCULAR; INTRAVENOUS AS NEEDED
Status: DISCONTINUED | OUTPATIENT
Start: 2024-05-03 | End: 2024-05-03

## 2024-05-03 RX ORDER — PROPOFOL 10 MG/ML
INJECTION, EMULSION INTRAVENOUS AS NEEDED
Status: DISCONTINUED | OUTPATIENT
Start: 2024-05-03 | End: 2024-05-03

## 2024-05-03 RX ORDER — LEVOFLOXACIN 5 MG/ML
500 INJECTION, SOLUTION INTRAVENOUS ONCE
Status: COMPLETED | OUTPATIENT
Start: 2024-05-03 | End: 2024-05-03

## 2024-05-03 RX ORDER — PHENAZOPYRIDINE HYDROCHLORIDE 200 MG/1
200 TABLET, FILM COATED ORAL
Qty: 6 TABLET | Refills: 0 | Status: SHIPPED | OUTPATIENT
Start: 2024-05-03 | End: 2024-05-05

## 2024-05-03 RX ORDER — FENTANYL CITRATE 50 UG/ML
INJECTION, SOLUTION INTRAMUSCULAR; INTRAVENOUS AS NEEDED
Status: DISCONTINUED | OUTPATIENT
Start: 2024-05-03 | End: 2024-05-03

## 2024-05-03 RX ORDER — FENTANYL CITRATE/PF 50 MCG/ML
50 SYRINGE (ML) INJECTION
Status: DISCONTINUED | OUTPATIENT
Start: 2024-05-03 | End: 2024-05-03 | Stop reason: HOSPADM

## 2024-05-03 RX ORDER — SODIUM CHLORIDE, SODIUM LACTATE, POTASSIUM CHLORIDE, CALCIUM CHLORIDE 600; 310; 30; 20 MG/100ML; MG/100ML; MG/100ML; MG/100ML
INJECTION, SOLUTION INTRAVENOUS CONTINUOUS PRN
Status: DISCONTINUED | OUTPATIENT
Start: 2024-05-03 | End: 2024-05-03

## 2024-05-03 RX ORDER — KETOROLAC TROMETHAMINE 30 MG/ML
INJECTION, SOLUTION INTRAMUSCULAR; INTRAVENOUS AS NEEDED
Status: DISCONTINUED | OUTPATIENT
Start: 2024-05-03 | End: 2024-05-03

## 2024-05-03 RX ADMIN — Medication 100 MCG: at 10:14

## 2024-05-03 RX ADMIN — LEVOFLOXACIN: 500 INJECTION, SOLUTION INTRAVENOUS at 09:56

## 2024-05-03 RX ADMIN — DEXAMETHASONE SODIUM PHOSPHATE 10 MG: 10 INJECTION, SOLUTION INTRAMUSCULAR; INTRAVENOUS at 10:10

## 2024-05-03 RX ADMIN — ONDANSETRON 4 MG: 2 INJECTION INTRAMUSCULAR; INTRAVENOUS at 10:10

## 2024-05-03 RX ADMIN — FENTANYL CITRATE 25 MCG: 50 INJECTION INTRAMUSCULAR; INTRAVENOUS at 10:25

## 2024-05-03 RX ADMIN — FENTANYL CITRATE 50 MCG: 50 INJECTION INTRAMUSCULAR; INTRAVENOUS at 10:03

## 2024-05-03 RX ADMIN — MIDAZOLAM 2 MG: 1 INJECTION INTRAMUSCULAR; INTRAVENOUS at 09:54

## 2024-05-03 RX ADMIN — PROPOFOL 200 MG: 10 INJECTION, EMULSION INTRAVENOUS at 10:03

## 2024-05-03 RX ADMIN — LIDOCAINE HYDROCHLORIDE 50 MG: 10 INJECTION, SOLUTION EPIDURAL; INFILTRATION; INTRACAUDAL; PERINEURAL at 10:03

## 2024-05-03 RX ADMIN — SODIUM CHLORIDE, SODIUM LACTATE, POTASSIUM CHLORIDE, AND CALCIUM CHLORIDE: .6; .31; .03; .02 INJECTION, SOLUTION INTRAVENOUS at 09:51

## 2024-05-03 RX ADMIN — FENTANYL CITRATE 25 MCG: 50 INJECTION INTRAMUSCULAR; INTRAVENOUS at 10:19

## 2024-05-03 RX ADMIN — LEVOFLOXACIN 500 MG: 500 INJECTION, SOLUTION INTRAVENOUS at 08:36

## 2024-05-03 RX ADMIN — KETOROLAC TROMETHAMINE 15 MG: 30 INJECTION, SOLUTION INTRAMUSCULAR; INTRAVENOUS at 10:24

## 2024-05-03 NOTE — DISCHARGE INSTR - AVS FIRST PAGE
Sakshi Tirado:    Your surgery went very well.  Your stone was fragmented to small pieces and subsequent fragments extracted via basket such that a residual stone should be small like grains of sand that can pass on their own.    You can remove your stent at home in 3 days via the string coming out of your urethra.  Instructions for how to do this are below.  If you prefer we can also do this in the clinic a via nursing visit    Please plan to take an antibiotic around the stent removal starting the day before, the day of and finishing the day after stent removal.    It is important to ensure you have healed well from surgery and therefore we will plan for an abdominal X-ray and kidney ultrasound approximately 4-6 weeks after the stent is removed.    Please take your medications as prescribed with caution for comfort.  Most importantly please drink 6-8 glasses of water per day    Please call with any questions or concerns.    Burton Senior MD  [facility]  [phone]            WHAT IS A STENT?  At the end of the procedure, your doctor may place a stent into your ureter. A stent is a thin, flexible piece of plastic that will hold open your ureter while the remaining small pieces of stone pass. This allows your kidney to drain easily and prevents you from having to “pass” these small stone pieces on your own, which could be painful. The stent is about 12 inches long and looks and feels like a thin piece of spaghetti.    AFTER THE PROCEDURE  After the procedure you may experience the following symptoms. All of these are normal and should resolve within 1 or 2 days after your stent is removed.  Urinary frequency (urinating more often than usual)  Urinary urgency (the sensation that you need to urinate right away)  Painful urination (this can be pain in your bladder or in your back when  you urinate)  Blood in your urine ( a stent can irritate the lining of your bladder causing it to  bleed)  Back/Flank pain, especially with urination    You may receive a prescription for narcotic pain medication after the procedure. You will also receive a prescription for tamsulosin which you will take once a day for 2 weeks to help relax your ureter and decrease stent discomfort. You will also need to purchase a stool softener (i.e. Colace) or mild laxative (i.e. Miralax) as the narcotic pain medication can make you constipated. This is important as constipation can exacerbate stent related symptoms.     STENT REMOVAL  In some cases, your doctor will leave strings attached to your stent. The strings will be taped to your skin after the procedure. The strings will allow you to remove the thin flexible stent while you are at home. Normally, the stent can be removed 3-5 days after your procedure; your physician will tell you the specific date after your procedure.     On the day you are supposed to remove your stent, do the following:  When you wake up in the morning, take 1-2 pain pills with food.  Start your antibiotic pill the morning of schedule stent removal if prescribed  One hour later sit on the toilet or in the bath tub.  Take a deep breath in and while exhaling, pull the string.   Dispose of the stent in the garbage.    Alternatively, you will come back for an office procedure to remove the stent by placing a small camera into your bladder to remove the stent.    During the next 4-8 hours after removing your stent, you may experience additional blood in your urine, pain with urination or back/side pain. You should take the pain medication you were prescribed to help you with the pain, as well as continue the Flomax. If the pain is severe, you are vomiting, and/or have a fever > 101.4 please call the clinic.

## 2024-05-03 NOTE — OP NOTE
OPERATIVE REPORT  PATIENT NAME: Sakshi Tirado    :  1965  MRN: [id number]  Pt Location: [facility] ROOM 01    SURGERY DATE: 5/3/2024    Surgeons and Role:     * Burton Senior MD - Primary    Preop Diagnosis:  Left ureteral stone [N20.1]    Post-Op Diagnosis Codes:     * Left ureteral stone [N20.1]    Procedure(s):  Left - CYSTOSCOPY URETEROSCOPY WITH LITHOTRIPSY HOLMIUM LASER.BASKET STONE EXTRACTION . RETROGRADE PYELOGRAM AND INSERTION STENT URETERAL    Specimen(s):  ID Type Source Tests Collected by Time Destination   A : LEFT URETERAL STONE Calculus Ureter, Left STONE ANALYSIS Burton Senior MD 5/3/2024 1020        Estimated Blood Loss:   Minimal    Drains:  * No LDAs found *    Anesthesia Type:   General    Operative Indications:  Patient with history of left ureteral stone with urinary tract infection resulting in stent placement on 2028 now here for definitive stone surgery.    Operative Findings:  Left distal ureteral stone fragmented and basket extracted    Complications:   None    Procedure and Technique:    After informed consent including the risks of bleeding, infection, ureteral injury, and need for secondary procedures, patient was placed supine in the operating room theater.  Gen. anesthesia was administered.      They were then placed in the dorsal lithotomy position and sterilely prepped and draped in usual fashion. Antibiotic prophylaxis and DVT prophylaxis were administered Cystoscopy was performed the 21 Brazilian cystoscope 30° lens.  This revealed a normal urethra.  Inspection of the bladder revealed no abnormalities.  Fluoroscopy did show evidence of a stone in distal left ureter next to stent.    Attention was turned to the left ureteral orifice. A 5fr open ended catheter was attempted to be passed into the ureteral orifice and 0.38 solo guidewire was passed through the open ended catheter and up the ureter into the renal pelvis.     Semirigid ureteroscope was then passed titno the  ureter    The stone was encountered in the distal ureter.  The stone was not noted to be impacted but there was bullous edema       This was fragmented with the use of a 270 micron holmium laser fiber at settings of 0.2J and 50Hz.  Sequential passes were then made with a 1.9fr zero tip wire basket in order to retrieve stone debris.   The ureter up to collecting system was examined and no significant residual stone fragments were appreciated.  Hemostasis was appropriate.       The ureteroscope was backed down the ureter under vision and there were no residual fragments and the ureter was noted to be intact with no injury and moderate edema where the stone was located.       A retrograde was performed from distal ureter and appeared unremarkable without extravasation of contrast.    Cystoscope was reassembled over the guidewire and a 6 x 24 double-J stent inserted without difficulty with good coil seen in left collecting system on fluoroscopy and visually in the bladder.  The string was left on. The bladder was drained.   The patient was placed back supine, awakened from general anesthesia and brought to recovery room in stable condition.     A qualified resident physician was not available.    Patient Disposition:  PACU         SIGNATURE: Burton Senior MD  DATE: May 3, 2024  TIME: 10:30 AM

## 2024-05-03 NOTE — ANESTHESIA POSTPROCEDURE EVALUATION
Post-Op Assessment Note    CV Status:  Stable  Pain Score: 0    Pain management: adequate    Multimodal analgesia used between 6 hours prior to anesthesia start to PACU discharge    Mental Status:  Alert and awake   Hydration Status:  Stable and euvolemic   PONV Controlled:  None   Airway Patency:  Patent  Two or more mitigation strategies used for obstructive sleep apnea   Post Op Vitals Reviewed: Yes    No anethesia notable event occurred.    Staff: CRNA               /74 (05/03/24 1036)    Temp (!) 97.1 °F (36.2 °C) (05/03/24 1036)    Pulse 74 (05/03/24 1036)   Resp 12 (05/03/24 1036)    SpO2 98 % (05/03/24 1036)

## 2024-05-03 NOTE — TELEPHONE ENCOUNTER
Patient had left ureteroscopy today.  She has a stent with string which can be removed in 3 days.  Plan for KUB and ultrasound to follow-up 4 to 6 weeks later.

## 2024-05-03 NOTE — TELEPHONE ENCOUNTER
Pt called in wanting to schedule an appt as she has been in and out of the hospital. There are no appt's soon and not even choosing to do a CARMEN with another provider. Pt would like to stay within the  network and close by, if she can't find anything then she states she will have to see with another network. Would there be a possibility of overbooking with Reyes or an AP?    Please call pt and advise [phone]

## 2024-05-03 NOTE — TELEPHONE ENCOUNTER
Post Op Note    Sakshi Tirado is a 58 y.o. female s/p #5 performed 05/03/2024.  Sakshi Tirado is a patient of Dr. Dr. Senior and is seen at the [facility].     How would you rate your pain on a scale from 1 to 10, 10 being the worst pain ever? 0  Have you had a fever? No  Do you have any difficulty urinating? No  Do you have any other questions or concerns that I can address at this time?  Post op expectations discussed  SS of infection discussed  Advised to increase hydration and call office with any questions or concerns. States will remove stent on Monday. I advised I will call to make sure stent is out and overall well. Appt confirmed for f/u and advised to schedule US/KUB to be done in 6 weeks. Phone number to CS given. Pt verbalized understanding.

## 2024-05-03 NOTE — TELEPHONE ENCOUNTER
Placed call to pt to offer appointment on 5/16, pt stated she needs after 330 appointment due to work and pt works closest to [place] , I advised pt will try and move around schedule and will contact pt back, I offered pt  appointment, pt stated unable to make it due to getting out at 330

## 2024-05-08 NOTE — TELEPHONE ENCOUNTER
Placed call to pt and left detailed message that she will be placed on wait list for 330 or 4 pm appointments if any become available in the [place], I did offer pt available appointments in the [place] with Dr. Reyes or Heather but they are morning or afternoon appointments if she would like to schedule to contact our office so we can assist with coordinating an appointment

## 2024-05-10 ENCOUNTER — HOSPITAL ENCOUNTER (OUTPATIENT)
Dept: RADIOLOGY | Facility: IMAGING CENTER | Age: 59
End: 2024-05-10
Payer: COMMERCIAL

## 2024-05-10 DIAGNOSIS — N20.1 LEFT URETERAL STONE: ICD-10-CM

## 2024-05-10 PROCEDURE — 76775 US EXAM ABDO BACK WALL LIM: CPT

## 2024-05-10 PROCEDURE — 74018 RADEX ABDOMEN 1 VIEW: CPT

## 2024-05-12 LAB
CALCIUM OXALATE DIHYDRATE MFR STONE IR: 70 %
COLOR STONE: NORMAL
COM MFR STONE: 20 %
COMMENT-STONE3: NORMAL
COMPOSITION: NORMAL
HYDROXYAPATITE 24H ENGDIFF UR: 10 %
LABORATORY COMMENT REPORT: NORMAL
PHOTO: NORMAL
SIZE STONE: NORMAL MM
SPEC SOURCE SUBJ: NORMAL
STONE ANALYSIS-IMP: NORMAL
WT STONE: 11 MG

## 2024-05-16 ENCOUNTER — TELEPHONE (OUTPATIENT)
Dept: NEPHROLOGY | Facility: CLINIC | Age: 59
End: 2024-05-16

## 2024-05-16 ENCOUNTER — OFFICE VISIT (OUTPATIENT)
Dept: NEPHROLOGY | Facility: CLINIC | Age: 59
End: 2024-05-16
Payer: COMMERCIAL

## 2024-05-16 VITALS
BODY MASS INDEX: 28.16 KG/M2 | OXYGEN SATURATION: 98 % | WEIGHT: 153 LBS | DIASTOLIC BLOOD PRESSURE: 78 MMHG | HEART RATE: 82 BPM | HEIGHT: 62 IN | SYSTOLIC BLOOD PRESSURE: 120 MMHG

## 2024-05-16 DIAGNOSIS — I10 PRIMARY HYPERTENSION: ICD-10-CM

## 2024-05-16 DIAGNOSIS — N20.0 NEPHROLITHIASIS: Primary | ICD-10-CM

## 2024-05-16 DIAGNOSIS — E66.3 OVERWEIGHT (BMI 25.0-29.9): ICD-10-CM

## 2024-05-16 DIAGNOSIS — E11.22 TYPE 2 DIABETES MELLITUS WITH CHRONIC KIDNEY DISEASE, WITHOUT LONG-TERM CURRENT USE OF INSULIN, UNSPECIFIED CKD STAGE (HCC): ICD-10-CM

## 2024-05-16 DIAGNOSIS — R80.1 PERSISTENT PROTEINURIA: ICD-10-CM

## 2024-05-16 PROCEDURE — 99212 OFFICE O/P EST SF 10 MIN: CPT | Performed by: STUDENT IN AN ORGANIZED HEALTH CARE EDUCATION/TRAINING PROGRAM

## 2024-05-16 NOTE — PATIENT INSTRUCTIONS
Thank you for coming to your visit today. As we discussed you kidney function is normal.  You have proteins in the urine mostly secondary to diabetes. Your electrolytes are normal. Please follow the recommendations below       Recommend low sodium (salt) food    Avoid nonsteroidal anti-inflammatory drugs such as Naprosyn, ibuprofen, Aleve, Advil, Celebrex, Meloxicam (Mobic) etc.  You can use Tylenol as needed if you do not have any liver condition to be concerned about    Try to exercise at least 30 minutes 3 days a week to begin with with an ultimate goal of 5 days a week for at least 30 minutes  We will check your PTH       Next Visit in 12 months with results   If you need to see us earlier we can change the appointment for you      Joselyn Reyes [place], MD  Nephrology Attending

## 2024-05-16 NOTE — PROGRESS NOTES
NEPHROLOGY OUTPATIENT PROGRESS NOTE   Sakshi Tirado 58 y.o. female MRN: [id number]  DATE: 5/16/2024    Reason for visit: No chief complaint on file.       There are no Patient Instructions on file for this visit.      There are no diagnoses linked to this encounter.    Assessment/Plan:  54 yo woman with PMH DM, HTN, nephrolithiasis s/p lithotripsy + cystoscopy + left stent , OA. Patient was admitted in [facility] in 2022 with pyelonephritis. Patient is here for follow up for recurrent nephrolithiasis         PLAN:  # Nephrolithiasis  Episodes:  multiple episodes of kidney stone   First episode 30 years ago with episodes of obstruction.   Family hx of kidney stones  Recurrent episodes with TOMMY: creatinine 0.85 mg/dL  Interventions:   9/2021 cystoscopy and left stent  12/2022 lithotripsy  Prior stone analysis reveals 90% calcium oxalate  Litholink/urine:   Low urine volume   Increase oxalate    borderline low citrate  Image: CT 9/2021  Right kidney: multiple non-obstructive calculi  Left Kidney: 5mm residual distal ureteral calculus. 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  Continue with lisinopril 5 mg       #hypertension  Volume: Euvolemic on exam  Current BP: Normotensive, /78, goal less than 140/90   Low sodium diet   Lisinorpil 5mg   Advised to maintain a good AMELIE control to prevent CKD     #DM  HbA1c 6.5  Advised to maintain a good DM control to prevent progression of CKD   Maintain healthy diet (vegetables, fruits, whole grains, nonfat or low fat)  Weight loss  Physical activity (5 to 10 minutes to start the  increase to 30 min a day)       #Anemia   hgb 13.7mg/d  Need to check iron panel   F/u with PCP     # Overweight  BMI 27.9  Recommend weight loss , heathy diet  Lifestyle modification          SUBJECTIVE / INTERVAL HISTORY:  58 y.o. female presents in follow up of nephrolithiasis.   Patient continued to have kidney stones, she is taking multivitamins and calcium supplements.      Sakshi Tirado denies any recent illness/hospitalizations/medication changes since last office visit.  Review of Systems   Constitutional:  Negative for activity change and appetite change.   HENT:  Negative for congestion and dental problem.    Eyes:  Negative for discharge.   Respiratory:  Negative for apnea and chest tightness.    Cardiovascular:  Negative for chest pain and leg swelling.   Gastrointestinal:  Negative for abdominal distention and abdominal pain.   Endocrine: Negative for cold intolerance.   Genitourinary:  Negative for difficulty urinating and dysuria.   Musculoskeletal:  Negative for back pain.   Skin:  Negative for color change.   Neurological:  Negative for dizziness.   Psychiatric/Behavioral:  Negative for agitation.        OBJECTIVE:  There were no vitals taken for this visit. There is no height or weight on file to calculate BMI.    Physical exam:  Physical Exam  General:  no acute distress at this time  Skin:  No acute rash  Eyes:  No scleral icterus and noninjected  ENT:  mucous membranes moist  Neck:  no carotid bruits  Chest:  Clear to auscultation percussion, good respiratory effort, no use of accessory respiratory muscles  CVS:  Regular rate and rhythm without rub   Abdomen:  soft and nontender   Extremities: no significant lower extremity edema  Neuro:  No gross focality  Psych:  Alert , cooperative       Medications:    Current Outpatient Medications:     acetaminophen (TYLENOL) 500 mg tablet, Take 500 mg by mouth every 6 (six) hours as needed for mild pain, Disp: , Rfl:     Ascorbic Acid (vitamin C) 1000  "MG tablet, Take 2,000 mg by mouth daily, Disp: , Rfl:     atorvastatin (LIPITOR) 80 mg tablet, Take 1 tablet (80 mg total) by mouth daily, Disp: 90 tablet, Rfl: 1    Cholecalciferol (Vitamin D3) 50 MCG (2000 UT) TABS, Take 2,000 Units by mouth daily, Disp: , Rfl:     lisinopril (ZESTRIL) 5 mg tablet, Take 1 tablet (5 mg total) by mouth daily, Disp: 90 tablet, Rfl: 1    metFORMIN (GLUCOPHAGE) 1000 MG tablet, Take 1 tablet (1,000 mg total) by mouth 2 (two) times a day with meals, Disp: 180 tablet, Rfl: 1    multivitamin (THERAGRAN) TABS, Take 1 tablet by mouth daily, Disp: , Rfl:     omeprazole (PriLOSEC) 40 MG capsule, Take 1 capsule (40 mg total) by mouth daily, Disp: 90 capsule, Rfl: 1    potassium citrate (UROCIT-K) 10 mEq, Take 1 tablet (10 mEq total) by mouth 3 (three) times a day with meals, Disp: 270 tablet, Rfl: 1    sertraline (ZOLOFT) 50 mg tablet, Take 1 tablet (50 mg total) by mouth daily Takes in the am., Disp: 90 tablet, Rfl: 1    Allergies:  Allergies as of 05/16/2024 - Reviewed 05/03/2024   Allergen Reaction Noted    Oxybutynin Anaphylaxis 09/16/2021    Clonazepam Other (See Comments) 09/25/2017    Morphine Other (See Comments) and Confusion 06/14/2017    Venlafaxine Other (See Comments) 09/25/2017       The following portions of the patient's history were reviewed and updated as appropriate: past family history, past surgical history and problem list.    Laboratory Results:  Lab Results   Component Value Date    SODIUM 140 04/19/2024    K 4.4 04/19/2024     04/19/2024    CO2 30 04/19/2024    BUN 19 04/19/2024    CREATININE 0.85 04/19/2024    GLUC 100 04/16/2024    CALCIUM 9.4 04/19/2024        Lab Results   Component Value Date    CALCIUM 9.4 04/19/2024    PHOS 3.0 01/30/2023       Portions of the record may have been created with voice recognition software.  Occasional wrong word or \"sound a like\" substitutions may have occurred due to the inherent limitations of voice recognition software.  " Read the chart carefully and recognize, using context, where substitutions have occurred.

## 2024-05-16 NOTE — TELEPHONE ENCOUNTER
Per Dr. Reyes.   Left a voicemail to patient with the following information: Sakshi, today has a appt at 4 pm and Dr. Reyes has a spot available at 2:30pm. Would you like to come earlier at 2:30pm instead of 4pm? Advised patient to please call the office to let us know if she come at 2:30 pm.

## 2024-05-27 PROBLEM — E66.3 OVERWEIGHT (BMI 25.0-29.9): Status: ACTIVE | Noted: 2024-05-27

## 2024-06-25 ENCOUNTER — OFFICE VISIT (OUTPATIENT)
Dept: UROLOGY | Facility: AMBULATORY SURGERY CENTER | Age: 59
End: 2024-06-25

## 2024-06-25 VITALS
OXYGEN SATURATION: 97 % | DIASTOLIC BLOOD PRESSURE: 68 MMHG | HEART RATE: 76 BPM | WEIGHT: 153 LBS | BODY MASS INDEX: 27.98 KG/M2 | SYSTOLIC BLOOD PRESSURE: 112 MMHG

## 2024-06-25 DIAGNOSIS — N20.1 URETEROLITHIASIS: Primary | ICD-10-CM

## 2024-06-25 DIAGNOSIS — N20.0 NEPHROLITHIASIS: ICD-10-CM

## 2024-06-25 NOTE — ASSESSMENT & PLAN NOTE
Kidney and bladder US in 6 weeks, will call with results  Patient required multiple surgical interventions in the past for ureterolithiasis  Had nephrology consult in February 2023, was initiated on 10 mEq of potassium citrate  Last stone episode that required surgical intervention was in January 2023  Continue with nephrology recommendations    I had a lengthy discussion today with the patient in the office regarding her bilateral kidney stone burden.  She is very upset today and frustrated at the fact that she continues to create kidney stones.  I did discuss with her preventative surgery with lithotripsy or ureteroscopy.  I did explain both procedures to her at length and did discuss with her that even after surgery there is still a possibility of her creating more kidney stones in the future.  She is very frustrated that this is not a definitive treatment and there is no guarantee.  She also states that she feels as though no one is able to help her.  She unfortunately does not want to follow with nephrology anymore as she states that they are not able to help her.  I did instruct her to go home and think about having preventative surgery in the future to potentially remove her bilateral kidney stones.  She wishes to think about this and will call our office if she wishes to follow-up and discuss surgical intervention.  I would recommend that she has follow-up with a physician in case she does want surgery so they are able to review her images and discuss plan of care with her.

## 2024-06-25 NOTE — PROGRESS NOTES
Assessment and plan:     Ureterolithiasis  Underwent cystoscopy, left ureterscopy, Holmium laser lithotripsy, basket stone extraction, retrograde pyelogram, left stent insertion by Dr. Senior on 5/3/2024, stent no longer in place and removed on  Stone analysis demonstrated 70% calcium oxalate, 90% calcium oxalate  Kidney and bladder ultrasound on 5/10/2024 demonstrated a bilateral nonobstructing renal calculi, mild hydronephrosis of the left kidney that was markedly improved from previous CT imaging, present left ureteral jet  Kidney and bladder US in 6 weeks, will call with results     Nephrolithiasis  Kidney and bladder US in 6 weeks, will call with results  Patient required multiple surgical interventions in the past for ureterolithiasis  Had nephrology consult in February 2023, was initiated on 10 mEq of potassium citrate  Last stone episode that required surgical intervention was in January 2023  Continue with nephrology recommendations    I had a lengthy discussion today with the patient in the office regarding her bilateral kidney stone burden.  She is very upset today and frustrated at the fact that she continues to create kidney stones.  I did discuss with her preventative surgery with lithotripsy or ureteroscopy.  I did explain both procedures to her at length and did discuss with her that even after surgery there is still a possibility of her creating more kidney stones in the future.  She is very frustrated that this is not a definitive treatment and there is no guarantee.  She also states that she feels as though no one is able to help her.  She unfortunately does not want to follow with nephrology anymore as she states that they are not able to help her.  I did instruct her to go home and think about having preventative surgery in the future to potentially remove her bilateral kidney stones.  She wishes to think about this and will call our office if she wishes to follow-up and discuss surgical  "intervention.  I would recommend that she has follow-up with a physician in case she does want surgery so they are able to review her images and discuss plan of care with her.    History of Present Illness     Sakshi Tirado is a 58 y.o. female who presents today to the office for postop follow-up for left ureteroscopy on 5/3/2024 by Dr. Senior.  She has a longstanding history of nephrolithiasis/ureterolithiasis and last had surgical intervention in January 2023.  She continues to follow with nephrology regarding her kidney stone events/burden.    Today in the office she states that she is very frustrated and upset that she continues with kidney stones.  She has been following nephrology's recommendations but states that she wishes not to follow with them anymore as they have not been able to help her.  She states that every time she has a kidney stone she ends up in the hospital for several days and this is very hard on her personally and from a work standpoint.  She also states that her  is disabled and has a hard time caring for him and herself.  We did discuss having stone surgery to remove the kidney stones before they cause any problems and she is very hesitant to move forward with this.    Laboratory     Lab Results   Component Value Date    BUN 19 04/19/2024    CREATININE 0.85 04/19/2024       No components found for: \"GFR\"    Lab Results   Component Value Date    CALCIUM 9.4 04/19/2024    K 4.4 04/19/2024    CO2 30 04/19/2024     04/19/2024       Lab Results   Component Value Date    WBC 10.95 (H) 04/19/2024    HGB 13.7 04/19/2024    HCT 44.5 04/19/2024    MCV 96 04/19/2024     (H) 04/19/2024       No results found for: \"PSA\"    No results found for this or any previous visit (from the past 1 hour(s)).    Review of Systems     Review of Systems   Constitutional:  Negative for chills and fever.   Respiratory: Negative.  Negative for cough and shortness of breath.    Cardiovascular:  " "Negative for chest pain and leg swelling.   Genitourinary:  Negative for dyspareunia, dysuria, flank pain, frequency, hematuria, menstrual problem, pelvic pain, urgency, vaginal bleeding, vaginal discharge and vaginal pain.   Skin:  Negative for rash.   Neurological: Negative.    Hematological:  Negative for adenopathy. Does not bruise/bleed easily.                 Allergies     Allergies   Allergen Reactions    Oxybutynin Anaphylaxis     \"feels like throat closing and can't swallow\"    Clonazepam Other (See Comments)     Pt states \"didn't feel right on it.\"    Morphine Other (See Comments) and Confusion     Annotation - [id number]: \"dopey\"  Gets silly    Venlafaxine Other (See Comments)     Unknown--pt states \" MD was trying pt on different medications. Pt unsure of reaction.\"       Physical Exam     Physical Exam  Vitals reviewed.   Constitutional:       Appearance: Normal appearance.   HENT:      Head: Normocephalic and atraumatic.   Eyes:      Pupils: Pupils are equal, round, and reactive to light.   Cardiovascular:      Rate and Rhythm: Normal rate.   Pulmonary:      Effort: Pulmonary effort is normal.   Musculoskeletal:      Cervical back: Normal range of motion.   Skin:     General: Skin is warm and dry.   Neurological:      General: No focal deficit present.      Mental Status: She is alert and oriented to person, place, and time. Mental status is at baseline.   Psychiatric:         Mood and Affect: Mood normal.         Behavior: Behavior normal.         Thought Content: Thought content normal.         Judgment: Judgment normal.         Vital Signs     Vitals:    06/25/24 1537   BP: 112/68   BP Location: Left arm   Patient Position: Sitting   Cuff Size: Adult   Pulse: 76   SpO2: 97%   Weight: 69.4 kg (153 lb)       Current Medications       Current Outpatient Medications:     acetaminophen (TYLENOL) 500 mg tablet, Take 500 mg by mouth every 6 (six) hours as needed for mild pain, Disp: , Rfl:     Ascorbic Acid " (vitamin C) 1000 MG tablet, Take 2,000 mg by mouth daily, Disp: , Rfl:     atorvastatin (LIPITOR) 80 mg tablet, Take 1 tablet (80 mg total) by mouth daily, Disp: 90 tablet, Rfl: 1    Cholecalciferol (Vitamin D3) 50 MCG (2000 UT) TABS, Take 2,000 Units by mouth daily, Disp: , Rfl:     lisinopril (ZESTRIL) 5 mg tablet, Take 1 tablet (5 mg total) by mouth daily, Disp: 90 tablet, Rfl: 1    metFORMIN (GLUCOPHAGE) 1000 MG tablet, Take 1 tablet (1,000 mg total) by mouth 2 (two) times a day with meals, Disp: 180 tablet, Rfl: 1    multivitamin (THERAGRAN) TABS, Take 1 tablet by mouth daily, Disp: , Rfl:     omeprazole (PriLOSEC) 40 MG capsule, Take 1 capsule (40 mg total) by mouth daily, Disp: 90 capsule, Rfl: 1    potassium citrate (UROCIT-K) 10 mEq, Take 1 tablet (10 mEq total) by mouth 3 (three) times a day with meals, Disp: 270 tablet, Rfl: 1    sertraline (ZOLOFT) 50 mg tablet, Take 1 tablet (50 mg total) by mouth daily Takes in the am., Disp: 90 tablet, Rfl: 1    Active Problems     Patient Active Problem List   Diagnosis    Atherosclerosis of arteries    Ureterolithiasis    Follicular cyst of ovary    GERD (gastroesophageal reflux disease)    Bilateral carpal tunnel syndrome    Vitamin D deficiency    Hypercholesteremia    Type 2 diabetes mellitus with diabetic chronic kidney disease (HCC)    Primary hypertension    Nephrolithiasis    BMI 30.0-30.9,adult    Seasonal allergies    Primary osteoarthritis of both knees    Stress incontinence, female    Hydronephrosis with ureteropelvic junction (UPJ) obstruction    Status post total knee replacement, right    Aftercare following right knee joint replacement surgery    Anxiety    CKD (chronic kidney disease) stage 2, GFR 60-89 ml/min    Hypocitraturia    Elevated LFTs    Persistent proteinuria    Urine test positive for microalbuminuria    Sepsis without acute organ dysfunction (HCC)    Overweight (BMI 25.0-29.9)       Past Medical History     Past Medical History:    Diagnosis Date    Anxiety     Asthma     Colon polyp     Depression     Diabetes mellitus (HCC)     Diverticulitis     Diverticulitis of colon     Follicular cyst of ovary 12/23/2014    Former smoker     GERD (gastroesophageal reflux disease)     Glycosuria     Headache     Hyperlipidemia     Hypertension     Kidney calculi 1/11/2019    Kidney stone     Obesity     Ovarian cyst     Overactive bladder     Overweight     Pulmonary nodule     Renal calculus     Seasonal allergies     Tinea pedis of left foot 6/8/2020    Vertigo     Wears partial dentures     Lower plate -partial       Surgical History     Past Surgical History:   Procedure Laterality Date    APPENDECTOMY      BLADDER SUSPENSION      [facility] Uro G[facility]    CERVICAL BIOPSY  W/ LOOP ELECTRODE EXCISION      COLONOSCOPY      DILATION AND CURETTAGE OF UTERUS      FL RETROGRADE PYELOGRAM  7/26/2021    FL RETROGRADE PYELOGRAM  11/18/2021    FL RETROGRADE PYELOGRAM  11/10/2022    FL RETROGRADE PYELOGRAM  1/17/2023    FL RETROGRADE PYELOGRAM  4/14/2024    FL RETROGRADE PYELOGRAM  5/3/2024    HYSTERECTOMY  2007    ovaries present bilaterally    JOINT REPLACEMENT Right 05/2021    right knee    GA ARTHRP KNE CONDYLE&PLATU MEDIAL&LAT COMPARTMENTS Right 5/19/2021    Procedure: ARTHROPLASTY KNEE TOTAL;  Surgeon: Bon Broderick MD;  [facility];  Service: Orthopedics    GA COLONOSCOPY FLX DX W/COLLJ SPEC WHEN PFRMD N/A 9/26/2017    Procedure: EGD AND COLONOSCOPY;  Surgeon: Reed Hardy MD;  Location: [facility];  Service: Gastroenterology    GA CYSTO BLADDER W/URETERAL CATHETERIZATION Right 11/10/2022    Procedure: CYSTOSCOPY RETROGRADE PYELOGRAM WITH INSERTION STENT URETERAL;  Surgeon: Burton Senior MD;  [facility];  Service: Urology    GA CYSTO/URETERO W/LITHOTRIPSY &INDWELL STENT INSRT Bilateral 7/26/2021    Procedure: CYSTOSCOPY URETEROSCOPY WITH LITHOTRIPSY HOLMIUM LASER, RETROGRADE PYELOGRAM AND INSERTION STENT URETERAL;  Surgeon: Pete COLON  MD John;  [facility];  Service: Urology    SD CYSTO/URETERO W/LITHOTRIPSY &INDWELL STENT INSRT Left 9/16/2021    Procedure: CYSTOSCOPY URETEROSCOPY WITH LITHOTRIPSY HOLMIUM LASER, RETROGRADE PYELOGRAM AND INSERTION STENT URETERAL;  Surgeon: Cesar Keller MD;  [facility];  Service: Urology    SD CYSTO/URETERO W/LITHOTRIPSY &INDWELL STENT INSRT Left 11/18/2021    Procedure: CYSTOSCOPY URETEROSCOPY WITH LITHOTRIPSY HOLMIUM LASER, RETROGRADE PYELOGRAM AND INSERTION STENT URETERAL;  Surgeon: Vicente Colby MD;  [facility];  Service: Urology    SD CYSTO/URETERO W/LITHOTRIPSY &INDWELL STENT INSRT Right 1/17/2023    Procedure: CYSTOSCOPY URETEROSCOPY WITH LITHOTRIPSY HOLMIUM LASER, RETROGRADE PYELOGRAM AND EXCHANGE STENT URETERAL, BASKET STONE EXTRACTION;  Surgeon: Vicente Colby MD;  [facility];  Service: Urology    SD CYSTO/URETERO W/LITHOTRIPSY &INDWELL STENT INSRT Left 4/14/2024    Procedure: CYSTOSCOPY URETEROSCOPY WITH LITHOTRIPSY HOLMIUM LASER, RETROGRADE PYELOGRAM AND INSERTION STENT URETERAL;  Surgeon: Burton Senior MD;  [facility];  Service: Urology    SD CYSTO/URETERO W/LITHOTRIPSY &INDWELL STENT INSRT Left 5/3/2024    Procedure: CYSTOSCOPY URETEROSCOPY WITH LITHOTRIPSY HOLMIUM LASER,BASKET STONE EXTRACTION , RETROGRADE PYELOGRAM AND INSERTION STENT URETERAL;  Surgeon: Burton Senior MD;  [facility];  Service: Urology    SD NEUROPLASTY &/TRANSPOS MEDIAN NRV CARPAL TUNNE Right 5/29/2018    Procedure: CARPAL TUNNEL RELEASE;  Surgeon: Amish Parkinson DO;  [facility];  Service: Orthopedics    REMOVAL URETERAL STENT Right 9/16/2021    Procedure: REMOVAL STENT URETERAL;  Surgeon: Cesar Keller MD;  [facility];  Service: Urology    TONSILLECTOMY      TUBAL LIGATION      URETEROSCOPY  7/26/2021    Procedure: URETEROSCOPY, LASER AND BASKET STONE EXTRACTION;  Surgeon: Pete Lopez MD;  [facility];  Service: Urology     WISDOM TOOTH EXTRACTION         Family History     Family History   Problem Relation Age of Onset    Diabetes type II Mother     Asthma Mother     Stroke Father     Pancreatic cancer Father 73    Diabetes type II Father     No Known Problems Sister     No Known Problems Sister     No Known Problems Sister     No Known Problems Sister     No Known Problems Maternal Grandmother     No Known Problems Maternal Grandfather     No Known Problems Paternal Grandmother     No Known Problems Paternal Grandfather     No Known Problems Brother     No Known Problems Son     No Known Problems Son     No Known Problems Maternal Aunt     No Known Problems Paternal Aunt     Breast cancer Neg Hx        Social History     Social History     Social History     Tobacco Use   Smoking Status Former    Current packs/day: 0.00    Average packs/day: 0.3 packs/day for 34.2 years (8.6 ttl pk-yrs)    Types: Cigarettes    Start date:     Quit date: 3/16/2018    Years since quittin.2   Smokeless Tobacco Never   Tobacco Comments    approx less than  half a pack       Past Surgical History:   Procedure Laterality Date    APPENDECTOMY      BLADDER SUSPENSION      [facility] Uro Gyn    CERVICAL BIOPSY  W/ LOOP ELECTRODE EXCISION      COLONOSCOPY      DILATION AND CURETTAGE OF UTERUS      FL RETROGRADE PYELOGRAM  2021    FL RETROGRADE PYELOGRAM  2021    FL RETROGRADE PYELOGRAM  11/10/2022    FL RETROGRADE PYELOGRAM  2023    FL RETROGRADE PYELOGRAM  2024    FL RETROGRADE PYELOGRAM  5/3/2024    HYSTERECTOMY  2007    ovaries present bilaterally    JOINT REPLACEMENT Right 2021    right knee    KY ARTHRP KNE CONDYLE&PLATU MEDIAL&LAT COMPARTMENTS Right 2021    Procedure: ARTHROPLASTY KNEE TOTAL;  Surgeon: Bon Broderick MD;  Location:  [facility] OR;  Service: Orthopedics    KY COLONOSCOPY FLX DX W/COLLJ SPEC WHEN PFRMD N/A 2017    Procedure: EGD AND COLONOSCOPY;  Surgeon: Reed Hardy MD;  Location: [facility];   Service: Gastroenterology    ND CYSTO BLADDER W/URETERAL CATHETERIZATION Right 11/10/2022    Procedure: CYSTOSCOPY RETROGRADE PYELOGRAM WITH INSERTION STENT URETERAL;  Surgeon: Burton Senior MD;  [facility];  Service: Urology    ND CYSTO/URETERO W/LITHOTRIPSY &INDWELL STENT INSRT Bilateral 7/26/2021    Procedure: CYSTOSCOPY URETEROSCOPY WITH LITHOTRIPSY HOLMIUM LASER, RETROGRADE PYELOGRAM AND INSERTION STENT URETERAL;  Surgeon: Pete Lopez MD;  [facility];  Service: Urology    ND CYSTO/URETERO W/LITHOTRIPSY &INDWELL STENT INSRT Left 9/16/2021    Procedure: CYSTOSCOPY URETEROSCOPY WITH LITHOTRIPSY HOLMIUM LASER, RETROGRADE PYELOGRAM AND INSERTION STENT URETERAL;  Surgeon: Cesar Keller MD;  [facility];  Service: Urology    ND CYSTO/URETERO W/LITHOTRIPSY &INDWELL STENT INSRT Left 11/18/2021    Procedure: CYSTOSCOPY URETEROSCOPY WITH LITHOTRIPSY HOLMIUM LASER, RETROGRADE PYELOGRAM AND INSERTION STENT URETERAL;  Surgeon: Vicente Colby MD;  [facility];  Service: Urology    ND CYSTO/URETERO W/LITHOTRIPSY &INDWELL STENT INSRT Right 1/17/2023    Procedure: CYSTOSCOPY URETEROSCOPY WITH LITHOTRIPSY HOLMIUM LASER, RETROGRADE PYELOGRAM AND EXCHANGE STENT URETERAL, BASKET STONE EXTRACTION;  Surgeon: Vicente Colby MD;  [facility];  Service: Urology    ND CYSTO/URETERO W/LITHOTRIPSY &INDWELL STENT INSRT Left 4/14/2024    Procedure: CYSTOSCOPY URETEROSCOPY WITH LITHOTRIPSY HOLMIUM LASER, RETROGRADE PYELOGRAM AND INSERTION STENT URETERAL;  Surgeon: Burton Senior MD;  [facility];  Service: Urology    ND CYSTO/URETERO W/LITHOTRIPSY &INDWELL STENT INSRT Left 5/3/2024    Procedure: CYSTOSCOPY URETEROSCOPY WITH LITHOTRIPSY HOLMIUM LASER,BASKET STONE EXTRACTION , RETROGRADE PYELOGRAM AND INSERTION STENT URETERAL;  Surgeon: Burton Senior MD;  [facility];  Service: Urology    ND NEUROPLASTY &/TRANSPOS MEDIAN NRV [place] DUMONT Right 5/29/2018     Procedure: CARPAL TUNNEL RELEASE;  Surgeon: Amish Parkinson DO;  [facility];  Service: Orthopedics    REMOVAL URETERAL STENT Right 9/16/2021    Procedure: REMOVAL STENT URETERAL;  Surgeon: Cesar Keller MD;  [facility];  Service: Urology    TONSILLECTOMY      TUBAL LIGATION      URETEROSCOPY  7/26/2021    Procedure: URETEROSCOPY, LASER AND BASKET STONE EXTRACTION;  Surgeon: Pete Lopez MD;  [facility];  Service: Urology    WISDOM TOOTH EXTRACTION           The following portions of the patient's history were reviewed and updated as appropriate: allergies, current medications, past family history, past medical history, past social history, past surgical history and problem list    Please note :  Voice dictation software has been used to create this document.  There may be inadvertent transcription errors.    LEONARD Heller

## 2024-06-27 ENCOUNTER — TELEPHONE (OUTPATIENT)
Age: 59
End: 2024-06-27

## 2024-06-27 NOTE — TELEPHONE ENCOUNTER
Pt called, sts she had appt with the AP on 6/25/24 and discussed possible kidney stone surgery.  Pt sts that she was informed by the AP that she would need a f/u appt with the MD to discuss the surgery.  Offered pt next avail f/u with an MD but pt declined since next avail is 12/2024.  Pt sts that she can't wait that long and that she was informed to schedule the appt with the MD ASAP.  Pt requesting call back to discuss her options at this time.  Please review.    Pt call back: [phone]

## 2024-06-28 NOTE — TELEPHONE ENCOUNTER
Patient is unable to go to the [place] office since she works in [place], she would need either an early AM or PM appointment, please advise thank-you.

## 2024-07-19 ENCOUNTER — TELEPHONE (OUTPATIENT)
Dept: UROLOGY | Facility: AMBULATORY SURGERY CENTER | Age: 59
End: 2024-07-19

## 2024-07-19 ENCOUNTER — OFFICE VISIT (OUTPATIENT)
Dept: UROLOGY | Facility: AMBULATORY SURGERY CENTER | Age: 59
End: 2024-07-19
Payer: COMMERCIAL

## 2024-07-19 VITALS
HEART RATE: 102 BPM | OXYGEN SATURATION: 97 % | HEIGHT: 63 IN | DIASTOLIC BLOOD PRESSURE: 72 MMHG | SYSTOLIC BLOOD PRESSURE: 136 MMHG | BODY MASS INDEX: 26.93 KG/M2 | WEIGHT: 152 LBS

## 2024-07-19 DIAGNOSIS — N20.0 NEPHROLITHIASIS: Primary | ICD-10-CM

## 2024-07-19 PROCEDURE — 99214 OFFICE O/P EST MOD 30 MIN: CPT | Performed by: UROLOGY

## 2024-07-19 NOTE — ASSESSMENT & PLAN NOTE
Imp: h/o bilat stones, s/p bilat URS with laser, punctate stones    Plan:    - hydrate 64+ oz H2O per day  - urocit K TID  - CT stone study  - OR only if significant stones on CT as U/S may be showing artifact  - patient and sister agreeable with plan

## 2024-07-19 NOTE — TELEPHONE ENCOUNTER
Pt saw FT today and is scheduled for the CT 7/25. She requested for the follow up to be a phone call. If there is no reason to see her then she would like to save the co pay money. Thank you      Provider note:   Return for CT stone study then appt with AP .

## 2024-07-19 NOTE — PROGRESS NOTES
7/19/2024    Sakshi Tirado  1965  [id number]    1. Nephrolithiasis  Assessment & Plan:  Imp: h/o bilat stones, s/p bilat URS with laser, punctate stones    Plan:    - hydrate 64+ oz H2O per day  - urocit K TID  - CT stone study  - OR only if significant stones on CT as U/S may be showing artifact  - patient and sister agreeable with plan     Orders:  -     CT renal stone study abdomen pelvis wo contrast; Future; Expected date: 07/19/2024       History of Present Illness  58 y.o. female with a history of bilateral stones and bilat URS by multiple MDs in the group.  Most recently L URS for large distal L stone by Dr. Senior.  CT stone study April 2024 with minimal stone disease (punctate) and the distal L stone.  F/u U/S concerning for larger bilateral stones.  May be artifact.  Currently asymptomatic.  On Urocit K TID by renal.  No hematuria      AUA Symptom Score      Review of Systems    Past Medical History  Past Medical History:   Diagnosis Date   • Anxiety    • Asthma    • Colon polyp    • Depression    • Diabetes mellitus (HCC)    • Diverticulitis    • Diverticulitis of colon    • Follicular cyst of ovary 12/23/2014   • Former smoker    • GERD (gastroesophageal reflux disease)    • Glycosuria    • Headache    • Hyperlipidemia    • Hypertension    • Kidney calculi 1/11/2019   • Kidney stone    • Obesity    • Ovarian cyst    • Overactive bladder    • Overweight    • Pulmonary nodule    • Renal calculus    • Seasonal allergies    • Tinea pedis of left foot 6/8/2020   • Vertigo    • Wears partial dentures     Lower plate -partial       Past Social History  Past Surgical History:   Procedure Laterality Date   • APPENDECTOMY     • BLADDER SUSPENSION      [facility]   • CERVICAL BIOPSY  W/ LOOP ELECTRODE EXCISION     • COLONOSCOPY     • DILATION AND CURETTAGE OF UTERUS     • FL RETROGRADE PYELOGRAM  7/26/2021   • FL RETROGRADE PYELOGRAM  11/18/2021   • FL RETROGRADE PYELOGRAM  11/10/2022   • FL RETROGRADE  PYELOGRAM  1/17/2023   • FL RETROGRADE PYELOGRAM  4/14/2024   • FL RETROGRADE PYELOGRAM  5/3/2024   • HYSTERECTOMY  2007    ovaries present bilaterally   • JOINT REPLACEMENT Right 05/2021    right knee   • NC ARTHRP KNE CONDYLE&PLATU MEDIAL&LAT COMPARTMENTS Right 5/19/2021    Procedure: ARTHROPLASTY KNEE TOTAL;  Surgeon: Bon Broderick MD;  [facility];  Service: Orthopedics   • NC COLONOSCOPY FLX DX W/COLLJ SPEC WHEN PFRMD N/A 9/26/2017    Procedure: EGD AND COLONOSCOPY;  Surgeon: Reed Hardy MD;  Location: [facility];  Service: Gastroenterology   • NC CYSTO BLADDER W/URETERAL CATHETERIZATION Right 11/10/2022    Procedure: CYSTOSCOPY RETROGRADE PYELOGRAM WITH INSERTION STENT URETERAL;  Surgeon: Burton Senior MD;  [facility];  Service: Urology   • NC CYSTO/URETERO W/LITHOTRIPSY &INDWELL STENT INSRT Bilateral 7/26/2021    Procedure: CYSTOSCOPY URETEROSCOPY WITH LITHOTRIPSY HOLMIUM LASER, RETROGRADE PYELOGRAM AND INSERTION STENT URETERAL;  Surgeon: Pete Lopez MD;  [facility];  Service: Urology   • NC CYSTO/URETERO W/LITHOTRIPSY &INDWELL STENT INSRT Left 9/16/2021    Procedure: CYSTOSCOPY URETEROSCOPY WITH LITHOTRIPSY HOLMIUM LASER, RETROGRADE PYELOGRAM AND INSERTION STENT URETERAL;  Surgeon: Cesar Keller MD;  Location: [facility] OR;  Service: Urology   • NC CYSTO/URETERO W/LITHOTRIPSY &INDWELL STENT INSRT Left 11/18/2021    Procedure: CYSTOSCOPY URETEROSCOPY WITH LITHOTRIPSY HOLMIUM LASER, RETROGRADE PYELOGRAM AND INSERTION STENT URETERAL;  Surgeon: Vicente Cobly MD;  [facility];  Service: Urology   • NC CYSTO/URETERO W/LITHOTRIPSY &INDWELL STENT INSRT Right 1/17/2023    Procedure: CYSTOSCOPY URETEROSCOPY WITH LITHOTRIPSY HOLMIUM LASER, RETROGRADE PYELOGRAM AND EXCHANGE STENT URETERAL, BASKET STONE EXTRACTION;  Surgeon: Vicente Colby MD;  [facility];  Service: Urology   • NC CYSTO/URETERO W/LITHOTRIPSY &INDWELL STENT INSRT Left 4/14/2024     Procedure: CYSTOSCOPY URETEROSCOPY WITH LITHOTRIPSY HOLMIUM LASER, RETROGRADE PYELOGRAM AND INSERTION STENT URETERAL;  Surgeon: Burton Senior MD;  [facility];  Service: Urology   • IN CYSTO/URETERO W/LITHOTRIPSY &INDWELL STENT INSRT Left 5/3/2024    Procedure: CYSTOSCOPY URETEROSCOPY WITH LITHOTRIPSY HOLMIUM LASER,BASKET STONE EXTRACTION , RETROGRADE PYELOGRAM AND INSERTION STENT URETERAL;  Surgeon: Burton Senior MD;  [facility];  Service: Urology   • IN NEUROPLASTY &/TRANSPOS MEDIAN NRV CARPAL TUNNE Right 5/29/2018    Procedure: CARPAL TUNNEL RELEASE;  Surgeon: Amish Parkinson DO;  [facility];  Service: Orthopedics   • REMOVAL URETERAL STENT Right 9/16/2021    Procedure: REMOVAL STENT URETERAL;  Surgeon: Cesar Keller MD;  [facility];  Service: Urology   • TONSILLECTOMY     • TUBAL LIGATION     • URETEROSCOPY  7/26/2021    Procedure: URETEROSCOPY, LASER AND BASKET STONE EXTRACTION;  Surgeon: Pete Lopez MD;  [facility];  Service: Urology   • WISDOM TOOTH EXTRACTION         Past Family History  Family History   Problem Relation Age of Onset   • Diabetes type II Mother    • Asthma Mother    • Stroke Father    • Pancreatic cancer Father 73   • Diabetes type II Father    • No Known Problems Sister    • No Known Problems Sister    • No Known Problems Sister    • No Known Problems Sister    • No Known Problems Maternal Grandmother    • No Known Problems Maternal Grandfather    • No Known Problems Paternal Grandmother    • No Known Problems Paternal Grandfather    • No Known Problems Brother    • No Known Problems Son    • No Known Problems Son    • No Known Problems Maternal Aunt    • No Known Problems Paternal Aunt    • Breast cancer Neg Hx        Past Social history  Social History     Socioeconomic History   • Marital status: /Civil Union     Spouse name: Not on file   • Number of children: Not on file   • Years of education: Not on file   • Highest  education level: Not on file   Occupational History   • Not on file   Tobacco Use   • Smoking status: Former     Current packs/day: 0.00     Average packs/day: 0.3 packs/day for 34.2 years (8.6 ttl pk-yrs)     Types: Cigarettes     Start date:      Quit date: 3/16/2018     Years since quittin.3   • Smokeless tobacco: Never   • Tobacco comments:     approx less than  half a pack   Vaping Use   • Vaping status: Never Used   Substance and Sexual Activity   • Alcohol use: Not Currently     Comment: socially   • Drug use: Never     Comment: Denies   • Sexual activity: Not Currently     Partners: Male   Other Topics Concern   • Not on file   Social History Narrative    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      Social Determinants of Health     Financial Resource Strain: Not on file   Food Insecurity: Not on file   Transportation Needs: Not on file   Physical Activity: Not on file   Stress: Not on file   Social Connections: Not on file   Intimate Partner Violence: Not on file   Housing Stability: Not on file       Current Medications  Current Outpatient Medications   Medication Sig Dispense Refill   • acetaminophen (TYLENOL) 500 mg tablet Take 500 mg by mouth every 6 (six) hours as needed for mild pain     • atorvastatin (LIPITOR) 80 mg tablet Take 1 tablet (80 mg total) by mouth daily 90 tablet 1   • Cholecalciferol (Vitamin D3) 50 MCG (2000 UT) TABS Take 2,000 Units by mouth daily     • lisinopril (ZESTRIL) 5 mg tablet Take 1 tablet (5 mg total) by mouth daily 90 tablet 1   • metFORMIN (GLUCOPHAGE) 1000 MG tablet Take 1 tablet (1,000 mg total) by mouth 2 (two) times a day with meals 180 tablet 1   • multivitamin (THERAGRAN) TABS Take 1 tablet by mouth daily     • omeprazole (PriLOSEC) 40 MG capsule Take 1 capsule (40 mg total) by mouth daily 90 capsule 1   • potassium citrate (UROCIT-K) 10 mEq Take 1 tablet (10 mEq total) by mouth 3 (three) times a day with meals 270 tablet 1   • sertraline (ZOLOFT) 50 mg tablet Take 1 tablet (50 mg  "total) by mouth daily Takes in the am. 90 tablet 1   • Ascorbic Acid (vitamin C) 1000 MG tablet Take 2,000 mg by mouth daily (Patient not taking: Reported on 7/19/2024)       No current facility-administered medications for this visit.       Allergies  Allergies   Allergen Reactions   • Oxybutynin Anaphylaxis     \"feels like throat closing and can't swallow\"   • Clonazepam Other (See Comments)     Pt states \"didn't feel right on it.\"   • Morphine Other (See Comments) and Confusion     Annotation - 14Jun2017: \"dopey\"  Gets silly   • Venlafaxine Other (See Comments)     Unknown--pt states \" MD was trying pt on different medications. Pt unsure of reaction.\"       Past Medical History, Social History, Family History, medications and allergies were reviewed.    Vitals  Vitals:    07/19/24 1525   BP: 136/72   BP Location: Left arm   Patient Position: Sitting   Cuff Size: Standard   Pulse: 102   SpO2: 97%   Weight: 68.9 kg (152 lb)   Height: 5' 3\" (1.6 m)       Physical Exam  Soft, nt, nd, - cva, skin warm, - edema    Results  No results found for: \"PSA\"  Lab Results   Component Value Date    CALCIUM 9.4 04/19/2024    K 4.4 04/19/2024    CO2 30 04/19/2024     04/19/2024    BUN 19 04/19/2024    CREATININE 0.85 04/19/2024     Lab Results   Component Value Date    WBC 10.95 (H) 04/19/2024    HGB 13.7 04/19/2024    HCT 44.5 04/19/2024    MCV 96 04/19/2024     (H) 04/19/2024       Office Urine Dip  No results found for this or any previous visit (from the past 1 hour(s)).]  "

## 2024-07-25 ENCOUNTER — HOSPITAL ENCOUNTER (OUTPATIENT)
Dept: RADIOLOGY | Age: 59
Discharge: HOME | End: 2024-07-25
Payer: COMMERCIAL

## 2024-07-25 DIAGNOSIS — N20.0 NEPHROLITHIASIS: ICD-10-CM

## 2024-07-25 PROCEDURE — 74176 CT ABD & PELVIS W/O CONTRAST: CPT

## 2024-07-26 NOTE — TELEPHONE ENCOUNTER
Pt called and stated she received a vm informing her she needs to come in for an appointment, pt stated she cannot afford to keep paying her $50 co-pay and would prefer to discuss the results directly over the phone.     Please advise.     Call back: [phone]

## 2024-09-11 ENCOUNTER — RA CDI HCC (OUTPATIENT)
Dept: OTHER | Facility: HOSPITAL | Age: 59
End: 2024-09-11

## 2024-09-11 NOTE — PROGRESS NOTES
Health Maintenance       Depression Screening (Yearly)  Never done    Hepatitis B Vaccine (1 of 3 - 19+ 3-dose series)  Never done    Colorectal Cancer Screen (View Topic Details)  Never done    Shingles Vaccine (1 of 2)  Never done    COVID-19 Vaccine (1 - 2023-24 season)  Never done    Influenza Vaccine (1)  Due since 9/1/2024           Following review of the above:  Patient is not proceeding with: COVID-19, Influenza, and Shingles    Note: Refer to final orders and clinician documentation.                   Assessment   1  Anxiety (300 00) (F41 9)   2  GERD (gastroesophageal reflux disease) (530 81) (K21 9)   3  Benign colon polyp (211 3) (K63 5)   4  Mild vitamin D deficiency (268 9) (E55 9)   5  Dry eyes, bilateral (375 15) (H04 123)   6  Hypercholesteremia (272 0) (E78 00)   7  Elevated BP without diagnosis of hypertension (796 2) (R03 0)    Plan   Anxiety    · Sertraline HCl - 25 MG Oral Tablet; TAKE 1 TABLET DAILY  Anxiety, Health Maintenance    · EKG/ECG- POC; Status:Complete - Retrospective By Protocol Authorization;   Done: [id number]    12:01PM  CTS (carpal tunnel syndrome), Mild vitamin D deficiency    · EMG ONE EXTREMITY WITH OR W/O RELATED PARASPINAL AREAS; Status:Hold For - Scheduling;    Requested for:17Jan2018;   ONE : LLE  GERD (gastroesophageal reflux disease)    · Omeprazole 20 MG Oral Capsule Delayed Release; Take 1 tablet daily  Hypercholesteremia    · Nicotine 14 MG/24HR Transdermal Patch 24 Hour; APPLY 1 PATCH DAILY AS DIRECTED   · Nicotine 7 MG/24HR Transdermal Patch 24 Hour; APPLY 1 PATCH DAILY AS DIRECTED   · Atorvastatin Calcium 10 MG Oral Tablet (Lipitor); TAKE 1 TABLET DAILY AS DIRECTED   · (1) LIPID PANEL, FASTING; Status:Active; Requested KB:[id number];   Mild vitamin D deficiency    · (1) VITAMIN D 25-HYDROXY; Status:Active; Requested YKC:[id number]; Discussion/Summary   Discussion Summary:    Check labs  Get OTC brace to help hand pain  Can get OTC B complex tablet  using eye drops, before going to bed start to use OTC saline drops  up with Gastro for repeat colonoscopy  renewed at visit  with 14 mg patch, about 2 weeks, after 2 weeks switch to 7 mg   to keep track of blood pressure, bring log of BP to next visit               Counseling Documentation With Imm: The patient was counseled regarding diagnostic results,-- instructions for management,-- risk factor reductions,-- prognosis,-- patient and family education,-- impressions,-- risks and benefits of treatment options,-- importance of compliance with treatment  total time of encounter was 30 minutes-- and-- 20 minutes was spent counseling  Medication SE Review and Pt Understands Tx: Possible side effects of new medications were reviewed with the patient/guardian today  The treatment plan was reviewed with the patient/guardian  The patient/guardian understands and agrees with the treatment plan      Chief Complaint   Chief Complaint Free Text Note Form: pt  presents for follow up for anxiety, GERD, hyperlipidemia  Review Labs 11/17/17  pt  would like Med refills  would like to discuss quitting smoking  History of Present Illness   Hand Pain: The patient is being seen for an initial evaluation of a hand problem affecting both hands  The patient is left hand dominant  Current Symptoms include pain,-- decreased range of motion,-- swelling-- and-- numbness  Exacerbating factors include use of the fingers,-- use of the hand-- and-- lifting at work of clients  Relieving factors include instructed to get OTC brace for fingers  Smoking Cessation (Brief): The patient is being seen for clinic follow-up for tobacco cessation assistance  The patient has high interest in quitting  She has tried to quit once  Symptoms:  anxiety  Pertinent medical history:  asthma  HPI: pt  presents for Bilateral hand pain, L hand worse, numbness, painful  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, makes pain worse  Possible carpal tunnel  Instructed to wear brace  better labs checked 11/17/17, will recheck again for next visit  frequency has improved, no symptoms  has been using Blink eye drops, has been helping dry eyes  Wears contacts about 14 hours a day  cessation pt  is ready to quit smoking, will to try patch  pt  states work has smoking cessation program, will look into joining  slightly elevated at visit, pt  states feels calm  pt  states anxiety, work triggers anxiety  Sertraline and Xanax helps anxiety      Hyperlipidemia (Initial): The patient is being seen for a routine clinic follow-up of hyperlipidemia  Recent measurements for this patient were taken 11/17/17  Recent measurements: HDL 39 mg/dL,  mg/dL and triglycerides 136 mg/dL  No associated symptoms are reported  Gastroesophageal Reflux Disease (Follow-Up): The patient is being seen for follow-up of gastroesophageal reflux disease  The patient reports doing well  There are no comorbid illnesses  Anxiety:    Ngozi Domínguez presents with complaints of daytime episodes of anxiety  Symptoms are improved by medications  Symptoms are made worse by stress and job issues  Associated symptoms include excessive worry-- and-- irritability, but-- no insomnia,-- no panic attacks,-- no sleep disruption,-- no chest pain,-- no dizziness,-- no fainting,-- no headaches,-- no racing heart,-- no shortness of breath-- and-- no tremors  Hyperlipidemia (Follow-Up): The patient states her hyperlipidemia has been under good control since the last visit  Symptoms:      Review of Systems   Complete-Female:      Constitutional: No fever, no chills, feels well, no tiredness, no recent weight gain or weight loss  Eyes: No complaints of eye pain, no red eyes, no eyesight problems, no discharge, no dry eyes, no itching of eyes  ENT: no complaints of earache, no loss of hearing, no nose bleeds, no nasal discharge, no sore throat, no hoarseness  Cardiovascular: No complaints of slow heart rate, no fast heart rate, no chest pain, no palpitations, no leg claudication, no lower extremity edema  Respiratory: No complaints of shortness of breath, no wheezing, no cough, no SOB on exertion, no orthopnea, no PND  Gastrointestinal: No complaints of abdominal pain, no constipation, no nausea or vomiting, no diarrhea, no bloody stools  Genitourinary: No complaints of dysuria, no incontinence, no pelvic pain, no dysmenorrhea, no vaginal discharge or bleeding  Musculoskeletal: Bilateral hand pain  Integumentary: No complaints of skin rash or lesions, no itching, no skin wounds, no breast pain or lump  Neurological: No complaints of headache, no confusion, no convulsions, no numbness, no dizziness or fainting, no tingling, no limb weakness, no difficulty walking  The patient presents with complaints of gradual onset of daytime episodes of moderate anxiety  Symptoms are improved by medications  Symptoms are made worse by stress and job issues  Endocrine: hot flashes, but-- No complaints of proptosis, no hot flashes, no muscle weakness, no deepening of the voice, no feelings of weakness  Hematologic/Lymphatic: No complaints of swollen glands, no swollen glands in the neck, does not bleed easily, does not bruise easily  ROS Reviewed:    ROS reviewed  Active Problems   1  Abnormal EKG (794 31) (R94 31)   2  Abnormal weight gain (783 1) (R63 5)   3  Allergic rhinitis (477 9) (J30 9)   4  Anxiety (300 00) (F41 9)   5  Asthma (493 90) (J45 909)   6  Atherosclerosis of arteries (440 8) (I70 8)   7  Benign colon polyp (211 3) (K63 5)   8  Benign paroxysmal positional vertigo (386 11) (H81 10)   9  Colon polyps (211 3) (K63 5)   10  Dermatophytosis of nail (110 1) (B35 1)   11  Diverticulitis of colon (562 11) (K57 32)   12  Fecal occult blood test positive (792 1) (R19 5)   13  GERD (gastroesophageal reflux disease) (530 81) (K21 9)   14  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)   15  Headache, unspecified headache type (784 0) (R51)   16  Hypercholesteremia (272 0) (E78 00)   17  Hyperglycemia (790 29) (R73 9)   18  Hyperlipidemia (272 4) (E78 5)   19  Neuralgia and neuritis, unspecified (729 2) (M79 2)   20  Ovarian cyst (620 2) (N83 209)   21  Overweight (BMI 25 0-29 9) (278 02) (E66 3)   22  Renal calculus, bilateral (592 0) (N20 0)   23  Situational anxiety (300 09) (F41 8)   24  Solitary pulmonary nodule (793 11) (R91 1)   25   Urinary frequency (561 41) (R35 0)    Past Medical History   1  History of Abdominal bloating (787 3) (R14 0)   2  History of diverticulitis (V12 70) (Z87 19)   3  History of gastroesophageal reflux (GERD) (V12 79) (Z87 19)  Active Problems And Past Medical History Reviewed: The active problems and past medical history were reviewed and updated today  Surgical History   1  History of Appendectomy   2  History of Hysterectomy  Surgical History Reviewed: The surgical history was reviewed and updated today  Family History   Mother    1  No pertinent family history  Father    2  Family history of pancreatic cancer (V16 0) (Z80 0)   3  Family history of stroke (V17 1) (Z82 3)  Family History Reviewed: The family history was reviewed and updated today  Social History    · Caffeine use (V49 89) (F15 90)   · Current every day smoker (305 1) (O17 087)   · No illicit drug use   · Rarely consumes alcohol (V49 89) (Z78 9)  Social History Reviewed: The social history was reviewed and updated today  Current Meds    1  ALPRAZolam 0 25 MG Oral Tablet; TAKE 1 TABLET Twice daily PRN; Therapy: 55QXL1401 to (Evaluate:68Iqt0526); Last Rx:88Cdb4599 Ordered   2  Atorvastatin Calcium 10 MG Oral Tablet; TAKE 1 TABLET DAILY AS DIRECTED; Therapy: 88ZYQ3192 to (Renew:59Rlw9849)  Requested for: 64Wzh4952; Last Rx:30Aug2017     Ordered   3  Omeprazole 20 MG Oral Capsule Delayed Release; Take 1 tablet daily; Therapy: 63HAT6142 to (Brynn Betancourt)  Requested for: 48Vdp0826; Last Rx:30Aug2017     Ordered   4  Phenazopyridine HCl - 200 MG Oral Tablet; TAKE 1 TABLET 3 TIMES DAILY AS NEEDED FOR PAIN;     Therapy: 09JWD7025 to (Evaluate:17Jan2018)  Requested for: 75PRM9446; Last Rx:12Jan2018     Ordered   5  Sertraline HCl - 25 MG Oral Tablet; TAKE 1 TABLET DAILY; Therapy: 92LUG3045 to (Sam Singh)  Requested for: 84Osv0675; Last Rx:72Tis2797     Ordered   6   Suprep Bowel Prep Kit 17 5-3 13-1 6 GM/180ML Oral Solution; USE AS DIRECTED; Therapy: 01RSG5285 to (Last Rx:80Hfl3210)  Requested for: 97ZTC0044 Ordered  Medication List Reviewed: The medication list was reviewed and updated today  Allergies   1  Bupropion   2  clonazepam   3  morphine   4  venlafaxine    Vitals   Vital Signs    Recorded: 13ILW4392 11:01AM   Temperature 97 7 F, Tympanic   Heart Rate 90   Systolic 201, LUE, Sitting   Diastolic 90, LUE, Sitting   Height 5 ft 3 in   Weight 173 lb    BMI Calculated 30 65   BSA Calculated 1 82   O2 Saturation 96, RA     Physical Exam        Constitutional      General appearance: No acute distress, well appearing and well nourished  overweight  Eyes      Conjunctiva and lids: No swelling, erythema or discharge  -- dry eyes  Pupils and irises: Equal, round and reactive to light  -- PERRLA, EOMI  Ears, Nose, Mouth, and Throat      External inspection of ears and nose: Normal        Oropharynx: Normal with no erythema, edema, exudate or lesions  Pulmonary      Respiratory effort: No increased work of breathing or signs of respiratory distress  Auscultation of lungs: Clear to auscultation  Cardiovascular      Auscultation of heart: Normal rate and rhythm, normal S1 and S2, without murmurs  Examination of extremities for edema and/or varicosities: Normal        Abdomen      Abdomen: Non-tender, no masses  The abdomen was rounded  Bowel sounds were normal  The abdomen was soft and nontender  no masses palpated  Lymphatic      Palpation of lymph nodes in neck: No lymphadenopathy  Musculoskeletal      Gait and station: Normal        Skin      Skin and subcutaneous tissue: Normal without rashes or lesions  Neurologic      Sensation: Abnormal   Sensory exam: dysesthesia present   left hand      Psychiatric      Orientation to person, place, and time: Normal        Mood and affect: Normal           Results/Data   EKG/ECG- POC [id number] 12:01PM Mary Ann Brunner Name Result Flag Reference   EKG/ECG 1/17/2017        Summary / No summary entered :        No summary entered  Documents attached :        Aileen Richter ECG - NAILA Rendon; Enc: [id number] - Appointment - Reggie Marvin -        (Internal Medicine) (Result Document)  ECG: A 12 lead ECG was performed and was normal -- No acute ischemia  Attending Note   Scribe Attestation:      Scribe Attestation I, Isidro Bowen am acting as a scribe in the presence of the attending physician while the attending physician examines the patient      Physician Attestation:      Signatures    Electronically signed by : Sana Bucio MD; Jan 17 2018 12:43PM EST                       (Author)

## 2024-09-15 ENCOUNTER — HOSPITAL ENCOUNTER (INPATIENT)
Facility: HOSPITAL | Age: 59
LOS: 2 days | Discharge: HOME | DRG: 660 | End: 2024-09-17
Attending: EMERGENCY MEDICINE | Admitting: INTERNAL MEDICINE
Payer: COMMERCIAL

## 2024-09-15 ENCOUNTER — APPOINTMENT (EMERGENCY)
Dept: RADIOLOGY | Facility: HOSPITAL | Age: 59
DRG: 660 | End: 2024-09-15
Payer: COMMERCIAL

## 2024-09-15 ENCOUNTER — ANESTHESIA EVENT (INPATIENT)
Dept: PERIOP | Facility: HOSPITAL | Age: 59
DRG: 660 | End: 2024-09-15
Payer: COMMERCIAL

## 2024-09-15 DIAGNOSIS — N20.0 NEPHROLITHIASIS: ICD-10-CM

## 2024-09-15 DIAGNOSIS — R10.819 LEFT FLANK TENDERNESS: Primary | ICD-10-CM

## 2024-09-15 DIAGNOSIS — N20.1 URETEROLITHIASIS: ICD-10-CM

## 2024-09-15 DIAGNOSIS — N39.0 UTI (URINARY TRACT INFECTION): ICD-10-CM

## 2024-09-15 PROBLEM — F32.A DEPRESSION: Status: ACTIVE | Noted: 2024-09-15

## 2024-09-15 PROBLEM — N13.1 HYDRONEPHROSIS DUE TO OBSTRUCTION OF URETER: Status: ACTIVE | Noted: 2020-06-08

## 2024-09-15 PROBLEM — E78.5 HYPERLIPIDEMIA: Status: ACTIVE | Noted: 2017-06-29

## 2024-09-15 LAB
ALBUMIN SERPL BCG-MCNC: 4.2 G/DL (ref 3.5–5)
ALP SERPL-CCNC: 95 U/L (ref 34–104)
ALT SERPL W P-5'-P-CCNC: 30 U/L (ref 7–52)
ANION GAP SERPL CALCULATED.3IONS-SCNC: 8 MMOL/L (ref 4–13)
AST SERPL W P-5'-P-CCNC: 31 U/L (ref 13–39)
BACTERIA UR QL AUTO: ABNORMAL /HPF
BASOPHILS # BLD AUTO: 0.04 THOUSANDS/ΜL (ref 0–0.1)
BASOPHILS NFR BLD AUTO: 0 % (ref 0–1)
BILIRUB SERPL-MCNC: 0.48 MG/DL (ref 0.2–1)
BILIRUB UR QL STRIP: NEGATIVE
BUN SERPL-MCNC: 21 MG/DL (ref 5–25)
CALCIUM SERPL-MCNC: 9.3 MG/DL (ref 8.4–10.2)
CHLORIDE SERPL-SCNC: 101 MMOL/L (ref 96–108)
CLARITY UR: ABNORMAL
CO2 SERPL-SCNC: 27 MMOL/L (ref 21–32)
COLOR UR: YELLOW
CREAT SERPL-MCNC: 0.94 MG/DL (ref 0.6–1.3)
EOSINOPHIL # BLD AUTO: 0.11 THOUSAND/ΜL (ref 0–0.61)
EOSINOPHIL NFR BLD AUTO: 1 % (ref 0–6)
ERYTHROCYTE [DISTWIDTH] IN BLOOD BY AUTOMATED COUNT: 13.5 % (ref 11.6–15.1)
EST. AVERAGE GLUCOSE BLD GHB EST-MCNC: 137 MG/DL
GFR SERPL CREATININE-BSD FRML MDRD: 67 ML/MIN/1.73SQ M
GLUCOSE SERPL-MCNC: 164 MG/DL (ref 65–140)
GLUCOSE SERPL-MCNC: 171 MG/DL (ref 65–140)
GLUCOSE SERPL-MCNC: 238 MG/DL (ref 65–140)
GLUCOSE UR STRIP-MCNC: NEGATIVE MG/DL
HBA1C MFR BLD: 6.4 %
HCT VFR BLD AUTO: 44.7 % (ref 34.8–46.1)
HGB BLD-MCNC: 14.9 G/DL (ref 11.5–15.4)
HGB UR QL STRIP.AUTO: ABNORMAL
IMM GRANULOCYTES # BLD AUTO: 0.09 THOUSAND/UL (ref 0–0.2)
IMM GRANULOCYTES NFR BLD AUTO: 1 % (ref 0–2)
KETONES UR STRIP-MCNC: NEGATIVE MG/DL
LEUKOCYTE ESTERASE UR QL STRIP: ABNORMAL
LYMPHOCYTES # BLD AUTO: 1.72 THOUSANDS/ΜL (ref 0.6–4.47)
LYMPHOCYTES NFR BLD AUTO: 12 % (ref 14–44)
MCH RBC QN AUTO: 31 PG (ref 26.8–34.3)
MCHC RBC AUTO-ENTMCNC: 33.3 G/DL (ref 31.4–37.4)
MCV RBC AUTO: 93 FL (ref 82–98)
MONOCYTES # BLD AUTO: 0.82 THOUSAND/ΜL (ref 0.17–1.22)
MONOCYTES NFR BLD AUTO: 6 % (ref 4–12)
NEUTROPHILS # BLD AUTO: 11.09 THOUSANDS/ΜL (ref 1.85–7.62)
NEUTS SEG NFR BLD AUTO: 80 % (ref 43–75)
NITRITE UR QL STRIP: NEGATIVE
NON-SQ EPI CELLS URNS QL MICRO: ABNORMAL /HPF
NRBC BLD AUTO-RTO: 0 /100 WBCS
PH UR STRIP.AUTO: 6 [PH]
PLATELET # BLD AUTO: 261 THOUSANDS/UL (ref 149–390)
PMV BLD AUTO: 10.7 FL (ref 8.9–12.7)
POTASSIUM SERPL-SCNC: 5.2 MMOL/L (ref 3.5–5.3)
PROT SERPL-MCNC: 6.9 G/DL (ref 6.4–8.4)
PROT UR STRIP-MCNC: ABNORMAL MG/DL
RBC # BLD AUTO: 4.8 MILLION/UL (ref 3.81–5.12)
RBC #/AREA URNS AUTO: ABNORMAL /HPF
SODIUM SERPL-SCNC: 136 MMOL/L (ref 135–147)
SP GR UR STRIP.AUTO: 1.02 (ref 1–1.03)
UROBILINOGEN UR STRIP-ACNC: <2 MG/DL
WBC # BLD AUTO: 13.87 THOUSAND/UL (ref 4.31–10.16)
WBC #/AREA URNS AUTO: ABNORMAL /HPF

## 2024-09-15 PROCEDURE — 82948 REAGENT STRIP/BLOOD GLUCOSE: CPT

## 2024-09-15 PROCEDURE — 36415 COLL VENOUS BLD VENIPUNCTURE: CPT

## 2024-09-15 PROCEDURE — 83036 HEMOGLOBIN GLYCOSYLATED A1C: CPT

## 2024-09-15 PROCEDURE — 99285 EMERGENCY DEPT VISIT HI MDM: CPT

## 2024-09-15 PROCEDURE — NC001 PR NO CHARGE: Performed by: INTERNAL MEDICINE

## 2024-09-15 PROCEDURE — 74176 CT ABD & PELVIS W/O CONTRAST: CPT

## 2024-09-15 PROCEDURE — 99223 1ST HOSP IP/OBS HIGH 75: CPT | Performed by: INTERNAL MEDICINE

## 2024-09-15 PROCEDURE — 85025 COMPLETE CBC W/AUTO DIFF WBC: CPT

## 2024-09-15 PROCEDURE — 87086 URINE CULTURE/COLONY COUNT: CPT

## 2024-09-15 PROCEDURE — 96365 THER/PROPH/DIAG IV INF INIT: CPT

## 2024-09-15 PROCEDURE — 99285 EMERGENCY DEPT VISIT HI MDM: CPT | Performed by: EMERGENCY MEDICINE

## 2024-09-15 PROCEDURE — 96375 TX/PRO/DX INJ NEW DRUG ADDON: CPT

## 2024-09-15 PROCEDURE — 81001 URINALYSIS AUTO W/SCOPE: CPT

## 2024-09-15 PROCEDURE — 80053 COMPREHEN METABOLIC PANEL: CPT

## 2024-09-15 RX ORDER — ACETAMINOPHEN 325 MG/1
650 TABLET ORAL EVERY 6 HOURS PRN
Status: DISCONTINUED | OUTPATIENT
Start: 2024-09-15 | End: 2024-09-16

## 2024-09-15 RX ORDER — ASCORBIC ACID 500 MG
2000 TABLET ORAL DAILY
Status: DISCONTINUED | OUTPATIENT
Start: 2024-09-15 | End: 2024-09-17 | Stop reason: HOSPADM

## 2024-09-15 RX ORDER — LISINOPRIL 5 MG/1
5 TABLET ORAL DAILY
Status: DISCONTINUED | OUTPATIENT
Start: 2024-09-15 | End: 2024-09-17 | Stop reason: HOSPADM

## 2024-09-15 RX ORDER — PANTOPRAZOLE SODIUM 40 MG/1
40 TABLET, DELAYED RELEASE ORAL
Status: DISCONTINUED | OUTPATIENT
Start: 2024-09-16 | End: 2024-09-17 | Stop reason: HOSPADM

## 2024-09-15 RX ORDER — ONDANSETRON 4 MG/1
4 TABLET, FILM COATED ORAL EVERY 8 HOURS PRN
Qty: 30 TABLET | Refills: 0 | Status: CANCELLED | OUTPATIENT
Start: 2024-09-15 | End: 2024-09-25

## 2024-09-15 RX ORDER — OXYCODONE HYDROCHLORIDE 5 MG/1
5 CAPSULE ORAL EVERY 6 HOURS PRN
Qty: 20 CAPSULE | Refills: 0 | Status: SHIPPED | OUTPATIENT
Start: 2024-09-15 | End: 2024-09-17

## 2024-09-15 RX ORDER — POTASSIUM CITRATE 10 MEQ/1
10 TABLET, EXTENDED RELEASE ORAL
Status: DISCONTINUED | OUTPATIENT
Start: 2024-09-15 | End: 2024-09-17 | Stop reason: HOSPADM

## 2024-09-15 RX ORDER — TAMSULOSIN HYDROCHLORIDE 0.4 MG/1
0.4 CAPSULE ORAL
Status: DISCONTINUED | OUTPATIENT
Start: 2024-09-15 | End: 2024-09-17 | Stop reason: HOSPADM

## 2024-09-15 RX ORDER — INSULIN LISPRO 100 [IU]/ML
1-5 INJECTION, SOLUTION INTRAVENOUS; SUBCUTANEOUS
Status: DISCONTINUED | OUTPATIENT
Start: 2024-09-15 | End: 2024-09-17 | Stop reason: HOSPADM

## 2024-09-15 RX ORDER — ENOXAPARIN SODIUM 100 MG/ML
40 INJECTION SUBCUTANEOUS DAILY
Status: DISCONTINUED | OUTPATIENT
Start: 2024-09-15 | End: 2024-09-17 | Stop reason: HOSPADM

## 2024-09-15 RX ORDER — TAMSULOSIN HYDROCHLORIDE 0.4 MG/1
0.4 CAPSULE ORAL
Qty: 10 CAPSULE | Refills: 0 | Status: CANCELLED | OUTPATIENT
Start: 2024-09-15 | End: 2024-09-25

## 2024-09-15 RX ORDER — ATORVASTATIN CALCIUM 80 MG/1
80 TABLET, FILM COATED ORAL DAILY
Status: DISCONTINUED | OUTPATIENT
Start: 2024-09-15 | End: 2024-09-17 | Stop reason: HOSPADM

## 2024-09-15 RX ORDER — CEPHALEXIN 500 MG/1
500 CAPSULE ORAL EVERY 6 HOURS SCHEDULED
Qty: 20 CAPSULE | Refills: 0 | Status: CANCELLED | OUTPATIENT
Start: 2024-09-15 | End: 2024-09-20

## 2024-09-15 RX ORDER — KETOROLAC TROMETHAMINE 30 MG/ML
15 INJECTION, SOLUTION INTRAMUSCULAR; INTRAVENOUS ONCE
Status: COMPLETED | OUTPATIENT
Start: 2024-09-15 | End: 2024-09-15

## 2024-09-15 RX ADMIN — Medication 2000 UNITS: at 14:37

## 2024-09-15 RX ADMIN — CEFEPIME 2000 MG: 2 INJECTION, POWDER, FOR SOLUTION INTRAVENOUS at 23:25

## 2024-09-15 RX ADMIN — TAMSULOSIN HYDROCHLORIDE 0.4 MG: 0.4 CAPSULE ORAL at 15:33

## 2024-09-15 RX ADMIN — POTASSIUM CITRATE 10 MEQ: 10 TABLET, EXTENDED RELEASE ORAL at 15:34

## 2024-09-15 RX ADMIN — KETOROLAC TROMETHAMINE 15 MG: 30 INJECTION, SOLUTION INTRAMUSCULAR; INTRAVENOUS at 08:36

## 2024-09-15 RX ADMIN — CEFEPIME 2000 MG: 2 INJECTION, POWDER, FOR SOLUTION INTRAVENOUS at 11:04

## 2024-09-15 RX ADMIN — INSULIN LISPRO 2 UNITS: 100 INJECTION, SOLUTION INTRAVENOUS; SUBCUTANEOUS at 17:25

## 2024-09-15 RX ADMIN — OXYCODONE HYDROCHLORIDE AND ACETAMINOPHEN 2000 MG: 500 TABLET ORAL at 14:36

## 2024-09-15 RX ADMIN — ATORVASTATIN CALCIUM 80 MG: 80 TABLET, FILM COATED ORAL at 14:36

## 2024-09-15 NOTE — H&P
"H&P - Internal Medicine   Name: Sakshi Tirado 58 y.o. female I MRN: [id number]  Unit/Bed#: ED 02 I Date of Admission: 9/15/2024   Date of Service: 9/15/2024 I Hospital Day: 0     Assessment & Plan  Hydronephrosis due to obstruction of ureter  Recent history of hydronephrosis with UPJ obstruction s/p L ureteral stent 4/14/24 (removed in 5/2024).  Presented to ED with left flank pain started this morning.  Reported sharp pain with no radiation, which was similar to the pain of renal calculi in the past.    CT A/P: Multiple nonobstructing right renal calculi measuring up to 7 mm in the midpole right kidney; moderate left-sided hydroutero nephrosis secondary to a 5 mm obstructing calculus in the distal left ureter proximal to the left ureterovesical junction.     Plan:  - Urology consulted, appreciate recs: cystoscopy with lithotripsy and stent insertion planned tomorrow 9/16  - N.p.o. at midnight  - Continue to trend WBC      Type 2 diabetes mellitus (HCC)  Lab Results   Component Value Date    HGBA1C 6.5 (H) 04/19/2024       No results for input(s): \"POCGLU\" in the last 72 hours.    Blood Sugar Average: Last 72 hrs:    A1c 4/19/24 6.5  Glucose on admission 171  Home medication includes metformin 1000 mg twice daily    Plan:  - Home metformin while inpatient  - Diabetic diet  - Initiate SSI algorithm 2  - Hypoglycemia protocol  - Goal glucose 140-180    HTN (hypertension)  Takes lisinopril 5 mg daily    Plan:   - BP on arrival 125/69  - Resume home med lisinopril 5 mg daily with holding parameter systolic less than 110  Nephrolithiasis    CT A/P: Multiple nonobstructing right renal calculi measuring up to 7 mm in the midpole right kidney; moderate left-sided hydro utero nephrosis secondary to a 5 mm obstructing calculus in the distal left ureter proximal to the left ureterovesical junction.    Reported multiple episodes of nephrolithiasis in the past and family history significant for nephrolithiasis in her father.  " "Takes potassium citrate 3 times daily.    Patient reporting no pain upon evaluation.    Plan:  - See plan under \" nephrosis due to obstruction of ureter\"  - PRN Tylenol 650 mg every 6 hours  - Started Flomax 0.4 mg daily    CKD (chronic kidney disease) stage 2, GFR 60-89 ml/min  Lab Results   Component Value Date    EGFR 67 09/15/2024    EGFR 75 04/19/2024    EGFR 82 04/16/2024    CREATININE 0.94 09/15/2024    CREATININE 0.85 04/19/2024    CREATININE 0.79 04/16/2024     Creatinine baseline 0.7-1.0    Plan:  - Continue to monitor renal function  - Avoid hypotension  - Avoid nephrotoxic agents    UTI (urinary tract infection)  Afebrile upon arrival with /69, HR 81, RR 20, SpO2 98 RA.   Urine dipstick remarkable for small blood, large leukocyte, and 1+ urine protein.  UA showed microscopic hematuria (4-10 RBCs), pyuria (30-50 WBC) , and bacteriuria (moderate bacteria).  Given history of multiple renal calculi in the past and left ureteral stent placement in April with removal in May, UTI this visit considered complicated UTI.     Patient denied materia, dysuria, and suprapubic tenderness.    History of UTI with growth of pan-sensitive Pseudomonas aeruginosa in April 2024 in November 2021; history of urine culture with ESBL which is susceptible to ertapenem, gentamicin, nitrofurantoin, Zosyn, Bactrim, tetracycline, and Augmentin in November 2019    WBC 13.87  S/p 1 dose 2 g cefepime in the ED    Plan:  -Continue cefepime 2 g BID  -Blood and urine culture and tailor antibiotics based on urine culture susceptibility  -Continue to trend WBC  Hyperlipidemia  Lipid panel 4/19/24: , , HDL 34, LDL 42  Takes Lipitor 80 mg daily    Plan:  - Continue Lipitor 80 mg daily  Depression  Takes Zoloft 50 mg daily. Continue Zoloft while inpatient.    Code Status: Level 1 - Full Code  Admission Status: INPATIENT   Disposition: Patient requires Med Surg    Admit to team: SOD TEAM C    History of Present Illness "   58-year-old female with history of type 2 diabetes, hypertension, pretension, CKD stage II, nephrolithiasis, hydronephrosis with UPJ obstruction s/p L ureteral stent 4/14/24 (removed in 5/2024), anxiety, and GERD presents to the ED for left flank pain which started this morning.  Patient states that she has had this similar pain in the past consistent with renal calculi.  Describes the pain as constant and sharp pain.  She has not tried over-the-counter to relieve the pain.  No associated nausea, vomiting, fever, and chills.  Also endorses pressure in the abdomen.  Patient states she has voided once in the ED, denies dysuria, hematuria, and suprapubic tenderness.  Also denies headache, dizziness, shortness of breath, chest pain or chest discomfort, heart palpitation, diarrhea, and peripheral swelling.    In the ED, afebrile, /69, HR 81, RR 20, SpO2 98% on room air.  Labs remarkable for glucose of 171 and WBC 13.8.  Urine dipstick remarkable for small blood, large leukocyte, and 1+ urine protein.  UA showed 4-10 RBCs, 30-50 WBC, moderate bacteria.  S/p 1 dose cefepime and 50 mg ketorolac in the ED. Urology consulted cystoscopy with planned ureteral stent tomorrow 9/16.  Patient is admitted to [facility].    Review of Systems   Constitutional:  Negative for activity change, appetite change, chills, diaphoresis and fever.   HENT:  Negative for ear pain, sore throat and trouble swallowing.    Eyes:  Negative for pain and visual disturbance.   Respiratory:  Negative for cough, chest tightness and shortness of breath.    Cardiovascular:  Negative for chest pain, palpitations and leg swelling.   Gastrointestinal:  Positive for abdominal pain. Negative for constipation, diarrhea, nausea and vomiting.   Genitourinary:  Positive for flank pain (left). Negative for dysuria, frequency and hematuria.   Musculoskeletal:  Negative for arthralgias and back pain.   Skin:  Negative for color change and rash.   Neurological:   Negative for seizures and syncope.   All other systems reviewed and are negative.    I have reviewed the patient's PMH, PSH, Social History, Family History, Meds, and Allergies    Objective     Vitals:    09/15/24 0805 09/15/24 1001 09/15/24 1435   BP: 125/69 118/81 116/56   BP Location: Left arm Right arm    Pulse: 81 61 56   Resp: 20 16 16   Temp: (!) 97 °F (36.1 °C)     TempSrc: Temporal     SpO2: 98% 98% 98%      Temperature:   Temp (24hrs), Av °F (36.1 °C), Min:97 °F (36.1 °C), Max:97 °F (36.1 °C)    Temperature: (!) 97 °F (36.1 °C)  Intake & Output:  I/O          0701  / 0700  0701  09/15 0700 09/15 0701   0700    IV Piggyback   50    Total Intake   50    Net   +50                 Weights:        There is no height or weight on file to calculate BMI.  Weight (last 2 days)       None          Physical Exam  Constitutional:       General: She is not in acute distress.     Appearance: Normal appearance. She is not diaphoretic.   HENT:      Head: Normocephalic and atraumatic.      Mouth/Throat:      Mouth: Mucous membranes are moist.      Pharynx: No oropharyngeal exudate.   Eyes:      General: No scleral icterus.     Extraocular Movements: Extraocular movements intact.      Conjunctiva/sclera: Conjunctivae normal.   Neck:      Vascular: No carotid bruit.   Cardiovascular:      Rate and Rhythm: Normal rate and regular rhythm.      Pulses: Normal pulses.      Heart sounds: Normal heart sounds. No murmur heard.  Pulmonary:      Effort: Pulmonary effort is normal. No respiratory distress.      Breath sounds: Normal breath sounds. No wheezing or rales.   Abdominal:      General: Bowel sounds are normal.      Palpations: Abdomen is soft.      Tenderness: There is no abdominal tenderness. There is no right CVA tenderness, left CVA tenderness or guarding.      Comments: Patient describes deep pressure upon tapping on left CVA   Musculoskeletal:      Cervical back: Neck supple.      Right lower leg:  "No edema.      Left lower leg: No edema.   Skin:     General: Skin is warm and dry.      Capillary Refill: Capillary refill takes less than 2 seconds.   Neurological:      Mental Status: She is alert and oriented to person, place, and time.           Lab Results: I have reviewed the following results: CBC/BMP:   .     09/15/24  0836   WBC 13.87*   HGB 14.9   HCT 44.7      SODIUM 136   K 5.2      CO2 27   BUN 21   CREATININE 0.94   GLUC 171*    , Lipid Profile:   , Urinalysis:   Lab Results   Component Value Date    COLORU Yellow 09/15/2024    CLARITYU Turbid 09/15/2024    SPECGRAV 1.016 09/15/2024    PHUR 6.0 09/15/2024    LEUKOCYTESUR Large (A) 09/15/2024    NITRITE Negative 09/15/2024    GLUCOSEU Negative 09/15/2024    KETONESU Negative 09/15/2024    BILIRUBINUR Negative 09/15/2024    BLOODU Small (A) 09/15/2024   , Urine Culture: No results found for: \"URINECX\"  Recent Labs     09/15/24  0836   WBC 13.87*   HGB 14.9   HCT 44.7      SODIUM 136   K 5.2      CO2 27   BUN 21   CREATININE 0.94   GLUC 171*   AST 31   ALT 30   ALB 4.2   TBILI 0.48   ALKPHOS 95     Micro:  Lab Results   Component Value Date    BLOODCX No Growth After 5 Days. 04/14/2024    BLOODCX No Growth After 5 Days. 04/14/2024    BLOODCX No Growth After 5 Days. 11/10/2022    URINECX No Growth <1000 cfu/mL 04/19/2024    URINECX 50,000-59,000 cfu/ml Pseudomonas aeruginosa (A) 04/14/2024    URINECX >100,000 cfu/ml Pseudomonas aeruginosa (A) 04/14/2024     Imaging Review: Reviewed radiology reports from this admission including: CT stone study abdomen pelvis without contrast.  Other Studies: No additional pertinent studies reviewed.    Currently Ordered Meds:   Current Facility-Administered Medications:     acetaminophen (TYLENOL) tablet 650 mg, Q6H PRN    ascorbic acid (VITAMIN C) tablet 2,000 mg, Daily    atorvastatin (LIPITOR) tablet 80 mg, Daily    cefepime (MAXIPIME) 2 g/50 mL dextrose IVPB, Q12H    Cholecalciferol (VITAMIN " "D3) tablet 2,000 Units, Daily    enoxaparin (LOVENOX) subcutaneous injection 40 mg, Daily    insulin lispro (HumALOG/ADMELOG) 100 units/mL subcutaneous injection 1-5 Units, TID AC **AND** Fingerstick Glucose (POCT), TID AC    lisinopril (ZESTRIL) tablet 5 mg, Daily    [START ON 9/16/2024] pantoprazole (PROTONIX) EC tablet 40 mg, Early Morning    potassium citrate (UROCIT-K) CR tablet 10 mEq, TID With Meals    sertraline (ZOLOFT) tablet 50 mg, Daily    tamsulosin (FLOMAX) capsule 0.4 mg, Daily With Dinner  VTE Pharmacologic Prophylaxis: Enoxaparin (Lovenox)  VTE Mechanical Prophylaxis: sequential compression device    Administrative Statements   I have spent a total time of 45 minutes in caring for this patient on the day of the visit/encounter including Diagnostic results, Counseling / Coordination of care, Documenting in the medical record, Reviewing / ordering tests, medicine, procedures  , Obtaining or reviewing history  , and Communicating with other healthcare professionals .  Portions of the record may have been created with voice recognition software.  Occasional wrong word or \"sound a like\" substitutions may have occurred due to the inherent limitations of voice recognition software.  Read the chart carefully and recognize, using context, where substitutions have occurred.  ==  Jessee Santamaria DO  [facility]  Internal Medicine Residency PGY-1  "

## 2024-09-15 NOTE — DISCHARGE INSTRUCTIONS
Per urology, please hydrate aggressively. Utilize strainer to strain your urine.      provide urine strainer, prescription for Percocet, Zofran, Flomax and 5 days of Keflex. Review instructions to return to the ER such as fever, chills, excessive nausea/vomiting and intractable pain despite pain medication. I will place a request for close urologic follow-up on her behalf.

## 2024-09-15 NOTE — ASSESSMENT & PLAN NOTE
Lipid panel 4/19/24: , , HDL 34, LDL 42  Takes Lipitor 80 mg daily    Plan:  - Continue Lipitor 80 mg daily

## 2024-09-15 NOTE — ASSESSMENT & PLAN NOTE
Lab Results   Component Value Date    EGFR 67 09/15/2024    EGFR 75 04/19/2024    EGFR 82 04/16/2024    CREATININE 0.94 09/15/2024    CREATININE 0.85 04/19/2024    CREATININE 0.79 04/16/2024     Creatinine baseline 0.7-1.0    Plan:  - Continue to monitor renal function  - Avoid hypotension  - Avoid nephrotoxic agents

## 2024-09-15 NOTE — ED ATTENDING ATTESTATION
Final Diagnoses:     1. Left flank tenderness    2. Ureterolithiasis    3. UTI (urinary tract infection)      ED Course as of 09/15/24 1549   Sun Sep 15, 2024   0851 WBC(!): 13.87   0851 Hemoglobin: 14.9   0851 Platelet Count: 261   0947 Potassium: 5.2  Hemolyzed.   1052 WBC, UA(!): 30-50   1052 Bacteria, UA(!): Moderate   1053 Leukocytes, UA(!): Large   1053 Nitrite, UA: Negative   1053 Infected urine with an obstructive stone.  Will begin ABX.    1146 Discussed with urology.  They would like to discharge the patient.    The patient does appear well, not hypotensive, not tachycardic, not febrile.  Given the CT scan though with her urine results, will wait 1 hour, monitor here.    If she continues to feel okay, will DC home with very very strict RTER precautions for fevers, rigors, pain control, etc. Etc.        I, Pablito Flores MD, saw and evaluated the patient. All available labs and X-rays were ordered by me or the resident / non-physician and have been reviewed by myself. I discussed the patient with the resident / non-physician and agree with the resident's / non-physician practitioner's findings and plan as documented in the resident's / non-physician practicitioner's note, except where noted.   At this point, I agree with the current assessment done in the ED.   I was present during key portions of all procedures performed unless otherwise stated.     HPI:  NURSING TRIAGE:    This is a 58 y.o. female presenting for evaluation of LEFT flank pain. Feels like kidney stones (has had bilateral stones in the past).  No f/ch/n/v/cp/sob.  Denies any urinary tract infection symptoms (burning, itching, pain, blood, frequency).  Denies any upper respiratory tract infection symptoms (cough, congestion, rhinorrhea, sore throat).   No falls/trauma/heavy lifting.   Recently had an infected stone per patient and is concerned that it is happening again.  Chief Complaint   Patient presents with    Flank Pain     Started  this morning with left flank pain. H/o kidney stones      PHYSICAL: ASSESSMENT + PLAN:   Pertinent: LCVAT (both kidney punch and pressing on it).   No belly distention.   2+ radial pulses.  1+ bilaterally equal PTs.     General: VS reviewed  Appears in NAD  awake, alert.   Well-nourished, well-developed. Appears stated age.   Speaking normally in full sentences.   Head: Normocephalic, atraumatic  Eyes: EOM-I. No diplopia.   No hyphema.   No subconjunctival hemorrhages.  Symmetrical lids.   ENT: Atraumatic external nose and ears.    MMM  No malocclusion. No stridor. Normal phonation. No drooling. Normal swallowing.   Neck: No JVD.  CV: No pallor noted  Lungs:   No tachypnea  No respiratory distress  Abd: soft nt nd no rebound/guarding  MSK:   FROM spontaneously  Skin: Dry, intact.   Neuro: Awake, alert, GCS15, CN II-XII grossly intact.   Motor grossly intact.  Psychiatric/Behavioral: interacting normally; appropriate mood/affect.    Exam: deferred    Vitals:    09/15/24 0805 09/15/24 1001 09/15/24 1435   BP: 125/69 118/81 116/56   BP Location: Left arm Right arm    Pulse: 81 61 56   Resp: 20 16 16   Temp: (!) 97 °F (36.1 °C)     TempSrc: Temporal     SpO2: 98% 98% 98%    - given the presentation, will check CBC for marked leukocytosis  - CMP for liver enzyme elevation that could signal cholecystitis, biliary obstructive disease. Check RFTs for TOMMY / markers of dehydration.  - Urine: will check for UTI or signs of pyelonephritis.   - Lastly, will consider abdominal imaging.  - Renal stone protocol CT: highest on my differential is a stone, will do imaging particularly for this looking for hydro or the stone itself.   - Disposition per workup.        There are no obvious limitations to social determinants of care.   Nursing note reviewed.   Vitals reviewed.   Orders placed by myself and/or advanced practitioner / resident.    Previous chart was reviewed  No language barrier.   History obtained from patient.    There  are no limitations to the history obtained:     Past Medical: Past Surgical:    has a past medical history of Anxiety, Asthma, Colon polyp, Depression, Diabetes mellitus (HCC), Diverticulitis, Diverticulitis of colon, Follicular cyst of ovary (12/23/2014), Former smoker, GERD (gastroesophageal reflux disease), Glycosuria, Headache, Hyperlipidemia, Hypertension, Kidney calculi (1/11/2019), Kidney stone, Obesity, Ovarian cyst, Overactive bladder, Overweight, Pulmonary nodule, Renal calculus, Seasonal allergies, Tinea pedis of left foot (6/8/2020), Vertigo, and Wears partial dentures.  has a past surgical history that includes Appendectomy; pr colonoscopy flx dx w/collj spec when pfrmd (N/A, 9/26/2017); Colonoscopy; Tonsillectomy; [place] tooth extraction; pr neuroplasty &/transpos median nrv carpal tunne (Right, 5/29/2018); Hysterectomy (2007); Tubal ligation; Cervical biopsy w/ loop electrode excision; Bladder suspension; Dilation and curettage of uterus; pr arthrp kne condyle&platu medial&lat compartments (Right, 5/19/2021); FL retrograde pyelogram (7/26/2021); pr cysto/uretero w/lithotripsy &indwell stent insrt (Bilateral, 7/26/2021); Ureteroscopy (7/26/2021); pr cysto/uretero w/lithotripsy &indwell stent insrt (Left, 9/16/2021); REMOVAL URETERAL STENT (Right, 9/16/2021); Joint replacement (Right, 05/2021); pr cysto/uretero w/lithotripsy &indwell stent insrt (Left, 11/18/2021); FL retrograde pyelogram (11/18/2021); pr cysto bladder w/ureteral catheterization (Right, 11/10/2022); FL retrograde pyelogram (11/10/2022); pr cysto/uretero w/lithotripsy &indwell stent insrt (Right, 1/17/2023); FL retrograde pyelogram (1/17/2023); FL retrograde pyelogram (4/14/2024); pr cysto/uretero w/lithotripsy &indwell stent insrt (Left, 4/14/2024); pr cysto/uretero w/lithotripsy &indwell stent insrt (Left, 5/3/2024); and FL retrograde pyelogram (5/3/2024).   Social: Cardiac (Echo/Cath)   Social History     Substance and Sexual Activity    Alcohol Use Not Currently    Comment: socially     Social History     Tobacco Use   Smoking Status Former    Current packs/day: 0.00    Average packs/day: 0.3 packs/day for 34.2 years (8.6 ttl pk-yrs)    Types: Cigarettes    Start date:     Quit date: 3/16/2018    Years since quittin.5   Smokeless Tobacco Never   Tobacco Comments    approx less than  half a pack     Social History     Substance and Sexual Activity   Drug Use Never    Comment: Denies    No results found for this or any previous visit.    No results found for this or any previous visit.    No results found for this or any previous visit.     Labs: Imaging:   Labs Reviewed   CBC AND DIFFERENTIAL - Abnormal       Result Value Ref Range Status    WBC 13.87 (*) 4.31 - 10.16 Thousand/uL Final    RBC 4.80  3.81 - 5.12 Million/uL Final    Hemoglobin 14.9  11.5 - 15.4 g/dL Final    Hematocrit 44.7  34.8 - 46.1 % Final    MCV 93  82 - 98 fL Final    MCH 31.0  26.8 - 34.3 pg Final    MCHC 33.3  31.4 - 37.4 g/dL Final    RDW 13.5  11.6 - 15.1 % Final    MPV 10.7  8.9 - 12.7 fL Final    Platelets 261  149 - 390 Thousands/uL Final    nRBC 0  /100 WBCs Final    Segmented % 80 (*) 43 - 75 % Final    Immature Grans % 1  0 - 2 % Final    Lymphocytes % 12 (*) 14 - 44 % Final    Monocytes % 6  4 - 12 % Final    Eosinophils Relative 1  0 - 6 % Final    Basophils Relative 0  0 - 1 % Final    Absolute Neutrophils 11.09 (*) 1.85 - 7.62 Thousands/µL Final    Absolute Immature Grans 0.09  0.00 - 0.20 Thousand/uL Final    Absolute Lymphocytes 1.72  0.60 - 4.47 Thousands/µL Final    Absolute Monocytes 0.82  0.17 - 1.22 Thousand/µL Final    Eosinophils Absolute 0.11  0.00 - 0.61 Thousand/µL Final    Basophils Absolute 0.04  0.00 - 0.10 Thousands/µL Final   COMPREHENSIVE METABOLIC PANEL - Abnormal    Sodium 136  135 - 147 mmol/L Final    Potassium 5.2  3.5 - 5.3 mmol/L Final    Comment: Slightly Hemolyzed:Results may be affected.    Chloride 101  96 - 108 mmol/L Final     CO2 27  21 - 32 mmol/L Final    ANION GAP 8  4 - 13 mmol/L Final    BUN 21  5 - 25 mg/dL Final    Creatinine 0.94  0.60 - 1.30 mg/dL Final    Comment: Standardized to IDMS reference method    Glucose 171 (*) 65 - 140 mg/dL Final    Comment: If the patient is fasting, the ADA then defines impaired fasting glucose as > 100 mg/dL and diabetes as > or equal to 123 mg/dL.    Calcium 9.3  8.4 - 10.2 mg/dL Final    AST 31  13 - 39 U/L Final    Comment: Slightly Hemolyzed:Results may be affected.    ALT 30  7 - 52 U/L Final    Comment: Specimen collection should occur prior to Sulfasalazine administration due to the potential for falsely depressed results.     Alkaline Phosphatase 95  34 - 104 U/L Final    Total Protein 6.9  6.4 - 8.4 g/dL Final    Albumin 4.2  3.5 - 5.0 g/dL Final    Total Bilirubin 0.48  0.20 - 1.00 mg/dL Final    Comment: Use of this assay is not recommended for patients undergoing treatment with eltrombopag due to the potential for falsely elevated results.  N-acetyl-p-benzoquinone imine (metabolite of Acetaminophen) will generate erroneously low results in samples for patients that have taken an overdose of Acetaminophen.    eGFR 67  ml/min/1.73sq m Final    Narrative:     National Kidney Disease Foundation guidelines for Chronic Kidney Disease (CKD):     Stage 1 with normal or high GFR (GFR > 90 mL/min/1.73 square meters)    Stage 2 Mild CKD (GFR = 60-89 mL/min/1.73 square meters)    Stage 3A Moderate CKD (GFR = 45-59 mL/min/1.73 square meters)    Stage 3B Moderate CKD (GFR = 30-44 mL/min/1.73 square meters)    Stage 4 Severe CKD (GFR = 15-29 mL/min/1.73 square meters)    Stage 5 End Stage CKD (GFR <15 mL/min/1.73 square meters)  Note: GFR calculation is accurate only with a steady state creatinine   UA W REFLEX TO MICROSCOPIC WITH REFLEX TO CULTURE - Abnormal    Color, UA Yellow   Final    Clarity, UA Turbid   Final    Specific Gravity, UA 1.016  1.003 - 1.030 Final    pH, UA 6.0  4.5, 5.0, 5.5,  6.0, 6.5, 7.0, 7.5, 8.0 Final    Leukocytes, UA Large (*) Negative Final    Nitrite, UA Negative  Negative Final    Protein, UA 30 (1+) (*) Negative mg/dl Final    Glucose, UA Negative  Negative mg/dl Final    Ketones, UA Negative  Negative mg/dl Final    Urobilinogen, UA <2.0  <2.0 mg/dl mg/dl Final    Bilirubin, UA Negative  Negative Final    Occult Blood, UA Small (*) Negative Final   URINE MICROSCOPIC - Abnormal    RBC, UA 4-10 (*) None Seen, 1-2 /hpf Final    WBC, UA 30-50 (*) None Seen, 1-2 /hpf Final    Epithelial Cells Occasional  None Seen, Occasional /hpf Final    Bacteria, UA Moderate (*) None Seen, Occasional /hpf Final   POCT GLUCOSE - Abnormal    POC Glucose 164 (*) 65 - 140 mg/dl Final   URINE CULTURE   PLATELET COUNT   HEMOGLOBIN A1C    CT renal stone study abdomen pelvis wo contrast   ED Interpretation   Abnormal   LEFT distal ureter stone with hydro-urtero-nephrosis.       Final Result      1. Moderate left-sided hydroureteronephrosis secondary to a 5 mm obstructing calculus in the distal left ureter.   2. Nonobstructing bilateral renal calculi.         Workstation performed: [id number]            Medications: Code Status:   Medications   enoxaparin (LOVENOX) subcutaneous injection 40 mg (40 mg Subcutaneous Not Given 9/15/24 1436)   cefepime (MAXIPIME) 2 g/50 mL dextrose IVPB (has no administration in time range)   acetaminophen (TYLENOL) tablet 650 mg (has no administration in time range)   ascorbic acid (VITAMIN C) tablet 2,000 mg (2,000 mg Oral Given 9/15/24 1436)   atorvastatin (LIPITOR) tablet 80 mg (80 mg Oral Given 9/15/24 1436)   Cholecalciferol (VITAMIN D3) tablet 2,000 Units (2,000 Units Oral Given 9/15/24 1437)   lisinopril (ZESTRIL) tablet 5 mg (0 mg Oral Hold 9/15/24 1436)   pantoprazole (PROTONIX) EC tablet 40 mg (has no administration in time range)   potassium citrate (UROCIT-K) CR tablet 10 mEq (10 mEq Oral Given 9/15/24 1534)   sertraline (ZOLOFT) tablet 50 mg (0 mg Oral Hold  9/15/24 1436)   tamsulosin (FLOMAX) capsule 0.4 mg (0.4 mg Oral Given 9/15/24 1533)   insulin lispro (HumALOG/ADMELOG) 100 units/mL subcutaneous injection 1-5 Units (has no administration in time range)   ketorolac (TORADOL) injection 15 mg (15 mg Intravenous Given 9/15/24 0836)   cefepime (MAXIPIME) 2 g/50 mL dextrose IVPB (0 mg Intravenous Stopped 9/15/24 1134)    Code Status: Level 1 - Full Code  Advance Directive and Living Will:      Power of :    POLST:       Orders Placed This Encounter   Procedures    Urine culture    CT renal stone study abdomen pelvis wo contrast    CBC and differential    Comprehensive metabolic panel    UA w Reflex to Microscopic w Reflex to Culture    Urine Microscopic    Platelet count    Basic metabolic panel    CBC and differential    Hemoglobin A1C    Diet NPO; Sips with meds    Diet Efraín/CHO Controlled; Consistent Carbohydrate Diet Level 2 (5 carb servings/75 grams CHO/meal)    Insert peripheral IV    Vital Signs per unit routine    Up and OOB as tolerated    I/O    Insert peripheral IV    Maintain IV access    Apply SCD or Foot pumps    Strain all urine    Insulin Subcutaneous Notify Physician    Insulin Subcutaneous Instruction    Hypoglycemia Protocol    Fingerstick Glucose (POCT)    Level 1-Full Code: all life saving measures are indicated    Inpatient consult to Case Management    Inpatient consult to Urology    INPATIENT ADMISSION     Time reflects when diagnosis was documented in both MDM as applicable and the Disposition within this note       Time User Action Codes Description Comment    9/15/2024  8:40 AM Pablito Flores Add [R10.819] Left flank tenderness     9/15/2024 11:25 AM Rachna Rinaldi Add [N20.1] Ureterolithiasis     9/15/2024 11:26 AM Rachna Rinaldi Add [N39.0] UTI (urinary tract infection)           ED Disposition       ED Disposition   Admit    Condition   Stable    Date/Time   Sun Sep 15, 2024  1:13 PM    Comment   Case was discussed with  SOD admitting resident and the patient's admission status was agreed to be Admission Status: inpatient status to the service of Dr. Cabello.               Follow-up Information       Follow up With Specialties Details Why Contact Info Additional Information    [facility] [place] [facility] Follow up in 1 week(s) Service will contact patient/caregiver with hospital follow-up appointment date and time once discharged. [address]  [facility] [address]  [phone] [facility] [place], [address], [place], [address]   [phone]    [facility] Emergency Department Emergency Medicine Go to  If symptoms worsen [address] [facility] [address] [phone] [facility] Emergency Department, [address] [place], [place], [address] [phone]          Patient's Medications   Discharge Prescriptions    OXYCODONE (OXY-IR) 5 MG CAPSULE    Take 1 capsule (5 mg total) by mouth every 6 (six) hours as needed for moderate pain for up to 5 days Max Daily Amount: 20 mg       Start Date: 9/15/2024 End Date: 9/20/2024       Order Dose: 5 mg       Quantity: 20 capsule    Refills: 0     No discharge procedures on file.  Prior to Admission Medications   Prescriptions Last Dose Informant Patient Reported? Taking?   Ascorbic Acid (vitamin C) 1000 MG tablet 9/14/2024 Self Yes Yes   Sig: Take 2,000 mg by mouth daily   Cholecalciferol (Vitamin D3) 50 MCG (2000 UT) TABS 9/14/2024 Self Yes Yes   Sig: Take 2,000 Units by mouth daily   acetaminophen (TYLENOL) 500 mg tablet Past Month Self Yes Yes   Sig: Take 500 mg by mouth every 6 (six) hours as needed for mild pain   atorvastatin (LIPITOR) 80 mg tablet 9/14/2024 Self No Yes   Sig: Take 1 tablet (80 mg total) by mouth daily   lisinopril (ZESTRIL) 5 mg tablet 9/14/2024 Self No Yes   Sig: Take 1 tablet (5 mg total) by mouth daily   metFORMIN  "(GLUCOPHAGE) 1000 MG tablet 9/14/2024 Self No Yes   Sig: Take 1 tablet (1,000 mg total) by mouth 2 (two) times a day with meals   multivitamin (THERAGRAN) TABS 9/14/2024 Self Yes Yes   Sig: Take 1 tablet by mouth daily   omeprazole (PriLOSEC) 40 MG capsule 9/14/2024 Self No Yes   Sig: Take 1 capsule (40 mg total) by mouth daily   potassium citrate (UROCIT-K) 10 mEq 9/14/2024 Self No Yes   Sig: Take 1 tablet (10 mEq total) by mouth 3 (three) times a day with meals   sertraline (ZOLOFT) 50 mg tablet 9/14/2024 Self No Yes   Sig: Take 1 tablet (50 mg total) by mouth daily Takes in the am.      Facility-Administered Medications: None                        Portions of the record may have been created with voice recognition software. Occasional wrong word or \"sound a like\" substitutions may have occurred due to the inherent limitations of voice recognition software. Read the chart carefully and recognize, using context, where substitutions have occurred.    Electronically signed by:  Pablito Flores  "

## 2024-09-15 NOTE — PROGRESS NOTES
INTERNAL MEDICINE RESIDENCY SENIOR ADMISSION NOTE     Name: Sakshi Tirado   Age & Sex: 58 y.o. female   MRN: [id number]  Unit/Bed#: ED 02   Encounter: [id number]  Primary Care Provider: LEONARD Haynes    Admit to team: SOD Team A    Patient seen and examined. Reviewed H&P per Dr. Santamaria . Agree with the assessment and plan with any exception/addition as noted below:    58-year-old female with a past medical history of hypertension, hyperlipidemia, diabetes, anxiety, ESBL, recurrent nephrolithiasis/ureterolithiasis and ureteropelvic obstruction presenting with left flank pain that began the morning of 9/15.  She is well-known to [facility] urology service and has had multiple interventions with them in the past, most recently in May 2024.  She notes left-sided flank pain and abdominal pain, but denies fever, chills, chest pain, shortness of breath, nausea, vomiting, dysuria, hematuria, urgency/frequency.  At the time of my evaluation, patient denied any pain and feels back to her baseline.  Vital signs stable in the ED.  Physical exam benign.  Labs significant for WBC 13.9, glucose 171.  UA positive for large leukocytes, 30-50 WBCs, and moderate bacteria.  CT renal study showed 5 mm obstructing calculus in the distal left ureter with moderate left-sided hydroureteronephrosis.  Urology evaluated the patient and initially recommended discharge with outpatient follow-up, but after further discussion plan to intervene tomorrow 9/16.  She is being admitted under SOD C for further evaluation and management.    Plan:  Ureterolithiasis  Urology consulted, appreciate recommendations  Plan for intervention on 9/16  N.p.o. at midnight  Cefepime for UTI given ESBL history  Flomax ordered  Continue home potassium citrate  Tylenol as needed for pain  Strain all urine    UTI  See plan above    Type 2 diabetes  On metformin 1000 mg twice daily at home  A1c of 6.5 back in April  Sliding scale insulin while inpatient  Carb  controlled diet  Recheck A1c  Hypertension  Continue home lisinopril 5 mg daily  Anxiety  Continue home Zoloft 50 mg daily  Hyperlipidemia  Continue Lipitor 80 mg daily    Principal Problem:    Hydronephrosis due to obstruction of ureter  Active Problems:    Type 2 diabetes mellitus (HCC)    HTN (hypertension)    Nephrolithiasis    CKD (chronic kidney disease) stage 2, GFR 60-89 ml/min    UTI (urinary tract infection)      Code Status: Level 1 - Full Code  Admission Status: INPATIENT   Disposition: Patient requires Med/Surg  Expected Length of Stay: 24-48 hours

## 2024-09-15 NOTE — ASSESSMENT & PLAN NOTE
Recent history of hydronephrosis with UPJ obstruction s/p L ureteral stent 4/14/24 (removed in 5/2024).  Presented to ED with left flank pain started this morning.  Reported sharp pain with no radiation, which was similar to the pain of renal calculi in the past.    CT A/P: Multiple nonobstructing right renal calculi measuring up to 7 mm in the midpole right kidney; moderate left-sided hydroutero nephrosis secondary to a 5 mm obstructing calculus in the distal left ureter proximal to the left ureterovesical junction.     Plan:  - Urology consulted, appreciate recs: cystoscopy with lithotripsy and stent insertion planned tomorrow 9/16  - N.p.o. at midnight  - Continue to trend WBC

## 2024-09-15 NOTE — ASSESSMENT & PLAN NOTE
Afebrile upon arrival with /69, HR 81, RR 20, SpO2 98 RA.   Urine dipstick remarkable for small blood, large leukocyte, and 1+ urine protein.  UA showed microscopic hematuria (4-10 RBCs), pyuria (30-50 WBC) , and bacteriuria (moderate bacteria).  Given history of multiple renal calculi in the past and left ureteral stent placement in April with removal in May, UTI this visit considered complicated UTI.     Patient denied materia, dysuria, and suprapubic tenderness.    History of UTI with growth of pan-sensitive Pseudomonas aeruginosa in April 2024 in November 2021; history of urine culture with ESBL which is susceptible to ertapenem, gentamicin, nitrofurantoin, Zosyn, Bactrim, tetracycline, and Augmentin in November 2019    WBC 13.87  S/p 1 dose 2 g cefepime in the ED    Plan:  -Continue cefepime 2 g BID  -Blood and urine culture and tailor antibiotics based on urine culture susceptibility  -Continue to trend WBC

## 2024-09-15 NOTE — ASSESSMENT & PLAN NOTE
"Lab Results   Component Value Date    HGBA1C 6.5 (H) 04/19/2024       No results for input(s): \"POCGLU\" in the last 72 hours.    Blood Sugar Average: Last 72 hrs:    A1c 4/19/24 6.5  Glucose on admission 171  Home medication includes metformin 1000 mg twice daily    Plan:  - Home metformin while inpatient  - Diabetic diet  - Initiate SSI algorithm 2  - Hypoglycemia protocol  - Goal glucose 140-180    "

## 2024-09-15 NOTE — HOSPITAL COURSE
58-year-old female with history of type 2 diabetes, hypertension, pretension, CKD stage II, hydronephrosis with UPJ obstruction s/p ureteral stent 4/14/24, anxiety, and GERD presented to the ED for left flank pain which started on Saturday morning.  Patient has had this similar pain in the past consistent with renal calculi.  Described the pain as constant and sharp pain.  She has not tried over-the-counter to relieve the pain.  No associated nausea, vomiting, fever, and chills.  Also endorses pressure in the abdomen.  Patient has voided once in the ED, denied dysuria, hematuria, and suprapubic tenderness.    In the ED, afebrile, /69, HR 81, RR 20, SpO2 98% on room air.  Labs remarkable for glucose of 171 and WBC  13.8.  Urine dipstick remarkable for small blood, large leukocyte, and 1+ urine protein.  UA showed hematuria, pyuria, and bacteriuria.  S/p 1 dose cefepime and 50 mg ketorolac in the ED. given history of UTI in the past with ESBL and Pseudomonas, patient was started on cefepime while waiting for urine culture. Urology consulted and patient underwent cystoscopy and left ureteral stent on 9/16. Urine culture collected in the ED showed mixed contaminants.  Antibiotic was discontinued.  Patient continued to improve clinically, and she has no pain at this point.  Per urology, patient is stable to be discharged home and follow-up with him outpatient in a week for stent removal and pending urine culture collected from cystoscopy.  Patient has allergy to oxybutynin with reaction of anaphylaxis.  She will be discharged home under stable condition with as needed Pyridium and tamsulosin.

## 2024-09-15 NOTE — ASSESSMENT & PLAN NOTE
Takes lisinopril 5 mg daily    Plan:   - BP on arrival 125/69  - Resume home med lisinopril 5 mg daily with holding parameter systolic less than 110

## 2024-09-15 NOTE — ANESTHESIA PREPROCEDURE EVALUATION
Procedure:  CYSTOSCOPY URETEROSCOPY WITH LITHOTRIPSY HOLMIUM LASER, RETROGRADE PYELOGRAM AND INSERTION STENT URETERAL--possible stent only at surgeon's discretion. (Left: Ureter)    Relevant Problems   CARDIO   (+) HTN (hypertension)   (+) Hyperlipidemia      ENDO   (+) Type 2 diabetes mellitus (HCC)      GI/HEPATIC   (+) GERD (gastroesophageal reflux disease)      /RENAL   (+) CKD (chronic kidney disease) stage 2, GFR 60-89 ml/min   (+) Hydronephrosis due to obstruction of ureter   (+) Nephrolithiasis      MUSCULOSKELETAL   (+) Primary osteoarthritis of both knees      NEURO/PSYCH   (+) Anxiety   (+) Depression        Physical Exam    Airway    Mallampati score: III  TM Distance: >3 FB  Neck ROM: full     Dental        Cardiovascular      Pulmonary      Other Findings  post-pubertal.      Anesthesia Plan  ASA Score- 2     Anesthesia Type- general with ASA Monitors.         Additional Monitors:     Airway Plan: LMA.           Plan Factors-Exercise tolerance (METS): >4 METS.    Chart reviewed. EKG reviewed.  Existing labs reviewed. Patient summary reviewed.    Patient is not a current smoker.              Induction- intravenous.    Postoperative Plan- Plan for postoperative opioid use.     Perioperative Resuscitation Plan - Level 1 - Full Code.       Informed Consent- Anesthetic plan and risks discussed with patient.  I personally reviewed this patient with the CRNA. Discussed and agreed on the Anesthesia Plan with the CRNA..

## 2024-09-16 ENCOUNTER — ANESTHESIA (INPATIENT)
Dept: PERIOP | Facility: HOSPITAL | Age: 59
DRG: 660 | End: 2024-09-16
Payer: COMMERCIAL

## 2024-09-16 ENCOUNTER — APPOINTMENT (INPATIENT)
Dept: RADIOLOGY | Facility: HOSPITAL | Age: 59
DRG: 660 | End: 2024-09-16
Payer: COMMERCIAL

## 2024-09-16 ENCOUNTER — TELEPHONE (OUTPATIENT)
Dept: UROLOGY | Facility: MEDICAL CENTER | Age: 59
End: 2024-09-16

## 2024-09-16 DIAGNOSIS — N20.0 NEPHROLITHIASIS: Primary | ICD-10-CM

## 2024-09-16 DIAGNOSIS — N17.9 AKI (ACUTE KIDNEY INJURY) (HCC): ICD-10-CM

## 2024-09-16 LAB
ANION GAP SERPL CALCULATED.3IONS-SCNC: 7 MMOL/L (ref 4–13)
BACTERIA UR CULT: NORMAL
BASOPHILS # BLD AUTO: 0.04 THOUSANDS/ΜL (ref 0–0.1)
BASOPHILS NFR BLD AUTO: 1 % (ref 0–1)
BUN SERPL-MCNC: 30 MG/DL (ref 5–25)
CALCIUM SERPL-MCNC: 8.4 MG/DL (ref 8.4–10.2)
CHLORIDE SERPL-SCNC: 105 MMOL/L (ref 96–108)
CO2 SERPL-SCNC: 27 MMOL/L (ref 21–32)
CREAT SERPL-MCNC: 0.94 MG/DL (ref 0.6–1.3)
EOSINOPHIL # BLD AUTO: 0.21 THOUSAND/ΜL (ref 0–0.61)
EOSINOPHIL NFR BLD AUTO: 3 % (ref 0–6)
ERYTHROCYTE [DISTWIDTH] IN BLOOD BY AUTOMATED COUNT: 13.8 % (ref 11.6–15.1)
GFR SERPL CREATININE-BSD FRML MDRD: 67 ML/MIN/1.73SQ M
GLUCOSE SERPL-MCNC: 108 MG/DL (ref 65–140)
GLUCOSE SERPL-MCNC: 122 MG/DL (ref 65–140)
GLUCOSE SERPL-MCNC: 123 MG/DL (ref 65–140)
GLUCOSE SERPL-MCNC: 202 MG/DL (ref 65–140)
HCT VFR BLD AUTO: 42.9 % (ref 34.8–46.1)
HGB BLD-MCNC: 13.9 G/DL (ref 11.5–15.4)
IMM GRANULOCYTES # BLD AUTO: 0.03 THOUSAND/UL (ref 0–0.2)
IMM GRANULOCYTES NFR BLD AUTO: 0 % (ref 0–2)
LYMPHOCYTES # BLD AUTO: 3.11 THOUSANDS/ΜL (ref 0.6–4.47)
LYMPHOCYTES NFR BLD AUTO: 39 % (ref 14–44)
MCH RBC QN AUTO: 30.8 PG (ref 26.8–34.3)
MCHC RBC AUTO-ENTMCNC: 32.4 G/DL (ref 31.4–37.4)
MCV RBC AUTO: 95 FL (ref 82–98)
MONOCYTES # BLD AUTO: 0.69 THOUSAND/ΜL (ref 0.17–1.22)
MONOCYTES NFR BLD AUTO: 9 % (ref 4–12)
NEUTROPHILS # BLD AUTO: 3.99 THOUSANDS/ΜL (ref 1.85–7.62)
NEUTS SEG NFR BLD AUTO: 48 % (ref 43–75)
NRBC BLD AUTO-RTO: 0 /100 WBCS
PLATELET # BLD AUTO: 236 THOUSANDS/UL (ref 149–390)
PMV BLD AUTO: 11.3 FL (ref 8.9–12.7)
POTASSIUM SERPL-SCNC: 4.3 MMOL/L (ref 3.5–5.3)
RBC # BLD AUTO: 4.52 MILLION/UL (ref 3.81–5.12)
SODIUM SERPL-SCNC: 139 MMOL/L (ref 135–147)
WBC # BLD AUTO: 8.07 THOUSAND/UL (ref 4.31–10.16)

## 2024-09-16 PROCEDURE — C2617 STENT, NON-COR, TEM W/O DEL: HCPCS | Performed by: UROLOGY

## 2024-09-16 PROCEDURE — 87086 URINE CULTURE/COLONY COUNT: CPT | Performed by: UROLOGY

## 2024-09-16 PROCEDURE — 0TC78ZZ EXTIRPATION OF MATTER FROM LEFT URETER, VIA NATURAL OR ARTIFICIAL OPENING ENDOSCOPIC: ICD-10-PCS | Performed by: UROLOGY

## 2024-09-16 PROCEDURE — 0TJB8ZZ INSPECTION OF BLADDER, VIA NATURAL OR ARTIFICIAL OPENING ENDOSCOPIC: ICD-10-PCS | Performed by: UROLOGY

## 2024-09-16 PROCEDURE — 82360 CALCULUS ASSAY QUANT: CPT | Performed by: UROLOGY

## 2024-09-16 PROCEDURE — 80048 BASIC METABOLIC PNL TOTAL CA: CPT

## 2024-09-16 PROCEDURE — 99223 1ST HOSP IP/OBS HIGH 75: CPT | Performed by: UROLOGY

## 2024-09-16 PROCEDURE — C1758 CATHETER, URETERAL: HCPCS | Performed by: UROLOGY

## 2024-09-16 PROCEDURE — 74420 UROGRAPHY RTRGR +-KUB: CPT

## 2024-09-16 PROCEDURE — 85025 COMPLETE CBC W/AUTO DIFF WBC: CPT

## 2024-09-16 PROCEDURE — 99232 SBSQ HOSP IP/OBS MODERATE 35: CPT | Performed by: INTERNAL MEDICINE

## 2024-09-16 PROCEDURE — 52352 CYSTOURETERO W/STONE REMOVE: CPT | Performed by: UROLOGY

## 2024-09-16 PROCEDURE — 52332 CYSTOSCOPY AND TREATMENT: CPT | Performed by: UROLOGY

## 2024-09-16 PROCEDURE — 0T778DZ DILATION OF LEFT URETER WITH INTRALUMINAL DEVICE, VIA NATURAL OR ARTIFICIAL OPENING ENDOSCOPIC: ICD-10-PCS | Performed by: UROLOGY

## 2024-09-16 PROCEDURE — BT1F1ZZ FLUOROSCOPY OF LEFT KIDNEY, URETER AND BLADDER USING LOW OSMOLAR CONTRAST: ICD-10-PCS | Performed by: UROLOGY

## 2024-09-16 PROCEDURE — 82948 REAGENT STRIP/BLOOD GLUCOSE: CPT

## 2024-09-16 PROCEDURE — C1769 GUIDE WIRE: HCPCS | Performed by: UROLOGY

## 2024-09-16 DEVICE — VARIABLE LENGTH INJECTION STENT SET
Type: IMPLANTABLE DEVICE | Site: URETER | Status: FUNCTIONAL
Brand: CONTOUR VL™ INJECTION STENT SET

## 2024-09-16 RX ORDER — OXYCODONE HYDROCHLORIDE 5 MG/1
5 TABLET ORAL EVERY 4 HOURS PRN
Status: DISCONTINUED | OUTPATIENT
Start: 2024-09-16 | End: 2024-09-17 | Stop reason: HOSPADM

## 2024-09-16 RX ORDER — FENTANYL CITRATE/PF 50 MCG/ML
25 SYRINGE (ML) INJECTION
Status: DISCONTINUED | OUTPATIENT
Start: 2024-09-16 | End: 2024-09-16 | Stop reason: HOSPADM

## 2024-09-16 RX ORDER — SODIUM CHLORIDE, SODIUM GLUCONATE, SODIUM ACETATE, POTASSIUM CHLORIDE, MAGNESIUM CHLORIDE, SODIUM PHOSPHATE, DIBASIC, AND POTASSIUM PHOSPHATE .53; .5; .37; .037; .03; .012; .00082 G/100ML; G/100ML; G/100ML; G/100ML; G/100ML; G/100ML; G/100ML
75 INJECTION, SOLUTION INTRAVENOUS CONTINUOUS
Status: DISPENSED | OUTPATIENT
Start: 2024-09-16 | End: 2024-09-16

## 2024-09-16 RX ORDER — ONDANSETRON 2 MG/ML
4 INJECTION INTRAMUSCULAR; INTRAVENOUS ONCE AS NEEDED
Status: DISCONTINUED | OUTPATIENT
Start: 2024-09-16 | End: 2024-09-16 | Stop reason: HOSPADM

## 2024-09-16 RX ORDER — PROPOFOL 10 MG/ML
INJECTION, EMULSION INTRAVENOUS AS NEEDED
Status: DISCONTINUED | OUTPATIENT
Start: 2024-09-16 | End: 2024-09-16

## 2024-09-16 RX ORDER — PHENAZOPYRIDINE HYDROCHLORIDE 100 MG/1
200 TABLET, FILM COATED ORAL 3 TIMES DAILY PRN
Status: DISCONTINUED | OUTPATIENT
Start: 2024-09-16 | End: 2024-09-17 | Stop reason: HOSPADM

## 2024-09-16 RX ORDER — MAGNESIUM HYDROXIDE 1200 MG/15ML
LIQUID ORAL AS NEEDED
Status: DISCONTINUED | OUTPATIENT
Start: 2024-09-16 | End: 2024-09-16 | Stop reason: HOSPADM

## 2024-09-16 RX ORDER — LIDOCAINE HYDROCHLORIDE 20 MG/ML
INJECTION, SOLUTION EPIDURAL; INFILTRATION; INTRACAUDAL; PERINEURAL AS NEEDED
Status: DISCONTINUED | OUTPATIENT
Start: 2024-09-16 | End: 2024-09-16

## 2024-09-16 RX ORDER — MIDAZOLAM HYDROCHLORIDE 2 MG/2ML
INJECTION, SOLUTION INTRAMUSCULAR; INTRAVENOUS AS NEEDED
Status: DISCONTINUED | OUTPATIENT
Start: 2024-09-16 | End: 2024-09-16

## 2024-09-16 RX ORDER — SODIUM CHLORIDE, SODIUM LACTATE, POTASSIUM CHLORIDE, CALCIUM CHLORIDE 600; 310; 30; 20 MG/100ML; MG/100ML; MG/100ML; MG/100ML
50 INJECTION, SOLUTION INTRAVENOUS CONTINUOUS
Status: DISCONTINUED | OUTPATIENT
Start: 2024-09-16 | End: 2024-09-17 | Stop reason: HOSPADM

## 2024-09-16 RX ORDER — DEXAMETHASONE SODIUM PHOSPHATE 10 MG/ML
INJECTION, SOLUTION INTRAMUSCULAR; INTRAVENOUS AS NEEDED
Status: DISCONTINUED | OUTPATIENT
Start: 2024-09-16 | End: 2024-09-16

## 2024-09-16 RX ORDER — CEFAZOLIN SODIUM 1 G/3ML
INJECTION, POWDER, FOR SOLUTION INTRAMUSCULAR; INTRAVENOUS AS NEEDED
Status: DISCONTINUED | OUTPATIENT
Start: 2024-09-16 | End: 2024-09-16

## 2024-09-16 RX ORDER — FENTANYL CITRATE 50 UG/ML
INJECTION, SOLUTION INTRAMUSCULAR; INTRAVENOUS AS NEEDED
Status: DISCONTINUED | OUTPATIENT
Start: 2024-09-16 | End: 2024-09-16

## 2024-09-16 RX ORDER — KETOROLAC TROMETHAMINE 30 MG/ML
15 INJECTION, SOLUTION INTRAMUSCULAR; INTRAVENOUS EVERY 6 HOURS PRN
Status: DISCONTINUED | OUTPATIENT
Start: 2024-09-16 | End: 2024-09-17 | Stop reason: HOSPADM

## 2024-09-16 RX ORDER — SODIUM CHLORIDE, SODIUM LACTATE, POTASSIUM CHLORIDE, CALCIUM CHLORIDE 600; 310; 30; 20 MG/100ML; MG/100ML; MG/100ML; MG/100ML
INJECTION, SOLUTION INTRAVENOUS CONTINUOUS PRN
Status: DISCONTINUED | OUTPATIENT
Start: 2024-09-16 | End: 2024-09-16

## 2024-09-16 RX ORDER — EPHEDRINE SULFATE 50 MG/ML
INJECTION INTRAVENOUS AS NEEDED
Status: DISCONTINUED | OUTPATIENT
Start: 2024-09-16 | End: 2024-09-16

## 2024-09-16 RX ORDER — ONDANSETRON 2 MG/ML
INJECTION INTRAMUSCULAR; INTRAVENOUS AS NEEDED
Status: DISCONTINUED | OUTPATIENT
Start: 2024-09-16 | End: 2024-09-16

## 2024-09-16 RX ORDER — ACETAMINOPHEN 325 MG/1
975 TABLET ORAL EVERY 6 HOURS SCHEDULED
Status: DISCONTINUED | OUTPATIENT
Start: 2024-09-16 | End: 2024-09-17 | Stop reason: HOSPADM

## 2024-09-16 RX ADMIN — POTASSIUM CITRATE 10 MEQ: 10 TABLET, EXTENDED RELEASE ORAL at 12:20

## 2024-09-16 RX ADMIN — ACETAMINOPHEN 975 MG: 325 TABLET ORAL at 12:20

## 2024-09-16 RX ADMIN — PHENYLEPHRINE HYDROCHLORIDE 100 MCG: 10 INJECTION INTRAVENOUS at 10:56

## 2024-09-16 RX ADMIN — LIDOCAINE HYDROCHLORIDE 100 MG: 20 INJECTION, SOLUTION EPIDURAL; INFILTRATION; INTRACAUDAL at 10:18

## 2024-09-16 RX ADMIN — DEXAMETHASONE SODIUM PHOSPHATE 4 MG: 10 INJECTION, SOLUTION INTRAMUSCULAR; INTRAVENOUS at 10:18

## 2024-09-16 RX ADMIN — PHENYLEPHRINE HYDROCHLORIDE 100 MCG: 10 INJECTION INTRAVENOUS at 10:45

## 2024-09-16 RX ADMIN — SODIUM CHLORIDE, SODIUM LACTATE, POTASSIUM CHLORIDE, AND CALCIUM CHLORIDE: .6; .31; .03; .02 INJECTION, SOLUTION INTRAVENOUS at 09:56

## 2024-09-16 RX ADMIN — EPHEDRINE SULFATE 10 MG: 50 INJECTION, SOLUTION INTRAVENOUS at 10:23

## 2024-09-16 RX ADMIN — FENTANYL CITRATE 25 MCG: 50 INJECTION INTRAMUSCULAR; INTRAVENOUS at 10:18

## 2024-09-16 RX ADMIN — FENTANYL CITRATE 25 MCG: 50 INJECTION INTRAMUSCULAR; INTRAVENOUS at 10:41

## 2024-09-16 RX ADMIN — CEFAZOLIN 2000 MG: 1 INJECTION, POWDER, FOR SOLUTION INTRAMUSCULAR; INTRAVENOUS at 10:23

## 2024-09-16 RX ADMIN — ONDANSETRON 4 MG: 2 INJECTION INTRAMUSCULAR; INTRAVENOUS at 10:30

## 2024-09-16 RX ADMIN — LISINOPRIL 5 MG: 5 TABLET ORAL at 08:28

## 2024-09-16 RX ADMIN — ACETAMINOPHEN 975 MG: 325 TABLET ORAL at 17:19

## 2024-09-16 RX ADMIN — MIDAZOLAM 2 MG: 1 INJECTION INTRAMUSCULAR; INTRAVENOUS at 10:11

## 2024-09-16 RX ADMIN — PANTOPRAZOLE SODIUM 40 MG: 40 TABLET, DELAYED RELEASE ORAL at 05:40

## 2024-09-16 RX ADMIN — CEFEPIME 2000 MG: 2 INJECTION, POWDER, FOR SOLUTION INTRAVENOUS at 22:18

## 2024-09-16 RX ADMIN — SODIUM CHLORIDE, SODIUM GLUCONATE, SODIUM ACETATE, POTASSIUM CHLORIDE, MAGNESIUM CHLORIDE, SODIUM PHOSPHATE, DIBASIC, AND POTASSIUM PHOSPHATE 75 ML/HR: .53; .5; .37; .037; .03; .012; .00082 INJECTION, SOLUTION INTRAVENOUS at 01:56

## 2024-09-16 RX ADMIN — POTASSIUM CITRATE 10 MEQ: 10 TABLET, EXTENDED RELEASE ORAL at 17:20

## 2024-09-16 RX ADMIN — INSULIN LISPRO 1 UNITS: 100 INJECTION, SOLUTION INTRAVENOUS; SUBCUTANEOUS at 17:24

## 2024-09-16 RX ADMIN — FENTANYL CITRATE 25 MCG: 50 INJECTION INTRAMUSCULAR; INTRAVENOUS at 10:51

## 2024-09-16 RX ADMIN — SERTRALINE HYDROCHLORIDE 50 MG: 50 TABLET ORAL at 08:28

## 2024-09-16 RX ADMIN — EPHEDRINE SULFATE 10 MG: 50 INJECTION, SOLUTION INTRAVENOUS at 10:35

## 2024-09-16 RX ADMIN — FENTANYL CITRATE 25 MCG: 50 INJECTION INTRAMUSCULAR; INTRAVENOUS at 10:56

## 2024-09-16 RX ADMIN — SODIUM CHLORIDE, SODIUM LACTATE, POTASSIUM CHLORIDE, AND CALCIUM CHLORIDE 50 ML/HR: .6; .31; .03; .02 INJECTION, SOLUTION INTRAVENOUS at 12:15

## 2024-09-16 RX ADMIN — TAMSULOSIN HYDROCHLORIDE 0.4 MG: 0.4 CAPSULE ORAL at 17:19

## 2024-09-16 RX ADMIN — PROPOFOL 150 MG: 10 INJECTION, EMULSION INTRAVENOUS at 10:18

## 2024-09-16 NOTE — INTERVAL H&P NOTE
H&P reviewed. After examining the patient I find no changes in the patients condition since the H&P had been written.    Vitals:    09/16/24 0726   BP: 139/66   Pulse: 71   Resp:    Temp:    SpO2: 97%   Procedure and risks reviewed with the patient in the holding area.  She has previously signed consent.  Laterality marked left.

## 2024-09-16 NOTE — ASSESSMENT & PLAN NOTE
Lab Results   Component Value Date    EGFR 67 09/16/2024    EGFR 67 09/15/2024    EGFR 75 04/19/2024    CREATININE 0.94 09/16/2024    CREATININE 0.94 09/15/2024    CREATININE 0.85 04/19/2024     Creatinine baseline 0.7-1.0  Creatinine this morning 0.94    Plan:  - Continue to monitor renal function  - Avoid hypotension  - Avoid nephrotoxic agents

## 2024-09-16 NOTE — OP NOTE
OPERATIVE REPORT  PATIENT NAME: Sakshi Tirado    :  1965  MRN: [id number]  Pt Location: [facility] ROOM 01    SURGERY DATE: 2024    Surgeons and Role:     * George Lira MD - Primary    Preop Diagnosis:  Ureterolithiasis [N20.1]    Post-Op Diagnosis Codes:     * Left ureteral stone [N20.1]    Procedure(s):  Left - CYSTOSCOPY URETEROSCOPY. RETROGRADE PYELOGRAM AND INSERTION STENT URETERAL. STONE EXTRACTION    Specimen(s):  ID Type Source Tests Collected by Time Destination   A : URINE CULTURE Urine Urine, Cystoscopic URINE CULTURE George Lira MD 2024 1031    B : LEFT URETERAL STONE Calculus Ureter, Left STONE ANALYSIS George Lira MD 2024 1048        Estimated Blood Loss:   Minimal    Drains:  * No LDAs found *    Anesthesia Type:   General    Operative Indications:  Ureterolithiasis [N20.1]  Left ureteral stone    Operative Findings:  Normal-appearing bladder and orifices.  The stone was visible as a calcification just above the UVJ on preliminary fluoroscopic images.  This was confirmed on retrograde pyelography and ureteroscopy.  No other ureteral filling defects were noted.  Uroscopy was performed.  The stone was basket extracted and a 6 Portuguese Contour stent placed.  Bill left long for outpatient removal.      Complications:   None      PLAN FOR STENT: Removal by patient or staff in 1 week.    Procedure and Technique:  The patient was brought into the OR, properly identified and positioned on the table. General anesthesia was administered and the patient was placed in lithotomy position and prepped and draped in the usual sterile fashion.   Compression boots were employed.  Intravenous antibiotic was administered.  An appropriate time-out was performed.  Cystoscopy was performed with a 22 Portuguese cystoscope with findings as above.      The left ureteral orifice was identified and retrograde pyelogram performed with findings as above.  A guidewire was placed up the ureter under  fluoroscopic guidance. The rigid ureteroscope was introduced and carefully advanced up to stone.   A basket was placed thru the scope and carefully engaged the stone. The scope was withdrawn, bringing the stone out, with no trauma to the ureter.  Repeat ureteroscopy was then performed.  No other stones were visualized.  Contrast was injected to evaluate the upper ureter and renal pelvis and no other filling defects identified.    The ureteroscope  was removed from the ureter.  No damage was noted to the ureter nor were there any other ureteral stones.       A 6 Kazakh Contour stent was then easily passed over the wire and seen to coil nicely in the renal pelvis.  After the wire was removed and a nice coil was noted in the bladder.  The cystoscope was used to visualize the distal end of the stent and to drain the bladder.  The Dangler was cut short but left long enough so the stent can be removed by the patient or nursing staff.  The patient was awaken from anesthesia and taken to the PACU in good condition.   I was present for the entire procedure.    Patient Disposition:  PACU  and hemodynamically stable             SIGNATURE: George Lira MD  DATE: September 16, 2024  TIME: 11:30 AM

## 2024-09-16 NOTE — PLAN OF CARE
Problem: INFECTION - ADULT  Goal: Absence or prevention of progression during hospitalization  Description: INTERVENTIONS:  - Assess and monitor for signs and symptoms of infection  - Monitor lab/diagnostic results  - Monitor all insertion sites, i.e. indwelling lines, tubes, and drains  - Monitor endotracheal if appropriate and nasal secretions for changes in amount and color  - [place] appropriate cooling/warming therapies per order  - Administer medications as ordered  - Instruct and encourage patient and family to use good hand hygiene technique  - Identify and instruct in appropriate isolation precautions for identified infection/condition  Outcome: Progressing  Goal: Absence of fever/infection during neutropenic period  Description: INTERVENTIONS:  - Monitor WBC    Outcome: Progressing     Problem: GENITOURINARY - ADULT  Goal: Maintains or returns to baseline urinary function  Description: INTERVENTIONS:  - Assess urinary function  - Encourage oral fluids to ensure adequate hydration if ordered  - Administer IV fluids as ordered to ensure adequate hydration  - Administer ordered medications as needed  - Offer frequent toileting  - Follow urinary retention protocol if ordered  Outcome: Progressing  Goal: Absence of urinary retention  Description: INTERVENTIONS:  - Assess patient’s ability to void and empty bladder  - Monitor I/O  - Bladder scan as needed  - Discuss with physician/AP medications to alleviate retention as needed  - Discuss catheterization for long term situations as appropriate  Outcome: Progressing  Goal: Urinary catheter remains patent  Description: INTERVENTIONS:  - Assess patency of urinary catheter  - If patient has a chronic mckeon, consider changing catheter if non-functioning  - Follow guidelines for intermittent irrigation of non-functioning urinary catheter  Outcome: Progressing

## 2024-09-16 NOTE — ASSESSMENT & PLAN NOTE
Takes lisinopril 5 mg daily  Blood pressure running 110s/high 50s to 70s    Plan:   - Resume home med lisinopril 5 mg daily with holding parameter systolic less than 110

## 2024-09-16 NOTE — UTILIZATION REVIEW
Initial Clinical Review    Admission: Date/Time/Statement:   Admission Orders (From admission, onward)       Ordered        09/15/24 1314  INPATIENT ADMISSION  Once                          Orders Placed This Encounter   Procedures    INPATIENT ADMISSION     Standing Status:   Standing     Number of Occurrences:   1     Order Specific Question:   Level of Care     Answer:   Med Surg [16]     Order Specific Question:   Estimated length of stay     Answer:   More than 2 Midnights     Order Specific Question:   Certification     Answer:   I certify that inpatient services are medically necessary for this patient for a duration of greater than two midnights. See H&P and MD Progress Notes for additional information about the patient's course of treatment.     ED Arrival Information       Expected   -    Arrival   9/15/2024 08:01    Acuity   Urgent              Means of arrival   Walk-In    Escorted by   Self    Service   [facility]    Admission type   Emergency              Arrival complaint   kindystons             Chief Complaint   Patient presents with    Flank Pain     Started this morning with left flank pain. H/o kidney stones       Initial Presentation: 58 y.o. female with PMHx of T2 DM, HTN, CKD III, nephrolithiasis, hydronephrosis with UPJ obstruction s/p L ureteral stent 4/14/24 (removed in 5/2024), anxiety, and GERD presents to ED as a walk-in with c/o left flank pain which started this morning. Pain is described as constant and sharp. Denies n/v. In ED VSS, WBC 13.8, glucose 171, UA + 4-10 RBCs, 30-50 WBC, moderate bacteria. CT A/P: Multiple nonobstructing right renal calculi measuring up to 7 mm in the midpole right kidney; moderate left-sided hydroutero nephrosis secondary to a 5 mm obstructing calculus in the distal left ureter proximal to the left ureterovesical junction.  S/p 1 dose cefepime and 50 mg ketorolac in the ED.  Admitted Inpatient to [facility] unit with Hydronephrosis, Nephrolithiasis and UTI --  npo after mdn. Consult Urology. Tylenol prn. Flomax started. Strain urine. Continue cefepime 2 g BID, monitor urine and blood cxs. Monitor WBCs and renal function. Continue pta po meds.        Date: 9/16   Day 2:   Urology consult -- A: Left ureteral stone, Pyuria. Plan to proceed to the OR today. Continue npo, IVFs, analgesics prn. SCDs.    OPERATIVE REPORT  SURGERY DATE: 9/16/2024   Post-Op Diagnosis Codes:     Left ureteral stone [N20.1]   Procedure(s):  Left - CYSTOSCOPY URETEROSCOPY. RETROGRADE PYELOGRAM AND INSERTION STENT URETERAL. STONE EXTRACTION  Anesthesia Type:   General   Operative Findings:  Normal-appearing bladder and orifices.  The stone was visible as a calcification just above the UVJ on preliminary fluoroscopic images.  This was confirmed on retrograde pyelography and ureteroscopy.  No other ureteral filling defects were noted.  Uroscopy was performed.  The stone was basket extracted and a 6 Malay Contour stent placed.  Bill left long for outpatient removal.      ED Triage Vitals [09/15/24 0805]   Temperature Pulse Respirations Blood Pressure SpO2 Pain Score   (!) 97 °F (36.1 °C) 81 20 125/69 98 % 10 - Worst Possible Pain     Weight (last 2 days)       Date/Time Weight    09/16/24 0556 70.4 (155.2)            Vital Signs (last 3 days)       Date/Time Temp Pulse Resp BP MAP (mmHg) SpO2 O2 Flow Rate (L/min) O2 Device Patient Position - Orthostatic VS Nas Coma Scale Score Pain    09/16/24 1130 98.1 °F (36.7 °C) 63 15 120/59 85 97 % -- None (Room air) -- 15 No Pain    09/16/24 1115 -- 69 21 121/77 95 96 % -- None (Room air) -- 15 No Pain    09/16/24 1104 98 °F (36.7 °C) 82 15 136/87 105 100 % 6 L/min Simple mask -- -- --    09/16/24 0900 -- -- -- -- -- -- -- None (Room air) -- -- No Pain    09/16/24 07:26:20 -- 71 -- 139/66 90 97 % -- -- -- -- --    09/15/24 21:33:49 98.2 °F (36.8 °C) 64 16 115/59 78 96 % -- -- -- -- --    09/15/24 2026 -- -- -- -- -- -- -- None (Room air) -- -- No Pain     09/15/24 19:11:48 97.8 °F (36.6 °C) 78 18 118/72 87 96 % -- -- -- -- --    09/15/24 1738 -- 76 16 109/57 74 96 % -- -- -- -- No Pain    09/15/24 1500 -- 64 -- 112/56 80 96 % -- -- -- -- --    09/15/24 1435 -- 56 16 116/56 81 98 % -- -- -- -- No Pain    09/15/24 1001 -- 61 16 118/81 94 98 % -- None (Room air) Sitting -- No Pain    09/15/24 0805 97 °F (36.1 °C) 81 20 125/69 -- 98 % -- None (Room air) Sitting -- 10 - Worst Possible Pain            Pertinent Labs/Diagnostic Test Results:   Radiology:  CT renal stone study abdomen pelvis wo contrast   ED Interpretation by Pablito Flores MD (09/15 0853)   Abnormal   LEFT distal ureter stone with hydro-urtero-nephrosis.       Final Interpretation by Bradley Landon Kocher, MD (09/15 0922)      1. Moderate left-sided hydroureteronephrosis secondary to a 5 mm obstructing calculus in the distal left ureter.   2. Nonobstructing bilateral renal calculi.         Workstation performed: [id number]         FL retrograde pyelogram    (Results Pending)       Results from last 7 days   Lab Units 09/16/24  0540 09/15/24  0836   WBC Thousand/uL 8.07 13.87*   HEMOGLOBIN g/dL 13.9 14.9   HEMATOCRIT % 42.9 44.7   PLATELETS Thousands/uL 236 261   TOTAL NEUT ABS Thousands/µL 3.99 11.09*       Results from last 7 days   Lab Units 09/16/24  0540 09/15/24  0836   SODIUM mmol/L 139 136   POTASSIUM mmol/L 4.3 5.2   CHLORIDE mmol/L 105 101   CO2 mmol/L 27 27   ANION GAP mmol/L 7 8   BUN mg/dL 30* 21   CREATININE mg/dL 0.94 0.94   EGFR ml/min/1.73sq m 67 67   CALCIUM mg/dL 8.4 9.3     Results from last 7 days   Lab Units 09/15/24  0836   AST U/L 31   ALT U/L 30   ALK PHOS U/L 95   TOTAL PROTEIN g/dL 6.9   ALBUMIN g/dL 4.2   TOTAL BILIRUBIN mg/dL 0.48     Results from last 7 days   Lab Units 09/16/24  1106 09/16/24  0725 09/15/24  1718 09/15/24  1532   POC GLUCOSE mg/dl 123 122 238* 164*     Results from last 7 days   Lab Units 09/16/24  0540 09/15/24  0836   GLUCOSE RANDOM mg/dL 108 171*        Results from last 7 days   Lab Units 09/15/24  0836   HEMOGLOBIN A1C % 6.4*   EAG mg/dl 137     Results from last 7 days   Lab Units 09/15/24  1002   CLARITY UA  Turbid   COLOR UA  Yellow   SPEC GRAV UA  1.016   PH UA  6.0   GLUCOSE UA mg/dl Negative   KETONES UA mg/dl Negative   BLOOD UA  Small*   PROTEIN UA mg/dl 30 (1+)*   NITRITE UA  Negative   BILIRUBIN UA  Negative   UROBILINOGEN UA (BE) mg/dl <2.0   LEUKOCYTES UA  Large*   WBC UA /hpf 30-50*   RBC UA /hpf 4-10*   BACTERIA UA /hpf Moderate*   EPITHELIAL CELLS WET PREP /hpf Occasional     Results from last 7 days   Lab Units 09/15/24  1002   URINE CULTURE  >100,000 cfu/ml       ED Treatment-Medication Administration from 09/15/2024 0801 to 09/15/2024 1904         Date/Time Order Dose Route Action     09/15/2024 0836 ketorolac (TORADOL) injection 15 mg 15 mg Intravenous Given     09/15/2024 1104 cefepime (MAXIPIME) 2 g/50 mL dextrose IVPB 2,000 mg Intravenous New Bag     09/15/2024 1436 ascorbic acid (VITAMIN C) tablet 2,000 mg 2,000 mg Oral Given     09/15/2024 1436 atorvastatin (LIPITOR) tablet 80 mg 80 mg Oral Given     09/15/2024 1437 Cholecalciferol (VITAMIN D3) tablet 2,000 Units 2,000 Units Oral Given     09/15/2024 1534 potassium citrate (UROCIT-K) CR tablet 10 mEq 10 mEq Oral Given     09/15/2024 1533 tamsulosin (FLOMAX) capsule 0.4 mg 0.4 mg Oral Given     09/15/2024 1725 insulin lispro (HumALOG/ADMELOG) 100 units/mL subcutaneous injection 1-5 Units 2 Units Subcutaneous Given       Past Medical History:   Diagnosis Date    Anxiety     Asthma     Colon polyp     Depression     Diabetes mellitus (HCC)     Diverticulitis     Diverticulitis of colon     Follicular cyst of ovary 12/23/2014    Former smoker     GERD (gastroesophageal reflux disease)     Glycosuria     Headache     Hyperlipidemia     Hypertension     Kidney calculi 1/11/2019    Kidney stone     Obesity     Ovarian cyst     Overactive bladder     Overweight     Pulmonary nodule     Renal  calculus     Seasonal allergies     Tinea pedis of left foot 6/8/2020    Vertigo     Wears partial dentures     Lower plate -partial     Present on Admission:   CKD (chronic kidney disease) stage 2, GFR 60-89 ml/min   Nephrolithiasis   Hydronephrosis due to obstruction of ureter   HTN (hypertension)   Type 2 diabetes mellitus (HCC)   UTI (urinary tract infection)   Hyperlipidemia      Admitting Diagnosis: Ureterolithiasis [N20.1]  UTI (urinary tract infection) [N39.0]  Flank pain [R10.9]  Left flank tenderness [R10.819]  Age/Sex: 58 y.o. female  Admission Orders:  Scheduled Medications:  acetaminophen, 975 mg, Oral, Q6H CARMELO  vitamin C, 2,000 mg, Oral, Daily  atorvastatin, 80 mg, Oral, Daily  cefepime, 2,000 mg, Intravenous, Q12H  Cholecalciferol, 2,000 Units, Oral, Daily  enoxaparin, 40 mg, Subcutaneous, Daily  insulin lispro, 1-5 Units, Subcutaneous, TID AC  lisinopril, 5 mg, Oral, Daily  pantoprazole, 40 mg, Oral, Early Morning  potassium citrate, 10 mEq, Oral, TID With Meals  sertraline, 50 mg, Oral, Daily  tamsulosin, 0.4 mg, Oral, Daily With Dinner    Continuous IV Infusions:  lactated ringers, 50 mL/hr, Intravenous, Continuous    PRN Meds:  ketorolac, 15 mg, Intravenous, Q6H PRN  oxyCODONE, 5 mg, Oral, Q4H PRN  phenazopyridine, 200 mg, Oral, TID PRN        IP CONSULT TO CASE MANAGEMENT  IP CONSULT TO UROLOGY    Network Utilization Review Department  ATTENTION: Please call with any questions or concerns to [phone] and carefully listen to the prompts so that you are directed to the right person. All voicemails are confidential.   For Discharge needs, contact Care Management DC Support Team at [phone] opt. 2  Send all requests for admission clinical reviews, approved or denied determinations and any other requests to dedicated fax number below belonging to the campus where the patient is receiving treatment. List of dedicated fax numbers for the Facilities:  FACILITY NAME UR FAX NUMBER   ADMISSION  DENIALS (Administrative/Medical Necessity) [phone]   DISCHARGE SUPPORT TEAM (NETWORK) [phone]   PARENT CHILD HEALTH (Maternity/NICU/Pediatrics) [phone]   [facility] [phone]   [facility] [phone]   [facility] [phone]   [facility] [phone]   [facility] [phone]   [facility] [phone]   [facility] [phone]   [facility] [phone]   [facility] [phone]   [facility] [phone]   [facility] [phone]   [facility] [phone]

## 2024-09-16 NOTE — ASSESSMENT & PLAN NOTE
Recent history of hydronephrosis with UPJ obstruction s/p L ureteral stent 4/14/24 (removed in 5/2024).  Presented to ED with left flank pain started this morning.  Reported sharp pain with no radiation, which was similar to the pain of renal calculi in the past.    CT A/P: Multiple nonobstructing right renal calculi measuring up to 7 mm in the midpole right kidney; moderate left-sided hydroutero nephrosis secondary to a 5 mm obstructing calculus in the distal left ureter proximal to the left ureterovesical junction.     Plan:  - Urology consulted, appreciate recs: cystoscopy with lithotripsy and stent insertion planned today  - Continue to trend WBC

## 2024-09-16 NOTE — TELEPHONE ENCOUNTER
Patient underwent ureteroscopy today.  Stent is on a string and can be removed by patient or staff in 1 week.  She will then need a follow-up visit with the [facility].  She may already have an appointment at [facility].  Please call to arrange post discharge.

## 2024-09-16 NOTE — ASSESSMENT & PLAN NOTE
"  CT A/P: Multiple nonobstructing right renal calculi measuring up to 7 mm in the midpole right kidney; moderate left-sided hydro utero nephrosis secondary to a 5 mm obstructing calculus in the distal left ureter proximal to the left ureterovesical junction.    Reported multiple episodes of nephrolithiasis in the past and family history significant for nephrolithiasis in her father.  Takes potassium citrate 3 times daily.    Patient reporting no pain upon evaluation.    Plan:  - See plan under \" hydronephrosis due to obstruction of ureter\"  - Continue potassium citrate 10 mEq 3 times daily  - Scheduled Tylenol 975 mg every 6 hours, as needed Toradol 50 mg every 6 hours, as needed oxy 5 mg every 4 hours, and as needed Pyridium 200 mg 3 times daily for bladder spasm and dysuria  - Continue Flomax 0.4 mg daily    "

## 2024-09-16 NOTE — QUICK NOTE
Called patient's spouse, Marcello, but unable to answer the call. I left a voicemail for quick update that the urologic procedure went well with Sakshi and she is currently resting in her room.

## 2024-09-16 NOTE — CASE MANAGEMENT
Case Management Assessment & Discharge Planning Note    Patient name Sakshi Tirado  Location [facility] 914/[facility] 914-01 MRN [id number]  : 1965 Date 2024       Current Admission Date: 9/15/2024  Current Admission Diagnosis:Hydronephrosis due to obstruction of ureter   Patient Active Problem List    Diagnosis Date Noted Date Diagnosed    UTI (urinary tract infection) 09/15/2024     Depression 09/15/2024     Overweight (BMI 25.0-29.9) 2024     Sepsis without acute organ dysfunction (HCC) 04/15/2024     Urine test positive for microalbuminuria 2023     Persistent proteinuria 2023     Elevated LFTs 2022     Hypocitraturia 2022     CKD (chronic kidney disease) stage 2, GFR 60-89 ml/min 2022     Anxiety      Aftercare following right knee joint replacement surgery 2021     Status post total knee replacement, right 2021     Hydronephrosis due to obstruction of ureter 2020     Stress incontinence, female 2020     Primary osteoarthritis of both knees 2019     Seasonal allergies 2019     BMI 30.0-30.9,adult 2019     Nephrolithiasis 2019     Type 2 diabetes mellitus (HCC) 10/16/2018     HTN (hypertension) 10/16/2018     Vitamin D deficiency 2018     Bilateral carpal tunnel syndrome 2018     Hyperlipidemia 2017     GERD (gastroesophageal reflux disease) 2017     Atherosclerosis of arteries 2017     Follicular cyst of ovary 2014     Ureterolithiasis 2014       LOS (days): 1  Geometric Mean LOS (GMLOS) (days):   Days to GMLOS:     OBJECTIVE:    Risk of Unplanned Readmission Score: 7.69         Current admission status: Inpatient  Referral Reason: Other (dispo planning)    Preferred Pharmacy:   [facility] - [address] [address]  Phone: [phone] Fax: [phone]    [id number] [address]  [address] [facility] [address]  Phone: [phone] Fax: [phone]    [facility] - [place] - [address]  [address]  [place] [address]  Phone: [phone] Fax: [phone]    Primary Care Provider: LEONARD Haynes    Primary Insurance: BLUE CROSS  Secondary Insurance:     ASSESSMENT:  Active Health Care Proxies    There are no active Health Care Proxies on file.                 Readmission Root Cause  30 Day Readmission: No    Patient Information  Admitted from:: Home  Mental Status: Alert  During Assessment patient was accompanied by: Not accompanied during assessment  Assessment information provided by:: Patient  Primary Caregiver: Self  Support Systems: Self, Spouse/significant other  County of Residence: [place]  What city do you live in?: [place]  Home entry access options. Select all that apply.: Ramp  Type of Current Residence: [facility]  Living Arrangements: Lives w/ Spouse/significant other  Is patient a ?: No    Activities of Daily Living Prior to Admission  Functional Status: Independent  Completes ADLs independently?: Yes  Ambulates independently?: Yes  Does patient use assisted devices?: No  Does patient currently own DME?: Yes  What DME does the patient currently own?: Straight Cane, Walker, Wheelchair  Does patient have a history of Outpatient Therapy (PT/OT)?: Yes  Does the patient have a history of Short-Term Rehab?: No  Does patient have a history of HHC?: No  Does patient currently have HHC?: No         Patient Information Continued  Income Source: Employed  Does patient have prescription coverage?: Yes  Does patient receive dialysis treatments?: No  Does patient have a history of substance abuse?: No  Does patient have a history of Mental Health Diagnosis?: Yes (Depression/anxiety)  Is patient receiving treatment for mental health?: Yes  Has patient received inpatient treatment related to mental  health in the last 2 years?: No         Means of Transportation  Means of Transport to Appts:: Drives Self      Social Determinants of Health (SDOH)      Flowsheet Row Most Recent Value   Housing Stability    In the last 12 months, was there a time when you were not able to pay the mortgage or rent on time? N   In the past 12 months, how many times have you moved where you were living? 0   At any time in the past 12 months, were you homeless or living in a shelter (including now)? N   Transportation Needs    In the past 12 months, has lack of transportation kept you from medical appointments or from getting medications? no   In the past 12 months, has lack of transportation kept you from meetings, work, or from getting things needed for daily living? No   Food Insecurity    Within the past 12 months, you worried that your food would run out before you got the money to buy more. Never true   Within the past 12 months, the food you bought just didn't last and you didn't have money to get more. Never true   Utilities    In the past 12 months has the electric, gas, oil, or water company threatened to shut off services in your home? No            DISCHARGE DETAILS:    Discharge planning discussed with:: Pt  Freedom of Choice: Yes  Comments - Freedom of Choice: Discussed FOC  CM contacted family/caregiver?: No- see comments (Pt alert and oriented)  Were Treatment Team discharge recommendations reviewed with patient/caregiver?: Yes  Did patient/caregiver verbalize understanding of patient care needs?: Yes  Were patient/caregiver advised of the risks associated with not following Treatment Team discharge recommendations?: Yes         Requested Home Health Care         Is the patient interested in HHC at discharge?: No    DME Referral Provided  Referral made for DME?: No    Other Referral/Resources/Interventions Provided:  Interventions: None Indicated  Referral Comments: no CM needs indicated at this time         Treatment  Team Recommendation: Home  Discharge Destination Plan:: Home               CM met with pt to complete assessment and explain CM role.  Pt resides with her  in a trailer home, has a ramp for entrance.  Pt is independent with all ADL's.  Pt does not use any DME, but does have a walker, cane and wheelchair if needed. Pt reports previous outpatient PT, denies STR or HHC.  Pt reports having anxiety, does currently take medications.  Pt has no hx of inpatient psychiatric hospitalizations.  Pt is employed and drives.  CM will continue to follow for discharge planning needs.

## 2024-09-16 NOTE — PROGRESS NOTES
Progress Note - Internal Medicine   Name: Sakshi Tirado 58 y.o. female I MRN: [id number]  [facility] I Date of Admission: 9/15/2024   Date of Service: 9/16/2024 I Hospital Day: 1    Assessment & Plan  Hydronephrosis due to obstruction of ureter  Recent history of hydronephrosis with UPJ obstruction s/p L ureteral stent 4/14/24 (removed in 5/2024).  Presented to ED with left flank pain started this morning.  Reported sharp pain with no radiation, which was similar to the pain of renal calculi in the past.    CT A/P: Multiple nonobstructing right renal calculi measuring up to 7 mm in the midpole right kidney; moderate left-sided hydroutero nephrosis secondary to a 5 mm obstructing calculus in the distal left ureter proximal to the left ureterovesical junction.     Plan:  - Urology consulted, appreciate recs: cystoscopy with lithotripsy and stent insertion planned today  - Continue to trend WBC      Type 2 diabetes mellitus (HCC)  Lab Results   Component Value Date    HGBA1C 6.4 (H) 09/15/2024       Recent Labs     09/15/24  1532 09/15/24  1718   POCGLU 164* 238*       Blood Sugar Average: Last 72 hrs:  (P) 201  A1c 4/19/24 6.5  A1c 9/16/2024 6.4    Home medication includes metformin 1000 mg twice daily    Glucose this morning 108    Plan:  - Hold metformin while inpatient  - Diabetic diet  - Initiate SSI algorithm 2  - Hypoglycemia protocol  - Goal glucose 140-180    HTN (hypertension)  Takes lisinopril 5 mg daily  Blood pressure running 110s/high 50s to 70s    Plan:   - Resume home med lisinopril 5 mg daily with holding parameter systolic less than 110  Nephrolithiasis    CT A/P: Multiple nonobstructing right renal calculi measuring up to 7 mm in the midpole right kidney; moderate left-sided hydro utero nephrosis secondary to a 5 mm obstructing calculus in the distal left ureter proximal to the left ureterovesical junction.    Reported multiple episodes of nephrolithiasis in the past and family history  "significant for nephrolithiasis in her father.  Takes potassium citrate 3 times daily.    Patient reporting no pain upon evaluation.    Plan:  - See plan under \" hydronephrosis due to obstruction of ureter\"  - Continue potassium citrate 10 mEq 3 times daily  - Scheduled Tylenol 975 mg every 6 hours, as needed Toradol 50 mg every 6 hours, as needed oxy 5 mg every 4 hours, and as needed Pyridium 200 mg 3 times daily for bladder spasm and dysuria  - Continue Flomax 0.4 mg daily    CKD (chronic kidney disease) stage 2, GFR 60-89 ml/min  Lab Results   Component Value Date    EGFR 67 09/16/2024    EGFR 67 09/15/2024    EGFR 75 04/19/2024    CREATININE 0.94 09/16/2024    CREATININE 0.94 09/15/2024    CREATININE 0.85 04/19/2024     Creatinine baseline 0.7-1.0  Creatinine this morning 0.94    Plan:  - Continue to monitor renal function  - Avoid hypotension  - Avoid nephrotoxic agents    UTI (urinary tract infection)  Afebrile upon arrival with /69, HR 81, RR 20, SpO2 98 RA.   Urine dipstick remarkable for small blood, large leukocyte, and 1+ urine protein.  UA showed microscopic hematuria (4-10 RBCs), pyuria (30-50 WBC) , and bacteriuria (moderate bacteria).  Given history of multiple renal calculi in the past and left ureteral stent placement in April with removal in May, UTI this visit considered complicated UTI.     Patient denied materia, dysuria, and suprapubic tenderness.    History of UTI with growth of pan-sensitive Pseudomonas aeruginosa in April 2024 in November 2021; history of urine culture with ESBL which is susceptible to ertapenem, gentamicin, nitrofurantoin, Zosyn, Bactrim, tetracycline, and Augmentin in November 2019    S/p 1 dose 2 g cefepime in the ED    WBC downtrending from 13.87 to 8.07    Plan:  -Continue cefepime 2 g BID  -F/u on urine culture and narrow antibiotics based on urine culture susceptibility  -Continue to trend WBC  Hyperlipidemia  Lipid panel 4/19/24: , , HDL 34, LDL " "42  Takes Lipitor 80 mg daily    Plan:  - Continue Lipitor 80 mg daily  Depression  Takes Zoloft 50 mg daily. Continue Zoloft while inpatient.    Disposition: Urologic procedure scheduled this morning.  Pending clearance from urology.  Potential discharge home in 24 to 48 hours.    Team: SOD TEAM C    History of Present Illness   Patient seen and examined. No acute events overnight.  Patient is breathing comfortably on room air.  Patient states that she is she has no pain since she got the ketorolac in the ED. she is aware of the urologic procedure this morning.  Denies shortness of breath, chest pain or chest pressure, palpitation, abdominal pain, nausea, vomiting, diarrhea, and urinary symptoms.  No other concerns voiced at this time.    Objective     Vitals:    09/15/24 1911 09/15/24 2133 24 0556 24 0557   BP: 118/72 115/59     BP Location:       Pulse: 78 64     Resp: 18 16     Temp: 97.8 °F (36.6 °C) 98.2 °F (36.8 °C)     TempSrc:       SpO2: 96% 96%     Weight:   70.4 kg (155 lb 3.3 oz)    Height:    5' 3\" (1.6 m)      Temperature:   Temp (24hrs), Av.7 °F (36.5 °C), Min:97 °F (36.1 °C), Max:98.2 °F (36.8 °C)    Temperature: 98.2 °F (36.8 °C)  Intake & Output:  I/O          0701  09/15 0700 09/15 0701   0700  0701   0700    P.O.  480     IV Piggyback  50     Total Intake(mL/kg)  530 (7.5)     Urine (mL/kg/hr)  200     Total Output  200     Net  +330                  Weights:   IBW (Ideal Body Weight): 52.4 kg    Body mass index is 27.49 kg/m².  Weight (last 2 days)       Date/Time Weight    24 0556 70.4 (155.2)          Physical Exam  Constitutional:       Appearance: Normal appearance.   HENT:      Head: Normocephalic and atraumatic.      Mouth/Throat:      Mouth: Mucous membranes are moist.      Pharynx: No oropharyngeal exudate or posterior oropharyngeal erythema.   Eyes:      General: No scleral icterus.     Extraocular Movements: Extraocular movements intact.      " "Conjunctiva/sclera: Conjunctivae normal.   Neck:      Vascular: No carotid bruit.   Cardiovascular:      Rate and Rhythm: Normal rate and regular rhythm.      Pulses: Normal pulses.      Heart sounds: Normal heart sounds. No murmur heard.  Pulmonary:      Effort: Pulmonary effort is normal. No respiratory distress.      Breath sounds: Normal breath sounds. No wheezing.   Abdominal:      General: Bowel sounds are normal. There is no distension.      Palpations: Abdomen is soft.      Tenderness: There is no abdominal tenderness. There is left CVA tenderness (pressure/mild pain).   Musculoskeletal:         General: No swelling.      Cervical back: Neck supple.      Right lower leg: No edema.      Left lower leg: No edema.   Skin:     General: Skin is warm and dry.      Capillary Refill: Capillary refill takes less than 2 seconds.      Findings: No bruising or rash.   Neurological:      Mental Status: She is alert and oriented to person, place, and time.           Lab Results: I have reviewed the following results: CBC/BMP:   .     09/15/24  0836 09/16/24  0540   WBC 13.87*  --    HGB 14.9  --    HCT 44.7  --      --    SODIUM 136 139   K 5.2 4.3    105   CO2 27 27   BUN 21 30*   CREATININE 0.94 0.94   GLUC 171* 108    , Creatinine Clearance: Estimated Creatinine Clearance: 61.4 mL/min (by C-G formula based on SCr of 0.94 mg/dL)., Lipid Profile:   , Urinalysis:   Lab Results   Component Value Date    COLORU Yellow 09/15/2024    CLARITYU Turbid 09/15/2024    SPECGRAV 1.016 09/15/2024    PHUR 6.0 09/15/2024    LEUKOCYTESUR Large (A) 09/15/2024    NITRITE Negative 09/15/2024    GLUCOSEU Negative 09/15/2024    KETONESU Negative 09/15/2024    BILIRUBINUR Negative 09/15/2024    BLOODU Small (A) 09/15/2024   , Urine Culture: No results found for: \"URINECX\"  Recent Labs     09/15/24  0836 09/16/24  0540   WBC 13.87*  --    HGB 14.9  --    HCT 44.7  --      --    SODIUM 136 139   K 5.2 4.3    105   CO2 " 27 27   BUN 21 30*   CREATININE 0.94 0.94   GLUC 171* 108   AST 31  --    ALT 30  --    ALB 4.2  --    TBILI 0.48  --    ALKPHOS 95  --      Imaging Review: Reviewed radiology reports from this admission including: CT renal sutdy A/P.  Other Studies: No additional pertinent studies reviewed.    Currently Ordered Meds:   Current Facility-Administered Medications:     acetaminophen (TYLENOL) tablet 650 mg, Q6H PRN    ascorbic acid (VITAMIN C) tablet 2,000 mg, Daily    atorvastatin (LIPITOR) tablet 80 mg, Daily    cefepime (MAXIPIME) 2 g/50 mL dextrose IVPB, Q12H, Last Rate: 2,000 mg (09/15/24 0435)    Cholecalciferol (VITAMIN D3) tablet 2,000 Units, Daily    enoxaparin (LOVENOX) subcutaneous injection 40 mg, Daily    insulin lispro (HumALOG/ADMELOG) 100 units/mL subcutaneous injection 1-5 Units, TID AC **AND** Fingerstick Glucose (POCT), TID AC    lisinopril (ZESTRIL) tablet 5 mg, Daily    multi-electrolyte (PLASMALYTE-A/ISOLYTE-S PH 7.4) IV solution, Continuous, Last Rate: 75 mL/hr (09/16/24 0156)    pantoprazole (PROTONIX) EC tablet 40 mg, Early Morning    potassium citrate (UROCIT-K) CR tablet 10 mEq, TID With Meals    sertraline (ZOLOFT) tablet 50 mg, Daily    tamsulosin (FLOMAX) capsule 0.4 mg, Daily With Dinner  VTE Pharmacologic Prophylaxis: Enoxaparin (Lovenox)  VTE Mechanical Prophylaxis: sequential compression device    Administrative Statements   I have spent a total time of 30 minutes in caring for this patient on the day of the visit/encounter including Diagnostic results, Documenting in the medical record, Reviewing / ordering tests, medicine, procedures  , Obtaining or reviewing history  , and Communicating with other healthcare professionals .  Portions of the record may have been created with voice recognition software.

## 2024-09-16 NOTE — CONSULTS
Inpatient consult to Urology  Consult performed by: Velma Fabian PA-C  Consult ordered by: Jessee Santamaria, DO      : UROLOGY  Sakshi Tirado 58 y.o. female [id number]   Unit/Bed #: [facility] 914-01  Encounter: [id number]        Assessment  & Plan  :  Left ureteral calculus:  -CT scan revealing 5 mm obstructing stone in the distal left ureter with moderate hydronephrosis  -No TOMMY   -Leukocytosis of 13  -Afebrile hemodynamically stable  -UA questionable for infection but not grossly positive, 4-10 RBCs 30-50 WBCs occasional epithelial cells and moderate bacteria nitrate negative urine.  Has history of ESBL back in 2019 all cultures since that time growing Pseudomonas or lactobacillus.  -Discussed with patient cystoscopy ureteroscopy, laser lithotripsy basket traction ureteral stent insertion on the left side.  Discussed risk of procedure including bleeding, infection, damage to pressure such as kidney ureter bladder and need for additional stone procedures in the setting of infection and impaction.    Subjective :    Sakshi Tirado  is a 58 y.o. female past medical history of type 2 diabetes hypertension, CKD stage III, nephrolithiasis anxiety, GERD presenting to the ED due to left-sided flank pain which began yesterday described as constant and sharp.  Not associate with nausea vomiting fevers or chills.  Pain similar to prior kidney stones.  CT scan upon evaluation in the ED revealed 5 mm structuring stone in the distal left ureter with moderate hydronephrosis.  Creatinine at baseline mild leukocytosis of 13.  UA with 4-10 RBCs 30-50 WBCs occasional epithelial cells and moderate bacteria, nitrate negative urine.  Has a history of ESBL from 2019 all additional cultures since that time have grown Pseudomonas.  Upon evaluation patient is sitting comfortably in bed no acute distress.  She reports that she currently does not have any pain.  She reports that her pain began yesterday and continued to persist with prompted  "her to come to the emergency room.  She reports that she has a prior history of sepsis with stones and typically does not have pain with her stones therefore she wanted to come and be evaluated prior to getting sicker.  He currently denies any nausea vomiting fevers or chills.  Denies any of these at home as well        Allergies   Allergen Reactions    Oxybutynin Anaphylaxis     \"feels like throat closing and can't swallow\"    Clonazepam Other (See Comments)     Pt states \"didn't feel right on it.\"    Morphine Other (See Comments) and Confusion     Annotation - [id number]: \"dopey\"  Gets silly    Venlafaxine Other (See Comments)     Unknown--pt states \" MD was trying pt on different medications. Pt unsure of reaction.\"      Current Outpatient Medications   Medication Instructions    acetaminophen (TYLENOL) 500 mg, Oral, Every 6 hours PRN    atorvastatin (LIPITOR) 80 mg, Oral, Daily    lisinopril (ZESTRIL) 5 mg, Oral, Daily    metFORMIN (GLUCOPHAGE) 1,000 mg, Oral, 2 times daily with meals    multivitamin (THERAGRAN) TABS 1 tablet, Oral, Daily    omeprazole (PRILOSEC) 40 mg, Oral, Daily    oxyCODONE (OXY-IR) 5 mg, Oral, Every 6 hours PRN    potassium citrate (UROCIT-K) 10 mEq 10 mEq, Oral, 3 times daily with meals    sertraline (ZOLOFT) 50 mg, Oral, Daily, Takes in the am.    vitamin C 2,000 mg, Oral, Daily    Vitamin D3 2,000 Units, Oral, Daily      Past Medical History:   Diagnosis Date    Anxiety     Asthma     Colon polyp     Depression     Diabetes mellitus (HCC)     Diverticulitis     Diverticulitis of colon     Follicular cyst of ovary 12/23/2014    Former smoker     GERD (gastroesophageal reflux disease)     Glycosuria     Headache     Hyperlipidemia     Hypertension     Kidney calculi 1/11/2019    Kidney stone     Obesity     Ovarian cyst     Overactive bladder     Overweight     Pulmonary nodule     Renal calculus     Seasonal allergies     Tinea pedis of left foot 6/8/2020    Vertigo     Wears partial " dentures     Lower plate -partial     Past Surgical History:   Procedure Laterality Date    APPENDECTOMY      BLADDER SUSPENSION      [facility]    CERVICAL BIOPSY  W/ LOOP ELECTRODE EXCISION      COLONOSCOPY      DILATION AND CURETTAGE OF UTERUS      FL RETROGRADE PYELOGRAM  7/26/2021    FL RETROGRADE PYELOGRAM  11/18/2021    FL RETROGRADE PYELOGRAM  11/10/2022    FL RETROGRADE PYELOGRAM  1/17/2023    FL RETROGRADE PYELOGRAM  4/14/2024    FL RETROGRADE PYELOGRAM  5/3/2024    HYSTERECTOMY  2007    ovaries present bilaterally    JOINT REPLACEMENT Right 05/2021    right knee    MS ARTHRP KNE CONDYLE&PLATU MEDIAL&LAT COMPARTMENTS Right 5/19/2021    Procedure: ARTHROPLASTY KNEE TOTAL;  Surgeon: Bon Broderick MD;  [facility];  Service: Orthopedics    MS COLONOSCOPY FLX DX W/COLLJ SPEC WHEN PFRMD N/A 9/26/2017    Procedure: EGD AND COLONOSCOPY;  Surgeon: Reed Hardy MD;  Location: [facility];  Service: Gastroenterology    MS CYSTO BLADDER W/URETERAL CATHETERIZATION Right 11/10/2022    Procedure: CYSTOSCOPY RETROGRADE PYELOGRAM WITH INSERTION STENT URETERAL;  Surgeon: Burton Senior MD;  [facility];  Service: Urology    MS CYSTO/URETERO W/LITHOTRIPSY &INDWELL STENT INSRT Bilateral 7/26/2021    Procedure: CYSTOSCOPY URETEROSCOPY WITH LITHOTRIPSY HOLMIUM LASER, RETROGRADE PYELOGRAM AND INSERTION STENT URETERAL;  Surgeon: Pete Lopez MD;  [facility];  Service: Urology    MS CYSTO/URETERO W/LITHOTRIPSY &INDWELL STENT INSRT Left 9/16/2021    Procedure: CYSTOSCOPY URETEROSCOPY WITH LITHOTRIPSY HOLMIUM LASER, RETROGRADE PYELOGRAM AND INSERTION STENT URETERAL;  Surgeon: Cesar Keller MD;  [facility];  Service: Urology    MS CYSTO/URETERO W/LITHOTRIPSY &INDWELL STENT INSRT Left 11/18/2021    Procedure: CYSTOSCOPY URETEROSCOPY WITH LITHOTRIPSY HOLMIUM LASER, RETROGRADE PYELOGRAM AND INSERTION STENT URETERAL;  Surgeon: Vicente Colby MD;  [facility];  Service:  Urology    WA CYSTO/URETERO W/LITHOTRIPSY &INDWELL STENT INSRT Right 1/17/2023    Procedure: CYSTOSCOPY URETEROSCOPY WITH LITHOTRIPSY HOLMIUM LASER, RETROGRADE PYELOGRAM AND EXCHANGE STENT URETERAL, BASKET STONE EXTRACTION;  Surgeon: Vicente Colby MD;  [facility];  Service: Urology    WA CYSTO/URETERO W/LITHOTRIPSY &INDWELL STENT INSRT Left 4/14/2024    Procedure: CYSTOSCOPY URETEROSCOPY WITH LITHOTRIPSY HOLMIUM LASER, RETROGRADE PYELOGRAM AND INSERTION STENT URETERAL;  Surgeon: Burton Senior MD;  [facility];  Service: Urology    WA CYSTO/URETERO W/LITHOTRIPSY &INDWELL STENT INSRT Left 5/3/2024    Procedure: CYSTOSCOPY URETEROSCOPY WITH LITHOTRIPSY HOLMIUM LASER,BASKET STONE EXTRACTION , RETROGRADE PYELOGRAM AND INSERTION STENT URETERAL;  Surgeon: Burton Senior MD;  [facility];  Service: Urology    WA NEUROPLASTY &/TRANSPOS MEDIAN NRV CARPAL TUNNE Right 5/29/2018    Procedure: CARPAL TUNNEL RELEASE;  Surgeon: Amish Parkinson DO;  [facility];  Service: Orthopedics    REMOVAL URETERAL STENT Right 9/16/2021    Procedure: REMOVAL STENT URETERAL;  Surgeon: Cesar Keller MD;  [facility];  Service: Urology    TONSILLECTOMY      TUBAL LIGATION      URETEROSCOPY  7/26/2021    Procedure: URETEROSCOPY, LASER AND BASKET STONE EXTRACTION;  Surgeon: Pete Lopez MD;  [facility];  Service: Urology    WISDOM TOOTH EXTRACTION       Family History   Problem Relation Age of Onset    Diabetes type II Mother     Asthma Mother     Stroke Father     Pancreatic cancer Father 73    Diabetes type II Father     No Known Problems Sister     No Known Problems Sister     No Known Problems Sister     No Known Problems Sister     No Known Problems Maternal Grandmother     No Known Problems Maternal Grandfather     No Known Problems Paternal Grandmother     No Known Problems Paternal Grandfather     No Known Problems Brother     No Known Problems Son     No Known Problems Son      No Known Problems Maternal Aunt     No Known Problems Paternal Aunt     Breast cancer Neg Hx      Social History     Socioeconomic History    Marital status: /Civil Union     Spouse name: None    Number of children: None    Years of education: None    Highest education level: None   Occupational History    None   Tobacco Use    Smoking status: Former     Current packs/day: 0.00     Average packs/day: 0.3 packs/day for 34.2 years (8.6 ttl pk-yrs)     Types: Cigarettes     Start date:      Quit date: 3/16/2018     Years since quittin.5    Smokeless tobacco: Never    Tobacco comments:     approx less than  half a pack   Vaping Use    Vaping status: Never Used   Substance and Sexual Activity    Alcohol use: Not Currently     Comment: socially    Drug use: Never     Comment: Denies    Sexual activity: Not Currently     Partners: Male   Other Topics Concern    None   Social History Narrative    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      Social Determinants of Health     Financial Resource Strain: Not on file   Food Insecurity: No Food Insecurity (9/15/2024)    Hunger Vital Sign     Worried About Running Out of Food in the Last Year: Never true     Ran Out of Food in the Last Year: Never true   Transportation Needs: No Transportation Needs (9/15/2024)    PRAPARE - Transportation     Lack of Transportation (Medical): No     Lack of Transportation (Non-Medical): No   Physical Activity: Not on file   Stress: Not on file   Social Connections: Not on file   Intimate Partner Violence: Not on file   Housing Stability: Low Risk  (9/15/2024)    Housing Stability Vital Sign     Unable to Pay for Housing in the Last Year: No     Number of Times Moved in the Last Year: 0     Homeless in the Last Year: No        Review of Systems     Objective     Physical Exam  Constitutional:       General: She is not in acute distress.     Appearance: She is normal weight. She is not ill-appearing, toxic-appearing or diaphoretic.   HENT:      Right Ear:  External ear normal.      Left Ear: External ear normal.      Nose: Nose normal.      Mouth/Throat:      Pharynx: Oropharynx is clear.   Eyes:      General: No scleral icterus.     Conjunctiva/sclera: Conjunctivae normal.   Cardiovascular:      Rate and Rhythm: Normal rate and regular rhythm.      Heart sounds: No murmur heard.     No friction rub. No gallop.   Pulmonary:      Effort: Pulmonary effort is normal. No respiratory distress.      Breath sounds: No wheezing, rhonchi or rales.      Comments: Decreased breath sounds in lower lobes.  Abdominal:      General: Bowel sounds are normal. There is no distension.      Tenderness: There is no abdominal tenderness.   Musculoskeletal:      Cervical back: Normal range of motion.   Neurological:      Mental Status: She is alert.                Imaging:  CT ABDOMEN AND PELVIS WITHOUT IV CONTRAST - LOW DOSE RENAL STONE     INDICATION: left CVA tenderness, h/o stones.     COMPARISON: CT abdomen pelvis 7/25/2024     TECHNIQUE: Low radiation dose thin section CT examination of the abdomen and pelvis was performed without intravenous or oral contrast according to a protocol specifically designed to evaluate for urinary tract calculus. Axial, sagittal, and coronal 2D   reformatted images were created from the source data and submitted for interpretation. Evaluation for pathology in the abdomen and pelvis that is unrelated to urinary tract calculi is limited.     Radiation dose length product (DLP) for this visit: 462.72 mGy-cm . This examination, like all CT scans performed in the [facility], was performed utilizing techniques to minimize radiation dose exposure, including the use of iterative   reconstruction and automated exposure control.     URINARY TRACT FINDINGS:     RIGHT KIDNEY AND URETER: Multiple nonobstructing right renal calculi measuring up to 7 mm at the midpole right kidney. No obstructive uropathy.     LEFT KIDNEY AND URETER: Moderate  left-sided hydroureteronephrosis secondary to a 5 mm obstructing calculus in the distal left ureter just proximal to the left ureterovesical junction. There are scattered nonobstructing 3 mm left renal calculi.     URINARY BLADDER: Unremarkable.        ADDITIONAL FINDINGS:     LOWER CHEST: No clinically significant abnormality in the visualized lower chest.     SOLID VISCERA: Limited low radiation dose noncontrast CT evaluation demonstrates no clinically significant abnormality of the imaged portions of the liver, spleen, pancreas, or adrenal glands.     GALLBLADDER/BILIARY TREE: No calcified gallstones. No pericholecystic inflammatory change. No biliary dilation.     STOMACH AND BOWEL: Colonic diverticulosis without findings of acute diverticulitis.     APPENDIX: Status post appendectomy     ABDOMINOPELVIC CAVITY: No ascites. No pneumoperitoneum. No lymphadenopathy.     VESSELS: Atherosclerosis without abdominal aortic aneurysm.     REPRODUCTIVE ORGANS: Unremarkable for patient's age.     ABDOMINAL WALL/INGUINAL REGIONS: Unremarkable.     BONES: No acute fracture or suspicious osseous lesion.     IMPRESSION:     1. Moderate left-sided hydroureteronephrosis secondary to a 5 mm obstructing calculus in the distal left ureter.  2. Nonobstructing bilateral renal calculi.    Labs:  Lab Results   Component Value Date    SODIUM 136 09/15/2024    K 5.2 09/15/2024     09/15/2024    CO2 27 09/15/2024    BUN 21 09/15/2024    CREATININE 0.94 09/15/2024    GLUC 171 (H) 09/15/2024    CALCIUM 9.3 09/15/2024         Lab Results   Component Value Date    WBC 13.87 (H) 09/15/2024    HGB 14.9 09/15/2024    HCT 44.7 09/15/2024    MCV 93 09/15/2024     09/15/2024         VTE Pharmacologic Prophylaxis: Enoxaparin (Lovenox)  VTE Mechanical Prophylaxis: sequential compression device     Velma Fabian PA-C

## 2024-09-16 NOTE — ASSESSMENT & PLAN NOTE
Lab Results   Component Value Date    HGBA1C 6.4 (H) 09/15/2024       Recent Labs     09/15/24  1532 09/15/24  1718   POCGLU 164* 238*       Blood Sugar Average: Last 72 hrs:  (P) 201  A1c 4/19/24 6.5  A1c 9/16/2024 6.4    Home medication includes metformin 1000 mg twice daily    Glucose this morning 108    Plan:  - Hold metformin while inpatient  - Diabetic diet  - Initiate SSI algorithm 2  - Hypoglycemia protocol  - Goal glucose 140-180

## 2024-09-16 NOTE — DISCHARGE INSTR - AVS FIRST PAGE
Ureteral stents are placed at the time of surgery to help keep the ureter opened and draining and allow it to heal.   They do need to be removed or changed at intervals  to prevent future kidney damage.  It is normal to have bladder irritability from the stent.  The symptoms include urgency and frequency.   Blood in the urine is also common.  Urinary bleeding can come and go and is generally of no significance.  It is also very normal to have back pain on the side of the stent when you urinate.  Please call the office for fever, heavy bleeding with clots and difficulty voiding, difficulty voiding and severe pain that is not relieved by pain medication prescribed.

## 2024-09-16 NOTE — PLAN OF CARE
Problem: PAIN - ADULT  Goal: Verbalizes/displays adequate comfort level or baseline comfort level  Description: Interventions:  - Encourage patient to monitor pain and request assistance  - Assess pain using appropriate pain scale  - Administer analgesics based on type and severity of pain and evaluate response  - Implement non-pharmacological measures as appropriate and evaluate response  - Consider cultural and social influences on pain and pain management  - Notify physician/advanced practitioner if interventions unsuccessful or patient reports new pain  Outcome: Progressing     Problem: INFECTION - ADULT  Goal: Absence or prevention of progression during hospitalization  Description: INTERVENTIONS:  - Assess and monitor for signs and symptoms of infection  - Monitor lab/diagnostic results  - Monitor all insertion sites, i.e. indwelling lines, tubes, and drains  - Monitor endotracheal if appropriate and nasal secretions for changes in amount and color  - [place] appropriate cooling/warming therapies per order  - Administer medications as ordered  - Instruct and encourage patient and family to use good hand hygiene technique  - Identify and instruct in appropriate isolation precautions for identified infection/condition  Outcome: Progressing  Goal: Absence of fever/infection during neutropenic period  Description: INTERVENTIONS:  - Monitor WBC    Outcome: Progressing     Problem: SAFETY ADULT  Goal: Patient will remain free of falls  Description: INTERVENTIONS:  - Educate patient/family on patient safety including physical limitations  - Instruct patient to call for assistance with activity   - Consult OT/PT to assist with strengthening/mobility   - Keep Call bell within reach  - Keep bed low and locked with side rails adjusted as appropriate  - Keep care items and personal belongings within reach  - Initiate and maintain comfort rounds  - Make Fall Risk Sign visible to staff  - Offer Toileting every 2 Hours,  in advance of need  - Initiate/Maintain alarm  - Obtain necessary fall risk management equipment  - Apply yellow socks and bracelet for high fall risk patients  - Consider moving patient to room near nurses station  Outcome: Progressing  Goal: Maintain or return to baseline ADL function  Description: INTERVENTIONS:  -  Assess patient's ability to carry out ADLs; assess patient's baseline for ADL function and identify physical deficits which impact ability to perform ADLs (bathing, care of mouth/teeth, toileting, grooming, dressing, etc.)  - Assess/evaluate cause of self-care deficits   - Assess range of motion  - Assess patient's mobility; develop plan if impaired  - Assess patient's need for assistive devices and provide as appropriate  - Encourage maximum independence but intervene and supervise when necessary  - Involve family in performance of ADLs  - Assess for home care needs following discharge   - Consider OT consult to assist with ADL evaluation and planning for discharge  - Provide patient education as appropriate  Outcome: Progressing  Goal: Maintains/Returns to pre admission functional level  Description: INTERVENTIONS:  - Perform AM-PAC 6 Click Basic Mobility/ Daily Activity assessment daily.  - Set and communicate daily mobility goal to care team and patient/family/caregiver.   - Collaborate with rehabilitation services on mobility goals if consulted  - Perform Range of Motion 3 times a day.  - Reposition patient every 2 hours.  - Dangle patient 3 times a day  - Stand patient 3 times a day  - Ambulate patient 3 times a day  - Out of bed to chair 3 times a day   - Out of bed for meals 3 times a day  - Out of bed for toileting  - Record patient progress and toleration of activity level   Outcome: Progressing     Problem: DISCHARGE PLANNING  Goal: Discharge to home or other facility with appropriate resources  Description: INTERVENTIONS:  - Identify barriers to discharge w/patient and caregiver  - Arrange  for needed discharge resources and transportation as appropriate  - Identify discharge learning needs (meds, wound care, etc.)  - Arrange for interpretive services to assist at discharge as needed  - Refer to Case Management Department for coordinating discharge planning if the patient needs post-hospital services based on physician/advanced practitioner order or complex needs related to functional status, cognitive ability, or social support system  Outcome: Progressing     Problem: Knowledge Deficit  Goal: Patient/family/caregiver demonstrates understanding of disease process, treatment plan, medications, and discharge instructions  Description: Complete learning assessment and assess knowledge base.  Interventions:  - Provide teaching at level of understanding  - Provide teaching via preferred learning methods  Outcome: Progressing     Problem: GENITOURINARY - ADULT  Goal: Maintains or returns to baseline urinary function  Description: INTERVENTIONS:  - Assess urinary function  - Encourage oral fluids to ensure adequate hydration if ordered  - Administer IV fluids as ordered to ensure adequate hydration  - Administer ordered medications as needed  - Offer frequent toileting  - Follow urinary retention protocol if ordered  Outcome: Progressing  Goal: Absence of urinary retention  Description: INTERVENTIONS:  - Assess patient’s ability to void and empty bladder  - Monitor I/O  - Bladder scan as needed  - Discuss with physician/AP medications to alleviate retention as needed  - Discuss catheterization for long term situations as appropriate  Outcome: Progressing  Goal: Urinary catheter remains patent  Description: INTERVENTIONS:  - Assess patency of urinary catheter  - If patient has a chronic mckeon, consider changing catheter if non-functioning  - Follow guidelines for intermittent irrigation of non-functioning urinary catheter  Outcome: Progressing

## 2024-09-16 NOTE — ASSESSMENT & PLAN NOTE
Afebrile upon arrival with /69, HR 81, RR 20, SpO2 98 RA.   Urine dipstick remarkable for small blood, large leukocyte, and 1+ urine protein.  UA showed microscopic hematuria (4-10 RBCs), pyuria (30-50 WBC) , and bacteriuria (moderate bacteria).  Given history of multiple renal calculi in the past and left ureteral stent placement in April with removal in May, UTI this visit considered complicated UTI.     Patient denied materia, dysuria, and suprapubic tenderness.    History of UTI with growth of pan-sensitive Pseudomonas aeruginosa in April 2024 in November 2021; history of urine culture with ESBL which is susceptible to ertapenem, gentamicin, nitrofurantoin, Zosyn, Bactrim, tetracycline, and Augmentin in November 2019    S/p 1 dose 2 g cefepime in the ED    WBC downtrending from 13.87 to 8.07    Plan:  -Continue cefepime 2 g BID  -F/u on urine culture and narrow antibiotics based on urine culture susceptibility  -Continue to trend WBC

## 2024-09-16 NOTE — ANESTHESIA POSTPROCEDURE EVALUATION
Post-Op Assessment Note    CV Status:  Stable  Pain Score: 0    Pain management: adequate       Mental Status:  Alert and awake   Hydration Status:  Euvolemic   PONV Controlled:  Controlled   Airway Patency:  Patent     Post Op Vitals Reviewed: Yes    No anethesia notable event occurred.    Staff: CRNA               BP   136/87   Temp 98   Pulse 75   Resp 12   SpO2 100

## 2024-09-16 NOTE — H&P (VIEW-ONLY)
Inpatient consult to Urology  Consult performed by: Velma Fabian PA-C  Consult ordered by: Jessee Santamaria, DO      : UROLOGY  Sakshi Tirado 58 y.o. female [id number]   Unit/Bed #: [facility] 914-01  Encounter: [id number]        Assessment  & Plan  :  Left ureteral calculus:  -CT scan revealing 5 mm obstructing stone in the distal left ureter with moderate hydronephrosis  -No TOMMY   -Leukocytosis of 13  -Afebrile hemodynamically stable  -UA questionable for infection but not grossly positive, 4-10 RBCs 30-50 WBCs occasional epithelial cells and moderate bacteria nitrate negative urine.  Has history of ESBL back in 2019 all cultures since that time growing Pseudomonas or lactobacillus.  -Discussed with patient cystoscopy ureteroscopy, laser lithotripsy basket traction ureteral stent insertion on the left side.  Discussed risk of procedure including bleeding, infection, damage to pressure such as kidney ureter bladder and need for additional stone procedures in the setting of infection and impaction.    Subjective :    Sakshi Tirado  is a 58 y.o. female past medical history of type 2 diabetes hypertension, CKD stage III, nephrolithiasis anxiety, GERD presenting to the ED due to left-sided flank pain which began yesterday described as constant and sharp.  Not associate with nausea vomiting fevers or chills.  Pain similar to prior kidney stones.  CT scan upon evaluation in the ED revealed 5 mm structuring stone in the distal left ureter with moderate hydronephrosis.  Creatinine at baseline mild leukocytosis of 13.  UA with 4-10 RBCs 30-50 WBCs occasional epithelial cells and moderate bacteria, nitrate negative urine.  Has a history of ESBL from 2019 all additional cultures since that time have grown Pseudomonas.  Upon evaluation patient is sitting comfortably in bed no acute distress.  She reports that she currently does not have any pain.  She reports that her pain began yesterday and continued to persist with prompted  "her to come to the emergency room.  She reports that she has a prior history of sepsis with stones and typically does not have pain with her stones therefore she wanted to come and be evaluated prior to getting sicker.  He currently denies any nausea vomiting fevers or chills.  Denies any of these at home as well        Allergies   Allergen Reactions    Oxybutynin Anaphylaxis     \"feels like throat closing and can't swallow\"    Clonazepam Other (See Comments)     Pt states \"didn't feel right on it.\"    Morphine Other (See Comments) and Confusion     Annotation - [id number]: \"dopey\"  Gets silly    Venlafaxine Other (See Comments)     Unknown--pt states \" MD was trying pt on different medications. Pt unsure of reaction.\"      Current Outpatient Medications   Medication Instructions    acetaminophen (TYLENOL) 500 mg, Oral, Every 6 hours PRN    atorvastatin (LIPITOR) 80 mg, Oral, Daily    lisinopril (ZESTRIL) 5 mg, Oral, Daily    metFORMIN (GLUCOPHAGE) 1,000 mg, Oral, 2 times daily with meals    multivitamin (THERAGRAN) TABS 1 tablet, Oral, Daily    omeprazole (PRILOSEC) 40 mg, Oral, Daily    oxyCODONE (OXY-IR) 5 mg, Oral, Every 6 hours PRN    potassium citrate (UROCIT-K) 10 mEq 10 mEq, Oral, 3 times daily with meals    sertraline (ZOLOFT) 50 mg, Oral, Daily, Takes in the am.    vitamin C 2,000 mg, Oral, Daily    Vitamin D3 2,000 Units, Oral, Daily      Past Medical History:   Diagnosis Date    Anxiety     Asthma     Colon polyp     Depression     Diabetes mellitus (HCC)     Diverticulitis     Diverticulitis of colon     Follicular cyst of ovary 12/23/2014    Former smoker     GERD (gastroesophageal reflux disease)     Glycosuria     Headache     Hyperlipidemia     Hypertension     Kidney calculi 1/11/2019    Kidney stone     Obesity     Ovarian cyst     Overactive bladder     Overweight     Pulmonary nodule     Renal calculus     Seasonal allergies     Tinea pedis of left foot 6/8/2020    Vertigo     Wears partial " dentures     Lower plate -partial     Past Surgical History:   Procedure Laterality Date    APPENDECTOMY      BLADDER SUSPENSION      [facility]    CERVICAL BIOPSY  W/ LOOP ELECTRODE EXCISION      COLONOSCOPY      DILATION AND CURETTAGE OF UTERUS      FL RETROGRADE PYELOGRAM  7/26/2021    FL RETROGRADE PYELOGRAM  11/18/2021    FL RETROGRADE PYELOGRAM  11/10/2022    FL RETROGRADE PYELOGRAM  1/17/2023    FL RETROGRADE PYELOGRAM  4/14/2024    FL RETROGRADE PYELOGRAM  5/3/2024    HYSTERECTOMY  2007    ovaries present bilaterally    JOINT REPLACEMENT Right 05/2021    right knee    HI ARTHRP KNE CONDYLE&PLATU MEDIAL&LAT COMPARTMENTS Right 5/19/2021    Procedure: ARTHROPLASTY KNEE TOTAL;  Surgeon: Bon Broderick MD;  [facility];  Service: Orthopedics    HI COLONOSCOPY FLX DX W/COLLJ SPEC WHEN PFRMD N/A 9/26/2017    Procedure: EGD AND COLONOSCOPY;  Surgeon: Reed Hardy MD;  Location: [facility];  Service: Gastroenterology    HI CYSTO BLADDER W/URETERAL CATHETERIZATION Right 11/10/2022    Procedure: CYSTOSCOPY RETROGRADE PYELOGRAM WITH INSERTION STENT URETERAL;  Surgeon: Burton Senior MD;  [facility];  Service: Urology    HI CYSTO/URETERO W/LITHOTRIPSY &INDWELL STENT INSRT Bilateral 7/26/2021    Procedure: CYSTOSCOPY URETEROSCOPY WITH LITHOTRIPSY HOLMIUM LASER, RETROGRADE PYELOGRAM AND INSERTION STENT URETERAL;  Surgeon: Pete Lopez MD;  [facility];  Service: Urology    HI CYSTO/URETERO W/LITHOTRIPSY &INDWELL STENT INSRT Left 9/16/2021    Procedure: CYSTOSCOPY URETEROSCOPY WITH LITHOTRIPSY HOLMIUM LASER, RETROGRADE PYELOGRAM AND INSERTION STENT URETERAL;  Surgeon: Cesar Keller MD;  [facility];  Service: Urology    HI CYSTO/URETERO W/LITHOTRIPSY &INDWELL STENT INSRT Left 11/18/2021    Procedure: CYSTOSCOPY URETEROSCOPY WITH LITHOTRIPSY HOLMIUM LASER, RETROGRADE PYELOGRAM AND INSERTION STENT URETERAL;  Surgeon: Vicente Colby MD;  [facility];  Service:  Urology    VA CYSTO/URETERO W/LITHOTRIPSY &INDWELL STENT INSRT Right 1/17/2023    Procedure: CYSTOSCOPY URETEROSCOPY WITH LITHOTRIPSY HOLMIUM LASER, RETROGRADE PYELOGRAM AND EXCHANGE STENT URETERAL, BASKET STONE EXTRACTION;  Surgeon: Vicente Colby MD;  [facility];  Service: Urology    VA CYSTO/URETERO W/LITHOTRIPSY &INDWELL STENT INSRT Left 4/14/2024    Procedure: CYSTOSCOPY URETEROSCOPY WITH LITHOTRIPSY HOLMIUM LASER, RETROGRADE PYELOGRAM AND INSERTION STENT URETERAL;  Surgeon: Burton Senior MD;  [facility];  Service: Urology    VA CYSTO/URETERO W/LITHOTRIPSY &INDWELL STENT INSRT Left 5/3/2024    Procedure: CYSTOSCOPY URETEROSCOPY WITH LITHOTRIPSY HOLMIUM LASER,BASKET STONE EXTRACTION , RETROGRADE PYELOGRAM AND INSERTION STENT URETERAL;  Surgeon: Burton Senior MD;  [facility];  Service: Urology    VA NEUROPLASTY &/TRANSPOS MEDIAN NRV CARPAL TUNNE Right 5/29/2018    Procedure: CARPAL TUNNEL RELEASE;  Surgeon: Amish Parkinson DO;  [facility];  Service: Orthopedics    REMOVAL URETERAL STENT Right 9/16/2021    Procedure: REMOVAL STENT URETERAL;  Surgeon: Cesar Keller MD;  [facility];  Service: Urology    TONSILLECTOMY      TUBAL LIGATION      URETEROSCOPY  7/26/2021    Procedure: URETEROSCOPY, LASER AND BASKET STONE EXTRACTION;  Surgeon: Pete Lopez MD;  [facility];  Service: Urology    WISDOM TOOTH EXTRACTION       Family History   Problem Relation Age of Onset    Diabetes type II Mother     Asthma Mother     Stroke Father     Pancreatic cancer Father 73    Diabetes type II Father     No Known Problems Sister     No Known Problems Sister     No Known Problems Sister     No Known Problems Sister     No Known Problems Maternal Grandmother     No Known Problems Maternal Grandfather     No Known Problems Paternal Grandmother     No Known Problems Paternal Grandfather     No Known Problems Brother     No Known Problems Son     No Known Problems Son      No Known Problems Maternal Aunt     No Known Problems Paternal Aunt     Breast cancer Neg Hx      Social History     Socioeconomic History    Marital status: /Civil Union     Spouse name: None    Number of children: None    Years of education: None    Highest education level: None   Occupational History    None   Tobacco Use    Smoking status: Former     Current packs/day: 0.00     Average packs/day: 0.3 packs/day for 34.2 years (8.6 ttl pk-yrs)     Types: Cigarettes     Start date:      Quit date: 3/16/2018     Years since quittin.5    Smokeless tobacco: Never    Tobacco comments:     approx less than  half a pack   Vaping Use    Vaping status: Never Used   Substance and Sexual Activity    Alcohol use: Not Currently     Comment: socially    Drug use: Never     Comment: Denies    Sexual activity: Not Currently     Partners: Male   Other Topics Concern    None   Social History Narrative    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      Social Determinants of Health     Financial Resource Strain: Not on file   Food Insecurity: No Food Insecurity (9/15/2024)    Hunger Vital Sign     Worried About Running Out of Food in the Last Year: Never true     Ran Out of Food in the Last Year: Never true   Transportation Needs: No Transportation Needs (9/15/2024)    PRAPARE - Transportation     Lack of Transportation (Medical): No     Lack of Transportation (Non-Medical): No   Physical Activity: Not on file   Stress: Not on file   Social Connections: Not on file   Intimate Partner Violence: Not on file   Housing Stability: Low Risk  (9/15/2024)    Housing Stability Vital Sign     Unable to Pay for Housing in the Last Year: No     Number of Times Moved in the Last Year: 0     Homeless in the Last Year: No        Review of Systems     Objective     Physical Exam  Constitutional:       General: She is not in acute distress.     Appearance: She is normal weight. She is not ill-appearing, toxic-appearing or diaphoretic.   HENT:      Right Ear:  External ear normal.      Left Ear: External ear normal.      Nose: Nose normal.      Mouth/Throat:      Pharynx: Oropharynx is clear.   Eyes:      General: No scleral icterus.     Conjunctiva/sclera: Conjunctivae normal.   Cardiovascular:      Rate and Rhythm: Normal rate and regular rhythm.      Heart sounds: No murmur heard.     No friction rub. No gallop.   Pulmonary:      Effort: Pulmonary effort is normal. No respiratory distress.      Breath sounds: No wheezing, rhonchi or rales.      Comments: Decreased breath sounds in lower lobes.  Abdominal:      General: Bowel sounds are normal. There is no distension.      Tenderness: There is no abdominal tenderness.   Musculoskeletal:      Cervical back: Normal range of motion.   Neurological:      Mental Status: She is alert.                Imaging:  CT ABDOMEN AND PELVIS WITHOUT IV CONTRAST - LOW DOSE RENAL STONE     INDICATION: left CVA tenderness, h/o stones.     COMPARISON: CT abdomen pelvis 7/25/2024     TECHNIQUE: Low radiation dose thin section CT examination of the abdomen and pelvis was performed without intravenous or oral contrast according to a protocol specifically designed to evaluate for urinary tract calculus. Axial, sagittal, and coronal 2D   reformatted images were created from the source data and submitted for interpretation. Evaluation for pathology in the abdomen and pelvis that is unrelated to urinary tract calculi is limited.     Radiation dose length product (DLP) for this visit: 462.72 mGy-cm . This examination, like all CT scans performed in the [facility], was performed utilizing techniques to minimize radiation dose exposure, including the use of iterative   reconstruction and automated exposure control.     URINARY TRACT FINDINGS:     RIGHT KIDNEY AND URETER: Multiple nonobstructing right renal calculi measuring up to 7 mm at the midpole right kidney. No obstructive uropathy.     LEFT KIDNEY AND URETER: Moderate  left-sided hydroureteronephrosis secondary to a 5 mm obstructing calculus in the distal left ureter just proximal to the left ureterovesical junction. There are scattered nonobstructing 3 mm left renal calculi.     URINARY BLADDER: Unremarkable.        ADDITIONAL FINDINGS:     LOWER CHEST: No clinically significant abnormality in the visualized lower chest.     SOLID VISCERA: Limited low radiation dose noncontrast CT evaluation demonstrates no clinically significant abnormality of the imaged portions of the liver, spleen, pancreas, or adrenal glands.     GALLBLADDER/BILIARY TREE: No calcified gallstones. No pericholecystic inflammatory change. No biliary dilation.     STOMACH AND BOWEL: Colonic diverticulosis without findings of acute diverticulitis.     APPENDIX: Status post appendectomy     ABDOMINOPELVIC CAVITY: No ascites. No pneumoperitoneum. No lymphadenopathy.     VESSELS: Atherosclerosis without abdominal aortic aneurysm.     REPRODUCTIVE ORGANS: Unremarkable for patient's age.     ABDOMINAL WALL/INGUINAL REGIONS: Unremarkable.     BONES: No acute fracture or suspicious osseous lesion.     IMPRESSION:     1. Moderate left-sided hydroureteronephrosis secondary to a 5 mm obstructing calculus in the distal left ureter.  2. Nonobstructing bilateral renal calculi.    Labs:  Lab Results   Component Value Date    SODIUM 136 09/15/2024    K 5.2 09/15/2024     09/15/2024    CO2 27 09/15/2024    BUN 21 09/15/2024    CREATININE 0.94 09/15/2024    GLUC 171 (H) 09/15/2024    CALCIUM 9.3 09/15/2024         Lab Results   Component Value Date    WBC 13.87 (H) 09/15/2024    HGB 14.9 09/15/2024    HCT 44.7 09/15/2024    MCV 93 09/15/2024     09/15/2024         VTE Pharmacologic Prophylaxis: Enoxaparin (Lovenox)  VTE Mechanical Prophylaxis: sequential compression device     Velma Fabian PA-C

## 2024-09-17 ENCOUNTER — TELEPHONE (OUTPATIENT)
Age: 59
End: 2024-09-17

## 2024-09-17 VITALS
OXYGEN SATURATION: 97 % | BODY MASS INDEX: 27.5 KG/M2 | TEMPERATURE: 97.9 F | RESPIRATION RATE: 16 BRPM | WEIGHT: 155.2 LBS | SYSTOLIC BLOOD PRESSURE: 138 MMHG | HEIGHT: 63 IN | HEART RATE: 54 BPM | DIASTOLIC BLOOD PRESSURE: 54 MMHG

## 2024-09-17 LAB
ANION GAP SERPL CALCULATED.3IONS-SCNC: 6 MMOL/L (ref 4–13)
BACTERIA UR CULT: NORMAL
BUN SERPL-MCNC: 19 MG/DL (ref 5–25)
CALCIUM SERPL-MCNC: 8.7 MG/DL (ref 8.4–10.2)
CHLORIDE SERPL-SCNC: 106 MMOL/L (ref 96–108)
CO2 SERPL-SCNC: 29 MMOL/L (ref 21–32)
CREAT SERPL-MCNC: 0.73 MG/DL (ref 0.6–1.3)
ERYTHROCYTE [DISTWIDTH] IN BLOOD BY AUTOMATED COUNT: 13.7 % (ref 11.6–15.1)
GFR SERPL CREATININE-BSD FRML MDRD: 91 ML/MIN/1.73SQ M
GLUCOSE SERPL-MCNC: 115 MG/DL (ref 65–140)
GLUCOSE SERPL-MCNC: 119 MG/DL (ref 65–140)
GLUCOSE SERPL-MCNC: 120 MG/DL (ref 65–140)
HCT VFR BLD AUTO: 41.9 % (ref 34.8–46.1)
HGB BLD-MCNC: 13.7 G/DL (ref 11.5–15.4)
MCH RBC QN AUTO: 31 PG (ref 26.8–34.3)
MCHC RBC AUTO-ENTMCNC: 32.7 G/DL (ref 31.4–37.4)
MCV RBC AUTO: 95 FL (ref 82–98)
PLATELET # BLD AUTO: 239 THOUSANDS/UL (ref 149–390)
PMV BLD AUTO: 11.1 FL (ref 8.9–12.7)
POTASSIUM SERPL-SCNC: 4.4 MMOL/L (ref 3.5–5.3)
RBC # BLD AUTO: 4.42 MILLION/UL (ref 3.81–5.12)
SODIUM SERPL-SCNC: 141 MMOL/L (ref 135–147)
WBC # BLD AUTO: 11.51 THOUSAND/UL (ref 4.31–10.16)

## 2024-09-17 PROCEDURE — 80048 BASIC METABOLIC PNL TOTAL CA: CPT

## 2024-09-17 PROCEDURE — 85027 COMPLETE CBC AUTOMATED: CPT

## 2024-09-17 PROCEDURE — 99232 SBSQ HOSP IP/OBS MODERATE 35: CPT

## 2024-09-17 PROCEDURE — 99238 HOSP IP/OBS DSCHRG MGMT 30/<: CPT | Performed by: INTERNAL MEDICINE

## 2024-09-17 PROCEDURE — 82948 REAGENT STRIP/BLOOD GLUCOSE: CPT

## 2024-09-17 RX ORDER — PHENAZOPYRIDINE HYDROCHLORIDE 200 MG/1
200 TABLET, FILM COATED ORAL 3 TIMES DAILY PRN
Qty: 10 TABLET | Refills: 0 | Status: SHIPPED | OUTPATIENT
Start: 2024-09-17

## 2024-09-17 RX ORDER — TAMSULOSIN HYDROCHLORIDE 0.4 MG/1
0.4 CAPSULE ORAL
Qty: 30 CAPSULE | Refills: 0 | Status: SHIPPED | OUTPATIENT
Start: 2024-09-17 | End: 2024-10-17

## 2024-09-17 RX ADMIN — OXYCODONE HYDROCHLORIDE AND ACETAMINOPHEN 2000 MG: 500 TABLET ORAL at 07:46

## 2024-09-17 RX ADMIN — Medication 2000 UNITS: at 07:46

## 2024-09-17 RX ADMIN — PANTOPRAZOLE SODIUM 40 MG: 40 TABLET, DELAYED RELEASE ORAL at 05:20

## 2024-09-17 RX ADMIN — POTASSIUM CITRATE 10 MEQ: 10 TABLET, EXTENDED RELEASE ORAL at 13:45

## 2024-09-17 RX ADMIN — ENOXAPARIN SODIUM 40 MG: 40 INJECTION SUBCUTANEOUS at 07:46

## 2024-09-17 RX ADMIN — POTASSIUM CITRATE 10 MEQ: 10 TABLET, EXTENDED RELEASE ORAL at 07:46

## 2024-09-17 RX ADMIN — LISINOPRIL 5 MG: 5 TABLET ORAL at 07:46

## 2024-09-17 RX ADMIN — ATORVASTATIN CALCIUM 80 MG: 80 TABLET, FILM COATED ORAL at 07:46

## 2024-09-17 RX ADMIN — SERTRALINE HYDROCHLORIDE 50 MG: 50 TABLET ORAL at 07:46

## 2024-09-17 RX ADMIN — ACETAMINOPHEN 975 MG: 325 TABLET ORAL at 13:45

## 2024-09-17 RX ADMIN — ACETAMINOPHEN 975 MG: 325 TABLET ORAL at 05:20

## 2024-09-17 NOTE — ASSESSMENT & PLAN NOTE
-POD1 cystoscopy, right ureteroscopy, holmium laser lithotripsy, retrograde pyelogram, insertion of left ureteral stent by Dr. Lira  -Feeling well  -Urine mixed contaminants, ABX discontinued  -Vital signs stable, afebrile  -Mild leukocytosis 11, reactive  -Stable for discharge  -Oxybutynin, Pyridium, Flomax for stent discomfort  -Stent on string removal 1 week

## 2024-09-17 NOTE — TELEPHONE ENCOUNTER
Pt was discharge from hospital today and forgot to get letter to go back to work with no limitations. Pt stated she has to go back tomorrow and requested a call back when letter is ready.

## 2024-09-17 NOTE — ASSESSMENT & PLAN NOTE
Afebrile upon arrival with /69, HR 81, RR 20, SpO2 98 RA.   Urine dipstick remarkable for small blood, large leukocyte, and 1+ urine protein.  UA showed microscopic hematuria (4-10 RBCs), pyuria (30-50 WBC) , and bacteriuria (moderate bacteria).  Given history of multiple renal calculi in the past and left ureteral stent placement in April with removal in May, UTI this visit considered complicated UTI.     Patient denied materia, dysuria, and suprapubic tenderness.    History of UTI with growth of pan-sensitive Pseudomonas aeruginosa in April 2024 in November 2021; history of urine culture with ESBL which is susceptible to ertapenem, gentamicin, nitrofurantoin, Zosyn, Bactrim, tetracycline, and Augmentin in November 2019    S/p 1 dose 2 g cefepime in the ED    WBC elevated to 11.51 form 8.07, likely reactive given urologic procedure yesterday and afebrile + hemodynamically stable     Urine culture collected in the ED revealed mixed contaminants x 4    Plan:  -Discontinue cefepime given urine culture revealed contamination  -F/u on urine culture collected from cystoscopy

## 2024-09-17 NOTE — ASSESSMENT & PLAN NOTE
Recent history of hydronephrosis with UPJ obstruction s/p L ureteral stent 4/14/24 (removed in 5/2024).  Presented to ED with left flank pain started Sat morning.  Reported sharp pain with no radiation, which was similar to the pain of renal calculi in the past.    CT A/P: Multiple nonobstructing right renal calculi measuring up to 7 mm in the midpole right kidney; moderate left-sided hydroutero nephrosis secondary to a 5 mm obstructing calculus in the distal left ureter proximal to the left ureterovesical junction.     Plan:  - S/p cystoscopy with left ureteral stone removal and stent insertion 9/16  - Per Urology, patient is stable to go home with oxybutynin, Pyridium, and Flomax; has allergic reaction to oxybutynin with anaphylaxis, will not give oxybutynin  - Follow up with urology outpatient for stent removal   - Continue to trend WBC

## 2024-09-17 NOTE — ASSESSMENT & PLAN NOTE
Lab Results   Component Value Date    EGFR 91 09/17/2024    EGFR 67 09/16/2024    EGFR 67 09/15/2024    CREATININE 0.73 09/17/2024    CREATININE 0.94 09/16/2024    CREATININE 0.94 09/15/2024     Creatinine baseline 0.7-1.0  Creatinine this morning 0.73    Plan:  - Continue to monitor renal function  - Avoid hypotension  - Avoid nephrotoxic agents

## 2024-09-17 NOTE — ASSESSMENT & PLAN NOTE
"  CT A/P: Multiple nonobstructing right renal calculi measuring up to 7 mm in the midpole right kidney; moderate left-sided hydro utero nephrosis secondary to a 5 mm obstructing calculus in the distal left ureter proximal to the left ureterovesical junction.    Reported multiple episodes of nephrolithiasis in the past and family history significant for nephrolithiasis in her father.  Takes potassium citrate 3 times daily.    Patient reporting no pain upon evaluation.    Plan:  - See plan under \" hydronephrosis due to obstruction of ureter\"  - Continue potassium citrate 10 mEq 3 times daily  - Discharge home with Flomax and as needed Pyridium  "

## 2024-09-17 NOTE — ASSESSMENT & PLAN NOTE
Takes lisinopril 5 mg daily  Blood pressure running 130s/60s     Plan:   - Continue home med lisinopril 5 mg daily with holding parameter systolic less than 110

## 2024-09-17 NOTE — ASSESSMENT & PLAN NOTE
Lab Results   Component Value Date    HGBA1C 6.4 (H) 09/15/2024       Recent Labs     09/16/24  0725 09/16/24  1106 09/16/24  1657 09/17/24  0631   POCGLU 122 123 202* 115         Blood Sugar Average: Last 72 hrs:  (P) [id number]  A1c 4/19/24 6.5  A1c 9/16/2024 6.4    Home medication includes metformin 1000 mg twice daily    Glucose this morning 119    Plan:  - Hold metformin while inpatient  - Diabetic diet  - Initiate SSI algorithm 2  - Hypoglycemia protocol  - Goal glucose 140-180

## 2024-09-17 NOTE — DISCHARGE SUMMARY
INTERNAL MEDICINE RESIDENCY DISCHARGE SUMMARY     Sakshi Tirado   58 y.o. female  MRN: [id number]  Room/Bed: [facility] 91/[facility] 914-01     [facility] BE [facility] 9   Encounter: [id number]    Principal Problem:    Hydronephrosis due to obstruction of ureter  Active Problems:    Hyperlipidemia    Type 2 diabetes mellitus (HCC)    HTN (hypertension)    Nephrolithiasis    CKD (chronic kidney disease) stage 2, GFR 60-89 ml/min    UTI (urinary tract infection)    Depression      Depression  Assessment & Plan  Takes Zoloft 50 mg daily. Continue Zoloft while inpatient.    UTI (urinary tract infection)  Assessment & Plan  Afebrile upon arrival with /69, HR 81, RR 20, SpO2 98 RA.   Urine dipstick remarkable for small blood, large leukocyte, and 1+ urine protein.  UA showed microscopic hematuria (4-10 RBCs), pyuria (30-50 WBC) , and bacteriuria (moderate bacteria).  Given history of multiple renal calculi in the past and left ureteral stent placement in April with removal in May, UTI this visit considered complicated UTI.     Patient denied materia, dysuria, and suprapubic tenderness.    History of UTI with growth of pan-sensitive Pseudomonas aeruginosa in April 2024 in November 2021; history of urine culture with ESBL which is susceptible to ertapenem, gentamicin, nitrofurantoin, Zosyn, Bactrim, tetracycline, and Augmentin in November 2019    S/p 1 dose 2 g cefepime in the ED    WBC elevated to 11.51 form 8.07, likely reactive given urologic procedure yesterday and afebrile + hemodynamically stable     Urine culture collected in the ED revealed mixed contaminants x 4    Plan:  -Discontinue cefepime given urine culture revealed contamination  -F/u on urine culture collected from cystoscopy     CKD (chronic kidney disease) stage 2, GFR 60-89 ml/min  Assessment & Plan  Lab Results   Component Value Date    EGFR 91 09/17/2024    EGFR 67 09/16/2024    EGFR 67 09/15/2024    CREATININE  "0.73 09/17/2024    CREATININE 0.94 09/16/2024    CREATININE 0.94 09/15/2024     Creatinine baseline 0.7-1.0  Creatinine this morning 0.73    Plan:  - Continue to monitor renal function  - Avoid hypotension  - Avoid nephrotoxic agents      Nephrolithiasis  Assessment & Plan    CT A/P: Multiple nonobstructing right renal calculi measuring up to 7 mm in the midpole right kidney; moderate left-sided hydro utero nephrosis secondary to a 5 mm obstructing calculus in the distal left ureter proximal to the left ureterovesical junction.    Reported multiple episodes of nephrolithiasis in the past and family history significant for nephrolithiasis in her father.  Takes potassium citrate 3 times daily.    Patient reporting no pain upon evaluation.    Plan:  - See plan under \" hydronephrosis due to obstruction of ureter\"  - Continue potassium citrate 10 mEq 3 times daily  - Discharge home with Flomax and as needed Pyridium    HTN (hypertension)  Assessment & Plan  Takes lisinopril 5 mg daily  Blood pressure running 130s/60s     Plan:   - Continue home med lisinopril 5 mg daily with holding parameter systolic less than 110    Type 2 diabetes mellitus (HCC)  Assessment & Plan  Lab Results   Component Value Date    HGBA1C 6.4 (H) 09/15/2024       Recent Labs     09/16/24  0725 09/16/24  1106 09/16/24  1657 09/17/24  0631   POCGLU 122 123 202* 115         Blood Sugar Average: Last 72 hrs:  (P) [id number]  A1c 4/19/24 6.5  A1c 9/16/2024 6.4    Home medication includes metformin 1000 mg twice daily    Glucose this morning 119    Plan:  - Hold metformin while inpatient  - Diabetic diet  - Initiate SSI algorithm 2  - Hypoglycemia protocol  - Goal glucose 140-180      Hyperlipidemia  Assessment & Plan  Lipid panel 4/19/24: , , HDL 34, LDL 42  Takes Lipitor 80 mg daily    Plan:  - Continue Lipitor 80 mg daily    * Hydronephrosis due to obstruction of ureter  Assessment & Plan  Recent history of hydronephrosis with " UPJ obstruction s/p L ureteral stent 4/14/24 (removed in 5/2024).  Presented to ED with left flank pain started Sat morning.  Reported sharp pain with no radiation, which was similar to the pain of renal calculi in the past.    CT A/P: Multiple nonobstructing right renal calculi measuring up to 7 mm in the midpole right kidney; moderate left-sided hydroutero nephrosis secondary to a 5 mm obstructing calculus in the distal left ureter proximal to the left ureterovesical junction.     Plan:  - S/p cystoscopy with left ureteral stone removal and stent insertion 9/16  - Per Urology, patient is stable to go home with oxybutynin, Pyridium, and Flomax; has allergic reaction to oxybutynin with anaphylaxis, will not give oxybutynin  - Follow up with urology outpatient for stent removal   - Continue to trend WBC            DETAILS OF HOSPITAL COURSE     58-year-old female with history of type 2 diabetes, hypertension, pretension, CKD stage II, hydronephrosis with UPJ obstruction s/p ureteral stent 4/14/24, anxiety, and GERD presented to the ED for left flank pain which started on Saturday morning.  Patient has had this similar pain in the past consistent with renal calculi.  Described the pain as constant and sharp pain.  She has not tried over-the-counter to relieve the pain.  No associated nausea, vomiting, fever, and chills.  Also endorses pressure in the abdomen.  Patient has voided once in the ED, denied dysuria, hematuria, and suprapubic tenderness.    In the ED, afebrile, /69, HR 81, RR 20, SpO2 98% on room air.  Labs remarkable for glucose of 171 and WBC  13.8.  Urine dipstick remarkable for small blood, large leukocyte, and 1+ urine protein.  UA showed hematuria, pyuria, and bacteriuria.  S/p 1 dose cefepime and 50 mg ketorolac in the ED. given history of UTI in the past with ESBL and Pseudomonas, patient was started on cefepime while waiting for urine culture. Urology consulted and patient underwent cystoscopy  and left ureteral stent on 9/16. Urine culture collected in the ED showed mixed contaminants.  Antibiotic was discontinued.  Patient continued to improve clinically, and she has no pain at this point.  Per urology, patient is stable to be discharged home and follow-up with them outpatient in a week for stent removal and pending urine culture collected from cystoscopy.  Patient has allergy to oxybutynin with reaction of anaphylaxis.  She will be discharged home under stable condition with as needed pyridium and tamsulosin daily.     DISCHARGE INFORMATION     Physical Exam  Constitutional:       Appearance: Normal appearance.   HENT:      Head: Normocephalic and atraumatic.      Mouth/Throat:      Mouth: Mucous membranes are moist.      Pharynx: No oropharyngeal exudate.   Eyes:      Extraocular Movements: Extraocular movements intact.      Conjunctiva/sclera: Conjunctivae normal.      Pupils: Pupils are equal, round, and reactive to light.   Neck:      Vascular: No carotid bruit.   Cardiovascular:      Rate and Rhythm: Normal rate and regular rhythm.      Pulses: Normal pulses.      Heart sounds: Normal heart sounds.   Pulmonary:      Effort: Pulmonary effort is normal. No respiratory distress.      Breath sounds: Normal breath sounds. No wheezing or rales.   Abdominal:      General: Bowel sounds are normal. There is no distension.      Palpations: Abdomen is soft.      Tenderness: There is no abdominal tenderness. There is no guarding.   Musculoskeletal:         General: No swelling.      Cervical back: Neck supple.      Right lower leg: No edema.      Left lower leg: No edema.   Skin:     General: Skin is warm.      Capillary Refill: Capillary refill takes less than 2 seconds.      Coloration: Skin is not jaundiced.      Findings: No bruising or erythema.   Neurological:      Mental Status: She is alert and oriented to person, place, and time. Mental status is at baseline.          PCP at Discharge: Petra  LEONARD Daley    Admitting Provider: Igor Cabello MD  Admission Date: 9/15/2024    Discharge Provider: Igor Cabello MD   Discharge Date: 9/17/24    Discharge Disposition: Home/Self Care  Discharge Condition: good  Discharge with Lines: no    Discharge Diet: diabetic diet  Activity Restrictions:  as tolerated  Test Results Pending at Discharge: F/u on urine culture collected from cystoscopy    Discharge Diagnoses:  Principal Problem:    Hydronephrosis due to obstruction of ureter  Active Problems:    Hyperlipidemia    Type 2 diabetes mellitus (HCC)    HTN (hypertension)    Nephrolithiasis    CKD (chronic kidney disease) stage 2, GFR 60-89 ml/min    UTI (urinary tract infection)    Depression  Resolved Problems:    * No resolved hospital problems. *      Consulting Providers:      Diagnostic & Therapeutic Procedures Performed:  FL retrograde pyelogram    Result Date: 9/16/2024  Impression: Fluoroscopic guidance provided for left retrograde pyelogram. Please see separate procedure report for additional details. Workstation performed: [id number]     CT renal stone study abdomen pelvis wo contrast    Result Date: 9/15/2024  Impression: 1. Moderate left-sided hydroureteronephrosis secondary to a 5 mm obstructing calculus in the distal left ureter. 2. Nonobstructing bilateral renal calculi. Workstation performed: [id number]       Code Status: Level 1 - Full Code  Advance Directive & Living Will: <no information>  Power of :    POLST:      Medications:       Medication List      START taking these medications     phenazopyridine 200 mg tablet; Commonly known as: PYRIDIUM; Take 1   tablet (200 mg total) by mouth 3 (three) times a day as needed for bladder   spasms (and dysuria)   tamsulosin 0.4 mg; Commonly known as: FLOMAX; Take 1 capsule (0.4 mg   total) by mouth daily with dinner     CONTINUE taking these medications     acetaminophen 500 mg tablet; Commonly known as: TYLENOL   atorvastatin 80 mg tablet;  Commonly known as: LIPITOR; Take 1 tablet (80   mg total) by mouth daily   lisinopril 5 mg tablet; Commonly known as: ZESTRIL; Take 1 tablet (5 mg   total) by mouth daily   metFORMIN 1000 MG tablet; Commonly known as: GLUCOPHAGE; Take 1 tablet   (1,000 mg total) by mouth 2 (two) times a day with meals   multivitamin Tabs   omeprazole 40 MG capsule; Commonly known as: PriLOSEC; Take 1 capsule   (40 mg total) by mouth daily   potassium citrate 10 mEq; Commonly known as: UROCIT-K; Take 1 tablet (10   mEq total) by mouth 3 (three) times a day with meals   sertraline 50 mg tablet; Commonly known as: ZOLOFT; Take 1 tablet (50 mg   total) by mouth daily Takes in the am.   vitamin C 1000 MG tablet   Vitamin D3 50 MCG (2000 UT) Tabs     Current Discharge Medication List         Current Discharge Medication List        CONTINUE these medications which have NOT CHANGED    Details   acetaminophen (TYLENOL) 500 mg tablet Take 500 mg by mouth every 6 (six) hours as needed for mild pain      Ascorbic Acid (vitamin C) 1000 MG tablet Take 2,000 mg by mouth daily      atorvastatin (LIPITOR) 80 mg tablet Take 1 tablet (80 mg total) by mouth daily  Qty: 90 tablet, Refills: 1    Associated Diagnoses: Hypercholesteremia      Cholecalciferol (Vitamin D3) 50 MCG (2000 UT) TABS Take 2,000 Units by mouth daily      lisinopril (ZESTRIL) 5 mg tablet Take 1 tablet (5 mg total) by mouth daily  Qty: 90 tablet, Refills: 1    Associated Diagnoses: Primary hypertension      metFORMIN (GLUCOPHAGE) 1000 MG tablet Take 1 tablet (1,000 mg total) by mouth 2 (two) times a day with meals  Qty: 180 tablet, Refills: 1    Associated Diagnoses: Type 2 diabetes mellitus without complication, without long-term current use of insulin (HCC)      multivitamin (THERAGRAN) TABS Take 1 tablet by mouth daily      omeprazole (PriLOSEC) 40 MG capsule Take 1 capsule (40 mg total) by mouth daily  Qty: 90 capsule, Refills: 1    Associated Diagnoses: Gastroesophageal reflux  "disease without esophagitis      potassium citrate (UROCIT-K) 10 mEq Take 1 tablet (10 mEq total) by mouth 3 (three) times a day with meals  Qty: 270 tablet, Refills: 1    Associated Diagnoses: TOMMY (acute kidney injury) (HCC)      sertraline (ZOLOFT) 50 mg tablet Take 1 tablet (50 mg total) by mouth daily Takes in the am.  Qty: 90 tablet, Refills: 1    Associated Diagnoses: Anxiety            Current Discharge Medication List        START taking these medications    Details   phenazopyridine (PYRIDIUM) 200 mg tablet Take 1 tablet (200 mg total) by mouth 3 (three) times a day as needed for bladder spasms (and dysuria)  Qty: 10 tablet, Refills: 0    Associated Diagnoses: Nephrolithiasis      tamsulosin (FLOMAX) 0.4 mg Take 1 capsule (0.4 mg total) by mouth daily with dinner  Qty: 30 capsule, Refills: 0    Associated Diagnoses: Nephrolithiasis            Current Discharge Medication List             Allergies:  Allergies   Allergen Reactions    Oxybutynin Anaphylaxis     \"feels like throat closing and can't swallow\"    Clonazepam Other (See Comments)     Pt states \"didn't feel right on it.\"    Morphine Other (See Comments) and Confusion     Annotation - 14Jun2017: \"dopey\"  Gets silly    Venlafaxine Other (See Comments)     Unknown--pt states \" MD was trying pt on different medications. Pt unsure of reaction.\"       FOLLOW-UP     PCP Outpatient Follow-up:  LEONARD Crawford    Consulting Providers Follow-up:  Urology outpatient follow-up     Active Issues Requiring Follow-up:   N/A    Discharge Statement:   I spent 45 minutes minutes discharging the patient. This time was spent on the day of discharge. I had direct contact with the patient on the day of discharge. Additional documentation is required if more than 30 minutes were spent on discharge.    Portions of the record may have been created with voice recognition software.  Occasional wrong word or \"sound a like\" substitutions may have occurred due to the " inherent limitations of voice recognition software.  Read the chart carefully and recognize, using context, where substitutions have occurred.    ==  Jessee Santamaria DO  [facility]  Internal Medicine Resident PGY-I

## 2024-09-17 NOTE — PROGRESS NOTES
"Progress Note - Urology   Name: Sakshi Tirado 58 y.o. female I MRN: [id number]  Unit/Bed#: [facility] 914-01 I Date of Admission: 9/15/2024   Date of Service: 9/17/2024 I Hospital Day: 2     Assessment & Plan  Hydronephrosis due to obstruction of ureter  -POD1 cystoscopy, right ureteroscopy, holmium laser lithotripsy, retrograde pyelogram, insertion of left ureteral stent by Dr. Lira  -Feeling well  -Urine mixed contaminants, ABX discontinued  -Vital signs stable, afebrile  -Mild leukocytosis 11, reactive  -Stable for discharge  -Oxybutynin, Pyridium, Flomax for stent discomfort  -Stent on string removal 1 week    Subjective:   HPI: Seen at bedside.  Feels well looking forward for discharge this afternoon    Review of Systems   Constitutional:  Negative for chills and fever.   Respiratory:  Negative for cough and shortness of breath.    Cardiovascular:  Negative for chest pain and palpitations.   Gastrointestinal:  Negative for abdominal pain and vomiting.   Genitourinary:  Negative for dysuria.   Musculoskeletal:  Negative for arthralgias and back pain.   Skin:  Negative for color change and rash.   All other systems reviewed and are negative.      Objective:    Vitals: Blood pressure 138/54, pulse (!) 54, temperature 97.9 °F (36.6 °C), resp. rate 16, height 5' 3\" (1.6 m), weight 70.4 kg (155 lb 3.3 oz), SpO2 97%.,Body mass index is 27.49 kg/m².    Physical Exam  Vitals reviewed.   Constitutional:       General: She is not in acute distress.     Appearance: Normal appearance. She is normal weight. She is not ill-appearing, toxic-appearing or diaphoretic.   HENT:      Head: Normocephalic and atraumatic.      Right Ear: External ear normal.      Left Ear: External ear normal.      Nose: Nose normal.      Mouth/Throat:      Mouth: Mucous membranes are moist.   Eyes:      General: No scleral icterus.     Conjunctiva/sclera: Conjunctivae normal.   Cardiovascular:      Rate and Rhythm: Normal rate.      Pulses: Normal " pulses.   Pulmonary:      Effort: Pulmonary effort is normal.   Neurological:      General: No focal deficit present.      Mental Status: She is alert and oriented to person, place, and time. Mental status is at baseline.   Psychiatric:         Mood and Affect: Mood normal.         Behavior: Behavior normal.         Thought Content: Thought content normal.         Judgment: Judgment normal.           Labs:  Recent Labs     09/15/24  0836 24  0540 24  0502   WBC 13.87* 8.07 11.51*       Recent Labs     09/15/24  0836 24  0540 24  0502   HGB 14.9 13.9 13.7     Recent Labs     09/15/24  0836 24  0540 24  0502   HCT 44.7 42.9 41.9     Recent Labs     09/15/24  0836 24  0540 24  0502   CREATININE 0.94 0.94 0.73       History:    Past Medical History:   Diagnosis Date    Anxiety     Asthma     Colon polyp     Depression     Diabetes mellitus (HCC)     Diverticulitis     Diverticulitis of colon     Follicular cyst of ovary 2014    Former smoker     GERD (gastroesophageal reflux disease)     Glycosuria     Headache     Hyperlipidemia     Hypertension     Kidney calculi 2019    Kidney stone     Obesity     Ovarian cyst     Overactive bladder     Overweight     Pulmonary nodule     Renal calculus     Seasonal allergies     Tinea pedis of left foot 2020    Vertigo     Wears partial dentures     Lower plate -partial     Social History     Socioeconomic History    Marital status: /Civil Union     Spouse name: None    Number of children: None    Years of education: None    Highest education level: None   Occupational History    None   Tobacco Use    Smoking status: Former     Current packs/day: 0.00     Average packs/day: 0.3 packs/day for 34.2 years (8.6 ttl pk-yrs)     Types: Cigarettes     Start date:      Quit date: 3/16/2018     Years since quittin.5    Smokeless tobacco: Never    Tobacco comments:     approx less than  half a pack   Vaping Use     Vaping status: Never Used   Substance and Sexual Activity    Alcohol use: Not Currently     Comment: socially    Drug use: Never     Comment: Denies    Sexual activity: Not Currently     Partners: Male   Other Topics Concern    None   Social History Narrative    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      Social Determinants of Health     Financial Resource Strain: Not on file   Food Insecurity: No Food Insecurity (9/16/2024)    Hunger Vital Sign     Worried About Running Out of Food in the Last Year: Never true     Ran Out of Food in the Last Year: Never true   Transportation Needs: No Transportation Needs (9/16/2024)    PRAPARE - Transportation     Lack of Transportation (Medical): No     Lack of Transportation (Non-Medical): No   Physical Activity: Not on file   Stress: Not on file   Social Connections: Not on file   Intimate Partner Violence: Not on file   Housing Stability: Low Risk  (9/16/2024)    Housing Stability Vital Sign     Unable to Pay for Housing in the Last Year: No     Number of Times Moved in the Last Year: 0     Homeless in the Last Year: No     Past Surgical History:   Procedure Laterality Date    APPENDECTOMY      BLADDER SUSPENSION      [facility]    CERVICAL BIOPSY  W/ LOOP ELECTRODE EXCISION      COLONOSCOPY      DILATION AND CURETTAGE OF UTERUS      FL RETROGRADE PYELOGRAM  7/26/2021    FL RETROGRADE PYELOGRAM  11/18/2021    FL RETROGRADE PYELOGRAM  11/10/2022    FL RETROGRADE PYELOGRAM  1/17/2023    FL RETROGRADE PYELOGRAM  4/14/2024    FL RETROGRADE PYELOGRAM  5/3/2024    FL RETROGRADE PYELOGRAM  9/16/2024    HYSTERECTOMY  2007    ovaries present bilaterally    JOINT REPLACEMENT Right 05/2021    right knee    NC ARTHRP KNE CONDYLE&PLATU MEDIAL&LAT COMPARTMENTS Right 5/19/2021    Procedure: ARTHROPLASTY KNEE TOTAL;  Surgeon: Bon Broderick MD;  [facility]: [facility];  Service: Orthopedics    NC COLONOSCOPY FLX DX W/COLLJ SPEC WHEN PFRMD N/A 9/26/2017    Procedure: EGD AND COLONOSCOPY;  Surgeon: Reed Hardy MD;   Location: [facility];  Service: Gastroenterology    HI CYSTO BLADDER W/URETERAL CATHETERIZATION Right 11/10/2022    Procedure: CYSTOSCOPY RETROGRADE PYELOGRAM WITH INSERTION STENT URETERAL;  Surgeon: Burton Senior MD;  [facility]: [facility];  Service: Urology    HI CYSTO/URETERO W/LITHOTRIPSY &INDWELL STENT INSRT Bilateral 7/26/2021    Procedure: CYSTOSCOPY URETEROSCOPY WITH LITHOTRIPSY HOLMIUM LASER, RETROGRADE PYELOGRAM AND INSERTION STENT URETERAL;  Surgeon: Pete Lopez MD;  [facility];  Service: Urology    HI CYSTO/URETERO W/LITHOTRIPSY &INDWELL STENT INSRT Left 9/16/2021    Procedure: CYSTOSCOPY URETEROSCOPY WITH LITHOTRIPSY HOLMIUM LASER, RETROGRADE PYELOGRAM AND INSERTION STENT URETERAL;  Surgeon: Cesar Keller MD;  [facility]: [facility];  Service: Urology    HI CYSTO/URETERO W/LITHOTRIPSY &INDWELL STENT INSRT Left 11/18/2021    Procedure: CYSTOSCOPY URETEROSCOPY WITH LITHOTRIPSY HOLMIUM LASER, RETROGRADE PYELOGRAM AND INSERTION STENT URETERAL;  Surgeon: Vicente Colby MD;  [facility];  Service: Urology    HI CYSTO/URETERO W/LITHOTRIPSY &INDWELL STENT INSRT Right 1/17/2023    Procedure: CYSTOSCOPY URETEROSCOPY WITH LITHOTRIPSY HOLMIUM LASER, RETROGRADE PYELOGRAM AND EXCHANGE STENT URETERAL, BASKET STONE EXTRACTION;  Surgeon: Vicente Colby MD;  [facility];  Service: Urology    HI CYSTO/URETERO W/LITHOTRIPSY &INDWELL STENT INSRT Left 4/14/2024    Procedure: CYSTOSCOPY URETEROSCOPY WITH LITHOTRIPSY HOLMIUM LASER, RETROGRADE PYELOGRAM AND INSERTION STENT URETERAL;  Surgeon: Burton Senior MD;  [facility];  Service: Urology    HI CYSTO/URETERO W/LITHOTRIPSY &INDWELL STENT INSRT Left 5/3/2024    Procedure: CYSTOSCOPY URETEROSCOPY WITH LITHOTRIPSY HOLMIUM LASER,BASKET STONE EXTRACTION , RETROGRADE PYELOGRAM AND INSERTION STENT URETERAL;  Surgeon: Burton Senior MD;  [facility];  Service: Urology    HI NEUROPLASTY &/TRANSPOS MEDIAN [facility]  Right 5/29/2018    Procedure: CARPAL TUNNEL RELEASE;  Surgeon: Amish Parkinson DO;  [facility];  Service: Orthopedics    REMOVAL URETERAL STENT Right 9/16/2021    Procedure: REMOVAL STENT URETERAL;  Surgeon: Cesar Keller MD;  [facility];  Service: Urology    TONSILLECTOMY      TUBAL LIGATION      URETEROSCOPY  7/26/2021    Procedure: URETEROSCOPY, LASER AND BASKET STONE EXTRACTION;  Surgeon: Pete Lopez MD;  [facility];  Service: Urology    WISDOM TOOTH EXTRACTION       Family History   Problem Relation Age of Onset    Diabetes type II Mother     Asthma Mother     Stroke Father     Pancreatic cancer Father 73    Diabetes type II Father     No Known Problems Sister     No Known Problems Sister     No Known Problems Sister     No Known Problems Sister     No Known Problems Maternal Grandmother     No Known Problems Maternal Grandfather     No Known Problems Paternal Grandmother     No Known Problems Paternal Grandfather     No Known Problems Brother     No Known Problems Son     No Known Problems Son     No Known Problems Maternal Aunt     No Known Problems Paternal Aunt     Breast cancer Neg Hx        Rose Lyn PA-C  Date: 9/17/2024 Time: 10:50 AM

## 2024-09-17 NOTE — TELEPHONE ENCOUNTER
Patient calling in to check status of note. Spoke to Dyan who is waiting on response from Shayne and then will call patient back.

## 2024-09-18 NOTE — TELEPHONE ENCOUNTER
Post Op Note    Sakshi Tirado is a 58 y.o. female s/p  CYSTOSCOPY URETEROSCOPY, RETROGRADE PYELOGRAM AND INSERTION STENT URETERAL, STONE EXTRACTION (Left: Ureter) performed 9-.  Sakshi Tirado is a patient of Dr. Melissa VALLE and is seen at the [place] office.     Call placed to pt. She did not answer. LMOM asking for her to contact the office to see how she is doing post-operatively and to review instructions for stent removal.   Office number was provided for pt to call  back and discuss.

## 2024-09-18 NOTE — ED PROVIDER NOTES
1. Left flank tenderness    2. Ureterolithiasis    3. UTI (urinary tract infection)    4. Nephrolithiasis      ED Disposition       ED Disposition   Admit    Condition   Stable    Date/Time   Sun Sep 15, 2024  1:13 PM    Comment   Case was discussed with SOD admitting resident and the patient's admission status was agreed to be Admission Status: inpatient status to the service of Dr. Cabello.               Assessment & Plan       Medical Decision Making  Sakshi Tirado is a 58 y.o. who presents with complaints of acute onset left sided flank pain this morning     Vital signs are stable, afebrile  PE: left CVA tenderness    Ddx: concerning for nephrolithiasis, possibly complicated by UTI vs. Other acute intraabdominal or intrapelvic abnormality    Plan: work up as below   CT showing 5 mm obstructing stone of left distal ureter/UVJ  Medications given for pain without significant relief   Discussed with urology- plan for admission with OR tomorrow for stenting     Disposition: Discussed with internal medicine. Admit inpatient. Urology consulted.            Amount and/or Complexity of Data Reviewed  Labs: ordered.  Radiology: ordered and independent interpretation performed.    Risk  Prescription drug management.  Decision regarding hospitalization.                       Medications   ketorolac (TORADOL) injection 15 mg (15 mg Intravenous Given 9/15/24 0836)   cefepime (MAXIPIME) 2 g/50 mL dextrose IVPB (0 mg Intravenous Stopped 9/15/24 1134)       History of Present Illness       Patient is a 58 y.o. female with pertinent PMH of T2DM, hypertension, CKD stage III, and nephrolithiasis presenting to the ED due to left-sided flank pain.  Patient states that she started experiencing sharp left flank pain this morning upon waking up.  She states that pain feels similar to when she experienced a kidney stone in the past.  She is felt chilled but has not checked her temperature at home.  She denies any nausea, vomiting,  abdominal pain, dysuria, hematuria, chest pain, shortness of breath, headache, dizziness, blood in stool, diarrhea, constipation. Of note, patient follows regularly outpatient with urology and has undergone procedural treatment of stones in the past. Has had history of urosepsis and complicated nephrolithiasis with ESBL in 2019 and pseudomonas on subsequent urine cultures. Takes urocit K daily.              Review of Systems   All other systems reviewed and are negative.          Objective     ED Triage Vitals [09/15/24 0805]   Temperature Pulse Blood Pressure Respirations SpO2 Patient Position - Orthostatic VS   (!) 97 °F (36.1 °C) 81 125/69 20 98 % Sitting      Temp Source Heart Rate Source BP Location FiO2 (%) Pain Score    Temporal Monitor Left arm -- 10 - Worst Possible Pain        Physical Exam  Constitutional:       General: She is in acute distress.      Appearance: She is not ill-appearing, toxic-appearing or diaphoretic.   HENT:      Head: Normocephalic and atraumatic.      Mouth/Throat:      Mouth: Mucous membranes are moist.   Eyes:      Extraocular Movements: Extraocular movements intact.      Conjunctiva/sclera: Conjunctivae normal.   Cardiovascular:      Rate and Rhythm: Normal rate and regular rhythm.      Heart sounds: Normal heart sounds.   Pulmonary:      Effort: Pulmonary effort is normal.      Breath sounds: Normal breath sounds.   Abdominal:      General: Abdomen is flat. There is no distension.      Tenderness: There is no abdominal tenderness. There is left CVA tenderness. There is no guarding.   Musculoskeletal:      Cervical back: Normal range of motion and neck supple.   Skin:     General: Skin is warm and dry.      Capillary Refill: Capillary refill takes less than 2 seconds.   Neurological:      General: No focal deficit present.      Mental Status: She is alert.         Labs Reviewed   CBC AND DIFFERENTIAL - Abnormal       Result Value    WBC 13.87 (*)     RBC 4.80      Hemoglobin 14.9       Hematocrit 44.7      MCV 93      MCH 31.0      MCHC 33.3      RDW 13.5      MPV 10.7      Platelets 261      nRBC 0      Segmented % 80 (*)     Immature Grans % 1      Lymphocytes % 12 (*)     Monocytes % 6      Eosinophils Relative 1      Basophils Relative 0      Absolute Neutrophils 11.09 (*)     Absolute Immature Grans 0.09      Absolute Lymphocytes 1.72      Absolute Monocytes 0.82      Eosinophils Absolute 0.11      Basophils Absolute 0.04     COMPREHENSIVE METABOLIC PANEL - Abnormal    Sodium 136      Potassium 5.2      Chloride 101      CO2 27      ANION GAP 8      BUN 21      Creatinine 0.94      Glucose 171 (*)     Calcium 9.3      AST 31      ALT 30      Alkaline Phosphatase 95      Total Protein 6.9      Albumin 4.2      Total Bilirubin 0.48      eGFR 67      Narrative:     National Kidney Disease Foundation guidelines for Chronic Kidney Disease (CKD):     Stage 1 with normal or high GFR (GFR > 90 mL/min/1.73 square meters)    Stage 2 Mild CKD (GFR = 60-89 mL/min/1.73 square meters)    Stage 3A Moderate CKD (GFR = 45-59 mL/min/1.73 square meters)    Stage 3B Moderate CKD (GFR = 30-44 mL/min/1.73 square meters)    Stage 4 Severe CKD (GFR = 15-29 mL/min/1.73 square meters)    Stage 5 End Stage CKD (GFR <15 mL/min/1.73 square meters)  Note: GFR calculation is accurate only with a steady state creatinine   UA W REFLEX TO MICROSCOPIC WITH REFLEX TO CULTURE - Abnormal    Color, UA Yellow      Clarity, UA Turbid      Specific Gravity, UA 1.016      pH, UA 6.0      Leukocytes, UA Large (*)     Nitrite, UA Negative      Protein, UA 30 (1+) (*)     Glucose, UA Negative      Ketones, UA Negative      Urobilinogen, UA <2.0      Bilirubin, UA Negative      Occult Blood, UA Small (*)    URINE MICROSCOPIC - Abnormal    RBC, UA 4-10 (*)     WBC, UA 30-50 (*)     Epithelial Cells Occasional      Bacteria, UA Moderate (*)    HEMOGLOBIN A1C - Abnormal    Hemoglobin A1C 6.4 (*)          BASIC METABOLIC PANEL -  Abnormal    Sodium 139      Potassium 4.3      Chloride 105      CO2 27      ANION GAP 7      BUN 30 (*)     Creatinine 0.94      Glucose 108      Calcium 8.4      eGFR 67      Narrative:     National Kidney Disease Foundation guidelines for Chronic Kidney Disease (CKD):     Stage 1 with normal or high GFR (GFR > 90 mL/min/1.73 square meters)    Stage 2 Mild CKD (GFR = 60-89 mL/min/1.73 square meters)    Stage 3A Moderate CKD (GFR = 45-59 mL/min/1.73 square meters)    Stage 3B Moderate CKD (GFR = 30-44 mL/min/1.73 square meters)    Stage 4 Severe CKD (GFR = 15-29 mL/min/1.73 square meters)    Stage 5 End Stage CKD (GFR <15 mL/min/1.73 square meters)  Note: GFR calculation is accurate only with a steady state creatinine   CBC AND PLATELET - Abnormal    WBC 11.51 (*)     RBC 4.42      Hemoglobin 13.7      Hematocrit 41.9      MCV 95      MCH 31.0      MCHC 32.7      RDW 13.7      Platelets 239      MPV 11.1     POCT GLUCOSE - Abnormal    POC Glucose 164 (*)    POCT GLUCOSE - Abnormal    POC Glucose 238 (*)    POCT GLUCOSE - Abnormal    POC Glucose 202 (*)    POCT GLUCOSE - Normal    POC Glucose 122     POCT GLUCOSE - Normal    POC Glucose 123     POCT GLUCOSE - Normal    POC Glucose 115     POCT GLUCOSE - Normal    POC Glucose 120     URINE CULTURE    Urine Culture >100,000 cfu/ml     URINE CULTURE    Urine Culture No Growth <1000 cfu/mL     CBC AND DIFFERENTIAL    WBC 8.07      RBC 4.52      Hemoglobin 13.9      Hematocrit 42.9      MCV 95      MCH 30.8      MCHC 32.4      RDW 13.8      MPV 11.3      Platelets 236      nRBC 0      Segmented % 48      Immature Grans % 0      Lymphocytes % 39      Monocytes % 9      Eosinophils Relative 3      Basophils Relative 1      Absolute Neutrophils 3.99      Absolute Immature Grans 0.03      Absolute Lymphocytes 3.11      Absolute Monocytes 0.69      Eosinophils Absolute 0.21      Basophils Absolute 0.04     BASIC METABOLIC PANEL    Sodium 141      Potassium 4.4      Chloride  106      CO2 29      ANION GAP 6      BUN 19      Creatinine 0.73      Glucose 119      Calcium 8.7      eGFR 91      Narrative:     National Kidney Disease Foundation guidelines for Chronic Kidney Disease (CKD):     Stage 1 with normal or high GFR (GFR > 90 mL/min/1.73 square meters)    Stage 2 Mild CKD (GFR = 60-89 mL/min/1.73 square meters)    Stage 3A Moderate CKD (GFR = 45-59 mL/min/1.73 square meters)    Stage 3B Moderate CKD (GFR = 30-44 mL/min/1.73 square meters)    Stage 4 Severe CKD (GFR = 15-29 mL/min/1.73 square meters)    Stage 5 End Stage CKD (GFR <15 mL/min/1.73 square meters)  Note: GFR calculation is accurate only with a steady state creatinine   STONE ANALYSIS     FL retrograde pyelogram   Final Interpretation by Marcello Szymanski MD (09/16 6796)      Fluoroscopic guidance provided for left retrograde pyelogram.      Please see separate procedure report for additional details.         Workstation performed: [id number]         CT renal stone study abdomen pelvis wo contrast   ED Interpretation by Pablito Flores MD (09/15 0378)   Abnormal   LEFT distal ureter stone with hydro-urtero-nephrosis.       Final Interpretation by Bradley Landon Kocher, MD (09/15 5079)      1. Moderate left-sided hydroureteronephrosis secondary to a 5 mm obstructing calculus in the distal left ureter.   2. Nonobstructing bilateral renal calculi.         Workstation performed: [id number]             Procedures         Rachna Rinaldi MD  09/18/24 4764

## 2024-09-19 NOTE — UTILIZATION REVIEW
NOTIFICATION OF ADMISSION DISCHARGE   This is a Notification of Discharge from [facility]. Please be advised that this patient has been discharge from our facility. Below you will find the admission and discharge date and time including the patient’s disposition.   UTILIZATION REVIEW CONTACT:  Amber Ward  Utilization   Network Utilization Review Department  Phone: [phone] x carefully listen to the prompts. All voicemails are confidential.  Email: [email or web address]     ADMISSION INFORMATION  PRESENTATION DATE: 9/15/2024  8:06 AM  OBERVATION ADMISSION DATE: N/A  INPATIENT ADMISSION DATE: 9/15/24  1:14 PM   DISCHARGE DATE: 9/17/2024  3:05 PM   DISPOSITION:Home/Self Care    Network Utilization Review Department  ATTENTION: Please call with any questions or concerns to [phone] and carefully listen to the prompts so that you are directed to the right person. All voicemails are confidential.   For Discharge needs, contact Care Management DC Support Team at [phone] opt. 2  Send all requests for admission clinical reviews, approved or denied determinations and any other requests to dedicated fax number below belonging to the campus where the patient is receiving treatment. List of dedicated fax numbers for the Facilities:  FACILITY NAME UR FAX NUMBER   ADMISSION DENIALS (Administrative/Medical Necessity) [phone]   DISCHARGE SUPPORT TEAM ([facility]) [phone]   PARENT CHILD HEALTH (Maternity/NICU/Pediatrics) [phone]   [facility] [phone]   [facility] [phone]   [facility] [phone]   [facility] [phone]   [facility] [phone]   [facility] [phone]   [facility] [phone]   [facility] [phone]    [facility] [phone]   [facility] [phone]   [facility] [phone]   [facility] [phone]

## 2024-09-19 NOTE — TELEPHONE ENCOUNTER
2nd attempt made to contact pt.     She did not answer. LMOM asking pt to contact the office to follow up post operatively and to review the plan for stent removal.     Office number was provided for call back.

## 2024-09-20 NOTE — TELEPHONE ENCOUNTER
3rd attempt made to contact pt.     She did not answer. LMOM asking for pt to contact the office to review post operative instructions and planning.     Office number was provided for call back.

## 2024-09-23 NOTE — TELEPHONE ENCOUNTER
Patient called stating she removed the stent and she is doing fine.  She stated Dr. Lira told her he can filled her prescription for the potasium citrate .  She would like to go to [facility].  She also wants to know when to follow up with Dr. Lira.  She stated she only wants to follow up with him.  She does not want to see the AP/CRNP.   Please review and advise. 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      Patient can be reached at [phone]

## 2024-09-23 NOTE — TELEPHONE ENCOUNTER
Attempted to contact pt to ensure stent was removed today.     She did not answer. LMOM asking for pt to contact the office to confirm stent was removed.   Office number was provided for call back.     Will send [email or web address] message as well to confirm stent was removed.

## 2024-09-24 LAB
CALCIUM OXALATE DIHYDRATE MFR STONE IR: 60 %
COLOR STONE: NORMAL
COM MFR STONE: 40 %
COMMENT-STONE3: NORMAL
COMPOSITION: NORMAL
LABORATORY COMMENT REPORT: NORMAL
PHOTO: NORMAL
SIZE STONE: NORMAL MM
SPEC SOURCE SUBJ: NORMAL
STONE ANALYSIS-IMP: NORMAL
STONE ANALYSIS-IMP: NORMAL
WT STONE: 41 MG

## 2024-09-24 RX ORDER — POTASSIUM CITRATE 10 MEQ/1
10 TABLET, EXTENDED RELEASE ORAL
Qty: 270 TABLET | Refills: 1 | Status: SHIPPED | OUTPATIENT
Start: 2024-09-24

## 2024-09-24 NOTE — PROGRESS NOTES
Transition of Care Visit  Name: Sakshi Tirado      : 1965      MRN: [id number]  Encounter Provider: LEONARD Haynes  Encounter Date: 2024   Encounter department: [facility]    Assessment & Plan  Needs flu shot    Orders:    influenza vaccine, recombinant, PF, 0.5 mL IM (Flublok)    Screening for metabolic disorder    Orders:    Comprehensive metabolic panel; Future    CBC and differential; Future    Lipid panel; Future    Hyperlipidemia, unspecified hyperlipidemia type  -controlled on Lipitor  Orders:    Lipid panel; Future    Elevated fasting glucose    Orders:    Hemoglobin A1C; Future    Type 2 diabetes mellitus with chronic kidney disease, without long-term current use of insulin, unspecified CKD stage (HCC)    Lab Results   Component Value Date    HGBA1C 6.4 (H) 09/15/2024   -Well-controlled on metformin    Orders:    Albumin / creatinine urine ratio; Future    Primary hypertension  -Controlled on lisinopril         Atherosclerosis of arteries  Patient currently taking statin.         Gastroesophageal reflux disease without esophagitis  Continue home prilosec          Nephrolithiasis  -Has follow-up with urology  -Continue potassium citrate and Flomax         Hydronephrosis due to obstruction of ureter  -Patient reports self stent removal  -Mild leukocytosis, WBCs 11, likely reactive           CKD (chronic kidney disease) stage 2, GFR 60-89 ml/min  Lab Results   Component Value Date    EGFR 91 2024    EGFR 67 2024    EGFR 67 09/15/2024    CREATININE 0.73 2024    CREATININE 0.94 2024    CREATININE 0.94 09/15/2024   Avoid nephrotoxins, advised to stay well-hydrated  Lab Results   Component Value Date    EGFR 91 2024    EGFR 67 2024    EGFR 67 09/15/2024    CREATININE 0.73 2024    CREATININE 0.94 2024    CREATININE 0.94 09/15/2024   Avoid nephrotoxins, advised to stay well-hydrated         Depression,  "unspecified depression type  -Well-controlled on Zoloft         Vitamin D deficiency  Continue vitamin D supplementation              History of Present Illness     Transitional Care Management Review:   Sakshi Tirado is a 58 y.o. female here for TCM follow up.     During the TCM phone call patient stated:  TCM Call       Date and time call was made  9/17/2024  3:21 PM    Hospital care reviewed  Records reviewed    Patient was hospitialized at  [facility]    Date of Admission  09/15/24    Date of discharge  09/17/24    Diagnosis  hyropnephrosis due to obstruction of ureter    Disposition  Home    Were the patients medications reviewed and updated  Yes    Current Symptoms  None    Loose stool severity  Mild          TCM Call       Post hospital issues  None    Should patient be enrolled in anticoag monitoring?  No    Scheduled for follow up?  Yes    Did you obtain your prescribed medications  Yes    Do you need help managing your prescriptions or medications  No    Is transportation to your appointment needed  No    I have advised the patient to call PCP with any new or worsening symptoms  [facility]          Patient presents today for a transition of care appointment, patient was admitted from August 15 August 17 for hydronephrosis due to obstruction of ureter.    Details of hospitalization as per Dr. Cabello:  \"58-year-old female with history of type 2 diabetes, hypertension, pretension, CKD stage II, hydronephrosis with UPJ obstruction s/p ureteral stent 4/14/24, anxiety, and GERD presented to the ED for left flank pain which started on Saturday morning.  Patient has had this similar pain in the past consistent with renal calculi.  Described the pain as constant and sharp pain.  She has not tried over-the-counter to relieve the pain.  No associated nausea, vomiting, fever, and chills.  Also endorses pressure in the abdomen.  Patient has voided once in the ED, denied dysuria, hematuria, and " "suprapubic tenderness.     In the ED, afebrile, /69, HR 81, RR 20, SpO2 98% on room air.  Labs remarkable for glucose of 171 and WBC  13.8.  Urine dipstick remarkable for small blood, large leukocyte, and 1+ urine protein.  UA showed hematuria, pyuria, and bacteriuria.  S/p 1 dose cefepime and 50 mg ketorolac in the ED. given history of UTI in the past with ESBL and Pseudomonas, patient was started on cefepime while waiting for urine culture. Urology consulted and patient underwent cystoscopy and left ureteral stent on 9/16. Urine culture collected in the ED showed mixed contaminants.  Antibiotic was discontinued.  Patient continued to improve clinically, and she has no pain at this point.  Per urology, patient is stable to be discharged home and follow-up with them outpatient in a week for stent removal and pending urine culture collected from cystoscopy.  Patient has allergy to oxybutynin with reaction of anaphylaxis.  She will be discharged home under stable condition with as needed pyridium and tamsulosin daily.\"      She reports that she self removed her stent  She has a follow-up with urology on October 18, 2024    She reports overall she is doing well, denies any dysuria, denies blood in urine, denies fevers and chills        Patient presents today also for her 6 month follow-up on hypertension, hyperlipidemia and diabetes.      Denies any new concerns.   Reviewed blood work      Screenings:  Colonoscopy- 2017, she states that she had a lot polyps  Gyn- has history of partial hytrectomy, she has not gone to her GYN in a while, she denies abnormal bleeding   Mammogram-   2024      Hypertension-blood pressure well-controlled on lisinopril 5 mg tablet daily, denies chest pain, shortness of breath, changes of vision and headaches    Hyperlipidemia-cholesterol 105, triglycerides 146, HDL 34, LDL 42, well-controlled on atorvastatin, denies side effects with this medication    Type 2 diabetes-hemoglobin A1c " "6.4, fasting glucose 171, patient continues on metformin at 1000 mg twice daily, denies hyper or hypoglycemic events, we scheduled eye exam while at office today, foot exam up-to-date              Review of Systems   Constitutional:  Negative for activity change, appetite change, chills, diaphoresis and fever.   HENT:  Negative for congestion, ear discharge, ear pain, postnasal drip, rhinorrhea, sinus pressure, sinus pain and sore throat.    Eyes:  Negative for pain, discharge, itching and visual disturbance.   Respiratory:  Negative for cough, chest tightness, shortness of breath and wheezing.    Cardiovascular:  Negative for chest pain, palpitations and leg swelling.   Gastrointestinal:  Negative for abdominal pain, constipation, diarrhea, nausea and vomiting.   Endocrine: Negative for polydipsia, polyphagia and polyuria.   Genitourinary:  Negative for difficulty urinating, dysuria and urgency.   Musculoskeletal:  Negative for arthralgias, back pain and neck pain.   Skin:  Negative for rash and wound.   Neurological:  Negative for dizziness, weakness, numbness and headaches.     Objective     /60 (BP Location: Left arm, Patient Position: Sitting, Cuff Size: Adult)   Pulse 68   Temp 98 °F (36.7 °C)   Ht 5' 3\" (1.6 m)   Wt 69.4 kg (153 lb)   SpO2 99%   BMI 27.10 kg/m²     Physical Exam  Constitutional:       General: She is not in acute distress.     Appearance: She is well-developed. She is not diaphoretic.   HENT:      Head: Normocephalic and atraumatic.      Right Ear: External ear normal.      Left Ear: External ear normal.      Nose: Nose normal.      Mouth/Throat:      Mouth: Mucous membranes are moist.      Pharynx: No oropharyngeal exudate or posterior oropharyngeal erythema.   Eyes:      General:         Right eye: No discharge.         Left eye: No discharge.      Conjunctiva/sclera: Conjunctivae normal.      Pupils: Pupils are equal, round, and reactive to light.   Neck:      Thyroid: No " thyromegaly.   Cardiovascular:      Rate and Rhythm: Normal rate and regular rhythm.      Heart sounds: Normal heart sounds. No murmur heard.     No friction rub. No gallop.   Pulmonary:      Effort: Pulmonary effort is normal. No respiratory distress.      Breath sounds: Normal breath sounds. No stridor. No wheezing or rales.   Abdominal:      General: Bowel sounds are normal. There is no distension.      Palpations: Abdomen is soft.      Tenderness: There is no abdominal tenderness.   Musculoskeletal:      Cervical back: Normal range of motion and neck supple.   Lymphadenopathy:      Cervical: No cervical adenopathy.   Skin:     General: Skin is warm and dry.      Findings: No erythema or rash.   Neurological:      Mental Status: She is alert and oriented to person, place, and time.   Psychiatric:         Behavior: Behavior normal.         Thought Content: Thought content normal.         Judgment: Judgment normal.       Medications have been reviewed by provider in current encounter    Administrative Statements

## 2024-09-24 NOTE — TELEPHONE ENCOUNTER
Called patient - LMOM regarding an appointment.  She is scheduled for 10/18 with Dr. Lira.  She is asked to call back if this does not work for her.

## 2024-09-25 ENCOUNTER — OFFICE VISIT (OUTPATIENT)
Dept: INTERNAL MEDICINE CLINIC | Facility: OTHER | Age: 59
End: 2024-09-25
Payer: COMMERCIAL

## 2024-09-25 VITALS
HEART RATE: 68 BPM | HEIGHT: 63 IN | BODY MASS INDEX: 27.11 KG/M2 | OXYGEN SATURATION: 99 % | DIASTOLIC BLOOD PRESSURE: 60 MMHG | WEIGHT: 153 LBS | TEMPERATURE: 98 F | SYSTOLIC BLOOD PRESSURE: 118 MMHG

## 2024-09-25 DIAGNOSIS — E11.22 TYPE 2 DIABETES MELLITUS WITH CHRONIC KIDNEY DISEASE, WITHOUT LONG-TERM CURRENT USE OF INSULIN, UNSPECIFIED CKD STAGE (HCC): ICD-10-CM

## 2024-09-25 DIAGNOSIS — Z23 NEEDS FLU SHOT: Primary | ICD-10-CM

## 2024-09-25 DIAGNOSIS — Z13.228 SCREENING FOR METABOLIC DISORDER: ICD-10-CM

## 2024-09-25 DIAGNOSIS — E55.9 VITAMIN D DEFICIENCY: ICD-10-CM

## 2024-09-25 DIAGNOSIS — N20.0 NEPHROLITHIASIS: ICD-10-CM

## 2024-09-25 DIAGNOSIS — R73.01 ELEVATED FASTING GLUCOSE: ICD-10-CM

## 2024-09-25 DIAGNOSIS — N13.1 HYDRONEPHROSIS DUE TO OBSTRUCTION OF URETER: ICD-10-CM

## 2024-09-25 DIAGNOSIS — I10 PRIMARY HYPERTENSION: ICD-10-CM

## 2024-09-25 DIAGNOSIS — F32.A DEPRESSION, UNSPECIFIED DEPRESSION TYPE: ICD-10-CM

## 2024-09-25 DIAGNOSIS — K21.9 GASTROESOPHAGEAL REFLUX DISEASE WITHOUT ESOPHAGITIS: ICD-10-CM

## 2024-09-25 DIAGNOSIS — N18.2 CKD (CHRONIC KIDNEY DISEASE) STAGE 2, GFR 60-89 ML/MIN: ICD-10-CM

## 2024-09-25 DIAGNOSIS — E78.5 HYPERLIPIDEMIA, UNSPECIFIED HYPERLIPIDEMIA TYPE: ICD-10-CM

## 2024-09-25 DIAGNOSIS — I70.90 ATHEROSCLEROSIS OF ARTERIES: ICD-10-CM

## 2024-09-25 PROCEDURE — 90471 IMMUNIZATION ADMIN: CPT

## 2024-09-25 PROCEDURE — 90673 RIV3 VACCINE NO PRESERV IM: CPT

## 2024-09-25 PROCEDURE — 99496 TRANSJ CARE MGMT HIGH F2F 7D: CPT | Performed by: NURSE PRACTITIONER

## 2024-09-25 NOTE — ASSESSMENT & PLAN NOTE
Lab Results   Component Value Date    HGBA1C 6.4 (H) 09/15/2024   -Well-controlled on metformin    Orders:    Albumin / creatinine urine ratio; Future

## 2024-09-25 NOTE — ASSESSMENT & PLAN NOTE
Lab Results   Component Value Date    EGFR 91 09/17/2024    EGFR 67 09/16/2024    EGFR 67 09/15/2024    CREATININE 0.73 09/17/2024    CREATININE 0.94 09/16/2024    CREATININE 0.94 09/15/2024   Avoid nephrotoxins, advised to stay well-hydrated  Lab Results   Component Value Date    EGFR 91 09/17/2024    EGFR 67 09/16/2024    EGFR 67 09/15/2024    CREATININE 0.73 09/17/2024    CREATININE 0.94 09/16/2024    CREATININE 0.94 09/15/2024   Avoid nephrotoxins, advised to stay well-hydrated

## 2024-09-25 NOTE — PROGRESS NOTES
Diabetic Foot Exam    Patient's shoes and socks removed.    Right Foot/Ankle   Right Foot Inspection  Skin Exam: skin normal and skin intact. No dry skin, no warmth, no callus, no erythema, no maceration, no abnormal color, no pre-ulcer, no ulcer and no callus.     Toe Exam: ROM and strength within normal limits. No swelling, no tenderness, erythema and  no right toe deformity    Sensory   Monofilament testing: intact    Vascular  Capillary refills: < 3 seconds  The right DP pulse is 2+.     Left Foot/Ankle  Left Foot Inspection  Skin Exam: skin normal and skin intact. No dry skin, no warmth, no erythema, no maceration, normal color, no pre-ulcer, no ulcer and no callus.     Toe Exam: ROM and strength within normal limits. No swelling, no tenderness, no erythema and no left toe deformity.     Sensory   Monofilament testing: intact    Vascular  Capillary refills: < 3 seconds  The left DP pulse is 2+.     Assign Risk Category  No deformity present  No loss of protective sensation  No weak pulses  Risk: 0

## 2024-09-28 LAB
LEFT EYE DIABETIC RETINOPATHY: NORMAL
RIGHT EYE DIABETIC RETINOPATHY: NORMAL
SEVERITY (EYE EXAM): NORMAL

## 2024-10-02 ENCOUNTER — TELEPHONE (OUTPATIENT)
Dept: ADMINISTRATIVE | Facility: OTHER | Age: 59
End: 2024-10-02

## 2024-10-02 NOTE — LETTER
Diabetic Eye Exam Form    Date Requested: 10/02/24  Patient: Sakshi Tirado  Patient : 1965   Referring Provider: LEONARD Haynes      DIABETIC Eye Exam Date _______________________________      Type of Exam MUST be documented for Diabetic Eye Exams. Please CHECK ONE.     Retinal Exam       Dilated Retinal Exam       OCT       Optomap-Iris Exam      Fundus Photography       Left Eye - Please check Retinopathy or No Retinopathy        Exam did show retinopathy    Exam did not show retinopathy       Right Eye - Please check Retinopathy or No Retinopathy       Exam did show retinopathy    Exam did not show retinopathy       Comments __________________________________________________________    Practice Providing Exam ______________________________________________    Exam Performed By (print name) _______________________________________      Provider Signature ___________________________________________________      These reports are needed for  compliance.  Please fax this completed form and a copy of the Diabetic Eye Exam report to our office located at [address] [place] [address] as soon as possible via Fax [phone] attention Emilee: Phone [phone]  We thank you for your assistance in treating our mutual patient.

## 2024-10-03 ENCOUNTER — VBI (OUTPATIENT)
Dept: ADMINISTRATIVE | Facility: OTHER | Age: 59
End: 2024-10-03

## 2024-10-03 NOTE — TELEPHONE ENCOUNTER
10/03/24 1:39 PM     Chart reviewed for Diabetic Eye Exam ; nothing is submitted to the patient's insurance at this time.     Queta Walker MA   [facility]

## 2024-10-07 NOTE — TELEPHONE ENCOUNTER
----- Message from LEONARD Crawford sent at 10/2/2024  9:46 AM EDT -----  10/02/24 9:47 AM    Hello, our patient Sakshi Tirado has had Diabetic Eye Exam completed/performed. Please assist in updating the patient chart by pulling the document from [address] in [place], on 9/28. The date of service is 9/28.     Thank you,  LEONARD Haynes  [facility] PRIMARY [facility]  
----- Message from LEONARD Crawford sent at 10/2/2024  9:46 AM EDT -----  10/02/24 9:47 AM    Hello, our patient Sakshi Tirado has had Diabetic Eye Exam completed/performed. Please assist in updating the patient chart by pulling the document from [address] in [place], on 9/28. The date of service is 9/28.     Thank you,  LEONARD Haynes  [facility] PRIMARY [facility]  
10/07/24 3:49 PM     Chart reviewed for Diabetic Eye Exam was/were submitted to the patient's insurance.     Emilee Rubalcava   [facility]  
Upon review of the In Basket request and the patient's chart, initial outreach has been made via fax to facility. Please see Contacts section for details.     Thank you  Emilee Rubalcava   
Upon review of the In Basket request we were able to locate, review, and update the patient chart as requested for Diabetic Eye Exam.    Any additional questions or concerns should be emailed to the Practice Liaisons via the appropriate education email address, please do not reply via In Basket.    Thank you  Emilee Rubalcava   [facility]          
Normal for race

## 2024-10-15 DIAGNOSIS — I10 PRIMARY HYPERTENSION: ICD-10-CM

## 2024-10-15 DIAGNOSIS — K21.9 GASTROESOPHAGEAL REFLUX DISEASE WITHOUT ESOPHAGITIS: ICD-10-CM

## 2024-10-15 DIAGNOSIS — F41.9 ANXIETY: ICD-10-CM

## 2024-10-15 DIAGNOSIS — E11.9 TYPE 2 DIABETES MELLITUS WITHOUT COMPLICATION, WITHOUT LONG-TERM CURRENT USE OF INSULIN (HCC): ICD-10-CM

## 2024-10-15 DIAGNOSIS — E78.00 HYPERCHOLESTEREMIA: ICD-10-CM

## 2024-10-15 NOTE — TELEPHONE ENCOUNTER
Medication: metFORMIN (GLUCOPHAGE) 1000 MG tablet     Dose/Frequency: Take 1 tablet (1,000 mg total) by mouth 2 (two) times a day with meals    Quantity: 180 tabs    lisinopril (ZESTRIL) 5 mg tablet  Take 1 tablet (5 mg total) by mouth daily  90 tab    omeprazole (PriLOSEC) 40 MG capsule   Take 1 capsule (40 mg total) by mouth daily  90 caps      sertraline (ZOLOFT) 50 mg table   Take 1 tablet (50 mg total) by mouth daily Takes in the am.  90 tabs    atorvastatin (LIPITOR) 80 mg tablet   Take 1 tablet (80 mg total) by mouth daily  90 tabs    Office:   [x] PCP/Provider -  LEONARD Haynes   [] Speciality/Provider -     Does the patient have enough for 3 days?   [] Yes   [x] No - Send as HP to POD    [facility] - [address] [phone]          Ambulatory

## 2024-10-16 PROBLEM — N39.0 UTI (URINARY TRACT INFECTION): Status: RESOLVED | Noted: 2024-09-15 | Resolved: 2024-10-16

## 2024-10-16 RX ORDER — LISINOPRIL 5 MG/1
5 TABLET ORAL DAILY
Qty: 90 TABLET | Refills: 1 | Status: SHIPPED | OUTPATIENT
Start: 2024-10-16

## 2024-10-16 RX ORDER — ATORVASTATIN CALCIUM 80 MG/1
80 TABLET, FILM COATED ORAL DAILY
Qty: 90 TABLET | Refills: 1 | Status: SHIPPED | OUTPATIENT
Start: 2024-10-16

## 2024-10-16 RX ORDER — OMEPRAZOLE 40 MG/1
40 CAPSULE, DELAYED RELEASE ORAL DAILY
Qty: 90 CAPSULE | Refills: 1 | Status: SHIPPED | OUTPATIENT
Start: 2024-10-16

## 2024-10-18 ENCOUNTER — TELEPHONE (OUTPATIENT)
Age: 59
End: 2024-10-18

## 2024-10-18 NOTE — TELEPHONE ENCOUNTER
Pt called upset that she received notification of appointment today 3:30 Tegan which she states is suppose to be [place] and she cannot go [place],

## 2024-10-18 NOTE — TELEPHONE ENCOUNTER
Pt called back, sts that she had called earlier.  Pt sts that she wants to r/s appt in [facility] office.  She sts that she will not go to [facility].  Informed pt, as per office RN, it is ok to r/s in [facility] office with either the MD or the AP.  Pt sts that she only wants to see Dr. Nina in [facility], and appt needs to be 3:30PM or later.  Next avail with Dr. Nina 3/21/2025 at 3PM, due to amount of appt timing.  Pt declined scheduling appt, sts that she will call back after work.  Pt requested appt with Dr. Lira 10/18/24 at 3;30PM to be cancelled.

## 2025-01-20 NOTE — NURSING NOTE
Patient:   USHA EPTTY            MRN: [id number]            FIN: [id number]               Age:   67 years     Sex:  MALE     :  51   Associated Diagnoses:   None   Author:   LAWANDA MELENDEZ      Basic Information   Source of history:  Self.    Referral source      Chief Complaint   Severe OA of Hip s/p R hip replacement      History of Present Illness   67 yr old with severe OA here after elective R hip Arthroplasty / RIGHT TOTAL HIP REPLACEMENT  pt. ready to walk. no cp no sob.no hx of prior surgery      Review of Systems   Constitutional:  Negative.    Eye:  Negative.    Ear/Nose/Mouth/Throat:  Negative.    Respiratory:  Negative.    Cardiovascular:  Negative.    Gastrointestinal:  Negative.    Genitourinary:  Negative.    Hematology/Lymphatics:  Negative.    Endocrine:  Negative.    Immunologic:  Negative.    Musculoskeletal:  Negative.    Integumentary:  Negative.    Neurologic:  Negative.    Psychiatric:  Negative.    All other systems are negative      Health Status   Allergies:    Allergic Reactions (All)  Severity Not Documented  Penicillin- No reactions were documented.  Canceled/Inactive Reactions (All)  Severity Not Documented  Macrobid- No reactions were documented.   Current medications:    Medications (16) Active  Scheduled: (8)  ApixaBAN 2.5 mg tab  2.5 mg 1 tab, Oral, Q12H  buPROPion  150 mg 1 tab, Oral, BID  Celecoxib 200 mg cap  200 mg 1 cap, Oral, Q12H  Chlorhexidine gluconate 0.12% ORAL RINSE 15 mL repack  15 mL, Oral Mucosa, Q12H  clindamycin-dextrose 5%  900 mg 50 mL, IVPB, Q8H  Gabapentin 300 mg cap  300 mg 1 cap, Oral, Q8H  Ketorolac 30 mg/1 mL inj SDV  15 mg 0.5 mL, IV Push, Q6H  Senna-docusate sodium 8.6-50 mg tab  2 tab, Oral, BID  Continuous: (1)  Lactated Ringers 1,000 mL  1,000 mL, IV, 80 mL/hr  PRN: (7)  Benzocaine-menthol 15-3.6 mg lozenge  1 lozenge, Oral, Q4H  DiphenhydrAMINE 25 mg cap  25 mg 1 cap, Oral, Q4H  HYDROcodone-acetaminophen 7.5-325 mg tab  1 tab, 
Pt d/c to home, transported by 
Oral, Q4H  HYDROmorphone (Dilaudid)  0.3 mg, IV Push, Q2H  Milk of magnesia 8% 30 mL oral susp UD  30 mL, Oral, Daily  Ondansetron 4 mg disintegrating tab  4 mg 1 tab, Oral, Q6H  Ondansetron 4 mg/2 mL inj SDV  4 mg 2 mL, Slow IV Push, Q6H        Histories   Past Medical History: Problem List / Past Medical History   Arthritis of hip  Chronic pain  Tinea pedis     Family History:    No family history items have been selected or recorded.   Procedure history:    No active procedure history items have been selected or recorded.   Social History        Alcohol  Details: Alcohol Abuse in Household: No.  Use: None.  Substance Abuse  Details: Substance Abuse in Household: No.  Tobacco  Details: Smoker in Houshold: No.  Smoked/Smokeless Tobacco Last 30 Days: Yes.  Smoking Tobacco Use: Current every day smoker.  Smokeless Tobacco Use Never.  .        Physical Examination   VS/Measurements        Vitals between:   10-OCT-2018 17:11:41   TO   11-OCT-2018 17:11:41                   LAST RESULT MINIMUM MAXIMUM  Temperature 36.3 36.0 36.9  Heart Rate 83 72 83  Respiratory Rate 18 14 20  NISBP           123 105 124  NIDBP           71 66 81  NIMBP           86 82 95  SpO2                    96 94 99     General:  Alert and oriented, No acute distress.    Eye:  Pupils are equal, round and reactive to light, Extraocular movements are intact, Normal conjunctiva.    HENT:  Normocephalic, Normal hearing, Oral mucosa is moist, No pharyngeal erythema.    Neck:  Supple, Non-tender, No carotid bruit, No jugular venous distention, No lymphadenopathy, No thyromegaly.    Respiratory:  Lungs are clear to auscultation, Respirations are non-labored, Breath sounds are equal, Symmetrical chest wall expansion, No chest wall tenderness.    Cardiovascular:  Normal rate, Regular rhythm, No murmur, No gallop, Good pulses equal in all extremities, Normal peripheral perfusion, No edema.    Gastrointestinal:  Soft, Non-tender, Non-distended, Normal bowel 
sounds, No organomegaly.    Genitourinary:  No costovertebral angle tenderness, No inguinal tenderness, No urethral discharge, No lesions.    Lymphatics:  No lymphadenopathy neck, axilla, groin.    Musculoskeletal     No tenderness.     No swelling.     No deformity.     R hip dressing   Integumentary:  Warm, Pink, Moist, No pallor, No rash.    Neurologic:  Alert, Oriented, Normal sensory, Normal motor function, No focal deficits.    Cognition and Speech:  Oriented, Speech clear and coherent, Functional cognition intact.    Psychiatric:  Cooperative, Appropriate mood & affect, Normal judgment, Non-suicidal.       Health Maintenance   Immunizations:  Up to date per MMWR-CDC guidelines.       Review / Management   Results review:       No Qualifying Labs are resulted on this patient in the last 24 hours  .    Condition:  Stable.       Impression and Plan   67 yr old with severe OA here with    Severe Hip OA  Hip Total Arthroplasty  RIGHT TOTAL HIP REPLACEMENT  -Pain control  -Monitor H&H  -Physical therapy to walk and rehabilitation    Ch. pain disorder.. stable now    DVT px with eliquis  .           Professional Services   I expect that this patient will require medically necessary hospital services for two midnights or more.            Electronically Signed On 10/11/2018 17:18  __________________________________________________   LAWANDA MELENDEZ    
 used

## 2025-02-05 ENCOUNTER — NURSE TRIAGE (OUTPATIENT)
Dept: OTHER | Facility: OTHER | Age: 60
End: 2025-02-05

## 2025-02-05 ENCOUNTER — OFFICE VISIT (OUTPATIENT)
Dept: INTERNAL MEDICINE CLINIC | Facility: OTHER | Age: 60
End: 2025-02-05
Payer: COMMERCIAL

## 2025-02-05 VITALS
SYSTOLIC BLOOD PRESSURE: 140 MMHG | HEART RATE: 104 BPM | HEIGHT: 63 IN | DIASTOLIC BLOOD PRESSURE: 80 MMHG | WEIGHT: 158.2 LBS | OXYGEN SATURATION: 97 % | TEMPERATURE: 98.1 F | BODY MASS INDEX: 28.03 KG/M2

## 2025-02-05 DIAGNOSIS — J00 NASOPHARYNGITIS ACUTE: Primary | ICD-10-CM

## 2025-02-05 DIAGNOSIS — B00.1 COLD SORE: ICD-10-CM

## 2025-02-05 LAB
SARS-COV-2 AG UPPER RESP QL IA: NEGATIVE
SL AMB POCT RAPID FLU A: NEGATIVE
SL AMB POCT RAPID FLU B: NEGATIVE
VALID CONTROL: NORMAL

## 2025-02-05 PROCEDURE — 87811 SARS-COV-2 COVID19 W/OPTIC: CPT | Performed by: INTERNAL MEDICINE

## 2025-02-05 PROCEDURE — 99213 OFFICE O/P EST LOW 20 MIN: CPT | Performed by: INTERNAL MEDICINE

## 2025-02-05 PROCEDURE — 87804 INFLUENZA ASSAY W/OPTIC: CPT | Performed by: INTERNAL MEDICINE

## 2025-02-05 RX ORDER — VALACYCLOVIR HYDROCHLORIDE 1 G/1
2000 TABLET, FILM COATED ORAL 2 TIMES DAILY
Qty: 4 TABLET | Refills: 0 | Status: SHIPPED | OUTPATIENT
Start: 2025-02-05 | End: 2025-02-06

## 2025-02-05 RX ORDER — FLUTICASONE PROPIONATE 50 MCG
1 SPRAY, SUSPENSION (ML) NASAL DAILY
Qty: 9.9 ML | Refills: 1 | Status: SHIPPED | OUTPATIENT
Start: 2025-02-05

## 2025-02-05 NOTE — PROGRESS NOTES
Name: Sakshi Tirado      : 1965      MRN: [id number]  Encounter Provider: Radha Alegre DO  Encounter Date: 2025   Encounter department: [facility] PRIMARY CARE [place]  :  Assessment & Plan  Nasopharyngitis acute  Patient presenting with cough, congestion, subjective fevers, and headache that started yesterday. Works at [employer] and notes that others have been sick. Has not taken any medications at home for her symptoms. Fortunately COVID/Flu testing in the office today was negative; however, her symptoms are consistent with ongoing acute URI that is likely viral in origin. No significant pharyngeal erythema and TM normal bilaterally on exam. Afebrile in the office today but did have temp of 99.4F at home per her report.     Plan:  Plan to manage conservatively with supportive care at home including adequate hydration and rest, and over-the-counter medications  For OTC meds we discussed Mucinex which she said did not help in the past but she is open to trying again. Also discussed used of Corcidin as she has history of high blood pressure. Will also utilize Flonase for congestion support  Patient to return to work when afebrile 24 hours, work note provided for today and tomorrow. She will call the office if symptoms worsen or do not improve.     Orders:    POCT Rapid Covid Ag    POCT rapid flu A and B    fluticasone (FLONASE) 50 mcg/act nasal spray; 1 spray into each nostril daily    Cold sore  Patient with cold sore evident on right lower lip and inner mucosa. She said she has had this once before when she had the flu a few years ago. Currently is bothersome. Started yesterday when acute cold sxs began and feels it has been increasing in size through today.    Plan:  Will plan for 2g BID Valtrex for one day  Discussed that patient can use Abreve over the counter if needed as well    Orders:    valACYclovir (VALTREX) 1,000 mg tablet; Take 2 tablets  "(2,000 mg total) by mouth 2 (two) times a day for 1 day         History of Present Illness   Patient is a 60yo female with history of type 2 diabetes, HTN, vitamin D deficiency, seasonal allergies, osteoarthritis, CKD.     She presents to the office as a same-day visit today for acute symptoms of cough, congestion, sore throat, and headache that started yesterday when she woke up. Temperature at home noted to be Tmax 99.4F. Does have sick contact at work. She also reports a cold sore on her lip that first noticed yesterday. She has had a cold sore before when she had the flu in the past. History of zoster vaccine in 2015.       Review of Systems   Constitutional:  Positive for chills, fatigue and fever.   HENT:  Positive for congestion, mouth sores, postnasal drip, sinus pressure, sneezing and sore throat. Negative for ear pain and trouble swallowing.    Eyes:  Negative for pain, discharge and visual disturbance.   Respiratory:  Negative for cough, chest tightness, shortness of breath and wheezing.    Cardiovascular:  Negative for chest pain and palpitations.   Gastrointestinal:  Negative for abdominal pain and vomiting.   Genitourinary:  Negative for dysuria and hematuria.   Musculoskeletal:  Negative for arthralgias and back pain.   Skin:  Negative for color change and rash.   Neurological:  Positive for headaches. Negative for dizziness, seizures, syncope and light-headedness.   All other systems reviewed and are negative.      Objective   /80 (BP Location: Left arm, Patient Position: Sitting, Cuff Size: Standard)   Pulse 104   Temp 98.1 °F (36.7 °C) (Temporal)   Ht 5' 3\" (1.6 m)   Wt 71.8 kg (158 lb 3.2 oz)   SpO2 97%   BMI 28.02 kg/m²      Physical Exam  Vitals and nursing note reviewed.   Constitutional:       General: She is not in acute distress.     Appearance: She is well-developed.   HENT:      Head: Normocephalic and atraumatic.      Right Ear: Tympanic membrane, ear canal and external ear " normal.      Left Ear: Tympanic membrane, ear canal and external ear normal.      Nose: Congestion and rhinorrhea present.      Mouth/Throat:      Lips: Lesions (R lower lip lesion) present.      Mouth: Mucous membranes are moist.      Pharynx: Oropharynx is clear. No oropharyngeal exudate or posterior oropharyngeal erythema.   Eyes:      General: No scleral icterus.        Right eye: No discharge.         Left eye: No discharge.      Conjunctiva/sclera: Conjunctivae normal.   Cardiovascular:      Rate and Rhythm: Normal rate and regular rhythm.      Heart sounds: Normal heart sounds. No murmur heard.  Pulmonary:      Effort: Pulmonary effort is normal. No respiratory distress.      Breath sounds: Normal breath sounds. No wheezing, rhonchi or rales.   Abdominal:      Palpations: Abdomen is soft.      Tenderness: There is no abdominal tenderness.   Musculoskeletal:      Cervical back: Neck supple.      Right lower leg: No edema.      Left lower leg: No edema.   Skin:     General: Skin is warm and dry.      Capillary Refill: Capillary refill takes less than 2 seconds.   Neurological:      Mental Status: She is alert.   Psychiatric:         Mood and Affect: Mood normal.       Radha Alegre DO  [facility]  Internal Medicine Residency, PGY-2

## 2025-02-05 NOTE — TELEPHONE ENCOUNTER
Patient calling in with cough, runny nose, cold sore on lip, ear itchiness, chills, poor appetite and body aches.  Patient grabbed a temperature while on the phone with me that showed 99.4 tympanic.  I asked patient if she took anything for the body aches and she stated no.  Patient is requesting an appointment with her PCP office.  I informed patient these symptoms can be treated at home but she insisted on coming in to be seen.  Appointment was made with Dr. Alegre today at 10am.   None

## 2025-02-05 NOTE — TELEPHONE ENCOUNTER
"Reason for Disposition  • [1] MILD pain (e.g., does not interfere with normal activities) AND [2] present < 7 days    Answer Assessment - Initial Assessment Questions  1. ONSET: \"When did the muscle aches or body pains start?\"         2/4    2. LOCATION: \"What part of your body is hurting?\" (e.g., entire body, arms, legs)         Entire body and ear itchiness    3. SEVERITY: \"How bad is the pain?\" (Scale 1-10; or mild, moderate, severe)        Moderate    4. CAUSE: \"What do you think is causing the pains?\"        unknown    5. FEVER: \"Have you been having fever?\"        99.4 tympanic    6. OTHER SYMPTOMS: \"Do you have any other symptoms?\" (e.g., chest pain, weakness, rash, cold or flu symptoms, weight loss)        Runny nose, cough, cold sore on lip, poor appetite, and chills.    7. TRAVEL: \"Have you traveled out of the country in the last month?\" (e.g., travel history, exposures)        Denies    Protocols used: Muscle Aches and Body Pain-Adult-AH    "

## 2025-02-05 NOTE — LETTER
February 5, 2025     Patient: Sakshi Tirado  YOB: 1965  Date of Visit: 2/5/2025      To Whom it May Concern:    Sakshi Tirado is under my professional care. Sakshi was seen in my office on 2/5/2025. Sakshi may return to work on 2/10/2025 .    If you have any questions or concerns, please don't hesitate to call.        Sincerely,        Radha Alegre, DO        CC: No Recipients

## 2025-02-05 NOTE — TELEPHONE ENCOUNTER
"Regarding: Body aches runny nose cold sore/ Need appt  ----- Message from Meg SANTOS sent at 2/5/2025  7:30 AM EST -----  \"I have body aches, runny nose, cough, and a cold sore on my lip. I need an appt.\"    "

## 2025-02-05 NOTE — PATIENT INSTRUCTIONS
Suggested cold medicine: Coricidin® HBP, Cough & Cold (for patients with high blood pressure)  Take Valtrex 2g (2 tablets) tonight and tomorrow morning.   Get plenty of fluids and rest!  Call if symptoms worsen or do not improve

## 2025-02-06 ENCOUNTER — TELEPHONE (OUTPATIENT)
Age: 60
End: 2025-02-06

## 2025-02-06 NOTE — TELEPHONE ENCOUNTER
Chief Complaint   Patient presents with    Rash     Patient states she noticed a spot on her upper L arm on Monday and since then it has spread and is now very itchy and all over        ASSESSMENT AND PLAN:    1. Rash  At this point there is no evidence of anything more than larval tick bites. Begin doxycycline 100 mg twice a day for 10 days. Benadryl at bedtime and hydroxyzine 10 mg at breakfast.  If her symptoms worsen or intensify she will return for reevaluation. At this point there is no evidence of folliculitis or monkey pox or other infectious diseases. No orders of the defined types were placed in this encounter. Yvonne Dickson MD, FACP      HPI:         is a 48 y.o. female who notes the onset of a skin problem. The details are as follows: Over the last day has become aware of intensely itching areas on her skin.  has similar symptoms. Describes herself as an outdoor person. Allergies   Allergen Reactions    Pollo Hives    Pcn [Penicillins] Unknown (comments)    Rocephin [Ceftriaxone] Rash       No current outpatient medications on file. No current facility-administered medications for this visit. Past Medical History:   Diagnosis Date    Asthma     Menopause     MRSA (methicillin resistant Staphylococcus aureus)          ROS:  Denies fever, chills, cough, chest pain, SOB,  nausea, vomiting, or diarrhea. Denies wt loss, wt gain, hemoptysis, hematochezia or melena. Physical Examination:    Visit Vitals  /74 (BP 1 Location: Right arm, BP Patient Position: Sitting)   Pulse (!) 101   Temp 98.2 °F (36.8 °C) (Temporal)   Resp 18   SpO2 97%      General:  Alert, cooperative, no distress. Head:  Normocephalic, without obvious abnormality, atraumatic. Eyes:  Conjunctivae/corneas clear. Pupils equal, round, reactive to light. Chest wall:  No tenderness or deformity. Extremities: Extremities normal, atraumatic, no cyanosis or edema.    Skin: PA for Fluticasone (FLONASE) 50 mcg/act nasal spray SUBMITTED to [facility] [employer]    via    []CMM-KEY:   [x]Surescripts-Case ID #: Not provided  []Availity-Auth ID #  []Faxed to plan   []Other website   []Phone call Case ID #     []PA sent as URGENT    All office notes, labs and other pertaining documents and studies sent. Clinical questions answered. Awaiting determination from insurance company.     Turnaround time for your insurance to make a decision on your Prior Authorization can take 7-21 business days.                 Submillimeter erythematous itchy papules scattered across the torso and extremities 1 of which has a very tiny black pinpoint area at its apex.    Lymph nodes: Cervical and supraclavicular nodes are normal.   Neurologic: Moves all extremities, fluent speech

## 2025-02-07 NOTE — TELEPHONE ENCOUNTER
PA for Fluticasone (FLONASE) 50 mcg/act nasal spray DENIED    Reason:(Screenshot if applicable)        Message sent to office clinical pool Yes    Denial letter scanned into Media Yes    Appeal started No (Provider will need to decide if appeal is warranted and send clinical documentation to Prior Authorization Team for initiation.)    **Please follow up with your patient regarding denial and next steps**

## 2025-02-19 ENCOUNTER — TELEPHONE (OUTPATIENT)
Age: 60
End: 2025-02-19

## 2025-02-19 NOTE — TELEPHONE ENCOUNTER
Joceline nurse from [facility] called stating there is cap in care for diabetic eye exam.  She would also like to provide number in case of care management needs.  [phone] ext [phone].

## 2025-03-08 ENCOUNTER — APPOINTMENT (OUTPATIENT)
Dept: LAB | Facility: IMAGING CENTER | Age: 60
End: 2025-03-08
Payer: COMMERCIAL

## 2025-03-08 DIAGNOSIS — N20.0 NEPHROLITHIASIS: ICD-10-CM

## 2025-03-08 DIAGNOSIS — Z13.228 SCREENING FOR METABOLIC DISORDER: ICD-10-CM

## 2025-03-08 DIAGNOSIS — E78.5 HYPERLIPIDEMIA, UNSPECIFIED HYPERLIPIDEMIA TYPE: ICD-10-CM

## 2025-03-08 DIAGNOSIS — E11.22 TYPE 2 DIABETES MELLITUS WITH CHRONIC KIDNEY DISEASE, WITHOUT LONG-TERM CURRENT USE OF INSULIN, UNSPECIFIED CKD STAGE (HCC): ICD-10-CM

## 2025-03-08 DIAGNOSIS — R73.01 ELEVATED FASTING GLUCOSE: ICD-10-CM

## 2025-03-08 LAB
ALBUMIN SERPL BCG-MCNC: 4.4 G/DL (ref 3.5–5)
ALP SERPL-CCNC: 117 U/L (ref 34–104)
ALT SERPL W P-5'-P-CCNC: 29 U/L (ref 7–52)
ANION GAP SERPL CALCULATED.3IONS-SCNC: 9 MMOL/L (ref 4–13)
AST SERPL W P-5'-P-CCNC: 23 U/L (ref 13–39)
BASOPHILS # BLD AUTO: 0.06 THOUSANDS/ÂΜL (ref 0–0.1)
BASOPHILS NFR BLD AUTO: 1 % (ref 0–1)
BILIRUB SERPL-MCNC: 0.69 MG/DL (ref 0.2–1)
BUN SERPL-MCNC: 20 MG/DL (ref 5–25)
CALCIUM SERPL-MCNC: 9.7 MG/DL (ref 8.4–10.2)
CHLORIDE SERPL-SCNC: 100 MMOL/L (ref 96–108)
CHOLEST SERPL-MCNC: 187 MG/DL (ref ?–200)
CO2 SERPL-SCNC: 31 MMOL/L (ref 21–32)
CREAT SERPL-MCNC: 0.89 MG/DL (ref 0.6–1.3)
CREAT UR-MCNC: 80.5 MG/DL
EOSINOPHIL # BLD AUTO: 0.48 THOUSAND/ÂΜL (ref 0–0.61)
EOSINOPHIL NFR BLD AUTO: 5 % (ref 0–6)
ERYTHROCYTE [DISTWIDTH] IN BLOOD BY AUTOMATED COUNT: 13.7 % (ref 11.6–15.1)
EST. AVERAGE GLUCOSE BLD GHB EST-MCNC: 137 MG/DL
GFR SERPL CREATININE-BSD FRML MDRD: 71 ML/MIN/1.73SQ M
GLUCOSE P FAST SERPL-MCNC: 131 MG/DL (ref 65–99)
HBA1C MFR BLD: 6.4 %
HCT VFR BLD AUTO: 47.3 % (ref 34.8–46.1)
HDLC SERPL-MCNC: 47 MG/DL
HGB BLD-MCNC: 15.3 G/DL (ref 11.5–15.4)
IMM GRANULOCYTES # BLD AUTO: 0.04 THOUSAND/UL (ref 0–0.2)
IMM GRANULOCYTES NFR BLD AUTO: 0 % (ref 0–2)
LDLC SERPL CALC-MCNC: 101 MG/DL (ref 0–100)
LYMPHOCYTES # BLD AUTO: 2.92 THOUSANDS/ÂΜL (ref 0.6–4.47)
LYMPHOCYTES NFR BLD AUTO: 31 % (ref 14–44)
MCH RBC QN AUTO: 30.2 PG (ref 26.8–34.3)
MCHC RBC AUTO-ENTMCNC: 32.3 G/DL (ref 31.4–37.4)
MCV RBC AUTO: 93 FL (ref 82–98)
MICROALBUMIN UR-MCNC: 26 MG/L
MICROALBUMIN/CREAT 24H UR: 32 MG/G CREATININE (ref 0–30)
MONOCYTES # BLD AUTO: 0.72 THOUSAND/ÂΜL (ref 0.17–1.22)
MONOCYTES NFR BLD AUTO: 8 % (ref 4–12)
NEUTROPHILS # BLD AUTO: 5.23 THOUSANDS/ÂΜL (ref 1.85–7.62)
NEUTS SEG NFR BLD AUTO: 55 % (ref 43–75)
NONHDLC SERPL-MCNC: 140 MG/DL
NRBC BLD AUTO-RTO: 0 /100 WBCS
PLATELET # BLD AUTO: 285 THOUSANDS/UL (ref 149–390)
PMV BLD AUTO: 11.3 FL (ref 8.9–12.7)
POTASSIUM SERPL-SCNC: 4.4 MMOL/L (ref 3.5–5.3)
PROT SERPL-MCNC: 6.6 G/DL (ref 6.4–8.4)
RBC # BLD AUTO: 5.07 MILLION/UL (ref 3.81–5.12)
SODIUM SERPL-SCNC: 140 MMOL/L (ref 135–147)
TRIGL SERPL-MCNC: 193 MG/DL (ref ?–150)
WBC # BLD AUTO: 9.45 THOUSAND/UL (ref 4.31–10.16)

## 2025-03-08 PROCEDURE — 83036 HEMOGLOBIN GLYCOSYLATED A1C: CPT

## 2025-03-08 PROCEDURE — 80053 COMPREHEN METABOLIC PANEL: CPT

## 2025-03-08 PROCEDURE — 82043 UR ALBUMIN QUANTITATIVE: CPT

## 2025-03-08 PROCEDURE — 85025 COMPLETE CBC W/AUTO DIFF WBC: CPT

## 2025-03-08 PROCEDURE — 82570 ASSAY OF URINE CREATININE: CPT

## 2025-03-08 PROCEDURE — 36415 COLL VENOUS BLD VENIPUNCTURE: CPT

## 2025-03-08 PROCEDURE — 80061 LIPID PANEL: CPT

## 2025-03-27 ENCOUNTER — RA CDI HCC (OUTPATIENT)
Dept: OTHER | Facility: HOSPITAL | Age: 60
End: 2025-03-27

## 2025-03-27 PROBLEM — A41.9 SEPSIS WITHOUT ACUTE ORGAN DYSFUNCTION (HCC): Status: RESOLVED | Noted: 2024-04-15 | Resolved: 2025-03-27

## 2025-03-27 NOTE — PROGRESS NOTES
HCC coding opportunities          Chart Reviewed number of suggestions sent to Provider: 2   E11.36    Depression: found on active problem list, Can Depression be further classified as per ICD 10 coding guidelines.        Patients Insurance        Commercial Insurance: [employer]

## 2025-04-02 ENCOUNTER — OFFICE VISIT (OUTPATIENT)
Dept: INTERNAL MEDICINE CLINIC | Facility: OTHER | Age: 60
End: 2025-04-02
Payer: COMMERCIAL

## 2025-04-02 VITALS
HEIGHT: 64 IN | TEMPERATURE: 98 F | OXYGEN SATURATION: 99 % | HEART RATE: 60 BPM | WEIGHT: 160 LBS | SYSTOLIC BLOOD PRESSURE: 124 MMHG | DIASTOLIC BLOOD PRESSURE: 82 MMHG | BODY MASS INDEX: 27.31 KG/M2

## 2025-04-02 DIAGNOSIS — N39.3 STRESS INCONTINENCE, FEMALE: ICD-10-CM

## 2025-04-02 DIAGNOSIS — Z00.00 ANNUAL PHYSICAL EXAM: ICD-10-CM

## 2025-04-02 DIAGNOSIS — N18.2 CKD (CHRONIC KIDNEY DISEASE) STAGE 2, GFR 60-89 ML/MIN: ICD-10-CM

## 2025-04-02 DIAGNOSIS — I70.90 ATHEROSCLEROSIS OF ARTERIES: ICD-10-CM

## 2025-04-02 DIAGNOSIS — E11.22 TYPE 2 DIABETES MELLITUS WITH CHRONIC KIDNEY DISEASE, WITHOUT LONG-TERM CURRENT USE OF INSULIN, UNSPECIFIED CKD STAGE (HCC): ICD-10-CM

## 2025-04-02 DIAGNOSIS — F32.A DEPRESSION, UNSPECIFIED DEPRESSION TYPE: ICD-10-CM

## 2025-04-02 DIAGNOSIS — Z23 ENCOUNTER FOR IMMUNIZATION: ICD-10-CM

## 2025-04-02 DIAGNOSIS — Z13.228 SCREENING FOR METABOLIC DISORDER: ICD-10-CM

## 2025-04-02 DIAGNOSIS — E55.9 VITAMIN D DEFICIENCY: ICD-10-CM

## 2025-04-02 DIAGNOSIS — I10 PRIMARY HYPERTENSION: ICD-10-CM

## 2025-04-02 DIAGNOSIS — R32 URINARY INCONTINENCE, UNSPECIFIED TYPE: Primary | ICD-10-CM

## 2025-04-02 DIAGNOSIS — N20.0 NEPHROLITHIASIS: ICD-10-CM

## 2025-04-02 DIAGNOSIS — K21.9 GASTROESOPHAGEAL REFLUX DISEASE WITHOUT ESOPHAGITIS: ICD-10-CM

## 2025-04-02 DIAGNOSIS — E66.3 OVERWEIGHT (BMI 25.0-29.9): ICD-10-CM

## 2025-04-02 DIAGNOSIS — E78.5 HYPERLIPIDEMIA, UNSPECIFIED HYPERLIPIDEMIA TYPE: ICD-10-CM

## 2025-04-02 DIAGNOSIS — Z12.31 ENCOUNTER FOR SCREENING MAMMOGRAM FOR BREAST CANCER: ICD-10-CM

## 2025-04-02 DIAGNOSIS — R73.01 ELEVATED FASTING GLUCOSE: ICD-10-CM

## 2025-04-02 PROBLEM — Z96.651 AFTERCARE FOLLOWING RIGHT KNEE JOINT REPLACEMENT SURGERY: Status: RESOLVED | Noted: 2021-05-26 | Resolved: 2025-04-02

## 2025-04-02 PROBLEM — Z96.651 STATUS POST TOTAL KNEE REPLACEMENT, RIGHT: Status: RESOLVED | Noted: 2021-05-20 | Resolved: 2025-04-02

## 2025-04-02 PROBLEM — Z47.1 AFTERCARE FOLLOWING RIGHT KNEE JOINT REPLACEMENT SURGERY: Status: RESOLVED | Noted: 2021-05-26 | Resolved: 2025-04-02

## 2025-04-02 PROBLEM — R80.9 URINE TEST POSITIVE FOR MICROALBUMINURIA: Status: RESOLVED | Noted: 2023-11-13 | Resolved: 2025-04-02

## 2025-04-02 PROCEDURE — 99214 OFFICE O/P EST MOD 30 MIN: CPT | Performed by: NURSE PRACTITIONER

## 2025-04-02 PROCEDURE — 90750 HZV VACC RECOMBINANT IM: CPT

## 2025-04-02 PROCEDURE — 90471 IMMUNIZATION ADMIN: CPT

## 2025-04-02 PROCEDURE — 87086 URINE CULTURE/COLONY COUNT: CPT | Performed by: NURSE PRACTITIONER

## 2025-04-02 PROCEDURE — 99396 PREV VISIT EST AGE 40-64: CPT | Performed by: NURSE PRACTITIONER

## 2025-04-02 NOTE — ASSESSMENT & PLAN NOTE
-Up-to-date with fasting blood work  -Will be due soon for breast cancer screening, up-to-date with colon cancer screening  -Encouraged lifestyle modification  -Received shingles vaccination while in office today  -Follow-up in 1 year for annual physical or sooner if needed

## 2025-04-02 NOTE — ASSESSMENT & PLAN NOTE
Lab Results   Component Value Date    HGBA1C 6.4 (H) 03/08/2025   -Well-controlled on metformin

## 2025-04-02 NOTE — ASSESSMENT & PLAN NOTE
Lab Results   Component Value Date    EGFR 71 03/08/2025    EGFR 91 09/17/2024    EGFR 67 09/16/2024    CREATININE 0.89 03/08/2025    CREATININE 0.73 09/17/2024    CREATININE 0.94 09/16/2024   Avoid nephrotoxins, advised to stay well-hydrated  Lab Results   Component Value Date    EGFR 71 03/08/2025    EGFR 91 09/17/2024    EGFR 67 09/16/2024    CREATININE 0.89 03/08/2025    CREATININE 0.73 09/17/2024    CREATININE 0.94 09/16/2024   Avoid nephrotoxins, advised to stay well-hydrated

## 2025-04-02 NOTE — PROGRESS NOTES
Adult Annual Physical  Name: Sakshi Tirado      : 1965      MRN: [id number]  Encounter Provider: LEONARD Haynes  Encounter Date: 2025   Encounter department: [facility]    :  Assessment & Plan  Urinary incontinence, unspecified type    Orders:    Urine culture; Future    Ambulatory Referral to Urogynecology; Future    Encounter for screening mammogram for breast cancer    Orders:    Mammo screening bilateral w 3d and cad; Future    Type 2 diabetes mellitus with chronic kidney disease, without long-term current use of insulin, unspecified CKD stage (HCC)    Lab Results   Component Value Date    HGBA1C 6.4 (H) 2025   -Well-controlled on metformin           Screening for metabolic disorder    Orders:    Comprehensive metabolic panel    CBC and differential    Lipid panel    Elevated fasting glucose    Orders:    Hemoglobin A1C    Hyperlipidemia, unspecified hyperlipidemia type  -controlled on Lipitor  Orders:    Lipid panel      Encounter for immunization    Orders:    Zoster Vaccine Recombinant IM    Primary hypertension  -Controlled on lisinopril           Atherosclerosis of arteries  Patient currently taking statin.           Gastroesophageal reflux disease without esophagitis  Continue home prilosec            Nephrolithiasis  -Has follow-up with urology  -Continue potassium citrate            CKD (chronic kidney disease) stage 2, GFR 60-89 ml/min  Lab Results   Component Value Date    EGFR 71 2025    EGFR 91 2024    EGFR 67 2024    CREATININE 0.89 2025    CREATININE 0.73 2024    CREATININE 0.94 2024   Avoid nephrotoxins, advised to stay well-hydrated  Lab Results   Component Value Date    EGFR 71 2025    EGFR 91 2024    EGFR 67 2024    CREATININE 0.89 2025    CREATININE 0.73 2024    CREATININE 0.94 2024   Avoid nephrotoxins, advised to stay well-hydrated           Stress  incontinence, female  -referral to urogynecology  -Will get urine culture         Depression, unspecified depression type  -Well-controlled on Zoloft           Vitamin D deficiency  Continue vitamin D supplementation           Overweight (BMI 25.0-29.9)  Encouraged lifestyle modification         Annual physical exam  -Up-to-date with fasting blood work  -Will be due soon for breast cancer screening, up-to-date with colon cancer screening  -Encouraged lifestyle modification  -Received shingles vaccination while in office today  -Follow-up in 1 year for annual physical or sooner if needed           BMI Counseling: Body mass index is 27.46 kg/m². The BMI is above normal. Nutrition recommendations include reducing portion sizes, decreasing overall calorie intake, 3-5 servings of fruits/vegetables daily, and reducing fast food intake. Exercise recommendations include moderate aerobic physical activity for 150 minutes/week.    Preventive Screenings:  - Diabetes Screening: screening not indicated, has diabetes, risks/benefits discussed and screening up-to-date  - Cholesterol Screening: screening not indicated, has hyperlipidemia, risks/benefits discussed and screening up-to-date   - Hepatitis C screening: screening up-to-date   - HIV screening: risks/benefits discussed and patient declines   - Cervical cancer screening: risks/benefits discussed and screening not indicated   - Breast cancer screening: screening up-to-date   - Colon cancer screening: screening up-to-date   - Lung cancer screening: screening not indicated     Immunizations:  - Immunizations due: Prevnar 20, Tdap and Zoster (Shingrix)  - Risks/benefits immunizations discussed    - Immunizations given per orders      Counseling/Anticipatory Guidance:  - Alcohol: discussed moderation in alcohol intake and recommendations for healthy alcohol use.   - Drug use: discussed harms of illicit drug use and how it can negatively impact mental/physical health.   - Tobacco  use: discussed harms of tobacco use and management options for quitting.   - Dental health: discussed importance of regular tooth brushing, flossing, and dental visits.   - Sexual health: discussed sexually transmitted diseases, partner selection, use of condoms, avoidance of unintended pregnancy, and contraceptive alternatives.   - Diet: discussed recommendations for a healthy/well-balanced diet.   - Exercise: the importance of regular exercise/physical activity was discussed. Recommend exercise 3-5 times per week for at least 30 minutes.   - Injury prevention: discussed safety/seat belts, safety helmets, smoke detectors, carbon monoxide detectors, and smoking near bedding or upholstery.          History of Present Illness     Adult Annual Physical:  Patient presents for annual physical. Patient presents today for  follow-up on hypertension, hyperlipidemia and diabetes.    She also presents today for annual physical.  Denies any new concerns.   Reviewed blood work while in office    She reports urinary leakage, she reports that she has had urinary leakage for a while now, has been having to wear pad due to leaking urine, had bladder sling in the past     Hypertension-currently well-controlled on lisinopril denies chest pain, shortness of breath changes of vision and headaches    Hyperlipidemia-ASCVD rescore 7.6%, cholesterol 187, triglycerides 193, HDL 47, , controlled on atorvastatin without side effects    Depression/anxiety-well-controlled with Zoloft, denies side effects with this medication    Type 2 DM- HBA1C 6.4, controlled on metformin, denies hypoglycemia and hyperglycemic events     Nephrolithiasis-currently follows urology        Screenings:  Colonoscopy- 2017  Gyn- has history of partial hytrectomy, she has not gone to her GYN in a while, she denies abnormal bleeding   Mammogram-   2024          .     Diet and Physical Activity:  - Diet/Nutrition: poor diet and well balanced diet.  - Exercise: no  "formal exercise. will be starting soon    General Health:  - Sleep: sleeps well, sleeps poorly and 7-8 hours of sleep on average.  - Hearing: normal hearing bilateral ears.  - Vision: no vision problems, wears glasses and most recent eye exam < 1 year ago.  - Dental: regular dental visits and brushes teeth twice daily.    /GYN Health:  - Follows with GYN: no.   - Menopause: postmenopausal.   - History of STDs: no    Advanced Care Planning:  - Has an advanced directive?: no    - Has a durable medical POA?: no    - ACP document given to patient?: no      Review of Systems   Constitutional:  Negative for activity change, appetite change, chills, diaphoresis and fever.   HENT:  Negative for congestion, ear discharge, ear pain, postnasal drip, rhinorrhea, sinus pressure, sinus pain and sore throat.    Eyes:  Negative for pain, discharge, itching and visual disturbance.   Respiratory:  Negative for cough, chest tightness, shortness of breath and wheezing.    Cardiovascular:  Negative for chest pain, palpitations and leg swelling.   Gastrointestinal:  Negative for abdominal pain, constipation, diarrhea, nausea and vomiting.   Endocrine: Negative for polydipsia, polyphagia and polyuria.   Genitourinary:  Negative for difficulty urinating, dysuria and urgency.   Musculoskeletal:  Negative for arthralgias, back pain and neck pain.   Skin:  Negative for rash and wound.   Neurological:  Negative for dizziness, weakness, numbness and headaches.         Objective   /82 (BP Location: Left arm, Patient Position: Sitting, Cuff Size: Adult)   Pulse 60   Temp 98 °F (36.7 °C)   Ht 5' 4\" (1.626 m)   Wt 72.6 kg (160 lb)   SpO2 99%   BMI 27.46 kg/m²     Physical Exam  Constitutional:       General: She is not in acute distress.     Appearance: She is well-developed. She is not diaphoretic.   HENT:      Head: Normocephalic and atraumatic.      Right Ear: External ear normal.      Left Ear: External ear normal.      Nose: Nose " normal.      Mouth/Throat:      Mouth: Mucous membranes are moist.      Pharynx: No oropharyngeal exudate or posterior oropharyngeal erythema.   Eyes:      General:         Right eye: No discharge.         Left eye: No discharge.      Conjunctiva/sclera: Conjunctivae normal.      Pupils: Pupils are equal, round, and reactive to light.   Neck:      Thyroid: No thyromegaly.   Cardiovascular:      Rate and Rhythm: Normal rate and regular rhythm.      Heart sounds: Normal heart sounds. No murmur heard.     No friction rub. No gallop.   Pulmonary:      Effort: Pulmonary effort is normal. No respiratory distress.      Breath sounds: Normal breath sounds. No stridor. No wheezing or rales.   Abdominal:      General: Bowel sounds are normal. There is no distension.      Palpations: Abdomen is soft.      Tenderness: There is no abdominal tenderness.   Musculoskeletal:      Cervical back: Normal range of motion and neck supple.   Lymphadenopathy:      Cervical: No cervical adenopathy.   Skin:     General: Skin is warm and dry.      Findings: No erythema or rash.   Neurological:      Mental Status: She is alert and oriented to person, place, and time.   Psychiatric:         Behavior: Behavior normal.         Thought Content: Thought content normal.         Judgment: Judgment normal.

## 2025-04-04 LAB — BACTERIA UR CULT: NORMAL

## 2025-05-07 DIAGNOSIS — K21.9 GASTROESOPHAGEAL REFLUX DISEASE WITHOUT ESOPHAGITIS: ICD-10-CM

## 2025-05-07 DIAGNOSIS — E11.9 TYPE 2 DIABETES MELLITUS WITHOUT COMPLICATION, WITHOUT LONG-TERM CURRENT USE OF INSULIN (HCC): ICD-10-CM

## 2025-05-07 DIAGNOSIS — I10 PRIMARY HYPERTENSION: ICD-10-CM

## 2025-05-07 DIAGNOSIS — F41.9 ANXIETY: ICD-10-CM

## 2025-05-07 DIAGNOSIS — E78.00 HYPERCHOLESTEREMIA: ICD-10-CM

## 2025-05-07 RX ORDER — ATORVASTATIN CALCIUM 80 MG/1
80 TABLET, FILM COATED ORAL DAILY
Qty: 90 TABLET | Refills: 1 | Status: SHIPPED | OUTPATIENT
Start: 2025-05-07

## 2025-05-07 RX ORDER — OMEPRAZOLE 40 MG/1
40 CAPSULE, DELAYED RELEASE ORAL DAILY
Qty: 90 CAPSULE | Refills: 1 | Status: SHIPPED | OUTPATIENT
Start: 2025-05-07

## 2025-05-07 RX ORDER — LISINOPRIL 5 MG/1
5 TABLET ORAL DAILY
Qty: 90 TABLET | Refills: 1 | Status: SHIPPED | OUTPATIENT
Start: 2025-05-07

## 2025-05-07 NOTE — TELEPHONE ENCOUNTER
Medication: atorvastatin (LIPITOR) 80 mg tablet     Dose/Frequency:   Take 1 tablet (80 mg total) by mouth daily    Quantity: 90    Pharmacy: [facility] [address]     Office:   [x] PCP/Provider -   [] Speciality/Provider -     Does the patient have enough for 3 days?   [] Yes   [x] No - Send as HP to POD      Medication: lisinopril (ZESTRIL) 5 mg tablet     Dose/Frequency:   Take 1 tablet (5 mg total) by mouth daily     Quantity: 90    Pharmacy: [facility] [address]     Office:   [x] PCP/Provider -   [] Speciality/Provider -     Does the patient have enough for 3 days?   [] Yes   [x] No - Send as HP to POD      Medication: metFORMIN (GLUCOPHAGE) 1000 MG tablet  Dose/Frequency:   Take 1 tablet (1,000 mg total) by mouth 2 (two) times a day with meals      Quantity: 180    Pharmacy: [facility] [address]     Office:   [x] PCP/Provider -   [] Speciality/Provider -     Does the patient have enough for 3 days?   [] Yes   [x] No - Send as HP to POD      Medication: omeprazole (PriLOSEC) 40 MG capsule     Dose/Frequency:   Take 1 capsule (40 mg total) by mouth daily        Quantity: 90    Pharmacy: [facility] [address]     Office:   [x] PCP/Provider -   [] Speciality/Provider -     Does the patient have enough for 3 days?   [] Yes   [x] No - Send as HP to POD      Medication: sertraline (ZOLOFT) 50 mg tablet     Dose/Frequency:   Take 1 tablet (50 mg total) by mouth daily Takes in the am.        Quantity: 90    Pharmacy:  [facility] [address]     Office:   [x] PCP/Provider -   [] Speciality/Provider -     Does the patient have enough for 3 days?   [] Yes   [x] No - Send as HP to POD

## 2025-06-04 ENCOUNTER — OFFICE VISIT (OUTPATIENT)
Dept: INTERNAL MEDICINE CLINIC | Facility: OTHER | Age: 60
End: 2025-06-04
Payer: COMMERCIAL

## 2025-06-04 ENCOUNTER — NURSE TRIAGE (OUTPATIENT)
Age: 60
End: 2025-06-04

## 2025-06-04 VITALS
HEIGHT: 64 IN | OXYGEN SATURATION: 97 % | DIASTOLIC BLOOD PRESSURE: 64 MMHG | BODY MASS INDEX: 26.46 KG/M2 | TEMPERATURE: 98 F | WEIGHT: 155 LBS | SYSTOLIC BLOOD PRESSURE: 128 MMHG | HEART RATE: 92 BPM

## 2025-06-04 DIAGNOSIS — J20.9 ACUTE BRONCHITIS, UNSPECIFIED ORGANISM: Primary | ICD-10-CM

## 2025-06-04 PROCEDURE — 99213 OFFICE O/P EST LOW 20 MIN: CPT

## 2025-06-04 RX ORDER — BENZONATATE 200 MG/1
200 CAPSULE ORAL 3 TIMES DAILY PRN
Qty: 20 CAPSULE | Refills: 0 | Status: SHIPPED | OUTPATIENT
Start: 2025-06-04

## 2025-06-04 RX ORDER — AZITHROMYCIN 250 MG/1
TABLET, FILM COATED ORAL
Qty: 6 TABLET | Refills: 0 | Status: SHIPPED | OUTPATIENT
Start: 2025-06-04 | End: 2025-06-08

## 2025-06-04 NOTE — PATIENT INSTRUCTIONS
Will treat with azithromycin  Tessalon perles as needed for cough  Advised Flonase twice daily, second-generation antihistamine such as Zyrtec daily, mucinex twice daily  Advised increased rest, fluid intake  Discussed symptomatic care including salt water gargles for sore throat, steam showers for congestion, warm liquids   Discussed red flag symptoms including going to the ER with chest pain or shortness of breath  Discussed course of illness could be 1 to 2 weeks.   Return to the office for reevaluation if symptoms do not improve in 1-2 weeks

## 2025-06-04 NOTE — TELEPHONE ENCOUNTER
"REASON FOR CONVERSATION: Wheezing    SYMPTOMS: Patient started with a cough 2 weeks ago. She has moderated productive cough for yellow, occasional wheezing at night, runny nose and fatigue  OTC medicine not working  OTHER HEALTH INFORMATION: no    PROTOCOL DISPOSITION: See Today or Tomorrow in Office    CARE ADVICE PROVIDED: OV today 2:20 pm  Prevent dehydration  Coughing spasm treatment-warm fluids, warm mist, cough drops   CALL BACK IF:  * Difficulty breathing  * Cough lasts more than 3 weeks  * Fever lasts more than 3 days  * You become worse    PRACTICE FOLLOW-UP: patient was hoping for late ov (after 3:30 pm) if one becomes available    Reason for Disposition   Continuous (nonstop) coughing interferes with work or school and no improvement using cough treatment per Care Advice    Answer Assessment - Initial Assessment Questions  1. ONSET: \"When did the cough begin?\"       2 weeks   2. SEVERITY: \"How bad is the cough today?\"       moderate  3. SPUTUM: \"Describe the color of your sputum\" (e.g., none, dry cough; clear, white, yellow, green)      Yellow tint  4. HEMOPTYSIS: \"Are you coughing up any blood?\" If Yes, ask: \"How much?\" (e.g., flecks, streaks, tablespoons, etc.)      no  5. DIFFICULTY BREATHING: \"Are you having difficulty breathing?\" If Yes, ask: \"How bad is it?\" (e.g., mild, moderate, severe)       Mild when coughing   6. FEVER: \"Do you have a fever?\" If Yes, ask: \"What is your temperature, how was it measured, and when did it start?\"      no  7. CARDIAC HISTORY: \"Do you have any history of heart disease?\" (e.g., heart attack, congestive heart failure)       no  8. LUNG HISTORY: \"Do you have any history of lung disease?\"  (e.g., pulmonary embolus, asthma, emphysema)      no  9. PE RISK FACTORS: \"Do you have a history of blood clots?\" (or: recent major surgery, recent prolonged travel, bedridden)      no  10. OTHER SYMPTOMS: \"Do you have any other symptoms?\" (e.g., runny nose, wheezing, chest pain)       " " Wheezing at night, runny nose, fatigue   11. PREGNANCY: \"Is there any chance you are pregnant?\" \"When was your last menstrual period?\"        no  12. TRAVEL: \"Have you traveled out of the country in the last month?\" (e.g., travel history, exposures)        no    Protocols used: Cough-Adult-[place]    "

## 2025-06-04 NOTE — PROGRESS NOTES
Name: Sakshi Tirado      : 1965      MRN: [id number]  Encounter Provider: Corry Arreguin PA-C  Encounter Date: 2025   Encounter department: [facility]  :  Assessment & Plan  Acute bronchitis, unspecified organism  Given timeline of symptoms, will treat with azithromycin  Prescription provided for Tessalon perles for symptom relief. Recommend discontinuing Robitussin syrup if not helping symptoms  Recommend continuing OTC supportive care including hydration, rest, Zyrtec, saline nasal spray, also discussed local honey as a natural cough reliever.   Advised to return if her symptoms worsen or do not improve, and seek care if she develops fever/chills/shortness of breath.  Orders:    azithromycin (ZITHROMAX) 250 mg tablet; Take 2 tablets today then 1 tablet daily x 4 days    benzonatate (TESSALON) 200 MG capsule; Take 1 capsule (200 mg total) by mouth 3 (three) times a day as needed for cough           History of Present Illness   Sakshi Tirado is a 59-year-old female presenting with cough and congestion x 2 weeks. Her symptoms originally started with sore throat and headache/congestion 2 weeks ago. The sore throat and headaches have resolved but she is still complaining of cough and rhinorrhea.    The cough is occasionally productive of yellow mucus. She denies hemoptysis, fever, chills, nausea/vomiting. No known sick contacts.  She denies shortness of breath but says her  hears her wheezing/snoring at night. She has no history of asthma or COPD. She is a former tobacco smoker.    For her symptoms she has tried Robitussin syrup, saline nasal spray, salt water gargles, and cough drops, all with minimum relief. She also takes Zyrtec daily for seasonal allergies.          Cough  Associated symptoms include postnasal drip, rhinorrhea and wheezing. Pertinent negatives include no chest pain, chills, ear pain, eye redness, fever, headaches, myalgias, sore throat  "or shortness of breath.   Wheezing  Associated symptoms include coughing, rhinorrhea and wheezing. Pertinent negatives include no chest pain, dizziness, fatigue, leg swelling, palpitations or sore throat.     Review of Systems   Constitutional:  Negative for chills, fatigue and fever.   HENT:  Positive for congestion, postnasal drip and rhinorrhea. Negative for ear pain, sinus pressure, sinus pain, sore throat and trouble swallowing.    Eyes:  Negative for discharge and redness.   Respiratory:  Positive for cough and wheezing. Negative for shortness of breath.    Cardiovascular:  Negative for chest pain, palpitations and leg swelling.   Gastrointestinal:  Negative for abdominal pain, constipation, diarrhea, nausea and vomiting.   Musculoskeletal:  Negative for myalgias.   Neurological:  Negative for dizziness, weakness, light-headedness and headaches.       Objective   /64 (BP Location: Left arm, Patient Position: Sitting, Cuff Size: Adult)   Pulse 92   Temp 98 °F (36.7 °C) (Temporal)   Ht 5' 4\" (1.626 m)   Wt 70.3 kg (155 lb)   SpO2 97%   BMI 26.61 kg/m²      Physical Exam  Vitals reviewed.   Constitutional:       General: She is not in acute distress.     Appearance: Normal appearance. She is not ill-appearing.   HENT:      Head: Normocephalic and atraumatic.      Right Ear: Ear canal and external ear normal. A middle ear effusion is present. Tympanic membrane is not erythematous.      Left Ear: Ear canal and external ear normal. A middle ear effusion is present. Tympanic membrane is not erythematous.      Nose: Rhinorrhea present.      Right Sinus: No maxillary sinus tenderness or frontal sinus tenderness.      Left Sinus: No maxillary sinus tenderness or frontal sinus tenderness.      Mouth/Throat:      Mouth: Mucous membranes are moist.      Pharynx: Oropharynx is clear. Uvula midline. No pharyngeal swelling, oropharyngeal exudate, posterior oropharyngeal erythema or postnasal drip.      Tonsils: No " tonsillar exudate.     Eyes:      General: No scleral icterus.        Right eye: No discharge.         Left eye: No discharge.      Conjunctiva/sclera: Conjunctivae normal.       Cardiovascular:      Rate and Rhythm: Normal rate and regular rhythm.      Heart sounds: Normal heart sounds. No murmur heard.     No friction rub. No gallop.   Pulmonary:      Effort: Pulmonary effort is normal. No respiratory distress.      Breath sounds: Normal breath sounds. No wheezing, rhonchi or rales.     Musculoskeletal:      Cervical back: Neck supple. No tenderness.      Right lower leg: No edema.      Left lower leg: No edema.   Lymphadenopathy:      Cervical: No cervical adenopathy.     Skin:     General: Skin is warm and dry.      Capillary Refill: Capillary refill takes less than 2 seconds.     Neurological:      General: No focal deficit present.      Mental Status: She is alert and oriented to person, place, and time. Mental status is at baseline.     Psychiatric:         Mood and Affect: Mood normal.         Behavior: Behavior normal.       Disclaimer: This note was generated with voice recognition software.  Phonetic, grammatical, and spelling errors may be present as a result.  Please contact provider with any concerns or questions

## 2025-07-21 ENCOUNTER — APPOINTMENT (OUTPATIENT)
Dept: PHYSICAL THERAPY | Facility: MEDICAL CENTER | Age: 60
End: 2025-07-21

## 2025-07-21 PROCEDURE — 97530 THERAPEUTIC ACTIVITIES: CPT | Performed by: PHYSICAL THERAPIST

## (undated) DEVICE — ACE WRAP 6 IN STERILE

## (undated) DEVICE — LUBRICANT JELLY SURGILUBE TUBE 4OZ FLIP TOP

## (undated) DEVICE — CATH URET .038 10FR 50CM DUAL LUMEN

## (undated) DEVICE — 3M™ TEGADERM™ TRANSPARENT FILM DRESSING FRAME STYLE, 1627, 4 IN X 10 IN (10 CM X 25 CM), 20/CT 4CT/CASE: Brand: 3M™ TEGADERM™

## (undated) DEVICE — 3M™ TEGADERM™ TRANSPARENT FILM DRESSING FRAME STYLE, 1626W, 4 IN X 4-3/4 IN (10 CM X 12 CM), 50/CT 4CT/CASE: Brand: 3M™ TEGADERM™

## (undated) DEVICE — GLOVE SRG BIOGEL 8

## (undated) DEVICE — GLOVE SRG BIOGEL ECLIPSE 7.5

## (undated) DEVICE — HOOD: Brand: FLYTE, SURGICOOL

## (undated) DEVICE — FIBER STD QUANTA 272 MICRON

## (undated) DEVICE — SHEATH URETERAL ACCESS 10/12FR 35CM PROXIS

## (undated) DEVICE — SCD SEQUENTIAL COMPRESSION COMFORT SLEEVE MEDIUM KNEE LENGTH: Brand: KENDALL SCD

## (undated) DEVICE — SHEATH URETERAL ACCESS 12/14FR 35CM PROXIS

## (undated) DEVICE — SYRINGE 50ML LL

## (undated) DEVICE — INTENDED FOR TISSUE SEPARATION, AND OTHER PROCEDURES THAT REQUIRE A SHARP SURGICAL BLADE TO PUNCTURE OR CUT.: Brand: BARD-PARKER SAFETY BLADES SIZE 15, STERILE

## (undated) DEVICE — PACK TUR

## (undated) DEVICE — SYRINGE 10ML LL

## (undated) DEVICE — BASKET SPECIMEN RETRIVAL 1.9FR 120CM

## (undated) DEVICE — UROCATCH BAG

## (undated) DEVICE — GUIDEWIRE STRGHT TIP 0.035 IN  SOLO PLUS

## (undated) DEVICE — GLOVE INDICATOR PI UNDERGLOVE SZ 8 BLUE

## (undated) DEVICE — CATH URETERAL 5FR X 70 CM FLEX TIP POLYUR BARD

## (undated) DEVICE — SUT VICRYL PLUS 1 CTB-1 36 IN VCPB947H

## (undated) DEVICE — 3M™ TEGADERM™ TRANSPARENT FILM DRESSING FRAME STYLE, 1624W, 2-3/8 IN X 2-3/4 IN (6 CM X 7 CM), 100/CT 4CT/CASE: Brand: 3M™ TEGADERM™

## (undated) DEVICE — THE SIMPULSE SOLO SYSTEM WITH ULTREX RETRACTABLE SPLASH SHIELD TIP: Brand: SIMPULSE SOLO

## (undated) DEVICE — SPINNING CEMENT MIXING BOWL

## (undated) DEVICE — PADDING CAST 3IN COTTON STRL

## (undated) DEVICE — LASER FIBER HOLMIUM 272MICRON

## (undated) DEVICE — STOCKINETTE REGULAR

## (undated) DEVICE — GLOVE SRG BIOGEL ECLIPSE 8

## (undated) DEVICE — SPECIMEN CONTAINER STERILE PEEL PACK

## (undated) DEVICE — SUT ETHILON 3-0 PS-1 18 IN 1663G

## (undated) DEVICE — PADDING CAST 4 IN  COTTON STRL

## (undated) DEVICE — TUBING SUCTION 5MM X 12 FT

## (undated) DEVICE — COOL TEMP PAD

## (undated) DEVICE — STERILE ALLENTOWN HAND PACK: Brand: CARDINAL HEALTH

## (undated) DEVICE — 3M™ IOBAN™ 2 ANTIMICROBIAL INCISE DRAPE 6650EZ: Brand: IOBAN™ 2

## (undated) DEVICE — PREMIUM DRY TRAY LF: Brand: MEDLINE INDUSTRIES, INC.

## (undated) DEVICE — CUFF TOURNIQUET 18 X 4 IN QUICK CONNECT DISP 1 BLADDER

## (undated) DEVICE — ENDOSCOPIC VALVE WITH ADAPTER.: Brand: SURSEAL® II

## (undated) DEVICE — DRAPE EQUIPMENT RF WAND

## (undated) DEVICE — CUFF TOURNIQUET 30 X 4 IN QUICK CONNECT DISP 1BLA

## (undated) DEVICE — LASER HOLMIUM FIBER 365 MIC

## (undated) DEVICE — BASKET STONE RTRVL PLTN CL 3FR 115CM

## (undated) DEVICE — SINGLE-USE DIGITAL FLEXIBLE URETEROSCOPE: Brand: APTRA

## (undated) DEVICE — GAUZE SPONGES,16 PLY: Brand: CURITY

## (undated) DEVICE — SPONGE PVP SCRUB WING STERILE

## (undated) DEVICE — BETHLEHEM UNIV TOTAL KNEE, KIT: Brand: CARDINAL HEALTH

## (undated) DEVICE — DUAL CUT SAGITTAL BLADE

## (undated) DEVICE — CAPIT KNEE ATTUNE FB CEMENT - DEPUY

## (undated) DEVICE — NO-SCRATCH ™ SMALL WHITNEY CURETTE ™ IS A SINGLE-USE, PLASTIC CURETTE FOR QUICKLY APPLYING, MANIPULATING AND REMOVING BONE CEMENT DURING HIP AND KNEE REPLACEMENT SURGERY. THE PLASTIC IS SOFTER THAN STEEL INSTRUMENTS, REDUCING THE RISK OF DAMAGING THE PROSTHESIS WITH METAL INSTRUMENTS.  THE CURETTE’S 6MM TIP REMOVES EXCESS CEMENT FROM REPLACEMENT HIPS AND KNEES. EASY-TO-MANEUVER, THE SMALL BLUE CURETTE LETS YOU REMOVE CEMENT FROM ALL EDGES OF THE PROSTHESIS.NO-SCRATCH WHITNEY SMALL CURETTE FEATURES:SAFER THAN STEEL- MADE OF PLASTIC - STURDY YET SOFTER THAN SURGICAL STEEL.HANDIER- EACH TOOL HAS A MOLDED-IN THUMB INDENTATION INSTANTLY ORIENTING THE TOOL.- EASIER TO MANEUVER IN HARD TO SEE PLACES.- COLOR-CODED FOR EASY IDENTIFICATION.FASTER- COMES INDIVIDUALLY PACKAGED IN STERILE, PEEL OPEN POUCH, READY TO GO.- APPLIES, MANIPULATES, OR REMOVES CEMENT WITH FINGERTIP PRECISION.ECONOMICAL- THE COST OF A SINGLE REVISION DWARFS THE COST OF A SINGLE-USE CURETTE. - DISPOSABLE – THERE’S NO NEED TO WASTE TIME REMOVING HARDENED CEMENT OR RE-STERILIZING TOOLS.- LESS EXPENSIVE TO BUY AND INVENTORY - ORDER ONLY THE TOOL YOU USE.- PACKAGED 25 INDIVIDUALLY WRAPPED TOOLS TO A CARTON FOR CONVENIENT SHELF STORAGE.: Brand: WHITNEY NO-SCRATCH CURETTE (SMALL)

## (undated) DEVICE — SILVER-COATED ANTIMICROBIAL BARRIER DRESSING: Brand: ACTICOAT   4" X 8"

## (undated) DEVICE — DRAPE SHEET X-LG

## (undated) DEVICE — GLOVE SRG BIOGEL ORTHOPEDIC 7.5

## (undated) DEVICE — JP 3-SPRING RES W/10FR PVC DRAIN/TR: Brand: CARDINAL HEALTH

## (undated) DEVICE — SINGLE PORT MANIFOLD: Brand: NEPTUNE 2

## (undated) DEVICE — OCCLUSIVE GAUZE STRIP,3% BISMUTH TRIBROMOPHENATE IN PETROLATUM BLEND: Brand: XEROFORM

## (undated) DEVICE — RESOLUTION CLIP 2.8MM X 235CM STR LF DISP

## (undated) DEVICE — SUT VICRYL PLUS 2-0 CTB-1 27 IN VCPB259H

## (undated) DEVICE — GLOVE SRG BIOGEL 7

## (undated) DEVICE — TRAY FOLEY 16FR URIMETER SURESTEP

## (undated) DEVICE — GLOVE SRG BIOGEL 7.5

## (undated) DEVICE — CHLORAPREP HI-LITE 26ML ORANGE

## (undated) DEVICE — EXIDINE 4 PCT

## (undated) DEVICE — FIBER STD QUANTA 200 MICRON

## (undated) DEVICE — CHLORHEXIDINE 4PCT 4 OZ

## (undated) DEVICE — RECIPROCATING BLADE, DOUBLE SIDED, OFFSET  (70.0 X 0.64 X 12.6MM)

## (undated) DEVICE — ABDOMINAL PAD: Brand: DERMACEA

## (undated) DEVICE — BSKT SKYLITE STONE RETRV 3FR 120CM 4WIRE TIPLESS

## (undated) DEVICE — GLOVE INDICATOR PI UNDERGLOVE SZ 8.5 BLUE

## (undated) DEVICE — LIGHT HANDLE COVER SLEEVE DISP BLUE STELLAR

## (undated) DEVICE — PLUMEPEN PRO 10FT

## (undated) DEVICE — PAD GROUNDING ADULT